# Patient Record
Sex: MALE | Race: WHITE | NOT HISPANIC OR LATINO
[De-identification: names, ages, dates, MRNs, and addresses within clinical notes are randomized per-mention and may not be internally consistent; named-entity substitution may affect disease eponyms.]

---

## 2017-03-01 ENCOUNTER — APPOINTMENT (OUTPATIENT)
Age: 52
End: 2017-03-01

## 2017-03-01 ENCOUNTER — APPOINTMENT (OUTPATIENT)
Dept: ORTHOPEDIC SURGERY | Facility: HOSPITAL | Age: 52
End: 2017-03-01

## 2017-03-01 ENCOUNTER — OUTPATIENT (OUTPATIENT)
Dept: OUTPATIENT SERVICES | Facility: HOSPITAL | Age: 52
LOS: 1 days | End: 2017-03-01
Payer: COMMERCIAL

## 2017-03-01 VITALS
BODY MASS INDEX: 31.52 KG/M2 | SYSTOLIC BLOOD PRESSURE: 122 MMHG | DIASTOLIC BLOOD PRESSURE: 78 MMHG | HEART RATE: 109 BPM | WEIGHT: 252.2 LBS

## 2017-03-01 PROCEDURE — 72170 X-RAY EXAM OF PELVIS: CPT | Mod: 26

## 2017-03-01 PROCEDURE — 73562 X-RAY EXAM OF KNEE 3: CPT | Mod: 26,76,RT

## 2017-03-02 DIAGNOSIS — M16.0 BILATERAL PRIMARY OSTEOARTHRITIS OF HIP: ICD-10-CM

## 2017-03-02 DIAGNOSIS — M17.0 BILATERAL PRIMARY OSTEOARTHRITIS OF KNEE: ICD-10-CM

## 2017-05-03 ENCOUNTER — OUTPATIENT (OUTPATIENT)
Dept: OUTPATIENT SERVICES | Facility: HOSPITAL | Age: 52
LOS: 1 days | End: 2017-05-03

## 2017-05-03 ENCOUNTER — APPOINTMENT (OUTPATIENT)
Dept: ORTHOPEDIC SURGERY | Facility: HOSPITAL | Age: 52
End: 2017-05-03

## 2017-05-03 VITALS
WEIGHT: 247.57 LBS | DIASTOLIC BLOOD PRESSURE: 82 MMHG | BODY MASS INDEX: 30.94 KG/M2 | HEART RATE: 103 BPM | SYSTOLIC BLOOD PRESSURE: 133 MMHG

## 2017-05-03 DIAGNOSIS — M17.0 BILATERAL PRIMARY OSTEOARTHRITIS OF KNEE: ICD-10-CM

## 2017-05-04 DIAGNOSIS — M17.0 BILATERAL PRIMARY OSTEOARTHRITIS OF KNEE: ICD-10-CM

## 2017-05-12 ENCOUNTER — EMERGENCY (EMERGENCY)
Facility: HOSPITAL | Age: 52
LOS: 1 days | End: 2017-05-12
Attending: EMERGENCY MEDICINE
Payer: MEDICARE

## 2017-05-12 VITALS
SYSTOLIC BLOOD PRESSURE: 148 MMHG | DIASTOLIC BLOOD PRESSURE: 98 MMHG | OXYGEN SATURATION: 96 % | TEMPERATURE: 99 F | RESPIRATION RATE: 16 BRPM | HEART RATE: 106 BPM

## 2017-05-12 PROCEDURE — 99283 EMERGENCY DEPT VISIT LOW MDM: CPT | Mod: 25

## 2017-05-12 PROCEDURE — 10060 I&D ABSCESS SIMPLE/SINGLE: CPT

## 2017-05-12 RX ADMIN — Medication 1 APPLICATION(S): at 13:25

## 2017-05-12 NOTE — ED ADULT TRIAGE NOTE - CHIEF COMPLAINT QUOTE
Pt sent from [facility] with small abscess and pain to R buttocks  x 2 days.  Pt on augmentin

## 2017-05-12 NOTE — ED PROVIDER NOTE - ATTENDING CONTRIBUTION TO CARE
Eriberto ELLINGTON- 50 y/o M with h/o bph, htn, obesity, bipolar and schizoaffective disorder from [facility] sent for evaluation of left buttock abscess. Pt states that he wears pull ups and pads and noticed a pimple which he scratched 5 days foley and got worse, never had abscess before and no fhx of dm or abscess    pt is alert, well appearing female, s1s2 normal reg, b/l clear breathe sounds, abd soft, nt,  nd, neuro exam aox3, cn 2-12 intact, skin warm, dry, good turgor, left buttock abscess adjacent to mid crease measuring 2 cm x 1.5 cm in size with diaper rash noted in that region    will perform I& d and discharge with clotrimazole cream and powder, advised to k eep it dry and clean, apply warm compressed

## 2017-05-12 NOTE — ED PROVIDER NOTE - OBJECTIVE STATEMENT
52 y/o male w/ hypthyroid, bph, bipolar d/o sent in from [facility] for right buttock abscess. patient reports it started 2 days ago as a small pumple now double in seize. started on antibiotics 2 days ago with no relief. No nausea vomiting or fever. No vomiting or diarrhea. 52 y/o male w/ hypothyroid, bph, bipolar d/o sent in from [facility] for right buttock abscess. Patient reports it started 2 days ago as a small pimple now double in size today.  Given Augmentin 2 days ago with no relief. No nausea vomiting or fever. No vomiting or diarrhea. ON ROS, patient has no other complaints.

## 2017-06-15 ENCOUNTER — EMERGENCY (EMERGENCY)
Facility: HOSPITAL | Age: 52
LOS: 1 days | Discharge: HOME | End: 2017-06-15
Attending: EMERGENCY MEDICINE | Admitting: EMERGENCY MEDICINE
Payer: COMMERCIAL

## 2017-06-15 VITALS
SYSTOLIC BLOOD PRESSURE: 150 MMHG | DIASTOLIC BLOOD PRESSURE: 83 MMHG | OXYGEN SATURATION: 100 % | TEMPERATURE: 99 F | HEART RATE: 97 BPM | RESPIRATION RATE: 14 BRPM

## 2017-06-15 PROCEDURE — 10061 I&D ABSCESS COMP/MULTIPLE: CPT

## 2017-06-15 PROCEDURE — 99282 EMERGENCY DEPT VISIT SF MDM: CPT | Mod: 25

## 2017-06-15 NOTE — ED PROVIDER NOTE - GASTROINTESTINAL, MLM
Abdomen soft, non-tender, no guarding.  2 cm lesion on right buttock, tender, no exudate, erythematous and firm.

## 2017-06-15 NOTE — ED PROVIDER NOTE - PROGRESS NOTE DETAILS
Abscess incised and drained.  Per Ran[facility] has its own pharmacy, and we just need to write on the instructions the medications we want.  Patient instructed to take Clindamycin 300mg 4 days a day for 1 week.

## 2017-06-15 NOTE — ED PROVIDER NOTE - GASTROINTESTINAL NEGATIVE STATEMENT, MLM
no abdominal pain, no bloating, no constipation, no diarrhea, no nausea and no vomiting.  Gluteal lesion per HPI

## 2017-06-15 NOTE — ED PROVIDER NOTE - ATTENDING CONTRIBUTION TO CARE
Dr. Love:  I have personally performed a face to face bedside history and physical examination of this patient. I have discussed the history, examination, review of systems, assessment and plan of management with the resident. I have reviewed the electronic medical record and amended it to reflect my history, review of systems, physical exam, assessment and plan.    51M h/o schizoaffective disorder sent from [facility] for right buttock pain and swelling and possibly recurrent abscess.  Patient had needle aspiration of abscess in that location approximately one month prior.  Denies constitutional symptoms.    Exam:  - nad  - rrr  - ctab  - abd soft, ntnd  - +0.5cm area of fluctuance with surrounding erythema and induration to medial side of right buttock, no rectal/anal involvement on exam    A/P  - abscess and cellulitis  - I&D, clindamycin  - f/u PCP

## 2017-06-15 NOTE — ED ADULT TRIAGE NOTE - CHIEF COMPLAINT QUOTE
Patient was sent from [facility] patient complains of pain to the right buttocks area states he has an abscess.

## 2017-06-15 NOTE — ED PROVIDER NOTE - OBJECTIVE STATEMENT
51 y.o. Male PMH schizoaffective d/o, enuresis, recent Needle drainage of gluteal abscess presents from [place] with return of painful guteal lesion.  No fevers, no painful defecation as lesion is away from anus more on cheek.  Patient wears a diaper at night for enuresis.

## 2017-06-23 ENCOUNTER — EMERGENCY (EMERGENCY)
Facility: HOSPITAL | Age: 52
LOS: 1 days | Discharge: HOME | End: 2017-06-23
Attending: EMERGENCY MEDICINE | Admitting: EMERGENCY MEDICINE
Payer: COMMERCIAL

## 2017-06-23 VITALS
TEMPERATURE: 98 F | DIASTOLIC BLOOD PRESSURE: 95 MMHG | OXYGEN SATURATION: 98 % | SYSTOLIC BLOOD PRESSURE: 130 MMHG | HEART RATE: 85 BPM | RESPIRATION RATE: 22 BRPM

## 2017-06-23 VITALS
OXYGEN SATURATION: 97 % | DIASTOLIC BLOOD PRESSURE: 86 MMHG | TEMPERATURE: 98 F | HEART RATE: 103 BPM | SYSTOLIC BLOOD PRESSURE: 130 MMHG | RESPIRATION RATE: 20 BRPM

## 2017-06-23 LAB
ALBUMIN SERPL ELPH-MCNC: 4.2 G/DL — SIGNIFICANT CHANGE UP (ref 3.3–5)
ALP SERPL-CCNC: 103 U/L — SIGNIFICANT CHANGE UP (ref 40–120)
ALT FLD-CCNC: 55 U/L — HIGH (ref 4–41)
AST SERPL-CCNC: 31 U/L — SIGNIFICANT CHANGE UP (ref 4–40)
BASE EXCESS BLDV CALC-SCNC: 4.3 MMOL/L — SIGNIFICANT CHANGE UP
BASOPHILS # BLD AUTO: 0.07 K/UL — SIGNIFICANT CHANGE UP (ref 0–0.2)
BASOPHILS NFR BLD AUTO: 1 % — SIGNIFICANT CHANGE UP (ref 0–2)
BILIRUB SERPL-MCNC: 0.3 MG/DL — SIGNIFICANT CHANGE UP (ref 0.2–1.2)
BLOOD GAS VENOUS - CREATININE: 0.67 MG/DL — SIGNIFICANT CHANGE UP (ref 0.5–1.3)
BUN SERPL-MCNC: 15 MG/DL — SIGNIFICANT CHANGE UP (ref 7–23)
CALCIUM SERPL-MCNC: 10.7 MG/DL — HIGH (ref 8.4–10.5)
CHLORIDE BLDV-SCNC: 106 MMOL/L — SIGNIFICANT CHANGE UP (ref 96–108)
CHLORIDE SERPL-SCNC: 106 MMOL/L — SIGNIFICANT CHANGE UP (ref 98–107)
CK MB BLD-MCNC: 5.93 NG/ML — SIGNIFICANT CHANGE UP (ref 1–6.6)
CK SERPL-CCNC: 218 U/L — HIGH (ref 30–200)
CO2 SERPL-SCNC: 27 MMOL/L — SIGNIFICANT CHANGE UP (ref 22–31)
CREAT SERPL-MCNC: 0.91 MG/DL — SIGNIFICANT CHANGE UP (ref 0.5–1.3)
EOSINOPHIL # BLD AUTO: 0.06 K/UL — SIGNIFICANT CHANGE UP (ref 0–0.5)
EOSINOPHIL NFR BLD AUTO: 0.9 % — SIGNIFICANT CHANGE UP (ref 0–6)
GAS PNL BLDV: 138 MMOL/L — SIGNIFICANT CHANGE UP (ref 136–146)
GLUCOSE BLDV-MCNC: 102 — HIGH (ref 70–99)
GLUCOSE SERPL-MCNC: 99 MG/DL — SIGNIFICANT CHANGE UP (ref 70–99)
HCO3 BLDV-SCNC: 28 MMOL/L — HIGH (ref 20–27)
HCT VFR BLD CALC: 46.4 % — SIGNIFICANT CHANGE UP (ref 39–50)
HCT VFR BLDV CALC: 46.3 % — SIGNIFICANT CHANGE UP (ref 39–51)
HGB BLD-MCNC: 14.7 G/DL — SIGNIFICANT CHANGE UP (ref 13–17)
HGB BLDV-MCNC: 15.1 G/DL — SIGNIFICANT CHANGE UP (ref 13–17)
IMM GRANULOCYTES NFR BLD AUTO: 0.1 % — SIGNIFICANT CHANGE UP (ref 0–1.5)
LACTATE BLDV-MCNC: 1 MMOL/L — SIGNIFICANT CHANGE UP (ref 0.5–2)
LYMPHOCYTES # BLD AUTO: 1.49 K/UL — SIGNIFICANT CHANGE UP (ref 1–3.3)
LYMPHOCYTES # BLD AUTO: 21.7 % — SIGNIFICANT CHANGE UP (ref 13–44)
MCHC RBC-ENTMCNC: 28.4 PG — SIGNIFICANT CHANGE UP (ref 27–34)
MCHC RBC-ENTMCNC: 31.7 % — LOW (ref 32–36)
MCV RBC AUTO: 89.6 FL — SIGNIFICANT CHANGE UP (ref 80–100)
MONOCYTES # BLD AUTO: 0.34 K/UL — SIGNIFICANT CHANGE UP (ref 0–0.9)
MONOCYTES NFR BLD AUTO: 4.9 % — SIGNIFICANT CHANGE UP (ref 2–14)
NEUTROPHILS # BLD AUTO: 4.91 K/UL — SIGNIFICANT CHANGE UP (ref 1.8–7.4)
NEUTROPHILS NFR BLD AUTO: 71.4 % — SIGNIFICANT CHANGE UP (ref 43–77)
PCO2 BLDV: 41 MMHG — SIGNIFICANT CHANGE UP (ref 41–51)
PH BLDV: 7.45 PH — HIGH (ref 7.32–7.43)
PLATELET # BLD AUTO: 342 K/UL — SIGNIFICANT CHANGE UP (ref 150–400)
PMV BLD: 9.5 FL — SIGNIFICANT CHANGE UP (ref 7–13)
PO2 BLDV: 120 MMHG — HIGH (ref 35–40)
POTASSIUM BLDV-SCNC: 4.1 MMOL/L — SIGNIFICANT CHANGE UP (ref 3.4–4.5)
POTASSIUM SERPL-MCNC: 4.4 MMOL/L — SIGNIFICANT CHANGE UP (ref 3.5–5.3)
POTASSIUM SERPL-SCNC: 4.4 MMOL/L — SIGNIFICANT CHANGE UP (ref 3.5–5.3)
PROT SERPL-MCNC: 6.8 G/DL — SIGNIFICANT CHANGE UP (ref 6–8.3)
RBC # BLD: 5.18 M/UL — SIGNIFICANT CHANGE UP (ref 4.2–5.8)
RBC # FLD: 13.9 % — SIGNIFICANT CHANGE UP (ref 10.3–14.5)
SAO2 % BLDV: 99.1 % — HIGH (ref 60–85)
SODIUM SERPL-SCNC: 143 MMOL/L — SIGNIFICANT CHANGE UP (ref 135–145)
TROPONIN T SERPL-MCNC: < 0.06 NG/ML — SIGNIFICANT CHANGE UP (ref 0–0.06)
WBC # BLD: 6.88 K/UL — SIGNIFICANT CHANGE UP (ref 3.8–10.5)
WBC # FLD AUTO: 6.88 K/UL — SIGNIFICANT CHANGE UP (ref 3.8–10.5)

## 2017-06-23 PROCEDURE — 99284 EMERGENCY DEPT VISIT MOD MDM: CPT

## 2017-06-23 PROCEDURE — 71020: CPT | Mod: 26

## 2017-06-23 NOTE — ED ADULT NURSE NOTE - OBJECTIVE STATEMENT
Pt received to Rm 09, with c/o of pressure-like pain to the midsternal chest area (6 out of 10) starting since last night. Pt denies SOB/chest pain/N/V/D/dizzines. EKG was performed. ED provider was at bedside evaluating. Pt is AxOx4 and  in NAD at this time, NSR on monitor. Lab sent and aide from [facility] is at bedside. 20G PIV is placed to right hand. will continue to monitor

## 2017-06-23 NOTE — ED PROVIDER NOTE - PLAN OF CARE
Follow up with your PMD. Rest, drink plenty of fluids.  Advance activity as tolerated.  Continue all previously prescribed medications as directed. You can use motrin 600mg every 6-8 hours for pain or fever, and/or Tylenol 650 mg every 4 hours for pain/fever. Follow up with your primary care physician in 48-72 hours- bring copies of your results.  Return to the emergency department for chest pain, shortness of breath, dizziness, or worsening, concerning, or persistent symptoms.

## 2017-06-23 NOTE — ED PROVIDER NOTE - CARE PLAN
Principal Discharge DX:	Chest pressure Principal Discharge DX:	Chest pressure  Instructions for follow-up, activity and diet:	Follow up with your PMD. Rest, drink plenty of fluids.  Advance activity as tolerated.  Continue all previously prescribed medications as directed. You can use motrin 600mg every 6-8 hours for pain or fever, and/or Tylenol 650 mg every 4 hours for pain/fever. Follow up with your primary care physician in 48-72 hours- bring copies of your results.  Return to the emergency department for chest pain, shortness of breath, dizziness, or worsening, concerning, or persistent symptoms.

## 2017-06-23 NOTE — ED PROVIDER NOTE - MEDICAL DECISION MAKING DETAILS
50 yo M here for chest pressure x 2 days, constant. Reproducible with palpation to anterior thorax. No other symptoms. EKG normal. PLAN: labs. cxr.

## 2017-06-23 NOTE — ED ADULT TRIAGE NOTE - CHIEF COMPLAINT QUOTE
pt from [facility] with staff member. pt c/o chest pressure substernal non radiating, began yesterday. Denies SOB or LUZ MARINA, pt appears comfortable

## 2017-06-23 NOTE — ED ADULT NURSE NOTE - CHPI ED SYMPTOMS NEG
no fever/no nausea/no dizziness/no vomiting/no tingling/no weakness/no chills/no numbness/no decreased eating/drinking

## 2017-06-23 NOTE — ED PROVIDER NOTE - OBJECTIVE STATEMENT
50 yo M pmhx of schizoaffective d/o here for chest pressure x 2 days. Otherwise without complaints. Denies injury or trauma. Denies better or worse with position or exercise/rest, denies fhx of cardiac disease. denies personal hx of cardiac issues. Pt states he used to use cocaine 3 yrs ago, denies drug use/smoking since. Denies travel/surgery. Denies fever, chills, vomiting, diarrhea, HA, SOB, dizziness, weakness, abdominal pain, numbness tingling. 52 yo M pmhx of schizoaffective d/o here for chest pressure x 2 days. Otherwise without complaints. Denies injury or trauma. Denies better or worse with position or exercise/rest, denies fhx of cardiac disease. denies personal hx of cardiac issues. Pt states he used to use cocaine 3 yrs ago, denies drug use/smoking since. Denies travel/surgery. Denies fever, chills, vomiting, diarrhea, HA, SOB, dizziness, weakness, abdominal pain, numbness tingling.    Attendinyo male with schizoaffective disorder presents with 2 days of chest pain.  Pain constant, but much less intense today.  no shortness of breath.

## 2017-06-23 NOTE — ED PROVIDER NOTE - CHPI ED SYMPTOMS NEG
no fever/no vomiting/no nausea/no decreased eating/drinking/no tingling/no chills/no weakness/no dizziness/no numbness

## 2017-06-23 NOTE — ED PROVIDER NOTE - CARDIAC, MLM
Normal rate, regular rhythm.  Heart sounds S1, S2.  No murmurs, rubs or gallops. +reproducible anterior thorax tenderness

## 2017-07-12 ENCOUNTER — APPOINTMENT (OUTPATIENT)
Dept: SURGERY | Facility: CLINIC | Age: 52
End: 2017-07-12

## 2017-07-12 VITALS
TEMPERATURE: 98.4 F | HEIGHT: 75 IN | BODY MASS INDEX: 31.08 KG/M2 | HEART RATE: 103 BPM | DIASTOLIC BLOOD PRESSURE: 92 MMHG | WEIGHT: 250 LBS | SYSTOLIC BLOOD PRESSURE: 140 MMHG

## 2017-07-12 DIAGNOSIS — L72.9 FOLLICULAR CYST OF THE SKIN AND SUBCUTANEOUS TISSUE, UNSPECIFIED: ICD-10-CM

## 2017-07-20 ENCOUNTER — APPOINTMENT (OUTPATIENT)
Dept: SURGERY | Facility: HOSPITAL | Age: 52
End: 2017-07-20

## 2017-07-20 ENCOUNTER — OUTPATIENT (OUTPATIENT)
Dept: OUTPATIENT SERVICES | Facility: HOSPITAL | Age: 52
LOS: 1 days | Discharge: HOME | End: 2017-07-20
Payer: COMMERCIAL

## 2017-07-20 VITALS
OXYGEN SATURATION: 97 % | HEIGHT: 75 IN | HEART RATE: 96 BPM | TEMPERATURE: 97 F | SYSTOLIC BLOOD PRESSURE: 129 MMHG | DIASTOLIC BLOOD PRESSURE: 85 MMHG | WEIGHT: 250 LBS | RESPIRATION RATE: 16 BRPM

## 2017-07-20 VITALS
OXYGEN SATURATION: 96 % | DIASTOLIC BLOOD PRESSURE: 75 MMHG | HEART RATE: 91 BPM | SYSTOLIC BLOOD PRESSURE: 121 MMHG | RESPIRATION RATE: 16 BRPM

## 2017-07-20 DIAGNOSIS — L72.9 FOLLICULAR CYST OF THE SKIN AND SUBCUTANEOUS TISSUE, UNSPECIFIED: ICD-10-CM

## 2017-07-20 PROCEDURE — 46275 REMOVE ANAL FIST INTER: CPT

## 2017-07-20 RX ORDER — SODIUM CHLORIDE 9 MG/ML
1000 INJECTION, SOLUTION INTRAVENOUS
Qty: 0 | Refills: 0 | Status: DISCONTINUED | OUTPATIENT
Start: 2017-07-20 | End: 2017-08-04

## 2017-07-20 RX ORDER — ACETAMINOPHEN 500 MG
1000 TABLET ORAL ONCE
Qty: 0 | Refills: 0 | Status: DISCONTINUED | OUTPATIENT
Start: 2017-07-20 | End: 2017-07-20

## 2017-07-20 RX ORDER — FENTANYL CITRATE 50 UG/ML
25 INJECTION INTRAVENOUS
Qty: 0 | Refills: 0 | Status: DISCONTINUED | OUTPATIENT
Start: 2017-07-20 | End: 2017-07-20

## 2017-07-20 RX ORDER — SODIUM CHLORIDE 9 MG/ML
1000 INJECTION, SOLUTION INTRAVENOUS
Qty: 0 | Refills: 0 | Status: DISCONTINUED | OUTPATIENT
Start: 2017-07-20 | End: 2017-07-20

## 2017-07-20 RX ADMIN — SODIUM CHLORIDE 75 MILLILITER(S): 9 INJECTION, SOLUTION INTRAVENOUS at 11:50

## 2017-07-20 NOTE — ASU DISCHARGE PLAN (ADULT/PEDIATRIC). - NOTIFY
Unable to Urinate/Swelling that continues/Bleeding that does not stop/Persistent Nausea and Vomiting/Fever greater than 101

## 2017-07-20 NOTE — H&P PST ADULT - ASSESSMENT
52M resident of [facility] with PMH hypothyroid, suspected hyperparathryoid, BPH, enuresis presents for scheduled excision of right buttock cyst.

## 2017-07-20 NOTE — H&P PST ADULT - HISTORY OF PRESENT ILLNESS
52M resident of [facility] with PMH schizophrenia, hypothyroid, suspected hyperparathryoid, BPH, enuresis presents for scheduled excision of right buttock cyst.

## 2017-07-20 NOTE — ASU DISCHARGE PLAN (ADULT/PEDIATRIC). - MEDICATION SUMMARY - MEDICATIONS TO TAKE
I will START or STAY ON the medications listed below when I get home from the hospital:    naproxen 500 mg oral tablet  -- 1 tab(s) by mouth 2 times a day, As needed, severe pain  -- Indication: For .    acetaminophen 325 mg oral tablet  -- 2 tab(s) by mouth every 6 hours, As needed, Moderate Pain  -- Indication: For .    Percocet 5/325 325 mg-5 mg oral tablet  -- 1-2 tab(s) by mouth every 4 to 6 hours, As Needed MDD:6 tabs - for severe pain if aleve does not help  -- Caution federal law prohibits the transfer of this drug to any person other  than the person for whom it was prescribed.  May cause drowsiness.  Alcohol may intensify this effect.  Use care when operating dangerous machinery.  This prescription cannot be refilled.  This product contains acetaminophen.  Do not use  with any other product containing acetaminophen to prevent possible liver damage.  Using more of this medication than prescribed may cause serious breathing problems.    -- Indication: For severe pain    calcium carbonate 1250 mg (500 mg elemental calcium) oral tablet  -- 1 tab(s) by mouth once a day  -- Indication: For .    Depakote  mg oral tablet, extended release  -- 5 tab(s) by mouth once a day (at bedtime)  -- Indication: For .    clonazePAM 1 mg oral tablet  -- 1 tab(s) by mouth 2 times a day  -- Indication: For .    desmopressin 0.01% nasal solution  -- 3 spray(s) into nose   -- Indication: For .    cloZAPine 200 mg oral tablet  -- 4 tab(s) by mouth   -- Indication: For .    famotidine 20 mg oral tablet  -- 1 tab(s) by mouth 2 times a day, As needed, dyspepsia  -- Indication: For .    bisacodyl 5 mg oral delayed release tablet  -- 1 tab(s) by mouth every 12 hours, As needed, Constipation  -- Indication: For .    docusate sodium 100 mg oral capsule  -- 1 cap(s) by mouth once a day  -- Indication: For .    polyethylene glycol 3350 oral powder for reconstitution  -- 17 gram(s) by mouth once a day  -- Indication: For .    levothyroxine 175 mcg (0.175 mg) oral tablet  -- 1 tab(s) by mouth once a day  -- Indication: For .    cholecalciferol oral tablet  -- 1000 unit(s) by mouth once a day  -- Indication: For .    folic acid 1 mg oral tablet  -- 1 tab(s) by mouth once a day  -- Indication: For ..

## 2017-07-21 ENCOUNTER — TRANSCRIPTION ENCOUNTER (OUTPATIENT)
Age: 52
End: 2017-07-21

## 2017-07-25 DIAGNOSIS — R32 UNSPECIFIED URINARY INCONTINENCE: ICD-10-CM

## 2017-07-25 DIAGNOSIS — N40.0 BENIGN PROSTATIC HYPERPLASIA WITHOUT LOWER URINARY TRACT SYMPTOMS: ICD-10-CM

## 2017-07-25 DIAGNOSIS — E03.9 HYPOTHYROIDISM, UNSPECIFIED: ICD-10-CM

## 2017-07-25 DIAGNOSIS — F25.9 SCHIZOAFFECTIVE DISORDER, UNSPECIFIED: ICD-10-CM

## 2017-07-25 DIAGNOSIS — K61.0 ANAL ABSCESS: ICD-10-CM

## 2017-07-25 DIAGNOSIS — I10 ESSENTIAL (PRIMARY) HYPERTENSION: ICD-10-CM

## 2017-07-25 DIAGNOSIS — F19.19 OTHER PSYCHOACTIVE SUBSTANCE ABUSE WITH UNSPECIFIED PSYCHOACTIVE SUBSTANCE-INDUCED DISORDER: ICD-10-CM

## 2017-07-25 DIAGNOSIS — Z79.1 LONG TERM (CURRENT) USE OF NON-STEROIDAL ANTI-INFLAMMATORIES (NSAID): ICD-10-CM

## 2017-07-31 ENCOUNTER — APPOINTMENT (OUTPATIENT)
Dept: SURGERY | Facility: CLINIC | Age: 52
End: 2017-07-31
Payer: MEDICARE

## 2017-07-31 VITALS
SYSTOLIC BLOOD PRESSURE: 138 MMHG | HEIGHT: 75 IN | HEART RATE: 102 BPM | BODY MASS INDEX: 31.08 KG/M2 | WEIGHT: 250 LBS | TEMPERATURE: 98.5 F | DIASTOLIC BLOOD PRESSURE: 88 MMHG

## 2017-07-31 DIAGNOSIS — Z09 ENCOUNTER FOR FOLLOW-UP EXAMINATION AFTER COMPLETED TREATMENT FOR CONDITIONS OTHER THAN MALIGNANT NEOPLASM: ICD-10-CM

## 2017-07-31 PROCEDURE — 99024 POSTOP FOLLOW-UP VISIT: CPT

## 2017-08-14 ENCOUNTER — APPOINTMENT (OUTPATIENT)
Dept: SURGERY | Facility: CLINIC | Age: 52
End: 2017-08-14
Payer: MEDICARE

## 2017-08-14 VITALS
BODY MASS INDEX: 31.08 KG/M2 | WEIGHT: 250 LBS | HEIGHT: 75 IN | TEMPERATURE: 98.1 F | HEART RATE: 88 BPM | SYSTOLIC BLOOD PRESSURE: 122 MMHG | DIASTOLIC BLOOD PRESSURE: 75 MMHG

## 2017-08-14 PROCEDURE — 99024 POSTOP FOLLOW-UP VISIT: CPT

## 2017-08-29 NOTE — ED PROVIDER NOTE - GASTROINTESTINAL NEGATIVE STATEMENT, MLM
Accession #: [id number] no abdominal pain, no bloating, no constipation, no diarrhea, no nausea and no vomiting.

## 2017-09-06 ENCOUNTER — APPOINTMENT (OUTPATIENT)
Dept: SURGERY | Facility: CLINIC | Age: 52
End: 2017-09-06
Payer: MEDICARE

## 2017-09-06 VITALS
HEIGHT: 75 IN | SYSTOLIC BLOOD PRESSURE: 105 MMHG | BODY MASS INDEX: 31.08 KG/M2 | HEART RATE: 99 BPM | TEMPERATURE: 98.3 F | DIASTOLIC BLOOD PRESSURE: 73 MMHG | WEIGHT: 250 LBS

## 2017-09-06 PROCEDURE — 99024 POSTOP FOLLOW-UP VISIT: CPT

## 2017-10-11 ENCOUNTER — APPOINTMENT (OUTPATIENT)
Dept: SURGERY | Facility: CLINIC | Age: 52
End: 2017-10-11
Payer: MEDICARE

## 2017-10-11 VITALS
WEIGHT: 250 LBS | BODY MASS INDEX: 31.08 KG/M2 | HEIGHT: 75 IN | HEART RATE: 112 BPM | DIASTOLIC BLOOD PRESSURE: 76 MMHG | SYSTOLIC BLOOD PRESSURE: 124 MMHG | TEMPERATURE: 97.5 F

## 2017-10-11 PROCEDURE — 99024 POSTOP FOLLOW-UP VISIT: CPT

## 2017-11-09 ENCOUNTER — EMERGENCY (EMERGENCY)
Facility: HOSPITAL | Age: 52
LOS: 1 days | Discharge: HOME | End: 2017-11-09
Attending: EMERGENCY MEDICINE | Admitting: EMERGENCY MEDICINE
Payer: MEDICAID

## 2017-11-09 VITALS
DIASTOLIC BLOOD PRESSURE: 89 MMHG | RESPIRATION RATE: 16 BRPM | SYSTOLIC BLOOD PRESSURE: 137 MMHG | TEMPERATURE: 99 F | OXYGEN SATURATION: 100 % | HEART RATE: 89 BPM

## 2017-11-09 PROCEDURE — 99284 EMERGENCY DEPT VISIT MOD MDM: CPT

## 2017-11-09 NOTE — ED ADULT TRIAGE NOTE - CHIEF COMPLAINT QUOTE
pt amb to ambarturoy c/o B/L foot pain 2/2 corns x multiple months, also c/o B/L hand tingling in 5th digit, no neuro deficits noted, pt states walked to [facility] from [facility], MD Schmidt called to triage for eval

## 2017-11-09 NOTE — ED ADULT NURSE NOTE - CHIEF COMPLAINT QUOTE
pt amb to ambarturoy c/o B/L foot pain 2/2 corns x multiple months, also c/o B/L hand tingling in 5th digit, no neuro deficits noted, pt states walked to [facility] from [facility], MD Schmidt called to triage for eval

## 2017-11-09 NOTE — ED PROVIDER NOTE - MEDICAL DECISION MAKING DETAILS
pt from [facility], presenting because he doesn't want to live at [facility]. Also doesn't want to go to shelter. Advised that he needs to return to [facility]. chronic paresthesias with normal exam, unlikely emergent pathology, below test threashold. f/u pmd.  The patient was given verbal and written discharge instructions. Specifically, instructions when to return to the ED and when to seek follow-up from their pcp was discussed. Any specialty follow-up was discussed, including how to make an appointment.  Instructions were discussed in simple, plain language and was understood by the patient. The patient understands that should their symptoms worsen or any new symptoms arise, they should return to the ED immediately for further evaluation.

## 2017-11-09 NOTE — ED ADULT NURSE REASSESSMENT NOTE - NS ED NURSE REASSESS COMMENT FT1
pt returned to [facility] stating "I still don't feel fell," MD Schmidt aware, pt brought to intake chairs for further eval.

## 2017-11-09 NOTE — ED PROVIDER NOTE - OBJECTIVE STATEMENT
52m, pmhx schizoaffective, from [facility] outpatient. [facility] and [facility] not want to live there anymore. on his way to the ED, noted bleeding from right d3 toe that stopped spontaneously. also noted chronic b/l hand and feet tingling. no weakness, numbness, h/a, n/v, f/c. no other complaints.

## 2017-11-10 ENCOUNTER — APPOINTMENT (OUTPATIENT)
Dept: PODIATRY | Facility: HOSPITAL | Age: 52
End: 2017-11-10

## 2017-11-10 ENCOUNTER — EMERGENCY (EMERGENCY)
Facility: HOSPITAL | Age: 52
LOS: 1 days | Discharge: HOME | End: 2017-11-10
Attending: EMERGENCY MEDICINE | Admitting: EMERGENCY MEDICINE
Payer: MEDICAID

## 2017-11-10 ENCOUNTER — OUTPATIENT (OUTPATIENT)
Dept: OUTPATIENT SERVICES | Facility: HOSPITAL | Age: 52
LOS: 1 days | End: 2017-11-10
Payer: MEDICAID

## 2017-11-10 VITALS
SYSTOLIC BLOOD PRESSURE: 158 MMHG | BODY MASS INDEX: 30.34 KG/M2 | HEART RATE: 88 BPM | RESPIRATION RATE: 14 BRPM | HEIGHT: 75 IN | DIASTOLIC BLOOD PRESSURE: 83 MMHG | WEIGHT: 244 LBS

## 2017-11-10 VITALS
SYSTOLIC BLOOD PRESSURE: 154 MMHG | HEART RATE: 96 BPM | RESPIRATION RATE: 16 BRPM | TEMPERATURE: 98 F | DIASTOLIC BLOOD PRESSURE: 89 MMHG | OXYGEN SATURATION: 98 %

## 2017-11-10 DIAGNOSIS — B35.1 TINEA UNGUIUM: ICD-10-CM

## 2017-11-10 DIAGNOSIS — L84 CORNS AND CALLOSITIES: ICD-10-CM

## 2017-11-10 DIAGNOSIS — S90.222A CONTUSION OF LEFT LESSER TOE(S) WITH DAMAGE TO NAIL, INITIAL ENCOUNTER: ICD-10-CM

## 2017-11-10 DIAGNOSIS — M79.609 PAIN IN UNSPECIFIED LIMB: ICD-10-CM

## 2017-11-10 PROCEDURE — 99282 EMERGENCY DEPT VISIT SF MDM: CPT | Mod: GC

## 2017-11-10 PROCEDURE — G0463: CPT

## 2017-11-10 NOTE — PROVIDER CONTACT NOTE (OTHER) - ACTION/TREATMENT ORDERED:
ED attending was informed, paper work regarding insurance info provided, Pt will be transferred from ED to clinic by ambulate transportation.

## 2017-11-10 NOTE — PROVIDER CONTACT NOTE (OTHER) - BACKGROUND
Dr. eLv MD informed [facility]'s is from [facility] and need ride to the [address] [place] [address]. writer contacted [facility] and  -  Ms. Adams.

## 2017-11-10 NOTE — PROVIDER CONTACT NOTE (OTHER) - ASSESSMENT
Pt was referred to ED CM to make an appointment for Podiatry clinic, Pt Spoke to Queenie coordinator in Podiatry clinic at [phone], Pt was referred to ED CM to make an appointment for Podiatry clinic, Pt is from [facility] at [phone], facillity applied for Medicaid and [employer] coverage and is in process. Spoke to Queenie coordinator in Podiatry clinic at [phone], Clinic accepted the pt's coverage and they will bill back Medicaid. Appointment made for 12:30 PM at [address] [place] [address] , [place] [address], wound clinic, [facility] 3rd floor.

## 2017-11-10 NOTE — ED PROVIDER NOTE - OBJECTIVE STATEMENT
Pt is a 52M with a PMH of schizoaffective dx, bipolar dx, on meds, presenting with a cc of b/l foot pain 2/2 callus and ingrown nail R 3rd digit, pt was seen in [facility] ED yesterday was d/c thereafter reports did not return to [place] home, spent night on bench in [facility], reports did not want to go back to [place] w/ c/f "they're stealing money" and "bad things happen there." Reports would be happy to go to [facility]. Otherwise well, no complaints. No numbness or tingling, loss of sensation or function. Denies n/v/f/c/cp/sob. Denies headache, syncope, lightheadedness, dizziness. Denies chest palpitations, abdominal pain. Denies dysuria, hematuria, hematochezia, BRBPR, tarry stools, diarrhea, constipation. Pt is a 52M with a PMH of schizoaffective dx, bipolar dx, on meds, presenting with a cc of b/l foot pain 2/2 callus and ingrown nail R 3rd digit, pt was seen in [facility] ED yesterday was d/c thereafter reports did not return to [place] home, spent night [place] bench in [facility], reports did not want to go back to [place] w/ c/f "they're stealing money" and "bad things happen there." Reports would be happy to go to [facility]. Otherwise well, no complaints. No numbness or tingling, loss of sensation or function. Denies n/v/f/c/cp/sob. Denies headache, syncope, lightheadedness, dizziness. Denies chest palpitations, abdominal pain. Denies dysuria, hematuria, hematochezia, BRBPR, tarry stools, diarrhea, constipation.  Attending - Agree with above.  I evaluated patient myself.  Previous [facility] records reviewed.  51 y/o M hx schizoaffective disorder, patient does not know meds.  States was walking around all night because does not want to return to [facility].  To ED c/o b/l feet pains from long toenails and callouses.  Denies SI/HI/AH/VH.  No fever or other complaint.

## 2017-11-10 NOTE — ED PROVIDER NOTE - CARE PLAN
Principal Discharge DX:	Callus of foot  Instructions for follow-up, activity and diet:	Thank you for visiting our Emergency Department, it has been a pleasure taking part in your healthcare.    Your discharge diagnosis is: callus  Please follow up with your PMD within x48 hours.  Please follow up with podiatry, a list of providers has been given to you.     Please take all discharge medications as indicated below:  Take Motrin/Tylenol for pain as needed, please follow instructions on manufacturers label. If you have any questions please consult a pharmacist or your PMD.  Return precautions to the Emergency Department include: unrelenting nausea, vomiting, fever, chills, chest pain, shortness of breath, dizziness, abdominal pain, syncope, blood in urine or stool, headache that doesn't resolve, numbness or tingling, loss of sensation, loss of motor function, or any other concerning symptoms. Principal Discharge DX:	Callus of foot  Instructions for follow-up, activity and diet:	Thank you for visiting our Emergency Department, it has been a pleasure taking part in your healthcare.    Your discharge diagnosis is: callus  Please follow up with your PMD within x48 hours.  Please follow up with podiatry, a list of providers has been given to you.     Please follow up with Podiatry clinic today:   Please eval for b/l plantar/lateral callus, ingrown toe nails     Please take all discharge medications as indicated below:  Take Motrin/Tylenol for pain as needed, please follow instructions on manufacturers label. If you have any questions please consult a pharmacist or your PMD.  Return precautions to the Emergency Department include: unrelenting nausea, vomiting, fever, chills, chest pain, shortness of breath, dizziness, abdominal pain, syncope, blood in urine or stool, headache that doesn't resolve, numbness or tingling, loss of sensation, loss of motor function, or any other concerning symptoms. Principal Discharge DX:	Callus of foot  Instructions for follow-up, activity and diet:	Thank you for visiting our Emergency Department, it has been a pleasure taking part in your healthcare.    Your discharge diagnosis is: callus  Please follow up with your PMD within x48 hours.  Please follow up with podiatry, a list of providers has been given to you.     Please follow up with Podiatry clinic today:   Please eval for b/l plantar/lateral callus, ingrown toe nails     Pt from [facility]  [phone]    Please take all discharge medications as indicated below:  Take Motrin/Tylenol for pain as needed, please follow instructions on manufacturers label. If you have any questions please consult a pharmacist or your PMD.  Return precautions to the Emergency Department include: unrelenting nausea, vomiting, fever, chills, chest pain, shortness of breath, dizziness, abdominal pain, syncope, blood in urine or stool, headache that doesn't resolve, numbness or tingling, loss of sensation, loss of motor function, or any other concerning symptoms.

## 2017-11-10 NOTE — ED ADULT TRIAGE NOTE - CHIEF COMPLAINT QUOTE
[facility] [address] for b/l foot pain 2/2 overgrown nails and bleeding callus on R foot, seen and d/c'd yesterday for same, states walked to [address] to watch cars, pt appears in NAD @ this time

## 2017-11-10 NOTE — PROVIDER CONTACT NOTE (OTHER) - ASSESSMENT
[facility] and spoke with  who informed pt just started with us 2 weeks ago and his insurance has not yet kicked in. Dr. Lev MD informed pt has to be in the [facility] by 11:00 am for wound care. Writer called back and left message  - [facility] and  -  Ms. Adams requesting about the ambulette bill back. Writer discussed this case with Asst Dir of  Ms. Franco ([id number]) and agreed to pay for this trip. Writer contacted senior ride [phone] and set up wheel chair  ambulette and pt will be picked up by 11:00 am and trip # [phone].  Medical team and pt was made aware of  time.

## 2017-11-10 NOTE — ED PROVIDER NOTE - PLAN OF CARE
Thank you for visiting our Emergency Department, it has been a pleasure taking part in your healthcare.    Your discharge diagnosis is: callus  Please follow up with your PMD within x48 hours.  Please follow up with podiatry, a list of providers has been given to you.     Please take all discharge medications as indicated below:  Take Motrin/Tylenol for pain as needed, please follow instructions on manufacturers label. If you have any questions please consult a pharmacist or your PMD.  Return precautions to the Emergency Department include: unrelenting nausea, vomiting, fever, chills, chest pain, shortness of breath, dizziness, abdominal pain, syncope, blood in urine or stool, headache that doesn't resolve, numbness or tingling, loss of sensation, loss of motor function, or any other concerning symptoms. Thank you for visiting our Emergency Department, it has been a pleasure taking part in your healthcare.    Your discharge diagnosis is: callus  Please follow up with your PMD within x48 hours.  Please follow up with podiatry, a list of providers has been given to you.     Please follow up with Podiatry clinic today:   Please eval for b/l plantar/lateral callus, ingrown toe nails     Please take all discharge medications as indicated below:  Take Motrin/Tylenol for pain as needed, please follow instructions on manufacturers label. If you have any questions please consult a pharmacist or your PMD.  Return precautions to the Emergency Department include: unrelenting nausea, vomiting, fever, chills, chest pain, shortness of breath, dizziness, abdominal pain, syncope, blood in urine or stool, headache that doesn't resolve, numbness or tingling, loss of sensation, loss of motor function, or any other concerning symptoms. Thank you for visiting our Emergency Department, it has been a pleasure taking part in your healthcare.    Your discharge diagnosis is: callus  Please follow up with your PMD within x48 hours.  Please follow up with podiatry, a list of providers has been given to you.     Please follow up with Podiatry clinic today:   Please eval for b/l plantar/lateral callus, ingrown toe nails     [facility]  [phone]    Please take all discharge medications as indicated below:  Take Motrin/Tylenol for pain as needed, please follow instructions on manufacturers label. If you have any questions please consult a pharmacist or your PMD.  Return precautions to the Emergency Department include: unrelenting nausea, vomiting, fever, chills, chest pain, shortness of breath, dizziness, abdominal pain, syncope, blood in urine or stool, headache that doesn't resolve, numbness or tingling, loss of sensation, loss of motor function, or any other concerning symptoms.

## 2017-11-10 NOTE — ED PROVIDER NOTE - PHYSICAL EXAMINATION
ATTENDING PHYSICAL EXAM DR. Ohara ***GEN - NAD; ambulating around ED, appears mildly disheveled; A+O x3 ***HEAD - NC/AT ***EYES/NOSE - PERRL, EOMI, mucous membranes moist, no discharge ***THROAT: Oral cavity and pharynx normal. No inflammation, swelling, exudate, or lesions.  ***NECK: Neck supple, non-tender without lymphadenopathy, no masses, no JVD.   ***PULMONARY - CTA b/l, symmetric breath sounds. ***CARDIAC -s1s2, RRR, no M,R,G  ***ABDOMEN - +BS, ND, NT, soft, no guarding, no rebound, no masses   ***BACK - no CVA tenderness, Normal  spine ***EXTREMITIES  Noted callouses on b/l feet.  Overgrown toenails b/l and thickening c/w onychomycosis.  Noted scant dried blood on right toes from toenails cutting into other toes.  No laceration appreciated.  No ulceration.  No signs of infection.   ***NEUROLOGIC - alert, CN 2-12 intact
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
## 2017-11-14 ENCOUNTER — EMERGENCY (EMERGENCY)
Facility: HOSPITAL | Age: 52
LOS: 1 days | Discharge: HOME | End: 2017-11-14
Admitting: EMERGENCY MEDICINE
Payer: MEDICAID

## 2017-11-14 ENCOUNTER — EMERGENCY (EMERGENCY)
Facility: HOSPITAL | Age: 52
LOS: 1 days | Discharge: HOME | End: 2017-11-14
Attending: EMERGENCY MEDICINE | Admitting: EMERGENCY MEDICINE
Payer: MEDICAID

## 2017-11-14 VITALS
HEART RATE: 93 BPM | DIASTOLIC BLOOD PRESSURE: 94 MMHG | OXYGEN SATURATION: 100 % | TEMPERATURE: 98 F | RESPIRATION RATE: 16 BRPM | SYSTOLIC BLOOD PRESSURE: 146 MMHG

## 2017-11-14 VITALS
SYSTOLIC BLOOD PRESSURE: 153 MMHG | TEMPERATURE: 98 F | DIASTOLIC BLOOD PRESSURE: 73 MMHG | OXYGEN SATURATION: 100 % | RESPIRATION RATE: 16 BRPM | HEART RATE: 98 BPM

## 2017-11-14 LAB
ALBUMIN SERPL ELPH-MCNC: 4.3 G/DL — SIGNIFICANT CHANGE UP (ref 3.3–5)
ALP SERPL-CCNC: 122 U/L — HIGH (ref 40–120)
ALT FLD-CCNC: 47 U/L — HIGH (ref 4–41)
AMPHET UR-MCNC: NEGATIVE — SIGNIFICANT CHANGE UP
APAP SERPL-MCNC: < 15 UG/ML — LOW (ref 15–25)
APPEARANCE UR: CLEAR — SIGNIFICANT CHANGE UP
AST SERPL-CCNC: 59 U/L — HIGH (ref 4–40)
BARBITURATES MEASUREMENT: NEGATIVE — SIGNIFICANT CHANGE UP
BARBITURATES UR SCN-MCNC: NEGATIVE — SIGNIFICANT CHANGE UP
BASOPHILS # BLD AUTO: 0.05 K/UL — SIGNIFICANT CHANGE UP (ref 0–0.2)
BASOPHILS NFR BLD AUTO: 0.4 % — SIGNIFICANT CHANGE UP (ref 0–2)
BENZODIAZ SERPL-MCNC: NEGATIVE — SIGNIFICANT CHANGE UP
BENZODIAZ UR-MCNC: NEGATIVE — SIGNIFICANT CHANGE UP
BILIRUB SERPL-MCNC: 0.6 MG/DL — SIGNIFICANT CHANGE UP (ref 0.2–1.2)
BILIRUB UR-MCNC: NEGATIVE — SIGNIFICANT CHANGE UP
BLOOD UR QL VISUAL: NEGATIVE — SIGNIFICANT CHANGE UP
BUN SERPL-MCNC: 17 MG/DL — SIGNIFICANT CHANGE UP (ref 7–23)
CALCIUM SERPL-MCNC: 10 MG/DL — SIGNIFICANT CHANGE UP (ref 8.4–10.5)
CANNABINOIDS UR-MCNC: POSITIVE — SIGNIFICANT CHANGE UP
CHLORIDE SERPL-SCNC: 102 MMOL/L — SIGNIFICANT CHANGE UP (ref 98–107)
CO2 SERPL-SCNC: 22 MMOL/L — SIGNIFICANT CHANGE UP (ref 22–31)
COCAINE METAB.OTHER UR-MCNC: NEGATIVE — SIGNIFICANT CHANGE UP
COLOR SPEC: YELLOW — SIGNIFICANT CHANGE UP
CREAT SERPL-MCNC: 0.74 MG/DL — SIGNIFICANT CHANGE UP (ref 0.5–1.3)
EOSINOPHIL # BLD AUTO: 0.03 K/UL — SIGNIFICANT CHANGE UP (ref 0–0.5)
EOSINOPHIL NFR BLD AUTO: 0.3 % — SIGNIFICANT CHANGE UP (ref 0–6)
ETHANOL BLD-MCNC: < 10 MG/DL — SIGNIFICANT CHANGE UP
GLUCOSE SERPL-MCNC: 89 MG/DL — SIGNIFICANT CHANGE UP (ref 70–99)
GLUCOSE UR-MCNC: NEGATIVE — SIGNIFICANT CHANGE UP
HCT VFR BLD CALC: 42.2 % — SIGNIFICANT CHANGE UP (ref 39–50)
HGB BLD-MCNC: 13.8 G/DL — SIGNIFICANT CHANGE UP (ref 13–17)
HYALINE CASTS # UR AUTO: SIGNIFICANT CHANGE UP (ref 0–?)
IMM GRANULOCYTES # BLD AUTO: 0.05 # — SIGNIFICANT CHANGE UP
IMM GRANULOCYTES NFR BLD AUTO: 0.4 % — SIGNIFICANT CHANGE UP (ref 0–1.5)
KETONES UR-MCNC: SIGNIFICANT CHANGE UP
LEUKOCYTE ESTERASE UR-ACNC: NEGATIVE — SIGNIFICANT CHANGE UP
LYMPHOCYTES # BLD AUTO: 1.29 K/UL — SIGNIFICANT CHANGE UP (ref 1–3.3)
LYMPHOCYTES # BLD AUTO: 11.3 % — LOW (ref 13–44)
MCHC RBC-ENTMCNC: 28.6 PG — SIGNIFICANT CHANGE UP (ref 27–34)
MCHC RBC-ENTMCNC: 32.7 % — SIGNIFICANT CHANGE UP (ref 32–36)
MCV RBC AUTO: 87.4 FL — SIGNIFICANT CHANGE UP (ref 80–100)
METHADONE UR-MCNC: NEGATIVE — SIGNIFICANT CHANGE UP
MONOCYTES # BLD AUTO: 0.68 K/UL — SIGNIFICANT CHANGE UP (ref 0–0.9)
MONOCYTES NFR BLD AUTO: 6 % — SIGNIFICANT CHANGE UP (ref 2–14)
MUCOUS THREADS # UR AUTO: SIGNIFICANT CHANGE UP
NEUTROPHILS # BLD AUTO: 9.3 K/UL — HIGH (ref 1.8–7.4)
NEUTROPHILS NFR BLD AUTO: 81.6 % — HIGH (ref 43–77)
NITRITE UR-MCNC: NEGATIVE — SIGNIFICANT CHANGE UP
NRBC # FLD: 0 — SIGNIFICANT CHANGE UP
OPIATES UR-MCNC: NEGATIVE — SIGNIFICANT CHANGE UP
OXYCODONE UR-MCNC: NEGATIVE — SIGNIFICANT CHANGE UP
PCP UR-MCNC: NEGATIVE — SIGNIFICANT CHANGE UP
PH UR: 6 — SIGNIFICANT CHANGE UP (ref 4.6–8)
PLATELET # BLD AUTO: 362 K/UL — SIGNIFICANT CHANGE UP (ref 150–400)
PMV BLD: 9.8 FL — SIGNIFICANT CHANGE UP (ref 7–13)
POTASSIUM SERPL-MCNC: 4.3 MMOL/L — SIGNIFICANT CHANGE UP (ref 3.5–5.3)
POTASSIUM SERPL-SCNC: 4.3 MMOL/L — SIGNIFICANT CHANGE UP (ref 3.5–5.3)
PROT SERPL-MCNC: 6.9 G/DL — SIGNIFICANT CHANGE UP (ref 6–8.3)
PROT UR-MCNC: 100 — SIGNIFICANT CHANGE UP
RBC # BLD: 4.83 M/UL — SIGNIFICANT CHANGE UP (ref 4.2–5.8)
RBC # FLD: 13.3 % — SIGNIFICANT CHANGE UP (ref 10.3–14.5)
RBC CASTS # UR COMP ASSIST: SIGNIFICANT CHANGE UP (ref 0–?)
SALICYLATES SERPL-MCNC: < 5 MG/DL — LOW (ref 15–30)
SODIUM SERPL-SCNC: 140 MMOL/L — SIGNIFICANT CHANGE UP (ref 135–145)
SP GR SPEC: 1.03 — HIGH (ref 1–1.03)
SQUAMOUS # UR AUTO: SIGNIFICANT CHANGE UP
TSH SERPL-MCNC: 3.1 UIU/ML — SIGNIFICANT CHANGE UP (ref 0.27–4.2)
UROBILINOGEN FLD QL: NORMAL E.U. — SIGNIFICANT CHANGE UP (ref 0.1–0.2)
WBC # BLD: 11.4 K/UL — HIGH (ref 3.8–10.5)
WBC # FLD AUTO: 11.4 K/UL — HIGH (ref 3.8–10.5)
WBC UR QL: SIGNIFICANT CHANGE UP (ref 0–?)

## 2017-11-14 PROCEDURE — 99284 EMERGENCY DEPT VISIT MOD MDM: CPT | Mod: GC

## 2017-11-14 PROCEDURE — 99053 MED SERV 10PM-8AM 24 HR FAC: CPT

## 2017-11-14 PROCEDURE — 71020: CPT | Mod: 26

## 2017-11-14 PROCEDURE — 99284 EMERGENCY DEPT VISIT MOD MDM: CPT | Mod: 25

## 2017-11-14 NOTE — ED PROVIDER NOTE - MEDICAL DECISION MAKING DETAILS
51y/o Male hx of bipolar evaluated in ED earlier for abd pain and was cleared for d/c presents back to ed for stating that he needs help finding his old self in context of being happy again.  Pt is medical other medical complaints at present including abd pain- Labs done today unrevealing. Pt w/o HI,SI, alert and oriented w/ articulate speech, ambulating w/ steady gait.  [facility]- Pt agrees w/ plan.

## 2017-11-14 NOTE — ED PROVIDER NOTE - CHPI ED SYMPTOMS NEG
no homicidal/no suicidal/no hallucinations/no change in level of consciousness/no weakness/no weight loss/no disorientation/no agitation/no paranoia/no confusion
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
## 2017-11-14 NOTE — ED PROVIDER NOTE - OBJECTIVE STATEMENT
The patient is a 52y Male hx of bipolar evaluated in ED earlier for abd pain and was cleared for d/c.  Pt presents back to ed for stating that he needs help finding his old self in context of being happy again.  Pt denies HI,SI, no AVH.  Pt denies recent injuries, trauma or falls, no headache, back or neck pain, no abd/flank pain at present, no uti/urinary symptoms, nausea or vomiting, no fever or chills, no cp or sob, no palpitations or diaphoresis.  Denies etoh or illicit drugs.

## 2017-11-14 NOTE — ED PROVIDER NOTE - MEDICAL DECISION MAKING DETAILS
51yo M PMHx bipolar disorder schizophrenia htn p/w CC abdominal pain and concerned for possible strychnine poisoning. Will send for cbc cmp urine/serum tox screen cxr ekg tsh and consult toxicology
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
## 2017-11-14 NOTE — ED ADULT TRIAGE NOTE - CHIEF COMPLAINT QUOTE
pt states "I came here from [facility] but I cant explain why. I need help with my thoughts." denies si/hi. denies drug/alcohol use. pmh bipolar drug abuse schizophrenia hypothyroid htn
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
## 2017-11-15 ENCOUNTER — APPOINTMENT (OUTPATIENT)
Dept: SURGERY | Facility: CLINIC | Age: 52
End: 2017-11-15

## 2018-02-02 ENCOUNTER — APPOINTMENT (OUTPATIENT)
Dept: PODIATRY | Facility: HOSPITAL | Age: 53
End: 2018-02-02

## 2018-08-01 ENCOUNTER — OUTPATIENT (OUTPATIENT)
Dept: OUTPATIENT SERVICES | Facility: HOSPITAL | Age: 53
LOS: 1 days | End: 2018-08-01
Payer: MEDICARE

## 2018-08-01 PROCEDURE — G9001: CPT

## 2018-08-15 ENCOUNTER — EMERGENCY (EMERGENCY)
Facility: HOSPITAL | Age: 53
LOS: 1 days | Discharge: HOME | End: 2018-08-15
Admitting: EMERGENCY MEDICINE
Payer: MEDICAID

## 2018-08-15 VITALS
SYSTOLIC BLOOD PRESSURE: 119 MMHG | RESPIRATION RATE: 18 BRPM | TEMPERATURE: 98 F | DIASTOLIC BLOOD PRESSURE: 79 MMHG | OXYGEN SATURATION: 100 % | HEART RATE: 83 BPM

## 2018-08-15 VITALS
DIASTOLIC BLOOD PRESSURE: 83 MMHG | SYSTOLIC BLOOD PRESSURE: 131 MMHG | HEART RATE: 72 BPM | OXYGEN SATURATION: 97 % | RESPIRATION RATE: 16 BRPM | TEMPERATURE: 98 F

## 2018-08-15 PROBLEM — F31.9 BIPOLAR DISORDER, UNSPECIFIED: Chronic | Status: ACTIVE | Noted: 2017-11-10

## 2018-08-15 LAB
ALBUMIN SERPL ELPH-MCNC: 4 G/DL — SIGNIFICANT CHANGE UP (ref 3.3–5)
ALP SERPL-CCNC: 111 U/L — SIGNIFICANT CHANGE UP (ref 40–120)
ALT FLD-CCNC: 36 U/L — SIGNIFICANT CHANGE UP (ref 4–41)
AST SERPL-CCNC: 22 U/L — SIGNIFICANT CHANGE UP (ref 4–40)
BASE EXCESS BLDV CALC-SCNC: 5 MMOL/L — SIGNIFICANT CHANGE UP
BASOPHILS # BLD AUTO: 0.08 K/UL — SIGNIFICANT CHANGE UP (ref 0–0.2)
BASOPHILS NFR BLD AUTO: 1.1 % — SIGNIFICANT CHANGE UP (ref 0–2)
BILIRUB SERPL-MCNC: 0.3 MG/DL — SIGNIFICANT CHANGE UP (ref 0.2–1.2)
BLOOD GAS VENOUS - CREATININE: 0.66 MG/DL — SIGNIFICANT CHANGE UP (ref 0.5–1.3)
BUN SERPL-MCNC: 18 MG/DL — SIGNIFICANT CHANGE UP (ref 7–23)
CALCIUM SERPL-MCNC: 9.8 MG/DL — SIGNIFICANT CHANGE UP (ref 8.4–10.5)
CHLORIDE BLDV-SCNC: 109 MMOL/L — HIGH (ref 96–108)
CHLORIDE SERPL-SCNC: 107 MMOL/L — SIGNIFICANT CHANGE UP (ref 98–107)
CO2 SERPL-SCNC: 28 MMOL/L — SIGNIFICANT CHANGE UP (ref 22–31)
CREAT SERPL-MCNC: 0.83 MG/DL — SIGNIFICANT CHANGE UP (ref 0.5–1.3)
EOSINOPHIL # BLD AUTO: 0.32 K/UL — SIGNIFICANT CHANGE UP (ref 0–0.5)
EOSINOPHIL NFR BLD AUTO: 4.4 % — SIGNIFICANT CHANGE UP (ref 0–6)
GAS PNL BLDV: 137 MMOL/L — SIGNIFICANT CHANGE UP (ref 136–146)
GLUCOSE BLDV-MCNC: 92 — SIGNIFICANT CHANGE UP (ref 70–99)
GLUCOSE SERPL-MCNC: 94 MG/DL — SIGNIFICANT CHANGE UP (ref 70–99)
HCO3 BLDV-SCNC: 27 MMOL/L — SIGNIFICANT CHANGE UP (ref 20–27)
HCT VFR BLD CALC: 42.8 % — SIGNIFICANT CHANGE UP (ref 39–50)
HCT VFR BLDV CALC: 43.2 % — SIGNIFICANT CHANGE UP (ref 39–51)
HGB BLD-MCNC: 13.7 G/DL — SIGNIFICANT CHANGE UP (ref 13–17)
HGB BLDV-MCNC: 14.2 G/DL — SIGNIFICANT CHANGE UP (ref 13–17)
IMM GRANULOCYTES # BLD AUTO: 0.02 # — SIGNIFICANT CHANGE UP
IMM GRANULOCYTES NFR BLD AUTO: 0.3 % — SIGNIFICANT CHANGE UP (ref 0–1.5)
LACTATE BLDV-MCNC: 0.7 MMOL/L — SIGNIFICANT CHANGE UP (ref 0.5–2)
LYMPHOCYTES # BLD AUTO: 1.46 K/UL — SIGNIFICANT CHANGE UP (ref 1–3.3)
LYMPHOCYTES # BLD AUTO: 20.1 % — SIGNIFICANT CHANGE UP (ref 13–44)
MCHC RBC-ENTMCNC: 29 PG — SIGNIFICANT CHANGE UP (ref 27–34)
MCHC RBC-ENTMCNC: 32 % — SIGNIFICANT CHANGE UP (ref 32–36)
MCV RBC AUTO: 90.5 FL — SIGNIFICANT CHANGE UP (ref 80–100)
MONOCYTES # BLD AUTO: 0.5 K/UL — SIGNIFICANT CHANGE UP (ref 0–0.9)
MONOCYTES NFR BLD AUTO: 6.9 % — SIGNIFICANT CHANGE UP (ref 2–14)
NEUTROPHILS # BLD AUTO: 4.89 K/UL — SIGNIFICANT CHANGE UP (ref 1.8–7.4)
NEUTROPHILS NFR BLD AUTO: 67.2 % — SIGNIFICANT CHANGE UP (ref 43–77)
NRBC # FLD: 0 — SIGNIFICANT CHANGE UP
PCO2 BLDV: 49 MMHG — SIGNIFICANT CHANGE UP (ref 41–51)
PH BLDV: 7.4 PH — SIGNIFICANT CHANGE UP (ref 7.32–7.43)
PLATELET # BLD AUTO: 333 K/UL — SIGNIFICANT CHANGE UP (ref 150–400)
PMV BLD: 9.4 FL — SIGNIFICANT CHANGE UP (ref 7–13)
PO2 BLDV: 31 MMHG — LOW (ref 35–40)
POTASSIUM BLDV-SCNC: 4.1 MMOL/L — SIGNIFICANT CHANGE UP (ref 3.4–4.5)
POTASSIUM SERPL-MCNC: 4.7 MMOL/L — SIGNIFICANT CHANGE UP (ref 3.5–5.3)
POTASSIUM SERPL-SCNC: 4.7 MMOL/L — SIGNIFICANT CHANGE UP (ref 3.5–5.3)
PROT SERPL-MCNC: 6.6 G/DL — SIGNIFICANT CHANGE UP (ref 6–8.3)
RBC # BLD: 4.73 M/UL — SIGNIFICANT CHANGE UP (ref 4.2–5.8)
RBC # FLD: 13.5 % — SIGNIFICANT CHANGE UP (ref 10.3–14.5)
SAO2 % BLDV: 58.9 % — LOW (ref 60–85)
SODIUM SERPL-SCNC: 143 MMOL/L — SIGNIFICANT CHANGE UP (ref 135–145)
WBC # BLD: 7.27 K/UL — SIGNIFICANT CHANGE UP (ref 3.8–10.5)
WBC # FLD AUTO: 7.27 K/UL — SIGNIFICANT CHANGE UP (ref 3.8–10.5)

## 2018-08-15 PROCEDURE — 99283 EMERGENCY DEPT VISIT LOW MDM: CPT

## 2018-08-15 RX ORDER — POLYETHYLENE GLYCOL 3350 17 G/17G
17 POWDER, FOR SOLUTION ORAL ONCE
Qty: 0 | Refills: 0 | Status: DISCONTINUED | OUTPATIENT
Start: 2018-08-15 | End: 2018-08-19

## 2018-08-15 RX ORDER — MULTIVIT WITH MIN/MFOLATE/K2 340-15/3 G
300 POWDER (GRAM) ORAL ONCE
Qty: 0 | Refills: 0 | Status: COMPLETED | OUTPATIENT
Start: 2018-08-15 | End: 2018-08-15

## 2018-08-15 RX ORDER — SOD SULF/SODIUM/NAHCO3/KCL/PEG
4000 SOLUTION, RECONSTITUTED, ORAL ORAL ONCE
Qty: 0 | Refills: 0 | Status: DISCONTINUED | OUTPATIENT
Start: 2018-08-15 | End: 2018-08-15

## 2018-08-15 RX ORDER — MULTIVIT WITH MIN/MFOLATE/K2 340-15/3 G
30 POWDER (GRAM) ORAL ONCE
Qty: 0 | Refills: 0 | Status: DISCONTINUED | OUTPATIENT
Start: 2018-08-15 | End: 2018-08-15

## 2018-08-15 RX ADMIN — Medication 300 MILLILITER(S): at 19:34

## 2018-08-15 NOTE — ED PROVIDER NOTE - PLAN OF CARE
Follow up with your primary doctor. Additional testing such as thyroid function could be performed to evaluate why you are constipated. Take Miralax and Dulcolax as needed, drink plenty of fluids.  Advance activity as tolerated.  Continue all previously prescribed medications as directed.  Follow up with your primary care physician in 48-72 hours- bring copies of your results.  Return to the ER for worsening or persistent symptoms, and/or ANY NEW OR CONCERNING SYMPTOMS. If you have issues obtaining follow up, please call: [phone] to obtain a doctor or specialist who takes your insurance in your area.  You may call [phone] to make an appointment with the internal medicine clinic.
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
## 2018-08-15 NOTE — ED PROVIDER NOTE - CARE PLAN
Principal Discharge DX:	Constipation  Assessment and plan of treatment:	Follow up with your primary doctor. Additional testing such as thyroid function could be performed to evaluate why you are constipated. Take Miralax and Dulcolax as needed, drink plenty of fluids.  Advance activity as tolerated.  Continue all previously prescribed medications as directed.  Follow up with your primary care physician in 48-72 hours- bring copies of your results.  Return to the ER for worsening or persistent symptoms, and/or ANY NEW OR CONCERNING SYMPTOMS. If you have issues obtaining follow up, please call: [phone] to obtain a doctor or specialist who takes your insurance in your area.  You may call [phone] to make an appointment with the internal medicine clinic.

## 2018-08-15 NOTE — ED PROVIDER NOTE - MEDICAL DECISION MAKING DETAILS
Pt C/O 2 days of constipation, no other changes, no vomiting, tolerating po. Will check labs, give laxitive and reassess, +BS X 4 quadrants.

## 2018-08-15 NOTE — ED PROVIDER NOTE - OBJECTIVE STATEMENT
52 Y/O M PMH HTN Schizoaffective Bipolar Type HLD BPH Hypothyroid HTN S/P fistula excision C/O 3 days of constipation. Pt states 54 Y/O M PMH HTN Schizoaffective Bipolar Type HLD BPH Hypothyroid HTN S/P fistula excision C/O 3 days of constipation. Pt states that he has not pooped in about 2 days. Denies preceding symptoms such as dark tarry or bloody stool, denies fever, chills, nightsweats n/v/d/dizz or any other acute symptoms and he has an appetite, eating normally. Admits to generalized intermittent abdominal ache, no pain at present.
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
## 2018-08-16 ENCOUNTER — EMERGENCY (EMERGENCY)
Facility: HOSPITAL | Age: 53
LOS: 1 days | Discharge: HOME | End: 2018-08-16
Attending: EMERGENCY MEDICINE | Admitting: EMERGENCY MEDICINE
Payer: MEDICAID

## 2018-08-16 VITALS
RESPIRATION RATE: 15 BRPM | SYSTOLIC BLOOD PRESSURE: 143 MMHG | OXYGEN SATURATION: 98 % | TEMPERATURE: 98 F | HEART RATE: 84 BPM | DIASTOLIC BLOOD PRESSURE: 94 MMHG

## 2018-08-16 VITALS
RESPIRATION RATE: 16 BRPM | HEART RATE: 87 BPM | TEMPERATURE: 99 F | SYSTOLIC BLOOD PRESSURE: 139 MMHG | DIASTOLIC BLOOD PRESSURE: 80 MMHG | OXYGEN SATURATION: 98 %

## 2018-08-16 LAB
ALBUMIN SERPL ELPH-MCNC: 4.7 G/DL — SIGNIFICANT CHANGE UP (ref 3.3–5)
ALP SERPL-CCNC: 136 U/L — HIGH (ref 40–120)
ALT FLD-CCNC: 109 U/L — HIGH (ref 4–41)
APAP SERPL-MCNC: < 15 UG/ML — LOW (ref 15–25)
AST SERPL-CCNC: 66 U/L — HIGH (ref 4–40)
BASOPHILS # BLD AUTO: 0.09 K/UL — SIGNIFICANT CHANGE UP (ref 0–0.2)
BASOPHILS NFR BLD AUTO: 1 % — SIGNIFICANT CHANGE UP (ref 0–2)
BILIRUB SERPL-MCNC: 0.8 MG/DL — SIGNIFICANT CHANGE UP (ref 0.2–1.2)
BUN SERPL-MCNC: 25 MG/DL — HIGH (ref 7–23)
CALCIUM SERPL-MCNC: 10 MG/DL — SIGNIFICANT CHANGE UP (ref 8.4–10.5)
CHLORIDE SERPL-SCNC: 102 MMOL/L — SIGNIFICANT CHANGE UP (ref 98–107)
CO2 SERPL-SCNC: 27 MMOL/L — SIGNIFICANT CHANGE UP (ref 22–31)
CREAT SERPL-MCNC: 0.83 MG/DL — SIGNIFICANT CHANGE UP (ref 0.5–1.3)
EOSINOPHIL # BLD AUTO: 0.07 K/UL — SIGNIFICANT CHANGE UP (ref 0–0.5)
EOSINOPHIL NFR BLD AUTO: 0.7 % — SIGNIFICANT CHANGE UP (ref 0–6)
ETHANOL BLD-MCNC: < 10 MG/DL — SIGNIFICANT CHANGE UP
GLUCOSE SERPL-MCNC: 98 MG/DL — SIGNIFICANT CHANGE UP (ref 70–99)
HCT VFR BLD CALC: 44.2 % — SIGNIFICANT CHANGE UP (ref 39–50)
HGB BLD-MCNC: 14.3 G/DL — SIGNIFICANT CHANGE UP (ref 13–17)
HIV COMBO RESULT: SIGNIFICANT CHANGE UP
HIV1+2 AB SPEC QL: SIGNIFICANT CHANGE UP
IMM GRANULOCYTES # BLD AUTO: 0.03 # — SIGNIFICANT CHANGE UP
IMM GRANULOCYTES NFR BLD AUTO: 0.3 % — SIGNIFICANT CHANGE UP (ref 0–1.5)
LYMPHOCYTES # BLD AUTO: 1.29 K/UL — SIGNIFICANT CHANGE UP (ref 1–3.3)
LYMPHOCYTES # BLD AUTO: 13.6 % — SIGNIFICANT CHANGE UP (ref 13–44)
MCHC RBC-ENTMCNC: 29.2 PG — SIGNIFICANT CHANGE UP (ref 27–34)
MCHC RBC-ENTMCNC: 32.4 % — SIGNIFICANT CHANGE UP (ref 32–36)
MCV RBC AUTO: 90.4 FL — SIGNIFICANT CHANGE UP (ref 80–100)
MONOCYTES # BLD AUTO: 0.6 K/UL — SIGNIFICANT CHANGE UP (ref 0–0.9)
MONOCYTES NFR BLD AUTO: 6.3 % — SIGNIFICANT CHANGE UP (ref 2–14)
NEUTROPHILS # BLD AUTO: 7.39 K/UL — SIGNIFICANT CHANGE UP (ref 1.8–7.4)
NEUTROPHILS NFR BLD AUTO: 78.1 % — HIGH (ref 43–77)
NRBC # FLD: 0 — SIGNIFICANT CHANGE UP
PLATELET # BLD AUTO: 375 K/UL — SIGNIFICANT CHANGE UP (ref 150–400)
PMV BLD: 9.5 FL — SIGNIFICANT CHANGE UP (ref 7–13)
POTASSIUM SERPL-MCNC: 4.4 MMOL/L — SIGNIFICANT CHANGE UP (ref 3.5–5.3)
POTASSIUM SERPL-SCNC: 4.4 MMOL/L — SIGNIFICANT CHANGE UP (ref 3.5–5.3)
PROT SERPL-MCNC: 7.7 G/DL — SIGNIFICANT CHANGE UP (ref 6–8.3)
RBC # BLD: 4.89 M/UL — SIGNIFICANT CHANGE UP (ref 4.2–5.8)
RBC # FLD: 13.6 % — SIGNIFICANT CHANGE UP (ref 10.3–14.5)
SALICYLATES SERPL-MCNC: < 5 MG/DL — LOW (ref 15–30)
SODIUM SERPL-SCNC: 143 MMOL/L — SIGNIFICANT CHANGE UP (ref 135–145)
TSH SERPL-MCNC: 3.76 UIU/ML — SIGNIFICANT CHANGE UP (ref 0.27–4.2)
VALPROATE SERPL-MCNC: < 3.2 UG/ML — LOW (ref 50–100)
WBC # BLD: 9.47 K/UL — SIGNIFICANT CHANGE UP (ref 3.8–10.5)
WBC # FLD AUTO: 9.47 K/UL — SIGNIFICANT CHANGE UP (ref 3.8–10.5)

## 2018-08-16 PROCEDURE — 99283 EMERGENCY DEPT VISIT LOW MDM: CPT

## 2018-08-16 NOTE — ED ADULT NURSE REASSESSMENT NOTE - NS ED NURSE REASSESS COMMENT FT1
pt calm & cooperative back to baseline d/c to [facility] awaiting transport  pt denies Si/Hi/AVh presently eval on going.
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
## 2018-08-16 NOTE — ED ADULT TRIAGE NOTE - CHIEF COMPLAINT QUOTE
Pt requesting HIV test.  Pt denies SI/HI, denies hallucinations.  States that he forgot to ask for test when he was seen in ED yesterday for constipation.  Pt calm and cooperative.  PMH: schizophrenia, bipolar, HTN, incontinence
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
## 2018-08-16 NOTE — ED ADULT NURSE NOTE - OBJECTIVE STATEMENT
Received pt in  from [facility] pt requesting HIV testing pt calm & cooperative in nad denies Si/Hi/AVh at present labs drawn & send  pt oriented to unit comfort measures provided eval on going.
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
## 2018-08-16 NOTE — ED BEHAVIORAL HEALTH NOTE - BEHAVIORAL HEALTH NOTE
Writer called [facility], and spoke with Radha, [phone], who provided invoice # [id number] for ambulette service, to be provided by Lisandro newell, [phone] and [phone], with an ETA of 6:30PM, to transport him to his address, verified to be: [facility], [address], [facility], [address] [place] [address]. Writer called [facility], and spoke with Radha, [phone], who provided invoice # [id number] for ambulette service, to be provided by [facility], [phone] and [phone], with an ETA of 6:30PM, to transport him to his address, verified to be: [facility], [address], [facility], [address] [place] [address]. Writer called back later, and could not reach [facility] staff on the phone. Terrencer called [facility] back and spoke with Perfecto, who stated he could not reach them on the phone either, and that the request had mistakenly been made for a taxi. He called [facility], [phone], and arranged for an ambulette with Claribel, providing an invoice # [id number], with an ETA of 10:15PM. Writer called [facility], and spoke with Radha, [phone], who provided invoice # [id number] for ambulette service, to be provided by [facility], [phone] and [phone], with an ETA of 6:30PM, to transport him to his address, verified to be: [facility], [address], [facility], [address] [place] [address]. Writer called back later, and could not reach [facility] staff on the phone. Terrencer called [facility] back and spoke with Perfecto, who stated he could not reach them on the phone either, and that the request had mistakenly been made for a taxi. He called [facility], [phone], and arranged for an ambulette with Claribel, providing an invoice # [id number], with an ETA of 10:15PM. Terrencer later called [facility] and spoke with Anna, who provided a new ETA of 11PM. Writer called [facility], and spoke with Radha, [phone], who provided invoice # [id number] for ambulette service, to be provided by [facility], [phone] and [phone], with an ETA of 6:30PM, to transport him to his address, verified to be: [facility], [address], [facility], [address] [place] [address]. Writer called back later, and could not reach [facility] staff on the phone. Terrencer called [facility] back and spoke with Perfecto, who stated he could not reach them on the phone either, and that the request had mistakenly been made for a taxi. He called [facility], [phone], and arranged for an ambulette with Kelsey, providing an invoice # [id number], with an ETA of 10:15PM. Terrencer later called [facility] and spoke with Anna, who provided a new ETA of 11PM.

## 2018-08-16 NOTE — ED ADULT NURSE NOTE - NSIMPLEMENTINTERV_GEN_ALL_ED
Implemented All Universal Safety Interventions:  [place] to call system. Call bell, personal items and telephone within reach. Instruct patient to call for assistance. Room bathroom lighting operational. Non-slip footwear when patient is off stretcher. Physically safe environment: no spills, clutter or unnecessary equipment. Stretcher in lowest position, wheels locked, appropriate side rails in place.

## 2018-08-16 NOTE — ED PROVIDER NOTE - PLAN OF CARE
Rest, drink plenty of fluids.  Advance activity as tolerated.  Continue all previously prescribed medications as directed.  Follow up with your primary care physician in 48-72 hours- bring copies of your results.  Return to the ER for worsening or persistent symptoms, and/or ANY NEW OR CONCERNING SYMPTOMS. If you have issues obtaining follow up, please call: [phone] to obtain a doctor or specialist who takes your insurance in your area.

## 2018-08-17 DIAGNOSIS — Z71.89 OTHER SPECIFIED COUNSELING: ICD-10-CM

## 2018-08-23 ENCOUNTER — INPATIENT (INPATIENT)
Facility: HOSPITAL | Age: 53
LOS: 0 days | Discharge: PSYCHIATRIC OR SUBSTANCE USE FACILITY | End: 2018-08-24
Attending: HOSPITALIST | Admitting: HOSPITALIST
Payer: MEDICAID

## 2018-08-23 VITALS
DIASTOLIC BLOOD PRESSURE: 97 MMHG | TEMPERATURE: 99 F | OXYGEN SATURATION: 100 % | HEART RATE: 110 BPM | RESPIRATION RATE: 16 BRPM | SYSTOLIC BLOOD PRESSURE: 147 MMHG

## 2018-08-23 DIAGNOSIS — F31.30 BIPOLAR DISORDER, CURRENT EPISODE DEPRESSED, MILD OR MODERATE SEVERITY, UNSPECIFIED: ICD-10-CM

## 2018-08-23 DIAGNOSIS — M86.172 OTHER ACUTE OSTEOMYELITIS, LEFT ANKLE AND FOOT: ICD-10-CM

## 2018-08-23 DIAGNOSIS — I10 ESSENTIAL (PRIMARY) HYPERTENSION: ICD-10-CM

## 2018-08-23 DIAGNOSIS — F25.0 SCHIZOAFFECTIVE DISORDER, BIPOLAR TYPE: ICD-10-CM

## 2018-08-23 DIAGNOSIS — T74.11XA ADULT PHYSICAL ABUSE, CONFIRMED, INITIAL ENCOUNTER: ICD-10-CM

## 2018-08-23 DIAGNOSIS — M86.9 OSTEOMYELITIS, UNSPECIFIED: ICD-10-CM

## 2018-08-23 DIAGNOSIS — Z29.9 ENCOUNTER FOR PROPHYLACTIC MEASURES, UNSPECIFIED: ICD-10-CM

## 2018-08-23 DIAGNOSIS — R69 ILLNESS, UNSPECIFIED: ICD-10-CM

## 2018-08-23 DIAGNOSIS — F25.9 SCHIZOAFFECTIVE DISORDER, UNSPECIFIED: ICD-10-CM

## 2018-08-23 DIAGNOSIS — E03.9 HYPOTHYROIDISM, UNSPECIFIED: ICD-10-CM

## 2018-08-23 DIAGNOSIS — N39.44 NOCTURNAL ENURESIS: ICD-10-CM

## 2018-08-23 LAB
ALBUMIN SERPL ELPH-MCNC: 4.5 G/DL — SIGNIFICANT CHANGE UP (ref 3.3–5)
ALP SERPL-CCNC: 140 U/L — HIGH (ref 40–120)
ALT FLD-CCNC: 32 U/L — SIGNIFICANT CHANGE UP (ref 4–41)
AMPHET UR-MCNC: NEGATIVE — SIGNIFICANT CHANGE UP
APAP SERPL-MCNC: < 15 UG/ML — LOW (ref 15–25)
APPEARANCE UR: CLEAR — SIGNIFICANT CHANGE UP
AST SERPL-CCNC: 25 U/L — SIGNIFICANT CHANGE UP (ref 4–40)
BARBITURATES UR SCN-MCNC: NEGATIVE — SIGNIFICANT CHANGE UP
BASOPHILS # BLD AUTO: 0.08 K/UL — SIGNIFICANT CHANGE UP (ref 0–0.2)
BASOPHILS NFR BLD AUTO: 0.6 % — SIGNIFICANT CHANGE UP (ref 0–2)
BENZODIAZ UR-MCNC: NEGATIVE — SIGNIFICANT CHANGE UP
BILIRUB SERPL-MCNC: 0.8 MG/DL — SIGNIFICANT CHANGE UP (ref 0.2–1.2)
BILIRUB UR-MCNC: NEGATIVE — SIGNIFICANT CHANGE UP
BLOOD UR QL VISUAL: NEGATIVE — SIGNIFICANT CHANGE UP
BUN SERPL-MCNC: 22 MG/DL — SIGNIFICANT CHANGE UP (ref 7–23)
CALCIUM SERPL-MCNC: 10.3 MG/DL — SIGNIFICANT CHANGE UP (ref 8.4–10.5)
CANNABINOIDS UR-MCNC: NEGATIVE — SIGNIFICANT CHANGE UP
CHLORIDE SERPL-SCNC: 101 MMOL/L — SIGNIFICANT CHANGE UP (ref 98–107)
CO2 SERPL-SCNC: 24 MMOL/L — SIGNIFICANT CHANGE UP (ref 22–31)
COCAINE METAB.OTHER UR-MCNC: NEGATIVE — SIGNIFICANT CHANGE UP
COLOR SPEC: SIGNIFICANT CHANGE UP
CREAT SERPL-MCNC: 0.93 MG/DL — SIGNIFICANT CHANGE UP (ref 0.5–1.3)
CRP SERPL-MCNC: 5.5 MG/L — HIGH
EOSINOPHIL # BLD AUTO: 0.03 K/UL — SIGNIFICANT CHANGE UP (ref 0–0.5)
EOSINOPHIL NFR BLD AUTO: 0.2 % — SIGNIFICANT CHANGE UP (ref 0–6)
ERYTHROCYTE [SEDIMENTATION RATE] IN BLOOD: 5 MM/HR — SIGNIFICANT CHANGE UP (ref 1–15)
ETHANOL BLD-MCNC: < 10 MG/DL — SIGNIFICANT CHANGE UP
GLUCOSE SERPL-MCNC: 107 MG/DL — HIGH (ref 70–99)
GLUCOSE UR-MCNC: NEGATIVE — SIGNIFICANT CHANGE UP
GRAM STN SPEC: SIGNIFICANT CHANGE UP
HCT VFR BLD CALC: 42.2 % — SIGNIFICANT CHANGE UP (ref 39–50)
HGB BLD-MCNC: 13.7 G/DL — SIGNIFICANT CHANGE UP (ref 13–17)
IMM GRANULOCYTES # BLD AUTO: 0.04 # — SIGNIFICANT CHANGE UP
IMM GRANULOCYTES NFR BLD AUTO: 0.3 % — SIGNIFICANT CHANGE UP (ref 0–1.5)
KETONES UR-MCNC: SIGNIFICANT CHANGE UP
LEUKOCYTE ESTERASE UR-ACNC: NEGATIVE — SIGNIFICANT CHANGE UP
LITHIUM SERPL-MCNC: 0.43 MMOL/L — LOW (ref 0.6–1.2)
LYMPHOCYTES # BLD AUTO: 0.68 K/UL — LOW (ref 1–3.3)
LYMPHOCYTES # BLD AUTO: 4.8 % — LOW (ref 13–44)
MCHC RBC-ENTMCNC: 28.8 PG — SIGNIFICANT CHANGE UP (ref 27–34)
MCHC RBC-ENTMCNC: 32.5 % — SIGNIFICANT CHANGE UP (ref 32–36)
MCV RBC AUTO: 88.8 FL — SIGNIFICANT CHANGE UP (ref 80–100)
METHADONE UR-MCNC: NEGATIVE — SIGNIFICANT CHANGE UP
MONOCYTES # BLD AUTO: 0.76 K/UL — SIGNIFICANT CHANGE UP (ref 0–0.9)
MONOCYTES NFR BLD AUTO: 5.4 % — SIGNIFICANT CHANGE UP (ref 2–14)
NEUTROPHILS # BLD AUTO: 12.54 K/UL — HIGH (ref 1.8–7.4)
NEUTROPHILS NFR BLD AUTO: 88.7 % — HIGH (ref 43–77)
NITRITE UR-MCNC: NEGATIVE — SIGNIFICANT CHANGE UP
NRBC # FLD: 0 — SIGNIFICANT CHANGE UP
OPIATES UR-MCNC: NEGATIVE — SIGNIFICANT CHANGE UP
OXYCODONE UR-MCNC: NEGATIVE — SIGNIFICANT CHANGE UP
PCP UR-MCNC: NEGATIVE — SIGNIFICANT CHANGE UP
PH UR: 7 — SIGNIFICANT CHANGE UP (ref 5–8)
PLATELET # BLD AUTO: 350 K/UL — SIGNIFICANT CHANGE UP (ref 150–400)
PMV BLD: 9.5 FL — SIGNIFICANT CHANGE UP (ref 7–13)
POTASSIUM SERPL-MCNC: 4.5 MMOL/L — SIGNIFICANT CHANGE UP (ref 3.5–5.3)
POTASSIUM SERPL-SCNC: 4.5 MMOL/L — SIGNIFICANT CHANGE UP (ref 3.5–5.3)
PROT SERPL-MCNC: 7.4 G/DL — SIGNIFICANT CHANGE UP (ref 6–8.3)
PROT UR-MCNC: NEGATIVE — SIGNIFICANT CHANGE UP
RBC # BLD: 4.75 M/UL — SIGNIFICANT CHANGE UP (ref 4.2–5.8)
RBC # FLD: 12.9 % — SIGNIFICANT CHANGE UP (ref 10.3–14.5)
SALICYLATES SERPL-MCNC: < 5 MG/DL — LOW (ref 15–30)
SODIUM SERPL-SCNC: 139 MMOL/L — SIGNIFICANT CHANGE UP (ref 135–145)
SP GR SPEC: 1.02 — SIGNIFICANT CHANGE UP (ref 1–1.04)
SPECIMEN SOURCE: SIGNIFICANT CHANGE UP
TSH SERPL-MCNC: 5.12 UIU/ML — HIGH (ref 0.27–4.2)
UROBILINOGEN FLD QL: NORMAL — SIGNIFICANT CHANGE UP
VALPROATE SERPL-MCNC: < 3.2 UG/ML — LOW (ref 50–100)
WBC # BLD: 14.13 K/UL — HIGH (ref 3.8–10.5)
WBC # FLD AUTO: 14.13 K/UL — HIGH (ref 3.8–10.5)

## 2018-08-23 PROCEDURE — 73630 X-RAY EXAM OF FOOT: CPT | Mod: 26,LT

## 2018-08-23 PROCEDURE — 99285 EMERGENCY DEPT VISIT HI MDM: CPT

## 2018-08-23 PROCEDURE — 99223 1ST HOSP IP/OBS HIGH 75: CPT

## 2018-08-23 RX ORDER — CEPHALEXIN 500 MG
500 CAPSULE ORAL ONCE
Qty: 0 | Refills: 0 | Status: COMPLETED | OUTPATIENT
Start: 2018-08-23 | End: 2018-08-23

## 2018-08-23 RX ORDER — VANCOMYCIN HCL 1 G
1000 VIAL (EA) INTRAVENOUS ONCE
Qty: 0 | Refills: 0 | Status: COMPLETED | OUTPATIENT
Start: 2018-08-23 | End: 2018-08-23

## 2018-08-23 RX ORDER — PANTOPRAZOLE SODIUM 20 MG/1
40 TABLET, DELAYED RELEASE ORAL
Qty: 0 | Refills: 0 | Status: DISCONTINUED | OUTPATIENT
Start: 2018-08-23 | End: 2018-08-24

## 2018-08-23 RX ORDER — MAGNESIUM OXIDE 400 MG ORAL TABLET 241.3 MG
400 TABLET ORAL DAILY
Qty: 0 | Refills: 0 | Status: DISCONTINUED | OUTPATIENT
Start: 2018-08-23 | End: 2018-08-24

## 2018-08-23 RX ORDER — CEFEPIME 1 G/1
2000 INJECTION, POWDER, FOR SOLUTION INTRAMUSCULAR; INTRAVENOUS ONCE
Qty: 0 | Refills: 0 | Status: COMPLETED | OUTPATIENT
Start: 2018-08-23 | End: 2018-08-23

## 2018-08-23 RX ORDER — LITHIUM CARBONATE 300 MG/1
600 TABLET, EXTENDED RELEASE ORAL
Qty: 0 | Refills: 0 | COMMUNITY

## 2018-08-23 RX ORDER — LEVOTHYROXINE SODIUM 125 MCG
175 TABLET ORAL DAILY
Qty: 0 | Refills: 0 | Status: DISCONTINUED | OUTPATIENT
Start: 2018-08-23 | End: 2018-08-24

## 2018-08-23 RX ORDER — DESMOPRESSIN ACETATE 0.1 MG/1
0.2 TABLET ORAL
Qty: 0 | Refills: 0 | COMMUNITY

## 2018-08-23 RX ORDER — CLOZAPINE 150 MG/1
250 TABLET, ORALLY DISINTEGRATING ORAL
Qty: 0 | Refills: 0 | COMMUNITY

## 2018-08-23 RX ORDER — OMEPRAZOLE 10 MG/1
20 CAPSULE, DELAYED RELEASE ORAL
Qty: 0 | Refills: 0 | COMMUNITY

## 2018-08-23 RX ORDER — VANCOMYCIN HCL 1 G
VIAL (EA) INTRAVENOUS
Qty: 0 | Refills: 0 | Status: DISCONTINUED | OUTPATIENT
Start: 2018-08-23 | End: 2018-08-24

## 2018-08-23 RX ORDER — LEVOTHYROXINE SODIUM 125 MCG
1 TABLET ORAL
Qty: 0 | Refills: 0 | COMMUNITY

## 2018-08-23 RX ORDER — GABAPENTIN 400 MG/1
300 CAPSULE ORAL
Qty: 0 | Refills: 0 | Status: DISCONTINUED | OUTPATIENT
Start: 2018-08-23 | End: 2018-08-24

## 2018-08-23 RX ORDER — LITHIUM CARBONATE 300 MG/1
300 TABLET, EXTENDED RELEASE ORAL
Qty: 0 | Refills: 0 | COMMUNITY

## 2018-08-23 RX ORDER — CLOZAPINE 150 MG/1
250 TABLET, ORALLY DISINTEGRATING ORAL AT BEDTIME
Qty: 0 | Refills: 0 | Status: DISCONTINUED | OUTPATIENT
Start: 2018-08-23 | End: 2018-08-24

## 2018-08-23 RX ORDER — LITHIUM CARBONATE 300 MG/1
300 TABLET, EXTENDED RELEASE ORAL DAILY
Qty: 0 | Refills: 0 | Status: DISCONTINUED | OUTPATIENT
Start: 2018-08-23 | End: 2018-08-24

## 2018-08-23 RX ORDER — LACTULOSE 10 G/15ML
20 SOLUTION ORAL
Qty: 0 | Refills: 0 | Status: DISCONTINUED | OUTPATIENT
Start: 2018-08-23 | End: 2018-08-24

## 2018-08-23 RX ORDER — METOPROLOL TARTRATE 50 MG
1 TABLET ORAL
Qty: 0 | Refills: 0 | COMMUNITY

## 2018-08-23 RX ORDER — LITHIUM CARBONATE 300 MG/1
600 TABLET, EXTENDED RELEASE ORAL AT BEDTIME
Qty: 0 | Refills: 0 | Status: DISCONTINUED | OUTPATIENT
Start: 2018-08-23 | End: 2018-08-24

## 2018-08-23 RX ORDER — GABAPENTIN 400 MG/1
300 CAPSULE ORAL
Qty: 0 | Refills: 0 | COMMUNITY

## 2018-08-23 RX ORDER — VANCOMYCIN HCL 1 G
1000 VIAL (EA) INTRAVENOUS EVERY 12 HOURS
Qty: 0 | Refills: 0 | Status: DISCONTINUED | OUTPATIENT
Start: 2018-08-24 | End: 2018-08-24

## 2018-08-23 RX ORDER — CLOZAPINE 150 MG/1
1 TABLET, ORALLY DISINTEGRATING ORAL
Qty: 0 | Refills: 0 | COMMUNITY

## 2018-08-23 RX ORDER — NICOTINE POLACRILEX 2 MG
2 GUM BUCCAL EVERY 8 HOURS
Qty: 0 | Refills: 0 | Status: DISCONTINUED | OUTPATIENT
Start: 2018-08-23 | End: 2018-08-24

## 2018-08-23 RX ORDER — CLOZAPINE 150 MG/1
100 TABLET, ORALLY DISINTEGRATING ORAL DAILY
Qty: 0 | Refills: 0 | Status: DISCONTINUED | OUTPATIENT
Start: 2018-08-23 | End: 2018-08-24

## 2018-08-23 RX ORDER — DESMOPRESSIN ACETATE 0.1 MG/1
0.2 TABLET ORAL AT BEDTIME
Qty: 0 | Refills: 0 | Status: DISCONTINUED | OUTPATIENT
Start: 2018-08-23 | End: 2018-08-24

## 2018-08-23 RX ORDER — SODIUM CHLORIDE 9 MG/ML
1000 INJECTION INTRAMUSCULAR; INTRAVENOUS; SUBCUTANEOUS ONCE
Qty: 0 | Refills: 0 | Status: COMPLETED | OUTPATIENT
Start: 2018-08-23 | End: 2018-08-23

## 2018-08-23 RX ORDER — METOPROLOL TARTRATE 50 MG
50 TABLET ORAL DAILY
Qty: 0 | Refills: 0 | Status: DISCONTINUED | OUTPATIENT
Start: 2018-08-23 | End: 2018-08-24

## 2018-08-23 RX ADMIN — Medication 250 MILLIGRAM(S): at 23:39

## 2018-08-23 RX ADMIN — CEFEPIME 100 MILLIGRAM(S): 1 INJECTION, POWDER, FOR SOLUTION INTRAMUSCULAR; INTRAVENOUS at 19:58

## 2018-08-23 RX ADMIN — MAGNESIUM OXIDE 400 MG ORAL TABLET 400 MILLIGRAM(S): 241.3 TABLET ORAL at 23:42

## 2018-08-23 RX ADMIN — SODIUM CHLORIDE 1000 MILLILITER(S): 9 INJECTION INTRAMUSCULAR; INTRAVENOUS; SUBCUTANEOUS at 14:53

## 2018-08-23 RX ADMIN — LITHIUM CARBONATE 600 MILLIGRAM(S): 300 TABLET, EXTENDED RELEASE ORAL at 23:48

## 2018-08-23 RX ADMIN — CLOZAPINE 250 MILLIGRAM(S): 150 TABLET, ORALLY DISINTEGRATING ORAL at 23:37

## 2018-08-23 RX ADMIN — GABAPENTIN 300 MILLIGRAM(S): 400 CAPSULE ORAL at 23:38

## 2018-08-23 RX ADMIN — Medication 500 MILLIGRAM(S): at 15:49
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
## 2018-08-23 NOTE — ED PROVIDER NOTE - MEDICAL DECISION MAKING DETAILS
52 yo M hx bipolar and schizophrenia, hypothyroidism presenting with generalized weakness, denies recreational drug use, pt on lithium- lithium level, Creatinine, pt tachycardic- check EKG & TSH, serum & urine tox r/o drugs of abuse, pt well appearing likely dc if negative work up

## 2018-08-23 NOTE — ED ADULT NURSE NOTE - CHIEF COMPLAINT QUOTE
Pt BIBEMS c/o generalized malaise, "not feeling well", pt was around residents from [facility] that smoked k-2, denies any drug use, pt tachycardic, afebrile, denies any present pain, breathing even and unlabored. Pt has hx of depression, states he does not take any medication.

## 2018-08-23 NOTE — ED PROVIDER NOTE - ATTENDING CONTRIBUTION TO CARE
Pt was seen and evaluated by me. Pt is a 54 y/o male with PMhx of Bipolar Disorder, Schizophrenia, drug abuse, incontinence, hypothyroid, and HTN presenting for "not feeling well." Pt is resides at [facility] and notes today he went outside where others were smoking K-2 and felt sick after inhaling the smoke. Pt denies any new medications or recent changes. Denies taking an illicit drugs. Pt denies any headache, neck pain, back pain, fever, chills, SOB, chest pain, or abd pain. Pt denies any SI, HI, or hallucinations. Lungs CTA b/l. RRR. Abd soft, non-tender. Pt was seen and evaluated by me. Pt is a 52 y/o male with PMhx of Bipolar Disorder, Schizophrenia, drug abuse, incontinence, hypothyroid, and HTN presenting for "not feeling well." Pt is resides at [facility] and notes today he went outside where others were smoking K-2 and felt sick after inhaling the smoke. Pt denies any new medications or recent changes. Denies taking an illicit drugs. Pt denies any headache, neck pain, back pain, fever, chills, SOB, chest pain, or abd pain. Pt denies any SI, HI, or hallucinations. Lungs CTA b/l. RRR. Abd soft, non-tender. Left foot warm with erythema. + distal pulses.

## 2018-08-23 NOTE — ED BEHAVIORAL HEALTH ASSESSMENT NOTE - MEDICAL ISSUES AND PLAN (INCLUDE STANDING AND PRN MEDICATION)
Cellulitis to Foot: Keflex 500mg BID x 10 days, Metoprolol Suc 50mg XL QAM, Omeprazole 20mg BID, Lactulose 30 mL BId, Bisacodyl 5mg 2 tabs BID, Levothyroxine 0.1mg tablet QAM, Gabapentin 300mg BID, Magnesium Oxide 500mg 1 tab Q48 hours, Desmopressin 0.2mg QHS

## 2018-08-23 NOTE — H&P ADULT - NSHPPHYSICALEXAM_GEN_ALL_CORE
Vital Signs Last 24 Hrs  T(C): 36.7 (23 Aug 2018 22:26), Max: 37.1 (23 Aug 2018 13:49)  T(F): 98.1 (23 Aug 2018 22:26), Max: 98.7 (23 Aug 2018 13:49)  HR: 89 (23 Aug 2018 22:26) (80 - 110)  BP: 104/50 (23 Aug 2018 22:26) (104/50 - 147/97)  BP(mean): --  RR: 18 (23 Aug 2018 22:26) (16 - 18)  SpO2: 97% (23 Aug 2018 22:26) (97% - 100%)    GENERAL: No acute distress, well-developed  HEAD:  Atraumatic, Normocephalic  ENT: EOMI, PERRLA, conjunctiva and sclera clear, Neck supple, No JVD, moist mucosa  CHEST/LUNG: Clear to auscultation bilaterally; No wheeze, equal breath sounds bilaterally   BACK: No spinal tenderness  HEART: Regular rate and rhythm; No murmurs, rubs, or gallops  ABDOMEN: Soft, Nontender, Nondistended; Bowel sounds present  EXTREMITIES:  No clubbing, cyanosis, or edema  PSYCH: flat affect, depressed, no agitation or anxiety at present  NEUROLOGY: AAOx3, non-focal  SKIN: Normal color, +ulcer on L foot, +TTP, some serosanguinous discharge, no foul smell noted

## 2018-08-23 NOTE — H&P ADULT - PROBLEM SELECTOR PLAN 1
- Patient with ulc - Patient with ulcer of L foot with xray findings concerning for OM of the 5th metatarsal head  - Recevied keflex and cefepime in ED, will place on vancomycin for now and monitor  - Wounds cultures sent by podiatry, will f/u further podiatry recs, MRI ordered

## 2018-08-23 NOTE — ED BEHAVIORAL HEALTH ASSESSMENT NOTE - SUMMARY
Patient is a 53 year old single disabled non-caregiver  male currently residing in [facility]. PPH Schizoaffective Disorder & Cannabis Use Disorder. He has a history of multiple inpatient admissions discharged most recently from [facility] inpatient 11/17/17-8/8/18. He has a history of 1 past suicide attempt at age 26 via OD. He has a history of aggression/violence.  Past history of cannabis/crack/alcohol use; denies recent drug use. No known history of detox/rehab. No current legal issues.  PMH hypothyroidism, HLD, HTN, enuresis, s/p perianal fistulectomy 7/17 & BPH. BIBA reporting he does not feel well.    Patient presents to the ER in the context of not feeling well. Patient is acutely psychotic and per collateral appears to be decompensating. Patient is delusional, depressed and paranoid. He is impulsive and unpredictable and having hallucinations that parents are going to come get him from [place] and he is too paranoid and fearful to return to residence. He is unable to care for self due to severity of symptoms and requires inpatient admission for safety and stabilization. Ovi Monzon reports plan to transfer back to inpatient [facility]. Patient is a 53 year old single disabled non-caregiver  male currently residing in [facility]. PPH Schizoaffective Disorder & Cannabis Use Disorder. He has a history of multiple inpatient admissions discharged most recently from [facility] inpatient 11/17/17-8/8/18. He has a history of 1 past suicide attempt at age 26 via OD. He has a history of aggression/violence.  Past history of cannabis/crack/alcohol use; denies recent drug use. No known history of detox/rehab. No current legal issues.  PMH hypothyroidism, HLD, HTN, enuresis, s/p perianal fistulectomy 7/17 & BPH. BIBA reporting he does not feel well.    Patient presents to the ER in the context of not feeling well. Patient is acutely psychotic and per collateral appears to be decompensating. Patient is delusional, depressed and paranoid. He is impulsive and unpredictable and having hallucinations that parents are going to come get him from [place] and he is too paranoid and fearful to return to residence. He is unable to care for self due to severity of symptoms and requires inpatient admission for safety and stabilization. Ovi Monzon reports plan to transfer back to inpatient [facility].    Patient to be admitted medically, recommend 1:1.

## 2018-08-23 NOTE — ED BEHAVIORAL HEALTH NOTE - BEHAVIORAL HEALTH NOTE
Worker called patient's mother ([phone]) Rose and informed her of patient's admission to [facility]. Worker provided unit information to L3 and provided support for patient's mother around patient's admission. Worker called patient's mother ([phone]) Rose and informed her of patient's admission to [facility]. Worker provided unit information to L3 and provided support for patient's mother around patient's admission.      Worker called back patient's mother and informed her that patient will be instead medically admitted to [facility].

## 2018-08-23 NOTE — H&P ADULT - PROBLEM SELECTOR PROBLEM 3
Bipolar affective disorder, current episode depressed, current episode severity unspecified Schizoaffective disorder, bipolar type

## 2018-08-23 NOTE — ED BEHAVIORAL HEALTH ASSESSMENT NOTE - RISK ASSESSMENT
Patient presents an imminent risk to self as evidence by worsening delusions/paranoia, depression, [facility], history of aggression/violence, unpredictable/impulsive behavior, unable to care for self due to severity of symptoms, history of drug use and decompensated from baseline.

## 2018-08-23 NOTE — H&P ADULT - PROBLEM SELECTOR PLAN 2
- Concern for acute decompensation of patient's underlying psychiatric d/o, psych eval appreciated  - Will c/w clozapine and lithium at home doses for now, f/u further psych recs  - Will c/w 1:1 for now - Patient meeting criteria for sepsis given his leukocytosis and tachycardia on initial presentation  - Will c/w management as above with vancomycin, his gram stain is showing GPCs in pairs  - if not improving or vitals worsening then would consider broadening coverage to include gram negatives

## 2018-08-23 NOTE — CONSULT NOTE ADULT - SUBJECTIVE AND OBJECTIVE BOX
Patient is a 53y old  Male who presents with a chief complaint of generalized weakness    HPI:  52 yo M hx Bipolar Disorder, drug abuse, incontinence, schizophrenia, hypothyroid, HTN, presenting for "not feeling well." Pt resides at [facility] and states he was outside when other residents were smoking K-2 and felt sick  after inhaling smoke. Denies smoking K2 or any other recreational drug use. No nausea/vomiting. Denies chest pain or shortness of breath, no leg pain/swelling, no recent travel/surgeries. No focal weakness or numbness. No fevers/chills.  Denies SI/HI/depression/anxiety. Denies visual or auditory hallucinations    PAST MEDICAL & SURGICAL HISTORY:  Bipolar disorder  Drug abuse  Incontinence  Schizophrenia  Hypothyroid  Hypertension  No significant past surgical history      MEDICATIONS  (STANDING):    MEDICATIONS  (PRN):      Allergies    No Known Allergies    Intolerances    Haldol (Unknown)  Thorazine (Unknown)      VITALS:    Vital Signs Last 24 Hrs  T(C): 36.9 (23 Aug 2018 14:38), Max: 37.1 (23 Aug 2018 13:49)  T(F): 98.5 (23 Aug 2018 14:38), Max: 98.7 (23 Aug 2018 13:49)  HR: 100 (23 Aug 2018 14:38) (100 - 110)  BP: 127/84 (23 Aug 2018 14:38) (127/84 - 147/97)  BP(mean): --  RR: 16 (23 Aug 2018 14:38) (16 - 16)  SpO2: 100% (23 Aug 2018 14:38) (100% - 100%)    LABS:                          13.7   14.13 )-----------( 350      ( 23 Aug 2018 14:30 )             42.2       08-23    139  |  101  |  22  ----------------------------<  107<H>  4.5   |  24  |  0.93    Ca    10.3      23 Aug 2018 14:30    TPro  7.4  /  Alb  4.5  /  TBili  0.8  /  DBili  x   /  AST  25  /  ALT  32  /  AlkPhos  140<H>  08-23      CAPILLARY BLOOD GLUCOSE              LOWER EXTREMITY PHYSICAL EXAM:    Vasular: DP/PT 2/4, B/L, CFT <3 seconds B/L, Temperature gradient warm to warm Left foot + wnl on right foot, B/L.   Neuro: Epicritic sensation diminished to the level of toes, B/L.  Musculoskeletal/Ortho:  Skin: diffuse erythema to left ankle, right foot hyperkeratotic lesion on 5th met head and prominent styloid process   Wound #1: Left foot 5th met head, fibrous base, + probe to bone  Depth: to fascia  Wound bed: fibrous   Drainage: no purulence   Odor: no malodor   Periwound: hyperkeratotic   Etiology: pressure     RADIOLOGY & ADDITIONAL STUDIES: Patient is a 53y old  Male who presents with a chief complaint of generalized weakness    HPI:  54 yo M hx Bipolar Disorder, drug abuse, incontinence, schizophrenia, hypothyroid, HTN, presenting for "not feeling well." Pt resides at [facility] and states he was outside when other residents were smoking K-2 and felt sick  after inhaling smoke. Denies smoking K2 or any other recreational drug use. No nausea/vomiting. Denies chest pain or shortness of breath, no leg pain/swelling, no recent travel/surgeries. No focal weakness or numbness. No fevers/chills.  Denies SI/HI/depression/anxiety. Denies visual or auditory hallucinations    PAST MEDICAL & SURGICAL HISTORY:  Bipolar disorder  Drug abuse  Incontinence  Schizophrenia  Hypothyroid  Hypertension  No significant past surgical history      MEDICATIONS  (STANDING):    MEDICATIONS  (PRN):      Allergies    No Known Allergies    Intolerances    Haldol (Unknown)  Thorazine (Unknown)      VITALS:    Vital Signs Last 24 Hrs  T(C): 36.9 (23 Aug 2018 14:38), Max: 37.1 (23 Aug 2018 13:49)  T(F): 98.5 (23 Aug 2018 14:38), Max: 98.7 (23 Aug 2018 13:49)  HR: 100 (23 Aug 2018 14:38) (100 - 110)  BP: 127/84 (23 Aug 2018 14:38) (127/84 - 147/97)  BP(mean): --  RR: 16 (23 Aug 2018 14:38) (16 - 16)  SpO2: 100% (23 Aug 2018 14:38) (100% - 100%)    LABS:                          13.7   14.13 )-----------( 350      ( 23 Aug 2018 14:30 )             42.2       08-23    139  |  101  |  22  ----------------------------<  107<H>  4.5   |  24  |  0.93    Ca    10.3      23 Aug 2018 14:30    TPro  7.4  /  Alb  4.5  /  TBili  0.8  /  DBili  x   /  AST  25  /  ALT  32  /  AlkPhos  140<H>  08-23      CAPILLARY BLOOD GLUCOSE              LOWER EXTREMITY PHYSICAL EXAM:    Vasular: DP/PT 2/4, B/L, CFT <3 seconds B/L, Temperature gradient warm to warm Left foot + wnl on right foot, B/L.   Neuro: Epicritic sensation diminished to the level of toes, B/L.  Musculoskeletal/Ortho:  Skin: diffuse erythema to left ankle, right foot hyperkeratotic lesion on 5th met head and prominent styloid process   Wound #1: Left foot 5th met head, fibrous base, no probe to bone  Depth: to fascia  Wound bed: fibrous   Drainage: no purulence   Odor: no malodor   Periwound: hyperkeratotic   Etiology: pressure     RADIOLOGY & ADDITIONAL STUDIES:

## 2018-08-23 NOTE — H&P ADULT - NSHPRISKHIVSCREEN_GEN_ALL_CORE
Will increase her dose to 100 mcg.  She should discontinue the 75 mcg levothyroxine tablet.  Repeat TSH in 2 months.    Unable to offer due to clinical condition
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
## 2018-08-23 NOTE — H&P ADULT - NSHPLABSRESULTS_GEN_ALL_CORE
LABS and ADDITIONAL STUDIES:                        13.7   14.13 )-----------( 350      ( 23 Aug 2018 14:30 )             42.2         139  |  101  |  22  ----------------------------<  107<H>  4.5   |  24  |  0.93    Ca    10.3      23 Aug 2018 14:30    TPro  7.4  /  Alb  4.5  /  TBili  0.8  /  DBili  x   /  AST  25  /  ALT  32  /  AlkPhos  140<H>      LIVER FUNCTIONS - ( 23 Aug 2018 14:30 )  Alb: 4.5 g/dL / Pro: 7.4 g/dL / ALK PHOS: 140 u/L / ALT: 32 u/L / AST: 25 u/L / GGT: x           Urinalysis Basic - ( 23 Aug 2018 17:03 )  Color: LIGHT YELLOW / Appearance: CLEAR / S.017 / pH: 7.0  Gluc: NEGATIVE / Ketone: SMALL  / Bili: NEGATIVE / Urobili: NORMAL   Blood: NEGATIVE / Protein: NEGATIVE / Nitrite: NEGATIVE   Leuk Esterase: NEGATIVE / RBC: x / WBC x   Sq Epi: x / Non Sq Epi: x / Bacteria: x    < from: Xray Foot AP + Lateral + Oblique, Left (18 @ 18:12) >  ******PRELIMINARY REPORT******        INTERPRETATION:  There tyron cortical defect on the inferior aspect of the 5th metatarsal head, concerning for ostemyelitis. Per primary team, the patient's ulcer is in this region.  < end of copied text >    EKG - NSR at 100, QTc 461

## 2018-08-23 NOTE — ED ADULT NURSE NOTE - NSIMPLEMENTINTERV_GEN_ALL_ED
Implemented All Universal Safety Interventions:  Alfred to call system. Call bell, personal items and telephone within reach. Instruct patient to call for assistance. Room bathroom lighting operational. Non-slip footwear when patient is off stretcher. Physically safe environment: no spills, clutter or unnecessary equipment. Stretcher in lowest position, wheels locked, appropriate side rails in place.

## 2018-08-23 NOTE — ED BEHAVIORAL HEALTH ASSESSMENT NOTE - PSYCHIATRIC ISSUES AND PLAN (INCLUDE STANDING AND PRN MEDICATION)
Clozapine 100mg QAM; 250mg QHS, Lithium 300mg QAM; 600mg QHS,, Prolixin 5mg PO/IM PRN, Ativan 2mg PO/IM PRN, Benadryl 50mg PO/IM PRN

## 2018-08-23 NOTE — ED BEHAVIORAL HEALTH ASSESSMENT NOTE - HPI (INCLUDE ILLNESS QUALITY, SEVERITY, DURATION, TIMING, CONTEXT, MODIFYING FACTORS, ASSOCIATED SIGNS AND SYMPTOMS)
Patient is a 53 year old single disabled non-caregiver  male currently residing in [facility]. PPH Schizoaffective Disorder & Cannabis Use Disorder. He has a history of multiple inpatient admissions discharged most recently from [facility] inpatient 11/17/17-8/8/18. He has a history of 1 past suicide attempt at age 26 via OD. He has a history of aggression/violence.  Past history of cannabis/crack/alcohol use; denies recent drug use. No known history of detox/rehab. No current legal issues.  PMH hypothyroidism, HLD, HTN, enuresis, s/p perianal fistulectomy 7/17 & BPH. BIBA reporting he does not feel well.    Patient reports he came to the ER because he does not feel well. He stated his heart is weak due to not feeling well. When asked what doesn't feel well patient had evident poverty of thought/self expression. He stated his mind doesn't feel well but cannot indicate why. He stated he is not doing well and needs help.    Patient endorsed feeling paranoid and depressed stating he believes staff is trying to kill him. He stated they are poisoning his water and using placebos instead of his pills. Patient was visually fearful and stated he cannot go back to [facility]. He reports his parents said they would pick him up and bring him to [place]. When asked how his parents would get him he said in the car. Patient reported his parents told him this but he did not talk to them on the phone, they told him in his mind.     Patient does not report nor exhibit any signs of adrienne, including irritable or elevated mood, grandiosity, pressured speech, risk-taking behaviors, increase in productivity or agitation. Patient adamantly denies SI, intent or plan; denies any HI, violent thoughts.     See  note for collateral information.

## 2018-08-23 NOTE — ED BEHAVIORAL HEALTH ASSESSMENT NOTE - CURRENT MEDICATION
Metoprolol Suc 50mg XL QAM, Omeprazole 20mg BID, Lactulose 30 mL BId, Bisacodyl 5mg 2 tabs BID, Levothyroxine 0.1mg tablet QAM, Gabapentin 300mg BID, Magnesium Oxide 500mg 1 tab Q48 hours, Desmopressin 0.2mg QHS    Nicotine Gum 2mg Q8 hours    Clozapine 100mg QAM; 250mg QHS, Lithium 300mg QAM; 600mg QHS, Metoprolol Suc 50mg XL QAM, Omeprazole 20mg BID, Lactulose 30 mL BId, Bisacodyl 5mg 2 tabs BID, Levothyroxine 0.1mg tablet QAM, Gabapentin 300mg BID, Magnesium Oxide 500mg 1 tab Q48 hours, Desmopressin 0.2mg QHS Nicotine Gum 2mg Q8 hours, Clozapine 100mg QAM; 250mg QHS, Lithium 300mg QAM; 600mg QHS, Metoprolol Suc 50mg XL QAM, Omeprazole 20mg BID, Lactulose 30 mL BId, Bisacodyl 5mg 2 tabs BID, Levothyroxine 0.1mg/0.75mg tablet QAM, Gabapentin 300mg BID, Magnesium Oxide 500mg 1 tab Q48 hours, Desmopressin 0.2mg QHS Nicotine Gum 2mg Q8 hours, Clozapine 100mg QAM; 250mg QHS, Lithium 300mg QAM; 600mg QHS,

## 2018-08-23 NOTE — ED ADULT NURSE REASSESSMENT NOTE - NS ED NURSE REASSESS COMMENT FT1
Pt noted with redness, swelling on L foot, +sore on the bottom part of the foot with mild bleeding, pt denies any pain. Area cleaned and covered with a dressing. MD gasca made aware.
Patient calm and cooperative, PCA at bedside, provided with meal directly observed at all times.

## 2018-08-23 NOTE — H&P ADULT - ASSESSMENT
This is a 53M with history as above who presents with complaints of not feeling well found to have OM of L foot and likely acute psychosis.

## 2018-08-23 NOTE — ED BEHAVIORAL HEALTH ASSESSMENT NOTE - CASE SUMMARY
Patient is a 53 year old single disabled non-caregiver  male currently residing in [facility]. PPH Schizoaffective Disorder & Cannabis Use Disorder. He has a history of multiple inpatient admissions discharged most recently from [facility] 11/17/17-8/8/18. He has a history of 1 past suicide attempt at age 26 via OD. He has a history of aggression/violence.  Past history of cannabis/crack/alcohol use; denies recent drug use. No known history of detox/rehab. No current legal issues.  PMH hypothyroidism, HLD, HTN, enuresis, s/p perianal fistulectomy 7/17 & BPH. BIBA reporting he does not feel well. On exam, pt is guarded, internally preoccupied, he endorses persecutory delusions believing that staff and patients in psych residence are trying to kill him. Collateral hx is concerning for worsening psychosis, poor judgment, and depressed mood. Patient poses an imminent danger to self as he is not caring for self, and requires inpatient psychiatric hospitalization and will be admitted 9.39 Patient is a 53 year old single disabled non-caregiver  male currently residing in [facility]. PPH Schizoaffective Disorder & Cannabis Use Disorder. He has a history of multiple inpatient admissions discharged most recently from [facility] 11/17/17-8/8/18. He has a history of 1 past suicide attempt at age 26 via OD. He has a history of aggression/violence.  Past history of cannabis/crack/alcohol use; denies recent drug use. No known history of detox/rehab. No current legal issues.  PMH hypothyroidism, HLD, HTN, enuresis, s/p perianal fistulectomy 7/17 & BPH. BIBA reporting he does not feel well. On exam, pt is guarded, internally preoccupied, he endorses persecutory delusions believing that staff and patients in psych residence are trying to kill him. Collateral hx is concerning for worsening psychosis, poor judgment, and depressed mood. Patient poses an imminent danger to self as he is not caring for self, and requires inpatient psychiatric hospitalization when medically cleared

## 2018-08-23 NOTE — ED PROVIDER NOTE - PROGRESS NOTE DETAILS
Spoke with pt's Outpt physician Dr. Monzon who can be reached at [phone] to discussed results with. juan m: pt signed out to me pending podiatry c/s. here with cellulitis of LE.  podiatry c/s would like ESR and imaging read prior to seeing pt. WIll call back pt's psychiatrist once medically cleared. Appreciate Psych C/S: as per psych, patient's psychiatrist (Dr. Monzon) states that patient is acutely psychotic with delusions and paranoia, was recently discharged from inpatient at [facility] and is not stable or safe living in the residence given his psychosis, needs inpatient psych, but patient is not medically cleared as he has an osteomyelitis, admit to medicine team

## 2018-08-23 NOTE — ED BEHAVIORAL HEALTH ASSESSMENT NOTE - OTHER PAST PSYCHIATRIC HISTORY (INCLUDE DETAILS REGARDING ONSET, COURSE OF ILLNESS, INPATIENT/OUTPATIENT TREATMENT)
PPH Schizoaffective Disorder & Cannabis Use Disorder. He has a history of multiple inpatient admissions discharged most recently from [facility] 11/17/17-8/8/18. . Patient was inpatient [place] after [facility] stay in 2015 until 10/25/17 but then was discharged and readmitted after medication non compliance until 8/8/18. He is currently on AOT.
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
## 2018-08-23 NOTE — H&P ADULT - NSHPSOCIALHISTORY_GEN_ALL_CORE
Currently residing at [facility] on [facility]  Denies any current tobacco or EtOH use, denies any IVDU

## 2018-08-23 NOTE — H&P ADULT - PROBLEM SELECTOR PLAN 3
- plan as above - Concern for acute decompensation of patient's underlying psychiatric d/o, psych eval appreciated  - Will c/w clozapine and lithium at home doses for now, f/u further psych recs  - Will c/w 1:1 for now
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
## 2018-08-23 NOTE — ED PROVIDER NOTE - PHYSICAL EXAMINATION
GEN: unkempt, NAD, A & O x 3  HEAD/EYES: NCAT, PERRL, EOMI, anicteric sclerae, no conjunctival pallor  ENT: mucus membranes moist, oropharynx WNL, trachea midline, no JVD  RESP: lungs CTA with equal breath sounds bilaterally, chest wall nontender and atraumatic  CV: heart with reg rhythm S1, S2, no murmur; distal pulses intact and symmetric bilaterally  ABDOMEN: normoactive bowel sounds, soft, nondistended, nontender, no palpable masses  : no CVAT  MSK: extremities atraumatic and nontender, no edema, no asymmetry. the back is without midline or lateral tenderness, there is no spinal deformity or stepoff and the back is ranged painlessly.  SKIN: warm, dry, no rash, no bruising, no cyanosis. color appropriate for ethnicity  NEURO: alert, mentating appropriately, no facial asymmetry. gross sensation, motor, coordination are intact  PSYCH: Affect appropriate GEN: unkempt, NAD, A & O x 3  HEAD/EYES: NCAT, PERRL, EOMI, anicteric sclerae, no conjunctival pallor  ENT: mucus membranes moist, oropharynx WNL, trachea midline, no JVD  RESP: lungs CTA with equal breath sounds bilaterally, chest wall nontender and atraumatic  CV: heart with reg rhythm S1, S2, no murmur; distal pulses intact and symmetric bilaterally  ABDOMEN: normoactive bowel sounds, soft, nondistended, nontender, no palpable masses  : no CVAT  MSK: extremities atraumatic and nontender, no edema, no asymmetry. the back is without midline or lateral tenderness, there is no spinal deformity or stepoff and the back is ranged painlessly.  SKIN: warm, dry,no bruising, no cyanosis. color appropriate for ethnicity, erythema warmth and ttp L foot with ulcer under base of 5th metatarsal  NEURO: alert, mentating appropriately, no facial asymmetry. gross sensation, motor, coordination are intact  PSYCH: Affect appropriate

## 2018-08-23 NOTE — ED PROVIDER NOTE - SKIN, MLM
Skin normal color for race, warm, dry and intact. No evidence of rash. Left foot, warm with erythema. + distal pulses.

## 2018-08-23 NOTE — ED BEHAVIORAL HEALTH ASSESSMENT NOTE - DESCRIPTION
hypothyroidism, HLD, HTN, enuresis, s/p perianal fistulectomy 7/17 & BPH see hpi During course of ED visit patient was calm and cooperative. Patient was not aggressive or violent and did not require PRN medications.     Vital Signs Last 24 Hrs  T(C): 36.9 (23 Aug 2018 14:38), Max: 37.1 (23 Aug 2018 13:49)  T(F): 98.5 (23 Aug 2018 14:38), Max: 98.7 (23 Aug 2018 13:49)  HR: 100 (23 Aug 2018 14:38) (100 - 110)  BP: 127/84 (23 Aug 2018 14:38) (127/84 - 147/97)  BP(mean): --  RR: 16 (23 Aug 2018 14:38) (16 - 16)  SpO2: 100% (23 Aug 2018 14:38) (100% - 100%)

## 2018-08-23 NOTE — ED ADULT TRIAGE NOTE - CHIEF COMPLAINT QUOTE
Pt BIBEMS c/o generalized malaise, "not feeling well", pt was around residents from [facility] that smoked k-2, denies any drug use, pt tachycardic, afebrile, denies any present pain, breathing even and unlabored. Pt has hx of depression, states he does not take any medication.
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
## 2018-08-23 NOTE — H&P ADULT - NSHPREVIEWOFSYSTEMS_GEN_ALL_CORE
REVIEW OF SYSTEMS:    CONSTITUTIONAL: No weakness, fevers or chills  EYES: No visual changes, no eye discharge  ENT: No rhinorrhea or sore throat  NECK: No pain or stiffness  RESPIRATORY: No cough, wheezing, hemoptysis; No shortness of breath  CARDIOVASCULAR: No chest pain or palpitations; No lower extremity edema  GASTROINTESTINAL: No abdominal or epigastric pain. No nausea, vomiting, or hematemesis; No diarrhea or constipation. No melena or hematochezia.  BACK: No back pain  GENITOURINARY: No dysuria, frequency or hematuria  NEUROLOGICAL: No numbness or weakness  PSYCH: +depression, +passive SI, +anhedonia, no active SI/HI  SKIN: +ulcer on L foot, painful on palpation   All other review of systems is negative unless indicated above.

## 2018-08-24 ENCOUNTER — INPATIENT (INPATIENT)
Facility: HOSPITAL | Age: 53
LOS: 47 days | Discharge: ANOTHER HOSPITAL | End: 2018-10-11
Attending: PSYCHIATRY & NEUROLOGY | Admitting: PSYCHIATRY & NEUROLOGY
Payer: MEDICAID

## 2018-08-24 ENCOUNTER — TRANSCRIPTION ENCOUNTER (OUTPATIENT)
Age: 53
End: 2018-08-24

## 2018-08-24 VITALS — WEIGHT: 233.91 LBS | HEIGHT: 75 IN | TEMPERATURE: 99 F

## 2018-08-24 VITALS
SYSTOLIC BLOOD PRESSURE: 100 MMHG | DIASTOLIC BLOOD PRESSURE: 64 MMHG | HEART RATE: 71 BPM | RESPIRATION RATE: 16 BRPM | OXYGEN SATURATION: 100 % | TEMPERATURE: 97 F

## 2018-08-24 DIAGNOSIS — F12.10 CANNABIS ABUSE, UNCOMPLICATED: ICD-10-CM

## 2018-08-24 DIAGNOSIS — F20.9 SCHIZOPHRENIA, UNSPECIFIED: ICD-10-CM

## 2018-08-24 DIAGNOSIS — A41.9 SEPSIS, UNSPECIFIED ORGANISM: ICD-10-CM

## 2018-08-24 LAB
BUN SERPL-MCNC: 17 MG/DL — SIGNIFICANT CHANGE UP (ref 7–23)
CALCIUM SERPL-MCNC: 9.9 MG/DL — SIGNIFICANT CHANGE UP (ref 8.4–10.5)
CHLORIDE SERPL-SCNC: 106 MMOL/L — SIGNIFICANT CHANGE UP (ref 98–107)
CO2 SERPL-SCNC: 26 MMOL/L — SIGNIFICANT CHANGE UP (ref 22–31)
CREAT SERPL-MCNC: 0.74 MG/DL — SIGNIFICANT CHANGE UP (ref 0.5–1.3)
GLUCOSE SERPL-MCNC: 88 MG/DL — SIGNIFICANT CHANGE UP (ref 70–99)
HCT VFR BLD CALC: 41.6 % — SIGNIFICANT CHANGE UP (ref 39–50)
HGB BLD-MCNC: 13.4 G/DL — SIGNIFICANT CHANGE UP (ref 13–17)
MAGNESIUM SERPL-MCNC: 2.3 MG/DL — SIGNIFICANT CHANGE UP (ref 1.6–2.6)
MCHC RBC-ENTMCNC: 29.5 PG — SIGNIFICANT CHANGE UP (ref 27–34)
MCHC RBC-ENTMCNC: 32.2 % — SIGNIFICANT CHANGE UP (ref 32–36)
MCV RBC AUTO: 91.4 FL — SIGNIFICANT CHANGE UP (ref 80–100)
NRBC # FLD: 0 — SIGNIFICANT CHANGE UP
PHOSPHATE SERPL-MCNC: 2.7 MG/DL — SIGNIFICANT CHANGE UP (ref 2.5–4.5)
PLATELET # BLD AUTO: 281 K/UL — SIGNIFICANT CHANGE UP (ref 150–400)
PMV BLD: 10.2 FL — SIGNIFICANT CHANGE UP (ref 7–13)
POTASSIUM SERPL-MCNC: 4.1 MMOL/L — SIGNIFICANT CHANGE UP (ref 3.5–5.3)
POTASSIUM SERPL-SCNC: 4.1 MMOL/L — SIGNIFICANT CHANGE UP (ref 3.5–5.3)
RBC # BLD: 4.55 M/UL — SIGNIFICANT CHANGE UP (ref 4.2–5.8)
RBC # FLD: 13 % — SIGNIFICANT CHANGE UP (ref 10.3–14.5)
SODIUM SERPL-SCNC: 143 MMOL/L — SIGNIFICANT CHANGE UP (ref 135–145)
T3 SERPL-MCNC: 94.6 NG/DL — SIGNIFICANT CHANGE UP (ref 80–200)
T4 AB SER-ACNC: 8.37 UG/DL — SIGNIFICANT CHANGE UP (ref 5.1–13)
WBC # BLD: 6.35 K/UL — SIGNIFICANT CHANGE UP (ref 3.8–10.5)
WBC # FLD AUTO: 6.35 K/UL — SIGNIFICANT CHANGE UP (ref 3.8–10.5)

## 2018-08-24 PROCEDURE — 99233 SBSQ HOSP IP/OBS HIGH 50: CPT

## 2018-08-24 PROCEDURE — 99239 HOSP IP/OBS DSCHRG MGMT >30: CPT

## 2018-08-24 PROCEDURE — 99222 1ST HOSP IP/OBS MODERATE 55: CPT

## 2018-08-24 RX ORDER — GABAPENTIN 400 MG/1
300 CAPSULE ORAL
Qty: 0 | Refills: 0 | Status: DISCONTINUED | OUTPATIENT
Start: 2018-08-24 | End: 2018-10-11

## 2018-08-24 RX ORDER — NICOTINE POLACRILEX 2 MG
2 GUM BUCCAL EVERY 8 HOURS
Qty: 0 | Refills: 0 | Status: DISCONTINUED | OUTPATIENT
Start: 2018-08-24 | End: 2018-10-11

## 2018-08-24 RX ORDER — CEPHALEXIN 500 MG
500 CAPSULE ORAL
Qty: 0 | Refills: 0 | Status: DISCONTINUED | OUTPATIENT
Start: 2018-08-24 | End: 2018-08-24

## 2018-08-24 RX ORDER — LITHIUM CARBONATE 300 MG/1
300 TABLET, EXTENDED RELEASE ORAL DAILY
Qty: 0 | Refills: 0 | Status: DISCONTINUED | OUTPATIENT
Start: 2018-08-24 | End: 2018-10-11

## 2018-08-24 RX ORDER — DESMOPRESSIN ACETATE 0.1 MG/1
0.2 TABLET ORAL AT BEDTIME
Qty: 0 | Refills: 0 | Status: DISCONTINUED | OUTPATIENT
Start: 2018-08-24 | End: 2018-10-11

## 2018-08-24 RX ORDER — PANTOPRAZOLE SODIUM 20 MG/1
40 TABLET, DELAYED RELEASE ORAL
Qty: 0 | Refills: 0 | Status: DISCONTINUED | OUTPATIENT
Start: 2018-08-24 | End: 2018-10-11

## 2018-08-24 RX ORDER — FLUPHENAZINE HYDROCHLORIDE 1 MG/1
5 TABLET, FILM COATED ORAL EVERY 6 HOURS
Qty: 0 | Refills: 0 | Status: DISCONTINUED | OUTPATIENT
Start: 2018-08-24 | End: 2018-10-11

## 2018-08-24 RX ORDER — MUPIROCIN 20 MG/G
1 OINTMENT TOPICAL ONCE
Qty: 0 | Refills: 0 | Status: DISCONTINUED | OUTPATIENT
Start: 2018-08-24 | End: 2018-08-24

## 2018-08-24 RX ORDER — CLOZAPINE 150 MG/1
350 TABLET, ORALLY DISINTEGRATING ORAL AT BEDTIME
Qty: 0 | Refills: 0 | Status: DISCONTINUED | OUTPATIENT
Start: 2018-08-24 | End: 2018-08-28

## 2018-08-24 RX ORDER — CEPHALEXIN 500 MG
1 CAPSULE ORAL
Qty: 0 | Refills: 0 | COMMUNITY
Start: 2018-08-24

## 2018-08-24 RX ORDER — MUPIROCIN 20 MG/G
1 OINTMENT TOPICAL EVERY 12 HOURS
Qty: 0 | Refills: 0 | Status: DISCONTINUED | OUTPATIENT
Start: 2018-08-24 | End: 2018-08-24

## 2018-08-24 RX ORDER — FLUPHENAZINE HYDROCHLORIDE 1 MG/1
5 TABLET, FILM COATED ORAL ONCE
Qty: 0 | Refills: 0 | Status: DISCONTINUED | OUTPATIENT
Start: 2018-08-24 | End: 2018-10-11

## 2018-08-24 RX ORDER — MAGNESIUM OXIDE 400 MG ORAL TABLET 241.3 MG
400 TABLET ORAL
Qty: 0 | Refills: 0 | Status: DISCONTINUED | OUTPATIENT
Start: 2018-08-24 | End: 2018-10-11

## 2018-08-24 RX ORDER — METOPROLOL TARTRATE 50 MG
50 TABLET ORAL DAILY
Qty: 0 | Refills: 0 | Status: DISCONTINUED | OUTPATIENT
Start: 2018-08-24 | End: 2018-10-11

## 2018-08-24 RX ORDER — ACETAMINOPHEN 500 MG
2 TABLET ORAL
Qty: 0 | Refills: 0 | COMMUNITY

## 2018-08-24 RX ORDER — MUPIROCIN 20 MG/G
1 OINTMENT TOPICAL
Qty: 0 | Refills: 0 | Status: DISCONTINUED | OUTPATIENT
Start: 2018-08-24 | End: 2018-10-11

## 2018-08-24 RX ORDER — LITHIUM CARBONATE 300 MG/1
600 TABLET, EXTENDED RELEASE ORAL AT BEDTIME
Qty: 0 | Refills: 0 | Status: DISCONTINUED | OUTPATIENT
Start: 2018-08-24 | End: 2018-10-11

## 2018-08-24 RX ORDER — LACTULOSE 10 G/15ML
20 SOLUTION ORAL
Qty: 0 | Refills: 0 | Status: DISCONTINUED | OUTPATIENT
Start: 2018-08-24 | End: 2018-10-11

## 2018-08-24 RX ADMIN — CLOZAPINE 350 MILLIGRAM(S): 150 TABLET, ORALLY DISINTEGRATING ORAL at 22:55

## 2018-08-24 RX ADMIN — Medication 1 TABLET(S): at 17:26

## 2018-08-24 RX ADMIN — DESMOPRESSIN ACETATE 0.2 MILLIGRAM(S): 0.1 TABLET ORAL at 00:52

## 2018-08-24 RX ADMIN — MAGNESIUM OXIDE 400 MG ORAL TABLET 400 MILLIGRAM(S): 241.3 TABLET ORAL at 12:19

## 2018-08-24 RX ADMIN — DESMOPRESSIN ACETATE 0.2 MILLIGRAM(S): 0.1 TABLET ORAL at 22:48

## 2018-08-24 RX ADMIN — PANTOPRAZOLE SODIUM 40 MILLIGRAM(S): 20 TABLET, DELAYED RELEASE ORAL at 06:35

## 2018-08-24 RX ADMIN — Medication 250 MILLIGRAM(S): at 06:35

## 2018-08-24 RX ADMIN — CLOZAPINE 100 MILLIGRAM(S): 150 TABLET, ORALLY DISINTEGRATING ORAL at 12:19

## 2018-08-24 RX ADMIN — LITHIUM CARBONATE 300 MILLIGRAM(S): 300 TABLET, EXTENDED RELEASE ORAL at 12:19

## 2018-08-24 RX ADMIN — Medication 50 MILLIGRAM(S): at 06:35

## 2018-08-24 RX ADMIN — GABAPENTIN 300 MILLIGRAM(S): 400 CAPSULE ORAL at 06:35

## 2018-08-24 RX ADMIN — Medication 1 TABLET(S): at 22:55

## 2018-08-24 RX ADMIN — LITHIUM CARBONATE 600 MILLIGRAM(S): 300 TABLET, EXTENDED RELEASE ORAL at 22:55

## 2018-08-24 RX ADMIN — LACTULOSE 20 GRAM(S): 10 SOLUTION ORAL at 22:55

## 2018-08-24 RX ADMIN — GABAPENTIN 300 MILLIGRAM(S): 400 CAPSULE ORAL at 17:26

## 2018-08-24 RX ADMIN — Medication 175 MICROGRAM(S): at 06:35

## 2018-08-24 RX ADMIN — GABAPENTIN 300 MILLIGRAM(S): 400 CAPSULE ORAL at 22:55

## 2018-08-24 NOTE — DISCHARGE NOTE ADULT - MEDICATION SUMMARY - MEDICATIONS TO STOP TAKING
I will STOP taking the medications listed below when I get home from the hospital:    desmopressin 0.01% nasal solution  -- 3 spray(s) into nose    Percocet 5/325 325 mg-5 mg oral tablet  -- 1-2 tab(s) by mouth every 4 to 6 hours, As Needed MDD:6 tabs - for severe pain if aleve does not help  -- Caution federal law prohibits the transfer of this drug to any person other  than the person for whom it was prescribed.  May cause drowsiness.  Alcohol may intensify this effect.  Use care when operating dangerous machinery.  This prescription cannot be refilled.  This product contains acetaminophen.  Do not use  with any other product containing acetaminophen to prevent possible liver damage.  Using more of this medication than prescribed may cause serious breathing problems.

## 2018-08-24 NOTE — DISCHARGE NOTE ADULT - MEDICATION SUMMARY - MEDICATIONS TO TAKE
I will START or STAY ON the medications listed below when I get home from the hospital:    gabapentin 300 mg oral capsule  -- 1 cap(s) by mouth 2 times a day  -- Indication: For Pain    desmopressin 0.2 mg oral tablet  -- 1 tab(s) by mouth once a day (at bedtime)  -- Indication: For Enuresis    cloZAPine 100 mg oral tablet  -- 1 tab(s) by mouth once a day (in the morning)  -- Indication: For Schizophrenia    cloZAPine 50 mg oral tablet  -- 5 tab(s) (250mg) by mouth once a day (at bedtime)  -- Indication: For Schizophrenia    lithium 300 mg oral capsule  -- 1 cap(s) (300mg) by mouth in the morning and then 2 cap(s) (600mg) by mouth at bedtime  -- Indication: For Bipolar disorder    Metoprolol Succinate ER 50 mg oral tablet, extended release  -- 1 tab(s) by mouth once a day (in the morning)  -- Indication: For Hypertension    lactulose 10 g/15 mL oral syrup  -- 30 milliliter(s) by mouth 2 times a day  -- Indication: For Constipation    bisacodyl 5 mg oral delayed release tablet  -- 2 tab(s) by mouth 2 times a day  -- Indication: For Constipation    magnesium oxide 400 mg (240 mg elemental magnesium) oral tablet  -- 1 tab(s) by mouth every other day (at bedtime)  -- Indication: For Multivitamin    Augmentin 875 mg-125 mg oral tablet  -- 1 tab(s) by mouth every 12 hours for two weeks  -- Indication: For Cellulitis    omeprazole 20 mg oral delayed release capsule  -- 1 cap(s) by mouth 2 times a day  -- Indication: For GERD    nicotine 2 mg oral transmucosal gum  -- Chew 2 pieces of gum oral transmucosal every 8 hours  -- Indication: For Smoking    levothyroxine 175 mcg (0.175 mg) oral tablet  -- 1 tab(s) by mouth once a day (in the morning)  -- Indication: For Hypothyroidism, unspecified type

## 2018-08-24 NOTE — PROGRESS NOTE ADULT - ASSESSMENT
This is a 53M with history as above who presents with complaints of not feeling well found to have OM of L foot and likely acute psychosis. This is a 53M with history as above who presents with complaints of not feeling well, found L food ulcer and acute psychosis. This is a 53M with history as above who presents with complaints of not feeling well, found L foot ulcer and acute psychosis. Radiograph negative for osteomyelitis. Plan to stop vancomycin and transfer to [facility] on cephalexin pending podiatry recs.

## 2018-08-24 NOTE — DISCHARGE NOTE ADULT - PATIENT PORTAL LINK FT
You can access the [facility] Patient Portal, offered by [facility], by registering with the following website: [email or web address]

## 2018-08-24 NOTE — PROGRESS NOTE ADULT - SUBJECTIVE AND OBJECTIVE BOX
Medicine Team MARIE CareModel B  Progress Note - PGY1    =========================================  CONTACT INFO  Marylou Arroyo M.D., PGY-1  Pager: [phone]    Mon-Fri: pager covered by day team 7am-7pm;   ***Academic conferences M-F 8am-9am & 12pm-1pm- page ONLY if URGENT or if Consultant  Sa/Sun: see chart, primary physician assigned available 7am-12pm  Sat/Sun Cross Coverage 12pm-7pm: NS- page [phone] for Team1-4, [facility]- pager forwarded to covering Resident  For Night coverage 7pm-7am: NS- page [phone] Team1-3, page [phone] Team4 & Care Model; [facility]- page [phone] Team1-3,[id number] Team4-CareA/B  =========================================      CHIEF COMPLAINT:     INTERVAL EVENTS / SUBJECTIVE:    ***    OBJECTIVE:  Vital Signs Last 24 Hrs  T(C): 35.6 (24 Aug 2018 06:13), Max: 37.1 (23 Aug 2018 13:49)  T(F): 96.1 (24 Aug 2018 06:13), Max: 98.7 (23 Aug 2018 13:49)  HR: 90 (24 Aug 2018 06:13) (80 - 110)  BP: 101/61 (24 Aug 2018 06:13) (101/61 - 147/97)  BP(mean): --  RR: 18 (24 Aug 2018 06:13) (16 - 18)  SpO2: 100% (24 Aug 2018 06:13) (97% - 100%)    CAPILLARY BLOOD GLUCOSE          PHYSICAL EXAM:    ***      HOSPITAL MEDICATIONS:  bisacodyl 10 milliGRAM(s) Oral every 12 hours PRN Constipation  cloZAPine 100 milliGRAM(s) Oral daily  cloZAPine 250 milliGRAM(s) Oral at bedtime  desmopressin 0.2 milliGRAM(s) Oral at bedtime  gabapentin 300 milliGRAM(s) Oral two times a day  lactulose Syrup 20 Gram(s) Oral two times a day PRN Constipation  levothyroxine 175 MICROGram(s) Oral daily  lithium 300 milliGRAM(s) Oral daily  lithium 600 milliGRAM(s) Oral at bedtime  magnesium oxide 400 milliGRAM(s) Oral daily  metoprolol succinate ER 50 milliGRAM(s) Oral daily  nicotine  Polacrilex Gum 2 milliGRAM(s) Oral every 8 hours PRN Nicotine cravings  pantoprazole    Tablet 40 milliGRAM(s) Oral before breakfast  vancomycin  IVPB      vancomycin  IVPB 1000 milliGRAM(s) IV Intermittent every 12 hours      LABS:                        13.7   14.13 )-----------( 350      ( 23 Aug 2018 14:30 )             42.2     Hgb Trend: 13.7<--      139  |  101  |  22  ----------------------------<  107<H>  4.5   |  24  |  0.93    Ca    10.3      23 Aug 2018 14:30    TPro  7.4  /  Alb  4.5  /  TBili  0.8  /  DBili  x   /  AST  25  /  ALT  32  /  AlkPhos  140<H>      Creatinine Trend: 0.93<--, 0.83<--, 0.83<--    Urinalysis Basic - ( 23 Aug 2018 17:03 )    Color: LIGHT YELLOW / Appearance: CLEAR / S.017 / pH: 7.0  Gluc: NEGATIVE / Ketone: SMALL  / Bili: NEGATIVE / Urobili: NORMAL   Blood: NEGATIVE / Protein: NEGATIVE / Nitrite: NEGATIVE   Leuk Esterase: NEGATIVE / RBC: x / WBC x   Sq Epi: x / Non Sq Epi: x / Bacteria: x            MICROBIOLOGY:    Culture - Abscess with Gram Stain (collected 18 @ 18:18)  Source: OTHER        RADIOLOGY:    EKG: Medicine [facility] Team 4 Progress Note - PGY1    =========================================  CONTACT INFO  Marylou Arroyo M.D., PGY-1  Pager: [phone]    Mon-Fri: pager covered by day team 7am-7pm;   ***Academic conferences M-F 8am-9am & 12pm-1pm- page ONLY if URGENT or if Consultant  Sa/Sun: see chart, primary physician assigned available 7am-12pm  Sat/Sun Cross Coverage 12pm-7pm: NS- page [phone] for Team1-4, [facility]- pager forwarded to covering Resident  For Night coverage 7pm-7am: NS- page [phone] Team1-3, page [phone] Team4 & Care Model; [facility]- page [phone] Team1-3,[id number] Team4-CareA/B  =========================================        INTERVAL EVENTS / SUBJECTIVE:    Radiograph negative for osteomyelitis. No acute events overnight. Pt seen and examined at bedside this am. Denies pain in foot. Denies fever, chills, diaphoresis.    OBJECTIVE:  Vital Signs Last 24 Hrs  T(C): 35.6 (24 Aug 2018 06:13), Max: 37.1 (23 Aug 2018 13:49)  T(F): 96.1 (24 Aug 2018 06:13), Max: 98.7 (23 Aug 2018 13:49)  HR: 90 (24 Aug 2018 06:13) (80 - 110)  BP: 101/61 (24 Aug 2018 06:13) (101/61 - 147/97)  BP(mean): --  RR: 18 (24 Aug 2018 06:13) (16 - 18)  SpO2: 100% (24 Aug 2018 06:13) (97% - 100%)      PHYSICAL EXAM:      GENERAL: No acute distress, well-developed  	HEAD:  Atraumatic, Normocephalic  	ENT: EOMI, PERRL, conjunctiva and sclera clear, Neck supple, No JVD, moist mucosa  	CHEST/LUNG: Clear to auscultation bilaterally; No wheeze, equal breath sounds bilaterally   	BACK: No spinal tenderness  	HEART: Regular rate and rhythm; No murmurs, rubs, or gallops  	ABDOMEN: Soft, Nontender, Nondistended; Bowel sounds present  	EXTREMITIES:  No clubbing, cyanosis, or edema  	PSYCH: flat affect, depressed, no agitation or anxiety at present  	NEUROLOGY: AAOx3, non-focal    SKIN: Normal color, +ulcer on L foot, +TTP, some serosanguinous discharge, no foul smell noted      HOSPITAL MEDICATIONS:  bisacodyl 10 milliGRAM(s) Oral every 12 hours PRN Constipation  cloZAPine 100 milliGRAM(s) Oral daily  cloZAPine 250 milliGRAM(s) Oral at bedtime  desmopressin 0.2 milliGRAM(s) Oral at bedtime  gabapentin 300 milliGRAM(s) Oral two times a day  lactulose Syrup 20 Gram(s) Oral two times a day PRN Constipation  levothyroxine 175 MICROGram(s) Oral daily  lithium 300 milliGRAM(s) Oral daily  lithium 600 milliGRAM(s) Oral at bedtime  magnesium oxide 400 milliGRAM(s) Oral daily  metoprolol succinate ER 50 milliGRAM(s) Oral daily  nicotine  Polacrilex Gum 2 milliGRAM(s) Oral every 8 hours PRN Nicotine cravings  pantoprazole    Tablet 40 milliGRAM(s) Oral before breakfast  vancomycin  IVPB      vancomycin  IVPB 1000 milliGRAM(s) IV Intermittent every 12 hours      LABS:                        13.7   14.13 )-----------( 350      ( 23 Aug 2018 14:30 )             42.2     Hgb Trend: 13.7<--      139  |  101  |  22  ----------------------------<  107<H>  4.5   |  24  |  0.93    Ca    10.3      23 Aug 2018 14:30    TPro  7.4  /  Alb  4.5  /  TBili  0.8  /  DBili  x   /  AST  25  /  ALT  32  /  AlkPhos  140<H>  08-23    Creatinine Trend: 0.93<--, 0.83<--, 0.83<--    Urinalysis Basic - ( 23 Aug 2018 17:03 )    Color: LIGHT YELLOW / Appearance: CLEAR / S.017 / pH: 7.0  Gluc: NEGATIVE / Ketone: SMALL  / Bili: NEGATIVE / Urobili: NORMAL   Blood: NEGATIVE / Protein: NEGATIVE / Nitrite: NEGATIVE   Leuk Esterase: NEGATIVE / RBC: x / WBC x   Sq Epi: x / Non Sq Epi: x / Bacteria: x      MICROBIOLOGY:    Culture - Abscess with Gram Stain (collected 18 @ 18:18)  Source: OTHER      RADIOLOGY:    L foot radiographs :   No acute fracture. There is no bone destruction or subcutaneous air to suggest osteomyelitis. Joint spaces are maintained.  No joint effusion. No radiographic signs to suggest osteomyelitis.
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
## 2018-08-24 NOTE — PROGRESS NOTE BEHAVIORAL HEALTH - NSBHCHARTREVIEWINVESTIGATE_PSY_A_CORE FT
< from: 12 Lead ECG (08.23.18 @ 14:19) >    Ventricular Rate 100 BPM    Atrial Rate 100 BPM    P-R Interval 168 ms    QRS Duration 96 ms    Q-T Interval 358 ms    QTC Calculation(Bezet) 461 ms    P Axis 33 degrees    R Axis 9 degrees    T Axis 14 degrees    Diagnosis Line Normal sinus rhythm  Normal ECG

## 2018-08-24 NOTE — PATIENT PROFILE ADULT. - PRO MENTAL HEALTH SX RECENT
A/P: 49 year old make with MR and schizophrenia presents with urinary retention and fever of 104F. Patient has a normal white count in this admission and U/A is negative for leukocyte esterase and nitrates, culture is pending.     Sepsis:  on admission no fevers, nl wbc and no evidence of pyelo but in retention. After long placed pt spiked to104. Probably transient sepsis secondary to trauma. Follow up cultures.  Cefepime 2gm q12h. none

## 2018-08-24 NOTE — PROGRESS NOTE ADULT - PROBLEM SELECTOR PLAN 2
- Patient meeting criteria for sepsis given his leukocytosis and tachycardia on initial presentation  - Will c/w management as above with vancomycin, his gram stain is showing GPCs in pairs  - if not improving or vitals worsening then would consider broadening coverage to include gram negatives - Patient meeting criteria for sepsis given his leukocytosis and tachycardia on initial presentation  - improved with vancomycin, his gram stain is showing GPCs in pairs  - sepsis resolved, Abx will be changed to keflex. - Patient meeting criteria for sepsis given his leukocytosis and tachycardia on initial presentation  - improved with vancomycin, his gram stain is showing GPCs in pairs  - sepsis resolved, abx will be changed to keflex.

## 2018-08-24 NOTE — PROGRESS NOTE BEHAVIORAL HEALTH - SUMMARY
Patient is a 53 year old single disabled non-caregiver  male currently residing in [facility]. PPH Schizoaffective Disorder & Cannabis Use Disorder. He has a history of multiple inpatient admissions discharged most recently from [facility] inpatient 11/17/17-8/8/18. He has a history of 1 past suicide attempt at age 26 via OD. He has a history of aggression/violence.  Past history of cannabis/crack/alcohol use; denies recent drug use. No known history of detox/rehab. No current legal issues.  PMH hypothyroidism, HLD, HTN, enuresis, s/p perianal fistulectomy 7/17 & BPH. BIBA reporting he does not feel well.    Patient presents to the ER in the context of not feeling well. Patient is acutely psychotic and per collateral appears to be decompensating. Patient is delusional, depressed and paranoid. He is impulsive and unpredictable and having hallucinations that parents are going to come get him from [place] and he is too paranoid and fearful to return to residence. He is unable to care for self due to severity of symptoms and requires inpatient admission for safety and stabilization. Ovi Monzon reports ultimate plan to transfer back to inpatient [facility].    Plan:  1) Patient is currently medically stable, plan to transfer to [facility] today on 2PC status, bed available on Low 6.   2) Continue current psychotropic meds for now:           Clozapine 100mg daily / 250mg qhs           Gabapentin 300mg bid           Lithium 300mg daily / 600mg qhs  3)

## 2018-08-24 NOTE — DISCHARGE NOTE ADULT - PLAN OF CARE
To keep your thoughts more calm You were started on clozapine and other medications while in this hospital stay. You were also seen by psychiatrists here and recommended that you should go to [facility] for further management. Treatment and evaluation You were found to have a foot ulcer during your admission here. It was concerning because there was a worry that the infection spread to your bone, but you were seen by the foot doctors here who looked at your wound on x-ray and stated that it is unlikely that the infection reached your bone and that you should continue to take your antibiotics for the next 7 days. To keep your thyroid function normal Please continue to take your synthroid daily as you did prior to your admission. You were found to have a foot ulcer during your admission here. It was concerning because there was a worry that the infection spread to your bone, but you were seen by the foot doctors here who looked at your wound on x-ray and stated that it is unlikely that the infection reached your bone and that you should continue to take your antibiotics for the next 2 weeks. Please follow the instructions below.

## 2018-08-24 NOTE — DISCHARGE NOTE ADULT - CARE PLAN
Principal Discharge DX:	Osteomyelitis  Goal:	Treatment and evaluation  Assessment and plan of treatment:	You were found to have a foot ulcer during your admission here. It was concerning because there was a worry that the infection spread to your bone, but you were seen by the foot doctors here who looked at your wound on x-ray and stated that it is unlikely that the infection reached your bone and that you should continue to take your antibiotics for the next 7 days.  Secondary Diagnosis:	Schizoaffective disorder  Goal:	To keep your thoughts more calm  Assessment and plan of treatment:	You were started on clozapine and other medications while in this hospital stay. You were also seen by psychiatrists here and recommended that you should go to [facility] for further management.  Secondary Diagnosis:	Hypothyroidism, unspecified type Principal Discharge DX:	Osteomyelitis  Goal:	Treatment and evaluation  Assessment and plan of treatment:	You were found to have a foot ulcer during your admission here. It was concerning because there was a worry that the infection spread to your bone, but you were seen by the foot doctors here who looked at your wound on x-ray and stated that it is unlikely that the infection reached your bone and that you should continue to take your antibiotics for the next 7 days.  Secondary Diagnosis:	Schizoaffective disorder  Goal:	To keep your thoughts more calm  Assessment and plan of treatment:	You were started on clozapine and other medications while in this hospital stay. You were also seen by psychiatrists here and recommended that you should go to [facility] for further management.  Secondary Diagnosis:	Hypothyroidism, unspecified type  Goal:	To keep your thyroid function normal  Assessment and plan of treatment:	Please continue to take your synthroid daily as you did prior to your admission. Principal Discharge DX:	Osteomyelitis  Goal:	Treatment and evaluation  Assessment and plan of treatment:	You were found to have a foot ulcer during your admission here. It was concerning because there was a worry that the infection spread to your bone, but you were seen by the foot doctors here who looked at your wound on x-ray and stated that it is unlikely that the infection reached your bone and that you should continue to take your antibiotics for the next 2 weeks. Please follow the instructions below.  Secondary Diagnosis:	Schizoaffective disorder  Goal:	To keep your thoughts more calm  Assessment and plan of treatment:	You were started on clozapine and other medications while in this hospital stay. You were also seen by psychiatrists here and recommended that you should go to [facility] for further management.  Secondary Diagnosis:	Hypothyroidism, unspecified type  Goal:	To keep your thyroid function normal  Assessment and plan of treatment:	Please continue to take your synthroid daily as you did prior to your admission.

## 2018-08-24 NOTE — DISCHARGE NOTE ADULT - ADDITIONAL INSTRUCTIONS
WOUND CARE: apply bactroban to wound and dress with 4x4 gauze and neville. Change dressing daily.   WEIGHT BEAR: weight bear as tolerated in surgical shoe   ANTIBIOTICS: 2 weeks of Augmentin   FOLLOW UP: within 1 week with Dr. Piper at [facility]. Call  to schedule appointment. Located on [address] [facility]. WOUND CARE: apply bactroban to wound and dress with 4x4 gauze and neville. Change dressing daily.   WEIGHT BEAR: weight bear as tolerated in surgical shoe   ANTIBIOTICS: 2 weeks of Augmentin   FOLLOW UP: within 1 week with Dr. Piper at [facility]. Call  to schedule appointment. Located on [address] [facility], where pt resides: Ovi Nicolás is the point person [phone]  Dr. Elvira Dewitt [phone]. Please call her with any questions, this is her cell phone.
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
## 2018-08-24 NOTE — PROGRESS NOTE ADULT - PROBLEM SELECTOR PLAN 3
- Concern for acute decompensation of patient's underlying psychiatric d/o, psych eval appreciated  - Will c/w clozapine and lithium at home doses for now, f/u further psych recs  - Will c/w 1:1 for now

## 2018-08-24 NOTE — PROGRESS NOTE ADULT - ATTENDING COMMENTS
Pt was seen and examed at bedside with resident.  54 yo M PMH who presents with complaints of not feeling well, found L foot ulcer and acute psychosis. Xray prelim concerned about OM, however official report no evidence of OM. Podiatry consult appreciated, wound Cx GPC in pairs. Pt on admission with leukocytosis, and tachycardia, improved with vanco. After discussed with podiatry, no need for MRI, will change Abx to Keflex.   - schizoaffective D/o, psych f/u, likely will need [facility] inpatient psych management. currently on Clozapine, Lithium, on 1:1 observation.   D/c planning, pending final psych recommendation. D/c SW to coordinate [facility] transfer. Discharge time 40 min

## 2018-08-24 NOTE — PROGRESS NOTE BEHAVIORAL HEALTH - NSBHCHARTREVIEWLAB_PSY_A_CORE FT
CBC Full  -  ( 24 Aug 2018 06:40 )  WBC Count : 6.35 K/uL  Hemoglobin : 13.4 g/dL  Hematocrit : 41.6 %  Platelet Count - Automated : 281 K/uL  Mean Cell Volume : 91.4 fL  Mean Cell Hemoglobin : 29.5 pg  Mean Cell Hemoglobin Concentration : 32.2 %  Auto Neutrophil # : x  Auto Lymphocyte # : x  Auto Monocyte # : x  Auto Eosinophil # : x  Auto Basophil # : x  Auto Neutrophil % : x  Auto Lymphocyte % : x  Auto Monocyte % : x  Auto Eosinophil % : x  Auto Basophil % : x        143  |  106  |  17  ----------------------------<  88  4.1   |  26  |  0.74    Ca    9.9      24 Aug 2018 06:40  Phos  2.7       Mg     2.3         TPro  7.4  /  Alb  4.5  /  TBili  0.8  /  DBili  x   /  AST  25  /  ALT  32  /  AlkPhos  140<H>      LIVER FUNCTIONS - ( 23 Aug 2018 14:30 )  Alb: 4.5 g/dL / Pro: 7.4 g/dL / ALK PHOS: 140 u/L / ALT: 32 u/L / AST: 25 u/L / GGT: x           Urinalysis Basic - ( 23 Aug 2018 17:03 )    Color: LIGHT YELLOW / Appearance: CLEAR / S.017 / pH: 7.0  Gluc: NEGATIVE / Ketone: SMALL  / Bili: NEGATIVE / Urobili: NORMAL   Blood: NEGATIVE / Protein: NEGATIVE / Nitrite: NEGATIVE   Leuk Esterase: NEGATIVE / RBC: x / WBC x   Sq Epi: x / Non Sq Epi: x / Bacteria: x
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
## 2018-08-24 NOTE — PROGRESS NOTE BEHAVIORAL HEALTH - NSBHCHARTREVIEWVS_PSY_A_CORE FT
Vital Signs Last 24 Hrs  T(C): 36.3 (24 Aug 2018 11:45), Max: 36.7 (23 Aug 2018 22:26)  T(F): 97.4 (24 Aug 2018 11:45), Max: 98.1 (23 Aug 2018 22:26)  HR: 71 (24 Aug 2018 11:45) (71 - 90)  BP: 100/64 (24 Aug 2018 11:45) (100/64 - 128/79)  BP(mean): --  RR: 16 (24 Aug 2018 11:45) (16 - 18)  SpO2: 100% (24 Aug 2018 11:45) (97% - 100%)

## 2018-08-24 NOTE — DISCHARGE NOTE ADULT - MEDICATION SUMMARY - MEDICATIONS TO CHANGE
I will SWITCH the dose or number of times a day I take the medications listed below when I get home from the hospital:    cloZAPine 200 mg oral tablet  -- 4 tab(s) by mouth    clonazePAM 1 mg oral tablet  -- 1 tab(s) by mouth 2 times a day

## 2018-08-24 NOTE — PROGRESS NOTE ADULT - ASSESSMENT
52 y/o Male presents with Left submet 5 ulcer  ·	Pt seen and evaluated   ·	stable ulcer submet 5 with probe to fascia, cellulitis resolved, hyperkeratotic lesions debrided with sterile #10 bladed   ·	Dressed with betadine and dry sterile dressing   ·	bactroban ordered   ·	MRI ordered to assess for OM   ·	cultures pending   ·	stable for d/c pending MRI   ·	REC ID consult   ·	seen w/ attending 52 y/o Male presents with Left submet 5 ulcer  ·	Pt seen and evaluated   ·	stable ulcer submet 5 with probe to fascia, cellulitis resolved, hyperkeratotic lesions debrided with sterile #10 bladed   ·	Dressed with betadine and dry sterile dressing   ·	bactroban ordered   ·	cultures pending   ·	Rec d/c Augmentin for 2 weeks  ·	stable for d/c  ·	seen w/ attending 54 y/o Male presents with Left submet 5 ulcer  ·	Pt seen and evaluated   ·	stable ulcer submet 5 with probe to fascia, cellulitis resolving signficantly, hyperkeratotic lesions debrided with sterile #10 bladed   ·	Dressed with betadine and dry sterile dressing   ·	bactroban ordered   ·	cultures pending   ·	Rec d/c on PO Augmentin for 2 weeks  ·	stable for d/c, will follow as outpatient  ·	possible LF MRI as outpatient is wound fails to show signs of progression  ·	seen w/ attending

## 2018-08-24 NOTE — PROGRESS NOTE ADULT - SUBJECTIVE AND OBJECTIVE BOX
Patient is a 53y old  Male who presents with a chief complaint of Worsening depression (23 Aug 2018 22:01)       INTERVAL HPI/OVERNIGHT EVENTS:  Patient seen and evaluated at bedside.  Pt is resting comfortable in NAD. Denies N/V/F/C.  Pain rated at X/10    Allergies    No Known Allergies    Intolerances    Haldol (Unknown)  Thorazine (Unknown)      Vital Signs Last 24 Hrs  T(C): 36.3 (24 Aug 2018 11:45), Max: 36.9 (23 Aug 2018 14:38)  T(F): 97.4 (24 Aug 2018 11:45), Max: 98.5 (23 Aug 2018 14:38)  HR: 71 (24 Aug 2018 11:45) (71 - 100)  BP: 100/64 (24 Aug 2018 11:45) (100/64 - 128/79)  BP(mean): --  RR: 16 (24 Aug 2018 11:45) (16 - 18)  SpO2: 100% (24 Aug 2018 11:45) (97% - 100%)    LABS:                        13.4   6.35  )-----------( 281      ( 24 Aug 2018 06:40 )             41.6     08-24    143  |  106  |  17  ----------------------------<  88  4.1   |  26  |  0.74    Ca    9.9      24 Aug 2018 06:40  Phos  2.7     08-24  Mg     2.3     08-24    TPro  7.4  /  Alb  4.5  /  TBili  0.8  /  DBili  x   /  AST  25  /  ALT  32  /  AlkPhos  140<H>  08-23      Urinalysis Basic - ( 23 Aug 2018 17:03 )    Color: LIGHT YELLOW / Appearance: CLEAR / S.017 / pH: 7.0  Gluc: NEGATIVE / Ketone: SMALL  / Bili: NEGATIVE / Urobili: NORMAL   Blood: NEGATIVE / Protein: NEGATIVE / Nitrite: NEGATIVE   Leuk Esterase: NEGATIVE / RBC: x / WBC x   Sq Epi: x / Non Sq Epi: x / Bacteria: x      CAPILLARY BLOOD GLUCOSE          Lower Extremity Physical Exam:  Vasular: DP/PT 2/4, B/L, CFT <3 seconds B/L, Temperature gradient warm to warm Left foot + wnl on right foot, B/L.   Neuro: Epicritic sensation diminished to the level of toes, B/L.  Musculoskeletal/Ortho:  Skin: erythema resolved right foot hyperkeratotic lesion on 5th met head and prominent styloid process   Wound #1: Left foot 5th met head, fibrous base, no probe to bone  Depth: to fascia  Wound bed: fibrous   Drainage: no purulence   Odor: no malodor   Periwound: hyperkeratotic   Etiology: pressure     RADIOLOGY & ADDITIONAL TESTS: Patient is a 53y old  Male who presents with a chief complaint of Worsening depression (23 Aug 2018 22:01)       INTERVAL HPI/OVERNIGHT EVENTS:  Patient seen and evaluated at bedside.  Pt is resting comfortable in NAD. Denies N/V/F/C.  Pain rated at X/10    Allergies    No Known Allergies    Intolerances    Haldol (Unknown)  Thorazine (Unknown)      Vital Signs Last 24 Hrs  T(C): 36.3 (24 Aug 2018 11:45), Max: 36.9 (23 Aug 2018 14:38)  T(F): 97.4 (24 Aug 2018 11:45), Max: 98.5 (23 Aug 2018 14:38)  HR: 71 (24 Aug 2018 11:45) (71 - 100)  BP: 100/64 (24 Aug 2018 11:45) (100/64 - 128/79)  BP(mean): --  RR: 16 (24 Aug 2018 11:45) (16 - 18)  SpO2: 100% (24 Aug 2018 11:45) (97% - 100%)    LABS:                        13.4   6.35  )-----------( 281      ( 24 Aug 2018 06:40 )             41.6     08-24    143  |  106  |  17  ----------------------------<  88  4.1   |  26  |  0.74    Ca    9.9      24 Aug 2018 06:40  Phos  2.7     08-24  Mg     2.3     08-24    TPro  7.4  /  Alb  4.5  /  TBili  0.8  /  DBili  x   /  AST  25  /  ALT  32  /  AlkPhos  140<H>  08-23      Urinalysis Basic - ( 23 Aug 2018 17:03 )    Color: LIGHT YELLOW / Appearance: CLEAR / S.017 / pH: 7.0  Gluc: NEGATIVE / Ketone: SMALL  / Bili: NEGATIVE / Urobili: NORMAL   Blood: NEGATIVE / Protein: NEGATIVE / Nitrite: NEGATIVE   Leuk Esterase: NEGATIVE / RBC: x / WBC x   Sq Epi: x / Non Sq Epi: x / Bacteria: x      CAPILLARY BLOOD GLUCOSE          Lower Extremity Physical Exam:  Vasular: DP/PT 2/4, B/L, CFT <3 seconds B/L, Temperature gradient warm to warm Left foot + wnl on right foot, B/L.   Neuro: Epicritic sensation diminished to the level of toes, B/L.  Musculoskeletal/Ortho:  Skin: erythema resolved right foot hyperkeratotic lesion on 5th met head and prominent styloid process   Wound #1: Left foot 5th met head, fibrous base, no probe to bone  Depth: to fascia  Wound bed: fibrous   Drainage: no purulence   Odor: no malodor   Periwound: hyperkeratotic   Etiology: pressure     RADIOLOGY & ADDITIONAL TESTS:  < from: Xray Foot AP + Lateral + Oblique, Left (18 @ 18:12) >    EXAM:  RAD FOOT MIN 3 VIEWS LT        PROCEDURE DATE:  Aug 23 2018         INTERPRETATION:  CLINICAL INFORMATION: Cellulitis, ulcer.    EXAM: 3 views of the left foot views     COMPARISON: None    FINDINGS:  Osseous Structures: No acute fracture. There is no bone destruction or   subcutaneous air to suggest osteomyelitis.   Joint spaces are maintained.    No joint effusion.        IMPRESSION:   No radiographic signs to suggest osteomyelitis.                MARRY SMITH M.D., RADIOLOGY RESIDENT  This document has been electronically signed.  BUSHRA GARNER M.D., ATTENDING RADIOLOGIST  This document has been electronically signed. Aug 24 2018  9:06AM        < end of copied text >

## 2018-08-24 NOTE — PROGRESS NOTE ADULT - PROBLEM SELECTOR PLAN 1
- Patient with ulcer of L foot with xray findings concerning for OM of the 5th metatarsal head  - Recevied keflex and cefepime in ED, will place on vancomycin for now and monitor  - Wounds cultures sent by podiatry, will f/u further podiatry recs, MRI ordered - Patient with ulcer of L foot with xray prelim findings concerning for OM of the 5th metatarsal head, however, official report no OM  - Received keflex and cefepime in ED, on vancomycin, as discussed with podiatry can change to keflex.   - Wounds cultures GPC in pairs, cancelled MRI as confirmed with podiatry. - Patient with ulcer of L foot with xray prelim findings concerning for OM of the 5th metatarsal head, however, official report no OM  - Received keflex and cefepime in ED, on vancomycin, as discussed with podiatry can change to keflex.  - Wounds cultures GPC in pairs, cancelled MRI as confirmed with podiatry.

## 2018-08-25 DIAGNOSIS — E03.9 HYPOTHYROIDISM, UNSPECIFIED: ICD-10-CM

## 2018-08-25 DIAGNOSIS — L97.509 NON-PRESSURE CHRONIC ULCER OF OTHER PART OF UNSPECIFIED FOOT WITH UNSPECIFIED SEVERITY: ICD-10-CM

## 2018-08-25 DIAGNOSIS — F25.9 SCHIZOAFFECTIVE DISORDER, UNSPECIFIED: ICD-10-CM

## 2018-08-25 DIAGNOSIS — I10 ESSENTIAL (PRIMARY) HYPERTENSION: ICD-10-CM

## 2018-08-25 PROCEDURE — 99232 SBSQ HOSP IP/OBS MODERATE 35: CPT

## 2018-08-25 PROCEDURE — 99223 1ST HOSP IP/OBS HIGH 75: CPT

## 2018-08-25 RX ADMIN — LACTULOSE 20 GRAM(S): 10 SOLUTION ORAL at 09:12

## 2018-08-25 RX ADMIN — Medication 1 TABLET(S): at 21:01

## 2018-08-25 RX ADMIN — GABAPENTIN 300 MILLIGRAM(S): 400 CAPSULE ORAL at 09:12

## 2018-08-25 RX ADMIN — Medication 5 MILLIGRAM(S): at 09:12

## 2018-08-25 RX ADMIN — CLOZAPINE 350 MILLIGRAM(S): 150 TABLET, ORALLY DISINTEGRATING ORAL at 21:01

## 2018-08-25 RX ADMIN — LITHIUM CARBONATE 300 MILLIGRAM(S): 300 TABLET, EXTENDED RELEASE ORAL at 09:12

## 2018-08-25 RX ADMIN — Medication 50 MILLIGRAM(S): at 09:12

## 2018-08-25 RX ADMIN — LACTULOSE 20 GRAM(S): 10 SOLUTION ORAL at 21:01

## 2018-08-25 RX ADMIN — MUPIROCIN 1 APPLICATION(S): 20 OINTMENT TOPICAL at 09:09

## 2018-08-25 RX ADMIN — GABAPENTIN 300 MILLIGRAM(S): 400 CAPSULE ORAL at 21:01

## 2018-08-25 RX ADMIN — Medication 5 MILLIGRAM(S): at 21:01

## 2018-08-25 RX ADMIN — PANTOPRAZOLE SODIUM 40 MILLIGRAM(S): 20 TABLET, DELAYED RELEASE ORAL at 09:13

## 2018-08-25 RX ADMIN — DESMOPRESSIN ACETATE 0.2 MILLIGRAM(S): 0.1 TABLET ORAL at 21:01

## 2018-08-25 RX ADMIN — MAGNESIUM OXIDE 400 MG ORAL TABLET 400 MILLIGRAM(S): 241.3 TABLET ORAL at 09:12

## 2018-08-25 RX ADMIN — Medication 1 TABLET(S): at 09:12

## 2018-08-25 RX ADMIN — LITHIUM CARBONATE 600 MILLIGRAM(S): 300 TABLET, EXTENDED RELEASE ORAL at 21:01

## 2018-08-25 NOTE — CONSULT NOTE ADULT - ASSESSMENT
53yMale [facility] schizoaffective disorder admitted to [facility] for paranoia, inability to care for self. Patient found to have left submetatarsal ulcer with cellulitis. Evaluated by podiatry as inpatient. On course of Augmentin.

## 2018-08-25 NOTE — CONSULT NOTE ADULT - PROBLEM SELECTOR RECOMMENDATION 2
Admitted to [facility]. Unable to care for self. Management per psychiatry team.

## 2018-08-25 NOTE — CONSULT NOTE ADULT - PROBLEM SELECTOR RECOMMENDATION 9
Patient seen by podiatry inpatient. Dressing c/d/i. Xray without e/o osteomyelitis; per podiatry notes, ulcer probed to fascia, not bone. Will complete 2-week course of Augmentin. Needs 1-week f/u with podiatry (may be done at [facility] or if patient is discharged, with Dr. Piper ).

## 2018-08-25 NOTE — CONSULT NOTE ADULT - PROBLEM SELECTOR RECOMMENDATION 3
TSH normal 9 days ago, but slightly elevated 2 days ago. Not symptomatic. Not on Synthroid. Recommend outpatient TFTs.

## 2018-08-25 NOTE — CONSULT NOTE ADULT - SUBJECTIVE AND OBJECTIVE BOX
HPI:   53yMale [facility] schizoaffective disorder admitted to [facility] for paranoia, inability to care for self. Patient found to have left submetatarsal ulcer with cellulitis. Evaluated by podiatry as inpatient. On course of Augmentin. States he has no significant pain around his ulcer. No fevers.    PAST MEDICAL & SURGICAL HISTORY:  Bipolar disorder  Drug abuse  Incontinence  Schizophrenia  Hypothyroid  Hypertension  No significant past surgical history      Review of Systems:   CONSTITUTIONAL: No fever, weight loss, or fatigue  EYES: No eye pain, visual disturbances, or discharge  ENMT:  No difficulty hearing, tinnitus, vertigo; No sinus or throat pain  NECK: No pain or stiffness  RESPIRATORY: No cough, wheezing, chills or hemoptysis; No shortness of breath  CARDIOVASCULAR: No chest pain, palpitations, dizziness, or leg swelling  GASTROINTESTINAL: No abdominal or epigastric pain. No nausea, vomiting, or hematemesis; No diarrhea or constipation. No melena or hematochezia.  GENITOURINARY: No dysuria, frequency, hematuria, or incontinence  NEUROLOGICAL: No headaches, memory loss, loss of strength, numbness, or tremors  SKIN: No itching, burning, rashes, or lesions   LYMPH NODES: No enlarged glands  ENDOCRINE: No heat or cold intolerance; No hair loss  MUSCULOSKELETAL: No joint pain or swelling; No muscle, back, or extremity pain  HEME/LYMPH: No easy bruising, or bleeding gums  ALLERGY AND IMMUNOLOGIC: No hives or eczema    Allergies    No Known Allergies    Intolerances    Haldol (Unknown)  Thorazine (Unknown)      Social History:   Smokes. Does not drink or use drugs per his report.    FAMILY HISTORY:  Family history of Crohn's disease (Mother): in Mother      MEDICATIONS  (STANDING):  amoxicillin  875 milliGRAM(s)/clavulanate 1 Tablet(s) Oral every 12 hours  bisacodyl 5 milliGRAM(s) Oral two times a day  cloZAPine 350 milliGRAM(s) Oral at bedtime  desmopressin 0.2 milliGRAM(s) Oral at bedtime  gabapentin 300 milliGRAM(s) Oral two times a day  lactulose Syrup 20 Gram(s) Oral two times a day  lithium 300 milliGRAM(s) Oral daily  lithium 600 milliGRAM(s) Oral at bedtime  magnesium oxide 400 milliGRAM(s) Oral <User Schedule>  metoprolol succinate ER 50 milliGRAM(s) Oral daily  pantoprazole    Tablet 40 milliGRAM(s) Oral before breakfast    MEDICATIONS  (PRN):  fluPHENAZine 5 milliGRAM(s) Oral every 6 hours PRN agitation  fluPHENAZine  Injectable 5 milliGRAM(s) IntraMuscular once PRN agitation  LORazepam     Tablet 1 milliGRAM(s) Oral every 6 hours PRN Anxiety  LORazepam   Injectable 2 milliGRAM(s) IntraMuscular once PRN Agitation  mupirocin 2% Ointment 1 Application(s) Topical two times a day PRN Foot ulcer  nicotine  Polacrilex Gum 2 milliGRAM(s) Oral every 8 hours PRN nicotine craving      Vital Signs Last 24 Hrs  T(C): 36.6 (24 Aug 2018 20:21), Max: 37.1 (24 Aug 2018 18:36)  T(F): 97.9 (24 Aug 2018 20:21), Max: 98.7 (24 Aug 2018 18:36)  HR: 71 (24 Aug 2018 11:45) (71 - 71)  BP: 100/64 (24 Aug 2018 11:45) (100/64 - 100/64)  BP(mean): --  RR: 18 (24 Aug 2018 20:21) (16 - 18)  SpO2: 100% (24 Aug 2018 11:45) (100% - 100%)  CAPILLARY BLOOD GLUCOSE            PHYSICAL EXAM:  GENERAL: NAD, well-developed  HEAD:  Atraumatic, Normocephalic  EYES: EOMI, PERRLA, conjunctiva and sclera clear  NECK: Supple, No JVD  CHEST/LUNG: Clear to auscultation bilaterally; No wheeze  HEART: Regular rate and rhythm; No murmurs, rubs, or gallops  ABDOMEN: Soft, Nontender, Nondistended; Bowel sounds present  EXTREMITIES:  2+ Peripheral Pulses, No clubbing, cyanosis, or edema; left foot bandage c/d/i  PSYCH: AAOx3  NEUROLOGY: non-focal  SKIN: No rashes or lesions    LABS:                        13.4   6.35  )-----------( 281      ( 24 Aug 2018 06:40 )             41.6     -    143  |  106  |  17  ----------------------------<  88  4.1   |  26  |  0.74    Ca    9.9      24 Aug 2018 06:40  Phos  2.7     -  Mg     2.3     -    TPro  7.4  /  Alb  4.5  /  TBili  0.8  /  DBili  x   /  AST  25  /  ALT  32  /  AlkPhos  140<H>            Urinalysis Basic - ( 23 Aug 2018 17:03 )    Color: LIGHT YELLOW / Appearance: CLEAR / S.017 / pH: 7.0  Gluc: NEGATIVE / Ketone: SMALL  / Bili: NEGATIVE / Urobili: NORMAL   Blood: NEGATIVE / Protein: NEGATIVE / Nitrite: NEGATIVE   Leuk Esterase: NEGATIVE / RBC: x / WBC x   Sq Epi: x / Non Sq Epi: x / Bacteria: x        EKG(personally reviewed):  - NSR,     RADIOLOGY & ADDITIONAL TESTS:    Imaging Personally Reviewed:    Consultant(s) Notes Reviewed:      Care Discussed with Consultants/Other Providers:

## 2018-08-26 PROCEDURE — 99232 SBSQ HOSP IP/OBS MODERATE 35: CPT

## 2018-08-26 RX ORDER — ACETAMINOPHEN 500 MG
650 TABLET ORAL EVERY 6 HOURS
Qty: 0 | Refills: 0 | Status: DISCONTINUED | OUTPATIENT
Start: 2018-08-26 | End: 2018-10-11

## 2018-08-26 RX ORDER — IBUPROFEN 200 MG
600 TABLET ORAL EVERY 6 HOURS
Qty: 0 | Refills: 0 | Status: DISCONTINUED | OUTPATIENT
Start: 2018-08-26 | End: 2018-08-26

## 2018-08-26 RX ADMIN — Medication 50 MILLIGRAM(S): at 11:04

## 2018-08-26 RX ADMIN — Medication 650 MILLIGRAM(S): at 14:19

## 2018-08-26 RX ADMIN — PANTOPRAZOLE SODIUM 40 MILLIGRAM(S): 20 TABLET, DELAYED RELEASE ORAL at 11:03

## 2018-08-26 RX ADMIN — Medication 1 TABLET(S): at 21:33

## 2018-08-26 RX ADMIN — Medication 5 MILLIGRAM(S): at 11:04

## 2018-08-26 RX ADMIN — Medication 1 TABLET(S): at 11:03

## 2018-08-26 RX ADMIN — LACTULOSE 20 GRAM(S): 10 SOLUTION ORAL at 11:04

## 2018-08-26 RX ADMIN — LITHIUM CARBONATE 300 MILLIGRAM(S): 300 TABLET, EXTENDED RELEASE ORAL at 11:04

## 2018-08-26 RX ADMIN — GABAPENTIN 300 MILLIGRAM(S): 400 CAPSULE ORAL at 11:04

## 2018-08-27 LAB — CULTURE RESULTS: SIGNIFICANT CHANGE UP

## 2018-08-27 PROCEDURE — 99232 SBSQ HOSP IP/OBS MODERATE 35: CPT

## 2018-08-27 RX ADMIN — Medication 50 MILLIGRAM(S): at 09:46

## 2018-08-27 RX ADMIN — MUPIROCIN 1 APPLICATION(S): 20 OINTMENT TOPICAL at 09:51

## 2018-08-27 RX ADMIN — GABAPENTIN 300 MILLIGRAM(S): 400 CAPSULE ORAL at 09:46

## 2018-08-27 RX ADMIN — MAGNESIUM OXIDE 400 MG ORAL TABLET 400 MILLIGRAM(S): 241.3 TABLET ORAL at 09:46

## 2018-08-27 RX ADMIN — Medication 1 TABLET(S): at 09:46

## 2018-08-27 RX ADMIN — PANTOPRAZOLE SODIUM 40 MILLIGRAM(S): 20 TABLET, DELAYED RELEASE ORAL at 09:47

## 2018-08-27 RX ADMIN — LITHIUM CARBONATE 300 MILLIGRAM(S): 300 TABLET, EXTENDED RELEASE ORAL at 09:46

## 2018-08-27 RX ADMIN — Medication 5 MILLIGRAM(S): at 09:46

## 2018-08-27 RX ADMIN — LACTULOSE 20 GRAM(S): 10 SOLUTION ORAL at 09:46

## 2018-08-28 LAB — CULTURE RESULTS: SIGNIFICANT CHANGE UP

## 2018-08-28 PROCEDURE — 99232 SBSQ HOSP IP/OBS MODERATE 35: CPT

## 2018-08-28 RX ORDER — CLOZAPINE 150 MG/1
350 TABLET, ORALLY DISINTEGRATING ORAL ONCE
Qty: 0 | Refills: 0 | Status: COMPLETED | OUTPATIENT
Start: 2018-08-28 | End: 2018-08-28

## 2018-08-28 RX ORDER — CLOZAPINE 150 MG/1
350 TABLET, ORALLY DISINTEGRATING ORAL DAILY
Qty: 0 | Refills: 0 | Status: DISCONTINUED | OUTPATIENT
Start: 2018-08-29 | End: 2018-09-06

## 2018-08-28 RX ADMIN — LITHIUM CARBONATE 300 MILLIGRAM(S): 300 TABLET, EXTENDED RELEASE ORAL at 09:04

## 2018-08-28 RX ADMIN — GABAPENTIN 300 MILLIGRAM(S): 400 CAPSULE ORAL at 21:33

## 2018-08-28 RX ADMIN — DESMOPRESSIN ACETATE 0.2 MILLIGRAM(S): 0.1 TABLET ORAL at 21:33

## 2018-08-28 RX ADMIN — Medication 50 MILLIGRAM(S): at 09:04

## 2018-08-28 RX ADMIN — Medication 5 MILLIGRAM(S): at 09:04

## 2018-08-28 RX ADMIN — Medication 5 MILLIGRAM(S): at 21:33

## 2018-08-28 RX ADMIN — LACTULOSE 20 GRAM(S): 10 SOLUTION ORAL at 09:04

## 2018-08-28 RX ADMIN — Medication 1 TABLET(S): at 21:33

## 2018-08-28 RX ADMIN — CLOZAPINE 350 MILLIGRAM(S): 150 TABLET, ORALLY DISINTEGRATING ORAL at 10:36

## 2018-08-28 RX ADMIN — Medication 1 TABLET(S): at 09:04

## 2018-08-28 RX ADMIN — GABAPENTIN 300 MILLIGRAM(S): 400 CAPSULE ORAL at 09:04

## 2018-08-29 RX ADMIN — DESMOPRESSIN ACETATE 0.2 MILLIGRAM(S): 0.1 TABLET ORAL at 22:16

## 2018-08-29 RX ADMIN — LACTULOSE 20 GRAM(S): 10 SOLUTION ORAL at 22:16

## 2018-08-29 RX ADMIN — Medication 1 TABLET(S): at 22:16

## 2018-08-29 RX ADMIN — Medication 1 TABLET(S): at 10:29

## 2018-08-29 RX ADMIN — Medication 5 MILLIGRAM(S): at 22:16

## 2018-08-29 RX ADMIN — Medication 50 MILLIGRAM(S): at 10:29

## 2018-08-29 RX ADMIN — CLOZAPINE 350 MILLIGRAM(S): 150 TABLET, ORALLY DISINTEGRATING ORAL at 10:29

## 2018-08-29 RX ADMIN — GABAPENTIN 300 MILLIGRAM(S): 400 CAPSULE ORAL at 22:16

## 2018-08-29 RX ADMIN — MAGNESIUM OXIDE 400 MG ORAL TABLET 400 MILLIGRAM(S): 241.3 TABLET ORAL at 10:29

## 2018-08-29 RX ADMIN — PANTOPRAZOLE SODIUM 40 MILLIGRAM(S): 20 TABLET, DELAYED RELEASE ORAL at 10:29

## 2018-08-29 RX ADMIN — LITHIUM CARBONATE 300 MILLIGRAM(S): 300 TABLET, EXTENDED RELEASE ORAL at 10:29

## 2018-08-29 RX ADMIN — LITHIUM CARBONATE 600 MILLIGRAM(S): 300 TABLET, EXTENDED RELEASE ORAL at 22:16

## 2018-08-29 RX ADMIN — Medication 5 MILLIGRAM(S): at 10:29

## 2018-08-29 RX ADMIN — LACTULOSE 20 GRAM(S): 10 SOLUTION ORAL at 10:29

## 2018-08-29 RX ADMIN — GABAPENTIN 300 MILLIGRAM(S): 400 CAPSULE ORAL at 10:29

## 2018-08-30 PROCEDURE — 99232 SBSQ HOSP IP/OBS MODERATE 35: CPT

## 2018-08-30 RX ADMIN — LITHIUM CARBONATE 600 MILLIGRAM(S): 300 TABLET, EXTENDED RELEASE ORAL at 20:46

## 2018-08-30 RX ADMIN — Medication 1 TABLET(S): at 20:45

## 2018-08-30 RX ADMIN — GABAPENTIN 300 MILLIGRAM(S): 400 CAPSULE ORAL at 20:46

## 2018-08-30 RX ADMIN — Medication 5 MILLIGRAM(S): at 09:14

## 2018-08-30 RX ADMIN — CLOZAPINE 350 MILLIGRAM(S): 150 TABLET, ORALLY DISINTEGRATING ORAL at 09:14

## 2018-08-30 RX ADMIN — LACTULOSE 20 GRAM(S): 10 SOLUTION ORAL at 09:14

## 2018-08-30 RX ADMIN — Medication 50 MILLIGRAM(S): at 09:15

## 2018-08-30 RX ADMIN — LITHIUM CARBONATE 300 MILLIGRAM(S): 300 TABLET, EXTENDED RELEASE ORAL at 09:15

## 2018-08-30 RX ADMIN — Medication 1 TABLET(S): at 09:14

## 2018-08-30 RX ADMIN — DESMOPRESSIN ACETATE 0.2 MILLIGRAM(S): 0.1 TABLET ORAL at 20:46

## 2018-08-30 RX ADMIN — GABAPENTIN 300 MILLIGRAM(S): 400 CAPSULE ORAL at 09:14

## 2018-08-30 RX ADMIN — Medication 5 MILLIGRAM(S): at 20:46

## 2018-08-30 RX ADMIN — PANTOPRAZOLE SODIUM 40 MILLIGRAM(S): 20 TABLET, DELAYED RELEASE ORAL at 09:15

## 2018-08-31 PROCEDURE — 99232 SBSQ HOSP IP/OBS MODERATE 35: CPT

## 2018-08-31 RX ORDER — HALOPERIDOL DECANOATE 100 MG/ML
200 INJECTION INTRAMUSCULAR
Qty: 0 | Refills: 0 | Status: DISCONTINUED | OUTPATIENT
Start: 2018-08-31 | End: 2018-10-11

## 2018-08-31 RX ADMIN — MAGNESIUM OXIDE 400 MG ORAL TABLET 400 MILLIGRAM(S): 241.3 TABLET ORAL at 08:57

## 2018-08-31 RX ADMIN — PANTOPRAZOLE SODIUM 40 MILLIGRAM(S): 20 TABLET, DELAYED RELEASE ORAL at 08:57

## 2018-08-31 RX ADMIN — Medication 5 MILLIGRAM(S): at 21:03

## 2018-08-31 RX ADMIN — LACTULOSE 20 GRAM(S): 10 SOLUTION ORAL at 21:03

## 2018-08-31 RX ADMIN — GABAPENTIN 300 MILLIGRAM(S): 400 CAPSULE ORAL at 08:57

## 2018-08-31 RX ADMIN — HALOPERIDOL DECANOATE 200 MILLIGRAM(S): 100 INJECTION INTRAMUSCULAR at 16:34

## 2018-08-31 RX ADMIN — CLOZAPINE 350 MILLIGRAM(S): 150 TABLET, ORALLY DISINTEGRATING ORAL at 08:57

## 2018-08-31 RX ADMIN — LITHIUM CARBONATE 300 MILLIGRAM(S): 300 TABLET, EXTENDED RELEASE ORAL at 08:57

## 2018-08-31 RX ADMIN — Medication 50 MILLIGRAM(S): at 08:57

## 2018-08-31 RX ADMIN — GABAPENTIN 300 MILLIGRAM(S): 400 CAPSULE ORAL at 21:03

## 2018-08-31 RX ADMIN — Medication 5 MILLIGRAM(S): at 08:57

## 2018-08-31 RX ADMIN — DESMOPRESSIN ACETATE 0.2 MILLIGRAM(S): 0.1 TABLET ORAL at 21:03

## 2018-08-31 RX ADMIN — LACTULOSE 20 GRAM(S): 10 SOLUTION ORAL at 08:57

## 2018-08-31 RX ADMIN — Medication 1 TABLET(S): at 08:57

## 2018-08-31 RX ADMIN — Medication 1 TABLET(S): at 21:03

## 2018-08-31 RX ADMIN — LITHIUM CARBONATE 600 MILLIGRAM(S): 300 TABLET, EXTENDED RELEASE ORAL at 21:03

## 2018-09-01 RX ADMIN — DESMOPRESSIN ACETATE 0.2 MILLIGRAM(S): 0.1 TABLET ORAL at 20:21

## 2018-09-01 RX ADMIN — GABAPENTIN 300 MILLIGRAM(S): 400 CAPSULE ORAL at 20:21

## 2018-09-01 RX ADMIN — Medication 50 MILLIGRAM(S): at 09:04

## 2018-09-01 RX ADMIN — LITHIUM CARBONATE 600 MILLIGRAM(S): 300 TABLET, EXTENDED RELEASE ORAL at 20:21

## 2018-09-01 RX ADMIN — PANTOPRAZOLE SODIUM 40 MILLIGRAM(S): 20 TABLET, DELAYED RELEASE ORAL at 09:04

## 2018-09-01 RX ADMIN — Medication 5 MILLIGRAM(S): at 20:21

## 2018-09-01 RX ADMIN — LACTULOSE 20 GRAM(S): 10 SOLUTION ORAL at 09:04

## 2018-09-01 RX ADMIN — Medication 1 TABLET(S): at 09:04

## 2018-09-01 RX ADMIN — LITHIUM CARBONATE 300 MILLIGRAM(S): 300 TABLET, EXTENDED RELEASE ORAL at 09:04

## 2018-09-01 RX ADMIN — CLOZAPINE 350 MILLIGRAM(S): 150 TABLET, ORALLY DISINTEGRATING ORAL at 09:04

## 2018-09-01 RX ADMIN — GABAPENTIN 300 MILLIGRAM(S): 400 CAPSULE ORAL at 09:04

## 2018-09-01 RX ADMIN — LACTULOSE 20 GRAM(S): 10 SOLUTION ORAL at 20:21

## 2018-09-01 RX ADMIN — Medication 1 TABLET(S): at 20:21

## 2018-09-01 RX ADMIN — Medication 5 MILLIGRAM(S): at 09:04

## 2018-09-02 RX ADMIN — GABAPENTIN 300 MILLIGRAM(S): 400 CAPSULE ORAL at 08:52

## 2018-09-02 RX ADMIN — LITHIUM CARBONATE 300 MILLIGRAM(S): 300 TABLET, EXTENDED RELEASE ORAL at 08:52

## 2018-09-02 RX ADMIN — Medication 5 MILLIGRAM(S): at 21:50

## 2018-09-02 RX ADMIN — LITHIUM CARBONATE 600 MILLIGRAM(S): 300 TABLET, EXTENDED RELEASE ORAL at 21:50

## 2018-09-02 RX ADMIN — Medication 5 MILLIGRAM(S): at 08:52

## 2018-09-02 RX ADMIN — Medication 1 TABLET(S): at 08:52

## 2018-09-02 RX ADMIN — MAGNESIUM OXIDE 400 MG ORAL TABLET 400 MILLIGRAM(S): 241.3 TABLET ORAL at 08:52

## 2018-09-02 RX ADMIN — LACTULOSE 20 GRAM(S): 10 SOLUTION ORAL at 21:50

## 2018-09-02 RX ADMIN — PANTOPRAZOLE SODIUM 40 MILLIGRAM(S): 20 TABLET, DELAYED RELEASE ORAL at 08:52

## 2018-09-02 RX ADMIN — CLOZAPINE 350 MILLIGRAM(S): 150 TABLET, ORALLY DISINTEGRATING ORAL at 08:52

## 2018-09-02 RX ADMIN — DESMOPRESSIN ACETATE 0.2 MILLIGRAM(S): 0.1 TABLET ORAL at 21:50

## 2018-09-02 RX ADMIN — GABAPENTIN 300 MILLIGRAM(S): 400 CAPSULE ORAL at 21:50

## 2018-09-02 RX ADMIN — Medication 1 TABLET(S): at 21:50

## 2018-09-03 RX ADMIN — GABAPENTIN 300 MILLIGRAM(S): 400 CAPSULE ORAL at 09:03

## 2018-09-03 RX ADMIN — Medication 5 MILLIGRAM(S): at 09:03

## 2018-09-03 RX ADMIN — Medication 1 TABLET(S): at 20:46

## 2018-09-03 RX ADMIN — CLOZAPINE 350 MILLIGRAM(S): 150 TABLET, ORALLY DISINTEGRATING ORAL at 09:03

## 2018-09-03 RX ADMIN — GABAPENTIN 300 MILLIGRAM(S): 400 CAPSULE ORAL at 20:46

## 2018-09-03 RX ADMIN — LACTULOSE 20 GRAM(S): 10 SOLUTION ORAL at 20:46

## 2018-09-03 RX ADMIN — PANTOPRAZOLE SODIUM 40 MILLIGRAM(S): 20 TABLET, DELAYED RELEASE ORAL at 09:03

## 2018-09-03 RX ADMIN — Medication 5 MILLIGRAM(S): at 20:46

## 2018-09-03 RX ADMIN — LITHIUM CARBONATE 300 MILLIGRAM(S): 300 TABLET, EXTENDED RELEASE ORAL at 09:03

## 2018-09-03 RX ADMIN — Medication 1 TABLET(S): at 09:03

## 2018-09-03 RX ADMIN — LACTULOSE 20 GRAM(S): 10 SOLUTION ORAL at 09:03

## 2018-09-03 RX ADMIN — DESMOPRESSIN ACETATE 0.2 MILLIGRAM(S): 0.1 TABLET ORAL at 20:46

## 2018-09-03 RX ADMIN — LITHIUM CARBONATE 600 MILLIGRAM(S): 300 TABLET, EXTENDED RELEASE ORAL at 20:46

## 2018-09-04 PROCEDURE — 99232 SBSQ HOSP IP/OBS MODERATE 35: CPT

## 2018-09-04 RX ADMIN — CLOZAPINE 350 MILLIGRAM(S): 150 TABLET, ORALLY DISINTEGRATING ORAL at 10:02

## 2018-09-04 RX ADMIN — GABAPENTIN 300 MILLIGRAM(S): 400 CAPSULE ORAL at 10:03

## 2018-09-04 RX ADMIN — Medication 1 TABLET(S): at 10:02

## 2018-09-04 RX ADMIN — PANTOPRAZOLE SODIUM 40 MILLIGRAM(S): 20 TABLET, DELAYED RELEASE ORAL at 10:03

## 2018-09-04 RX ADMIN — Medication 5 MILLIGRAM(S): at 10:02

## 2018-09-04 RX ADMIN — MAGNESIUM OXIDE 400 MG ORAL TABLET 400 MILLIGRAM(S): 241.3 TABLET ORAL at 10:03

## 2018-09-04 RX ADMIN — LACTULOSE 20 GRAM(S): 10 SOLUTION ORAL at 10:03

## 2018-09-04 RX ADMIN — LITHIUM CARBONATE 600 MILLIGRAM(S): 300 TABLET, EXTENDED RELEASE ORAL at 20:38

## 2018-09-04 RX ADMIN — GABAPENTIN 300 MILLIGRAM(S): 400 CAPSULE ORAL at 20:38

## 2018-09-04 RX ADMIN — Medication 5 MILLIGRAM(S): at 20:37

## 2018-09-04 RX ADMIN — DESMOPRESSIN ACETATE 0.2 MILLIGRAM(S): 0.1 TABLET ORAL at 20:38

## 2018-09-04 RX ADMIN — LITHIUM CARBONATE 300 MILLIGRAM(S): 300 TABLET, EXTENDED RELEASE ORAL at 10:03

## 2018-09-04 RX ADMIN — Medication 1 TABLET(S): at 20:37

## 2018-09-05 PROCEDURE — 99232 SBSQ HOSP IP/OBS MODERATE 35: CPT

## 2018-09-05 RX ADMIN — LACTULOSE 20 GRAM(S): 10 SOLUTION ORAL at 10:14

## 2018-09-05 RX ADMIN — LITHIUM CARBONATE 300 MILLIGRAM(S): 300 TABLET, EXTENDED RELEASE ORAL at 10:14

## 2018-09-05 RX ADMIN — Medication 1 TABLET(S): at 21:36

## 2018-09-05 RX ADMIN — PANTOPRAZOLE SODIUM 40 MILLIGRAM(S): 20 TABLET, DELAYED RELEASE ORAL at 10:14

## 2018-09-05 RX ADMIN — Medication 1 TABLET(S): at 10:14

## 2018-09-05 RX ADMIN — CLOZAPINE 350 MILLIGRAM(S): 150 TABLET, ORALLY DISINTEGRATING ORAL at 10:14

## 2018-09-05 RX ADMIN — GABAPENTIN 300 MILLIGRAM(S): 400 CAPSULE ORAL at 21:36

## 2018-09-05 RX ADMIN — Medication 50 MILLIGRAM(S): at 10:14

## 2018-09-05 RX ADMIN — GABAPENTIN 300 MILLIGRAM(S): 400 CAPSULE ORAL at 10:14

## 2018-09-05 RX ADMIN — DESMOPRESSIN ACETATE 0.2 MILLIGRAM(S): 0.1 TABLET ORAL at 21:36

## 2018-09-05 RX ADMIN — Medication 5 MILLIGRAM(S): at 10:14

## 2018-09-05 RX ADMIN — Medication 5 MILLIGRAM(S): at 21:36

## 2018-09-05 RX ADMIN — LITHIUM CARBONATE 600 MILLIGRAM(S): 300 TABLET, EXTENDED RELEASE ORAL at 21:36

## 2018-09-06 PROCEDURE — 99232 SBSQ HOSP IP/OBS MODERATE 35: CPT

## 2018-09-06 RX ORDER — CLOZAPINE 150 MG/1
50 TABLET, ORALLY DISINTEGRATING ORAL DAILY
Qty: 0 | Refills: 0 | Status: DISCONTINUED | OUTPATIENT
Start: 2018-09-07 | End: 2018-09-13

## 2018-09-06 RX ORDER — CLOZAPINE 150 MG/1
300 TABLET, ORALLY DISINTEGRATING ORAL AT BEDTIME
Qty: 0 | Refills: 0 | Status: DISCONTINUED | OUTPATIENT
Start: 2018-09-07 | End: 2018-09-13

## 2018-09-06 RX ADMIN — Medication 1 TABLET(S): at 21:38

## 2018-09-06 RX ADMIN — Medication 5 MILLIGRAM(S): at 10:30

## 2018-09-06 RX ADMIN — Medication 1 TABLET(S): at 10:30

## 2018-09-06 RX ADMIN — GABAPENTIN 300 MILLIGRAM(S): 400 CAPSULE ORAL at 10:30

## 2018-09-06 RX ADMIN — GABAPENTIN 300 MILLIGRAM(S): 400 CAPSULE ORAL at 21:38

## 2018-09-06 RX ADMIN — MAGNESIUM OXIDE 400 MG ORAL TABLET 400 MILLIGRAM(S): 241.3 TABLET ORAL at 10:30

## 2018-09-06 RX ADMIN — DESMOPRESSIN ACETATE 0.2 MILLIGRAM(S): 0.1 TABLET ORAL at 21:38

## 2018-09-06 RX ADMIN — CLOZAPINE 350 MILLIGRAM(S): 150 TABLET, ORALLY DISINTEGRATING ORAL at 10:30

## 2018-09-06 RX ADMIN — Medication 5 MILLIGRAM(S): at 21:38

## 2018-09-06 RX ADMIN — PANTOPRAZOLE SODIUM 40 MILLIGRAM(S): 20 TABLET, DELAYED RELEASE ORAL at 10:30

## 2018-09-06 RX ADMIN — LITHIUM CARBONATE 300 MILLIGRAM(S): 300 TABLET, EXTENDED RELEASE ORAL at 10:30

## 2018-09-06 RX ADMIN — LITHIUM CARBONATE 600 MILLIGRAM(S): 300 TABLET, EXTENDED RELEASE ORAL at 21:38

## 2018-09-07 PROCEDURE — 99232 SBSQ HOSP IP/OBS MODERATE 35: CPT

## 2018-09-07 RX ADMIN — LACTULOSE 20 GRAM(S): 10 SOLUTION ORAL at 21:03

## 2018-09-07 RX ADMIN — Medication 1 TABLET(S): at 21:03

## 2018-09-07 RX ADMIN — GABAPENTIN 300 MILLIGRAM(S): 400 CAPSULE ORAL at 21:03

## 2018-09-07 RX ADMIN — Medication 5 MILLIGRAM(S): at 21:03

## 2018-09-07 RX ADMIN — PANTOPRAZOLE SODIUM 40 MILLIGRAM(S): 20 TABLET, DELAYED RELEASE ORAL at 09:34

## 2018-09-07 RX ADMIN — Medication 50 MILLIGRAM(S): at 09:34

## 2018-09-07 RX ADMIN — GABAPENTIN 300 MILLIGRAM(S): 400 CAPSULE ORAL at 09:33

## 2018-09-07 RX ADMIN — LACTULOSE 20 GRAM(S): 10 SOLUTION ORAL at 09:34

## 2018-09-07 RX ADMIN — Medication 1 TABLET(S): at 09:33

## 2018-09-07 RX ADMIN — CLOZAPINE 50 MILLIGRAM(S): 150 TABLET, ORALLY DISINTEGRATING ORAL at 09:33

## 2018-09-07 RX ADMIN — LITHIUM CARBONATE 600 MILLIGRAM(S): 300 TABLET, EXTENDED RELEASE ORAL at 21:03

## 2018-09-07 RX ADMIN — CLOZAPINE 300 MILLIGRAM(S): 150 TABLET, ORALLY DISINTEGRATING ORAL at 21:03

## 2018-09-07 RX ADMIN — DESMOPRESSIN ACETATE 0.2 MILLIGRAM(S): 0.1 TABLET ORAL at 21:03

## 2018-09-07 RX ADMIN — LITHIUM CARBONATE 300 MILLIGRAM(S): 300 TABLET, EXTENDED RELEASE ORAL at 09:34

## 2018-09-07 RX ADMIN — Medication 5 MILLIGRAM(S): at 09:33

## 2018-09-08 RX ADMIN — GABAPENTIN 300 MILLIGRAM(S): 400 CAPSULE ORAL at 09:04

## 2018-09-08 RX ADMIN — LITHIUM CARBONATE 300 MILLIGRAM(S): 300 TABLET, EXTENDED RELEASE ORAL at 09:04

## 2018-09-08 RX ADMIN — LITHIUM CARBONATE 600 MILLIGRAM(S): 300 TABLET, EXTENDED RELEASE ORAL at 21:41

## 2018-09-08 RX ADMIN — PANTOPRAZOLE SODIUM 40 MILLIGRAM(S): 20 TABLET, DELAYED RELEASE ORAL at 09:04

## 2018-09-08 RX ADMIN — Medication 5 MILLIGRAM(S): at 21:41

## 2018-09-08 RX ADMIN — GABAPENTIN 300 MILLIGRAM(S): 400 CAPSULE ORAL at 21:41

## 2018-09-08 RX ADMIN — Medication 5 MILLIGRAM(S): at 09:04

## 2018-09-08 RX ADMIN — CLOZAPINE 50 MILLIGRAM(S): 150 TABLET, ORALLY DISINTEGRATING ORAL at 09:04

## 2018-09-08 RX ADMIN — CLOZAPINE 300 MILLIGRAM(S): 150 TABLET, ORALLY DISINTEGRATING ORAL at 21:41

## 2018-09-08 RX ADMIN — DESMOPRESSIN ACETATE 0.2 MILLIGRAM(S): 0.1 TABLET ORAL at 21:41

## 2018-09-08 RX ADMIN — MAGNESIUM OXIDE 400 MG ORAL TABLET 400 MILLIGRAM(S): 241.3 TABLET ORAL at 09:04

## 2018-09-08 RX ADMIN — Medication 50 MILLIGRAM(S): at 09:04

## 2018-09-08 RX ADMIN — LACTULOSE 20 GRAM(S): 10 SOLUTION ORAL at 09:04

## 2018-09-09 RX ADMIN — Medication 50 MILLIGRAM(S): at 09:37

## 2018-09-09 RX ADMIN — LITHIUM CARBONATE 300 MILLIGRAM(S): 300 TABLET, EXTENDED RELEASE ORAL at 09:37

## 2018-09-09 RX ADMIN — PANTOPRAZOLE SODIUM 40 MILLIGRAM(S): 20 TABLET, DELAYED RELEASE ORAL at 09:37

## 2018-09-09 RX ADMIN — CLOZAPINE 50 MILLIGRAM(S): 150 TABLET, ORALLY DISINTEGRATING ORAL at 09:37

## 2018-09-09 RX ADMIN — LACTULOSE 20 GRAM(S): 10 SOLUTION ORAL at 20:30

## 2018-09-09 RX ADMIN — CLOZAPINE 300 MILLIGRAM(S): 150 TABLET, ORALLY DISINTEGRATING ORAL at 20:30

## 2018-09-09 RX ADMIN — LITHIUM CARBONATE 600 MILLIGRAM(S): 300 TABLET, EXTENDED RELEASE ORAL at 20:30

## 2018-09-09 RX ADMIN — GABAPENTIN 300 MILLIGRAM(S): 400 CAPSULE ORAL at 20:30

## 2018-09-09 RX ADMIN — LACTULOSE 20 GRAM(S): 10 SOLUTION ORAL at 09:37

## 2018-09-09 RX ADMIN — DESMOPRESSIN ACETATE 0.2 MILLIGRAM(S): 0.1 TABLET ORAL at 20:30

## 2018-09-09 RX ADMIN — Medication 5 MILLIGRAM(S): at 09:38

## 2018-09-09 RX ADMIN — Medication 5 MILLIGRAM(S): at 20:30

## 2018-09-09 RX ADMIN — GABAPENTIN 300 MILLIGRAM(S): 400 CAPSULE ORAL at 09:37

## 2018-09-10 PROCEDURE — 99232 SBSQ HOSP IP/OBS MODERATE 35: CPT

## 2018-09-10 RX ADMIN — LITHIUM CARBONATE 300 MILLIGRAM(S): 300 TABLET, EXTENDED RELEASE ORAL at 09:21

## 2018-09-10 RX ADMIN — Medication 5 MILLIGRAM(S): at 21:27

## 2018-09-10 RX ADMIN — MAGNESIUM OXIDE 400 MG ORAL TABLET 400 MILLIGRAM(S): 241.3 TABLET ORAL at 09:21

## 2018-09-10 RX ADMIN — GABAPENTIN 300 MILLIGRAM(S): 400 CAPSULE ORAL at 09:21

## 2018-09-10 RX ADMIN — PANTOPRAZOLE SODIUM 40 MILLIGRAM(S): 20 TABLET, DELAYED RELEASE ORAL at 09:22

## 2018-09-10 RX ADMIN — LACTULOSE 20 GRAM(S): 10 SOLUTION ORAL at 09:21

## 2018-09-10 RX ADMIN — LITHIUM CARBONATE 600 MILLIGRAM(S): 300 TABLET, EXTENDED RELEASE ORAL at 20:28

## 2018-09-10 RX ADMIN — Medication 5 MILLIGRAM(S): at 09:20

## 2018-09-10 RX ADMIN — LACTULOSE 20 GRAM(S): 10 SOLUTION ORAL at 20:28

## 2018-09-10 RX ADMIN — GABAPENTIN 300 MILLIGRAM(S): 400 CAPSULE ORAL at 20:28

## 2018-09-10 RX ADMIN — CLOZAPINE 300 MILLIGRAM(S): 150 TABLET, ORALLY DISINTEGRATING ORAL at 20:28

## 2018-09-10 RX ADMIN — Medication 650 MILLIGRAM(S): at 11:22

## 2018-09-10 RX ADMIN — Medication 50 MILLIGRAM(S): at 09:21

## 2018-09-10 RX ADMIN — CLOZAPINE 50 MILLIGRAM(S): 150 TABLET, ORALLY DISINTEGRATING ORAL at 09:20

## 2018-09-10 RX ADMIN — DESMOPRESSIN ACETATE 0.2 MILLIGRAM(S): 0.1 TABLET ORAL at 20:28

## 2018-09-11 LAB
BASOPHILS # BLD AUTO: 0.08 K/UL — SIGNIFICANT CHANGE UP (ref 0–0.2)
BASOPHILS NFR BLD AUTO: 1.1 % — SIGNIFICANT CHANGE UP (ref 0–2)
EOSINOPHIL # BLD AUTO: 0.5 K/UL — SIGNIFICANT CHANGE UP (ref 0–0.5)
EOSINOPHIL NFR BLD AUTO: 7.1 % — HIGH (ref 0–6)
HCT VFR BLD CALC: 42.7 % — SIGNIFICANT CHANGE UP (ref 39–50)
HGB BLD-MCNC: 13.3 G/DL — SIGNIFICANT CHANGE UP (ref 13–17)
IMM GRANULOCYTES # BLD AUTO: 0.02 # — SIGNIFICANT CHANGE UP
IMM GRANULOCYTES NFR BLD AUTO: 0.3 % — SIGNIFICANT CHANGE UP (ref 0–1.5)
LYMPHOCYTES # BLD AUTO: 1.14 K/UL — SIGNIFICANT CHANGE UP (ref 1–3.3)
LYMPHOCYTES # BLD AUTO: 16.3 % — SIGNIFICANT CHANGE UP (ref 13–44)
MCHC RBC-ENTMCNC: 28.5 PG — SIGNIFICANT CHANGE UP (ref 27–34)
MCHC RBC-ENTMCNC: 31.1 % — LOW (ref 32–36)
MCV RBC AUTO: 91.6 FL — SIGNIFICANT CHANGE UP (ref 80–100)
MONOCYTES # BLD AUTO: 0.54 K/UL — SIGNIFICANT CHANGE UP (ref 0–0.9)
MONOCYTES NFR BLD AUTO: 7.7 % — SIGNIFICANT CHANGE UP (ref 2–14)
NEUTROPHILS # BLD AUTO: 4.72 K/UL — SIGNIFICANT CHANGE UP (ref 1.8–7.4)
NEUTROPHILS NFR BLD AUTO: 67.5 % — SIGNIFICANT CHANGE UP (ref 43–77)
NRBC # FLD: 0 — SIGNIFICANT CHANGE UP
PLATELET # BLD AUTO: 326 K/UL — SIGNIFICANT CHANGE UP (ref 150–400)
PMV BLD: 10.4 FL — SIGNIFICANT CHANGE UP (ref 7–13)
RBC # BLD: 4.66 M/UL — SIGNIFICANT CHANGE UP (ref 4.2–5.8)
RBC # FLD: 13 % — SIGNIFICANT CHANGE UP (ref 10.3–14.5)
WBC # BLD: 7 K/UL — SIGNIFICANT CHANGE UP (ref 3.8–10.5)
WBC # FLD AUTO: 7 K/UL — SIGNIFICANT CHANGE UP (ref 3.8–10.5)

## 2018-09-11 PROCEDURE — 99232 SBSQ HOSP IP/OBS MODERATE 35: CPT

## 2018-09-11 RX ORDER — ACETAMINOPHEN 500 MG
650 TABLET ORAL ONCE
Qty: 0 | Refills: 0 | Status: COMPLETED | OUTPATIENT
Start: 2018-09-11 | End: 2018-09-11

## 2018-09-11 RX ADMIN — Medication 5 MILLIGRAM(S): at 11:13

## 2018-09-11 RX ADMIN — GABAPENTIN 300 MILLIGRAM(S): 400 CAPSULE ORAL at 20:35

## 2018-09-11 RX ADMIN — CLOZAPINE 300 MILLIGRAM(S): 150 TABLET, ORALLY DISINTEGRATING ORAL at 20:35

## 2018-09-11 RX ADMIN — LACTULOSE 20 GRAM(S): 10 SOLUTION ORAL at 11:13

## 2018-09-11 RX ADMIN — PANTOPRAZOLE SODIUM 40 MILLIGRAM(S): 20 TABLET, DELAYED RELEASE ORAL at 11:13

## 2018-09-11 RX ADMIN — DESMOPRESSIN ACETATE 0.2 MILLIGRAM(S): 0.1 TABLET ORAL at 20:35

## 2018-09-11 RX ADMIN — Medication 650 MILLIGRAM(S): at 06:10

## 2018-09-11 RX ADMIN — Medication 5 MILLIGRAM(S): at 20:35

## 2018-09-11 RX ADMIN — GABAPENTIN 300 MILLIGRAM(S): 400 CAPSULE ORAL at 11:13

## 2018-09-11 RX ADMIN — CLOZAPINE 50 MILLIGRAM(S): 150 TABLET, ORALLY DISINTEGRATING ORAL at 11:13

## 2018-09-11 RX ADMIN — LITHIUM CARBONATE 600 MILLIGRAM(S): 300 TABLET, EXTENDED RELEASE ORAL at 20:36

## 2018-09-11 RX ADMIN — Medication 650 MILLIGRAM(S): at 04:59

## 2018-09-11 RX ADMIN — Medication 50 MILLIGRAM(S): at 11:13

## 2018-09-11 RX ADMIN — LITHIUM CARBONATE 300 MILLIGRAM(S): 300 TABLET, EXTENDED RELEASE ORAL at 11:13

## 2018-09-12 PROCEDURE — 99232 SBSQ HOSP IP/OBS MODERATE 35: CPT

## 2018-09-12 RX ADMIN — LITHIUM CARBONATE 600 MILLIGRAM(S): 300 TABLET, EXTENDED RELEASE ORAL at 21:14

## 2018-09-12 RX ADMIN — CLOZAPINE 300 MILLIGRAM(S): 150 TABLET, ORALLY DISINTEGRATING ORAL at 21:14

## 2018-09-12 RX ADMIN — GABAPENTIN 300 MILLIGRAM(S): 400 CAPSULE ORAL at 08:28

## 2018-09-12 RX ADMIN — Medication 5 MILLIGRAM(S): at 08:28

## 2018-09-12 RX ADMIN — MAGNESIUM OXIDE 400 MG ORAL TABLET 400 MILLIGRAM(S): 241.3 TABLET ORAL at 08:28

## 2018-09-12 RX ADMIN — PANTOPRAZOLE SODIUM 40 MILLIGRAM(S): 20 TABLET, DELAYED RELEASE ORAL at 08:29

## 2018-09-12 RX ADMIN — Medication 5 MILLIGRAM(S): at 21:14

## 2018-09-12 RX ADMIN — Medication 50 MILLIGRAM(S): at 08:29

## 2018-09-12 RX ADMIN — GABAPENTIN 300 MILLIGRAM(S): 400 CAPSULE ORAL at 21:14

## 2018-09-12 RX ADMIN — DESMOPRESSIN ACETATE 0.2 MILLIGRAM(S): 0.1 TABLET ORAL at 21:14

## 2018-09-12 RX ADMIN — LITHIUM CARBONATE 300 MILLIGRAM(S): 300 TABLET, EXTENDED RELEASE ORAL at 08:28

## 2018-09-12 RX ADMIN — CLOZAPINE 50 MILLIGRAM(S): 150 TABLET, ORALLY DISINTEGRATING ORAL at 08:28

## 2018-09-13 PROCEDURE — 99232 SBSQ HOSP IP/OBS MODERATE 35: CPT

## 2018-09-13 RX ORDER — CLOZAPINE 150 MG/1
300 TABLET, ORALLY DISINTEGRATING ORAL
Qty: 0 | Refills: 0 | Status: COMPLETED | OUTPATIENT
Start: 2018-09-13 | End: 2018-09-13

## 2018-09-13 RX ORDER — CLOZAPINE 150 MG/1
350 TABLET, ORALLY DISINTEGRATING ORAL
Qty: 0 | Refills: 0 | Status: DISCONTINUED | OUTPATIENT
Start: 2018-09-14 | End: 2018-10-11

## 2018-09-13 RX ADMIN — DESMOPRESSIN ACETATE 0.2 MILLIGRAM(S): 0.1 TABLET ORAL at 20:45

## 2018-09-13 RX ADMIN — GABAPENTIN 300 MILLIGRAM(S): 400 CAPSULE ORAL at 20:45

## 2018-09-13 RX ADMIN — CLOZAPINE 50 MILLIGRAM(S): 150 TABLET, ORALLY DISINTEGRATING ORAL at 10:06

## 2018-09-13 RX ADMIN — LACTULOSE 20 GRAM(S): 10 SOLUTION ORAL at 10:06

## 2018-09-13 RX ADMIN — Medication 5 MILLIGRAM(S): at 10:06

## 2018-09-13 RX ADMIN — LITHIUM CARBONATE 300 MILLIGRAM(S): 300 TABLET, EXTENDED RELEASE ORAL at 10:06

## 2018-09-13 RX ADMIN — CLOZAPINE 300 MILLIGRAM(S): 150 TABLET, ORALLY DISINTEGRATING ORAL at 19:00

## 2018-09-13 RX ADMIN — PANTOPRAZOLE SODIUM 40 MILLIGRAM(S): 20 TABLET, DELAYED RELEASE ORAL at 10:06

## 2018-09-13 RX ADMIN — Medication 5 MILLIGRAM(S): at 20:45

## 2018-09-13 RX ADMIN — LITHIUM CARBONATE 600 MILLIGRAM(S): 300 TABLET, EXTENDED RELEASE ORAL at 20:45

## 2018-09-13 RX ADMIN — Medication 50 MILLIGRAM(S): at 10:06

## 2018-09-13 RX ADMIN — GABAPENTIN 300 MILLIGRAM(S): 400 CAPSULE ORAL at 10:06

## 2018-09-14 LAB
APPEARANCE UR: CLEAR — SIGNIFICANT CHANGE UP
BILIRUB UR-MCNC: NEGATIVE — SIGNIFICANT CHANGE UP
BLOOD UR QL VISUAL: NEGATIVE — SIGNIFICANT CHANGE UP
COLOR SPEC: SIGNIFICANT CHANGE UP
GLUCOSE UR-MCNC: NEGATIVE — SIGNIFICANT CHANGE UP
KETONES UR-MCNC: NEGATIVE — SIGNIFICANT CHANGE UP
LEUKOCYTE ESTERASE UR-ACNC: NEGATIVE — SIGNIFICANT CHANGE UP
NITRITE UR-MCNC: NEGATIVE — SIGNIFICANT CHANGE UP
PH UR: 6 — SIGNIFICANT CHANGE UP (ref 5–8)
PROT UR-MCNC: 10 — SIGNIFICANT CHANGE UP
SP GR SPEC: 1.02 — SIGNIFICANT CHANGE UP (ref 1–1.04)
UROBILINOGEN FLD QL: NORMAL — SIGNIFICANT CHANGE UP

## 2018-09-14 PROCEDURE — 99232 SBSQ HOSP IP/OBS MODERATE 35: CPT

## 2018-09-14 RX ADMIN — DESMOPRESSIN ACETATE 0.2 MILLIGRAM(S): 0.1 TABLET ORAL at 21:54

## 2018-09-14 RX ADMIN — PANTOPRAZOLE SODIUM 40 MILLIGRAM(S): 20 TABLET, DELAYED RELEASE ORAL at 09:31

## 2018-09-14 RX ADMIN — LACTULOSE 20 GRAM(S): 10 SOLUTION ORAL at 09:31

## 2018-09-14 RX ADMIN — LACTULOSE 20 GRAM(S): 10 SOLUTION ORAL at 21:54

## 2018-09-14 RX ADMIN — MAGNESIUM OXIDE 400 MG ORAL TABLET 400 MILLIGRAM(S): 241.3 TABLET ORAL at 09:31

## 2018-09-14 RX ADMIN — GABAPENTIN 300 MILLIGRAM(S): 400 CAPSULE ORAL at 21:54

## 2018-09-14 RX ADMIN — Medication 50 MILLIGRAM(S): at 09:31

## 2018-09-14 RX ADMIN — Medication 5 MILLIGRAM(S): at 21:54

## 2018-09-14 RX ADMIN — GABAPENTIN 300 MILLIGRAM(S): 400 CAPSULE ORAL at 09:31

## 2018-09-14 RX ADMIN — LITHIUM CARBONATE 300 MILLIGRAM(S): 300 TABLET, EXTENDED RELEASE ORAL at 09:31

## 2018-09-14 RX ADMIN — Medication 5 MILLIGRAM(S): at 09:31

## 2018-09-14 RX ADMIN — LITHIUM CARBONATE 600 MILLIGRAM(S): 300 TABLET, EXTENDED RELEASE ORAL at 21:55

## 2018-09-15 LAB — SPECIMEN SOURCE: SIGNIFICANT CHANGE UP

## 2018-09-15 RX ADMIN — CLOZAPINE 350 MILLIGRAM(S): 150 TABLET, ORALLY DISINTEGRATING ORAL at 18:26

## 2018-09-15 RX ADMIN — LACTULOSE 20 GRAM(S): 10 SOLUTION ORAL at 08:27

## 2018-09-15 RX ADMIN — PANTOPRAZOLE SODIUM 40 MILLIGRAM(S): 20 TABLET, DELAYED RELEASE ORAL at 08:27

## 2018-09-15 RX ADMIN — DESMOPRESSIN ACETATE 0.2 MILLIGRAM(S): 0.1 TABLET ORAL at 21:08

## 2018-09-15 RX ADMIN — LACTULOSE 20 GRAM(S): 10 SOLUTION ORAL at 21:08

## 2018-09-15 RX ADMIN — GABAPENTIN 300 MILLIGRAM(S): 400 CAPSULE ORAL at 08:27

## 2018-09-15 RX ADMIN — Medication 50 MILLIGRAM(S): at 08:27

## 2018-09-15 RX ADMIN — Medication 5 MILLIGRAM(S): at 08:27

## 2018-09-15 RX ADMIN — LITHIUM CARBONATE 300 MILLIGRAM(S): 300 TABLET, EXTENDED RELEASE ORAL at 08:27

## 2018-09-15 RX ADMIN — LITHIUM CARBONATE 600 MILLIGRAM(S): 300 TABLET, EXTENDED RELEASE ORAL at 21:08

## 2018-09-15 RX ADMIN — GABAPENTIN 300 MILLIGRAM(S): 400 CAPSULE ORAL at 21:08

## 2018-09-15 RX ADMIN — Medication 5 MILLIGRAM(S): at 21:08

## 2018-09-16 LAB — BACTERIA UR CULT: SIGNIFICANT CHANGE UP

## 2018-09-16 RX ADMIN — PANTOPRAZOLE SODIUM 40 MILLIGRAM(S): 20 TABLET, DELAYED RELEASE ORAL at 09:30

## 2018-09-16 RX ADMIN — GABAPENTIN 300 MILLIGRAM(S): 400 CAPSULE ORAL at 21:44

## 2018-09-16 RX ADMIN — Medication 5 MILLIGRAM(S): at 21:44

## 2018-09-16 RX ADMIN — Medication 50 MILLIGRAM(S): at 09:30

## 2018-09-16 RX ADMIN — LITHIUM CARBONATE 600 MILLIGRAM(S): 300 TABLET, EXTENDED RELEASE ORAL at 21:44

## 2018-09-16 RX ADMIN — CLOZAPINE 350 MILLIGRAM(S): 150 TABLET, ORALLY DISINTEGRATING ORAL at 18:41

## 2018-09-16 RX ADMIN — GABAPENTIN 300 MILLIGRAM(S): 400 CAPSULE ORAL at 09:30

## 2018-09-16 RX ADMIN — Medication 5 MILLIGRAM(S): at 09:30

## 2018-09-16 RX ADMIN — LACTULOSE 20 GRAM(S): 10 SOLUTION ORAL at 09:30

## 2018-09-16 RX ADMIN — DESMOPRESSIN ACETATE 0.2 MILLIGRAM(S): 0.1 TABLET ORAL at 21:44

## 2018-09-16 RX ADMIN — LITHIUM CARBONATE 300 MILLIGRAM(S): 300 TABLET, EXTENDED RELEASE ORAL at 09:30

## 2018-09-16 RX ADMIN — MAGNESIUM OXIDE 400 MG ORAL TABLET 400 MILLIGRAM(S): 241.3 TABLET ORAL at 09:30

## 2018-09-17 LAB — CLOZAPINE SERPL-MCNC: SIGNIFICANT CHANGE UP

## 2018-09-17 PROCEDURE — 99232 SBSQ HOSP IP/OBS MODERATE 35: CPT

## 2018-09-17 RX ORDER — LANOLIN ALCOHOL/MO/W.PET/CERES
3 CREAM (GRAM) TOPICAL ONCE
Qty: 0 | Refills: 0 | Status: COMPLETED | OUTPATIENT
Start: 2018-09-17 | End: 2018-09-17

## 2018-09-17 RX ADMIN — Medication 3 MILLIGRAM(S): at 23:59

## 2018-09-17 RX ADMIN — LITHIUM CARBONATE 600 MILLIGRAM(S): 300 TABLET, EXTENDED RELEASE ORAL at 20:53

## 2018-09-17 RX ADMIN — LACTULOSE 20 GRAM(S): 10 SOLUTION ORAL at 10:16

## 2018-09-17 RX ADMIN — Medication 50 MILLIGRAM(S): at 10:16

## 2018-09-17 RX ADMIN — PANTOPRAZOLE SODIUM 40 MILLIGRAM(S): 20 TABLET, DELAYED RELEASE ORAL at 10:16

## 2018-09-17 RX ADMIN — Medication 5 MILLIGRAM(S): at 20:53

## 2018-09-17 RX ADMIN — LITHIUM CARBONATE 300 MILLIGRAM(S): 300 TABLET, EXTENDED RELEASE ORAL at 10:16

## 2018-09-17 RX ADMIN — Medication 5 MILLIGRAM(S): at 10:16

## 2018-09-17 RX ADMIN — GABAPENTIN 300 MILLIGRAM(S): 400 CAPSULE ORAL at 20:53

## 2018-09-17 RX ADMIN — GABAPENTIN 300 MILLIGRAM(S): 400 CAPSULE ORAL at 10:16

## 2018-09-17 RX ADMIN — CLOZAPINE 350 MILLIGRAM(S): 150 TABLET, ORALLY DISINTEGRATING ORAL at 17:55

## 2018-09-17 RX ADMIN — DESMOPRESSIN ACETATE 0.2 MILLIGRAM(S): 0.1 TABLET ORAL at 20:53

## 2018-09-17 RX ADMIN — Medication 650 MILLIGRAM(S): at 22:03

## 2018-09-18 PROCEDURE — 99232 SBSQ HOSP IP/OBS MODERATE 35: CPT

## 2018-09-18 RX ADMIN — LITHIUM CARBONATE 600 MILLIGRAM(S): 300 TABLET, EXTENDED RELEASE ORAL at 21:25

## 2018-09-18 RX ADMIN — Medication 650 MILLIGRAM(S): at 13:30

## 2018-09-18 RX ADMIN — Medication 5 MILLIGRAM(S): at 21:24

## 2018-09-18 RX ADMIN — CLOZAPINE 350 MILLIGRAM(S): 150 TABLET, ORALLY DISINTEGRATING ORAL at 17:43

## 2018-09-18 RX ADMIN — Medication 50 MILLIGRAM(S): at 10:20

## 2018-09-18 RX ADMIN — GABAPENTIN 300 MILLIGRAM(S): 400 CAPSULE ORAL at 21:24

## 2018-09-18 RX ADMIN — LITHIUM CARBONATE 300 MILLIGRAM(S): 300 TABLET, EXTENDED RELEASE ORAL at 10:20

## 2018-09-18 RX ADMIN — DESMOPRESSIN ACETATE 0.2 MILLIGRAM(S): 0.1 TABLET ORAL at 21:24

## 2018-09-18 RX ADMIN — MAGNESIUM OXIDE 400 MG ORAL TABLET 400 MILLIGRAM(S): 241.3 TABLET ORAL at 10:20

## 2018-09-18 RX ADMIN — Medication 5 MILLIGRAM(S): at 10:20

## 2018-09-18 RX ADMIN — PANTOPRAZOLE SODIUM 40 MILLIGRAM(S): 20 TABLET, DELAYED RELEASE ORAL at 10:20

## 2018-09-18 RX ADMIN — LACTULOSE 20 GRAM(S): 10 SOLUTION ORAL at 21:25

## 2018-09-18 RX ADMIN — GABAPENTIN 300 MILLIGRAM(S): 400 CAPSULE ORAL at 10:20

## 2018-09-19 PROCEDURE — 99232 SBSQ HOSP IP/OBS MODERATE 35: CPT | Mod: 25

## 2018-09-19 RX ADMIN — Medication 5 MILLIGRAM(S): at 08:53

## 2018-09-19 RX ADMIN — LACTULOSE 20 GRAM(S): 10 SOLUTION ORAL at 20:56

## 2018-09-19 RX ADMIN — LITHIUM CARBONATE 300 MILLIGRAM(S): 300 TABLET, EXTENDED RELEASE ORAL at 08:53

## 2018-09-19 RX ADMIN — Medication 50 MILLIGRAM(S): at 08:53

## 2018-09-19 RX ADMIN — Medication 5 MILLIGRAM(S): at 20:56

## 2018-09-19 RX ADMIN — PANTOPRAZOLE SODIUM 40 MILLIGRAM(S): 20 TABLET, DELAYED RELEASE ORAL at 08:53

## 2018-09-19 RX ADMIN — LACTULOSE 20 GRAM(S): 10 SOLUTION ORAL at 08:53

## 2018-09-19 RX ADMIN — GABAPENTIN 300 MILLIGRAM(S): 400 CAPSULE ORAL at 20:56

## 2018-09-19 RX ADMIN — DESMOPRESSIN ACETATE 0.2 MILLIGRAM(S): 0.1 TABLET ORAL at 20:56

## 2018-09-19 RX ADMIN — GABAPENTIN 300 MILLIGRAM(S): 400 CAPSULE ORAL at 08:53

## 2018-09-19 RX ADMIN — LITHIUM CARBONATE 600 MILLIGRAM(S): 300 TABLET, EXTENDED RELEASE ORAL at 20:56

## 2018-09-20 PROCEDURE — 99232 SBSQ HOSP IP/OBS MODERATE 35: CPT

## 2018-09-20 RX ADMIN — LACTULOSE 20 GRAM(S): 10 SOLUTION ORAL at 21:37

## 2018-09-20 RX ADMIN — GABAPENTIN 300 MILLIGRAM(S): 400 CAPSULE ORAL at 08:25

## 2018-09-20 RX ADMIN — Medication 5 MILLIGRAM(S): at 08:25

## 2018-09-20 RX ADMIN — LITHIUM CARBONATE 600 MILLIGRAM(S): 300 TABLET, EXTENDED RELEASE ORAL at 21:37

## 2018-09-20 RX ADMIN — MAGNESIUM OXIDE 400 MG ORAL TABLET 400 MILLIGRAM(S): 241.3 TABLET ORAL at 08:26

## 2018-09-20 RX ADMIN — Medication 50 MILLIGRAM(S): at 08:26

## 2018-09-20 RX ADMIN — PANTOPRAZOLE SODIUM 40 MILLIGRAM(S): 20 TABLET, DELAYED RELEASE ORAL at 08:26

## 2018-09-20 RX ADMIN — DESMOPRESSIN ACETATE 0.2 MILLIGRAM(S): 0.1 TABLET ORAL at 21:37

## 2018-09-20 RX ADMIN — Medication 5 MILLIGRAM(S): at 21:37

## 2018-09-20 RX ADMIN — GABAPENTIN 300 MILLIGRAM(S): 400 CAPSULE ORAL at 21:37

## 2018-09-20 RX ADMIN — CLOZAPINE 350 MILLIGRAM(S): 150 TABLET, ORALLY DISINTEGRATING ORAL at 21:37

## 2018-09-20 RX ADMIN — LITHIUM CARBONATE 300 MILLIGRAM(S): 300 TABLET, EXTENDED RELEASE ORAL at 08:26

## 2018-09-21 PROCEDURE — 99232 SBSQ HOSP IP/OBS MODERATE 35: CPT

## 2018-09-21 RX ADMIN — Medication 5 MILLIGRAM(S): at 21:28

## 2018-09-21 RX ADMIN — PANTOPRAZOLE SODIUM 40 MILLIGRAM(S): 20 TABLET, DELAYED RELEASE ORAL at 10:14

## 2018-09-21 RX ADMIN — GABAPENTIN 300 MILLIGRAM(S): 400 CAPSULE ORAL at 10:13

## 2018-09-21 RX ADMIN — CLOZAPINE 350 MILLIGRAM(S): 150 TABLET, ORALLY DISINTEGRATING ORAL at 17:44

## 2018-09-21 RX ADMIN — DESMOPRESSIN ACETATE 0.2 MILLIGRAM(S): 0.1 TABLET ORAL at 21:28

## 2018-09-21 RX ADMIN — LACTULOSE 20 GRAM(S): 10 SOLUTION ORAL at 21:28

## 2018-09-21 RX ADMIN — GABAPENTIN 300 MILLIGRAM(S): 400 CAPSULE ORAL at 21:28

## 2018-09-21 RX ADMIN — Medication 50 MILLIGRAM(S): at 10:13

## 2018-09-21 RX ADMIN — Medication 5 MILLIGRAM(S): at 10:13

## 2018-09-21 RX ADMIN — LITHIUM CARBONATE 600 MILLIGRAM(S): 300 TABLET, EXTENDED RELEASE ORAL at 21:28

## 2018-09-21 RX ADMIN — LITHIUM CARBONATE 300 MILLIGRAM(S): 300 TABLET, EXTENDED RELEASE ORAL at 10:13

## 2018-09-22 RX ADMIN — LITHIUM CARBONATE 600 MILLIGRAM(S): 300 TABLET, EXTENDED RELEASE ORAL at 22:28

## 2018-09-22 RX ADMIN — PANTOPRAZOLE SODIUM 40 MILLIGRAM(S): 20 TABLET, DELAYED RELEASE ORAL at 09:12

## 2018-09-22 RX ADMIN — Medication 5 MILLIGRAM(S): at 22:28

## 2018-09-22 RX ADMIN — LACTULOSE 20 GRAM(S): 10 SOLUTION ORAL at 22:28

## 2018-09-22 RX ADMIN — GABAPENTIN 300 MILLIGRAM(S): 400 CAPSULE ORAL at 09:12

## 2018-09-22 RX ADMIN — GABAPENTIN 300 MILLIGRAM(S): 400 CAPSULE ORAL at 22:28

## 2018-09-22 RX ADMIN — Medication 50 MILLIGRAM(S): at 09:12

## 2018-09-22 RX ADMIN — LITHIUM CARBONATE 300 MILLIGRAM(S): 300 TABLET, EXTENDED RELEASE ORAL at 09:12

## 2018-09-22 RX ADMIN — CLOZAPINE 350 MILLIGRAM(S): 150 TABLET, ORALLY DISINTEGRATING ORAL at 17:31

## 2018-09-22 RX ADMIN — DESMOPRESSIN ACETATE 0.2 MILLIGRAM(S): 0.1 TABLET ORAL at 22:28

## 2018-09-22 RX ADMIN — MAGNESIUM OXIDE 400 MG ORAL TABLET 400 MILLIGRAM(S): 241.3 TABLET ORAL at 09:12

## 2018-09-22 RX ADMIN — Medication 5 MILLIGRAM(S): at 09:12

## 2018-09-23 RX ADMIN — LITHIUM CARBONATE 600 MILLIGRAM(S): 300 TABLET, EXTENDED RELEASE ORAL at 21:19

## 2018-09-23 RX ADMIN — GABAPENTIN 300 MILLIGRAM(S): 400 CAPSULE ORAL at 09:11

## 2018-09-23 RX ADMIN — GABAPENTIN 300 MILLIGRAM(S): 400 CAPSULE ORAL at 21:19

## 2018-09-23 RX ADMIN — Medication 50 MILLIGRAM(S): at 09:11

## 2018-09-23 RX ADMIN — Medication 5 MILLIGRAM(S): at 09:11

## 2018-09-23 RX ADMIN — LITHIUM CARBONATE 300 MILLIGRAM(S): 300 TABLET, EXTENDED RELEASE ORAL at 09:11

## 2018-09-23 RX ADMIN — LACTULOSE 20 GRAM(S): 10 SOLUTION ORAL at 09:11

## 2018-09-23 RX ADMIN — DESMOPRESSIN ACETATE 0.2 MILLIGRAM(S): 0.1 TABLET ORAL at 21:19

## 2018-09-23 RX ADMIN — Medication 5 MILLIGRAM(S): at 21:19

## 2018-09-23 RX ADMIN — PANTOPRAZOLE SODIUM 40 MILLIGRAM(S): 20 TABLET, DELAYED RELEASE ORAL at 09:11

## 2018-09-23 RX ADMIN — CLOZAPINE 350 MILLIGRAM(S): 150 TABLET, ORALLY DISINTEGRATING ORAL at 18:48

## 2018-09-24 PROCEDURE — 99232 SBSQ HOSP IP/OBS MODERATE 35: CPT

## 2018-09-24 RX ADMIN — Medication 5 MILLIGRAM(S): at 20:45

## 2018-09-24 RX ADMIN — CLOZAPINE 350 MILLIGRAM(S): 150 TABLET, ORALLY DISINTEGRATING ORAL at 17:11

## 2018-09-24 RX ADMIN — LITHIUM CARBONATE 600 MILLIGRAM(S): 300 TABLET, EXTENDED RELEASE ORAL at 20:45

## 2018-09-24 RX ADMIN — GABAPENTIN 300 MILLIGRAM(S): 400 CAPSULE ORAL at 20:45

## 2018-09-24 RX ADMIN — DESMOPRESSIN ACETATE 0.2 MILLIGRAM(S): 0.1 TABLET ORAL at 20:45

## 2018-09-24 RX ADMIN — LACTULOSE 20 GRAM(S): 10 SOLUTION ORAL at 20:45

## 2018-09-24 RX ADMIN — GABAPENTIN 300 MILLIGRAM(S): 400 CAPSULE ORAL at 08:57

## 2018-09-24 RX ADMIN — MAGNESIUM OXIDE 400 MG ORAL TABLET 400 MILLIGRAM(S): 241.3 TABLET ORAL at 08:58

## 2018-09-24 RX ADMIN — LITHIUM CARBONATE 300 MILLIGRAM(S): 300 TABLET, EXTENDED RELEASE ORAL at 08:57

## 2018-09-24 RX ADMIN — Medication 5 MILLIGRAM(S): at 08:57

## 2018-09-24 RX ADMIN — Medication 50 MILLIGRAM(S): at 08:58

## 2018-09-24 RX ADMIN — LACTULOSE 20 GRAM(S): 10 SOLUTION ORAL at 08:57

## 2018-09-24 RX ADMIN — PANTOPRAZOLE SODIUM 40 MILLIGRAM(S): 20 TABLET, DELAYED RELEASE ORAL at 08:45

## 2018-09-25 PROCEDURE — 99232 SBSQ HOSP IP/OBS MODERATE 35: CPT

## 2018-09-25 RX ADMIN — LITHIUM CARBONATE 300 MILLIGRAM(S): 300 TABLET, EXTENDED RELEASE ORAL at 09:14

## 2018-09-25 RX ADMIN — GABAPENTIN 300 MILLIGRAM(S): 400 CAPSULE ORAL at 09:14

## 2018-09-25 RX ADMIN — PANTOPRAZOLE SODIUM 40 MILLIGRAM(S): 20 TABLET, DELAYED RELEASE ORAL at 09:14

## 2018-09-25 RX ADMIN — Medication 5 MILLIGRAM(S): at 09:13

## 2018-09-25 RX ADMIN — Medication 50 MILLIGRAM(S): at 09:14

## 2018-09-25 RX ADMIN — LACTULOSE 20 GRAM(S): 10 SOLUTION ORAL at 21:38

## 2018-09-25 RX ADMIN — DESMOPRESSIN ACETATE 0.2 MILLIGRAM(S): 0.1 TABLET ORAL at 21:38

## 2018-09-25 RX ADMIN — LACTULOSE 20 GRAM(S): 10 SOLUTION ORAL at 09:14

## 2018-09-25 RX ADMIN — GABAPENTIN 300 MILLIGRAM(S): 400 CAPSULE ORAL at 21:38

## 2018-09-25 RX ADMIN — CLOZAPINE 350 MILLIGRAM(S): 150 TABLET, ORALLY DISINTEGRATING ORAL at 18:12

## 2018-09-25 RX ADMIN — Medication 5 MILLIGRAM(S): at 21:38

## 2018-09-25 RX ADMIN — LITHIUM CARBONATE 600 MILLIGRAM(S): 300 TABLET, EXTENDED RELEASE ORAL at 21:38

## 2018-09-26 PROCEDURE — 99232 SBSQ HOSP IP/OBS MODERATE 35: CPT

## 2018-09-26 RX ADMIN — LITHIUM CARBONATE 600 MILLIGRAM(S): 300 TABLET, EXTENDED RELEASE ORAL at 19:48

## 2018-09-26 RX ADMIN — DESMOPRESSIN ACETATE 0.2 MILLIGRAM(S): 0.1 TABLET ORAL at 19:48

## 2018-09-26 RX ADMIN — GABAPENTIN 300 MILLIGRAM(S): 400 CAPSULE ORAL at 19:48

## 2018-09-26 RX ADMIN — Medication 50 MILLIGRAM(S): at 09:20

## 2018-09-26 RX ADMIN — GABAPENTIN 300 MILLIGRAM(S): 400 CAPSULE ORAL at 09:19

## 2018-09-26 RX ADMIN — MAGNESIUM OXIDE 400 MG ORAL TABLET 400 MILLIGRAM(S): 241.3 TABLET ORAL at 09:20

## 2018-09-26 RX ADMIN — CLOZAPINE 350 MILLIGRAM(S): 150 TABLET, ORALLY DISINTEGRATING ORAL at 19:48

## 2018-09-26 RX ADMIN — Medication 5 MILLIGRAM(S): at 19:48

## 2018-09-26 RX ADMIN — Medication 5 MILLIGRAM(S): at 09:19

## 2018-09-26 RX ADMIN — PANTOPRAZOLE SODIUM 40 MILLIGRAM(S): 20 TABLET, DELAYED RELEASE ORAL at 09:20

## 2018-09-26 RX ADMIN — LITHIUM CARBONATE 300 MILLIGRAM(S): 300 TABLET, EXTENDED RELEASE ORAL at 09:20

## 2018-09-27 PROCEDURE — 99231 SBSQ HOSP IP/OBS SF/LOW 25: CPT

## 2018-09-27 RX ADMIN — Medication 50 MILLIGRAM(S): at 10:25

## 2018-09-27 RX ADMIN — PANTOPRAZOLE SODIUM 40 MILLIGRAM(S): 20 TABLET, DELAYED RELEASE ORAL at 10:25

## 2018-09-27 RX ADMIN — Medication 5 MILLIGRAM(S): at 10:25

## 2018-09-27 RX ADMIN — Medication 5 MILLIGRAM(S): at 19:47

## 2018-09-27 RX ADMIN — LITHIUM CARBONATE 600 MILLIGRAM(S): 300 TABLET, EXTENDED RELEASE ORAL at 19:48

## 2018-09-27 RX ADMIN — LITHIUM CARBONATE 300 MILLIGRAM(S): 300 TABLET, EXTENDED RELEASE ORAL at 10:25

## 2018-09-27 RX ADMIN — CLOZAPINE 350 MILLIGRAM(S): 150 TABLET, ORALLY DISINTEGRATING ORAL at 18:52

## 2018-09-27 RX ADMIN — DESMOPRESSIN ACETATE 0.2 MILLIGRAM(S): 0.1 TABLET ORAL at 19:47

## 2018-09-27 RX ADMIN — GABAPENTIN 300 MILLIGRAM(S): 400 CAPSULE ORAL at 10:25

## 2018-09-27 RX ADMIN — GABAPENTIN 300 MILLIGRAM(S): 400 CAPSULE ORAL at 19:47

## 2018-09-28 PROCEDURE — 99231 SBSQ HOSP IP/OBS SF/LOW 25: CPT

## 2018-09-28 RX ADMIN — GABAPENTIN 300 MILLIGRAM(S): 400 CAPSULE ORAL at 21:51

## 2018-09-28 RX ADMIN — Medication 5 MILLIGRAM(S): at 21:51

## 2018-09-28 RX ADMIN — Medication 5 MILLIGRAM(S): at 11:32

## 2018-09-28 RX ADMIN — MAGNESIUM OXIDE 400 MG ORAL TABLET 400 MILLIGRAM(S): 241.3 TABLET ORAL at 11:33

## 2018-09-28 RX ADMIN — CLOZAPINE 350 MILLIGRAM(S): 150 TABLET, ORALLY DISINTEGRATING ORAL at 19:05

## 2018-09-28 RX ADMIN — LACTULOSE 20 GRAM(S): 10 SOLUTION ORAL at 11:31

## 2018-09-28 RX ADMIN — LITHIUM CARBONATE 300 MILLIGRAM(S): 300 TABLET, EXTENDED RELEASE ORAL at 11:33

## 2018-09-28 RX ADMIN — LITHIUM CARBONATE 600 MILLIGRAM(S): 300 TABLET, EXTENDED RELEASE ORAL at 21:51

## 2018-09-28 RX ADMIN — LACTULOSE 20 GRAM(S): 10 SOLUTION ORAL at 21:51

## 2018-09-28 RX ADMIN — DESMOPRESSIN ACETATE 0.2 MILLIGRAM(S): 0.1 TABLET ORAL at 21:51

## 2018-09-28 RX ADMIN — GABAPENTIN 300 MILLIGRAM(S): 400 CAPSULE ORAL at 11:32

## 2018-09-28 RX ADMIN — PANTOPRAZOLE SODIUM 40 MILLIGRAM(S): 20 TABLET, DELAYED RELEASE ORAL at 11:33

## 2018-09-29 RX ORDER — LANOLIN ALCOHOL/MO/W.PET/CERES
3 CREAM (GRAM) TOPICAL AT BEDTIME
Qty: 0 | Refills: 0 | Status: DISCONTINUED | OUTPATIENT
Start: 2018-09-29 | End: 2018-10-11

## 2018-09-29 RX ADMIN — LITHIUM CARBONATE 300 MILLIGRAM(S): 300 TABLET, EXTENDED RELEASE ORAL at 10:19

## 2018-09-29 RX ADMIN — Medication 50 MILLIGRAM(S): at 10:19

## 2018-09-29 RX ADMIN — DESMOPRESSIN ACETATE 0.2 MILLIGRAM(S): 0.1 TABLET ORAL at 20:56

## 2018-09-29 RX ADMIN — Medication 5 MILLIGRAM(S): at 10:19

## 2018-09-29 RX ADMIN — LITHIUM CARBONATE 600 MILLIGRAM(S): 300 TABLET, EXTENDED RELEASE ORAL at 20:56

## 2018-09-29 RX ADMIN — LACTULOSE 20 GRAM(S): 10 SOLUTION ORAL at 10:19

## 2018-09-29 RX ADMIN — CLOZAPINE 350 MILLIGRAM(S): 150 TABLET, ORALLY DISINTEGRATING ORAL at 17:51

## 2018-09-29 RX ADMIN — Medication 5 MILLIGRAM(S): at 20:56

## 2018-09-29 RX ADMIN — GABAPENTIN 300 MILLIGRAM(S): 400 CAPSULE ORAL at 10:19

## 2018-09-29 RX ADMIN — GABAPENTIN 300 MILLIGRAM(S): 400 CAPSULE ORAL at 20:56

## 2018-09-29 RX ADMIN — LACTULOSE 20 GRAM(S): 10 SOLUTION ORAL at 20:56

## 2018-09-29 RX ADMIN — PANTOPRAZOLE SODIUM 40 MILLIGRAM(S): 20 TABLET, DELAYED RELEASE ORAL at 10:19

## 2018-09-29 RX ADMIN — HALOPERIDOL DECANOATE 200 MILLIGRAM(S): 100 INJECTION INTRAMUSCULAR at 10:19

## 2018-09-30 RX ADMIN — DESMOPRESSIN ACETATE 0.2 MILLIGRAM(S): 0.1 TABLET ORAL at 20:48

## 2018-09-30 RX ADMIN — LITHIUM CARBONATE 600 MILLIGRAM(S): 300 TABLET, EXTENDED RELEASE ORAL at 20:48

## 2018-09-30 RX ADMIN — GABAPENTIN 300 MILLIGRAM(S): 400 CAPSULE ORAL at 08:48

## 2018-09-30 RX ADMIN — Medication 5 MILLIGRAM(S): at 20:48

## 2018-09-30 RX ADMIN — CLOZAPINE 350 MILLIGRAM(S): 150 TABLET, ORALLY DISINTEGRATING ORAL at 18:18

## 2018-09-30 RX ADMIN — Medication 5 MILLIGRAM(S): at 08:47

## 2018-09-30 RX ADMIN — LITHIUM CARBONATE 300 MILLIGRAM(S): 300 TABLET, EXTENDED RELEASE ORAL at 08:48

## 2018-09-30 RX ADMIN — PANTOPRAZOLE SODIUM 40 MILLIGRAM(S): 20 TABLET, DELAYED RELEASE ORAL at 08:48

## 2018-09-30 RX ADMIN — Medication 50 MILLIGRAM(S): at 08:48

## 2018-09-30 RX ADMIN — GABAPENTIN 300 MILLIGRAM(S): 400 CAPSULE ORAL at 20:48

## 2018-09-30 RX ADMIN — Medication 3 MILLIGRAM(S): at 20:48

## 2018-09-30 RX ADMIN — MAGNESIUM OXIDE 400 MG ORAL TABLET 400 MILLIGRAM(S): 241.3 TABLET ORAL at 08:48

## 2018-10-01 PROCEDURE — 99231 SBSQ HOSP IP/OBS SF/LOW 25: CPT

## 2018-10-01 RX ADMIN — Medication 2 MILLIGRAM(S): at 20:53

## 2018-10-01 RX ADMIN — Medication 5 MILLIGRAM(S): at 20:44

## 2018-10-01 RX ADMIN — DESMOPRESSIN ACETATE 0.2 MILLIGRAM(S): 0.1 TABLET ORAL at 20:44

## 2018-10-01 RX ADMIN — Medication 3 MILLIGRAM(S): at 20:53

## 2018-10-01 RX ADMIN — GABAPENTIN 300 MILLIGRAM(S): 400 CAPSULE ORAL at 08:58

## 2018-10-01 RX ADMIN — Medication 650 MILLIGRAM(S): at 18:50

## 2018-10-01 RX ADMIN — GABAPENTIN 300 MILLIGRAM(S): 400 CAPSULE ORAL at 20:44

## 2018-10-01 RX ADMIN — PANTOPRAZOLE SODIUM 40 MILLIGRAM(S): 20 TABLET, DELAYED RELEASE ORAL at 07:26

## 2018-10-01 RX ADMIN — LACTULOSE 20 GRAM(S): 10 SOLUTION ORAL at 08:58

## 2018-10-01 RX ADMIN — Medication 50 MILLIGRAM(S): at 08:58

## 2018-10-01 RX ADMIN — LITHIUM CARBONATE 600 MILLIGRAM(S): 300 TABLET, EXTENDED RELEASE ORAL at 20:53

## 2018-10-01 RX ADMIN — Medication 5 MILLIGRAM(S): at 08:58

## 2018-10-01 RX ADMIN — LITHIUM CARBONATE 300 MILLIGRAM(S): 300 TABLET, EXTENDED RELEASE ORAL at 08:58

## 2018-10-02 PROCEDURE — 99231 SBSQ HOSP IP/OBS SF/LOW 25: CPT

## 2018-10-02 RX ORDER — ACETAMINOPHEN 500 MG
650 TABLET ORAL ONCE
Qty: 0 | Refills: 0 | Status: COMPLETED | OUTPATIENT
Start: 2018-10-02 | End: 2018-10-02

## 2018-10-02 RX ADMIN — PANTOPRAZOLE SODIUM 40 MILLIGRAM(S): 20 TABLET, DELAYED RELEASE ORAL at 08:20

## 2018-10-02 RX ADMIN — DESMOPRESSIN ACETATE 0.2 MILLIGRAM(S): 0.1 TABLET ORAL at 20:29

## 2018-10-02 RX ADMIN — Medication 650 MILLIGRAM(S): at 21:11

## 2018-10-02 RX ADMIN — CLOZAPINE 350 MILLIGRAM(S): 150 TABLET, ORALLY DISINTEGRATING ORAL at 17:53

## 2018-10-02 RX ADMIN — Medication 5 MILLIGRAM(S): at 08:19

## 2018-10-02 RX ADMIN — Medication 3 MILLIGRAM(S): at 20:29

## 2018-10-02 RX ADMIN — GABAPENTIN 300 MILLIGRAM(S): 400 CAPSULE ORAL at 08:19

## 2018-10-02 RX ADMIN — GABAPENTIN 300 MILLIGRAM(S): 400 CAPSULE ORAL at 20:29

## 2018-10-02 RX ADMIN — MAGNESIUM OXIDE 400 MG ORAL TABLET 400 MILLIGRAM(S): 241.3 TABLET ORAL at 08:20

## 2018-10-02 RX ADMIN — Medication 50 MILLIGRAM(S): at 08:20

## 2018-10-02 RX ADMIN — LACTULOSE 20 GRAM(S): 10 SOLUTION ORAL at 20:29

## 2018-10-02 RX ADMIN — Medication 650 MILLIGRAM(S): at 23:45

## 2018-10-02 RX ADMIN — Medication 5 MILLIGRAM(S): at 20:29

## 2018-10-02 RX ADMIN — LITHIUM CARBONATE 600 MILLIGRAM(S): 300 TABLET, EXTENDED RELEASE ORAL at 20:29

## 2018-10-02 RX ADMIN — LITHIUM CARBONATE 300 MILLIGRAM(S): 300 TABLET, EXTENDED RELEASE ORAL at 08:19

## 2018-10-03 PROCEDURE — 99232 SBSQ HOSP IP/OBS MODERATE 35: CPT

## 2018-10-03 RX ADMIN — LITHIUM CARBONATE 600 MILLIGRAM(S): 300 TABLET, EXTENDED RELEASE ORAL at 20:46

## 2018-10-03 RX ADMIN — GABAPENTIN 300 MILLIGRAM(S): 400 CAPSULE ORAL at 09:29

## 2018-10-03 RX ADMIN — Medication 50 MILLIGRAM(S): at 09:29

## 2018-10-03 RX ADMIN — Medication 5 MILLIGRAM(S): at 20:45

## 2018-10-03 RX ADMIN — Medication 5 MILLIGRAM(S): at 09:29

## 2018-10-03 RX ADMIN — CLOZAPINE 350 MILLIGRAM(S): 150 TABLET, ORALLY DISINTEGRATING ORAL at 17:36

## 2018-10-03 RX ADMIN — LACTULOSE 20 GRAM(S): 10 SOLUTION ORAL at 20:46

## 2018-10-03 RX ADMIN — LITHIUM CARBONATE 300 MILLIGRAM(S): 300 TABLET, EXTENDED RELEASE ORAL at 09:29

## 2018-10-03 RX ADMIN — GABAPENTIN 300 MILLIGRAM(S): 400 CAPSULE ORAL at 20:46

## 2018-10-03 RX ADMIN — Medication 3 MILLIGRAM(S): at 20:46

## 2018-10-03 RX ADMIN — DESMOPRESSIN ACETATE 0.2 MILLIGRAM(S): 0.1 TABLET ORAL at 20:46

## 2018-10-04 PROCEDURE — 99231 SBSQ HOSP IP/OBS SF/LOW 25: CPT

## 2018-10-04 RX ADMIN — MAGNESIUM OXIDE 400 MG ORAL TABLET 400 MILLIGRAM(S): 241.3 TABLET ORAL at 08:32

## 2018-10-04 RX ADMIN — Medication 3 MILLIGRAM(S): at 21:31

## 2018-10-04 RX ADMIN — GABAPENTIN 300 MILLIGRAM(S): 400 CAPSULE ORAL at 08:32

## 2018-10-04 RX ADMIN — GABAPENTIN 300 MILLIGRAM(S): 400 CAPSULE ORAL at 21:31

## 2018-10-04 RX ADMIN — Medication 50 MILLIGRAM(S): at 08:32

## 2018-10-04 RX ADMIN — Medication 5 MILLIGRAM(S): at 08:31

## 2018-10-04 RX ADMIN — LACTULOSE 20 GRAM(S): 10 SOLUTION ORAL at 21:31

## 2018-10-04 RX ADMIN — DESMOPRESSIN ACETATE 0.2 MILLIGRAM(S): 0.1 TABLET ORAL at 21:31

## 2018-10-04 RX ADMIN — Medication 5 MILLIGRAM(S): at 21:31

## 2018-10-04 RX ADMIN — Medication 650 MILLIGRAM(S): at 10:37

## 2018-10-04 RX ADMIN — LITHIUM CARBONATE 600 MILLIGRAM(S): 300 TABLET, EXTENDED RELEASE ORAL at 21:31

## 2018-10-04 RX ADMIN — Medication 2 MILLIGRAM(S): at 09:15

## 2018-10-04 RX ADMIN — LACTULOSE 20 GRAM(S): 10 SOLUTION ORAL at 08:32

## 2018-10-04 RX ADMIN — LITHIUM CARBONATE 300 MILLIGRAM(S): 300 TABLET, EXTENDED RELEASE ORAL at 08:32

## 2018-10-04 RX ADMIN — CLOZAPINE 350 MILLIGRAM(S): 150 TABLET, ORALLY DISINTEGRATING ORAL at 17:35

## 2018-10-04 RX ADMIN — PANTOPRAZOLE SODIUM 40 MILLIGRAM(S): 20 TABLET, DELAYED RELEASE ORAL at 08:34

## 2018-10-05 PROCEDURE — 99232 SBSQ HOSP IP/OBS MODERATE 35: CPT

## 2018-10-05 RX ADMIN — LACTULOSE 20 GRAM(S): 10 SOLUTION ORAL at 08:58

## 2018-10-05 RX ADMIN — GABAPENTIN 300 MILLIGRAM(S): 400 CAPSULE ORAL at 08:58

## 2018-10-05 RX ADMIN — DESMOPRESSIN ACETATE 0.2 MILLIGRAM(S): 0.1 TABLET ORAL at 20:59

## 2018-10-05 RX ADMIN — Medication 50 MILLIGRAM(S): at 08:58

## 2018-10-05 RX ADMIN — PANTOPRAZOLE SODIUM 40 MILLIGRAM(S): 20 TABLET, DELAYED RELEASE ORAL at 08:58

## 2018-10-05 RX ADMIN — GABAPENTIN 300 MILLIGRAM(S): 400 CAPSULE ORAL at 20:59

## 2018-10-05 RX ADMIN — LITHIUM CARBONATE 600 MILLIGRAM(S): 300 TABLET, EXTENDED RELEASE ORAL at 20:59

## 2018-10-05 RX ADMIN — Medication 3 MILLIGRAM(S): at 20:59

## 2018-10-05 RX ADMIN — Medication 650 MILLIGRAM(S): at 17:34

## 2018-10-05 RX ADMIN — CLOZAPINE 350 MILLIGRAM(S): 150 TABLET, ORALLY DISINTEGRATING ORAL at 17:35

## 2018-10-05 RX ADMIN — Medication 650 MILLIGRAM(S): at 12:47

## 2018-10-05 RX ADMIN — LITHIUM CARBONATE 300 MILLIGRAM(S): 300 TABLET, EXTENDED RELEASE ORAL at 08:58

## 2018-10-05 RX ADMIN — LACTULOSE 20 GRAM(S): 10 SOLUTION ORAL at 20:59

## 2018-10-05 RX ADMIN — Medication 5 MILLIGRAM(S): at 20:59

## 2018-10-05 RX ADMIN — Medication 5 MILLIGRAM(S): at 08:58

## 2018-10-06 RX ADMIN — DESMOPRESSIN ACETATE 0.2 MILLIGRAM(S): 0.1 TABLET ORAL at 21:46

## 2018-10-06 RX ADMIN — FLUPHENAZINE HYDROCHLORIDE 5 MILLIGRAM(S): 1 TABLET, FILM COATED ORAL at 10:36

## 2018-10-06 RX ADMIN — GABAPENTIN 300 MILLIGRAM(S): 400 CAPSULE ORAL at 09:32

## 2018-10-06 RX ADMIN — Medication 650 MILLIGRAM(S): at 18:56

## 2018-10-06 RX ADMIN — LACTULOSE 20 GRAM(S): 10 SOLUTION ORAL at 21:46

## 2018-10-06 RX ADMIN — Medication 5 MILLIGRAM(S): at 21:46

## 2018-10-06 RX ADMIN — LITHIUM CARBONATE 300 MILLIGRAM(S): 300 TABLET, EXTENDED RELEASE ORAL at 09:32

## 2018-10-06 RX ADMIN — PANTOPRAZOLE SODIUM 40 MILLIGRAM(S): 20 TABLET, DELAYED RELEASE ORAL at 09:32

## 2018-10-06 RX ADMIN — Medication 5 MILLIGRAM(S): at 09:32

## 2018-10-06 RX ADMIN — MAGNESIUM OXIDE 400 MG ORAL TABLET 400 MILLIGRAM(S): 241.3 TABLET ORAL at 09:32

## 2018-10-06 RX ADMIN — LITHIUM CARBONATE 600 MILLIGRAM(S): 300 TABLET, EXTENDED RELEASE ORAL at 21:46

## 2018-10-06 RX ADMIN — CLOZAPINE 350 MILLIGRAM(S): 150 TABLET, ORALLY DISINTEGRATING ORAL at 18:56

## 2018-10-06 RX ADMIN — Medication 50 MILLIGRAM(S): at 09:32

## 2018-10-06 RX ADMIN — FLUPHENAZINE HYDROCHLORIDE 5 MILLIGRAM(S): 1 TABLET, FILM COATED ORAL at 02:00

## 2018-10-06 RX ADMIN — Medication 3 MILLIGRAM(S): at 21:46

## 2018-10-06 RX ADMIN — GABAPENTIN 300 MILLIGRAM(S): 400 CAPSULE ORAL at 21:46

## 2018-10-07 RX ADMIN — CLOZAPINE 350 MILLIGRAM(S): 150 TABLET, ORALLY DISINTEGRATING ORAL at 18:39

## 2018-10-07 RX ADMIN — GABAPENTIN 300 MILLIGRAM(S): 400 CAPSULE ORAL at 10:58

## 2018-10-07 RX ADMIN — Medication 50 MILLIGRAM(S): at 10:58

## 2018-10-07 RX ADMIN — LACTULOSE 20 GRAM(S): 10 SOLUTION ORAL at 20:38

## 2018-10-07 RX ADMIN — GABAPENTIN 300 MILLIGRAM(S): 400 CAPSULE ORAL at 20:38

## 2018-10-07 RX ADMIN — Medication 650 MILLIGRAM(S): at 18:39

## 2018-10-07 RX ADMIN — DESMOPRESSIN ACETATE 0.2 MILLIGRAM(S): 0.1 TABLET ORAL at 20:38

## 2018-10-07 RX ADMIN — PANTOPRAZOLE SODIUM 40 MILLIGRAM(S): 20 TABLET, DELAYED RELEASE ORAL at 10:58

## 2018-10-07 RX ADMIN — LITHIUM CARBONATE 300 MILLIGRAM(S): 300 TABLET, EXTENDED RELEASE ORAL at 10:58

## 2018-10-07 RX ADMIN — Medication 5 MILLIGRAM(S): at 20:38

## 2018-10-07 RX ADMIN — Medication 3 MILLIGRAM(S): at 20:38

## 2018-10-07 RX ADMIN — Medication 5 MILLIGRAM(S): at 10:58

## 2018-10-07 RX ADMIN — LACTULOSE 20 GRAM(S): 10 SOLUTION ORAL at 10:58

## 2018-10-07 RX ADMIN — LITHIUM CARBONATE 600 MILLIGRAM(S): 300 TABLET, EXTENDED RELEASE ORAL at 20:38

## 2018-10-08 PROCEDURE — 99232 SBSQ HOSP IP/OBS MODERATE 35: CPT

## 2018-10-08 RX ADMIN — LACTULOSE 20 GRAM(S): 10 SOLUTION ORAL at 21:26

## 2018-10-08 RX ADMIN — LITHIUM CARBONATE 600 MILLIGRAM(S): 300 TABLET, EXTENDED RELEASE ORAL at 21:26

## 2018-10-08 RX ADMIN — CLOZAPINE 350 MILLIGRAM(S): 150 TABLET, ORALLY DISINTEGRATING ORAL at 18:04

## 2018-10-08 RX ADMIN — Medication 650 MILLIGRAM(S): at 18:49

## 2018-10-08 RX ADMIN — Medication 3 MILLIGRAM(S): at 21:26

## 2018-10-08 RX ADMIN — LITHIUM CARBONATE 300 MILLIGRAM(S): 300 TABLET, EXTENDED RELEASE ORAL at 08:42

## 2018-10-08 RX ADMIN — Medication 5 MILLIGRAM(S): at 21:26

## 2018-10-08 RX ADMIN — DESMOPRESSIN ACETATE 0.2 MILLIGRAM(S): 0.1 TABLET ORAL at 21:26

## 2018-10-08 RX ADMIN — MAGNESIUM OXIDE 400 MG ORAL TABLET 400 MILLIGRAM(S): 241.3 TABLET ORAL at 08:42

## 2018-10-08 RX ADMIN — GABAPENTIN 300 MILLIGRAM(S): 400 CAPSULE ORAL at 08:42

## 2018-10-08 RX ADMIN — GABAPENTIN 300 MILLIGRAM(S): 400 CAPSULE ORAL at 21:26

## 2018-10-08 RX ADMIN — Medication 5 MILLIGRAM(S): at 08:42

## 2018-10-08 RX ADMIN — Medication 50 MILLIGRAM(S): at 08:42

## 2018-10-08 RX ADMIN — PANTOPRAZOLE SODIUM 40 MILLIGRAM(S): 20 TABLET, DELAYED RELEASE ORAL at 08:42

## 2018-10-09 PROCEDURE — 99231 SBSQ HOSP IP/OBS SF/LOW 25: CPT

## 2018-10-09 RX ADMIN — Medication 3 MILLIGRAM(S): at 21:31

## 2018-10-09 RX ADMIN — GABAPENTIN 300 MILLIGRAM(S): 400 CAPSULE ORAL at 21:31

## 2018-10-09 RX ADMIN — CLOZAPINE 350 MILLIGRAM(S): 150 TABLET, ORALLY DISINTEGRATING ORAL at 17:28

## 2018-10-09 RX ADMIN — GABAPENTIN 300 MILLIGRAM(S): 400 CAPSULE ORAL at 08:58

## 2018-10-09 RX ADMIN — Medication 5 MILLIGRAM(S): at 08:58

## 2018-10-09 RX ADMIN — LITHIUM CARBONATE 300 MILLIGRAM(S): 300 TABLET, EXTENDED RELEASE ORAL at 08:58

## 2018-10-09 RX ADMIN — Medication 650 MILLIGRAM(S): at 17:55

## 2018-10-09 RX ADMIN — LACTULOSE 20 GRAM(S): 10 SOLUTION ORAL at 21:31

## 2018-10-09 RX ADMIN — LITHIUM CARBONATE 600 MILLIGRAM(S): 300 TABLET, EXTENDED RELEASE ORAL at 21:31

## 2018-10-09 RX ADMIN — Medication 50 MILLIGRAM(S): at 08:59

## 2018-10-09 RX ADMIN — DESMOPRESSIN ACETATE 0.2 MILLIGRAM(S): 0.1 TABLET ORAL at 21:31

## 2018-10-09 RX ADMIN — PANTOPRAZOLE SODIUM 40 MILLIGRAM(S): 20 TABLET, DELAYED RELEASE ORAL at 08:59

## 2018-10-09 RX ADMIN — Medication 5 MILLIGRAM(S): at 21:31

## 2018-10-10 PROCEDURE — 99231 SBSQ HOSP IP/OBS SF/LOW 25: CPT

## 2018-10-10 RX ADMIN — GABAPENTIN 300 MILLIGRAM(S): 400 CAPSULE ORAL at 08:46

## 2018-10-10 RX ADMIN — LACTULOSE 20 GRAM(S): 10 SOLUTION ORAL at 21:12

## 2018-10-10 RX ADMIN — DESMOPRESSIN ACETATE 0.2 MILLIGRAM(S): 0.1 TABLET ORAL at 21:12

## 2018-10-10 RX ADMIN — Medication 3 MILLIGRAM(S): at 21:12

## 2018-10-10 RX ADMIN — GABAPENTIN 300 MILLIGRAM(S): 400 CAPSULE ORAL at 21:12

## 2018-10-10 RX ADMIN — LITHIUM CARBONATE 300 MILLIGRAM(S): 300 TABLET, EXTENDED RELEASE ORAL at 08:46

## 2018-10-10 RX ADMIN — Medication 5 MILLIGRAM(S): at 08:46

## 2018-10-10 RX ADMIN — PANTOPRAZOLE SODIUM 40 MILLIGRAM(S): 20 TABLET, DELAYED RELEASE ORAL at 08:46

## 2018-10-10 RX ADMIN — CLOZAPINE 350 MILLIGRAM(S): 150 TABLET, ORALLY DISINTEGRATING ORAL at 18:06

## 2018-10-10 RX ADMIN — Medication 5 MILLIGRAM(S): at 21:12

## 2018-10-10 RX ADMIN — LITHIUM CARBONATE 600 MILLIGRAM(S): 300 TABLET, EXTENDED RELEASE ORAL at 21:12

## 2018-10-10 RX ADMIN — Medication 50 MILLIGRAM(S): at 08:46

## 2018-10-10 RX ADMIN — MAGNESIUM OXIDE 400 MG ORAL TABLET 400 MILLIGRAM(S): 241.3 TABLET ORAL at 08:46

## 2018-10-10 RX ADMIN — LACTULOSE 20 GRAM(S): 10 SOLUTION ORAL at 08:46

## 2018-10-11 VITALS — TEMPERATURE: 98 F

## 2018-10-11 LAB
BASOPHILS # BLD AUTO: 0.08 K/UL — SIGNIFICANT CHANGE UP (ref 0–0.2)
BASOPHILS NFR BLD AUTO: 1.2 % — SIGNIFICANT CHANGE UP (ref 0–2)
BUN SERPL-MCNC: 18 MG/DL — SIGNIFICANT CHANGE UP (ref 7–23)
CALCIUM SERPL-MCNC: 9 MG/DL — SIGNIFICANT CHANGE UP (ref 8.4–10.5)
CHLORIDE SERPL-SCNC: 103 MMOL/L — SIGNIFICANT CHANGE UP (ref 98–107)
CHOLEST SERPL-MCNC: 132 MG/DL — SIGNIFICANT CHANGE UP (ref 120–199)
CO2 SERPL-SCNC: 26 MMOL/L — SIGNIFICANT CHANGE UP (ref 22–31)
CREAT SERPL-MCNC: 0.75 MG/DL — SIGNIFICANT CHANGE UP (ref 0.5–1.3)
EOSINOPHIL # BLD AUTO: 0.43 K/UL — SIGNIFICANT CHANGE UP (ref 0–0.5)
EOSINOPHIL NFR BLD AUTO: 6.7 % — HIGH (ref 0–6)
GLUCOSE SERPL-MCNC: 96 MG/DL — SIGNIFICANT CHANGE UP (ref 70–99)
HBA1C BLD-MCNC: 5 % — SIGNIFICANT CHANGE UP (ref 4–5.6)
HCT VFR BLD CALC: 45.4 % — SIGNIFICANT CHANGE UP (ref 39–50)
HDLC SERPL-MCNC: 36 MG/DL — SIGNIFICANT CHANGE UP (ref 35–55)
HGB BLD-MCNC: 14.5 G/DL — SIGNIFICANT CHANGE UP (ref 13–17)
IMM GRANULOCYTES # BLD AUTO: 0.04 # — SIGNIFICANT CHANGE UP
IMM GRANULOCYTES NFR BLD AUTO: 0.6 % — SIGNIFICANT CHANGE UP (ref 0–1.5)
LIPID PNL WITH DIRECT LDL SERPL: 92 MG/DL — SIGNIFICANT CHANGE UP
LYMPHOCYTES # BLD AUTO: 1.27 K/UL — SIGNIFICANT CHANGE UP (ref 1–3.3)
LYMPHOCYTES # BLD AUTO: 19.7 % — SIGNIFICANT CHANGE UP (ref 13–44)
MCHC RBC-ENTMCNC: 28.5 PG — SIGNIFICANT CHANGE UP (ref 27–34)
MCHC RBC-ENTMCNC: 31.9 % — LOW (ref 32–36)
MCV RBC AUTO: 89.2 FL — SIGNIFICANT CHANGE UP (ref 80–100)
MONOCYTES # BLD AUTO: 0.42 K/UL — SIGNIFICANT CHANGE UP (ref 0–0.9)
MONOCYTES NFR BLD AUTO: 6.5 % — SIGNIFICANT CHANGE UP (ref 2–14)
NEUTROPHILS # BLD AUTO: 4.21 K/UL — SIGNIFICANT CHANGE UP (ref 1.8–7.4)
NEUTROPHILS NFR BLD AUTO: 65.3 % — SIGNIFICANT CHANGE UP (ref 43–77)
NRBC # FLD: 0 — SIGNIFICANT CHANGE UP
PLATELET # BLD AUTO: 327 K/UL — SIGNIFICANT CHANGE UP (ref 150–400)
PMV BLD: 9.1 FL — SIGNIFICANT CHANGE UP (ref 7–13)
POTASSIUM SERPL-MCNC: 4.4 MMOL/L — SIGNIFICANT CHANGE UP (ref 3.5–5.3)
POTASSIUM SERPL-SCNC: 4.4 MMOL/L — SIGNIFICANT CHANGE UP (ref 3.5–5.3)
RBC # BLD: 5.09 M/UL — SIGNIFICANT CHANGE UP (ref 4.2–5.8)
RBC # FLD: 14.1 % — SIGNIFICANT CHANGE UP (ref 10.3–14.5)
SODIUM SERPL-SCNC: 140 MMOL/L — SIGNIFICANT CHANGE UP (ref 135–145)
TRIGL SERPL-MCNC: 95 MG/DL — SIGNIFICANT CHANGE UP (ref 10–149)
WBC # BLD: 6.45 K/UL — SIGNIFICANT CHANGE UP (ref 3.8–10.5)
WBC # FLD AUTO: 6.45 K/UL — SIGNIFICANT CHANGE UP (ref 3.8–10.5)

## 2018-10-11 PROCEDURE — 99238 HOSP IP/OBS DSCHRG MGMT 30/<: CPT

## 2018-10-11 RX ORDER — HALOPERIDOL DECANOATE 100 MG/ML
2 INJECTION INTRAMUSCULAR
Qty: 0 | Refills: 0 | COMMUNITY
Start: 2018-10-11

## 2018-10-11 RX ORDER — GABAPENTIN 400 MG/1
1 CAPSULE ORAL
Qty: 60 | Refills: 0 | OUTPATIENT
Start: 2018-10-11 | End: 2018-11-09

## 2018-10-11 RX ORDER — CLOZAPINE 150 MG/1
1 TABLET, ORALLY DISINTEGRATING ORAL
Qty: 0 | Refills: 0 | COMMUNITY

## 2018-10-11 RX ORDER — CLOZAPINE 150 MG/1
5 TABLET, ORALLY DISINTEGRATING ORAL
Qty: 0 | Refills: 0 | COMMUNITY

## 2018-10-11 RX ORDER — GABAPENTIN 400 MG/1
1 CAPSULE ORAL
Qty: 0 | Refills: 0 | COMMUNITY

## 2018-10-11 RX ORDER — DESMOPRESSIN ACETATE 0.1 MG/1
1 TABLET ORAL
Qty: 0 | Refills: 0 | COMMUNITY

## 2018-10-11 RX ORDER — DESMOPRESSIN ACETATE 0.1 MG/1
1 TABLET ORAL
Qty: 30 | Refills: 0
Start: 2018-10-11 | End: 2018-11-09

## 2018-10-11 RX ORDER — MAGNESIUM OXIDE 400 MG ORAL TABLET 241.3 MG
1 TABLET ORAL
Qty: 30 | Refills: 0 | OUTPATIENT
Start: 2018-10-11 | End: 2018-11-09

## 2018-10-11 RX ORDER — METOPROLOL TARTRATE 50 MG
1 TABLET ORAL
Qty: 30 | Refills: 0
Start: 2018-10-11 | End: 2018-11-09

## 2018-10-11 RX ORDER — NICOTINE POLACRILEX 2 MG
2 GUM BUCCAL
Qty: 0 | Refills: 0 | COMMUNITY

## 2018-10-11 RX ORDER — CLOZAPINE 150 MG/1
7 TABLET, ORALLY DISINTEGRATING ORAL
Qty: 105 | Refills: 0 | OUTPATIENT
Start: 2018-10-11 | End: 2018-10-25

## 2018-10-11 RX ORDER — LITHIUM CARBONATE 300 MG/1
1 TABLET, EXTENDED RELEASE ORAL
Qty: 0 | Refills: 0 | COMMUNITY

## 2018-10-11 RX ORDER — METOPROLOL TARTRATE 50 MG
1 TABLET ORAL
Qty: 0 | Refills: 0 | COMMUNITY

## 2018-10-11 RX ORDER — MAGNESIUM OXIDE 400 MG ORAL TABLET 241.3 MG
1 TABLET ORAL
Qty: 0 | Refills: 0 | COMMUNITY

## 2018-10-11 RX ORDER — LITHIUM CARBONATE 300 MG/1
1 TABLET, EXTENDED RELEASE ORAL
Qty: 30 | Refills: 0 | OUTPATIENT
Start: 2018-10-11 | End: 2018-11-09

## 2018-10-11 RX ORDER — LACTULOSE 10 G/15ML
30 SOLUTION ORAL
Qty: 0 | Refills: 0 | COMMUNITY

## 2018-10-11 RX ADMIN — GABAPENTIN 300 MILLIGRAM(S): 400 CAPSULE ORAL at 09:01

## 2018-10-11 RX ADMIN — LITHIUM CARBONATE 300 MILLIGRAM(S): 300 TABLET, EXTENDED RELEASE ORAL at 09:01

## 2018-10-11 RX ADMIN — PANTOPRAZOLE SODIUM 40 MILLIGRAM(S): 20 TABLET, DELAYED RELEASE ORAL at 09:02

## 2018-10-11 RX ADMIN — Medication 50 MILLIGRAM(S): at 09:02

## 2018-10-11 RX ADMIN — Medication 5 MILLIGRAM(S): at 09:01

## 2018-10-15 LAB — CLOZAPINE SERPL-MCNC: SIGNIFICANT CHANGE UP

## 2018-10-24 ENCOUNTER — EMERGENCY (EMERGENCY)
Facility: HOSPITAL | Age: 53
LOS: 1 days | Discharge: HOME | End: 2018-10-24
Admitting: EMERGENCY MEDICINE
Payer: MEDICAID

## 2018-10-24 VITALS
OXYGEN SATURATION: 99 % | HEART RATE: 87 BPM | RESPIRATION RATE: 18 BRPM | TEMPERATURE: 98 F | DIASTOLIC BLOOD PRESSURE: 81 MMHG | SYSTOLIC BLOOD PRESSURE: 140 MMHG

## 2018-10-24 DIAGNOSIS — F25.9 SCHIZOAFFECTIVE DISORDER, UNSPECIFIED: ICD-10-CM

## 2018-10-24 DIAGNOSIS — R69 ILLNESS, UNSPECIFIED: ICD-10-CM

## 2018-10-24 PROCEDURE — 90792 PSYCH DIAG EVAL W/MED SRVCS: CPT

## 2018-10-24 PROCEDURE — 99284 EMERGENCY DEPT VISIT MOD MDM: CPT

## 2018-10-24 RX ORDER — KETOROLAC TROMETHAMINE 30 MG/ML
30 SYRINGE (ML) INJECTION ONCE
Qty: 0 | Refills: 0 | Status: DISCONTINUED | OUTPATIENT
Start: 2018-10-24 | End: 2018-10-24

## 2018-10-24 RX ADMIN — Medication 2 MILLIGRAM(S): at 17:12

## 2018-10-24 RX ADMIN — Medication 30 MILLIGRAM(S): at 17:01

## 2018-10-24 NOTE — ED BEHAVIORAL HEALTH ASSESSMENT NOTE - SUMMARY
Patient is a 53 year old single disabled non-caregiver  male currently residing in [facility]. PPH Schizoaffective Disorder & Cannabis Use Disorder. He has a history of multiple inpatient admissions discharged most recently from [facility] 8/24/18-10/11/18, prior to this was [place] inpatient 11/17/17-8/8/18. He has a history of 1 past suicide attempt at age 26 via OD. He has a history of aggression/violence.  Past history of cannabis/crack/alcohol use; denies recent drug use. No known history of detox/rehab. No current legal issues.  PMH hypothyroidism, HLD, HTN, enuresis, s/p perianal fistulectomy 7/17 & BPH. BIB EMS called by his outpatient psychiatrist with CAH telling him to hurt himself. Patient is known to writer from previous ED presentation. On interview, pt is A+O x 3, he is calm and cooperative, makes good eye contact, mood subjectively "good", objectively is euthymic, affect restricted. Thought processes are concrete but grossly linear. Pt denies paranoid delusions. Endorses intermittent CAH telling him to "end it all" but denies specific plan or intent. Denies VH. Reality testing is fairly intact. Patient's mental state is much improved since last assessment in ED on 8/23/18. He is institutionalized given long standing recent inpatient hospitalization and is struggling to make transition to community living, he has a long hx of chronic CAH which increases during times of duress. Patient is a 53 year old single disabled non-caregiver  male currently residing in [facility]. PPH Schizoaffective Disorder & Cannabis Use Disorder. He has a history of multiple inpatient admissions discharged most recently from [facility] 8/24/18-10/11/18, prior to this was [place] inpatient 11/17/17-8/8/18. He has a history of 1 past suicide attempt at age 26 via OD. He has a history of aggression/violence.  Past history of cannabis/crack/alcohol use; denies recent drug use. No known history of detox/rehab. No current legal issues.  PMH hypothyroidism, HLD, HTN, enuresis, s/p perianal fistulectomy 7/17 & BPH. BIB EMS called by his outpatient psychiatrist with CAH telling him to hurt himself. Patient is known to writer from previous ED presentation. On interview, pt is A+O x 3, he is calm and cooperative, makes good eye contact, mood subjectively "good", objectively is euthymic, affect restricted. Thought processes are concrete but grossly linear. Pt denies paranoid delusions. Endorses intermittent CAH telling him to "end it all" but denies specific plan or intent. Denies VH. Reality testing is fairly intact. Patient's mental state is much improved since last assessment in ED on 8/23/18. He is institutionalized given long standing recent inpatient hospitalization and is struggling to make transition to community living, he has a long hx of chronic CAH which increases during times of duress. dx schizoaffective disorder

## 2018-10-24 NOTE — ED BEHAVIORAL HEALTH ASSESSMENT NOTE - OTHER PAST PSYCHIATRIC HISTORY (INCLUDE DETAILS REGARDING ONSET, COURSE OF ILLNESS, INPATIENT/OUTPATIENT TREATMENT)
PPH Schizoaffective Disorder & Cannabis Use Disorder. He has a history of multiple inpatient admissions discharged most recently from [facility] 11/17/17-8/8/18. . Patient was inpatient [place] after [facility] stay in 2015 until 10/25/17 but then was discharged and readmitted after medication non compliance until 8/8/18. He is currently on AOT. PPH Schizoaffective Disorder & Cannabis Use Disorder. He has a history of multiple inpatient admissions discharged most recently from [facility] 8/24/18-10/11/18 and [place] inpatient 11/17/17-8/8/18. . Patient was inpatient [place] after [facility] stay in 2015 until 10/25/17 but then was discharged and readmitted after medication non compliance until 8/8/18. He is currently on AOT.

## 2018-10-24 NOTE — ED ADULT NURSE NOTE - NSIMPLEMENTINTERV_GEN_ALL_ED
Implemented All Universal Safety Interventions:  [place] to call system. Call bell, personal items and telephone within reach. Instruct patient to call for assistance. Room bathroom lighting operational. Non-slip footwear when patient is off stretcher. Physically safe environment: no spills, clutter or unnecessary equipment. Stretcher in lowest position, wheels locked, appropriate side rails in place.

## 2018-10-24 NOTE — ED BEHAVIORAL HEALTH NOTE - BEHAVIORAL HEALTH NOTE
Writer called [facility], [phone], and spoke with Zachary, informing him the patient is being discharged. Writer called [facility], [phone], and left a message for Dr. Betancur, informing him also of the discharge.
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
## 2018-10-24 NOTE — ED PROVIDER NOTE - CHPI ED SYMPTOMS NEG
no disorientation/no change in level of consciousness/no paranoia/no homicidal/no suicidal/no agitation

## 2018-10-24 NOTE — ED ADULT TRIAGE NOTE - BP NONINVASIVE DIASTOLIC (MM HG)
CT abd: 1. Foci of endometrial air of uncertain etiology, possibly from recent   procedure or less likely infection.  2. Cholelithiasis. CT abd: 1. Foci of endometrial air of uncertain etiology, possibly from recent procedure or less likely infection. Cholelithiasis.  MRI foot: Disorganization and disruption of the tarsometatarsal and navicular cuneiform articulations with prominent osseous erosive change and subchondral edema with surrounding periosseous enhancement. Findings are most likely related to Charcot related changes. Superimposed osteomyelitis cannot be entirely excluded as it would have a similar imaging appearance 81

## 2018-10-24 NOTE — ED PROVIDER NOTE - MEDICAL DECISION MAKING DETAILS
52 y/o male BIBEMS for psychiatric evaluation.  Medical evaluation performed.  No clinical evidence of acute intoxication or any acute medical issues/problems requiring immediate interventions.  Plan: 52 y/o male BIBEMS for psychiatric evaluation.  Plan: Psychiatric consult, Ativan/Toradol
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
## 2018-10-24 NOTE — ED BEHAVIORAL HEALTH ASSESSMENT NOTE - CURRENT MEDICATION
Metoprolol Suc 50mg XL QAM, Omeprazole 20mg BID, Lactulose 30 mL BId, Bisacodyl 5mg 2 tabs BID, Levothyroxine 0.1mg/0.75mg tablet QAM, Gabapentin 300mg BID, Magnesium Oxide 500mg 1 tab Q48 hours, Desmopressin 0.2mg QHS Nicotine Gum 2mg Q8 hours, Clozapine 100mg QAM; 250mg QHS, Lithium 300mg QAM; 600mg QHS,

## 2018-10-24 NOTE — ED PROVIDER NOTE - PLAN OF CARE
PT will return to [facility] and continue to f/u with plan of care at facility.

## 2018-10-24 NOTE — ED PROVIDER NOTE - OBJECTIVE STATEMENT
52 y/o male BIBEMS for psychiatric evaluation.  Pt was at his psychiatrist's office and mentioned that he is having auditory hallucinations and the voices are telling him to harm himself.  PT states:  "I do not want to hurt myself, that's just what the voices are telling me and they are annoying".  "The voices are so annoying".  PT denies SI/HI/VH.  PT stated that he was "so scared, it was like 8 - 10  that all came for me".  Pt also reports "always having AH" telling him to hurt himself, but today, they were just annoying and he told his psychiatrist this because he was there for the visit, but does not want to harm himself.  There is no evidence of or obvious physical injuries.  PT denies hitting, punching or kicking any objects.  PT reports pain to his left knee 7/10.  Pt denies SOB, chills, CP, ETOH or drug use.

## 2018-10-24 NOTE — ED BEHAVIORAL HEALTH ASSESSMENT NOTE - HPI (INCLUDE ILLNESS QUALITY, SEVERITY, DURATION, TIMING, CONTEXT, MODIFYING FACTORS, ASSOCIATED SIGNS AND SYMPTOMS)
Patient is a 53 year old single disabled non-caregiver  male currently residing in [facility]. PPH Schizoaffective Disorder & Cannabis Use Disorder. He has a history of multiple inpatient admissions discharged most recently from [facility] 8/24/18-10/11/18, prior to this was [place] inpatient 11/17/17-8/8/18. He has a history of 1 past suicide attempt at age 26 via OD. He has a history of aggression/violence.  Past history of cannabis/crack/alcohol use; denies recent drug use. No known history of detox/rehab. No current legal issues.  PMH hypothyroidism, HLD, HTN, enuresis, s/p perianal fistulectomy 7/17 & BPH. BIB EMS called by his outpatient psychiatrist with CAH telling him to hurt himself. Patient is known to writer from previous ED presentation.     On interview, pt is A+O x 3, he is calm and cooperative, makes good eye contact, mood subjectively "good", objectively is euthymic, affect restricted. Thought processes Patient is a 53 year old single disabled non-caregiver  male currently residing in [facility]. PPH Schizoaffective Disorder & Cannabis Use Disorder. He has a history of multiple inpatient admissions discharged most recently from [facility] 8/24/18-10/11/18, prior to this was [place] inpatient 11/17/17-8/8/18. He has a history of 1 past suicide attempt at age 26 via OD. He has a history of aggression/violence.  Past history of cannabis/crack/alcohol use; denies recent drug use. No known history of detox/rehab. No current legal issues.  PMH hypothyroidism, HLD, HTN, enuresis, s/p perianal fistulectomy 7/17 & BPH. BIB EMS called by his outpatient psychiatrist with CAH telling him to hurt himself. Patient is known to writer from previous ED presentation.     On interview, pt is A+O x 3, he is calm and cooperative, makes good eye contact, mood subjectively "good", objectively is euthymic, affect restricted. Thought processes are concrete but grossly linear. He states that he hears voices telling him to "just end it" intermittently for many years, but he adamantly denies active plan or intent. He denies that the voices tell him to kill himself by a particular method. He states that he has been able to resist the voices. He expresses increased stress with making the transition to community life from many years of inpatient hospitalization. He states that his mood is "pretty good", and he "feels better" but that he feels that he "was discharged too soon".  He denies overt paranoid delusions regarding staff at present. He denies HI/I/P, denies current SI/I/P. He reports that sleep. appetite and energy are normal. He is compliant with meds. He denies access to guns, he is able to safety plan. He is future oriented and expressed a wish to "buy a radio" and "go to the program in [facility]". Identifies parents and family as reasons to live.

## 2018-10-24 NOTE — ED ADULT NURSE NOTE - OBJECTIVE STATEMENT
Received pt in  pt calm & cooperative c/o  pt denies Si/Hi/AVh  presently safety & comfort measures maintained eval on going.

## 2018-10-24 NOTE — ED PROVIDER NOTE - CARE PLAN
Principal Discharge DX:	Schizophrenia  Assessment and plan of treatment:	PT will return to [facility] and continue to f/u with plan of care at facility.

## 2018-11-02 ENCOUNTER — EMERGENCY (EMERGENCY)
Facility: HOSPITAL | Age: 53
LOS: 1 days | Discharge: HOME | End: 2018-11-02
Attending: EMERGENCY MEDICINE | Admitting: EMERGENCY MEDICINE
Payer: MEDICAID

## 2018-11-02 VITALS
RESPIRATION RATE: 16 BRPM | OXYGEN SATURATION: 100 % | HEART RATE: 95 BPM | DIASTOLIC BLOOD PRESSURE: 85 MMHG | SYSTOLIC BLOOD PRESSURE: 151 MMHG | TEMPERATURE: 98 F

## 2018-11-02 PROCEDURE — 99282 EMERGENCY DEPT VISIT SF MDM: CPT

## 2018-11-02 RX ORDER — IBUPROFEN 200 MG
600 TABLET ORAL ONCE
Qty: 0 | Refills: 0 | Status: COMPLETED | OUTPATIENT
Start: 2018-11-02 | End: 2018-11-02

## 2018-11-02 RX ADMIN — Medication 600 MILLIGRAM(S): at 18:53

## 2018-11-02 NOTE — ED PROVIDER NOTE - CARE PLAN
Assessment and plan of treatment:	Rest, ice and elevate your knee. Advance activity as tolerated.  Continue all previously prescribed medications as directed.  Follow up with your primary care physician in 48-72 hours- bring copies of your results. Follow up with Orthopedist as needed- referral list provided. Return to the Emergency Department for worsening or persistent symptoms OR ANY NEW OR CONCERNING SYMPTOMS. Principal Discharge DX:	Left knee pain  Assessment and plan of treatment:	Rest, ice and elevate your knee. Advance activity as tolerated.  Continue all previously prescribed medications as directed.  Follow up with your primary care physician in 48-72 hours- bring copies of your results. Follow up with Orthopedist as needed- referral list provided. Return to the Emergency Department for worsening or persistent symptoms OR ANY NEW OR CONCERNING SYMPTOMS.

## 2018-11-02 NOTE — ED ADULT TRIAGE NOTE - CHIEF COMPLAINT QUOTE
pt with chronic left knee pain, c/o increased left knee pain while walking, pt ambulatory in triage in [facility]- denies any other medical complaints- denies any recent fall/injury

## 2018-11-02 NOTE — ED PROVIDER NOTE - MEDICAL DECISION MAKING DETAILS
L knee pain, intermittent x1y, normal physical exam. Pt states pain improves with Tylenol. Will give Motrin and reassess. No indication for x-ray. L knee pain, intermittent x1y, normal physical exam. Pt states pain improves with Tylenol. Will give Motrin and reassess. No indication for x-ray. Denies hearing voices, suicidal/ homicidal ideations. Has no psych complaints.

## 2018-11-02 NOTE — ED PROVIDER NOTE - OBJECTIVE STATEMENT
54y/o Male with PMHx of bipolar disorder, schizophrenia and HTN, smokes a quarter pack of cigarettes per day complaining of L knee pain after tripping and falling to the ground on it 1 year ago. Pt never followed up with an orthopedic since the fall. Pt reports no fevers in the past 2 days.

## 2018-11-02 NOTE — ED PROVIDER NOTE - PLAN OF CARE
Rest, ice and elevate your knee. Advance activity as tolerated.  Continue all previously prescribed medications as directed.  Follow up with your primary care physician in 48-72 hours- bring copies of your results. Follow up with Orthopedist as needed- referral list provided. Return to the Emergency Department for worsening or persistent symptoms OR ANY NEW OR CONCERNING SYMPTOMS.

## 2018-11-02 NOTE — PROVIDER CONTACT NOTE (OTHER) - ASSESSMENT
medical team  referred this case as pt is requesting a cab to go home through their insurance.  Writer contacted [facility] – [phone] spoke with   and trip invoice number # [id number].  Writer spoke with Ms. Rivas at [facility] informed that pt will be picked up in 30 min and medical team. cab came and picked up the pt to [facility] [address].  no further SW intervention needed at this time for this visit.

## 2018-11-02 NOTE — ED PROVIDER NOTE - NS_ ATTENDINGSCRIBEDETAILS _ED_A_ED_FT
I,Edith Milton, performed the initial face to face bedside interview with this patient regarding history of present illness, review of symptoms and relevant past medical, social and family history.  I completed an independent physical examination.  I was the initial provider who evaluated this patient. The history, relevant review of systems, past medical and surgical history, medical decision making, and physical examination was documented by the scribe in my presence and I attest to the accuracy of the documentation.

## 2018-11-06 ENCOUNTER — EMERGENCY (EMERGENCY)
Facility: HOSPITAL | Age: 53
LOS: 1 days | Discharge: HOME | End: 2018-11-06
Attending: EMERGENCY MEDICINE | Admitting: EMERGENCY MEDICINE
Payer: MEDICAID

## 2018-11-06 VITALS
TEMPERATURE: 98 F | SYSTOLIC BLOOD PRESSURE: 140 MMHG | DIASTOLIC BLOOD PRESSURE: 93 MMHG | RESPIRATION RATE: 18 BRPM | HEART RATE: 106 BPM | OXYGEN SATURATION: 97 %

## 2018-11-06 PROCEDURE — 99283 EMERGENCY DEPT VISIT LOW MDM: CPT

## 2018-11-06 RX ORDER — MULTIVIT WITH MIN/MFOLATE/K2 340-15/3 G
300 POWDER (GRAM) ORAL ONCE
Qty: 0 | Refills: 0 | Status: COMPLETED | OUTPATIENT
Start: 2018-11-06 | End: 2018-11-06

## 2018-11-06 RX ADMIN — Medication 300 MILLILITER(S): at 17:00

## 2018-11-06 NOTE — ED PROVIDER NOTE - PROGRESS NOTE DETAILS
Dr. Perez: left message with 's psychiatrist Dr. Young ([phone]), awaiting call back Dr. Perez: left message with covering psychiatry Dr. Julian ([phone]), awaiting call back Dr. Perez: called pt's care coordinator Ms. Rola Scales ([phone]) and left message, awaiting call back Dr. Perez: called main phone number listed on [place] transfer paperwork (Beronica Chen, [phone]) Dr. Perez: called pt's parents (listed and noted to have been given consent to discuss healthcare; [phone]), spoke to pt's father Gabino Townsend, aware that pt is in ED and is aware that pt is being discharged back to [place] Dr. Perez: pt tolerated PO without difficulty--no vomiting or pain; social work to assist with transportation back to [place]

## 2018-11-06 NOTE — ED PROVIDER NOTE - OBJECTIVE STATEMENT
52 yo male with schizoaffective disorder, living in [place] [facility] [facility], known history of constipation, in ED for evaluation of possible constipation.  Pt notes that he has not had a bowel movement in 1.5 days and so was sent to ED for evaluation.  He denies N/V/D or abdominal pain, states that he continues to eat normally.  He admits that he feels somewhat constipated, feels like he needs "something to get it going".  No abdominal surgical history.  Pt denies SI/HI.

## 2018-11-06 NOTE — ED PROVIDER NOTE - MEDICAL DECISION MAKING DETAILS
52 yo male with psychiatric disorder, living on [place] grounds, in ED with complaint of constipation; transfer paperwork notes concern for possible bowel obstruction, but my evaluation shows extremely low suspicion for bowel obstruction--no abdominal surgical history, no abdominal TTP, normal BS, no abdominal distention, soft brown stool in vault and no N/V, pt continues to eat normally; pt currently on senna and lactulose and bisacodyl already so do not want to add any new medications; will give mag citrate in ED and encourage oral hydration with water and outpatient follow up;  noted, no signs of dehydration on physical exam

## 2018-11-06 NOTE — ED PROVIDER NOTE - PHYSICAL EXAMINATION
rectal exam chaperoned by NP student Amy Santos  no tenderness, no hemorrhoids, soft brown stool in vault

## 2018-11-06 NOTE — ED PROVIDER NOTE - CARE PLAN
Principal Discharge DX:	Constipation  Assessment and plan of treatment:	1. TAKE ALL MEDICATIONS AS DIRECTED.    2. FOR PAIN OR FEVER YOU CAN TAKE IBUPROFEN (MOTRIN, ADVIL) OR ACETAMINOPHEN (TYLENOL) AS NEEDED, AS DIRECTED ON PACKAGING.  3. FOLLOW UP WITH YOUR PRIMARY DOCTOR WITHIN 5 DAYS AS DIRECTED.  4. IF YOU HAD LABS OR IMAGING DONE, YOU WERE GIVEN COPIES OF ALL LABS AND/OR IMAGING RESULTS FROM YOUR ER VISIT--PLEASE TAKE THEM WITH YOU TO YOUR FOLLOW UP APPOINTMENTS.  5. IF NEEDED, CALL PATIENT ACCESS SERVICES AT [phone] TO FIND A PRIMARY CARE PHYSICIAN.  OR CALL [phone] TO MAKE AN APPOINTMENT WITH THE CLINIC.  6. RETURN TO THE ER FOR ANY WORSENING SYMPTOMS OR CONCERNS.

## 2018-11-06 NOTE — ED PROVIDER NOTE - PLAN OF CARE
1. TAKE ALL MEDICATIONS AS DIRECTED.    2. FOR PAIN OR FEVER YOU CAN TAKE IBUPROFEN (MOTRIN, ADVIL) OR ACETAMINOPHEN (TYLENOL) AS NEEDED, AS DIRECTED ON PACKAGING.  3. FOLLOW UP WITH YOUR PRIMARY DOCTOR WITHIN 5 DAYS AS DIRECTED.  4. IF YOU HAD LABS OR IMAGING DONE, YOU WERE GIVEN COPIES OF ALL LABS AND/OR IMAGING RESULTS FROM YOUR ER VISIT--PLEASE TAKE THEM WITH YOU TO YOUR FOLLOW UP APPOINTMENTS.  5. IF NEEDED, CALL PATIENT ACCESS SERVICES AT [phone] TO FIND A PRIMARY CARE PHYSICIAN.  OR CALL [phone] TO MAKE AN APPOINTMENT WITH THE CLINIC.  6. RETURN TO THE ER FOR ANY WORSENING SYMPTOMS OR CONCERNS.

## 2018-11-23 ENCOUNTER — EMERGENCY (EMERGENCY)
Facility: HOSPITAL | Age: 53
LOS: 1 days | Discharge: HOME | End: 2018-11-23
Admitting: EMERGENCY MEDICINE
Payer: MEDICAID

## 2018-11-23 VITALS
OXYGEN SATURATION: 99 % | RESPIRATION RATE: 18 BRPM | SYSTOLIC BLOOD PRESSURE: 117 MMHG | HEART RATE: 70 BPM | TEMPERATURE: 98 F | DIASTOLIC BLOOD PRESSURE: 75 MMHG

## 2018-11-23 PROCEDURE — 90792 PSYCH DIAG EVAL W/MED SRVCS: CPT | Mod: GC

## 2018-11-23 PROCEDURE — 99284 EMERGENCY DEPT VISIT MOD MDM: CPT

## 2018-11-23 NOTE — ED ADULT TRIAGE NOTE - CHIEF COMPLAINT QUOTE
BIBEMS from [facility] for suicidal thoughts today. Pt has had previous attempts of OD. Denies any plan or attempt today. Hearing voices to telling him to hurt himself. Denies HI/ETOH/Substance abuse. Takes meds as prescribed.

## 2018-11-23 NOTE — ED BEHAVIORAL HEALTH ASSESSMENT NOTE - CASE SUMMARY
53 year-old single disabled non-caregiver  male currently residing in [facility] with a PPH of schizoaffective disorder and cannabis use disorder with a history of multiple inpatient admissions discharged most recently from [facility] 8/24/18-10/11/18, before this he was at [facility] 11/17/17-8/8/18. He has a history of 1 past suicide attempt at age 26 via OD. Past history of cannabis/crack/alcohol use; denies recent drug use. No known history of detox/rehab. No current legal issues.  PMH hypothyroidism, HLD, HTN, enuresis, s/p perianal fistulectomy 7/17 & BPH. BIB EMS called by the on-call psychiatrist after the patient told the psychiatrist that he was hearing CAH telling him to hurt himself.     Patient currently denies SI/HI/I/P at this time as well as current AH. Denies manic symptoms. Reports baseline, chronic, unchanged AH telling him to "end it" with no plan. Denies intent and is able to cite multiple protective factors. Reports he does not want to be at [facility], states he would like his own apartment instead, and cites no issues with peers or staff. Patient is able to contract for safety and appears to be at his baseline. He is bright and social with writer and staff. He does not meet criteria for inpatient admission and will be discharged home to follow up with outpatient providers.

## 2018-11-23 NOTE — ED PROVIDER NOTE - MEDICAL DECISION MAKING DETAILS
54 y/o male presents to [facility] EMS from [place] for SI and AH "telling me to harm myself".  Physical examination completed.  No acute medical issues/problems requiring immediate interventions.  Psychiatric consult requested and discharge with continued outpatient treatment recommended to be continued at [place].

## 2018-11-23 NOTE — ED BEHAVIORAL HEALTH NOTE - BEHAVIORAL HEALTH NOTE
Worker called [facility] ([phone]) and spoke to Ayala who  arranged for transportation through [employer] ([phone])  time 7:15pm to return back to [facility] trip #[phone].

## 2018-11-23 NOTE — ED ADULT NURSE NOTE - CHIEF COMPLAINT QUOTE
BIBEMS from [facility] for suicidal thoughts today. Pt has had previous attempts of OD. Denies any plan or attempt today. Hearing voices to telling him to hurt himself. Denies HI/ETOH/Substance abuse. Takes meds as prescribed.

## 2018-11-23 NOTE — ED ADULT NURSE NOTE - OBJECTIVE STATEMENT
Pt received in  intake area from [facility] via EMS with c/o auditory hallucinations. Pt denies desire of SI or any plans for SI. States that he has chronic ah. No deviations from baseline ah. Pt also states that he does not like his current residence. No HI, VH or etoh/substance use.

## 2018-11-23 NOTE — ED PROVIDER NOTE - OBJECTIVE STATEMENT
52 y/o male presents to  BIB EMS from [place] for SI and AH "telling me to harm myself".  PT is well known to  as he often comes for the same complaint.  Pt currently states:  "I don't think I would really harm myself", pt denies having a plan".  PT states "the voices are telling me to hurt myself".  When asked how the voices tell him to do that or if they tell him how to harm himself, pt states: No, they don't tell me how, they just tell me to hurt myself". PT denies HI/VH.  Pt with no active suicidal thoughts/plan at this time.  PT denies any other c/o pain/discomfort and is only requesting food.

## 2018-11-23 NOTE — ED BEHAVIORAL HEALTH ASSESSMENT NOTE - HPI (INCLUDE ILLNESS QUALITY, SEVERITY, DURATION, TIMING, CONTEXT, MODIFYING FACTORS, ASSOCIATED SIGNS AND SYMPTOMS)
53 year-old single disabled non-caregiver  male currently residing in [facility] with a PPH of schizoaffective disorder and cannabis use disorder with a history of multiple inpatient admissions discharged most recently from [facility] 8/24/18-10/11/18, before this he was at [facility] 11/17/17-8/8/18. He has a history of 1 past suicide attempt at age 26 via OD. Past history of cannabis/crack/alcohol use; denies recent drug use. No known history of detox/rehab. No current legal issues.  PMH hypothyroidism, HLD, HTN, enuresis, s/p perianal fistulectomy 7/17 & BPH. BIB EMS called by the on-call psychiatrist after the patient told the psychiatrist that he was hearing CAH telling him to hurt himself.     On interview, pt is A+O x 3, he is calm and cooperative, makes good eye contact, mood subjectively "good", affect is euthymic. Thought processes are concrete but grossly linear. He states that he hears voices telling him to end his life intermittently for many years, but he adamantly denies active plan or intent. He denies that the voices tell him to kill himself by a particular method. He states that he has been able to resist the voices. He states he's tired of his current living arrangement at [place] and says he would like to transition to an apartment program. He states he is not being bullied at his residence and generally gets along well with peers and staff there. Asked what makes life worth living, he rattles off a long list of family members include neices and nephews he says he loves very much. He reports good mood and denies anhedonia. He reports compliance with his medications, and says he's sleeping and eating well. Reports good energy. He denies HI/I/P, denies current intent or plan to end his life. He denies access to guns, he is able to safety plan. He says he's looking forward to transitioning into an apartment program. 53 year-old single disabled non-caregiver  male currently residing in [facility] with a PPH of schizoaffective disorder and cannabis use disorder with a history of multiple inpatient admissions discharged most recently from [facility] 8/24/18-10/11/18, before this he was at [facility] 11/17/17-8/8/18. He has a history of 1 past suicide attempt at age 26 via OD. Past history of cannabis/crack/alcohol use; denies recent drug use. No known history of detox/rehab. No current legal issues.  PMH hypothyroidism, HLD, HTN, enuresis, s/p perianal fistulectomy 7/17 & BPH. BIB EMS called by the on-call psychiatrist after the patient told the psychiatrist that he was hearing CAH telling him to hurt himself.     On interview, pt is A+O x 3, he is calm and cooperative, makes good eye contact, mood subjectively "good", affect is euthymic. Thought processes are concrete but grossly linear. He states that he hears voices telling him to end his life intermittently for many years, but he adamantly denies active plan or intent. He denies that the voices tell him to kill himself by a particular method. He states that he has been able to resist the voices. He states he's tired of his current living arrangement at [place] and says he would like to transition to an apartment program. He states he is not being bullied at his residence and generally gets along well with peers and staff there. Asked what makes life worth living, he rattles off a long list of family members include neices and nephews he says he loves very much. He reports good mood and denies anhedonia. He reports compliance with his medications, and says he's sleeping and eating well. Reports good energy. He denies HI/I/P, denies current intent or plan to end his life. He denies access to guns, he is able to safety plan. He says he's looking forward to transitioning into an apartment program.    An attempt at obtaining collateral was made by contacting Dr. Young at [phone]. A voicemail was left. 53 year-old single disabled non-caregiver  male currently residing in [facility] with a PPH of schizoaffective disorder and cannabis use disorder with a history of multiple inpatient admissions discharged most recently from [facility] 8/24/18-10/11/18, before this he was at [facility] 11/17/17-8/8/18. He has a history of 1 past suicide attempt at age 26 via OD. Past history of cannabis/crack/alcohol use; denies recent drug use. No known history of detox/rehab. No current legal issues.  PMH hypothyroidism, HLD, HTN, enuresis, s/p perianal fistulectomy 7/17 & BPH. BIB EMS called by the on-call psychiatrist after the patient told the psychiatrist that he was hearing CAH telling him to hurt himself.     On interview, pt is A+O x 3, he is calm and cooperative, makes good eye contact, mood subjectively "good", affect is euthymic. Thought processes are concrete but grossly linear. He states that he hears voices telling him to end his life intermittently for many years, but he adamantly denies active plan or intent. He denies that the voices tell him to kill himself by a particular method. He states that he has been able to resist the voices. He states he's tired of his current living arrangement at [place] and says he would like to transition to an apartment program. He states he is not being bullied at his residence and generally gets along well with peers and staff there. Asked what makes life worth living, he rattles off a long list of family members include nieces and nephews he says he loves very much. He reports good mood and denies anhedonia. He reports compliance with his medications, and says he's sleeping and eating well. Reports good energy. He denies HI/I/P, denies current intent or plan to end his life. He denies access to guns, he is able to safety plan. He says he's looking forward to transitioning into an apartment program.    An attempt at obtaining collateral was made by contacting Dr. Young at [phone]. A voicemail was left.

## 2018-11-23 NOTE — ED BEHAVIORAL HEALTH ASSESSMENT NOTE - SAFETY PLAN DETAILS
return to ED if condition deteriorates patient advised to return to ED or call 911 for any worsening symptoms and patient agreed. Call [phone] if you need to speak with someone 24/7

## 2018-11-23 NOTE — ED ADULT NURSE REASSESSMENT NOTE - NS ED NURSE REASSESS COMMENT FT1
pt calm & cooperative in nad tolerated dinner pt d/c by NP pt verbalizing understanding of d/c instructions pt denies Si/Hi/AVh presently pt placed in a cab by ROBERT.

## 2018-11-23 NOTE — ED BEHAVIORAL HEALTH ASSESSMENT NOTE - OTHER PAST PSYCHIATRIC HISTORY (INCLUDE DETAILS REGARDING ONSET, COURSE OF ILLNESS, INPATIENT/OUTPATIENT TREATMENT)
PPH Schizoaffective Disorder & Cannabis Use Disorder. He has a history of multiple inpatient admissions discharged most recently from [facility] 8/24/18-10/11/18 and [facility] inpatient 11/17/17-8/8/18. . Patient was inpatient [facility] after [facility] stay in 2015 until 10/25/17 but then was discharged and readmitted after medication non compliance until 8/8/18. He is currently on AOT.

## 2018-11-23 NOTE — ED BEHAVIORAL HEALTH ASSESSMENT NOTE - RISK ASSESSMENT
Acute risks include CAH to end his life. Chronic risks include past hospitalizations, past SA, chronic psychotic sx including CAH at his baseline, unemployment. Protective factors includes denies intent or plan to end his life, reporting ability to resist voices he hears telling him to end his life, being future orientated, complaint w tx, lives in supportive housing, no access to means/weapons. Pt appears at baseline, and risk is not elevated to justify inpatient hospitalization. Furthermore, inpatient hospitalization would further institutionalization and would inhibit successful transition to community life. Pt does not pose an imminent danger to self or others and does not meet criteria for involuntary psychiatric admission. Chronic risks include past hospitalizations, past SA, chronic psychotic sx including CAH at his baseline, unemployment. Protective factors includes denies intent or plan to end his life, reporting ability to resist voices he hears telling him to end his life, being future orientated, complaint w tx, lives in supportive housing, no access to means/weapons. Pt appears at baseline, and risk is not elevated to justify inpatient hospitalization. Furthermore, inpatient hospitalization would further institutionalize and would inhibit successful transition to community life. Pt does not pose an imminent danger to self or others and does not meet criteria for involuntary psychiatric admission.

## 2018-11-23 NOTE — ED BEHAVIORAL HEALTH ASSESSMENT NOTE - SUMMARY
Patient is a 53 year old single disabled non-caregiver  male currently residing in [facility]. PPH Schizoaffective Disorder & Cannabis Use Disorder. He has a history of multiple inpatient admissions discharged most recently from [facility] 8/24/18-10/11/18, prior to this was [facility] inpatient 11/17/17-8/8/18. He has a history of 1 past suicide attempt at age 26 via OD. Past history of cannabis/crack/alcohol use; denies recent drug use. No known history of detox/rehab. No current legal issues.  PMH hypothyroidism, HLD, HTN, enuresis, s/p perianal fistulectomy 7/17 & BPH. BIB EMS called by the psychiatrist on call because the patient made a complaint about CAH telling him to hurt himself. On interview, pt is A+O x 3, he is calm and cooperative, makes good eye contact, mood subjectively "good", and his affect is objectively euthymic. Thought processes are concrete but grossly linear. Pt denies paranoid delusions. Endorses intermittent CAH telling him to end his life but denies specific plan or intent. Reality testing is fairly intact. The patient has CAH at his baseline.

## 2018-11-23 NOTE — ED BEHAVIORAL HEALTH ASSESSMENT NOTE - DETAILS
Crohn's disease mother CAH to end his life -- patient reports he does not want to end his life and is able to ignore the CAH pt has remote hx of SA 20 yrs ago, no recent attempts or self injury Voicemail left for Dr. Young vague CAH to end his life -- patient reports he does not want to end his life and is able to ignore the CAH

## 2018-12-05 NOTE — ED PROVIDER NOTE - NS ED ROS FT
CONST: no fevers, no chills, no trauma  EYES: no pain, no visual disturbances  ENT: no sore throat, no epistaxis, no rhinorrhea, no hearing changes  CV: no chest pain, no palpitations, no orthopnea, no extremity pain or swelling  RESP: no shortness of breath, no cough, no sputum, no pleurisy, no wheezing  ABD: no abdominal pain, no nausea, no vomiting, no diarrhea, no black or bloody stool  : no dysuria, no hematuria, no frequency, no urgency  MSK: no back pain, no neck pain, no extremity pain  NEURO: no headache, no sensory disturbances, no focal weakness, no dizziness, + generalized weakness  HEME: no easy bleeding or bruising  SKIN: no diaphoresis, no rash
supervision

## 2018-12-07 ENCOUNTER — EMERGENCY (EMERGENCY)
Facility: HOSPITAL | Age: 53
LOS: 1 days | Discharge: HOME | End: 2018-12-07
Admitting: EMERGENCY MEDICINE
Payer: MEDICAID

## 2018-12-07 VITALS
TEMPERATURE: 98 F | OXYGEN SATURATION: 100 % | RESPIRATION RATE: 14 BRPM | SYSTOLIC BLOOD PRESSURE: 138 MMHG | DIASTOLIC BLOOD PRESSURE: 97 MMHG | HEART RATE: 88 BPM

## 2018-12-07 PROCEDURE — 99283 EMERGENCY DEPT VISIT LOW MDM: CPT | Mod: 25

## 2018-12-07 RX ADMIN — Medication 2 MILLIGRAM(S): at 22:24

## 2018-12-07 NOTE — ED ADULT NURSE NOTE - NSIMPLEMENTINTERV_GEN_ALL_ED
Implemented All Universal Safety Interventions:  [place] to call system. Call bell, personal items and telephone within reach. Instruct patient to call for assistance. Room bathroom lighting operational. Non-slip footwear when patient is off stretcher. Physically safe environment: no spills, clutter or unnecessary equipment. Stretcher in lowest position, wheels locked, appropriate side rails in place.

## 2018-12-07 NOTE — ED ADULT NURSE NOTE - CHIEF COMPLAINT QUOTE
"I taste sperm in my mouth. My whole body is uncomfortable, I can't release myself for months". Pt hx of schizoaffective disorder. EMS was called to scene, pt is from [employer] [address], left and was banging on peoples doors. Pt denies suicidal or homicidal ideation. Denies drugs or alcohol use. Pt observed to have nasal drip, states recent cold. VSS. Endorsed to RICHMOND Ramesh, to be seen in

## 2018-12-07 NOTE — ED PROVIDER NOTE - OBJECTIVE STATEMENT
56 y/o F  hx Schizophrenia, Polysubstance Abuse, HTN, Hypothyroid  BIBA  w c/o agitation requesting " I need to release , im backed up".  States while laughing , that he hadn't had sex in months and now he needs to release his sperm.  Admits to medication compliance .  No evidence of physical injuries, broken skin or deformities.  Denies falling, punching or kicking any objects. Denies SI/HI/AH/VH.  Denies  pain, SOB,  fever, chills, chest/abdominal  discomfort.  Denies recent use of  alcohol or illicit drugs.

## 2018-12-07 NOTE — ED BEHAVIORAL HEALTH NOTE - BEHAVIORAL HEALTH NOTE
Writer called [facility], [phone], and spoke with Mickey, who provided invoice # [id number], for livery service, to be provided by [employer], [phone] with an ETA of 12AM, to transport him to his residence, verified to be [address] [place] [address]. Writer called [facility], [phone], and spoke with Mickey, who provided invoice # [id number], for livery service, to be provided by [employer], [phone] with an ETA of 12AM, to transport him to his residence, verified to be [address] [place] [address]. Writer called [employer] and verified the arrangements, making sure they had both writer's spectra link number to call prior to midnight, and the nursing station number to call after that.

## 2018-12-07 NOTE — ED PROVIDER NOTE - MEDICAL DECISION MAKING DETAILS
56 y/o F  hx Schizophrenia, Polysubstance Abuse, HTN, Hypothyroid  Medical evaluation performed. There is no clinical evidence of intoxication or any acute medical problem requiring immediate intervention.  Discuss plan with [facility] staff. D/C via cab.

## 2018-12-07 NOTE — ED ADULT TRIAGE NOTE - CHIEF COMPLAINT QUOTE
"I taste sperm in my mouth. My whole body is uncomfortable, I can't release myself for months". Pt hx of schizoaffective disorder. EMS was called to scene, pt is from [employer] [address], left and was banging on peoples doors. Pt denies suicidal or homicidal ideation. Denies drugs or alcohol use. Pt observed to have nasal drip, states recent cold. VSS. "I taste sperm in my mouth. My whole body is uncomfortable, I can't release myself for months". Pt hx of schizoaffective disorder. EMS was called to scene, pt is from [employer] [address], left and was banging on peoples doors. Pt denies suicidal or homicidal ideation. Denies drugs or alcohol use. Pt observed to have nasal drip, states recent cold. VSS. Endorsed to RICHMON[facility] Ramesh, to be seen in

## 2018-12-07 NOTE — ED ADULT NURSE NOTE - OBJECTIVE STATEMENT
Pt received to behavioral health low acuity. Presents alert w/ c/o being "backed up" for last few months that it is making him uncomfortable. Pt defines feeling backed up as being unable to ejaculate. Pt medicated as ordered on arrival. Calm and cooperative presently. Awaiting dispo. Will continue to monitor.

## 2018-12-08 ENCOUNTER — EMERGENCY (EMERGENCY)
Facility: HOSPITAL | Age: 53
LOS: 1 days | Discharge: HOME | End: 2018-12-08
Attending: EMERGENCY MEDICINE | Admitting: EMERGENCY MEDICINE
Payer: MEDICAID

## 2018-12-08 VITALS
OXYGEN SATURATION: 99 % | RESPIRATION RATE: 14 BRPM | HEART RATE: 94 BPM | DIASTOLIC BLOOD PRESSURE: 63 MMHG | SYSTOLIC BLOOD PRESSURE: 147 MMHG | TEMPERATURE: 97 F

## 2018-12-08 PROCEDURE — 99282 EMERGENCY DEPT VISIT SF MDM: CPT | Mod: 25

## 2018-12-08 NOTE — PROVIDER CONTACT NOTE (OTHER) - ASSESSMENT
SW contacted by medical team, Pt requires transport back to [facility]. Pt is from [facility] [address] [phone]. SW contacted Pt's residence and spoke with staff Millicent JIMENEZ, Pt is known to staff is from Pt's unit, staff confirmed Pt is a resident.  SW contacted [facility] [phone] spoke with staff Yamileth who provided confirmation #[id number] for taxi trip with [employer]. Pt and medical staff are aware and in agreement with plan. All questions or concerns were addressed with Pt to satisfaction, No further SW intervention required at this time. .

## 2018-12-08 NOTE — ED PROVIDER NOTE - MEDICAL DECISION MAKING DETAILS
Calm and cooperative. Does not appear to be intoxicated or overtly psychotic. No complaints beyond need to ejaculate.

## 2018-12-08 NOTE — ED PROVIDER NOTE - OBJECTIVE STATEMENT
53M PMH of schizoaffective d/o and resident of [facility] after call made by concerned bystanders who saw patient walking along road. Pt reports he was going to see his girlfriend. Pt states "I feel backed up and I need to blow my wad." Pt denies SI/HI/AVH. Denies tobacco, alcohol and drug abuse. No HA, CP, SOB, abd pain.

## 2018-12-08 NOTE — ED ADULT TRIAGE NOTE - CHIEF COMPLAINT QUOTE
Pt called in ED called in by [employer] who saw him wandering by department of [employer]. Pt states went inside [facility] [address] [place], decided to leave to walk to his house in [facility] [place] [facility] to see his fiance but first wanted to see if his brother-in-law who works at the department of [employer]. Pt was here earlier, stating "I am still backed up, I taste sperm". VSS. MD Spicer evaluated patient, to be seen in . Hx of Bipolar and schizophrenia

## 2018-12-10 ENCOUNTER — EMERGENCY (EMERGENCY)
Facility: HOSPITAL | Age: 53
LOS: 1 days | Discharge: HOME | End: 2018-12-10
Attending: EMERGENCY MEDICINE | Admitting: EMERGENCY MEDICINE
Payer: MEDICAID

## 2018-12-10 VITALS
SYSTOLIC BLOOD PRESSURE: 133 MMHG | OXYGEN SATURATION: 97 % | RESPIRATION RATE: 18 BRPM | TEMPERATURE: 98 F | HEART RATE: 106 BPM | DIASTOLIC BLOOD PRESSURE: 71 MMHG

## 2018-12-10 VITALS
HEART RATE: 100 BPM | SYSTOLIC BLOOD PRESSURE: 137 MMHG | OXYGEN SATURATION: 100 % | TEMPERATURE: 98 F | DIASTOLIC BLOOD PRESSURE: 76 MMHG | RESPIRATION RATE: 16 BRPM

## 2018-12-10 LAB
ALBUMIN SERPL ELPH-MCNC: 3.8 G/DL — SIGNIFICANT CHANGE UP (ref 3.3–5)
ALP SERPL-CCNC: 120 U/L — SIGNIFICANT CHANGE UP (ref 40–120)
ALT FLD-CCNC: 28 U/L — SIGNIFICANT CHANGE UP (ref 4–41)
AST SERPL-CCNC: 29 U/L — SIGNIFICANT CHANGE UP (ref 4–40)
BASOPHILS # BLD AUTO: 0.07 K/UL — SIGNIFICANT CHANGE UP (ref 0–0.2)
BASOPHILS NFR BLD AUTO: 0.9 % — SIGNIFICANT CHANGE UP (ref 0–2)
BILIRUB SERPL-MCNC: 0.3 MG/DL — SIGNIFICANT CHANGE UP (ref 0.2–1.2)
BUN SERPL-MCNC: 13 MG/DL — SIGNIFICANT CHANGE UP (ref 7–23)
CALCIUM SERPL-MCNC: 9.5 MG/DL — SIGNIFICANT CHANGE UP (ref 8.4–10.5)
CHLORIDE SERPL-SCNC: 109 MMOL/L — HIGH (ref 98–107)
CO2 SERPL-SCNC: 24 MMOL/L — SIGNIFICANT CHANGE UP (ref 22–31)
CREAT SERPL-MCNC: 0.7 MG/DL — SIGNIFICANT CHANGE UP (ref 0.5–1.3)
EOSINOPHIL # BLD AUTO: 0.26 K/UL — SIGNIFICANT CHANGE UP (ref 0–0.5)
EOSINOPHIL NFR BLD AUTO: 3.3 % — SIGNIFICANT CHANGE UP (ref 0–6)
GLUCOSE SERPL-MCNC: 103 MG/DL — HIGH (ref 70–99)
HCT VFR BLD CALC: 41.3 % — SIGNIFICANT CHANGE UP (ref 39–50)
HGB BLD-MCNC: 13.4 G/DL — SIGNIFICANT CHANGE UP (ref 13–17)
IMM GRANULOCYTES # BLD AUTO: 0.02 # — SIGNIFICANT CHANGE UP
IMM GRANULOCYTES NFR BLD AUTO: 0.3 % — SIGNIFICANT CHANGE UP (ref 0–1.5)
LIDOCAIN IGE QN: 106.2 U/L — HIGH (ref 7–60)
LITHIUM SERPL-MCNC: 0.45 MMOL/L — LOW (ref 0.6–1.2)
LYMPHOCYTES # BLD AUTO: 1.26 K/UL — SIGNIFICANT CHANGE UP (ref 1–3.3)
LYMPHOCYTES # BLD AUTO: 15.9 % — SIGNIFICANT CHANGE UP (ref 13–44)
MCHC RBC-ENTMCNC: 29.1 PG — SIGNIFICANT CHANGE UP (ref 27–34)
MCHC RBC-ENTMCNC: 32.4 % — SIGNIFICANT CHANGE UP (ref 32–36)
MCV RBC AUTO: 89.6 FL — SIGNIFICANT CHANGE UP (ref 80–100)
MONOCYTES # BLD AUTO: 0.41 K/UL — SIGNIFICANT CHANGE UP (ref 0–0.9)
MONOCYTES NFR BLD AUTO: 5.2 % — SIGNIFICANT CHANGE UP (ref 2–14)
NEUTROPHILS # BLD AUTO: 5.88 K/UL — SIGNIFICANT CHANGE UP (ref 1.8–7.4)
NEUTROPHILS NFR BLD AUTO: 74.4 % — SIGNIFICANT CHANGE UP (ref 43–77)
NRBC # FLD: 0 — SIGNIFICANT CHANGE UP
PLATELET # BLD AUTO: 319 K/UL — SIGNIFICANT CHANGE UP (ref 150–400)
PMV BLD: 9.2 FL — SIGNIFICANT CHANGE UP (ref 7–13)
POTASSIUM SERPL-MCNC: 3.9 MMOL/L — SIGNIFICANT CHANGE UP (ref 3.5–5.3)
POTASSIUM SERPL-SCNC: 3.9 MMOL/L — SIGNIFICANT CHANGE UP (ref 3.5–5.3)
PROT SERPL-MCNC: 6.3 G/DL — SIGNIFICANT CHANGE UP (ref 6–8.3)
RBC # BLD: 4.61 M/UL — SIGNIFICANT CHANGE UP (ref 4.2–5.8)
RBC # FLD: 13.3 % — SIGNIFICANT CHANGE UP (ref 10.3–14.5)
SODIUM SERPL-SCNC: 144 MMOL/L — SIGNIFICANT CHANGE UP (ref 135–145)
T3 SERPL-MCNC: 138.3 NG/DL — SIGNIFICANT CHANGE UP (ref 80–200)
T4 FREE SERPL-MCNC: 1.05 NG/DL — SIGNIFICANT CHANGE UP (ref 0.9–1.8)
TROPONIN T, HIGH SENSITIVITY: 17 NG/L — SIGNIFICANT CHANGE UP (ref ?–14)
TROPONIN T, HIGH SENSITIVITY: 18 NG/L — SIGNIFICANT CHANGE UP (ref ?–14)
TSH SERPL-MCNC: 18.63 UIU/ML — HIGH (ref 0.27–4.2)
VALPROATE SERPL-MCNC: < 3.2 UG/ML — LOW (ref 50–100)
WBC # BLD: 7.9 K/UL — SIGNIFICANT CHANGE UP (ref 3.8–10.5)
WBC # FLD AUTO: 7.9 K/UL — SIGNIFICANT CHANGE UP (ref 3.8–10.5)

## 2018-12-10 PROCEDURE — 71046 X-RAY EXAM CHEST 2 VIEWS: CPT | Mod: 26

## 2018-12-10 PROCEDURE — 99284 EMERGENCY DEPT VISIT MOD MDM: CPT

## 2018-12-10 RX ORDER — SODIUM CHLORIDE 9 MG/ML
1000 INJECTION INTRAMUSCULAR; INTRAVENOUS; SUBCUTANEOUS ONCE
Qty: 0 | Refills: 0 | Status: DISCONTINUED | OUTPATIENT
Start: 2018-12-10 | End: 2018-12-10

## 2018-12-10 NOTE — ED PROVIDER NOTE - ATTENDING CONTRIBUTION TO CARE
53M from outpt [facility], PMH HTN, hypothyroid, bipolar/schizoaffective p/w vague complaints of feeling "sick," flushed. Also vague diffuse abd pain, over several hrs, nausea, no other systemic symptoms. Initial tachycardia self resolved, other vitals wnl, exam as above. EKG grossly unchanged from prior.  ddx: Unclear etiology. Benign abd. Not allergic rexn.   Given psych hx, will check basic labs, medication levels, fluid, reassess.

## 2018-12-10 NOTE — ED ADULT NURSE NOTE - NSIMPLEMENTINTERV_GEN_ALL_ED
Implemented All Universal Safety Interventions:  [place] to call system. Call bell, personal items and telephone within reach. Instruct patient to call for assistance. Room bathroom lighting operational. Non-slip footwear when patient is off stretcher. Physically safe environment: no spills, clutter or unnecessary equipment. Stretcher in lowest position, wheels locked, appropriate side rails in place.

## 2018-12-10 NOTE — ED PROVIDER NOTE - PROGRESS NOTE DETAILS
[place]: tolerating po. Pt eloped prior to completion of eval. Thyroid studies, repeat trop and clozapine level all still pending. Attempted to call contact numbers in chart w/o response. Called overhead in ED w/o response. Pt not observed in ED for over 1hr, eloped.  Pt had clinical capacity during our entire encounter and clinically had no acute psych complaints. Pt returned to the ED, stated he is feeling much better, states he went outside to smoke. Repeat trop negative. Pt stable to be discharged from ED. [place]: PT states he went out to smoke. Currently asymptomatic. delta trop <3. TFTs wnl. Comfortable for dc, outpt pmd f/u. Able to take bus back.

## 2018-12-10 NOTE — ED PROVIDER NOTE - NSFOLLOWUPINSTRUCTIONS_ED_ALL_ED_FT
Advance activity as tolerated.  Continue all previously prescribed medications as directed unless otherwise instructed.  Follow up with your primary care physician in 48-72 hours- bring copies of your results.  Return to the ER for worsening or persistent symptoms, and/or ANY NEW OR CONCERNING SYMPTOMS. If you have issues obtaining follow up, please call: [phone] to obtain a doctor or specialist who takes your insurance in your area.  You may call [phone] to make an appointment with the internal medicine clinic.

## 2018-12-10 NOTE — ED PROVIDER NOTE - OBJECTIVE STATEMENT
54yo M w/ pmhx of HTN, hypothyroidism, bipolar d/o, schizoaffective d/o presents to the ED BIBEMS from [facility] c/o feeling hot, and uncomfortable, reports abd pain and nausea x a few days. 54yo M w/ pmhx of HTN, hypothyroidism, bipolar d/o, schizoaffective d/o presents to the ED BIBEMS from [facility] c/o feeling hot, and uncomfortable, reports abd pain and nausea x a few days.  [place] 53[facility] HTN, hypothyroid, bipolar/schizoaffective p/w vague complaints of feeling "sick," flushed. Also vague diffuse abd pain, over several hrs, nausea. Now feeling much better but still feels "sick." Denies vomit, cp, f/c, cough/rhinorrhea, black/bloody stool, rashes, urinary complaints. States symptoms began after getting his evening meds, pt unsure which ones he got. No pruritis. No SI/HI.   Pt w/ multiple ED visits for various complaints.

## 2018-12-10 NOTE — ED ADULT NURSE NOTE - OBJECTIVE STATEMENT
break coverage RN- pt brought in from [facility] c/o abdominal pain for "a few days"- currently denies any abdominal pain, n/v/d, dysuria, hematuria. pt awake, a/ox3, ambulatory, vitals as noted in NAD- pt butterflied for blood work. MD at bedside, awaiting further orders from MD, will continue to monitor, pt safety maintained.

## 2018-12-15 ENCOUNTER — EMERGENCY (EMERGENCY)
Facility: HOSPITAL | Age: 53
LOS: 1 days | Discharge: HOME | End: 2018-12-15
Admitting: EMERGENCY MEDICINE
Payer: MEDICAID

## 2018-12-15 VITALS
HEART RATE: 94 BPM | OXYGEN SATURATION: 98 % | SYSTOLIC BLOOD PRESSURE: 126 MMHG | TEMPERATURE: 98 F | RESPIRATION RATE: 18 BRPM | DIASTOLIC BLOOD PRESSURE: 74 MMHG

## 2018-12-15 PROCEDURE — 99283 EMERGENCY DEPT VISIT LOW MDM: CPT

## 2018-12-15 RX ORDER — IBUPROFEN 200 MG
600 TABLET ORAL ONCE
Qty: 0 | Refills: 0 | Status: COMPLETED | OUTPATIENT
Start: 2018-12-15 | End: 2018-12-15

## 2018-12-15 RX ADMIN — Medication 600 MILLIGRAM(S): at 22:05

## 2018-12-15 NOTE — ED PROVIDER NOTE - CARE PLAN
Principal Discharge DX:	Adjustment disorder Principal Discharge DX:	Adjustment disorder  Secondary Diagnosis:	Malingering

## 2018-12-15 NOTE — ED ADULT NURSE REASSESSMENT NOTE - GENERAL PATIENT STATE
remains awake, a&ox3, calm. Pt received STAT Motrin 600mg PO for c/i right knee pain 7/10./cooperative/comfortable appearance

## 2018-12-15 NOTE — ED ADULT TRIAGE NOTE - CHIEF COMPLAINT QUOTE
Pt arrives via EMS from [facility] [address] at [place]. Pt c/o left sided leg/knee pain x 1 hour. Pt also requesting a pair of "psych pants" and a shower while in the ER.

## 2018-12-15 NOTE — ED PROVIDER NOTE - OBJECTIVE STATEMENT
54 y/o F  hx Schizophrenia, Polysubstance Abuse, HTN, Hypothyroid  BIBA  requesting a change of clothes and  left knee pain- 2/10 x 6 months. Admits that patient is intermittent. Denies any recent trauma.   Admits to medication compliance .  No evidence of physical injuries, broken skin or deformities.  Denies falling, punching or kicking any objects. Denies SI/HI/AH/VH.  Denies  SOB,  fever, chills, chest/abdominal  discomfort.  Denies recent use of  alcohol or illicit drugs.

## 2018-12-15 NOTE — ED ADULT NURSE NOTE - CHIEF COMPLAINT QUOTE
Pt arrives via EMS from [facility] [address] at [place]. Pt c/o left sided leg/knee pain x 1 hour. Pt also requesting a pair of "psych pants" and a shower while in the ER.

## 2018-12-15 NOTE — ED PROVIDER NOTE - MEDICAL DECISION MAKING DETAILS
52 y/o F  hx Schizophrenia, Polysubstance Abuse, HTN, Hypothyroid  Good ROM to knee, gait steady, able to weight bear well. No evidence of physical injuries, broken skin or deformities. Motrin offered. No indication for xray.   Medical evaluation performed. There is no clinical evidence of intoxication or any acute medical problem requiring immediate intervention.  D/C to [facility] via cab. CW aware

## 2018-12-16 ENCOUNTER — EMERGENCY (EMERGENCY)
Facility: HOSPITAL | Age: 53
LOS: 1 days | Discharge: HOME | End: 2018-12-16
Attending: EMERGENCY MEDICINE | Admitting: EMERGENCY MEDICINE
Payer: MEDICARE

## 2018-12-16 VITALS
SYSTOLIC BLOOD PRESSURE: 125 MMHG | DIASTOLIC BLOOD PRESSURE: 71 MMHG | OXYGEN SATURATION: 100 % | RESPIRATION RATE: 16 BRPM | HEART RATE: 71 BPM | TEMPERATURE: 98 F

## 2018-12-16 LAB
ALBUMIN SERPL ELPH-MCNC: 3.7 G/DL — SIGNIFICANT CHANGE UP (ref 3.3–5)
ALP SERPL-CCNC: 117 U/L — SIGNIFICANT CHANGE UP (ref 40–120)
ALT FLD-CCNC: 28 U/L — SIGNIFICANT CHANGE UP (ref 4–41)
APPEARANCE UR: CLEAR — SIGNIFICANT CHANGE UP
AST SERPL-CCNC: 29 U/L — SIGNIFICANT CHANGE UP (ref 4–40)
BASOPHILS # BLD AUTO: 0.07 K/UL — SIGNIFICANT CHANGE UP (ref 0–0.2)
BASOPHILS NFR BLD AUTO: 1.2 % — SIGNIFICANT CHANGE UP (ref 0–2)
BILIRUB SERPL-MCNC: 0.2 MG/DL — SIGNIFICANT CHANGE UP (ref 0.2–1.2)
BILIRUB UR-MCNC: NEGATIVE — SIGNIFICANT CHANGE UP
BLOOD UR QL VISUAL: NEGATIVE — SIGNIFICANT CHANGE UP
BUN SERPL-MCNC: 12 MG/DL — SIGNIFICANT CHANGE UP (ref 7–23)
CALCIUM SERPL-MCNC: 9.5 MG/DL — SIGNIFICANT CHANGE UP (ref 8.4–10.5)
CHLORIDE SERPL-SCNC: 106 MMOL/L — SIGNIFICANT CHANGE UP (ref 98–107)
CO2 SERPL-SCNC: 25 MMOL/L — SIGNIFICANT CHANGE UP (ref 22–31)
COLOR SPEC: COLORLESS — SIGNIFICANT CHANGE UP
CREAT SERPL-MCNC: 0.71 MG/DL — SIGNIFICANT CHANGE UP (ref 0.5–1.3)
EOSINOPHIL # BLD AUTO: 0.37 K/UL — SIGNIFICANT CHANGE UP (ref 0–0.5)
EOSINOPHIL NFR BLD AUTO: 6.2 % — HIGH (ref 0–6)
GLUCOSE SERPL-MCNC: 83 MG/DL — SIGNIFICANT CHANGE UP (ref 70–99)
GLUCOSE UR-MCNC: NEGATIVE — SIGNIFICANT CHANGE UP
HCT VFR BLD CALC: 42 % — SIGNIFICANT CHANGE UP (ref 39–50)
HGB BLD-MCNC: 13.2 G/DL — SIGNIFICANT CHANGE UP (ref 13–17)
IMM GRANULOCYTES # BLD AUTO: 0.01 # — SIGNIFICANT CHANGE UP
IMM GRANULOCYTES NFR BLD AUTO: 0.2 % — SIGNIFICANT CHANGE UP (ref 0–1.5)
KETONES UR-MCNC: NEGATIVE — SIGNIFICANT CHANGE UP
LEUKOCYTE ESTERASE UR-ACNC: NEGATIVE — SIGNIFICANT CHANGE UP
LIDOCAIN IGE QN: 80.1 U/L — HIGH (ref 7–60)
LYMPHOCYTES # BLD AUTO: 1.06 K/UL — SIGNIFICANT CHANGE UP (ref 1–3.3)
LYMPHOCYTES # BLD AUTO: 17.8 % — SIGNIFICANT CHANGE UP (ref 13–44)
MCHC RBC-ENTMCNC: 28.4 PG — SIGNIFICANT CHANGE UP (ref 27–34)
MCHC RBC-ENTMCNC: 31.4 % — LOW (ref 32–36)
MCV RBC AUTO: 90.5 FL — SIGNIFICANT CHANGE UP (ref 80–100)
MONOCYTES # BLD AUTO: 0.72 K/UL — SIGNIFICANT CHANGE UP (ref 0–0.9)
MONOCYTES NFR BLD AUTO: 12.1 % — SIGNIFICANT CHANGE UP (ref 2–14)
NEUTROPHILS # BLD AUTO: 3.71 K/UL — SIGNIFICANT CHANGE UP (ref 1.8–7.4)
NEUTROPHILS NFR BLD AUTO: 62.5 % — SIGNIFICANT CHANGE UP (ref 43–77)
NITRITE UR-MCNC: NEGATIVE — SIGNIFICANT CHANGE UP
NRBC # FLD: 0 — SIGNIFICANT CHANGE UP
PH UR: 7 — SIGNIFICANT CHANGE UP (ref 5–8)
PLATELET # BLD AUTO: 385 K/UL — SIGNIFICANT CHANGE UP (ref 150–400)
PMV BLD: 9.4 FL — SIGNIFICANT CHANGE UP (ref 7–13)
POTASSIUM SERPL-MCNC: 4.7 MMOL/L — SIGNIFICANT CHANGE UP (ref 3.5–5.3)
POTASSIUM SERPL-SCNC: 4.7 MMOL/L — SIGNIFICANT CHANGE UP (ref 3.5–5.3)
PROT SERPL-MCNC: 6.6 G/DL — SIGNIFICANT CHANGE UP (ref 6–8.3)
PROT UR-MCNC: NEGATIVE — SIGNIFICANT CHANGE UP
RBC # BLD: 4.64 M/UL — SIGNIFICANT CHANGE UP (ref 4.2–5.8)
RBC # FLD: 13.5 % — SIGNIFICANT CHANGE UP (ref 10.3–14.5)
SODIUM SERPL-SCNC: 140 MMOL/L — SIGNIFICANT CHANGE UP (ref 135–145)
SP GR SPEC: 1.01 — SIGNIFICANT CHANGE UP (ref 1–1.04)
UROBILINOGEN FLD QL: NORMAL — SIGNIFICANT CHANGE UP
WBC # BLD: 5.94 K/UL — SIGNIFICANT CHANGE UP (ref 3.8–10.5)
WBC # FLD AUTO: 5.94 K/UL — SIGNIFICANT CHANGE UP (ref 3.8–10.5)

## 2018-12-16 PROCEDURE — 71045 X-RAY EXAM CHEST 1 VIEW: CPT | Mod: 26

## 2018-12-16 PROCEDURE — 99283 EMERGENCY DEPT VISIT LOW MDM: CPT

## 2018-12-16 NOTE — ED ADULT TRIAGE NOTE - CHIEF COMPLAINT QUOTE
pt comes to ED for abdominal pain and vomiting since this morning VSS NAD pt was seen at [facility] yesterday.

## 2018-12-16 NOTE — ED PROVIDER NOTE - OBJECTIVE STATEMENT
52 yo M from outpt [place], PMH HTN, hypothyroid, bipolar/schizoaffective p/w nausea, vomiting and abdominal cramping after having some fried food, all of which have resolved prior to coming to the ED. No f/c, sob, cp, dysuria, constipation. Pt said he had one episode of diarrhea yesterday.

## 2018-12-16 NOTE — ED PROVIDER NOTE - MEDICAL DECISION MAKING DETAILS
will check basic labs and lfts; pt does not want nausea medication ot pain meds as all symptoms have resolved. Will d/c if labs are wnl as the pt's exam is benign and he is has eaten three sandwiches while in the ED.

## 2018-12-17 LAB
CLOZAPINE SERPL-MCNC: SIGNIFICANT CHANGE UP
SPECIMEN SOURCE: SIGNIFICANT CHANGE UP

## 2018-12-18 LAB — BACTERIA UR CULT: SIGNIFICANT CHANGE UP

## 2019-01-05 ENCOUNTER — EMERGENCY (EMERGENCY)
Facility: HOSPITAL | Age: 54
LOS: 1 days | Discharge: HOME | End: 2019-01-05
Admitting: EMERGENCY MEDICINE
Payer: MEDICAID

## 2019-01-05 VITALS
DIASTOLIC BLOOD PRESSURE: 79 MMHG | TEMPERATURE: 98 F | OXYGEN SATURATION: 99 % | RESPIRATION RATE: 18 BRPM | SYSTOLIC BLOOD PRESSURE: 153 MMHG | HEART RATE: 98 BPM

## 2019-01-05 PROCEDURE — 93010 ELECTROCARDIOGRAM REPORT: CPT

## 2019-01-05 PROCEDURE — 99283 EMERGENCY DEPT VISIT LOW MDM: CPT | Mod: 25

## 2019-01-05 RX ORDER — FAMOTIDINE 10 MG/ML
20 INJECTION INTRAVENOUS ONCE
Qty: 0 | Refills: 0 | Status: COMPLETED | OUTPATIENT
Start: 2019-01-05 | End: 2019-01-05

## 2019-01-05 RX ADMIN — FAMOTIDINE 20 MILLIGRAM(S): 10 INJECTION INTRAVENOUS at 21:58

## 2019-01-05 RX ADMIN — Medication 30 MILLILITER(S): at 21:58

## 2019-01-05 NOTE — ED ADULT NURSE REASSESSMENT NOTE - GENERAL PATIENT STATE
cooperative/improvement verbalized/comfortable appearance/pt remains awake, a&ox3, calm. Denies chest pain or sob after receiving PO Maalox/Pepcid

## 2019-01-05 NOTE — ED PROVIDER NOTE - OBJECTIVE STATEMENT
52 y/o F  hx Schizophrenia, Polysubstance Abuse, HTN, Hypothyroid  BIBA w c/o epigastric pain shorty after eating chinese food . States ' The pain is just in the middle of my chest". State that pain  is 2/10.  No evidence of physical injuries, broken skin or deformities.  Denies falling, punching or kicking any objects. Denies SI/HI/AH/VH.  Denies  SOB,  fever, chills, chest/abdominal  discomfort.  Denies recent use of  alcohol or illicit drugs.

## 2019-01-05 NOTE — ED ADULT TRIAGE NOTE - CHIEF COMPLAINT QUOTE
Pt arrives from  - [address] [place]. Pt c/o SOB beginning at 11am this morning. Pt also c/o burning sensations in chest which worsen after eating. Pt appears comfortable on room air, respirations even and non labored.

## 2019-01-05 NOTE — ED PROVIDER NOTE - MEDICAL DECISION MAKING DETAILS
Tolerated Maalox and Pepcid. EKG normal   Medical evaluation performed. There is no clinical evidence of intoxication or any acute medical problem requiring immediate intervention. D/c via EMS 54 y/o F  hx Schizophrenia, Polysubstance Abuse, HTN, Hypothyroid    Tolerated Maalox and Pepcid- verbalize relief of epigastric pain.  EKG normal   Medical evaluation performed. There is no clinical evidence of intoxication or any acute medical problem requiring immediate intervention. D/C to [facility] via EMS

## 2019-01-05 NOTE — ED ADULT NURSE NOTE - OBJECTIVE STATEMENT
BIB ems from [facility] for c/o indigestion and sob after "eating chinese food". Pt received from ambulance triage, awake, a&ox3, calm, denies s/i h/i or a/v/h, NAD. Denies drug/etoh. EKG in progress.

## 2019-01-05 NOTE — ED ADULT NURSE NOTE - CHIEF COMPLAINT QUOTE
Pt arrives from [facility] - 4th floor [address]. Pt c/o SOB beginning at 11am this morning. Pt also c/o burning sensations in chest which worsen after eating. Pt appears comfortable on room air, respirations even and non labored.

## 2019-01-11 ENCOUNTER — APPOINTMENT (OUTPATIENT)
Dept: VASCULAR SURGERY | Facility: CLINIC | Age: 54
End: 2019-01-11

## 2019-01-14 ENCOUNTER — EMERGENCY (EMERGENCY)
Facility: HOSPITAL | Age: 54
LOS: 1 days | Discharge: HOME | End: 2019-01-14
Admitting: EMERGENCY MEDICINE
Payer: MEDICAID

## 2019-01-14 VITALS
SYSTOLIC BLOOD PRESSURE: 149 MMHG | DIASTOLIC BLOOD PRESSURE: 74 MMHG | RESPIRATION RATE: 16 BRPM | HEART RATE: 80 BPM | OXYGEN SATURATION: 100 % | TEMPERATURE: 98 F

## 2019-01-14 PROCEDURE — 99283 EMERGENCY DEPT VISIT LOW MDM: CPT | Mod: 25

## 2019-01-14 NOTE — ED PROVIDER NOTE - MEDICAL DECISION MAKING DETAILS
This is a 53 year old Male PMHX Bipolar disorder  Drug abuse  Hypertension  Hypothyroid  Incontinence  Schizophrenia  came in today for evaluation r/t paranoid thoughts. patient reports he is paranoid and believes someone s after him. Medical evaluation performed. There is no clinical evidence of intoxication or any acute medical problem requiring immediate intervention.

## 2019-01-14 NOTE — ED PROVIDER NOTE - CHPI ED SYMPTOMS NEG
no disorientation/no weakness/no homicidal/no weight loss/no suicidal/no hallucinations/no change in level of consciousness

## 2019-01-14 NOTE — ED ADULT TRIAGE NOTE - CHIEF COMPLAINT QUOTE
Pt c/o feeling paranoid and that people "are after him". Endorses auditory hallucinations. H/o schizoaffective disorder.

## 2019-01-14 NOTE — ED PROVIDER NOTE - OBJECTIVE STATEMENT
This is a 53 year old Male PMHX Bipolar disorder  Drug abuse  Hypertension  Hypothyroid  Incontinence  Schizophrenia  came in today for evaluation r/t paranoid thoughts. patient reports he is paranoid and believes someone s after him. Reports he wants to stay in the ER. Once that patient was offered a sandwich he reported he want to eat a sandwich and then will go home. Denies SI/HI Denies AH/VH Denies ETOH/Illicit drugs

## 2019-01-14 NOTE — ED BEHAVIORAL HEALTH NOTE - BEHAVIORAL HEALTH NOTE
Writer called [facility], [phone], to arrange livery service, as requested, to his residential facility, verified to be [facility], [phone], and spoke with Sally, who provided Invoice # [id number], for service to be provided by [employer], [phone]. Writer called [employer], and was given an ETA of 12AM. Writer called [facility], [phone], to arrange livery service, as requested, to his residential facility, verified to be [facility], [address], [facility], [address] [place] [address], [phone], and spoke with Sally, who provided Invoice # [id number], for service to be provided by [employer], [phone]. Writer called [employer], and was given an ETA of 12AM.

## 2019-01-14 NOTE — ED PROVIDER NOTE - PROGRESS NOTE DETAILS
RICHMOND Taylor at [facility] who reports patel is ompliant with medications. Reports he is at his baseline and has not acted out. reports patient requires a lot of reassurance and does not have any safety concerns for patient.

## 2019-02-06 ENCOUNTER — EMERGENCY (EMERGENCY)
Facility: HOSPITAL | Age: 54
LOS: 1 days | Discharge: HOME | End: 2019-02-06
Attending: EMERGENCY MEDICINE | Admitting: EMERGENCY MEDICINE
Payer: MEDICARE

## 2019-02-06 VITALS
HEART RATE: 75 BPM | SYSTOLIC BLOOD PRESSURE: 125 MMHG | OXYGEN SATURATION: 100 % | TEMPERATURE: 98 F | DIASTOLIC BLOOD PRESSURE: 74 MMHG | RESPIRATION RATE: 18 BRPM

## 2019-02-06 PROCEDURE — 99283 EMERGENCY DEPT VISIT LOW MDM: CPT

## 2019-02-06 RX ORDER — ACETAMINOPHEN 500 MG
650 TABLET ORAL ONCE
Qty: 0 | Refills: 0 | Status: COMPLETED | OUTPATIENT
Start: 2019-02-06 | End: 2019-02-06

## 2019-02-06 RX ADMIN — Medication 650 MILLIGRAM(S): at 20:07

## 2019-02-06 RX ADMIN — Medication 650 MILLIGRAM(S): at 19:25
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
## 2019-02-06 NOTE — ED PROVIDER NOTE - MEDICAL DECISION MAKING DETAILS
52 y/o M with a PMHx of Bipolar disorder presenting with cold-like symptoms with very well-appearing and stable vital signs. Plan - Will DCU back to [facility]. 52 y/o M with a PMHx of Bipolar disorder presenting with cold-like symptoms, very well-appearing and stable vital signs. Plan - will rec tylenol, Will DC back to [facility], instructed on return precautions and f/u pcp.

## 2019-02-06 NOTE — ED PROVIDER NOTE - NSFOLLOWUPINSTRUCTIONS_ED_ALL_ED_FT
Please take tylenol 650mg every 4 hours as needed for pain.  Please follow up with your regular doctor in the next few days.  Please return for any new/worse problems.

## 2019-02-06 NOTE — ED BEHAVIORAL HEALTH NOTE - BEHAVIORAL HEALTH NOTE
Worker called patient’s residence [facility] ([phone]) and spoke to Mental health therapy aide Eveliaphillip Lowe.     Ms. Lowe states that staff did not even know that the patient was in the emergency room.  Ms. Lowe states that the patient is usually in and out of the residence. Ms. Lowe denies that the patient was presenting with SI/HI/AH/VH today. She states that the day shift last saw the patient at the residence and he did not appear to be presenting with SI/HI. She denies that the patient has been violent or aggressive. She states that the patient has history of refusing medication at times and yesterday night the patient did not take the medication. She denies that staff or residents go into the patient’s room or takes his belongings. Worker informed Ms. Lowe that patient was cleared for discharge by MD and will be returning. Writer called [facility], [phone], to arrange livery service, as requested, to his residential facility, verified to be [facility], [address], [facility], [address] [place] [address], [phone], and spoke with Millie, who provided Invoice # [id number], for service to be provided by [employer], [phone]. Writer called [employer], and was given an ETA of 8:30pm. Worker provided nursing contact number for cab once arrived.

## 2019-02-15 ENCOUNTER — EMERGENCY (EMERGENCY)
Facility: HOSPITAL | Age: 54
LOS: 1 days | Discharge: HOME | End: 2019-02-15
Admitting: PERSONAL EMERGENCY RESPONSE ATTENDANT
Payer: MEDICAID

## 2019-02-15 VITALS
OXYGEN SATURATION: 100 % | DIASTOLIC BLOOD PRESSURE: 84 MMHG | RESPIRATION RATE: 16 BRPM | SYSTOLIC BLOOD PRESSURE: 118 MMHG | TEMPERATURE: 98 F | HEART RATE: 105 BPM

## 2019-02-15 VITALS
RESPIRATION RATE: 17 BRPM | HEART RATE: 77 BPM | DIASTOLIC BLOOD PRESSURE: 76 MMHG | TEMPERATURE: 98 F | OXYGEN SATURATION: 99 % | SYSTOLIC BLOOD PRESSURE: 121 MMHG

## 2019-02-15 PROCEDURE — 73562 X-RAY EXAM OF KNEE 3: CPT | Mod: 26,LT

## 2019-02-15 PROCEDURE — 73564 X-RAY EXAM KNEE 4 OR MORE: CPT | Mod: 26,LT

## 2019-02-15 PROCEDURE — 73502 X-RAY EXAM HIP UNI 2-3 VIEWS: CPT | Mod: 26,LT

## 2019-02-15 PROCEDURE — 99283 EMERGENCY DEPT VISIT LOW MDM: CPT

## 2019-02-15 RX ORDER — ACETAMINOPHEN 500 MG
650 TABLET ORAL ONCE
Qty: 0 | Refills: 0 | Status: COMPLETED | OUTPATIENT
Start: 2019-02-15 | End: 2019-02-15

## 2019-02-15 RX ADMIN — Medication 650 MILLIGRAM(S): at 22:02

## 2019-02-15 NOTE — ED ADULT NURSE NOTE - NSIMPLEMENTINTERV_GEN_ALL_ED
Implemented All Fall Risk Interventions:  [place] to call system. Call bell, personal items and telephone within reach. Instruct patient to call for assistance. Room bathroom lighting operational. Non-slip footwear when patient is off stretcher. Physically safe environment: no spills, clutter or unnecessary equipment. Stretcher in lowest position, wheels locked, appropriate side rails in place. Provide visual cue, wrist band, yellow gown, etc. Monitor gait and stability. Monitor for mental status changes and reorient to person, place, and time. Review medications for side effects contributing to fall risk. Reinforce activity limits and safety measures with patient and family.

## 2019-02-15 NOTE — ED PROVIDER NOTE - PHYSICAL EXAMINATION
MSK: left knee non-tender to palpation, limited ROM 2/2 pain, sensation intact and equal b/l, distal pulses intact MSK: left knee non-tender to palpation, limited ROM 2/2 pain, no warmth or swelling to left knee, sensation intact and equal b/l, distal pulses intact MSK: left knee non-tender to palpation, limited active ROM 2/2 pain, near full passive ROM, no warmth or swelling to left knee, sensation intact and equal b/l, distal pulses intact

## 2019-02-15 NOTE — ED PROVIDER NOTE - OBJECTIVE STATEMENT
53yM w/pmhx presenting with left knee pain s/p fall. 53yM w/pmhx presenting with left knee pain s/p fall. Pt states he fell today while "playing ball" onto his left knee, denies head injury or LOC. Pt states he hasn't been able to walk since he fell. Pt denies numbness/tingling, weakness, fever/chills, recent illness, chest pain, shortness of breath, palpitations or any other concerns. 53yM w/pmhx bipolar, HTN, hypothyroid,  presenting with left knee pain s/p fall. Pt states he fell today while "playing ball" onto his left knee, denies head injury or LOC. Pt states he hasn't been able to walk since he fell. Pt reports that he has had pain to the left knee intermittently over the past few months. Pt denies numbness/tingling, weakness, fever/chills, recent illness, chest pain, shortness of breath, palpitations, headache, dizziness, lightheadedness or any other concerns.

## 2019-02-15 NOTE — ED PROVIDER NOTE - PROGRESS NOTE DETAILS
GORGE Verduzco: Spoke with overnight aide at [facility], she states pt fell onto leg knee after getting out of bed, she states lately he has been asking her to help him get around. She states pt did not hist his head, no LOC. GORGE Verduzco: Spoke with social work to organize transportation home GORGE Verduzco: Pt was at first reluctant to return to [facility], he was able to ambulate with knee immobilizer and cane with assistance. GORGE Verduzco: Spoke with overnight aide at [facility], she states pt fell onto leg knee after getting out of bed, she states lately he has been asking her to help him get around. She states pt did not hit his head, no LOC. GORGE Verduzco: Spoke with social work to organize transportation home. Social work offered pt housing at a shelter and pt refused. GORGE Verduzco: Pt was at first reluctant to return to [facility] although willing when transport arrived, he was able to ambulate with knee immobilizer and cane with assistance.

## 2019-02-15 NOTE — ED ADULT NURSE NOTE - OBJECTIVE STATEMENT
pt arrives a*ox3 to rw from [facility], c/o left knee pain after "falling down while playing basketball," +ROM, no deformity noted, ambulatory without assist. appears comfortable and in no distress, denies cp sob n/v/fevers, appears comfortable, calm and cooperative, pending xray at this time will continue to monitor closely.

## 2019-02-15 NOTE — ED PROVIDER NOTE - CLINICAL SUMMARY MEDICAL DECISION MAKING FREE TEXT BOX
53yM w/pmhx bipolar, HTN, hypothyroid, presenting with left knee pain s/p fall. No swelling, warmth, redness to suggest a septic joint. Limited active ROM 2/2 pain. Will xray to r/o fracture and provide analgesia. Pt will likely be d/c home with ortho follow up. 53yM w/pmhx bipolar, HTN, hypothyroid, presenting with left knee pain s/p fall. No swelling, warmth, redness to suggest a septic joint. Limited active ROM 2/2 pain. Will xray to r/o fracture and provide analgesia. Pt will likely be d/c home with ortho follow up. Will discuss with social work, many ER visits, no SI/HI at this time

## 2019-02-15 NOTE — ED ADULT NURSE REASSESSMENT NOTE - NS ED NURSE REASSESS COMMENT FT1
pt prepared for discharge, yaneth rm made [facility] aware that patient will be returning tonight.  provided with cane and knee immobilizer, educated regarding use. awaiting pickup at this time, vital signs stable, will continue to monitor for remainder of stay in ed.

## 2019-02-15 NOTE — ED PROVIDER NOTE - NSFOLLOWUPINSTRUCTIONS_ED_ALL_ED_FT
Follow up with your primary care provider within 48 hours  Follow up with an orthopedic doctor within 48 hours  Take Tylenol 650mg every 6 hours for pain  Return to the ER with any worsening or concerning symptoms, increased pain or swelling, fever/chills or any other concerns

## 2019-02-15 NOTE — ED ADULT TRIAGE NOTE - CHIEF COMPLAINT QUOTE
A&Ox3 c/o left knee pain pt states she slipped on water, denies loc or hitting head, no bruising or ecchymosis noted

## 2019-02-16 ENCOUNTER — EMERGENCY (EMERGENCY)
Facility: HOSPITAL | Age: 54
LOS: 1 days | Discharge: HOME | End: 2019-02-16
Attending: EMERGENCY MEDICINE | Admitting: EMERGENCY MEDICINE
Payer: MEDICARE

## 2019-02-16 VITALS
OXYGEN SATURATION: 98 % | HEART RATE: 78 BPM | TEMPERATURE: 98 F | DIASTOLIC BLOOD PRESSURE: 79 MMHG | SYSTOLIC BLOOD PRESSURE: 146 MMHG | RESPIRATION RATE: 18 BRPM

## 2019-02-16 VITALS
SYSTOLIC BLOOD PRESSURE: 120 MMHG | DIASTOLIC BLOOD PRESSURE: 75 MMHG | RESPIRATION RATE: 17 BRPM | OXYGEN SATURATION: 100 % | HEART RATE: 89 BPM | TEMPERATURE: 98 F

## 2019-02-16 PROCEDURE — 99283 EMERGENCY DEPT VISIT LOW MDM: CPT | Mod: 25

## 2019-02-16 RX ORDER — ACETAMINOPHEN 500 MG
650 TABLET ORAL ONCE
Qty: 0 | Refills: 0 | Status: COMPLETED | OUTPATIENT
Start: 2019-02-16 | End: 2019-02-16

## 2019-02-16 RX ADMIN — Medication 650 MILLIGRAM(S): at 04:44

## 2019-02-16 NOTE — ED PROVIDER NOTE - PRIOR HOSPITAL/ED VISITS SUMMARY FREE TEXT FOR MDM OBTAINED AND REVIEWED OLD RECORDS QUESTION
Pt seen in ER earlier this evening for similar complaints, XR negative at the time.  Pt is now requesting SW as he does not want to return to [facility] residence

## 2019-02-16 NOTE — ED ADULT NURSE NOTE - CHIEF COMPLAINT QUOTE
Pt FAB [facility], reports Pain on left Knee s/p Tripped and Fall yesterday and landed on affected area. Evaluated here earlier, D/C and transported to his Facility, But the staff refused to accept him , claimed He still complaining of Pain and wanted to be rechecked. Denies SI HI Hearing/Visual Hallucination.

## 2019-02-16 NOTE — ED PROVIDER NOTE - PROGRESS NOTE DETAILS
AJM: pt calm and cooperative, walking around ED, stable for dc to [facility], spoke with nurse elida who accepts pt. SW assisting with transport. spoke with mother with pts permission who agrees with plan as well.

## 2019-02-16 NOTE — ED ADULT NURSE NOTE - OBJECTIVE STATEMENT
Pt received to ER#3. Pt received to ER#3. Pt c/o left knee pain s/p fall 2 years ago. No swelling noted; pt was seen ambulating without assistance or difficulty in triage. Pt admits during nursing assessment that he hates [facility], that its just horrible. Pt requesting to see social work for shelter placement.

## 2019-02-16 NOTE — ED PROVIDER NOTE - MUSCULOSKELETAL, MLM
Spine appears normal, range of motion is not limited, no muscle or joint tenderness; left knee intact, from, no swelling or redness, no warmth or laxity.  Pt is weightbearing and ambulatory in ER.

## 2019-02-16 NOTE — ED PROVIDER NOTE - CLINICAL SUMMARY MEDICAL DECISION MAKING FREE TEXT BOX
54 y/o M with previous earlier ER visit for left knee pain s/p mechanical fall.  Pt now refusing to return to [facility].  No new complaints, no change to knee pain or new trauma.  Will give tylenol for pain, SATYA blevins in the AM for shelter placement.

## 2019-02-16 NOTE — ED ADULT NURSE REASSESSMENT NOTE - NS ED NURSE REASSESS COMMENT FT1
pt discharged, instructions provided by pa. patient able to ambulate with cane and immobilizer with assistance from ed staff.

## 2019-02-16 NOTE — ED ADULT TRIAGE NOTE - CHIEF COMPLAINT QUOTE
Pt FAB [facility], reports Pain on left Knee s/p Tripped and Fall yesterday and landed on affected area. Evaluated here earlier and transported to his FAcility, Butb the staff refused to accept him , claimed He still complaining of Pain and wanted to rechecked. Denies SI HI Hearing/Visual Hallucination. Pt FAB [facility], reports Pain on left Knee s/p Tripped and Fall yesterday and landed on affected area. Evaluated here earlier, D/C and transported to his Facility, But the staff refused to accept him , claimed He still complaining of Pain and wanted to be rechecked. Denies SI HI Hearing/Visual Hallucination.

## 2019-02-16 NOTE — ED PROVIDER NOTE - OBJECTIVE STATEMENT
54 y/o M with h/o HTN, hypothyroidism, bipolar disorder, resident of [facility] ([facility]) here with knee pain.  Pt reports that he has had pain to his left knee for 2 years.  Pt was seen in ER earlier this evening for pain to left knee, which he reported to be after a trip and fall getting out of bed.  Pt had negative XR at the time, dc home with knee immobilizer and cane.  Pt was also seen by SW at the time and offered shelter information, which he refused stating he would return to [facility].  Upon arrival at [facility], pt refused to leave ambulance and return.    Pt denies any new injuries.  He has since removed the knee immobilizer and is seen ambulating in the waiting room and results waiting while waiting for room.  Pt is now stating that his residence is "horrible" and is requesting to go to shelter as he does not want to return to [facility].

## 2019-02-16 NOTE — PROVIDER CONTACT NOTE (OTHER) - ASSESSMENT
SATYA contacted by medical team, Pt requires transport back to [facility]. SW informed by Medical Team Pt refused to leave ambulance. Pt is from [facility] [phone]. SATYA contacted Pt's residence and spoke with staff GARY Moses  who confirmed Pt is a resident, this is an outpatient program and the Pt travels independently. RN informs last night Pt complained of pain and [facility] refused to accept him at that time. SATYA contacted by medical team, Pt requires transport back to [facility]. SATYA informed by Medical Team Pt refused to leave ambulance last night. Pt is from [facility] [phone]. SATYA contacted Pt's residence and spoke with staff GARY Moses  who confirmed Pt is a resident, this is an outpatient program and the Pt travels independently. RN informs last night Pt complained of pain and [facility] refused to accept him back at that time. SATYA met with MD who confirmed Pt is stable, has been walking without issue.  SATYA provided MD with residence information to speak with RN.  SATYA contacted [facility] spoke with Sarah who provided auth # [id number] for [facility] by 11:30am.  SATYA contacted Pt Support Mgr. Adrienne Watkins [id number] acquired clothing for Pt to travel. Pt and medical staff are aware and in agreement with plan. All questions or concerns were addressed with to satisfaction, No further SW intervention required at this time.

## 2019-02-19 ENCOUNTER — EMERGENCY (EMERGENCY)
Facility: HOSPITAL | Age: 54
LOS: 1 days | Discharge: HOME | End: 2019-02-19
Attending: EMERGENCY MEDICINE | Admitting: EMERGENCY MEDICINE
Payer: MEDICARE

## 2019-02-19 VITALS
SYSTOLIC BLOOD PRESSURE: 179 MMHG | DIASTOLIC BLOOD PRESSURE: 88 MMHG | TEMPERATURE: 98 F | OXYGEN SATURATION: 100 % | HEART RATE: 100 BPM | RESPIRATION RATE: 18 BRPM

## 2019-02-19 PROCEDURE — 99283 EMERGENCY DEPT VISIT LOW MDM: CPT

## 2019-02-19 PROCEDURE — 90792 PSYCH DIAG EVAL W/MED SRVCS: CPT

## 2019-02-19 NOTE — ED ADULT NURSE REASSESSMENT NOTE - NS ED NURSE REASSESS COMMENT FT1
pt calm & cooperative back to baseline pt calm & cooperative back to baseline pt d/c by MD pt currently denies si/hi/avh, pt placed in a cab by PES for transportation back to [facility].

## 2019-02-19 NOTE — ED PROVIDER NOTE - OBJECTIVE STATEMENT
pt P/W auditory hallucinations, with no complaints SI, HI. On interview, pt is A+O x 3, he is calm and cooperative, makes good eye contact, mood subjectively "good", affect is euthymic. Thought processes are concrete but grossly linear. He states that he hears voices telling him to end his life intermittently for many years, but he adamantly denies active plan or intent. He denies that the voices tell him to kill himself by a particular method. He states that he has been able to resist the voices. He states he's tired of his current living arrangement at [place] and says he would like to transition to an apartment program. He states he is not being bullied at his residence and generally gets along well with peers and staff there. Asked what makes life worth living, he rattles off a long list of family members include nieces and nephews he says he loves very much. He reports good mood and denies anhedonia. He reports compliance with his medications, and says he's sleeping and eating well. Reports good energy. He denies HI/I/P, denies current intent or plan to end his life. He denies access to guns, he is able to safety plan. He says he's looking forward to transitioning into an apartment program.

## 2019-02-19 NOTE — ED ADULT TRIAGE NOTE - CHIEF COMPLAINT QUOTE
Pt from [facility] sent for hearing voices, being sexual, and non compliant with meds.  PMH bipolar, schizophrenia, depression

## 2019-02-19 NOTE — ED BEHAVIORAL HEALTH ASSESSMENT NOTE - RISK ASSESSMENT
Chronic risks include past hospitalizations, past SA, chronic psychotic sx including CAH at his baseline, unemployment. Protective factors includes denies intent or plan to end his life, reporting ability to resist voices he hears telling him to end his life, being future orientated, complaint w tx, lives in supportive housing, no access to means/weapons. Pt appears at baseline, and risk is not elevated to justify inpatient hospitalization. Furthermore, inpatient hospitalization would further institutionalize and would inhibit successful transition to community life. Pt does not pose an imminent danger to self or others and does not meet criteria for involuntary psychiatric admission. Chronic risks include past hospitalizations, past SA, chronic psychotic sx including a hx of CAH at his baseline, unemployment. Protective factors includes denies intent or plan to end his life, reporting ability to resist voices he hears telling him to end his life, being future orientated, complaint w tx, lives in supportive housing, no access to means/weapons. Pt appears at baseline, and risk is not elevated to justify inpatient hospitalization. Furthermore, inpatient hospitalization would further institutionalize and would inhibit successful transition to community life. Pt does not pose an imminent danger to self or others and does not meet criteria for involuntary psychiatric admission.

## 2019-02-19 NOTE — ED BEHAVIORAL HEALTH ASSESSMENT NOTE - SUMMARY
Patient is a 53 year old single disabled non-caregiver  male currently residing in [facility]. PPH Schizoaffective Disorder & Cannabis Use Disorder. He has a history of multiple inpatient admissions discharged most recently from [facility] 8/24/18-10/11/18, prior to this was [facility] inpatient 11/17/17-8/8/18. He has a history of 1 past suicide attempt at age 26 via OD. Past history of cannabis/crack/alcohol use; denies recent drug use. No known history of detox/rehab. No current legal issues.  PMH hypothyroidism, HLD, HTN, enuresis, s/p perianal fistulectomy 7/17 & BPH. BIB EMS called by the psychiatrist on call because the patient made a complaint about CAH telling him to hurt himself. On interview, pt is A+O x 3, he is calm and cooperative, makes good eye contact, mood subjectively "good", and his affect is objectively euthymic. Thought processes are concrete but grossly linear. Pt denies paranoid delusions. Endorses intermittent CAH telling him to end his life but denies specific plan or intent. Reality testing is fairly intact. The patient has CAH at his baseline. Patient is a 53 year old single disabled non-caregiver  male currently residing in [facility]. PPH Schizoaffective Disorder & Cannabis Use Disorder. He has a history of multiple inpatient admissions discharged most recently from [facility] 8/24/18-10/11/18, prior to this was [place] inpatient 11/17/17-8/8/18. He has a history of 1 past suicide attempt at age 26 via OD. Past history of cannabis/crack/alcohol use; denies recent drug use. No known history of detox/rehab. No current legal issues.  PMH hypothyroidism, HLD, HTN, enuresis, s/p perianal fistulectomy 7/17 & BPH. BIB EMS called by staff as the patient made a complaint about AH, no reports of command type AH. On interview, pt is A+O x 3, he is calm and cooperative, makes good eye contact, mood subjectively "good", and his affect is objectively euthymic. Thought processes are concrete but grossly linear. Pt denies paranoid delusions. Endorses a history of chronic AH, with a past report of CAH not acutely psychotic at this time with no SI or HI. Reality testing is fairly intact. The patient is offered but declines voluntary admission and does not meet criteria for involuntary hospitalization.

## 2019-02-19 NOTE — ED BEHAVIORAL HEALTH NOTE - BEHAVIORAL HEALTH NOTE
As per psychiatric clearance by MD, Worker was instructed to call for transport with [facility] services. Worker called [phone] and spoke with Kerry who arranged for patient to return back to [address] [place] [address], with 8/11 transit pick 7:30pm with trip #[phone]. As per psychiatric clearance by MD, Worker was instructed to call for transport with livery services. Worker called [phone] and spoke with Kerry who arranged for patient to return back to [address] [place] [address], with 8/11 transit pick 7:30pm with trip #[phone].   Worker received a call back from8/11 transit stating that patient refused to take cab back to residence and walked back into facility. Patient was escorted to bus stop by security because he declined to take livery services. Worker discussed case with psychiatric team. As per psychiatric clearance by MD, Worker was instructed to call for transport with livery services. Worker called [phone] and spoke with Kerry who arranged for patient to return back to [address] [place] [address], with 8/11 transit pick 7:30pm with trip #[phone].   Worker received a call back from8/11 transit stating that patient refused to take cab back to residence and walked back into facility. Patient was escorted to bus stop by security because he declined to take livery services. Worker discussed case with psychiatric team. Patient has a known history of traveling independently. [facility] was informed that patient will be returning back to residence as per evaluating psychiatrist.

## 2019-02-19 NOTE — ED BEHAVIORAL HEALTH ASSESSMENT NOTE - DESCRIPTION
During course of ED visit patient was calm and cooperative. Patient was not aggressive or violent and did not require PRN medications.     ICU Vital Signs Last 24 Hrs  T(C): 36.8 (23 Nov 2018 17:58), Max: 36.8 (23 Nov 2018 17:58)  T(F): 98.3 (23 Nov 2018 17:58), Max: 98.3 (23 Nov 2018 17:58)  HR: 70 (23 Nov 2018 17:58) (70 - 70)  BP: 117/75 (23 Nov 2018 17:58) (117/75 - 117/75)  BP(mean): --  ABP: --  ABP(mean): --  RR: 18 (23 Nov 2018 17:58) (18 - 18)  SpO2: 99% (23 Nov 2018 17:58) (99% - 99%) hypothyroidism, HLD, HTN, enuresis, s/p perianal fistulectomy 7/17 & BPH Single. No kids. Completed 11th grade of high school During course of ED visit patient was calm and cooperative. Patient was not aggressive or violent and did not require PRN medications.   Vital Signs Last 24 Hrs  T(C): 36.4 (19 Feb 2019 15:57), Max: 36.4 (19 Feb 2019 15:57)  T(F): 97.6 (19 Feb 2019 15:57), Max: 97.6 (19 Feb 2019 15:57)  HR: 100 (19 Feb 2019 15:57) (100 - 100)  BP: 179/88 (19 Feb 2019 15:57) (179/88 - 179/88)  BP(mean): --  RR: 18 (19 Feb 2019 15:57) (18 - 18)  SpO2: 100% (19 Feb 2019 15:57) (100% - 100%) During course of ED visit patient was calm and cooperative. Patient was not aggressive or violent and did not require PRN medications.     Vital Signs Last 24 Hrs  T(C): 36.4 (19 Feb 2019 15:57), Max: 36.4 (19 Feb 2019 15:57)  T(F): 97.6 (19 Feb 2019 15:57), Max: 97.6 (19 Feb 2019 15:57)  HR: 100 (19 Feb 2019 15:57) (100 - 100)  BP: 179/88 (19 Feb 2019 15:57) (179/88 - 179/88)  BP(mean): --  RR: 18 (19 Feb 2019 15:57) (18 - 18)  SpO2: 100% (19 Feb 2019 15:57) (100% - 100%)

## 2019-02-19 NOTE — ED BEHAVIORAL HEALTH ASSESSMENT NOTE - HPI (INCLUDE ILLNESS QUALITY, SEVERITY, DURATION, TIMING, CONTEXT, MODIFYING FACTORS, ASSOCIATED SIGNS AND SYMPTOMS)
53 year-old single disabled non-caregiver  male currently residing in [facility] with a PPH of schizoaffective disorder and cannabis use disorder with a history of multiple inpatient admissions discharged most recently from [facility] 8/24/18-10/11/18, before this he was at [facility] 11/17/17-8/8/18. He has a history of 1 past suicide attempt at age 26 via OD. Past history of cannabis/crack/alcohol use; denies recent drug use. No known history of detox/rehab. No current legal issues.  PMH hypothyroidism, HLD, HTN, enuresis, s/p perianal fistulectomy 7/17 & BPH. BIB EMS called by the on-call psychiatrist after the patient told the psychiatrist that he was hearing CAH telling him to hurt himself.     On interview, pt is A+O x 3, he is calm and cooperative, makes good eye contact, mood subjectively "good", affect is euthymic. Thought processes are concrete but grossly linear. He states that he hears voices telling him to end his life intermittently for many years, but he adamantly denies active plan or intent. He denies that the voices tell him to kill himself by a particular method. He states that he has been able to resist the voices. He states he's tired of his current living arrangement at [place] and says he would like to transition to an apartment program. He states he is not being bullied at his residence and generally gets along well with peers and staff there. Asked what makes life worth living, he rattles off a long list of family members include nieces and nephews he says he loves very much. He reports good mood and denies anhedonia. He reports compliance with his medications, and says he's sleeping and eating well. Reports good energy. He denies HI/I/P, denies current intent or plan to end his life. He denies access to guns, he is able to safety plan. He says he's looking forward to transitioning into an apartment program.    An attempt at obtaining collateral was made by contacting Dr. Young at [phone]. A voicemail was left. 53 year-old single disabled non-caregiver  male currently residing in [facility] with a PPH of schizoaffective disorder and cannabis use disorder with a history of multiple inpatient admissions discharged most recently from [facility] 8/24/18-10/11/18, before this he was at [facility] inpatient 11/17/17-8/8/18. He has a history of 1 past suicide attempt at age 26 via OD. Past history of cannabis/crack/alcohol use; denies recent drug use. No known history of detox/rehab. No current legal issues.  PMH hypothyroidism, HLD, HTN, enuresis, s/p perianal fistulectomy 7/17 & BPH. BIB EMS called by the staff at [facility] after the patient reported auditory hallucinations.     On interview, pt is A+O x 3, he is calm and cooperative, makes good eye contact, mood subjectively "good", affect is euthymic. Thought processes are concrete but grossly linear. He states that he hears voices telling him to end his life intermittently for many years, but he adamantly denies active plan or intent. He denies that the voices tell him to kill himself by a particular method. He states that he has been able to resist the voices. He states he's tired of his current living arrangement at [place] and says he would like to transition to an apartment program. He states he is not being bullied at his residence and generally gets along well with peers and staff there. Asked what makes life worth living, he rattles off a long list of family members include nieces and nephews he says he loves very much. He reports good mood and denies anhedonia. He reports compliance with his medications, and says he's sleeping and eating well. Reports good energy. He denies HI/I/P, denies current intent or plan to end his life. He denies access to guns, he is able to safety plan. He says he's looking forward to transitioning into an apartment program.    An attempt at obtaining collateral was made by contacting Dr. Young at [phone] but not reached as after hours. 53 year-old single disabled non-caregiver  male currently residing in [facility] with a PPH of schizoaffective disorder and cannabis use disorder with a history of multiple inpatient admissions discharged most recently from [facility] 8/24/18-10/11/18, before this he was at [facility] inpatient 11/17/17-8/8/18. He has a history of 1 past suicide attempt at age 26 via OD. Past history of cannabis/crack/alcohol use; denies recent drug use. No known history of detox/rehab. No current legal issues.  PMH hypothyroidism, HLD, HTN, enuresis, s/p perianal fistulectomy 7/17 & BPH. BIB EMS called by the staff at [facility] after the patient reported auditory hallucinations.     On interview, pt is A+O x 3, he is calm and cooperative, makes good eye contact, mood subjectively "good", affect is euthymic. Thought processes are concrete but grossly linear. He states that he hears voices telling him to end his life intermittently for many years, but he adamantly denies acute AH at this time and denies active plan or intent. He denies that the voices tell him to kill himself by a particular method. He states that he has been able to resist the voices. He states he's tired of his current living arrangement at [place] and says he would like to transition to an apartment program.  Patient states he "just needed to get a break" from residence and denies any acute safety concerns.  He states he is not being bullied at his residence and generally gets along well with peers and staff there but states not liking to live there.  Asked what makes life worth living, he rattles off a long list of family members and expresses hope to eventually change residence.  He reports good mood and denies anhedonia.  He reports compliance with his medications, and says he's sleeping and eating well.  Reports good energy.  He denies HI/I/P, denies current intent or plan to end his life.  He denies access to guns, he is able to safety plan.  He says he's looking forward to transitioning into an apartment program.    An attempt at obtaining collateral was made by contacting Dr. Young at [phone] but not reached as after hours.    Care is coordinated with RN at [facility] who reports that patient has been taking meds and self reported hearing voices and as a result 911 was activated.  No reports of SI or HI or acute safety concerns are expressed. 53 year-old single disabled non-caregiver  male currently residing in [facility], with multiple ED visits noted with a PPH of schizoaffective disorder and cannabis use disorder with a history of multiple inpatient admissions discharged most recently from [place] [address] 8/24/18-10/11/18, before this he was at [place] inpatient 11/17/17-8/8/18. He has a history of 1 past suicide attempt at age 26 via OD. Past history of cannabis/crack/alcohol use; denies recent drug use. No known history of detox/rehab. No current legal issues.  PMH hypothyroidism, HLD, HTN, enuresis, s/p perianal fistulectomy 7/17 & BPH. BIB EMS called by the staff at [facility] after the patient reported auditory hallucinations.     On interview, pt is A+O x 3, he is calm and cooperative, makes good eye contact, mood subjectively "good", affect is euthymic. Thought processes are concrete but grossly linear. He states that he hears voices telling him to end his life intermittently for many years, but he adamantly denies acute AH at this time and denies active plan or intent. He denies that the voices tell him to kill himself by a particular method. He states that he has been able to resist the voices. He states he's tired of his current living arrangement at [place] and says he would like to transition to an apartment program.  Patient states he "just needed to get a break" from residence and denies any acute safety concerns.  He states he is not being bullied at his residence and generally gets along well with peers and staff there but states not liking to live there.  Asked what makes life worth living, he rattles off a long list of family members and expresses hope to eventually change residence.  He reports good mood and denies anhedonia.  He reports compliance with his medications, and says he's sleeping and eating well.  Reports good energy.  He denies HI/I/P, denies current intent or plan to end his life.  He denies access to guns, he is able to safety plan.  He says he's looking forward to transitioning into an apartment program.    An attempt at obtaining collateral was made by contacting Dr. Young at [phone] but not reached as after hours.    Care is coordinated with RN at [facility] who reports that patient has been taking meds and self reported hearing voices and as a result 911 was activated.  No reports of SI or HI or acute safety concerns are expressed.

## 2019-02-19 NOTE — ED PROVIDER NOTE - CLINICAL SUMMARY MEDICAL DECISION MAKING FREE TEXT BOX
pt p/w auditory hallucinations with no chronic basis. nothing acute. pt stable with no acute changes. no si hi/ no acute psychosis. stable for d/c to [facility]. cleared by psych.

## 2019-02-19 NOTE — ED BEHAVIORAL HEALTH ASSESSMENT NOTE - SUICIDE PROTECTIVE FACTORS
Responsibility to family and others/Identifies reasons for living/Supportive social network or family/Future oriented Future oriented/Supportive social network or family/Responsibility to family and others/Identifies reasons for living/Other

## 2019-02-19 NOTE — ED ADULT NURSE NOTE - OBJECTIVE STATEMENT
Received pt in  pt c/o  pt denies Si/Hi/Vh presently emotional reassurance provided safety & comfort measures maintained eval on going.

## 2019-02-19 NOTE — ED BEHAVIORAL HEALTH ASSESSMENT NOTE - REFERRAL / APPOINTMENT DETAILS
continue follow up with outpatient team continue follow up with outpatient team at [facility] at [address] on [place] grounds continue follow up with outpatient team at [facility] at [address] on [place] grounds within 7 days.

## 2019-02-19 NOTE — ED BEHAVIORAL HEALTH ASSESSMENT NOTE - OTHER
peers hx of chronic mental illness recent transition back to community life from inpatient concrete staff at [facility] records from [facility] and medication records denies acute AH at this time, does not appear to be internally preoccupied recent transition back to community life from inpatient, dissatisfied with housing medication compliance, on a LUBIN Provided support, reassurance and psychoeducation.

## 2019-02-19 NOTE — ED BEHAVIORAL HEALTH ASSESSMENT NOTE - DETAILS
pt has remote hx of SA 20 yrs ago, no recent attempts or self injury Crohn's disease mother vague CAH to end his life -- patient reports he does not want to end his life and is able to ignore the CAH Voicemail left for Dr. Young care coordinated with RN, Ms. Ta, at [facility] and d/w EM provider, Dr. Ybarra

## 2019-02-19 NOTE — ED ADULT NURSE NOTE - CHIEF COMPLAINT QUOTE
Pt from [facility] sent for hearing voices, being sexual, and non compliant with meds.  PMH bipolar, schizophrenia, depression

## 2019-02-19 NOTE — ED BEHAVIORAL HEALTH ASSESSMENT NOTE - SAFETY PLAN DETAILS
patient advised to return to ED or call 911 for any worsening symptoms and patient agreed. Call [phone] if you need to speak with someone 24/7

## 2019-02-19 NOTE — ED BEHAVIORAL HEALTH ASSESSMENT NOTE - CURRENT MEDICATION
Metoprolol Suc 50mg XL QAM, Omeprazole 20mg BID, Lactulose 30 mL BId, Bisacodyl 5mg 2 tabs BID, Levothyroxine 0.1mg/0.75mg tablet QAM, Gabapentin 300mg BID, Magnesium Oxide 500mg 1 tab Q48 hours, Desmopressin 0.2mg QHS Nicotine Gum 2mg Q8 hours, Clozapine 100mg QAM; 250mg QHS, Lithium 300mg QAM; 600mg QHS, Metoprolol Suc 50mg XL QAM, Omeprazole 20mg BID, Senna 8.6mg 2 tabs daily, Levothyroxine 175mcg tablet QAM, Gabapentin 300mg BID, Magnesium Oxide 250mg daily, Finasteride 5mg daily, Desmopressin 0.2mg QHS Nicotine Gum 2mg Q8 hours, Clozapine 100mg QHS, Lithium 300mg QPM, Seroquel 600mg QPM, Depakote ER 1250mg HS, Cogentin 1mg QHS, Haldol Dec q4wks given on 2/6/19 (next due 3/6/19), Ventolin prn wheezing

## 2019-02-24 ENCOUNTER — EMERGENCY (EMERGENCY)
Facility: HOSPITAL | Age: 54
LOS: 1 days | Discharge: HOME | End: 2019-02-24
Attending: EMERGENCY MEDICINE | Admitting: EMERGENCY MEDICINE
Payer: MEDICARE

## 2019-02-24 VITALS
HEART RATE: 92 BPM | DIASTOLIC BLOOD PRESSURE: 99 MMHG | OXYGEN SATURATION: 97 % | SYSTOLIC BLOOD PRESSURE: 128 MMHG | RESPIRATION RATE: 16 BRPM | TEMPERATURE: 97 F

## 2019-02-24 PROCEDURE — 99282 EMERGENCY DEPT VISIT SF MDM: CPT | Mod: 25

## 2019-02-24 NOTE — ED ADULT TRIAGE NOTE - CHIEF COMPLAINT QUOTE
Pt arrives to ED via EMS from [facility].  Pt states he feels he "needs his ears cleaned and an iv for dehydration."  Pt arrives smelling of urine.  Pt denies ETOH or drug use.  Pt denies SI/HI.  Pt to be changed in  and returned to triage.  Pt denies pain.  Pt does not appear in distress.  Pt calm and cooperative.

## 2019-02-24 NOTE — ED ADULT NURSE NOTE - CHIEF COMPLAINT QUOTE
Pt arrives to ED via EMS from [facility].  Pt states he feels he "needs his ears cleaned and an iv for dehydration."  Pt arrives smelling of urine.  Pt denies ETOH or drug use.  Pt denies SI/HI.  Pt to be changed in  and returned to triage.  Pt denies pain.  Pt does not appear in distress.  Pt calm and cooperative.

## 2019-02-24 NOTE — ED ADULT NURSE NOTE - OBJECTIVE STATEMENT
Pt received to room 12 a/o x 3 c/o fluid sensation in his ear for "a while" and need for IV fluids for dehydration. Pt states he need fluids and his ears cleaned. Pt denies SI/HI.  Pt denies visual or auditory hallucinations or other complaints, drug or ETOH use. Respirations even and unlabored. Lung sounds clear with equal chest rise bilaterally. ABD is soft, non tender, non distended with normal active bowel sounds. No complaints of chest pain, headache, nausea, dizziness, vomiting  SOB, fever, chills verbalized. Pt resting comfortably in bed. NAD

## 2019-02-24 NOTE — ED ADULT NURSE NOTE - NSIMPLEMENTINTERV_GEN_ALL_ED
Implemented All Universal Safety Interventions:  [place] to call system. Call bell, personal items and telephone within reach. Instruct patient to call for assistance. Room bathroom lighting operational. Non-slip footwear when patient is off stretcher. Physically safe environment: no spills, clutter or unnecessary equipment. Stretcher in lowest position, wheels locked, appropriate side rails in place.

## 2019-02-24 NOTE — ED PROVIDER NOTE - NS ED ROS FT
ROS:  GENERAL: No fever, no chills  EYES: no change in vision  HEENT: no trouble swallowing, no trouble speaking, no ear pain or drainage  CARDIAC: no chest pain  PULMONARY: no cough, no shortness of breath  GI: no abdominal pain, no nausea, no vomiting, no diarrhea, no constipation  : No dysuria, no frequency, no change in appearance, or odor of urine  SKIN: no rashes  NEURO: no headache, no weakness  MSK: No joint pain

## 2019-02-24 NOTE — ED PROVIDER NOTE - NSFOLLOWUPINSTRUCTIONS_ED_ALL_ED_FT
Please follow up with your regular doctor.   Please return for pain, fevers, vomiting, or any new/worse symptom.

## 2019-02-24 NOTE — ED ADULT NURSE REASSESSMENT NOTE - NS ED NURSE REASSESS COMMENT FT1
Pt a/o x 3. Pt discharged by Dr. schmitz . Pt given written and verbal instructions by MD. Pt given metrocard and belongings returned.  Pt ambulated to exit
Pt clothing changed by staff in .
Pt respirations even and unlabored.  Pt repositioned as aspiration precaution.

## 2019-02-24 NOTE — ED PROVIDER NOTE - OBJECTIVE STATEMENT
52 y/o m presents requesting his ears to be drained and iv fluids. Patient states he feels well, does not have any ear pain or drainage, has been eating and drinking normally, has not had any fevers, chills, ha, cp, sob, abd pain, vomiting, diarrhea, dysuria. when asked why he felt he needs his ears drained and fluids, he responds he doesn't know. denies any drug or alcohol use.

## 2019-02-24 NOTE — ED PROVIDER NOTE - CLINICAL SUMMARY MEDICAL DECISION MAKING FREE TEXT BOX
Patient presented requesting ears to be drained and iv fluids. No abnormalities on exam, well appearing, no c/o ear pain or drainage, tolerating po without difficulty, no systemic symptoms, ok for dc with pcp f/u. Patient presented requesting ears to be drained and iv fluids. No abnormalities on exam, tms nl, eacs nl, minimal wax. well appearing, no c/o ear pain or drainage, tolerating po without difficulty, no systemic symptoms, discussed with patient no abnl findings on exam and no need for fluids or ear drainage. Patient ok with plan to dc home. Instructed to f/u with his pcp in next few days. ok for dc with pcp f/u.

## 2019-03-11 ENCOUNTER — EMERGENCY (EMERGENCY)
Facility: HOSPITAL | Age: 54
LOS: 1 days | Discharge: HOME | End: 2019-03-11
Admitting: EMERGENCY MEDICINE
Payer: MEDICAID

## 2019-03-11 VITALS
OXYGEN SATURATION: 99 % | TEMPERATURE: 97 F | HEART RATE: 79 BPM | RESPIRATION RATE: 18 BRPM | SYSTOLIC BLOOD PRESSURE: 107 MMHG | DIASTOLIC BLOOD PRESSURE: 71 MMHG

## 2019-03-11 VITALS
OXYGEN SATURATION: 98 % | HEART RATE: 74 BPM | RESPIRATION RATE: 18 BRPM | DIASTOLIC BLOOD PRESSURE: 72 MMHG | SYSTOLIC BLOOD PRESSURE: 105 MMHG

## 2019-03-11 LAB
ALBUMIN SERPL ELPH-MCNC: 3.7 G/DL — SIGNIFICANT CHANGE UP (ref 3.3–5)
ALP SERPL-CCNC: 118 U/L — SIGNIFICANT CHANGE UP (ref 40–120)
ALT FLD-CCNC: 21 U/L — SIGNIFICANT CHANGE UP (ref 4–41)
ANION GAP SERPL CALC-SCNC: 11 MMO/L — SIGNIFICANT CHANGE UP (ref 7–14)
APAP SERPL-MCNC: < 15 UG/ML — LOW (ref 15–25)
AST SERPL-CCNC: 15 U/L — SIGNIFICANT CHANGE UP (ref 4–40)
BASOPHILS # BLD AUTO: 0.08 K/UL — SIGNIFICANT CHANGE UP (ref 0–0.2)
BASOPHILS NFR BLD AUTO: 1.7 % — SIGNIFICANT CHANGE UP (ref 0–2)
BILIRUB SERPL-MCNC: 0.4 MG/DL — SIGNIFICANT CHANGE UP (ref 0.2–1.2)
BUN SERPL-MCNC: 27 MG/DL — HIGH (ref 7–23)
CALCIUM SERPL-MCNC: 9.9 MG/DL — SIGNIFICANT CHANGE UP (ref 8.4–10.5)
CHLORIDE SERPL-SCNC: 105 MMOL/L — SIGNIFICANT CHANGE UP (ref 98–107)
CO2 SERPL-SCNC: 29 MMOL/L — SIGNIFICANT CHANGE UP (ref 22–31)
CREAT SERPL-MCNC: 0.98 MG/DL — SIGNIFICANT CHANGE UP (ref 0.5–1.3)
EOSINOPHIL # BLD AUTO: 0.16 K/UL — SIGNIFICANT CHANGE UP (ref 0–0.5)
EOSINOPHIL NFR BLD AUTO: 3.5 % — SIGNIFICANT CHANGE UP (ref 0–6)
ETHANOL BLD-MCNC: < 10 MG/DL — SIGNIFICANT CHANGE UP
GLUCOSE SERPL-MCNC: 114 MG/DL — HIGH (ref 70–99)
HCT VFR BLD CALC: 46 % — SIGNIFICANT CHANGE UP (ref 39–50)
HGB BLD-MCNC: 14.2 G/DL — SIGNIFICANT CHANGE UP (ref 13–17)
IMM GRANULOCYTES NFR BLD AUTO: 0.2 % — SIGNIFICANT CHANGE UP (ref 0–1.5)
LYMPHOCYTES # BLD AUTO: 1.16 K/UL — SIGNIFICANT CHANGE UP (ref 1–3.3)
LYMPHOCYTES # BLD AUTO: 25.2 % — SIGNIFICANT CHANGE UP (ref 13–44)
MCHC RBC-ENTMCNC: 27.7 PG — SIGNIFICANT CHANGE UP (ref 27–34)
MCHC RBC-ENTMCNC: 30.9 % — LOW (ref 32–36)
MCV RBC AUTO: 89.7 FL — SIGNIFICANT CHANGE UP (ref 80–100)
MONOCYTES # BLD AUTO: 0.48 K/UL — SIGNIFICANT CHANGE UP (ref 0–0.9)
MONOCYTES NFR BLD AUTO: 10.4 % — SIGNIFICANT CHANGE UP (ref 2–14)
NEUTROPHILS # BLD AUTO: 2.71 K/UL — SIGNIFICANT CHANGE UP (ref 1.8–7.4)
NEUTROPHILS NFR BLD AUTO: 59 % — SIGNIFICANT CHANGE UP (ref 43–77)
NRBC # FLD: 0 K/UL — LOW (ref 25–125)
PLATELET # BLD AUTO: 266 K/UL — SIGNIFICANT CHANGE UP (ref 150–400)
PMV BLD: 9.7 FL — SIGNIFICANT CHANGE UP (ref 7–13)
POTASSIUM SERPL-MCNC: 4.2 MMOL/L — SIGNIFICANT CHANGE UP (ref 3.5–5.3)
POTASSIUM SERPL-SCNC: 4.2 MMOL/L — SIGNIFICANT CHANGE UP (ref 3.5–5.3)
PROT SERPL-MCNC: 6.3 G/DL — SIGNIFICANT CHANGE UP (ref 6–8.3)
RBC # BLD: 5.13 M/UL — SIGNIFICANT CHANGE UP (ref 4.2–5.8)
RBC # FLD: 14.2 % — SIGNIFICANT CHANGE UP (ref 10.3–14.5)
SALICYLATES SERPL-MCNC: < 5 MG/DL — LOW (ref 15–30)
SODIUM SERPL-SCNC: 145 MMOL/L — SIGNIFICANT CHANGE UP (ref 135–145)
TSH SERPL-MCNC: 3.09 UIU/ML — SIGNIFICANT CHANGE UP (ref 0.27–4.2)
VALPROATE SERPL-MCNC: 46.3 UG/ML — LOW (ref 50–100)
WBC # BLD: 4.6 K/UL — SIGNIFICANT CHANGE UP (ref 3.8–10.5)
WBC # FLD AUTO: 4.6 K/UL — SIGNIFICANT CHANGE UP (ref 3.8–10.5)

## 2019-03-11 PROCEDURE — 99283 EMERGENCY DEPT VISIT LOW MDM: CPT

## 2019-03-11 NOTE — ED ADULT TRIAGE NOTE - CHIEF COMPLAINT QUOTE
pt BIBA from [facility], as per EMS, 911 was called because pt has been refusing meds and is refusing to speak to anyone.

## 2019-03-11 NOTE — ED PROVIDER NOTE - CLINICAL SUMMARY MEDICAL DECISION MAKING FREE TEXT BOX
54 y/o male pt presents to [facility] from [place] for psych eval. 54 y/o male pt presents to [facility] from [place] for psych eval.  Collateral obtained by .  Physical examination completed and unremarkable for any acute issues/problems requiring immediate interventions.  PT remained calm and cooperative in the  area at all times.  Pt cleared for discharge awaiting EMS for transport back to [place].

## 2019-03-11 NOTE — ED ADULT NURSE NOTE - CHIEF COMPLAINT QUOTE
pt BIBA from [facility], as per EMS, 911 was called because pt has been refusing meds and is refusing to speak to anyone.

## 2019-03-11 NOTE — ED BEHAVIORAL HEALTH NOTE - BEHAVIORAL HEALTH NOTE
Pt is a 53 year old male. Pt BIB EMS from [facility] ([phone]) for refusing to speak with staff. Writer contacted [facility] and spoke with Mingo High ([phone]) who provided the following information:     Pt is current resident of [facility] and pending transfer to [facility] (ext. [phone]). Pt has hx of mental illness with multiple past psychiatric admissions. Pt last hospitalized at [facility] from 3/5-3/9. Pt was reported to have not been admitted but remained in ED for medical evaluation. Pt then seen by psychiatry after refusing to leave. Pt reported to be diagnosed with Osteoarthritis of left knee. Pt reported to be stating that he "can't walk" with leg pain. Pt also seen in [facility] ED on 3/4 after being sent by [facility] for inappropriate touching of female peers. Pt was reported to be evaluated and sent home same day. Last potential psychiatric hospitalization was reported to be in January or February 2019. Actual date unable to be provided. Medication list sent with pt. Pt reported to be taking medications on his own schedule but refusing if encouraged to by staff. [facility] staff stated that pt was unresponsive, not focusing, and not speaking when approached by staff in his room today. Pt reported to be not reacting despite verbal engagement. Staff states pt also presented as "delusional". Writer asked staff member to further clarify delusions, in which it was reported that upon arrival of ambulance pt reported that he could not walk. No AH, VH, SI or HI reported. No recent suicidal behaviors or attempts reported. Pt has no reported access to firearms. No known active substance use. Pt was also reported to not be exhibiting inappropriate behaviors to peers on this day. No additional safety concerns or questions reported. Pt is currently responding to staff on unit.     Pt to be discharged upon medical clearance. [facility] staff member, Mingo High, contacted and notified via .

## 2019-03-11 NOTE — ED ADULT NURSE NOTE - OBJECTIVE STATEMENT
Pt received in  BIBA from [facility], c/o psych eval, pt denies SI,HI&AH. Pt denies ETOH or substance use. Pt endorse noncompliance to medications, stating "I don't wanna be in [place] anymore". Pt presented calm and cooperative, difficulty with ambulated noted. safety and comfort measures maintained.

## 2019-03-11 NOTE — ED ADULT TRIAGE NOTE - NS_BH TRG QUESTION3_ED_ALL_ED
Immediate Brief Procedure Note  Patient Name:  Deirdre Floyd  YOB: 1928  DATE OF PROCEDURE : 5/25/2017  PROCEDURALIST: Anatoly Benitez MD  ASSISTANT(S):  None  ANESTHESIA TYPE:  Moderate sedation.  ANESTHESIOLOGIST:  None    PROCEDURE PERFORMED:  Fluid collection drain placement.    Pre-procedure Dx:   Patient Active Problem List   Diagnosis   • Insomnia   • GERD (gastroesophageal reflux disease)   • Constipation   • COPD (chronic obstructive pulmonary disease) (CMS/HCC)   • Hypertension   • Hyperlipidemia   • Glaucoma   • Iron deficiency   • CAD (coronary artery disease)   • Incontinence in female   • Contusion of left knee   • Hyponatremia   • Acute UTI   • SOB (shortness of breath)   • Tinea corporis   • S/P TAVR (transcatheter aortic valve replacement)   • Syncope   • Hypertensive urgency   • Chronic kidney disease, stage III (moderate)   • Renal artery stenosis (CMS/HCC)   • Orthostatic hypotension   • S/P TAVR (transcatheter aortic valve replacement)   • 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     Estimated Blood Loss: less than 5 ml    Complications:  None    Specimens Removed: Yes.    
No

## 2019-03-11 NOTE — ED PROVIDER NOTE - OBJECTIVE STATEMENT
52 y/o male pt presents to [facility] from [place] for psych eval.  PT presents irritable, stating he wants some juice to drink.  PT provided with apple juice and then stated: "I hate it at [place], I don't want to be there".  When asked why he wasn't talking to staff there, pt replied: "because I don't want to".  PT also stated that he is taking his medications.  PT reports having difficulty walking, but no pain.  As per collateral obtained by   from [place], this was addressed at [facility] last week when pt was admitted and diagnosed with arthritis.  Pt is alert and oriented x 3, denies SI/HI/AH/VH.  Pt is cooperative in  area and denies any other c/o pain or discomfort.

## 2019-03-21 LAB — CLOZAPINE SERPL-MCNC: SIGNIFICANT CHANGE UP

## 2019-03-23 ENCOUNTER — EMERGENCY (EMERGENCY)
Facility: HOSPITAL | Age: 54
LOS: 1 days | Discharge: HOME | End: 2019-03-23
Attending: EMERGENCY MEDICINE | Admitting: EMERGENCY MEDICINE
Payer: MEDICAID

## 2019-03-23 VITALS
HEART RATE: 69 BPM | TEMPERATURE: 98 F | DIASTOLIC BLOOD PRESSURE: 65 MMHG | RESPIRATION RATE: 17 BRPM | OXYGEN SATURATION: 97 % | SYSTOLIC BLOOD PRESSURE: 120 MMHG

## 2019-03-23 PROCEDURE — 99283 EMERGENCY DEPT VISIT LOW MDM: CPT | Mod: 25

## 2019-03-23 NOTE — ED ADULT TRIAGE NOTE - CHIEF COMPLAINT QUOTE
Pt arrives to ED s/p trip and fal on carpet.  Pt right lower leg wrapped by EMS due to small laceration and abrasion.  Pt denies head rtrauma or LOC.  Pt is [place] pt.

## 2019-03-24 PROCEDURE — 73562 X-RAY EXAM OF KNEE 3: CPT | Mod: 26,50

## 2019-03-24 RX ORDER — TETANUS TOXOID, REDUCED DIPHTHERIA TOXOID AND ACELLULAR PERTUSSIS VACCINE, ADSORBED 5; 2.5; 8; 8; 2.5 [IU]/.5ML; [IU]/.5ML; UG/.5ML; UG/.5ML; UG/.5ML
0.5 SUSPENSION INTRAMUSCULAR ONCE
Qty: 0 | Refills: 0 | Status: COMPLETED | OUTPATIENT
Start: 2019-03-24 | End: 2019-03-24

## 2019-03-24 RX ORDER — IBUPROFEN 200 MG
600 TABLET ORAL ONCE
Qty: 0 | Refills: 0 | Status: COMPLETED | OUTPATIENT
Start: 2019-03-24 | End: 2019-03-24

## 2019-03-24 RX ADMIN — Medication 600 MILLIGRAM(S): at 00:55

## 2019-03-24 NOTE — ED ADULT NURSE REASSESSMENT NOTE - NS ED NURSE REASSESS COMMENT FT1
pt A&Ox4, c/o right knee pain. pt has abrasion and contusion to right knee, pt states he tripped and fell. pt says last tetanus shot 5 years ago. wound not actively bleeding. pt appears in NAD, respirations even and non labored. medicated per MD order. pending xray results.

## 2019-03-24 NOTE — ED PROVIDER NOTE - LOWER EXTREMITY EXAM, LEFT
no deformities to lt knee, TTP to the lt patella, good pedal pulses, distally NV intact, ranging knee well/TENDERNESS

## 2019-03-24 NOTE — ED PROVIDER NOTE - LOWER EXTREMITY EXAM, RIGHT
SWELLING/TENDERNESS/swelling to the rt anterior knee/proximal tibia, TTP to the rt patella, good pedal pulses, no visualized FB, distally NV intact, ranging knee well

## 2019-03-24 NOTE — ED PROVIDER NOTE - OBJECTIVE STATEMENT
54 y/o M from [place] w/ PMHx of Schizophrenia, Bipolar Disorder, HTN presents to ED c/o BL knee pain w/ abrasion to the rt knee s/p mechanical fall onto concrete in the past 24hrs. States he struck the concrete w/ his rt knee. Tetanus status is unknown. Denies LOC, and other complaints/injuries.

## 2019-03-24 NOTE — ED PROVIDER NOTE - NSFOLLOWUPINSTRUCTIONS_ED_ALL_ED_FT
No acute fractures/dislocations on your XR.      Please follow up with your primary care physician.    Recommend Tylenol or Motrin/Advil for pain as needed.

## 2019-03-24 NOTE — PROVIDER CONTACT NOTE (OTHER) - RECOMMENDATIONS
SATYA also contacted [facility] to schedule ambulette transport for pt- scheduled for 3:30 a.m. with [facility].  Invoice # [phone].  [facility] trip # [phone].

## 2019-03-24 NOTE — PROVIDER CONTACT NOTE (OTHER) - SITUATION
Pt states he wants to go to a nursing home; states he doesn't like his residence.  Support provided re: living situation.  Encouraged him to speak with his therapist on Thursday re: options.

## 2019-04-16 NOTE — ED PROVIDER NOTE - CONSTITUTIONAL NOURISHMENT, MLM
Santosh Vasquez (MD)  Surgery; Thoracic Surgery  [address] [place] [address], [place] [address]  Phone: [phone]  Fax: [phone]  Follow Up Time:     Jonas Carmen ()  [facility]  [address] [place] [address]  Phone: [phone]  Fax: [phone]  Follow Up Time: OVERWEIGHT

## 2019-04-25 ENCOUNTER — INPATIENT (INPATIENT)
Facility: HOSPITAL | Age: 54
LOS: 20 days | Discharge: NURSING, REHAB OR LONG-TERM CARE | End: 2019-05-16
Attending: STUDENT IN AN ORGANIZED HEALTH CARE EDUCATION/TRAINING PROGRAM | Admitting: STUDENT IN AN ORGANIZED HEALTH CARE EDUCATION/TRAINING PROGRAM
Payer: MEDICAID

## 2019-04-25 VITALS
HEART RATE: 98 BPM | SYSTOLIC BLOOD PRESSURE: 122 MMHG | RESPIRATION RATE: 16 BRPM | OXYGEN SATURATION: 98 % | TEMPERATURE: 98 F | DIASTOLIC BLOOD PRESSURE: 68 MMHG

## 2019-04-25 DIAGNOSIS — R53.1 WEAKNESS: ICD-10-CM

## 2019-04-25 LAB
ALBUMIN SERPL ELPH-MCNC: 4.3 G/DL — SIGNIFICANT CHANGE UP (ref 3.3–5)
ALP SERPL-CCNC: 104 U/L — SIGNIFICANT CHANGE UP (ref 40–120)
ALT FLD-CCNC: 18 U/L — SIGNIFICANT CHANGE UP (ref 4–41)
ANION GAP SERPL CALC-SCNC: 14 MMO/L — SIGNIFICANT CHANGE UP (ref 7–14)
AST SERPL-CCNC: 18 U/L — SIGNIFICANT CHANGE UP (ref 4–40)
BASOPHILS # BLD AUTO: 0.07 K/UL — SIGNIFICANT CHANGE UP (ref 0–0.2)
BASOPHILS NFR BLD AUTO: 1 % — SIGNIFICANT CHANGE UP (ref 0–2)
BILIRUB SERPL-MCNC: 0.5 MG/DL — SIGNIFICANT CHANGE UP (ref 0.2–1.2)
BUN SERPL-MCNC: 15 MG/DL — SIGNIFICANT CHANGE UP (ref 7–23)
CALCIUM SERPL-MCNC: 10.5 MG/DL — SIGNIFICANT CHANGE UP (ref 8.4–10.5)
CHLORIDE SERPL-SCNC: 105 MMOL/L — SIGNIFICANT CHANGE UP (ref 98–107)
CO2 SERPL-SCNC: 25 MMOL/L — SIGNIFICANT CHANGE UP (ref 22–31)
CREAT SERPL-MCNC: 0.72 MG/DL — SIGNIFICANT CHANGE UP (ref 0.5–1.3)
EOSINOPHIL # BLD AUTO: 0.09 K/UL — SIGNIFICANT CHANGE UP (ref 0–0.5)
EOSINOPHIL NFR BLD AUTO: 1.2 % — SIGNIFICANT CHANGE UP (ref 0–6)
GLUCOSE SERPL-MCNC: 94 MG/DL — SIGNIFICANT CHANGE UP (ref 70–99)
HCT VFR BLD CALC: 47.4 % — SIGNIFICANT CHANGE UP (ref 39–50)
HGB BLD-MCNC: 15.7 G/DL — SIGNIFICANT CHANGE UP (ref 13–17)
IMM GRANULOCYTES NFR BLD AUTO: 0.3 % — SIGNIFICANT CHANGE UP (ref 0–1.5)
LITHIUM SERPL-MCNC: < 0.04 MMOL/L — LOW (ref 0.6–1.2)
LYMPHOCYTES # BLD AUTO: 1.23 K/UL — SIGNIFICANT CHANGE UP (ref 1–3.3)
LYMPHOCYTES # BLD AUTO: 17 % — SIGNIFICANT CHANGE UP (ref 13–44)
MCHC RBC-ENTMCNC: 28.4 PG — SIGNIFICANT CHANGE UP (ref 27–34)
MCHC RBC-ENTMCNC: 33.1 % — SIGNIFICANT CHANGE UP (ref 32–36)
MCV RBC AUTO: 85.7 FL — SIGNIFICANT CHANGE UP (ref 80–100)
MONOCYTES # BLD AUTO: 0.65 K/UL — SIGNIFICANT CHANGE UP (ref 0–0.9)
MONOCYTES NFR BLD AUTO: 9 % — SIGNIFICANT CHANGE UP (ref 2–14)
NEUTROPHILS # BLD AUTO: 5.17 K/UL — SIGNIFICANT CHANGE UP (ref 1.8–7.4)
NEUTROPHILS NFR BLD AUTO: 71.5 % — SIGNIFICANT CHANGE UP (ref 43–77)
NRBC # FLD: 0 K/UL — SIGNIFICANT CHANGE UP (ref 0–0)
PLATELET # BLD AUTO: 346 K/UL — SIGNIFICANT CHANGE UP (ref 150–400)
PMV BLD: 9.6 FL — SIGNIFICANT CHANGE UP (ref 7–13)
POTASSIUM SERPL-MCNC: 4 MMOL/L — SIGNIFICANT CHANGE UP (ref 3.5–5.3)
POTASSIUM SERPL-SCNC: 4 MMOL/L — SIGNIFICANT CHANGE UP (ref 3.5–5.3)
PROT SERPL-MCNC: 7.4 G/DL — SIGNIFICANT CHANGE UP (ref 6–8.3)
RBC # BLD: 5.53 M/UL — SIGNIFICANT CHANGE UP (ref 4.2–5.8)
RBC # FLD: 14.4 % — SIGNIFICANT CHANGE UP (ref 10.3–14.5)
SODIUM SERPL-SCNC: 144 MMOL/L — SIGNIFICANT CHANGE UP (ref 135–145)
TSH SERPL-MCNC: 8.29 UIU/ML — HIGH (ref 0.27–4.2)
WBC # BLD: 7.23 K/UL — SIGNIFICANT CHANGE UP (ref 3.8–10.5)
WBC # FLD AUTO: 7.23 K/UL — SIGNIFICANT CHANGE UP (ref 3.8–10.5)

## 2019-04-25 PROCEDURE — 70450 CT HEAD/BRAIN W/O DYE: CPT | Mod: 26

## 2019-04-25 NOTE — ED PROVIDER NOTE - PHYSICAL EXAMINATION
LUE with erythema lateral left arm  nontender  interosseous muscles appear wasted in hands    neuro  decreased strength in hands  decreased LE strength  difficulty raising legs against gravity  no LE sensory level  toe upgoing RLE  b/l ankle clonus   ([place] and as below)

## 2019-04-25 NOTE — ED PROVIDER NOTE - ATTENDING CONTRIBUTION TO CARE
[facility]  pt with reported 2 weeks of inability to walk  after fall 2 weeks ago  Also with fall seen here in March  Given underlying psych d/o , strength exam may not be fully reliable but objective Muscle loss ludmila in hands  upgoing toes rt [facility]  pt with reported 2 weeks of inability to walk  after fall 2 weeks ago  Also with fall seen here in March  Given underlying psych d/o , strength exam may not be fully reliable but objective Muscle loss ludmila in hands  upgoing toes rt    predominantly UMN signs    will need further evaluation in hospital  MRI of spine/brain

## 2019-04-25 NOTE — ED ADULT NURSE NOTE - CHIEF COMPLAINT QUOTE
bibems from [place] [address] for back pain, knee pain, hand/foot tingling. as per paperwork,  pt states he cannot move his legs, has been bedbound since falling 2 weeks ago. pt states he was being poisoned while he was admitted in [place].  denies any si/hi, a/v hallucinations.

## 2019-04-25 NOTE — ED ADULT TRIAGE NOTE - CHIEF COMPLAINT QUOTE
bibems from [place] [address] for back pain, knee pain, hand/foot tingling. as per paperwork,  pt states he cannot move his legs, has been bedbound since falling 2 weeks ago. pt states he was being poisoned while he was admitted in [place].  denies any si/hi, a/v hallucinations.

## 2019-04-25 NOTE — ED ADULT NURSE NOTE - OBJECTIVE STATEMENT
Pt received to spot #9. AAOx4, being sent in from [facility] #40 for back pain, knee pain, hand/foot tingling. As per paperwork , pt had a fall 2 weeks ago and since then has not been able to walk. Pt states he has been bed bound c/o back pain and leg pain. Pt states, "I have a guardian addie that holds my legs down while I'm in bed and I need him to let go." Pt calm and cooperative. MD blevins performed. #20g IVSL placed to right ac, labs drawn and sent. no acute distress. Awaiting further plan of care.

## 2019-04-25 NOTE — ED PROVIDER NOTE - OBJECTIVE STATEMENT
pt with h/o bipolar d/o  HTN  [facility] with h/o bipolar d/o  HTN  sent from [facility] for decreased ability to ambulate for the last 2 weeks  since a fall   Pt notes some low back pain  notes paresthesias in hands  but is able to feel feet  He has no GI/ complaint  no SOB  notes some cough  no abd pain  He was seen here in March for a fall as well 54 y/o M with bipolar disorder from [place] presents with inability to walk secondary to lower extremity weakness. Has been worsening for the last two weeks. Also complaining of some lower back pain. Has been evaluated recently for fall. Believes he is being possessed and held down by somebody that passed away. Denies SI/HI. Denies fever, chills, chest pain, SOB, abdominal pain, nausea, vomiting, diarrhea, dysuria, increased frequency.      [place]: pt with h/o bipolar d/o  HTN  sent from [facility] for decreased ability to ambulate for the last 2 weeks  since a fall   Pt notes some low back pain  notes paresthesias in hands  but is able to feel feet  He has no GI/ complaint  no SOB  notes some cough  no abd pain  He was seen here in March for a fall as well

## 2019-04-25 NOTE — ED PROVIDER NOTE - CHIEF COMPLAINT
The patient is a 53y Male complaining of The patient is a 53y Male complaining of inability to ambulate.

## 2019-04-25 NOTE — CONSULT NOTE ADULT - ATTENDING COMMENTS
Patient with schizophrenia who is a poor historian. He was admitted after a fall and worsening gait imbalance. His baseline is not clear and reports pain in his mid back.    On exam: He appears to have 4+ weakness in his hip flexors with brisk reflexes and ankle clonus + Babinskis and hoffmans. Mild thenar atrophy in his hands. patient did not want to try walking. there was no spinal tenderness    A/P: r/o myelopathy    - obtain MRI c/t spine

## 2019-04-25 NOTE — ED PROVIDER NOTE - CLINICAL SUMMARY MEDICAL DECISION MAKING FREE TEXT BOX
52 y/o M with bipolar disorder from [place] presents with inability to walk secondary to lower extremity weakness. physical exam concerning for UMN lesion given sustained clonus in bilateral lower extremities, some spinal tenderness and back pain as well. Will have neuro eval patient, CT head, admit

## 2019-04-25 NOTE — CONSULT NOTE ADULT - SUBJECTIVE AND OBJECTIVE BOX
Neurology Consult    53y Male PMH bipolar, HTN, Hypothyroid, schizophrenia, IVDA here w/ inability to ambulate.     REVIEW OF SYSTEMS:  As above    MEDICATIONS    PMH: Bipolar disorder  Drug abuse  Incontinence  Schizophrenia  Hypothyroid  Hypertension    PSH: No significant past surgical history    FAMILY HISTORY:  Family history of Crohn's disease (Mother): in Mother  No history of dementia, strokes, or seizures     SOCIAL HISTORY:  No history of tobacco or alcohol use     Allergies  No Known Allergies    Intolerances    Haldol (Unknown)  Thorazine (Unknown)    Vital Signs Last 24 Hrs  T(C): 36.7 (25 Apr 2019 17:42), Max: 36.7 (25 Apr 2019 17:42)  T(F): 98 (25 Apr 2019 17:42), Max: 98 (25 Apr 2019 17:42)  HR: 98 (25 Apr 2019 17:42) (98 - 98)  BP: 122/68 (25 Apr 2019 17:42) (122/68 - 122/68)  BP(mean): --  RR: 16 (25 Apr 2019 17:42) (16 - 16)  SpO2: 98% (25 Apr 2019 17:42) (98% - 98%)    Neurological Examination:    Mental Status: Patient is alert, awake, oriented X3. Patient is fluent, no dysarthria, no aphasia. Follows commands well and able to answer questions appropriately. Mood and affect normal.    Cranial Nerves: PERRL, EOMI, visual field intact, V1-V3 intact, no gross facial asymmetry, tongue/uvula midline    Motor Exam: No drift  Right upper extremity: 5/5  Left upper extremity: 5/5  Right lower extremity: 5/5  Left lower extremity: 5/5    Normal bulk/tone    Sensory: Intact to light touch and pinprick bilaterally. No extinction    Coordination: Finger to nose intact bilaterally     Gait: Normal      Reflexes: Bilateral 2+ Biceps, Brachial, Patellar, Ankle  Plantar: Down bilateral    GENERAL: No acute distress  HEENT:  Normocephalic, atraumatic  EXTREMITIES: No edema, clubbing, cyanosis  MUSCULOSKELETAL: Normal range of motion  SKIN: No rashes    LABS:              Hemoglobin A1C:           RADIOLOGY:  CT head:  MRI: Neurology Consult    53y Male PMH bipolar, HTN, Hypothyroid, schizophrenia, IVDA here w/ inability to ambulate. Patient a poor historian, poor insight into condition(believes hes here because he was poisoned). Patient endorses back pain starting a couple of weeks ago(reports falling, injuring his L knee, states thats the reason he can't walk). Does not recall trauma otherwise. Not answering questions w/ regards to urination, defecation, altered sensorium, fever, chills.     REVIEW OF SYSTEMS:  As above    MEDICATIONS    PMH: Bipolar disorder  Drug abuse  Incontinence  Schizophrenia  Hypothyroid  Hypertension    PSH: No significant past surgical history    FAMILY HISTORY:  Family history of Crohn's disease (Mother): in Mother  No history of dementia, strokes, or seizures     SOCIAL HISTORY:  No history of tobacco or alcohol use     Allergies  No Known Allergies    Intolerances    Haldol (Unknown)  Thorazine (Unknown)    Vital Signs Last 24 Hrs  T(C): 36.7 (25 Apr 2019 17:42), Max: 36.7 (25 Apr 2019 17:42)  T(F): 98 (25 Apr 2019 17:42), Max: 98 (25 Apr 2019 17:42)  HR: 98 (25 Apr 2019 17:42) (98 - 98)  BP: 122/68 (25 Apr 2019 17:42) (122/68 - 122/68)  BP(mean): --  RR: 16 (25 Apr 2019 17:42) (16 - 16)  SpO2: 98% (25 Apr 2019 17:42) (98% - 98%)    Neurological Examination:    Mental Status: Patient is alert, awake, oriented X3. Patient is fluent, no dysarthria, no aphasia. Follows commands well and able to answer questions appropriately. Mood and affect normal.    Cranial Nerves: PERRL, EOMI, visual field intact, V1-V3 intact, no gross facial asymmetry, tongue/uvula midline    Motor Exam: No drift  Right upper extremity: 5/5  Left upper extremity: 5/5  Right lower extremity: 5/5  Left lower extremity: 5/5    Normal bulk/tone    Sensory: Pain with active hip flexion RLE, LLE individually, no pain on straight leg raise, pain on palpation of T/L spine. LT intact, proprioception impaired BLE, temp intact    Coordination: Finger to nose intact bilaterally    Reflexes: Bilateral 2+ Biceps, Brachial, Patellar, Ankle. LLE with sustained clonus on passive dorsiflexion  Plantar: upgoing bilateral    GENERAL: No acute distress  HEENT:  Normocephalic, atraumatic  EXTREMITIES: No edema, clubbing, cyanosis  MUSCULOSKELETAL: Normal range of motion  SKIN: No rashes    LABS:              Hemoglobin A1C:           RADIOLOGY:  CT head:  MRI:

## 2019-04-26 DIAGNOSIS — E03.9 HYPOTHYROIDISM, UNSPECIFIED: ICD-10-CM

## 2019-04-26 DIAGNOSIS — F20.9 SCHIZOPHRENIA, UNSPECIFIED: ICD-10-CM

## 2019-04-26 DIAGNOSIS — R20.2 PARESTHESIA OF SKIN: ICD-10-CM

## 2019-04-26 DIAGNOSIS — F31.9 BIPOLAR DISORDER, UNSPECIFIED: ICD-10-CM

## 2019-04-26 DIAGNOSIS — E86.0 DEHYDRATION: ICD-10-CM

## 2019-04-26 DIAGNOSIS — R10.819 ABDOMINAL TENDERNESS, UNSPECIFIED SITE: ICD-10-CM

## 2019-04-26 DIAGNOSIS — Z29.9 ENCOUNTER FOR PROPHYLACTIC MEASURES, UNSPECIFIED: ICD-10-CM

## 2019-04-26 DIAGNOSIS — R29.898 OTHER SYMPTOMS AND SIGNS INVOLVING THE MUSCULOSKELETAL SYSTEM: ICD-10-CM

## 2019-04-26 DIAGNOSIS — F22 DELUSIONAL DISORDERS: ICD-10-CM

## 2019-04-26 LAB
APPEARANCE UR: CLEAR — SIGNIFICANT CHANGE UP
BILIRUB UR-MCNC: NEGATIVE — SIGNIFICANT CHANGE UP
BLOOD UR QL VISUAL: NEGATIVE — SIGNIFICANT CHANGE UP
COLOR SPEC: YELLOW — SIGNIFICANT CHANGE UP
CRP SERPL-MCNC: < 4 MG/L — SIGNIFICANT CHANGE UP
ERYTHROCYTE [SEDIMENTATION RATE] IN BLOOD: 5 MM/HR — SIGNIFICANT CHANGE UP (ref 1–15)
GLUCOSE UR-MCNC: NEGATIVE — SIGNIFICANT CHANGE UP
KETONES UR-MCNC: NEGATIVE — SIGNIFICANT CHANGE UP
LEUKOCYTE ESTERASE UR-ACNC: NEGATIVE — SIGNIFICANT CHANGE UP
NITRITE UR-MCNC: NEGATIVE — SIGNIFICANT CHANGE UP
PH UR: 6 — SIGNIFICANT CHANGE UP (ref 5–8)
PROT UR-MCNC: 10 — SIGNIFICANT CHANGE UP
SP GR SPEC: 1.02 — SIGNIFICANT CHANGE UP (ref 1–1.04)
TSH SERPL-MCNC: 9.08 UIU/ML — HIGH (ref 0.27–4.2)
UROBILINOGEN FLD QL: NORMAL — SIGNIFICANT CHANGE UP
VALPROATE SERPL-MCNC: < 3.2 UG/ML — LOW (ref 50–100)

## 2019-04-26 PROCEDURE — 12345: CPT | Mod: NC

## 2019-04-26 PROCEDURE — 72157 MRI CHEST SPINE W/O & W/DYE: CPT | Mod: 26

## 2019-04-26 PROCEDURE — 72156 MRI NECK SPINE W/O & W/DYE: CPT | Mod: 26

## 2019-04-26 PROCEDURE — 99223 1ST HOSP IP/OBS HIGH 75: CPT

## 2019-04-26 PROCEDURE — 93010 ELECTROCARDIOGRAM REPORT: CPT

## 2019-04-26 RX ORDER — ENOXAPARIN SODIUM 100 MG/ML
40 INJECTION SUBCUTANEOUS EVERY 24 HOURS
Qty: 0 | Refills: 0 | Status: DISCONTINUED | OUTPATIENT
Start: 2019-04-26 | End: 2019-04-30

## 2019-04-26 RX ORDER — INFLUENZA VIRUS VACCINE 15; 15; 15; 15 UG/.5ML; UG/.5ML; UG/.5ML; UG/.5ML
0.5 SUSPENSION INTRAMUSCULAR ONCE
Qty: 0 | Refills: 0 | Status: COMPLETED | OUTPATIENT
Start: 2019-04-26 | End: 2019-04-26

## 2019-04-26 RX ORDER — METOPROLOL TARTRATE 50 MG
50 TABLET ORAL DAILY
Qty: 0 | Refills: 0 | Status: DISCONTINUED | OUTPATIENT
Start: 2019-04-26 | End: 2019-05-16

## 2019-04-26 RX ORDER — VALPROIC ACID (AS SODIUM SALT) 250 MG/5ML
500 SOLUTION, ORAL ORAL
Qty: 0 | Refills: 0 | Status: DISCONTINUED | OUTPATIENT
Start: 2019-04-26 | End: 2019-04-26

## 2019-04-26 RX ORDER — GABAPENTIN 400 MG/1
100 CAPSULE ORAL
Qty: 0 | Refills: 0 | Status: DISCONTINUED | OUTPATIENT
Start: 2019-04-26 | End: 2019-05-16

## 2019-04-26 RX ORDER — BACLOFEN 100 %
1 POWDER (GRAM) MISCELLANEOUS
Qty: 0 | Refills: 0 | COMMUNITY

## 2019-04-26 RX ORDER — VALPROIC ACID (AS SODIUM SALT) 250 MG/5ML
750 SOLUTION, ORAL ORAL AT BEDTIME
Qty: 0 | Refills: 0 | Status: DISCONTINUED | OUTPATIENT
Start: 2019-04-26 | End: 2019-05-16

## 2019-04-26 RX ORDER — SODIUM CHLORIDE 9 MG/ML
1000 INJECTION INTRAMUSCULAR; INTRAVENOUS; SUBCUTANEOUS
Qty: 0 | Refills: 0 | Status: DISCONTINUED | OUTPATIENT
Start: 2019-04-26 | End: 2019-04-30

## 2019-04-26 RX ORDER — BACLOFEN 100 %
10 POWDER (GRAM) MISCELLANEOUS THREE TIMES A DAY
Qty: 0 | Refills: 0 | Status: DISCONTINUED | OUTPATIENT
Start: 2019-04-26 | End: 2019-05-16

## 2019-04-26 RX ORDER — DESMOPRESSIN ACETATE 0.1 MG/1
0.2 TABLET ORAL AT BEDTIME
Qty: 0 | Refills: 0 | Status: DISCONTINUED | OUTPATIENT
Start: 2019-04-26 | End: 2019-05-16

## 2019-04-26 RX ORDER — FLUPHENAZINE HYDROCHLORIDE 1 MG/1
5 TABLET, FILM COATED ORAL
Qty: 0 | Refills: 0 | Status: DISCONTINUED | OUTPATIENT
Start: 2019-04-26 | End: 2019-04-26

## 2019-04-26 RX ORDER — GABAPENTIN 400 MG/1
100 CAPSULE ORAL
Qty: 0 | Refills: 0 | Status: DISCONTINUED | OUTPATIENT
Start: 2019-04-26 | End: 2019-04-26

## 2019-04-26 RX ORDER — BENZTROPINE MESYLATE 1 MG
0.5 TABLET ORAL
Qty: 0 | Refills: 0 | Status: DISCONTINUED | OUTPATIENT
Start: 2019-04-26 | End: 2019-05-16

## 2019-04-26 RX ORDER — FLUPHENAZINE HYDROCHLORIDE 1 MG/1
1 TABLET, FILM COATED ORAL
Qty: 0 | Refills: 0 | COMMUNITY

## 2019-04-26 RX ORDER — PANTOPRAZOLE SODIUM 20 MG/1
40 TABLET, DELAYED RELEASE ORAL
Qty: 0 | Refills: 0 | Status: DISCONTINUED | OUTPATIENT
Start: 2019-04-26 | End: 2019-05-16

## 2019-04-26 RX ORDER — VALPROIC ACID (AS SODIUM SALT) 250 MG/5ML
500 SOLUTION, ORAL ORAL
Qty: 0 | Refills: 0 | Status: DISCONTINUED | OUTPATIENT
Start: 2019-04-26 | End: 2019-05-16

## 2019-04-26 RX ORDER — DIVALPROEX SODIUM 500 MG/1
500 TABLET, DELAYED RELEASE ORAL
Qty: 0 | Refills: 0 | Status: DISCONTINUED | OUTPATIENT
Start: 2019-04-26 | End: 2019-04-26

## 2019-04-26 RX ORDER — OMEPRAZOLE 10 MG/1
1 CAPSULE, DELAYED RELEASE ORAL
Qty: 0 | Refills: 0 | COMMUNITY

## 2019-04-26 RX ORDER — BENZTROPINE MESYLATE 1 MG
0.5 TABLET ORAL AT BEDTIME
Qty: 0 | Refills: 0 | Status: DISCONTINUED | OUTPATIENT
Start: 2019-04-26 | End: 2019-04-26

## 2019-04-26 RX ORDER — FLUPHENAZINE HYDROCHLORIDE 1 MG/1
5 TABLET, FILM COATED ORAL
Refills: 0 | Status: DISCONTINUED | OUTPATIENT
Start: 2019-04-26 | End: 2019-05-16

## 2019-04-26 RX ORDER — VALPROIC ACID (AS SODIUM SALT) 250 MG/5ML
750 SOLUTION, ORAL ORAL AT BEDTIME
Qty: 0 | Refills: 0 | Status: DISCONTINUED | OUTPATIENT
Start: 2019-04-26 | End: 2019-04-26

## 2019-04-26 RX ORDER — LEVOTHYROXINE SODIUM 125 MCG
175 TABLET ORAL DAILY
Qty: 0 | Refills: 0 | Status: DISCONTINUED | OUTPATIENT
Start: 2019-04-26 | End: 2019-05-16

## 2019-04-26 RX ORDER — FINASTERIDE 5 MG/1
1 TABLET, FILM COATED ORAL
Qty: 0 | Refills: 0 | COMMUNITY

## 2019-04-26 RX ADMIN — Medication 0.5 MILLIGRAM(S): at 18:12

## 2019-04-26 RX ADMIN — GABAPENTIN 100 MILLIGRAM(S): 400 CAPSULE ORAL at 17:15

## 2019-04-26 RX ADMIN — Medication 5 MILLIGRAM(S): at 05:26

## 2019-04-26 RX ADMIN — Medication 175 MICROGRAM(S): at 05:26

## 2019-04-26 RX ADMIN — DESMOPRESSIN ACETATE 0.2 MILLIGRAM(S): 0.1 TABLET ORAL at 21:03

## 2019-04-26 RX ADMIN — Medication 5 MILLIGRAM(S): at 17:20

## 2019-04-26 RX ADMIN — GABAPENTIN 100 MILLIGRAM(S): 400 CAPSULE ORAL at 05:26

## 2019-04-26 RX ADMIN — FLUPHENAZINE HYDROCHLORIDE 5 MILLIGRAM(S): 1 TABLET, FILM COATED ORAL at 05:26

## 2019-04-26 RX ADMIN — Medication 50 MILLIGRAM(S): at 05:26

## 2019-04-26 RX ADMIN — PANTOPRAZOLE SODIUM 40 MILLIGRAM(S): 20 TABLET, DELAYED RELEASE ORAL at 05:26

## 2019-04-26 RX ADMIN — Medication 750 MILLIGRAM(S): at 21:03

## 2019-04-26 RX ADMIN — DIVALPROEX SODIUM 500 MILLIGRAM(S): 500 TABLET, DELAYED RELEASE ORAL at 17:15

## 2019-04-26 RX ADMIN — SODIUM CHLORIDE 125 MILLILITER(S): 9 INJECTION INTRAMUSCULAR; INTRAVENOUS; SUBCUTANEOUS at 05:26

## 2019-04-26 RX ADMIN — FLUPHENAZINE HYDROCHLORIDE 5 MILLIGRAM(S): 1 TABLET, FILM COATED ORAL at 18:11

## 2019-04-26 RX ADMIN — ENOXAPARIN SODIUM 40 MILLIGRAM(S): 100 INJECTION SUBCUTANEOUS at 05:26

## 2019-04-26 RX ADMIN — DIVALPROEX SODIUM 500 MILLIGRAM(S): 500 TABLET, DELAYED RELEASE ORAL at 05:26

## 2019-04-26 NOTE — H&P ADULT - NSHPLABSRESULTS_GEN_ALL_CORE
CT BRAIN   PROCEDURE DATE: Apr 25 2019   FINDINGS:   The study is mildly degraded by motion. There is no gross acute intra-axial   or extra-axial hemorrhage. There is no mass effect or shift of the midline.   The basal cisterns are not effaced. There is cerebral volume loss with   prominence of the ventricles and sulci. Mild patchy lucency in the   frontoparietal periventricular and subcortical white matter without   associated mild mass effect is nonspecific.   There is no significant scalp soft tissue swelling or scalp hematoma. The   skull base and bony calvarium are intact. The visualized paranasal sinuses   and tympanic/mastoid cavities are clear apart from minimal ethmoid mucosal   thickening and trace bilateral mastoid effusions. There is evidence of   bilateral lens surgery.   IMPRESSION:   No acute intracranial hemorrhage, mass effect, or acute osseous fracture.   Cerebral volume loss and mild patchy lucency in the frontoparietal   periventricular and subcortical white matter without associated mass effect   which could be due to chronic small vessel ischemic changes,   postinfectious/postinflammatory, or demyelination.

## 2019-04-26 NOTE — BEHAVIORAL HEALTH ASSESSMENT NOTE - NSBHCHARTREVIEWIMAGING_PSY_A_CORE FT
< from: CT Head No Cont (04.25.19 @ 21:27) >    EXAM:  CT BRAIN      PROCEDURE DATE:  Apr 25 2019     INTERPRETATION:  HISTORY: Lower extremity weakness, status post fall 2   weeks ago.    COMPARISON: None    TECHNIQUE: Axial noncontrast CT images from the skull base to the vertex   were obtained and submitted for interpretation. Coronal and sagittal   reformatted images were performed. Bone and soft tissue windows were   evaluated.    FINDINGS:   The study is mildly degraded by motion. There is no gross acute   intra-axial or extra-axial hemorrhage. There is no mass effect or shift   of the midline. The basal cisterns are not effaced. There is cerebral   volume loss with prominence of the ventricles and sulci. Mild patchy   lucency in the frontoparietal periventricular and subcortical white   matter without associated mild mass effect is nonspecific.    There is no significant scalp soft tissue swelling or scalp hematoma. The   skull base and bony calvarium are intact. The visualized paranasal   sinuses and tympanic/mastoid cavities are clear apart from minimal   ethmoid mucosal thickening and trace bilateral mastoid effusions. There   is evidence of bilateral lens surgery.    IMPRESSION:   No acute intracranial hemorrhage, mass effect, or acute osseous fracture.    Cerebral volume loss and mild patchy lucency in the frontoparietal   periventricular and subcortical white matter without associated mass   effect which could be due to chronic small vessel ischemic changes,   postinfectious/postinflammatory, or demyelination.

## 2019-04-26 NOTE — OCCUPATIONAL THERAPY INITIAL EVALUATION ADULT - PERTINENT HX OF CURRENT PROBLEM, REHAB EVAL
54 y/o Male, resident of [place], with h/o bipolar d/o, HTN, nocturnal enuresis, deformity of lower leg, hypothyroid, schizophrenia, BPH, IVDA a/w bilateral LE weakness, inability to walk and paresthesias of bilateral UE. Found to be dehydrated and to have suprapubic TTP; Also found to be paranoid. CT Head: No acute intracranial hemorrhage, mass effect, or acute osseous fracture.

## 2019-04-26 NOTE — PROGRESS NOTE ADULT - SUBJECTIVE AND OBJECTIVE BOX
Patient is a 53y old  Male who presents with a chief complaint of Cannot move legs (26 Apr 2019 03:25)        SUBJECTIVE / OVERNIGHT EVENTS:      MEDICATIONS  (STANDING):  benztropine 0.5 milliGRAM(s) Oral at bedtime  bisacodyl 5 milliGRAM(s) Oral every 12 hours  desmopressin 0.2 milliGRAM(s) Oral at bedtime  diVALproex  milliGRAM(s) Oral two times a day  enoxaparin Injectable 40 milliGRAM(s) SubCutaneous every 24 hours  fluPHENAZine 5 milliGRAM(s) Oral two times a day  gabapentin 100 milliGRAM(s) Oral two times a day  influenza   Vaccine 0.5 milliLiter(s) IntraMuscular once  levothyroxine 175 MICROGram(s) Oral daily  metoprolol succinate ER 50 milliGRAM(s) Oral daily  pantoprazole    Tablet 40 milliGRAM(s) Oral before breakfast  sodium chloride 0.9%. 1000 milliLiter(s) (125 mL/Hr) IV Continuous <Continuous>    MEDICATIONS  (PRN):  baclofen 10 milliGRAM(s) Oral three times a day PRN Muscle Spasm      Vital Signs Last 24 Hrs  T(C): 36.8 (26 Apr 2019 05:19), Max: 37 (26 Apr 2019 02:29)  T(F): 98.2 (26 Apr 2019 05:19), Max: 98.6 (26 Apr 2019 02:29)  HR: 80 (26 Apr 2019 05:19) (80 - 98)  BP: 132/68 (26 Apr 2019 05:19) (122/68 - 163/87)  BP(mean): --  RR: 18 (26 Apr 2019 05:19) (16 - 20)  SpO2: 96% (26 Apr 2019 05:19) (96% - 100%)  CAPILLARY BLOOD GLUCOSE        I&O's Summary        PHYSICAL EXAM  GENERAL: NAD, well-developed  HEAD:  Atraumatic, Normocephalic  EYES: EOMI, PERRLA, conjunctiva and sclera clear  NECK: Supple, No JVD  CHEST/LUNG: Clear to auscultation bilaterally; No wheeze  HEART: Regular rate and rhythm; No murmurs, rubs, or gallops  ABDOMEN: Soft, Nontender, Nondistended; Bowel sounds present  EXTREMITIES:  2+ Peripheral Pulses, No clubbing, cyanosis, or edema  PSYCH: AAOx3  SKIN: No rashes or lesions    LABS:                        15.7   7.23  )-----------( 346      ( 25 Apr 2019 20:00 )             47.4     04-25    144  |  105  |  15  ----------------------------<  94  4.0   |  25  |  0.72    Ca    10.5      25 Apr 2019 20:00    TPro  7.4  /  Alb  4.3  /  TBili  0.5  /  DBili  x   /  AST  18  /  ALT  18  /  AlkPhos  104  04-25              RADIOLOGY & ADDITIONAL TESTS:    Imaging Personally Reviewed:  Consultant(s) Notes Reviewed:    Care Discussed with Consultants/Other Providers: Patient is a 53y old  Male who presents with a chief complaint of Cannot move legs (26 Apr 2019 03:25)    SUBJECTIVE / OVERNIGHT EVENTS:  Patient seen in the afternoon. Pt eating lunch. Pt endorse bilateral leg weakness and difficulty moving his fingers and opening hands. No fever, chills, SOB, chest pain, n/v and abdominal pain.    MEDICATIONS  (STANDING):  benztropine 0.5 milliGRAM(s) Oral at bedtime  bisacodyl 5 milliGRAM(s) Oral every 12 hours  desmopressin 0.2 milliGRAM(s) Oral at bedtime  diVALproex  milliGRAM(s) Oral two times a day  enoxaparin Injectable 40 milliGRAM(s) SubCutaneous every 24 hours  fluPHENAZine 5 milliGRAM(s) Oral two times a day  gabapentin 100 milliGRAM(s) Oral two times a day  influenza   Vaccine 0.5 milliLiter(s) IntraMuscular once  levothyroxine 175 MICROGram(s) Oral daily  metoprolol succinate ER 50 milliGRAM(s) Oral daily  pantoprazole    Tablet 40 milliGRAM(s) Oral before breakfast  sodium chloride 0.9%. 1000 milliLiter(s) (125 mL/Hr) IV Continuous <Continuous>    MEDICATIONS  (PRN):  baclofen 10 milliGRAM(s) Oral three times a day PRN Muscle Spasm      Vital Signs Last 24 Hrs  T(C): 36.8 (26 Apr 2019 05:19), Max: 37 (26 Apr 2019 02:29)  T(F): 98.2 (26 Apr 2019 05:19), Max: 98.6 (26 Apr 2019 02:29)  HR: 80 (26 Apr 2019 05:19) (80 - 98)  BP: 132/68 (26 Apr 2019 05:19) (122/68 - 163/87)  BP(mean): --  RR: 18 (26 Apr 2019 05:19) (16 - 20)  SpO2: 96% (26 Apr 2019 05:19) (96% - 100%)        PHYSICAL EXAM  GENERAL: NAD, well-developed  EYES: EOMI, PERRLA, conjunctiva and sclera clear  CHEST/LUNG: Clear to auscultation bilaterally; No wheeze  HEART: Regular rate and rhythm; No murmurs, rubs, or gallops  ABDOMEN: Soft, Nontender, Nondistended; Bowel sounds present  EXTREMITIES:  2+ Peripheral Pulses, No clubbing, cyanosis, or edema  PSYCH/NEURO: AAOx3. Sensation to light touch intact. Patient with atrophy of hand muscles b/l R>L. 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 Pt unable to lift legs against gravity b/l.  SKIN: resolving ecchymosis of left dorsal arm distally.     LABS:                        15.7   7.23  )-----------( 346      ( 25 Apr 2019 20:00 )             47.4     04-25    144  |  105  |  15  ----------------------------<  94  4.0   |  25  |  0.72    Ca    10.5      25 Apr 2019 20:00    TPro  7.4  /  Alb  4.3  /  TBili  0.5  /  DBili  x   /  AST  18  /  ALT  18  /  AlkPhos  104  04-25          Consultant(s) Notes Reviewed:  yes  Care Discussed with Consultants/Other Providers:

## 2019-04-26 NOTE — BEHAVIORAL HEALTH ASSESSMENT NOTE - NSBHCONSULTRECOMMENDOTHER_PSY_A_CORE FT
- Monitor EKG for qtc; if qtc >500ms, would need to discontinue antipsychotics - Monitor EKG for qtc; if qtc >500ms, would need to discontinue antipsychotics    - Will attempt to get collateral info from pt's outpatient psychiatrist and therapist

## 2019-04-26 NOTE — H&P ADULT - NSICDXPASTMEDICALHX_GEN_ALL_CORE_FT
PAST MEDICAL HISTORY:  Bipolar disorder     Drug abuse     Hypertension     Hypothyroid     Incontinence     Schizophrenia

## 2019-04-26 NOTE — OCCUPATIONAL THERAPY INITIAL EVALUATION ADULT - DIAGNOSIS, OT EVAL
Bilateral UE weakness/numbness, decreased trunk control, decreased functional mobility, decreased ADL independence

## 2019-04-26 NOTE — H&P ADULT - LYMPHATIC
posterior cervical L/supraclavicular L/posterior cervical R/anterior cervical L/supraclavicular R/anterior cervical R

## 2019-04-26 NOTE — OCCUPATIONAL THERAPY INITIAL EVALUATION ADULT - GENERAL OBSERVATIONS, REHAB EVAL
Pt received in semisupine position. + left forearm ecchymosis. + paranoid thoughts. Pt pleasant and agreeable to participate with therapy.

## 2019-04-26 NOTE — BEHAVIORAL HEALTH ASSESSMENT NOTE - NSBHCONSULTFOLLOWAFTERCARE_PSY_A_CORE FT
Return to [facility] ([phone]) when medically stable.   Pt will f/u with his outpatient psychiatrist Dr. Faviola Julian ([phone]) and therapist Lilia Gutierrez ([phone])

## 2019-04-26 NOTE — BEHAVIORAL HEALTH ASSESSMENT NOTE - NSBHCONSULTMEDS_PSY_A_CORE FT
Restart patient on his outpatient psychotropic medication regimen:    VPA 500mg qam + 750mg qhs  Neurontin 100mg po bid  Prolixin 5mg po bid  Cogentin 0.5mg po bid    ** Pt also receives Prolixin 50mg IM decanoate monthly, last received 4/23/19- due on 5/21/19    (Pt was also taking Clozapine, Lithium and Seroquel - however all 3 of these agents were discontinued at [facility] on 4/23/19)

## 2019-04-26 NOTE — H&P ADULT - PROBLEM SELECTOR PLAN 1
Fall precautions  Evaluated by Neurology in ED: r/o  spinal cord myelopathy  -MRI T- and L-spine  -ESR, CRP

## 2019-04-26 NOTE — H&P ADULT - ASSESSMENT
54 y/o Male, resident of [facility], with h/o bipolar d/o, HTN, nocturnal enuresis, deformity of lower leg, hypothyroid, schizophrenia, BPH, IVDA a/w b/l weakness, inability to walk and paresthesias of b/l UE. Found to be dehydrated and to have suprapubic TTP; 54 y/o Male, resident of [facility], with h/o bipolar d/o, HTN, nocturnal enuresis, deformity of lower leg, hypothyroid, schizophrenia, BPH, IVDA a/w b/l weakness, inability to walk and paresthesias of b/l UE. Found to be dehydrated and to have suprapubic TTP; Also found to be paranoid; r/o UTI

## 2019-04-26 NOTE — CHART NOTE - NSCHARTNOTEFT_GEN_A_CORE
attempted to reach aib coordinator to obtain wifes information to sign consents as pt cannot sign consent 2/2 does not have insight into his illness, unable to reach [place] coordinator. Also attempted to call Dr Rola Mullins  (medical doctor) unable to reach. Dr Dao is aware. S/w   Rola Scales [facility]  who states that patient is able to sign consents at [facility] he was previously at [facility]  [place]  unsure why previous admission - will attempt to reach medical doctor    -Can call Housing  on Saturday for more information and to reach medical doctor : Laura  [phone]  -Dr Dhillon  is patient psychiatrist at [facility] S/w   Rola Scales [facility]  who states that patient is able to sign consents at [facility] he was previously at [facility]  [place]  unsure why previous admission - will attempt to reach medical doctor  -accordign to  patient has no wife or gf   -Can call Housing  on Saturday for more information and to reach medical doctor : Laura  [phone]  -Dr Dhillon  is patient psychiatrist at [facility]

## 2019-04-26 NOTE — BEHAVIORAL HEALTH ASSESSMENT NOTE - NSBHCHARTREVIEWLAB_PSY_A_CORE FT
VPA level 4/26 AM: <3.2  Li level: <0.04  Blood EtOH level: <10    CBC Full  -  ( 25 Apr 2019 20:00 )  WBC Count : 7.23 K/uL  RBC Count : 5.53 M/uL  Hemoglobin : 15.7 g/dL  Hematocrit : 47.4 %  Platelet Count - Automated : 346 K/uL  Mean Cell Volume : 85.7 fL  Mean Cell Hemoglobin : 28.4 pg  Mean Cell Hemoglobin Concentration : 33.1 %  Auto Neutrophil # : 5.17 K/uL  Auto Lymphocyte # : 1.23 K/uL  Auto Monocyte # : 0.65 K/uL  Auto Eosinophil # : 0.09 K/uL  Auto Basophil # : 0.07 K/uL  Auto Neutrophil % : 71.5 %  Auto Lymphocyte % : 17.0 %  Auto Monocyte % : 9.0 %  Auto Eosinophil % : 1.2 %  Auto Basophil % : 1.0 %    04-25    144  |  105  |  15  ----------------------------<  94  4.0   |  25  |  0.72    Ca    10.5      25 Apr 2019 20:00    TPro  7.4  /  Alb  4.3  /  TBili  0.5  /  DBili  x   /  AST  18  /  ALT  18  /  AlkPhos  104  04-25    LIVER FUNCTIONS - ( 25 Apr 2019 20:00 )  Alb: 4.3 g/dL / Pro: 7.4 g/dL / ALK PHOS: 104 u/L / ALT: 18 u/L / AST: 18 u/L / GGT: x

## 2019-04-26 NOTE — PHYSICAL THERAPY INITIAL EVALUATION ADULT - ACTIVE RANGE OF MOTION EXAMINATION, REHAB EVAL
bilateral UE active assist WFL; bilateral LE AROM knee extension -25 degrees from neutral; left ankle df/pf 0-3 degrees; right ankle df/pf no active ROM; bilateral hip flexion 0-55 degrees

## 2019-04-26 NOTE — H&P ADULT - HISTORY OF PRESENT ILLNESS
54 y/o Male from [place] with h/o bipolar d/o, HTN,  hypothyroid, schizophrenia, IVDA c/o inability to walk due to lower extremity weakness which has been worsening for the last two weeks. Also complaining of lower back pain since sustaining a fall 2 weeks ago. In additions the pt reports paresthesias in both hands, however says that able to feel his feet. Sates that believes that he is being "possessed" and "held down" by somebody who passed away. Otherwise reports no SI/HI, fever, chills, chest pain, SOB, abdominal pain, nausea, vomiting, diarrhea, dysuria, increased frequency. 54 y/o Male, resident of [facility], with h/o bipolar d/o, HTN, nocturnal enuresis, deformity of lower leg, hypothyroid, schizophrenia, BPH, IVDA c/o inability to walk due to lower extremity weakness which has been worsening for the last two weeks. Also complaining of lower back pain since sustaining a fall 2 weeks ago. In additions the pt reports paresthesias in both hands, however says that able to feel his feet. Sates that believes that he is being "possessed" and "held down" by somebody who passed away. Otherwise reports no SI/HI, fever, chills, chest pain, SOB, abdominal pain, nausea, vomiting, diarrhea, dysuria, increased frequency. 52 y/o Male, resident of [facility], with h/o bipolar d/o, HTN, nocturnal enuresis, deformity of lower leg, hypothyroid, schizophrenia, BPH, IVDA c/o inability to walk due to lower extremity weakness which has been worsening for the last two weeks. Also complaining of lower back pain since sustaining a fall 2 weeks ago. In additions the pt reports paresthesias in both hands, however says that able to feel his feet. Sates that believes that he is being "possessed" and "held down" by somebody who passed away. Otherwise reports no SI/HI, fever, chills, chest pain, SOB, abdominal pain, nausea, vomiting, diarrhea, dysuria, increased frequency. Of note, the pt states that someone is trying to "poison" and "finish him off" at [place] and that he is being followed. 52 y/o Male, resident of [facility], with h/o bipolar d/o, HTN, nocturnal enuresis, deformity of lower leg, hypothyroid, schizophrenia, BPH, IVDA c/o inability to walk due to lower extremity weakness which has been worsening for the last two weeks. Also complaining of lower back pain since sustaining a fall 2 weeks ago. In additions the pt reports paresthesias in both hands, however says that able to feel his feet. Sates that believes that he is being "possessed" and "held down" by somebody who passed away. Otherwise reports no SI/HI, fever, chills, chest pain, SOB, abdominal pain, nausea, vomiting, diarrhea, dysuria, increased frequency. Of note, the pt states that someone is trying to "poison" and "finish him off" at [place] and that he is being followed. Last BM 3 days ago;

## 2019-04-26 NOTE — BEHAVIORAL HEALTH ASSESSMENT NOTE - NSBHCHARTREVIEWVS_PSY_A_CORE FT
Vital Signs Last 24 Hrs  T(C): 36.8 (26 Apr 2019 05:19), Max: 37 (26 Apr 2019 02:29)  T(F): 98.2 (26 Apr 2019 05:19), Max: 98.6 (26 Apr 2019 02:29)  HR: 80 (26 Apr 2019 05:19) (80 - 98)  BP: 132/68 (26 Apr 2019 05:19) (122/68 - 163/87)  BP(mean): --  RR: 18 (26 Apr 2019 05:19) (16 - 20)  SpO2: 96% (26 Apr 2019 05:19) (96% - 100%)

## 2019-04-26 NOTE — BEHAVIORAL HEALTH ASSESSMENT NOTE - SUMMARY
52 y/o Male, resident of [facility] with h/o schizophrenia, history of 1 past suicide attempt at age 26 via OD, h/o aggression/violence, h/o cannabis/crack/alcohol use and IVDA (reports sober x 4 years) and PMH of  HTN, nocturnal enuresis, deformity of lower leg, hypothyroid, BPH, c/o inability to walk due to lower extremity weakness which has been worsening for the last two weeks. In additions the pt reports paresthesias in both hands, however says that able to feel his feet. He has a history of multiple inpatient admissions discharged most recently from [facility] 11/17/17-8/8/18, currently residing at [facility]. Patient currently with longstanding delusions of being targeted/harmed by [facility] staff and them causing him not to be able to walk. Aside from these delusions of persecution, pt denies any other psychotic sx - no AH/VH, and no SI/HI. Patient appears to be at his psychiatric baseline at this time, although need collateral from pt's treating psychiatrist and/or therapist.

## 2019-04-27 LAB
ANION GAP SERPL CALC-SCNC: 13 MMO/L — SIGNIFICANT CHANGE UP (ref 7–14)
BUN SERPL-MCNC: 15 MG/DL — SIGNIFICANT CHANGE UP (ref 7–23)
CALCIUM SERPL-MCNC: 10 MG/DL — SIGNIFICANT CHANGE UP (ref 8.4–10.5)
CHLORIDE SERPL-SCNC: 100 MMOL/L — SIGNIFICANT CHANGE UP (ref 98–107)
CO2 SERPL-SCNC: 25 MMOL/L — SIGNIFICANT CHANGE UP (ref 22–31)
CREAT SERPL-MCNC: 0.65 MG/DL — SIGNIFICANT CHANGE UP (ref 0.5–1.3)
GLUCOSE SERPL-MCNC: 98 MG/DL — SIGNIFICANT CHANGE UP (ref 70–99)
HCT VFR BLD CALC: 44.6 % — SIGNIFICANT CHANGE UP (ref 39–50)
HGB BLD-MCNC: 14.4 G/DL — SIGNIFICANT CHANGE UP (ref 13–17)
MCHC RBC-ENTMCNC: 28.1 PG — SIGNIFICANT CHANGE UP (ref 27–34)
MCHC RBC-ENTMCNC: 32.3 % — SIGNIFICANT CHANGE UP (ref 32–36)
MCV RBC AUTO: 87.1 FL — SIGNIFICANT CHANGE UP (ref 80–100)
NRBC # FLD: 0 K/UL — SIGNIFICANT CHANGE UP (ref 0–0)
PLATELET # BLD AUTO: 291 K/UL — SIGNIFICANT CHANGE UP (ref 150–400)
PMV BLD: 9.4 FL — SIGNIFICANT CHANGE UP (ref 7–13)
POTASSIUM SERPL-MCNC: 4 MMOL/L — SIGNIFICANT CHANGE UP (ref 3.5–5.3)
POTASSIUM SERPL-SCNC: 4 MMOL/L — SIGNIFICANT CHANGE UP (ref 3.5–5.3)
RBC # BLD: 5.12 M/UL — SIGNIFICANT CHANGE UP (ref 4.2–5.8)
RBC # FLD: 14 % — SIGNIFICANT CHANGE UP (ref 10.3–14.5)
SODIUM SERPL-SCNC: 138 MMOL/L — SIGNIFICANT CHANGE UP (ref 135–145)
WBC # BLD: 8.23 K/UL — SIGNIFICANT CHANGE UP (ref 3.8–10.5)
WBC # FLD AUTO: 8.23 K/UL — SIGNIFICANT CHANGE UP (ref 3.8–10.5)

## 2019-04-27 PROCEDURE — 72125 CT NECK SPINE W/O DYE: CPT | Mod: 26

## 2019-04-27 PROCEDURE — 93010 ELECTROCARDIOGRAM REPORT: CPT

## 2019-04-27 PROCEDURE — 99233 SBSQ HOSP IP/OBS HIGH 50: CPT

## 2019-04-27 PROCEDURE — 99223 1ST HOSP IP/OBS HIGH 75: CPT

## 2019-04-27 RX ORDER — DEXAMETHASONE 0.5 MG/5ML
4 ELIXIR ORAL EVERY 6 HOURS
Qty: 0 | Refills: 0 | Status: DISCONTINUED | OUTPATIENT
Start: 2019-04-27 | End: 2019-05-02

## 2019-04-27 RX ADMIN — Medication 750 MILLIGRAM(S): at 21:03

## 2019-04-27 RX ADMIN — DESMOPRESSIN ACETATE 0.2 MILLIGRAM(S): 0.1 TABLET ORAL at 21:03

## 2019-04-27 RX ADMIN — GABAPENTIN 100 MILLIGRAM(S): 400 CAPSULE ORAL at 05:15

## 2019-04-27 RX ADMIN — Medication 5 MILLIGRAM(S): at 05:15

## 2019-04-27 RX ADMIN — Medication 5 MILLIGRAM(S): at 17:43

## 2019-04-27 RX ADMIN — Medication 0.5 MILLIGRAM(S): at 05:15

## 2019-04-27 RX ADMIN — FLUPHENAZINE HYDROCHLORIDE 5 MILLIGRAM(S): 1 TABLET, FILM COATED ORAL at 05:15

## 2019-04-27 RX ADMIN — Medication 4 MILLIGRAM(S): at 23:22

## 2019-04-27 RX ADMIN — Medication 10 MILLIGRAM(S): at 21:03

## 2019-04-27 RX ADMIN — FLUPHENAZINE HYDROCHLORIDE 5 MILLIGRAM(S): 1 TABLET, FILM COATED ORAL at 17:43

## 2019-04-27 RX ADMIN — ENOXAPARIN SODIUM 40 MILLIGRAM(S): 100 INJECTION SUBCUTANEOUS at 05:15

## 2019-04-27 RX ADMIN — Medication 4 MILLIGRAM(S): at 17:43

## 2019-04-27 RX ADMIN — PANTOPRAZOLE SODIUM 40 MILLIGRAM(S): 20 TABLET, DELAYED RELEASE ORAL at 05:15

## 2019-04-27 RX ADMIN — Medication 500 MILLIGRAM(S): at 12:49

## 2019-04-27 RX ADMIN — Medication 50 MILLIGRAM(S): at 05:15

## 2019-04-27 RX ADMIN — Medication 0.5 MILLIGRAM(S): at 17:43

## 2019-04-27 RX ADMIN — GABAPENTIN 100 MILLIGRAM(S): 400 CAPSULE ORAL at 17:43

## 2019-04-27 RX ADMIN — Medication 175 MICROGRAM(S): at 05:15

## 2019-04-27 NOTE — CONSULT NOTE ADULT - SUBJECTIVE AND OBJECTIVE BOX
HPI:  54 y/o Male, resident of [facility], with h/o bipolar d/o, HTN, nocturnal enuresis, deformity of lower leg, hypothyroid, schizophrenia, BPH, IVDA c/o inability to walk due to lower extremity weakness which has been worsening for the last two weeks. Also complaining of lower back pain since sustaining a fall 2 weeks ago. In additions the pt reports paresthesias in both hands, however says that able to feel his feet. Sates that believes that he is being "possessed" and "held down" by somebody who passed away. Otherwise reports no SI/HI, fever, chills, chest pain, SOB, abdominal pain, nausea, vomiting, diarrhea, dysuria, increased frequency. Of note, the pt states that someone is trying to "poison" and "finish him off" at [place] and that he is being followed. Last BM 3 days ago; (2019 03:25)  No history of cancers, no bowel or bladder incontinence     Neurosurgery called for severe cervical stenosis on MRI    PAST MEDICAL & SURGICAL HISTORY:  Bipolar disorder  Drug abuse  Incontinence  Schizophrenia  Hypothyroid  Hypertension  No significant past surgical history    Allergies    No Known Allergies    Intolerances    Haldol (Unknown)  Thorazine (Unknown)    baclofen 10 milliGRAM(s) Oral three times a day PRN  benztropine 0.5 milliGRAM(s) Oral two times a day  bisacodyl 5 milliGRAM(s) Oral every 12 hours  desmopressin 0.2 milliGRAM(s) Oral at bedtime  enoxaparin Injectable 40 milliGRAM(s) SubCutaneous every 24 hours  fluPHENAZine 5 milliGRAM(s) Oral two times a day  gabapentin 100 milliGRAM(s) Oral two times a day  influenza   Vaccine 0.5 milliLiter(s) IntraMuscular once  levothyroxine 175 MICROGram(s) Oral daily  metoprolol succinate ER 50 milliGRAM(s) Oral daily  pantoprazole    Tablet 40 milliGRAM(s) Oral before breakfast  sodium chloride 0.9%. 1000 milliLiter(s) IV Continuous <Continuous>  valproic  acid Syrup 500 milliGRAM(s) Oral <User Schedule>  valproic  acid Syrup 750 milliGRAM(s) Oral at bedtime    SOCIAL HISTORY:  FAMILY HISTORY:  Family history of Crohn's disease: in Mother    Vital Signs Last 24 Hrs  T(C): 36.6 (2019 05:14), Max: 36.7 (2019 15:02)  T(F): 97.8 (2019 05:14), Max: 98 (2019 15:02)  HR: 70 (2019 05:14) (66 - 70)  BP: 144/70 (2019 05:14) (130/80 - 149/81)  BP(mean): --  RR: 17 (2019 05:14) (17 - 18)  SpO2: 97% (2019 05:14) (96% - 99%)    PHYSICAL EXAM:  Awake Alert Attentive Affect appropriate Ox3, But was speaking to as if his "aunt" was next to him when no one present  Tone: normal.                  Strength:     [X] Upper extremity                      Delt       Bicep    Tricep                                                  R         5/5        4/5        5/5       5/5                                               L          5/5        5/5        5/5       5/5  [X] Lower extremity                       HF          KE          KF        DF         PF    EHL                                               R        4/5        5/5        5/5       4/5       5/5      5/5                                               L         4/5        5/5       5/5       4/5        5/5       4/5  + GRIFFITH'S  + BABINKSI  + CLONUS  Diminished sensation to LE leg to light touch however difficult to exam and patient is easily distracted  LABS:                          14.4   8.23  )-----------( 291      ( 2019 06:15 )             44.6     04-27    138  |  100  |  15  ----------------------------<  98  4.0   |  25  |  0.65    Ca    10.0      2019 06:15    TPro  7.4  /  Alb  4.3  /  TBili  0.5  /  DBili  x   /  AST  18  /  ALT  18  /  AlkPhos  104  04-25      Urinalysis Basic - ( 2019 16:25 )    Color: YELLOW / Appearance: CLEAR / S.019 / pH: 6.0  Gluc: NEGATIVE / Ketone: NEGATIVE  / Bili: NEGATIVE / Urobili: NORMAL   Blood: NEGATIVE / Protein: 10 / Nitrite: NEGATIVE   Leuk Esterase: NEGATIVE / RBC: x / WBC x   Sq Epi: x / Non Sq Epi: x / Bacteria: x        RADIOLOGY & ADDITIONAL STUDIES:  < from: MR Cervical Spine w/wo IV Cont (19 @ 19:54) >  EXAM:  MR SPINE CERVICAL WAW IC      EXAM:  MR SPINE THORACIC WAW IC        PROCEDURE DATE:  2019         INTERPRETATION:  History: Paresthesias. Bilateral upper and lower   extremity weakness. Inability to walk.    MRI of the thoracic and lumbar spine was performed using sagittal T1-T2   and STIR sequence. Axial T1 and T2-weighted sequences were performed. The   patient was injected with approximately 7.5 cc Gadavist IV with no IV   contrast discarded. Sagittal T1-weighted sequence was performed with fat   suppression. Axial T1-weighted sequence was performed.    Cervical spine:    Loss of normal cervical lordosis is seen which could be due to   positioning or muscle spasm.    The vertebral body height alignment appear maintained.    Disc desiccation is seen throughout cervical spine region though most   prominent at the C4-5 C5-6 and C6-7 levels. These findings are secondary   to degenerative changes    Abnormal increased signal seen involving the C2 C3 C4 C5-C6 and C7   vertebral bodies. These findings are suspicious for radiation changes.   Please correlate clinically.     C2-3: Hypertrophic facet joint changes are seen bilaterally (right   greater than left). Mild to moderate narrowing of the right neural   foramen is seen.    C3-4: Disc bulge and bilateral hypertrophic facet joint changes seen.   Moderate to severe narrowing of the left spinal canal is seen. Severe   narrowing of both neural foramen is seen. Spinal cord T2 prolongation is   suspected at the C4 level. This is difficult to ascertain given the   motion. Better quality MRI the cervical spine is recommended when patient   tolerated or sedation be given.    C4-5: Disc bulge is identified. Bilateral hypertrophic facet joint   changes seen. Severe narrowing of the spinal canal is seen with severe   spinal cord compression. Abnormal T2 pronation spinal cord is suspected.   Better quality study is recommended to better evaluate this finding.   Severe narrowing of both neural foramen is seen.    C5-6: Disc osteophyte complex is seen. Bilateral hypertrophic facet joint   changes seen. Mild to moderate narrowing of the spinal canal is seen.   Moderate to severe narrowing of the right neural foramen and severe   narrowing left neural foramen.    C6-7:Disc bulge and bilateral hypertrophic facet joint changes are seen.   Mild narrowing of the spinal canal is seen. Severe narrowing of both   neural foramen.    C7-T1: Disc bulge and bilateral hypertrophic facet joint changes seen. No   significant compromise of the left spinal canal or either neural foramen.    Thoracic spine:    This exam is somewhat limited by motion.     Hemangiomas are suspected at the T4 T10 and T11 levels. CT scan can be   done to evaluate this finding if clinically indicated.    T3-4: Right parasagittal disc herniation is seen which causes effacement   ventral thecal sac and abuts the spinal cord. No significant, as of the   spinal canal or either neural foramen.    T4-5: Disc bulge is seen which is slightly more prominent on the right   side. No significant, minus of the spinal canal or either neural foramen.    T5-6: Mild disc bulge is seen without significant, spinal canal or either   neural foramen    T6-7: Left parasagittal disc protrusion is seen without significant, the   spinal canal or either neural foramina foramen.    T7-8: Mild disc bulge is seen with mild narrowing of the spinal canal.    T8-9: Tiny central disc herniation is seen without significant, as of the   spinal canal or either neural foramen    T9-10: Left-sided disc bulge is seen which causes minimal effacement of   the ventral thecal sac. No Kniffen, the spinal canal or either neural   foramen    T10-11: Mild disc bulge and bilateral hypertrophic facet joint changes   are seen. Mild narrowing of the spinal canal and both neural foramen    No abnormal areas of enhancement are seen.    The spinal cord demonstrates normal signal and caliber.     Evaluation of the paraspinal soft tissues appear unremarkable    Impression: Abnormal signal is seen involving the C2-C7 vertebral bodies   which is suspicious for radiation changes.    Degenerative change seen involving the cervical and thoracic spine region   though this most prominent at the C3-4 C4-5 levels without significant   narrowing spinal canal and abnormal T2 prolongation suspected in the   spinal cord which is likely compatible with underlying contusion. Better   quality MRI of the cervical spine is recommended when patient tolerated   sedation be given.    < end of copied text >      < from: CT Head No Cont (19 @ 21:27) >      The study is mildly degraded by motion. There is no gross acute   intra-axial or extra-axial hemorrhage. There is no mass effect or shift   of the midline. The basal cisterns are not effaced. There is cerebral   volume loss with prominence of the ventricles and sulci. Mild patchy   lucency in the frontoparietal periventricular and subcortical white   matter without associated mild mass effect is nonspecific.    There is no significant scalp soft tissue swelling or scalp hematoma. The   skull base and bony calvarium are intact. The visualized paranasal   sinuses and tympanic/mastoid cavities are clear apart from minimal   ethmoid mucosal thickening and trace bilateral mastoid effusions. There   is evidence of bilateral lens surgery.    IMPRESSION:     No acute intracranial hemorrhage, mass effect, or acute osseous fracture.    Cerebral volume loss and mild patchy lucency in the frontoparietal   periventricular and subcortical white matter without associated mass   effect which could be due to chronic small vessel ischemic changes,   postinfectious/postinflammatory, or demyelination.      < end of copied text >      53

## 2019-04-27 NOTE — PROGRESS NOTE ADULT - SUBJECTIVE AND OBJECTIVE BOX
Spoke to parents of patient, Rose and Mariela Mims, regarding MRI Cervical spine results and need for cervical decompression and fusion. Risks and benefits d/w parents who agreed that it is best to proceed with surgery to prevent Mariela from getting weaker and to prevent further injury to his spinal cord and prevent paralysis. D/w Family that mariela had paranoid behavior this morning  and told me that his mother is in halfway for murdering his father. Both parents agreed this is not like him to say and are concerned his medications will need adjustments and requested that this is taken care of before surgery.       D/w Dr. Villasenor. Can move surgery to Wednesday 5/1/19 for cervical decompression and fusion. In the interim, MRI Cervical spine and Lumbar spine +/- to be arrange with sedation. CT Cervical spine w/o contrast to be completed and psychiatry to see given the above concerns.  Please medically optimize patient in preparation for surgery .     All d/w ADS NP

## 2019-04-27 NOTE — CHART NOTE - NSCHARTNOTEFT_GEN_A_CORE
Received call from Dr. Veras radiology; reporting MRI results of C3-C5 severe spinal stenosis, cord edema and compression. Dr. Dao made aware, neurosx paged. awaiting call back

## 2019-04-27 NOTE — PROGRESS NOTE ADULT - SUBJECTIVE AND OBJECTIVE BOX
Patient is a 53y old  Male who presents with a chief complaint of Cannot move legs (2019 12:41)      SUBJECTIVE / OVERNIGHT EVENTS:      MEDICATIONS  (STANDING):  benztropine 0.5 milliGRAM(s) Oral two times a day  bisacodyl 5 milliGRAM(s) Oral every 12 hours  desmopressin 0.2 milliGRAM(s) Oral at bedtime  dexamethasone  Injectable 4 milliGRAM(s) IV Push every 6 hours  enoxaparin Injectable 40 milliGRAM(s) SubCutaneous every 24 hours  fluPHENAZine 5 milliGRAM(s) Oral two times a day  gabapentin 100 milliGRAM(s) Oral two times a day  influenza   Vaccine 0.5 milliLiter(s) IntraMuscular once  levothyroxine 175 MICROGram(s) Oral daily  LORazepam   Injectable 2 milliGRAM(s) IV Push once  metoprolol succinate ER 50 milliGRAM(s) Oral daily  pantoprazole    Tablet 40 milliGRAM(s) Oral before breakfast  sodium chloride 0.9%. 1000 milliLiter(s) (125 mL/Hr) IV Continuous <Continuous>  valproic  acid Syrup 500 milliGRAM(s) Oral <User Schedule>  valproic  acid Syrup 750 milliGRAM(s) Oral at bedtime    MEDICATIONS  (PRN):  baclofen 10 milliGRAM(s) Oral three times a day PRN Muscle Spasm      Vital Signs Last 24 Hrs  T(C): 36.7 (2019 15:09), Max: 36.7 (2019 15:09)  T(F): 98.1 (2019 15:09), Max: 98.1 (2019 15:09)  HR: 64 (2019 15:09) (64 - 70)  BP: 137/73 (2019 15:09) (137/73 - 149/81)  BP(mean): --  RR: 18 (2019 15:09) (17 - 18)  SpO2: 98% (2019 15:09) (97% - 99%)  CAPILLARY BLOOD GLUCOSE        I&O's Summary        PHYSICAL EXAM  GENERAL: NAD, well-developed  HEAD:  Atraumatic, Normocephalic  EYES: EOMI, PERRLA, conjunctiva and sclera clear  NECK: Supple, No JVD  CHEST/LUNG: Clear to auscultation bilaterally; No wheeze  HEART: Regular rate and rhythm; No murmurs, rubs, or gallops  ABDOMEN: Soft, Nontender, Nondistended; Bowel sounds present  EXTREMITIES:  2+ Peripheral Pulses, No clubbing, cyanosis, or edema  PSYCH: AAOx3  SKIN: No rashes or lesions    LABS:                        14.4   8.23  )-----------( 291      ( 2019 06:15 )             44.6     04-    138  |  100  |  15  ----------------------------<  98  4.0   |  25  |  0.65    Ca    10.0      2019 06:15    TPro  7.4  /  Alb  4.3  /  TBili  0.5  /  DBili  x   /  AST  18  /  ALT  18  /  AlkPhos  104  04-25          Urinalysis Basic - ( 2019 16:25 )    Color: YELLOW / Appearance: CLEAR / S.019 / pH: 6.0  Gluc: NEGATIVE / Ketone: NEGATIVE  / Bili: NEGATIVE / Urobili: NORMAL   Blood: NEGATIVE / Protein: 10 / Nitrite: NEGATIVE   Leuk Esterase: NEGATIVE / RBC: x / WBC x   Sq Epi: x / Non Sq Epi: x / Bacteria: x        RADIOLOGY & ADDITIONAL TESTS:    Imaging Personally Reviewed:  Consultant(s) Notes Reviewed:    Care Discussed with Consultants/Other Providers: Patient is a 53y old  Male who presents with a chief complaint of Cannot move legs (27 Apr 2019 12:41)    SUBJECTIVE / OVERNIGHT EVENTS:  Patient observed working with PT this afternoon. Pt otherwise without worsening weakness. Patient states he is determined to walk again. Pt has no SOB, chest pain, n/v or abdominal pain.    MEDICATIONS  (STANDING):  benztropine 0.5 milliGRAM(s) Oral two times a day  bisacodyl 5 milliGRAM(s) Oral every 12 hours  desmopressin 0.2 milliGRAM(s) Oral at bedtime  dexamethasone  Injectable 4 milliGRAM(s) IV Push every 6 hours  enoxaparin Injectable 40 milliGRAM(s) SubCutaneous every 24 hours  fluPHENAZine 5 milliGRAM(s) Oral two times a day  gabapentin 100 milliGRAM(s) Oral two times a day  influenza   Vaccine 0.5 milliLiter(s) IntraMuscular once  levothyroxine 175 MICROGram(s) Oral daily  LORazepam   Injectable 2 milliGRAM(s) IV Push once  metoprolol succinate ER 50 milliGRAM(s) Oral daily  pantoprazole    Tablet 40 milliGRAM(s) Oral before breakfast  sodium chloride 0.9%. 1000 milliLiter(s) (125 mL/Hr) IV Continuous <Continuous>  valproic  acid Syrup 500 milliGRAM(s) Oral <User Schedule>  valproic  acid Syrup 750 milliGRAM(s) Oral at bedtime    MEDICATIONS  (PRN):  baclofen 10 milliGRAM(s) Oral three times a day PRN Muscle Spasm      Vital Signs Last 24 Hrs  T(C): 36.7 (27 Apr 2019 15:09), Max: 36.7 (27 Apr 2019 15:09)  T(F): 98.1 (27 Apr 2019 15:09), Max: 98.1 (27 Apr 2019 15:09)  HR: 64 (27 Apr 2019 15:09) (64 - 70)  BP: 137/73 (27 Apr 2019 15:09) (137/73 - 149/81)  BP(mean): --  RR: 18 (27 Apr 2019 15:09) (17 - 18)  SpO2: 98% (27 Apr 2019 15:09) (97% - 99%)          PHYSICAL EXAM  GENERAL: NAD  CHEST/LUNG: Clear to auscultation bilaterally; No wheeze  HEART: Regular rate and rhythm; No murmurs, rubs, or gallops  ABDOMEN: Soft, Nontender, Nondistended; Bowel sounds present  EXTREMITIES:  2+ Peripheral Pulses, No clubbing, cyanosis, or edema  PSYCH/NEURO: AAOx3. Sensation to light touch intact. Patient with atrophy of hand muscles b/l R>L. 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 Pt using proximal hip muscles in attempt to lift legs against gravity b/l - weak quadriceps b/l  SKIN: resolving ecchymosis of left dorsal arm distally.       LABS:                        14.4   8.23  )-----------( 291      ( 27 Apr 2019 06:15 )             44.6     04-27    138  |  100  |  15  ----------------------------<  98  4.0   |  25  |  0.65    Ca    10.0      27 Apr 2019 06:15        RADIOLOGY & ADDITIONAL TESTS:      EXAM:  MR SPINE CERVICAL WAW IC    EXAM:  MR SPINE THORACIC WAW IC    PROCEDURE DATE:  Apr 26 2019       Cervical spine:  Loss of normal cervical lordosis is seen which could be due to   positioning or muscle spasm.  The vertebral body height alignment appear maintained.  Disc desiccation is seen throughout cervical spine region though most   prominent at the C4-5 C5-6 and C6-7 levels. These findings are secondary   to degenerative changes  Abnormal increased signal seen involving the C2 C3 C4 C5-C6 and C7   vertebral bodies. These findings are suspicious for radiation changes.   Please correlate clinically.     C2-3: Hypertrophic facet joint changes are seen bilaterally (right   greater than left). Mild to moderate narrowing of the right neural   foramen is seen.    C3-4: Disc bulge and bilateral hypertrophic facet joint changes seen.   Moderate to severe narrowing of the left spinal canal is seen. Severe   narrowing of both neural foramen is seen. Spinal cord T2 prolongation is   suspected at the C4 level. This is difficult to ascertain given the   motion. Better quality MRI the cervical spine is recommended when patient   tolerated or sedation be given.    C4-5: Disc bulge is identified. Bilateral hypertrophic facet joint   changes seen. Severe narrowing of the spinal canal is seen with severe   spinal cord compression. Abnormal T2 pronation spinal cord is suspected.   Better quality study is recommended to better evaluate this finding.   Severe narrowing of both neural foramen is seen.    C5-6: Disc osteophyte complex is seen. Bilateral hypertrophic facet joint   changes seen. Mild to moderate narrowing of the spinal canal is seen.   Moderate to severe narrowing of the right neural foramen and severe   narrowing left neural foramen.    C6-7: Disc bulge and bilateral hypertrophic facet joint changes are seen.   Mild narrowing of the spinal canal is seen. Severe narrowing of both   neural foramen.    C7-T1: Disc bulge and bilateral hypertrophic facet joint changes seen. No   significant compromise of the left spinal canal or either neural foramen.    Thoracic spine:  This exam is somewhat limited by motion.   Hemangiomas are suspected at the T4 T10 and T11 levels. CT scan can be   done to evaluate this finding if clinically indicated.    T3-4: Right parasagittal disc herniation is seen which causes effacement   ventral thecal sac and abuts the spinal cord. No significant, as of the   spinal canal or either neural foramen.    T4-5: Disc bulge is seen which is slightly more prominent on the right   side. No significant, minus of the spinal canal or either neural foramen.    T5-6: Mild disc bulge is seen without significant, spinal canal or either   neural foramen    T6-7: Left parasagittal disc protrusion is seen without significant, the   spinal canal or either neural foramina foramen.    T7-8: Mild disc bulge is seen with mild narrowing of the spinal canal.    T8-9: Tiny central disc herniation is seen without significant, as of the   spinal canal or either neural foramen    T9-10: Left-sided disc bulge is seen which causes minimal effacement of   the ventral thecal sac. No Kniffen, the spinal canal or either neural   foramen    T10-11: Mild disc bulge and bilateral hypertrophic facet joint changes   are seen. Mild narrowing of the spinal canal and both neural foramen    No abnormal areas of enhancement are seen.  The spinal cord demonstrates normal signal and caliber.   Evaluation of the paraspinal soft tissues appear unremarkable    IMPRESSION:   Abnormal signal is seen involving the C2-C7 vertebral bodies   which is suspicious for radiation changes.    Degenerative change seen involving the cervical and thoracic spine region   though this most prominent at the C3-4 C4-5 levels without significant   narrowing spinal canal and abnormal T2 prolongation suspected in the   spinal cord which is likely compatible with underlying contusion. Better   quality MRI of the cervical spine is recommended when patient tolerated   sedation be given.  Imaging Personally Reviewed:  Consultant(s) Notes Reviewed:    Care Discussed with Consultants/Other Providers:

## 2019-04-27 NOTE — PROGRESS NOTE ADULT - SUBJECTIVE AND OBJECTIVE BOX
Neurology Progress    RAUDEL [id number]    HPI:  54 y/o Male, resident of [facility], with h/o bipolar d/o, HTN, nocturnal enuresis, deformity of lower leg, hypothyroid, schizophrenia, BPH, IVDA c/o inability to walk due to lower extremity weakness which has been worsening for the last two weeks. Also complaining of lower back pain since sustaining a fall 2 weeks ago. In additions the pt reports paresthesias in both hands, however says that able to feel his feet. Sates that believes that he is being "possessed" and "held down" by somebody who passed away. Otherwise reports no SI/HI, fever, chills, chest pain, SOB, abdominal pain, nausea, vomiting, diarrhea, dysuria, increased frequency. Of note, the pt states that someone is trying to "poison" and "finish him off" at [place] and that he is being followed. Last BM 3 days ago; (2019 03:25)      Past Medical History  Bipolar disorder  Drug abuse  Incontinence  Schizophrenia  Hypothyroid  Hypertension      Past Surgical History  No significant past surgical history  No significant past surgical history  No significant past surgical history      MEDICATIONS    baclofen 10 milliGRAM(s) Oral three times a day PRN  benztropine 0.5 milliGRAM(s) Oral two times a day  bisacodyl 5 milliGRAM(s) Oral every 12 hours  desmopressin 0.2 milliGRAM(s) Oral at bedtime  dexamethasone  Injectable 4 milliGRAM(s) IV Push every 6 hours  enoxaparin Injectable 40 milliGRAM(s) SubCutaneous every 24 hours  fluPHENAZine 5 milliGRAM(s) Oral two times a day  gabapentin 100 milliGRAM(s) Oral two times a day  influenza   Vaccine 0.5 milliLiter(s) IntraMuscular once  levothyroxine 175 MICROGram(s) Oral daily  LORazepam   Injectable 2 milliGRAM(s) IV Push once  metoprolol succinate ER 50 milliGRAM(s) Oral daily  pantoprazole    Tablet 40 milliGRAM(s) Oral before breakfast  sodium chloride 0.9%. 1000 milliLiter(s) IV Continuous <Continuous>  valproic  acid Syrup 500 milliGRAM(s) Oral <User Schedule>  valproic  acid Syrup 750 milliGRAM(s) Oral at bedtime         Family history: No history of dementia, strokes, or seizures   FAMILY HISTORY:  Family history of Crohn's disease: in Mother    SOCIAL HISTORY -- No history of tobacco or alcohol use     Allergies    No Known Allergies    Intolerances    Haldol (Unknown)  Thorazine (Unknown)          Vital Signs Last 24 Hrs  T(C): 36.6 (2019 05:14), Max: 36.7 (2019 15:02)  T(F): 97.8 (2019 05:14), Max: 98 (2019 15:02)  HR: 70 (2019 05:14) (66 - 70)  BP: 144/70 (2019 05:14) (130/80 - 149/81)  BP(mean): --  RR: 17 (2019 05:14) (17 - 18)  SpO2: 97% (2019 05:14) (96% - 99%)        On Neurological Examination:    Mental Status - Patient is alert, awake, oriented X3. .   Follows commands well and able to answer questions appropriately. Mood and affect  normal  Follow simple commands able to repeat  able to name.  Speech - Fluent no Dysarthria  no  Aphasia                              Cranial Nerves - Extraocular muscle intact  JOHN Facial symmetry Tongue midline, CnV1to V3 intact gross hearing intact      Motor Exam -   Right upper  4/5 throughout  Left upper 4/5 throughtout  Right lower-  4/5 throughout  Left lower45/5 throughout  Coordination -finger to nose intact  Muscle tone - is normal all over. No asymmetry is seen.      Sensory    Bilateral intact to light touch      ENERAL Exam:     Nontoxic , No Acute Distress   	  HEENT:  normocephalic, atraumatic  		  LUNGS:	Clear bilaterally  No Wheeze      VASCULAR: no carotid brui  	  HEART:	 Normal S1S2   No murmur RRR        	  MUSCULOSKELETAL: Normal Range of Motion  	   SKIN:      Normal   No Ecchymosis               LABS:  CBC Full  -  ( 2019 06:15 )  WBC Count : 8.23 K/uL  RBC Count : 5.12 M/uL  Hemoglobin : 14.4 g/dL  Hematocrit : 44.6 %  Platelet Count - Automated : 291 K/uL  Mean Cell Volume : 87.1 fL  Mean Cell Hemoglobin : 28.1 pg  Mean Cell Hemoglobin Concentration : 32.3 %  Auto Neutrophil # : x  Auto Lymphocyte # : x  Auto Monocyte # : x  Auto Eosinophil # : x  Auto Basophil # : x  Auto Neutrophil % : x  Auto Lymphocyte % : x  Auto Monocyte % : x  Auto Eosinophil % : x  Auto Basophil % : x    Urinalysis Basic - ( 2019 16:25 )    Color: YELLOW / Appearance: CLEAR / S.019 / pH: 6.0  Gluc: NEGATIVE / Ketone: NEGATIVE  / Bili: NEGATIVE / Urobili: NORMAL   Blood: NEGATIVE / Protein: 10 / Nitrite: NEGATIVE   Leuk Esterase: NEGATIVE / RBC: x / WBC x   Sq Epi: x / Non Sq Epi: x / Bacteria: x          138  |  100  |  15  ----------------------------<  98  4.0   |  25  |  0.65    Ca    10.0      2019 06:15    TPro  7.4  /  Alb  4.3  /  TBili  0.5  /  DBili  x   /  AST  18  /  ALT  18  /  AlkPhos  104  04-25    Hemoglobin A1C:     LIVER FUNCTIONS - ( 2019 20:00 )  Alb: 4.3 g/dL / Pro: 7.4 g/dL / ALK PHOS: 104 u/L / ALT: 18 u/L / AST: 18 u/L / GGT: x           Vitamin B12         RADIOLOGY    EKG                schoenberg

## 2019-04-27 NOTE — CONSULT NOTE ADULT - ASSESSMENT
52 y/o Male, resident of [facility], with h/o bipolar d/o, HTN, nocturnal enuresis, deformity of lower leg, hypothyroid, schizophrenia, BPH, IVDA c/o inability to walk due to lower extremity weakness which has been worsening for the last two weeks. Also complaining of lower back pain since sustaining a fall 2 weeks ago. In additions the pt reports paresthesias in both hands, however says that able to feel his feet. Sates that believes that he is being "possessed" and "held down" by somebody who passed away. Otherwise reports no SI/HI, fever, chills, chest pain, SOB, abdominal pain, nausea, vomiting, diarrhea, dysuria, increased frequency. Of note, the pt states that someone is trying to "poison" and "finish him off" at [place] and that he is being followed. Last BM 3 days ago; (26 Apr 2019 03:25)  Motion degraded MRI Cervical and thoracic spine obtained after patient had +duarte's clonus and babinski on exam that showed multilevel cervical stenosis and possible cord signal change          1. Patient would need cervical decompression and fusion but will need repeated MRI cervical spine WITH and without contrast as patient moved too much during study to evaluate exact levels of compression. Please also add MRI Lumbar spine +/- contrast also given back pain and EHL weaknss.   2. Send HIV/Hepatitis panel  3. CT cervical spine w/o contrast to assess bone quality  4. Medically optimize patient as this surgery can be done as early as Monday pending completion of above imaging and discussion with HCP if they are agreeable with HCP  5. Spoke to covering ADS NP regarding our plan. Will follow up once it is determined who pateint's HCP is to discuss surgery.   6. Hold Aspirin, NSAIDS, Any blood thinners (DVT ppx is ok and can be held the night before surgery)  7. Consider starting Decadron 4q6

## 2019-04-28 LAB
ALBUMIN SERPL ELPH-MCNC: 4 G/DL — SIGNIFICANT CHANGE UP (ref 3.3–5)
ALP SERPL-CCNC: 92 U/L — SIGNIFICANT CHANGE UP (ref 40–120)
ALT FLD-CCNC: 16 U/L — SIGNIFICANT CHANGE UP (ref 4–41)
AST SERPL-CCNC: 13 U/L — SIGNIFICANT CHANGE UP (ref 4–40)
BACTERIA UR CULT: SIGNIFICANT CHANGE UP
BILIRUB DIRECT SERPL-MCNC: < 0.2 MG/DL — SIGNIFICANT CHANGE UP (ref 0.1–0.2)
BILIRUB SERPL-MCNC: 0.6 MG/DL — SIGNIFICANT CHANGE UP (ref 0.2–1.2)
HIV 1+2 AB+HIV1 P24 AG SERPL QL IA: SIGNIFICANT CHANGE UP
PROT SERPL-MCNC: 6.9 G/DL — SIGNIFICANT CHANGE UP (ref 6–8.3)
SPECIMEN SOURCE: SIGNIFICANT CHANGE UP

## 2019-04-28 PROCEDURE — 99233 SBSQ HOSP IP/OBS HIGH 50: CPT

## 2019-04-28 RX ADMIN — Medication 50 MILLIGRAM(S): at 05:17

## 2019-04-28 RX ADMIN — Medication 175 MICROGRAM(S): at 05:17

## 2019-04-28 RX ADMIN — FLUPHENAZINE HYDROCHLORIDE 5 MILLIGRAM(S): 1 TABLET, FILM COATED ORAL at 05:18

## 2019-04-28 RX ADMIN — Medication 0.5 MILLIGRAM(S): at 17:54

## 2019-04-28 RX ADMIN — GABAPENTIN 100 MILLIGRAM(S): 400 CAPSULE ORAL at 05:18

## 2019-04-28 RX ADMIN — Medication 500 MILLIGRAM(S): at 08:57

## 2019-04-28 RX ADMIN — GABAPENTIN 100 MILLIGRAM(S): 400 CAPSULE ORAL at 17:54

## 2019-04-28 RX ADMIN — Medication 0.5 MILLIGRAM(S): at 05:17

## 2019-04-28 RX ADMIN — Medication 4 MILLIGRAM(S): at 12:37

## 2019-04-28 RX ADMIN — Medication 5 MILLIGRAM(S): at 05:17

## 2019-04-28 RX ADMIN — PANTOPRAZOLE SODIUM 40 MILLIGRAM(S): 20 TABLET, DELAYED RELEASE ORAL at 05:18

## 2019-04-28 RX ADMIN — Medication 10 MILLIGRAM(S): at 19:03

## 2019-04-28 RX ADMIN — DESMOPRESSIN ACETATE 0.2 MILLIGRAM(S): 0.1 TABLET ORAL at 21:21

## 2019-04-28 RX ADMIN — Medication 750 MILLIGRAM(S): at 21:21

## 2019-04-28 RX ADMIN — Medication 4 MILLIGRAM(S): at 05:18

## 2019-04-28 RX ADMIN — Medication 5 MILLIGRAM(S): at 17:54

## 2019-04-28 RX ADMIN — FLUPHENAZINE HYDROCHLORIDE 5 MILLIGRAM(S): 1 TABLET, FILM COATED ORAL at 18:26

## 2019-04-28 RX ADMIN — Medication 4 MILLIGRAM(S): at 17:54

## 2019-04-28 RX ADMIN — ENOXAPARIN SODIUM 40 MILLIGRAM(S): 100 INJECTION SUBCUTANEOUS at 05:18

## 2019-04-28 NOTE — PROGRESS NOTE ADULT - SUBJECTIVE AND OBJECTIVE BOX
Patient is a 53y old  Male who presents with a chief complaint of Cannot move legs (28 Apr 2019 09:41)        SUBJECTIVE / OVERNIGHT EVENTS:      MEDICATIONS  (STANDING):  benztropine 0.5 milliGRAM(s) Oral two times a day  bisacodyl 5 milliGRAM(s) Oral every 12 hours  desmopressin 0.2 milliGRAM(s) Oral at bedtime  dexamethasone  Injectable 4 milliGRAM(s) IV Push every 6 hours  enoxaparin Injectable 40 milliGRAM(s) SubCutaneous every 24 hours  fluPHENAZine 5 milliGRAM(s) Oral two times a day  gabapentin 100 milliGRAM(s) Oral two times a day  levothyroxine 175 MICROGram(s) Oral daily  LORazepam   Injectable 2 milliGRAM(s) IV Push once  metoprolol succinate ER 50 milliGRAM(s) Oral daily  pantoprazole    Tablet 40 milliGRAM(s) Oral before breakfast  sodium chloride 0.9%. 1000 milliLiter(s) (125 mL/Hr) IV Continuous <Continuous>  valproic  acid Syrup 500 milliGRAM(s) Oral <User Schedule>  valproic  acid Syrup 750 milliGRAM(s) Oral at bedtime    MEDICATIONS  (PRN):  baclofen 10 milliGRAM(s) Oral three times a day PRN Muscle Spasm      Vital Signs Last 24 Hrs  T(C): 36.6 (28 Apr 2019 12:35), Max: 36.6 (28 Apr 2019 05:17)  T(F): 97.8 (28 Apr 2019 12:35), Max: 97.8 (28 Apr 2019 05:17)  HR: 67 (28 Apr 2019 12:35) (67 - 88)  BP: 127/90 (28 Apr 2019 12:35) (127/90 - 156/86)  BP(mean): --  RR: 18 (28 Apr 2019 12:35) (18 - 18)  SpO2: 99% (28 Apr 2019 12:35) (96% - 99%)  CAPILLARY BLOOD GLUCOSE        I&O's Summary        PHYSICAL EXAM  GENERAL: NAD, well-developed  HEAD:  Atraumatic, Normocephalic  EYES: EOMI, PERRLA, conjunctiva and sclera clear  NECK: Supple, No JVD  CHEST/LUNG: Clear to auscultation bilaterally; No wheeze  HEART: Regular rate and rhythm; No murmurs, rubs, or gallops  ABDOMEN: Soft, Nontender, Nondistended; Bowel sounds present  EXTREMITIES:  2+ Peripheral Pulses, No clubbing, cyanosis, or edema  PSYCH: AAOx3  SKIN: No rashes or lesions    LABS:                        14.4   8.23  )-----------( 291      ( 27 Apr 2019 06:15 )             44.6     04-27    138  |  100  |  15  ----------------------------<  98  4.0   |  25  |  0.65    Ca    10.0      27 Apr 2019 06:15    TPro  6.9  /  Alb  4.0  /  TBili  0.6  /  DBili  < 0.2  /  AST  13  /  ALT  16  /  AlkPhos  92  04-28              RADIOLOGY & ADDITIONAL TESTS:    Imaging Personally Reviewed:  Consultant(s) Notes Reviewed:    Care Discussed with Consultants/Other Providers: Patient is a 53y old  Male who presents with a chief complaint of Cannot move legs (28 Apr 2019 09:41)    SUBJECTIVE / OVERNIGHT EVENTS:  No events overnight. Patient has no acute complaints. Pt eats with assistance, no n/v or abdominal pain. Pt reports no change in urinary or bowel habits.     MEDICATIONS  (STANDING):  benztropine 0.5 milliGRAM(s) Oral two times a day  bisacodyl 5 milliGRAM(s) Oral every 12 hours  desmopressin 0.2 milliGRAM(s) Oral at bedtime  dexamethasone  Injectable 4 milliGRAM(s) IV Push every 6 hours  enoxaparin Injectable 40 milliGRAM(s) SubCutaneous every 24 hours  fluPHENAZine 5 milliGRAM(s) Oral two times a day  gabapentin 100 milliGRAM(s) Oral two times a day  levothyroxine 175 MICROGram(s) Oral daily  LORazepam   Injectable 2 milliGRAM(s) IV Push once  metoprolol succinate ER 50 milliGRAM(s) Oral daily  pantoprazole    Tablet 40 milliGRAM(s) Oral before breakfast  sodium chloride 0.9%. 1000 milliLiter(s) (125 mL/Hr) IV Continuous <Continuous>  valproic  acid Syrup 500 milliGRAM(s) Oral <User Schedule>  valproic  acid Syrup 750 milliGRAM(s) Oral at bedtime    MEDICATIONS  (PRN):  baclofen 10 milliGRAM(s) Oral three times a day PRN Muscle Spasm      Vital Signs Last 24 Hrs  T(C): 36.6 (28 Apr 2019 12:35), Max: 36.6 (28 Apr 2019 05:17)  T(F): 97.8 (28 Apr 2019 12:35), Max: 97.8 (28 Apr 2019 05:17)  HR: 67 (28 Apr 2019 12:35) (67 - 88)  BP: 127/90 (28 Apr 2019 12:35) (127/90 - 156/86)  BP(mean): --  RR: 18 (28 Apr 2019 12:35) (18 - 18)  SpO2: 99% (28 Apr 2019 12:35) (96% - 99%)        PHYSICAL EXAM  GENERAL: NAD, pleasant   CHEST/LUNG: Clear to auscultation bilaterally; No wheeze  HEART: Regular rate and rhythm; No murmurs, rubs, or gallops  ABDOMEN: Soft, Nontender, Nondistended; Bowel sounds present  EXTREMITIES:  2+ Peripheral Pulses, No clubbing, cyanosis, or edema  PSYCH/NEURO: AAOx3. Sensation to light touch intact. Patient with atrophy of hand muscles b/l R>L. 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 Pt using proximal hip muscles but able to slightly lift legs against gravity b/l, R>L weak quadriceps b/l  SKIN: resolving ecchymosis of left dorsal arm distally.     LABS:                        14.4   8.23  )-----------( 291      ( 27 Apr 2019 06:15 )             44.6     04-27    138  |  100  |  15  ----------------------------<  98  4.0   |  25  |  0.65    Ca    10.0      27 Apr 2019 06:15    TPro  6.9  /  Alb  4.0  /  TBili  0.6  /  DBili  < 0.2  /  AST  13  /  ALT  16  /  AlkPhos  92  04-28      EXAM:  CT CERVICAL SPINE    PROCEDURE DATE:  Apr 27 2019     INTERPRETATION  Loss of normal cervical lordosis is seen. This could be due to   positioning or muscle spasm    Disc space narrowing endplate right change and osteophytes seen involving   the C4-5 C5-6 and C6-7 levels. These findings are secondary degenerative   change    There are no acute fractures or dislocations seen.    C2-3: Bilateral hypertrophic facet joint changes are seen. Mild to   moderate narrowing of the right neural foramen is seen.    C3-4: Disc osteophyte complex is seen with bilateral hypertrophic facet   joint changes. Moderate to severe narrowing of the spinal canal is seen.   Moderate to severe narrowing of the right neural foramen is seen. Mild to   moderate narrowing of the left neural foramen is seen.    C4-5: Disc osteophyte complex is seen. Bilateral hypertrophic facet joint   changes are seen. Severe narrowing of the spinal canal is seen. Moderate   narrowing of the left neural foramen and moderate to severe narrowing of   the right neural foramen    C5-6: Disc osteophyte complex is seen. Bilateral hypertrophic facet joint   changes seen. Mild to moderate narrowing spinal canal is seen. Mild to   moderate narrowing of the right neural foramen and moderate to severe   narrowing of the left neural foramen    C6-7: Disc bulge and bilateral hypertrophic facet joint changes are seen.   Moderate narrowing of the spinal canal and both neural foramen.    C7-T1: Disc bulge is seen. No significant, spinal canal either neural   foramen.    Previously noted abnormal signal involving the spinal cord on the prior   MRI is beyond the resolution of this study and should be followed with   MRI.    Evaluation of the paraspinal soft tissues is limited lack of IV contrast   though grossly unremarkable    The visualized portion of both lung apices appear clear.    Impression: Extensive degenerative changes as described above.    Consultant(s) Notes Reviewed:  Yes  Care Discussed with Consultants/Other Providers:

## 2019-04-28 NOTE — PROGRESS NOTE ADULT - SUBJECTIVE AND OBJECTIVE BOX
HPI:  54 y/o Male, resident of [facility], with h/o bipolar d/o, HTN, nocturnal enuresis, deformity of lower leg, hypothyroid, schizophrenia, BPH, IVDA c/o inability to walk due to lower extremity weakness which has been worsening for the last two weeks. Also complaining of lower back pain since sustaining a fall 2 weeks ago. In additions the pt reports paresthesias in both hands, however says that able to feel his feet. Sates that believes that he is being "possessed" and "held down" by somebody who passed away. Otherwise reports no SI/HI, fever, chills, chest pain, SOB, abdominal pain, nausea, vomiting, diarrhea, dysuria, increased frequency. Of note, the pt states that someone is trying to "poison" and "finish him off" at [place] and that he is being followed. Last BM 3 days ago; (2019 03:25)      Past Medical History  Bipolar disorder  Drug abuse  Incontinence  Schizophrenia  Hypothyroid  Hypertension      Past Surgical History  No significant past surgical history  No significant past surgical history  No significant past surgical history      MEDICATIONS    baclofen 10 milliGRAM(s) Oral three times a day PRN  benztropine 0.5 milliGRAM(s) Oral two times a day  bisacodyl 5 milliGRAM(s) Oral every 12 hours  desmopressin 0.2 milliGRAM(s) Oral at bedtime  dexamethasone  Injectable 4 milliGRAM(s) IV Push every 6 hours  enoxaparin Injectable 40 milliGRAM(s) SubCutaneous every 24 hours  fluPHENAZine 5 milliGRAM(s) Oral two times a day  gabapentin 100 milliGRAM(s) Oral two times a day  influenza   Vaccine 0.5 milliLiter(s) IntraMuscular once  levothyroxine 175 MICROGram(s) Oral daily  LORazepam   Injectable 2 milliGRAM(s) IV Push once  metoprolol succinate ER 50 milliGRAM(s) Oral daily  pantoprazole    Tablet 40 milliGRAM(s) Oral before breakfast  sodium chloride 0.9%. 1000 milliLiter(s) IV Continuous <Continuous>  valproic  acid Syrup 500 milliGRAM(s) Oral <User Schedule>  valproic  acid Syrup 750 milliGRAM(s) Oral at bedtime         Family history: No history of dementia, strokes, or seizures   FAMILY HISTORY:  Family history of Crohn's disease: in Mother    SOCIAL HISTORY -- No history of tobacco or alcohol use     Allergies    No Known Allergies    Intolerances    Haldol (Unknown)  Thorazine (Unknown)          Vital Signs Last 24 Hrs  T(C): 36.8  HR: 76  BP: 140/70   RR: 17  On Neurological Examination:    Mental Status - Patient is alert, awake, oriented X3. .   Follows commands well and able to answer questions appropriately. Mood and affect  normal  Follow simple commands able to repeat  able to name.  Speech - Fluent no Dysarthria  no  Aphasia                              Cranial Nerves - Extraocular muscle intact  JOHN Facial symmetry Tongue midline, CnV1to V3 intact gross hearing intact      Motor Exam -   Right upper  4/5 throughout  Left upper 4/5 throughtout  Right lower-  4/5 throughout  Left lower45/5 throughout  Coordination -finger to nose intact  Muscle tone - is normal all over. No asymmetry is seen.      Sensory    Bilateral intact to light touch      ENERAL Exam:     Nontoxic , No Acute Distress   	  HEENT:  normocephalic, atraumatic  		  LUNGS:	Clear bilaterally  No Wheeze      VASCULAR: no carotid brui  	  HEART:	 Normal S1S2   No murmur RRR        	  MUSCULOSKELETAL: Normal Range of Motion  	   SKIN:      Normal   No Ecchymosis               LABS:  CBC Full  -  ( 2019 06:15 )  WBC Count : 8.23 K/uL  RBC Count : 5.12 M/uL  Hemoglobin : 14.4 g/dL  Hematocrit : 44.6 %  Platelet Count - Automated : 291 K/uL  Mean Cell Volume : 87.1 fL  Mean Cell Hemoglobin : 28.1 pg  Mean Cell Hemoglobin Concentration : 32.3 %  Auto Neutrophil # : x  Auto Lymphocyte # : x  Auto Monocyte # : x  Auto Eosinophil # : x  Auto Basophil # : x  Auto Neutrophil % : x  Auto Lymphocyte % : x  Auto Monocyte % : x  Auto Eosinophil % : x  Auto Basophil % : x    Urinalysis Basic - ( 2019 16:25 )    Color: YELLOW / Appearance: CLEAR / S.019 / pH: 6.0  Gluc: NEGATIVE / Ketone: NEGATIVE  / Bili: NEGATIVE / Urobili: NORMAL   Blood: NEGATIVE / Protein: 10 / Nitrite: NEGATIVE   Leuk Esterase: NEGATIVE / RBC: x / WBC x   Sq Epi: x / Non Sq Epi: x / Bacteria: x          138  |  100  |  15  ----------------------------<  98  4.0   |  25  |  0.65    Ca    10.0      2019 06:15    TPro  7.4  /  Alb  4.3  /  TBili  0.5  /  DBili  x   /  AST  18  /  ALT  18  /  AlkPhos  104  04-25    Hemoglobin A1C:     LIVER FUNCTIONS - ( 2019 20:00 )  Alb: 4.3 g/dL / Pro: 7.4 g/dL / ALK PHOS: 104 u/L / ALT: 18 u/L / AST: 18 u/L / GGT: x           Vitamin B12         RADIOLOGY    EKG                schoenberg     Assessment and Plan:   · Assessment		  54 y/o Male, resident of [facility], with h/o bipolar d/o, HTN, nocturnal enuresis, deformity of lower leg, hypothyroid, schizophrenia, BPH, IVDA c/o inability to walk due to lower extremity weakness which has been worsening for the last two weeks. Also complaining of lower back pain since sustaining a fall 2 weeks ago. In additions the pt reports paresthesias in both hands, however says that able to feel his feet. Sates that believes that he is being "possessed" and "held down" by somebody who passed away   MRI shows severe steneois in the cervical spione     [facility]     neurosurgery input appreciated      Electronic Signatures:  Schoenberg, Laura Gray (MD)  (

## 2019-04-29 LAB
ANION GAP SERPL CALC-SCNC: 12 MMO/L — SIGNIFICANT CHANGE UP (ref 7–14)
BASOPHILS # BLD AUTO: 0.02 K/UL — SIGNIFICANT CHANGE UP (ref 0–0.2)
BASOPHILS NFR BLD AUTO: 0.2 % — SIGNIFICANT CHANGE UP (ref 0–2)
BUN SERPL-MCNC: 15 MG/DL — SIGNIFICANT CHANGE UP (ref 7–23)
CALCIUM SERPL-MCNC: 10.2 MG/DL — SIGNIFICANT CHANGE UP (ref 8.4–10.5)
CHLORIDE SERPL-SCNC: 102 MMOL/L — SIGNIFICANT CHANGE UP (ref 98–107)
CO2 SERPL-SCNC: 26 MMOL/L — SIGNIFICANT CHANGE UP (ref 22–31)
CREAT SERPL-MCNC: 0.62 MG/DL — SIGNIFICANT CHANGE UP (ref 0.5–1.3)
EOSINOPHIL # BLD AUTO: 0 K/UL — SIGNIFICANT CHANGE UP (ref 0–0.5)
EOSINOPHIL NFR BLD AUTO: 0 % — SIGNIFICANT CHANGE UP (ref 0–6)
GLUCOSE SERPL-MCNC: 102 MG/DL — HIGH (ref 70–99)
HCT VFR BLD CALC: 45 % — SIGNIFICANT CHANGE UP (ref 39–50)
HGB BLD-MCNC: 14.7 G/DL — SIGNIFICANT CHANGE UP (ref 13–17)
IMM GRANULOCYTES NFR BLD AUTO: 0.4 % — SIGNIFICANT CHANGE UP (ref 0–1.5)
LYMPHOCYTES # BLD AUTO: 0.72 K/UL — LOW (ref 1–3.3)
LYMPHOCYTES # BLD AUTO: 6.4 % — LOW (ref 13–44)
MAGNESIUM SERPL-MCNC: 2 MG/DL — SIGNIFICANT CHANGE UP (ref 1.6–2.6)
MCHC RBC-ENTMCNC: 28.5 PG — SIGNIFICANT CHANGE UP (ref 27–34)
MCHC RBC-ENTMCNC: 32.7 % — SIGNIFICANT CHANGE UP (ref 32–36)
MCV RBC AUTO: 87.2 FL — SIGNIFICANT CHANGE UP (ref 80–100)
MONOCYTES # BLD AUTO: 0.58 K/UL — SIGNIFICANT CHANGE UP (ref 0–0.9)
MONOCYTES NFR BLD AUTO: 5.2 % — SIGNIFICANT CHANGE UP (ref 2–14)
NEUTROPHILS # BLD AUTO: 9.89 K/UL — HIGH (ref 1.8–7.4)
NEUTROPHILS NFR BLD AUTO: 87.8 % — HIGH (ref 43–77)
NRBC # FLD: 0 K/UL — SIGNIFICANT CHANGE UP (ref 0–0)
PHOSPHATE SERPL-MCNC: 2.6 MG/DL — SIGNIFICANT CHANGE UP (ref 2.5–4.5)
PLATELET # BLD AUTO: 305 K/UL — SIGNIFICANT CHANGE UP (ref 150–400)
PMV BLD: 10.3 FL — SIGNIFICANT CHANGE UP (ref 7–13)
POTASSIUM SERPL-MCNC: 3.9 MMOL/L — SIGNIFICANT CHANGE UP (ref 3.5–5.3)
POTASSIUM SERPL-SCNC: 3.9 MMOL/L — SIGNIFICANT CHANGE UP (ref 3.5–5.3)
RBC # BLD: 5.16 M/UL — SIGNIFICANT CHANGE UP (ref 4.2–5.8)
RBC # FLD: 13.8 % — SIGNIFICANT CHANGE UP (ref 10.3–14.5)
SODIUM SERPL-SCNC: 140 MMOL/L — SIGNIFICANT CHANGE UP (ref 135–145)
WBC # BLD: 11.25 K/UL — HIGH (ref 3.8–10.5)
WBC # FLD AUTO: 11.25 K/UL — HIGH (ref 3.8–10.5)

## 2019-04-29 PROCEDURE — 99233 SBSQ HOSP IP/OBS HIGH 50: CPT

## 2019-04-29 PROCEDURE — 93010 ELECTROCARDIOGRAM REPORT: CPT

## 2019-04-29 RX ORDER — OLANZAPINE 15 MG/1
5 TABLET, FILM COATED ORAL ONCE
Qty: 0 | Refills: 0 | Status: DISCONTINUED | OUTPATIENT
Start: 2019-04-29 | End: 2019-04-29

## 2019-04-29 RX ADMIN — Medication 0.5 MILLIGRAM(S): at 18:32

## 2019-04-29 RX ADMIN — Medication 175 MICROGRAM(S): at 05:18

## 2019-04-29 RX ADMIN — FLUPHENAZINE HYDROCHLORIDE 5 MILLIGRAM(S): 1 TABLET, FILM COATED ORAL at 06:45

## 2019-04-29 RX ADMIN — Medication 4 MILLIGRAM(S): at 11:35

## 2019-04-29 RX ADMIN — DESMOPRESSIN ACETATE 0.2 MILLIGRAM(S): 0.1 TABLET ORAL at 22:40

## 2019-04-29 RX ADMIN — Medication 4 MILLIGRAM(S): at 18:32

## 2019-04-29 RX ADMIN — GABAPENTIN 100 MILLIGRAM(S): 400 CAPSULE ORAL at 05:18

## 2019-04-29 RX ADMIN — Medication 5 MILLIGRAM(S): at 18:31

## 2019-04-29 RX ADMIN — Medication 4 MILLIGRAM(S): at 05:18

## 2019-04-29 RX ADMIN — GABAPENTIN 100 MILLIGRAM(S): 400 CAPSULE ORAL at 18:32

## 2019-04-29 RX ADMIN — Medication 500 MILLIGRAM(S): at 08:20

## 2019-04-29 RX ADMIN — Medication 0.5 MILLIGRAM(S): at 05:18

## 2019-04-29 RX ADMIN — Medication 50 MILLIGRAM(S): at 05:18

## 2019-04-29 RX ADMIN — Medication 5 MILLIGRAM(S): at 05:18

## 2019-04-29 RX ADMIN — FLUPHENAZINE HYDROCHLORIDE 5 MILLIGRAM(S): 1 TABLET, FILM COATED ORAL at 18:32

## 2019-04-29 RX ADMIN — Medication 4 MILLIGRAM(S): at 00:00

## 2019-04-29 RX ADMIN — Medication 750 MILLIGRAM(S): at 22:40

## 2019-04-29 RX ADMIN — Medication 10 MILLIGRAM(S): at 03:19

## 2019-04-29 RX ADMIN — PANTOPRAZOLE SODIUM 40 MILLIGRAM(S): 20 TABLET, DELAYED RELEASE ORAL at 05:18

## 2019-04-29 RX ADMIN — ENOXAPARIN SODIUM 40 MILLIGRAM(S): 100 INJECTION SUBCUTANEOUS at 05:18

## 2019-04-29 NOTE — PROGRESS NOTE BEHAVIORAL HEALTH - NSBHCHARTREVIEWVS_PSY_A_CORE FT
Vital Signs Last 24 Hrs  T(C): 36.3 (29 Apr 2019 05:17), Max: 37.1 (28 Apr 2019 21:19)  T(F): 97.3 (29 Apr 2019 05:17), Max: 98.7 (28 Apr 2019 21:19)  HR: 72 (29 Apr 2019 05:17) (72 - 72)  BP: 133/80 (29 Apr 2019 05:17) (133/80 - 140/82)  BP(mean): --  RR: 18 (29 Apr 2019 05:17) (18 - 18)  SpO2: 99% (29 Apr 2019 05:17) (99% - 99%)

## 2019-04-29 NOTE — PROGRESS NOTE BEHAVIORAL HEALTH - NSBHFUPINTERVALHXFT_PSY_A_CORE
Patient seen in follow-up today, chart reviewed. No prns or events over the weekend. Pt scheduled for surgery 5/1 for spinal stenosis. Pt without complaints today, in good spirits. Reports good sleep and appetite. Pt reports he has been communicating internally in his mind with rapper "Maria C" which he feels has been helpful for him. No SI/HI. He is A&Ox3. Patient seen in follow-up today, chart reviewed. No prns or events over the weekend. Pt scheduled for surgery 5/1 for spinal stenosis. Pt without complaints today, in good spirits. Reports good sleep and appetite. Pt reports he has been communicating internally in his mind with rapper "Maria C" which he feels has been helpful for him. No SI/HI. He is A&Ox3.    Spoke with pt's outpatient psychiatrist Dr. Young at [place] (Psych resident) at [phone] who reports he's been treating the patient x 1 year. Reports that Clozapine and Lithium were discontinued many months ago, and that over the last month while pt was psychiatrically admitted to [facility] he was taken off of Seroquel and started on Prolixin PO and Prolixin decanoate. Reports pt was just out of [place] a few days prior to current admission, at that time looked much improved psychiatrically with pleasant mood.

## 2019-04-29 NOTE — PROGRESS NOTE BEHAVIORAL HEALTH - NSBHCONSULTFOLLOWAFTERCARE_PSY_A_CORE FT
Return to [facility] ([phone]) when medically stable.   Pt will f/u with his outpatient psychiatrist Dr. Faviola Julian ([phone]) and therapist Lilia Gutierrez ([phone]) Return to [facility] ([phone]) when medically stable.   Pt will f/u with his outpatient psychiatrist resident Dr. Young ([phone]) with supervisor Dr. Faviola Julian ([phone]) and therapist Lilia Gutierrez ([phone])

## 2019-04-29 NOTE — PROGRESS NOTE BEHAVIORAL HEALTH - NSBHCHARTREVIEWLAB_PSY_A_CORE FT
CBC Full  -  ( 29 Apr 2019 05:55 )  WBC Count : 11.25 K/uL  RBC Count : 5.16 M/uL  Hemoglobin : 14.7 g/dL  Hematocrit : 45.0 %  Platelet Count - Automated : 305 K/uL  Mean Cell Volume : 87.2 fL  Mean Cell Hemoglobin : 28.5 pg  Mean Cell Hemoglobin Concentration : 32.7 %  Auto Neutrophil # : 9.89 K/uL  Auto Lymphocyte # : 0.72 K/uL  Auto Monocyte # : 0.58 K/uL  Auto Eosinophil # : 0.00 K/uL  Auto Basophil # : 0.02 K/uL  Auto Neutrophil % : 87.8 %  Auto Lymphocyte % : 6.4 %  Auto Monocyte % : 5.2 %  Auto Eosinophil % : 0.0 %  Auto Basophil % : 0.2 %    04-29    140  |  102  |  15  ----------------------------<  102<H>  3.9   |  26  |  0.62    Ca    10.2      29 Apr 2019 05:55  Phos  2.6     04-29  Mg     2.0     04-29    TPro  6.9  /  Alb  4.0  /  TBili  0.6  /  DBili  < 0.2  /  AST  13  /  ALT  16  /  AlkPhos  92  04-28    LIVER FUNCTIONS - ( 28 Apr 2019 06:18 )  Alb: 4.0 g/dL / Pro: 6.9 g/dL / ALK PHOS: 92 u/L / ALT: 16 u/L / AST: 13 u/L / GGT: x

## 2019-04-29 NOTE — PROGRESS NOTE BEHAVIORAL HEALTH - NSBHCONSULTMEDS_PSY_A_CORE FT
C/w patient's outpatient psychotropic medication regimen:    VPA 500mg qam + 750mg qhs  Neurontin 100mg po bid  Prolixin 5mg po bid  Cogentin 0.5mg po bid    ** Pt also receives Prolixin 50mg IM decanoate monthly, last received 4/23/19- due on 5/21/19    (Pt was also taking Clozapine, Lithium and Seroquel - however all 3 of these agents were discontinued at [place] on 4/23/19)

## 2019-04-29 NOTE — PROGRESS NOTE BEHAVIORAL HEALTH - SUMMARY
52 y/o Male, resident of [facility] with h/o schizophrenia, history of 1 past suicide attempt at age 26 via OD, h/o aggression/violence, h/o cannabis/crack/alcohol use and IVDA (reports sober x 4 years) and PMH of  HTN, nocturnal enuresis, deformity of lower leg, hypothyroid, BPH, c/o inability to walk due to lower extremity weakness which has been worsening for the last two weeks. In additions the pt reports paresthesias in both hands, however says that able to feel his feet. He has a history of multiple inpatient admissions discharged most recently from [facility] 11/17/17-8/8/18, currently residing at [facility]. Patient currently with longstanding delusions of being targeted/harmed by [facility] staff and them causing him not to be able to walk. Aside from these delusions of persecution, pt denies any other psychotic sx - no AH/VH, and no SI/HI. Patient appears to be at his psychiatric baseline at this time, although need collateral from pt's treating psychiatrist and/or therapist. 54 y/o Male, resident of [facility] with h/o schizophrenia, history of 1 past suicide attempt at age 26 via OD, h/o aggression/violence, h/o cannabis/crack/alcohol use and IVDA (reports sober x 4 years) and PMH of  HTN, nocturnal enuresis, deformity of lower leg, hypothyroid, BPH, c/o inability to walk due to lower extremity weakness which has been worsening for the last two weeks. In additions the pt reports paresthesias in both hands, however says that able to feel his feet. He has a history of multiple inpatient admissions discharged most recently from [facility] 11/17/17-8/8/18, currently residing at [facility]. Patient currently with longstanding delusions of being targeted/harmed by [facility] staff and them causing him not to be able to walk. Aside from these delusions of persecution, pt denies any other psychotic sx - no AH/VH, and no SI/HI. Patient appears to be at his psychiatric baseline at this time.

## 2019-04-29 NOTE — PROGRESS NOTE BEHAVIORAL HEALTH - NSBHCONSULTRECOMMENDOTHER_PSY_A_CORE FT
- Monitor EKG for qtc; if qtc >500ms, would need to discontinue antipsychotics    - Will attempt to get collateral info from pt's outpatient psychiatrist and therapist - Monitor EKG for qtc; if qtc >500ms, would need to discontinue antipsychotics

## 2019-04-30 ENCOUNTER — TRANSCRIPTION ENCOUNTER (OUTPATIENT)
Age: 54
End: 2019-04-30

## 2019-04-30 LAB
ANION GAP SERPL CALC-SCNC: 12 MMO/L — SIGNIFICANT CHANGE UP (ref 7–14)
APTT BLD: 32.7 SEC — SIGNIFICANT CHANGE UP (ref 27.5–36.3)
BASOPHILS # BLD AUTO: 0.01 K/UL — SIGNIFICANT CHANGE UP (ref 0–0.2)
BASOPHILS NFR BLD AUTO: 0.1 % — SIGNIFICANT CHANGE UP (ref 0–2)
BLD GP AB SCN SERPL QL: NEGATIVE — SIGNIFICANT CHANGE UP
BUN SERPL-MCNC: 18 MG/DL — SIGNIFICANT CHANGE UP (ref 7–23)
CALCIUM SERPL-MCNC: 9.9 MG/DL — SIGNIFICANT CHANGE UP (ref 8.4–10.5)
CHLORIDE SERPL-SCNC: 102 MMOL/L — SIGNIFICANT CHANGE UP (ref 98–107)
CO2 SERPL-SCNC: 27 MMOL/L — SIGNIFICANT CHANGE UP (ref 22–31)
CREAT SERPL-MCNC: 0.6 MG/DL — SIGNIFICANT CHANGE UP (ref 0.5–1.3)
EOSINOPHIL # BLD AUTO: 0 K/UL — SIGNIFICANT CHANGE UP (ref 0–0.5)
EOSINOPHIL NFR BLD AUTO: 0 % — SIGNIFICANT CHANGE UP (ref 0–6)
GLUCOSE SERPL-MCNC: 114 MG/DL — HIGH (ref 70–99)
HAV IGM SER-ACNC: NONREACTIVE — SIGNIFICANT CHANGE UP
HBV CORE IGM SER-ACNC: NONREACTIVE — SIGNIFICANT CHANGE UP
HBV SURFACE AG SER-ACNC: NONREACTIVE — SIGNIFICANT CHANGE UP
HCT VFR BLD CALC: 43.6 % — SIGNIFICANT CHANGE UP (ref 39–50)
HCV AB S/CO SERPL IA: 0.11 S/CO — SIGNIFICANT CHANGE UP (ref 0–0.99)
HCV AB SERPL-IMP: SIGNIFICANT CHANGE UP
HGB BLD-MCNC: 14.2 G/DL — SIGNIFICANT CHANGE UP (ref 13–17)
IMM GRANULOCYTES NFR BLD AUTO: 0.4 % — SIGNIFICANT CHANGE UP (ref 0–1.5)
INR BLD: 1.2 — HIGH (ref 0.88–1.17)
LYMPHOCYTES # BLD AUTO: 0.55 K/UL — LOW (ref 1–3.3)
LYMPHOCYTES # BLD AUTO: 4.7 % — LOW (ref 13–44)
MAGNESIUM SERPL-MCNC: 1.9 MG/DL — SIGNIFICANT CHANGE UP (ref 1.6–2.6)
MCHC RBC-ENTMCNC: 28.6 PG — SIGNIFICANT CHANGE UP (ref 27–34)
MCHC RBC-ENTMCNC: 32.6 % — SIGNIFICANT CHANGE UP (ref 32–36)
MCV RBC AUTO: 87.9 FL — SIGNIFICANT CHANGE UP (ref 80–100)
MONOCYTES # BLD AUTO: 0.58 K/UL — SIGNIFICANT CHANGE UP (ref 0–0.9)
MONOCYTES NFR BLD AUTO: 5 % — SIGNIFICANT CHANGE UP (ref 2–14)
NEUTROPHILS # BLD AUTO: 10.4 K/UL — HIGH (ref 1.8–7.4)
NEUTROPHILS NFR BLD AUTO: 89.8 % — HIGH (ref 43–77)
NRBC # FLD: 0 K/UL — SIGNIFICANT CHANGE UP (ref 0–0)
PHOSPHATE SERPL-MCNC: 3.2 MG/DL — SIGNIFICANT CHANGE UP (ref 2.5–4.5)
PLATELET # BLD AUTO: 267 K/UL — SIGNIFICANT CHANGE UP (ref 150–400)
PMV BLD: 10.4 FL — SIGNIFICANT CHANGE UP (ref 7–13)
POTASSIUM SERPL-MCNC: 4 MMOL/L — SIGNIFICANT CHANGE UP (ref 3.5–5.3)
POTASSIUM SERPL-SCNC: 4 MMOL/L — SIGNIFICANT CHANGE UP (ref 3.5–5.3)
PROTHROM AB SERPL-ACNC: 13.8 SEC — HIGH (ref 9.8–13.1)
RBC # BLD: 4.96 M/UL — SIGNIFICANT CHANGE UP (ref 4.2–5.8)
RBC # FLD: 13.9 % — SIGNIFICANT CHANGE UP (ref 10.3–14.5)
RH IG SCN BLD-IMP: POSITIVE — SIGNIFICANT CHANGE UP
RH IG SCN BLD-IMP: POSITIVE — SIGNIFICANT CHANGE UP
SODIUM SERPL-SCNC: 141 MMOL/L — SIGNIFICANT CHANGE UP (ref 135–145)
WBC # BLD: 11.59 K/UL — HIGH (ref 3.8–10.5)
WBC # FLD AUTO: 11.59 K/UL — HIGH (ref 3.8–10.5)

## 2019-04-30 PROCEDURE — 71045 X-RAY EXAM CHEST 1 VIEW: CPT | Mod: 26

## 2019-04-30 PROCEDURE — 99233 SBSQ HOSP IP/OBS HIGH 50: CPT

## 2019-04-30 PROCEDURE — 93306 TTE W/DOPPLER COMPLETE: CPT | Mod: 26

## 2019-04-30 RX ORDER — SODIUM CHLORIDE 9 MG/ML
1000 INJECTION INTRAMUSCULAR; INTRAVENOUS; SUBCUTANEOUS
Qty: 0 | Refills: 0 | Status: DISCONTINUED | OUTPATIENT
Start: 2019-04-30 | End: 2019-05-01

## 2019-04-30 RX ADMIN — Medication 175 MICROGRAM(S): at 05:41

## 2019-04-30 RX ADMIN — Medication 10 MILLIGRAM(S): at 00:48

## 2019-04-30 RX ADMIN — Medication 500 MILLIGRAM(S): at 07:39

## 2019-04-30 RX ADMIN — Medication 4 MILLIGRAM(S): at 00:48

## 2019-04-30 RX ADMIN — Medication 0.5 MILLIGRAM(S): at 05:40

## 2019-04-30 RX ADMIN — Medication 5 MILLIGRAM(S): at 05:40

## 2019-04-30 RX ADMIN — Medication 0.5 MILLIGRAM(S): at 19:24

## 2019-04-30 RX ADMIN — Medication 4 MILLIGRAM(S): at 12:35

## 2019-04-30 RX ADMIN — Medication 5 MILLIGRAM(S): at 19:24

## 2019-04-30 RX ADMIN — FLUPHENAZINE HYDROCHLORIDE 5 MILLIGRAM(S): 1 TABLET, FILM COATED ORAL at 19:25

## 2019-04-30 RX ADMIN — DESMOPRESSIN ACETATE 0.2 MILLIGRAM(S): 0.1 TABLET ORAL at 22:53

## 2019-04-30 RX ADMIN — Medication 4 MILLIGRAM(S): at 19:25

## 2019-04-30 RX ADMIN — GABAPENTIN 100 MILLIGRAM(S): 400 CAPSULE ORAL at 19:24

## 2019-04-30 RX ADMIN — GABAPENTIN 100 MILLIGRAM(S): 400 CAPSULE ORAL at 05:41

## 2019-04-30 RX ADMIN — Medication 750 MILLIGRAM(S): at 22:53

## 2019-04-30 RX ADMIN — FLUPHENAZINE HYDROCHLORIDE 5 MILLIGRAM(S): 1 TABLET, FILM COATED ORAL at 05:40

## 2019-04-30 RX ADMIN — Medication 4 MILLIGRAM(S): at 05:40

## 2019-04-30 RX ADMIN — PANTOPRAZOLE SODIUM 40 MILLIGRAM(S): 20 TABLET, DELAYED RELEASE ORAL at 05:41

## 2019-04-30 RX ADMIN — ENOXAPARIN SODIUM 40 MILLIGRAM(S): 100 INJECTION SUBCUTANEOUS at 05:39

## 2019-04-30 NOTE — CONSULT NOTE ADULT - ASSESSMENT
EKG: NSR no ischemic changes     Echo: < from: TTE with Doppler (w/Cont) (04.30.19 @ 13:23) >  1. Normal mitral valve. Minimal mitral regurgitation.  2. Normal left ventricular internal dimensions and wall  thicknesses.  3. Endocardium not well visualized; grossly normal left  ventricular systolicfunction.  Endocardial visualization  enhanced with intravenous injection of echo contrast  (Definity).  4. The right ventricle is not well visualized; grossly  normal right ventricular systolic function.    < end of copied text >    Assessment and Plan     1) Perioperative risk assessment: Denies CP, SOB, Palpitation, syncope , Ekg no ischemic changes, Echo with normal LV function, Optimized from a cardiac standpoint for Cervical spine decompression and fusion under anesthesia. Moderate risk for MACE      2) Cervical spine compression: in steroids, neuro surg on board      3) HTN: c/w metoprolol

## 2019-04-30 NOTE — CONSULT NOTE ADULT - SUBJECTIVE AND OBJECTIVE BOX
Pipe Winkler MD  Interventional Cardiology   [place] Office : [phone] [place] [address] [place] [address]  Tel:   [place] Office : [address] [facility] [place] [address]  Tel: [phone]  Cell : [phone]    HISTORY OF PRESENT ILLNESS:    52 y/o Male, resident of [facility], with h/o bipolar d/o, HTN, nocturnal enuresis, deformity of lower leg, hypothyroid, schizophrenia, BPH, IVDA c/o inability to walk due to lower extremity weakness which has been worsening for the last two weeks. Also complaining of lower back pain since sustaining a fall 2 weeks ago. In additions the pt reports paresthesias in both hands, however says that able to feel his feet. Sates that believes that he is being "possessed" and "held down" by somebody who passed away. Otherwise reports no SI/HI, fever, chills, chest pain, SOB, abdominal pain, nausea, vomiting, diarrhea, dysuria, increased frequency. Of note, the pt states that someone is trying to "poison" and "finish him off" at [place] and that he is being followed. Last BM 3 days ago;  Cervical MRI found to have cervical spinal compression scheduled for cervical decompression and fusion,. Denies CP, SOB, palpitation, Syncope   PAST MEDICAL & SURGICAL HISTORY:  Bipolar disorder  Drug abuse  Incontinence  Schizophrenia  Hypothyroid  Hypertension  No significant past surgical history    	    MEDICATIONS:  metoprolol succinate ER 50 milliGRAM(s) Oral daily        baclofen 10 milliGRAM(s) Oral three times a day PRN  benztropine 0.5 milliGRAM(s) Oral two times a day  fluPHENAZine 5 milliGRAM(s) Oral two times a day  gabapentin 100 milliGRAM(s) Oral two times a day  LORazepam   Injectable 2 milliGRAM(s) IV Push once  valproic  acid Syrup 500 milliGRAM(s) Oral <User Schedule>  valproic  acid Syrup 750 milliGRAM(s) Oral at bedtime    bisacodyl 5 milliGRAM(s) Oral every 12 hours  pantoprazole    Tablet 40 milliGRAM(s) Oral before breakfast    desmopressin 0.2 milliGRAM(s) Oral at bedtime  dexamethasone  Injectable 4 milliGRAM(s) IV Push every 6 hours  levothyroxine 175 MICROGram(s) Oral daily    sodium chloride 0.9%. 1000 milliLiter(s) IV Continuous <Continuous>      FAMILY HISTORY:  Family history of Crohn's disease: in Mother        Allergies    No Known Allergies    Intolerances    Haldol (Unknown)  Thorazine (Unknown)  	      PHYSICAL EXAM:  T(C): 36.7 (04-30-19 @ 15:38), Max: 36.8 (04-29-19 @ 22:39)  HR: 66 (04-30-19 @ 15:38) (53 - 67)  BP: 136/83 (04-30-19 @ 15:38) (114/68 - 136/83)  RR: 17 (04-30-19 @ 15:38) (16 - 17)  SpO2: 100% (04-30-19 @ 15:38) (97% - 100%)  Wt(kg): --  I&O's Summary      Appearance: Disheveled 	  HEENT:   Normal oral mucosa	  Cardiovascular: Normal S1 S2, No JVD, No murmurs  Respiratory: Lungs clear to auscultation	  Gastrointestinal:  Soft, Non-tender, + BS	  Extremities:  No clubbing, cyanosis or edema    LABS:	 	    CARDIAC MARKERS:                                  14.2   11.59 )-----------( 267      ( 30 Apr 2019 06:15 )             43.6     04-30    141  |  102  |  18  ----------------------------<  114<H>  4.0   |  27  |  0.60    Ca    9.9      30 Apr 2019 06:15  Phos  3.2     04-30  Mg     1.9     04-30      proBNP:   Lipid Profile:   HgA1c:   TSH:     ASSESSMENT/PLAN:

## 2019-04-30 NOTE — PROGRESS NOTE ADULT - SUBJECTIVE AND OBJECTIVE BOX
Patient is a 53y old  Male who presents with a chief complaint of Cannot move legs (29 Apr 2019 17:14)      SUBJECTIVE / OVERNIGHT EVENTS: patient seen and examined by bedside, no acute distress noted        MEDICATIONS  (STANDING):  benztropine 0.5 milliGRAM(s) Oral two times a day  bisacodyl 5 milliGRAM(s) Oral every 12 hours  desmopressin 0.2 milliGRAM(s) Oral at bedtime  dexamethasone  Injectable 4 milliGRAM(s) IV Push every 6 hours  enoxaparin Injectable 40 milliGRAM(s) SubCutaneous every 24 hours  fluPHENAZine 5 milliGRAM(s) Oral two times a day  gabapentin 100 milliGRAM(s) Oral two times a day  levothyroxine 175 MICROGram(s) Oral daily  LORazepam   Injectable 2 milliGRAM(s) IV Push once  metoprolol succinate ER 50 milliGRAM(s) Oral daily  pantoprazole    Tablet 40 milliGRAM(s) Oral before breakfast  sodium chloride 0.9%. 1000 milliLiter(s) (125 mL/Hr) IV Continuous <Continuous>  valproic  acid Syrup 500 milliGRAM(s) Oral <User Schedule>  valproic  acid Syrup 750 milliGRAM(s) Oral at bedtime    MEDICATIONS  (PRN):  baclofen 10 milliGRAM(s) Oral three times a day PRN Muscle Spasm      Vital Signs Last 24 Hrs  T(C): 36.8 (30 Apr 2019 05:35), Max: 37.2 (29 Apr 2019 15:20)  T(F): 98.3 (30 Apr 2019 05:35), Max: 98.9 (29 Apr 2019 15:20)  HR: 53 (30 Apr 2019 05:35) (53 - 74)  BP: 117/78 (30 Apr 2019 05:35) (114/68 - 136/87)  BP(mean): --  RR: 16 (30 Apr 2019 05:35) (16 - 18)  SpO2: 97% (30 Apr 2019 05:35) (97% - 100%)    PHYSICAL EXAM  GENERAL: NAD, pleasant   CHEST/LUNG: Clear to auscultation bilaterally; No wheeze  HEART: Regular rate and rhythm;   ABDOMEN: Soft, Nontender, Nondistended; Bowel sounds present  EXTREMITIES:  2+ Peripheral Pulses, No clubbing, cyanosis, or edema  PSYCH/NEURO: AAOx3. Sensation to light touch intact. Patient with atrophy of hand muscles b/l R>L. 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 Pt using proximal hip muscles but able to slightly lift legs against gravity b/l, R>L weak quadriceps b/l  SKIN: resolving ecchymosis of left dorsal arm distally.         LABS:                        14.2   11.59 )-----------( 267      ( 30 Apr 2019 06:15 )             43.6     04-30    141  |  102  |  18  ----------------------------<  114<H>  4.0   |  27  |  0.60    Ca    9.9      30 Apr 2019 06:15  Phos  3.2     04-30  Mg     1.9     04-30      PT/INR - ( 30 Apr 2019 11:30 )   PT: 13.8 SEC;   INR: 1.20          PTT - ( 30 Apr 2019 11:30 )  PTT:32.7 SEC          RADIOLOGY & ADDITIONAL TESTS:  < from: TTE with Doppler (w/Cont) (04.30.19 @ 13:23) >  CONCLUSIONS:  1. Normal mitral valve. Minimal mitral regurgitation.  2. Normal left ventricular internal dimensions and wall  thicknesses.  3. Endocardium not well visualized; grossly normal left  ventricular systolicfunction.  Endocardial visualization  enhanced with intravenous injection of echo contrast  (Definity).  4. The right ventricle is not well visualized; grossly  normal right ventricular systolic function.    < end of copied text >      Consultant(s) Notes Reviewed:      Care Discussed with Consultants/Other Providers:

## 2019-05-01 ENCOUNTER — APPOINTMENT (OUTPATIENT)
Dept: NEUROSURGERY | Facility: HOSPITAL | Age: 54
End: 2019-05-01
Payer: MEDICAID

## 2019-05-01 DIAGNOSIS — M48.02 SPINAL STENOSIS, CERVICAL REGION: ICD-10-CM

## 2019-05-01 LAB
ANION GAP SERPL CALC-SCNC: 12 MMO/L — SIGNIFICANT CHANGE UP (ref 7–14)
ANION GAP SERPL CALC-SCNC: 15 MMO/L — HIGH (ref 7–14)
BASOPHILS # BLD AUTO: 0.01 K/UL — SIGNIFICANT CHANGE UP (ref 0–0.2)
BASOPHILS # BLD AUTO: 0.01 K/UL — SIGNIFICANT CHANGE UP (ref 0–0.2)
BASOPHILS NFR BLD AUTO: 0.1 % — SIGNIFICANT CHANGE UP (ref 0–2)
BASOPHILS NFR BLD AUTO: 0.1 % — SIGNIFICANT CHANGE UP (ref 0–2)
BUN SERPL-MCNC: 12 MG/DL — SIGNIFICANT CHANGE UP (ref 7–23)
BUN SERPL-MCNC: 16 MG/DL — SIGNIFICANT CHANGE UP (ref 7–23)
CALCIUM SERPL-MCNC: 9.6 MG/DL — SIGNIFICANT CHANGE UP (ref 8.4–10.5)
CALCIUM SERPL-MCNC: 9.8 MG/DL — SIGNIFICANT CHANGE UP (ref 8.4–10.5)
CHLORIDE SERPL-SCNC: 103 MMOL/L — SIGNIFICANT CHANGE UP (ref 98–107)
CHLORIDE SERPL-SCNC: 104 MMOL/L — SIGNIFICANT CHANGE UP (ref 98–107)
CO2 SERPL-SCNC: 24 MMOL/L — SIGNIFICANT CHANGE UP (ref 22–31)
CO2 SERPL-SCNC: 26 MMOL/L — SIGNIFICANT CHANGE UP (ref 22–31)
CREAT SERPL-MCNC: 0.49 MG/DL — LOW (ref 0.5–1.3)
CREAT SERPL-MCNC: 0.49 MG/DL — LOW (ref 0.5–1.3)
EOSINOPHIL # BLD AUTO: 0 K/UL — SIGNIFICANT CHANGE UP (ref 0–0.5)
EOSINOPHIL # BLD AUTO: 0 K/UL — SIGNIFICANT CHANGE UP (ref 0–0.5)
EOSINOPHIL NFR BLD AUTO: 0 % — SIGNIFICANT CHANGE UP (ref 0–6)
EOSINOPHIL NFR BLD AUTO: 0 % — SIGNIFICANT CHANGE UP (ref 0–6)
GLUCOSE SERPL-MCNC: 111 MG/DL — HIGH (ref 70–99)
GLUCOSE SERPL-MCNC: 169 MG/DL — HIGH (ref 70–99)
HCT VFR BLD CALC: 44.2 % — SIGNIFICANT CHANGE UP (ref 39–50)
HCT VFR BLD CALC: 44.4 % — SIGNIFICANT CHANGE UP (ref 39–50)
HGB BLD-MCNC: 14.6 G/DL — SIGNIFICANT CHANGE UP (ref 13–17)
HGB BLD-MCNC: 14.8 G/DL — SIGNIFICANT CHANGE UP (ref 13–17)
IMM GRANULOCYTES NFR BLD AUTO: 0.4 % — SIGNIFICANT CHANGE UP (ref 0–1.5)
IMM GRANULOCYTES NFR BLD AUTO: 0.5 % — SIGNIFICANT CHANGE UP (ref 0–1.5)
INR BLD: 1.18 — HIGH (ref 0.88–1.17)
LYMPHOCYTES # BLD AUTO: 0.4 K/UL — LOW (ref 1–3.3)
LYMPHOCYTES # BLD AUTO: 0.56 K/UL — LOW (ref 1–3.3)
LYMPHOCYTES # BLD AUTO: 2.5 % — LOW (ref 13–44)
LYMPHOCYTES # BLD AUTO: 5.7 % — LOW (ref 13–44)
MAGNESIUM SERPL-MCNC: 2 MG/DL — SIGNIFICANT CHANGE UP (ref 1.6–2.6)
MCHC RBC-ENTMCNC: 28.5 PG — SIGNIFICANT CHANGE UP (ref 27–34)
MCHC RBC-ENTMCNC: 28.5 PG — SIGNIFICANT CHANGE UP (ref 27–34)
MCHC RBC-ENTMCNC: 32.9 % — SIGNIFICANT CHANGE UP (ref 32–36)
MCHC RBC-ENTMCNC: 33.5 % — SIGNIFICANT CHANGE UP (ref 32–36)
MCV RBC AUTO: 85 FL — SIGNIFICANT CHANGE UP (ref 80–100)
MCV RBC AUTO: 86.5 FL — SIGNIFICANT CHANGE UP (ref 80–100)
MONOCYTES # BLD AUTO: 0.68 K/UL — SIGNIFICANT CHANGE UP (ref 0–0.9)
MONOCYTES # BLD AUTO: 0.7 K/UL — SIGNIFICANT CHANGE UP (ref 0–0.9)
MONOCYTES NFR BLD AUTO: 4.3 % — SIGNIFICANT CHANGE UP (ref 2–14)
MONOCYTES NFR BLD AUTO: 6.9 % — SIGNIFICANT CHANGE UP (ref 2–14)
NEUTROPHILS # BLD AUTO: 14.96 K/UL — HIGH (ref 1.8–7.4)
NEUTROPHILS # BLD AUTO: 8.54 K/UL — HIGH (ref 1.8–7.4)
NEUTROPHILS NFR BLD AUTO: 86.8 % — HIGH (ref 43–77)
NEUTROPHILS NFR BLD AUTO: 92.7 % — HIGH (ref 43–77)
NRBC # FLD: 0 K/UL — SIGNIFICANT CHANGE UP (ref 0–0)
NRBC # FLD: 0 K/UL — SIGNIFICANT CHANGE UP (ref 0–0)
PHOSPHATE SERPL-MCNC: 2.6 MG/DL — SIGNIFICANT CHANGE UP (ref 2.5–4.5)
PLATELET # BLD AUTO: 271 K/UL — SIGNIFICANT CHANGE UP (ref 150–400)
PLATELET # BLD AUTO: 275 K/UL — SIGNIFICANT CHANGE UP (ref 150–400)
PMV BLD: 10.2 FL — SIGNIFICANT CHANGE UP (ref 7–13)
PMV BLD: 10.5 FL — SIGNIFICANT CHANGE UP (ref 7–13)
POTASSIUM SERPL-MCNC: 3.9 MMOL/L — SIGNIFICANT CHANGE UP (ref 3.5–5.3)
POTASSIUM SERPL-MCNC: 3.9 MMOL/L — SIGNIFICANT CHANGE UP (ref 3.5–5.3)
POTASSIUM SERPL-SCNC: 3.9 MMOL/L — SIGNIFICANT CHANGE UP (ref 3.5–5.3)
POTASSIUM SERPL-SCNC: 3.9 MMOL/L — SIGNIFICANT CHANGE UP (ref 3.5–5.3)
PROTHROM AB SERPL-ACNC: 13.5 SEC — HIGH (ref 9.8–13.1)
RBC # BLD: 5.13 M/UL — SIGNIFICANT CHANGE UP (ref 4.2–5.8)
RBC # BLD: 5.2 M/UL — SIGNIFICANT CHANGE UP (ref 4.2–5.8)
RBC # FLD: 13.7 % — SIGNIFICANT CHANGE UP (ref 10.3–14.5)
RBC # FLD: 13.9 % — SIGNIFICANT CHANGE UP (ref 10.3–14.5)
SODIUM SERPL-SCNC: 142 MMOL/L — SIGNIFICANT CHANGE UP (ref 135–145)
SODIUM SERPL-SCNC: 142 MMOL/L — SIGNIFICANT CHANGE UP (ref 135–145)
WBC # BLD: 16.14 K/UL — HIGH (ref 3.8–10.5)
WBC # BLD: 9.84 K/UL — SIGNIFICANT CHANGE UP (ref 3.8–10.5)
WBC # FLD AUTO: 16.14 K/UL — HIGH (ref 3.8–10.5)
WBC # FLD AUTO: 9.84 K/UL — SIGNIFICANT CHANGE UP (ref 3.8–10.5)

## 2019-05-01 PROCEDURE — 63045 LAM FACETEC & FORAMOT CRV: CPT

## 2019-05-01 PROCEDURE — 22842 INSERT SPINE FIXATION DEVICE: CPT

## 2019-05-01 PROCEDURE — 22600 ARTHRD PST TQ 1NTRSPC CRV: CPT

## 2019-05-01 PROCEDURE — 63048 LAM FACETEC &FORAMOT EA ADDL: CPT

## 2019-05-01 PROCEDURE — 22614 ARTHRD PST TQ 1NTRSPC EA ADD: CPT

## 2019-05-01 PROCEDURE — 99233 SBSQ HOSP IP/OBS HIGH 50: CPT

## 2019-05-01 RX ORDER — HYDROMORPHONE HYDROCHLORIDE 2 MG/ML
0.5 INJECTION INTRAMUSCULAR; INTRAVENOUS; SUBCUTANEOUS
Qty: 0 | Refills: 0 | Status: DISCONTINUED | OUTPATIENT
Start: 2019-05-01 | End: 2019-05-01

## 2019-05-01 RX ORDER — DEXTROSE MONOHYDRATE, SODIUM CHLORIDE, AND POTASSIUM CHLORIDE 50; .745; 4.5 G/1000ML; G/1000ML; G/1000ML
1000 INJECTION, SOLUTION INTRAVENOUS
Qty: 0 | Refills: 0 | Status: DISCONTINUED | OUTPATIENT
Start: 2019-05-01 | End: 2019-05-02

## 2019-05-01 RX ORDER — ONDANSETRON 8 MG/1
4 TABLET, FILM COATED ORAL EVERY 6 HOURS
Qty: 0 | Refills: 0 | Status: DISCONTINUED | OUTPATIENT
Start: 2019-05-01 | End: 2019-05-16

## 2019-05-01 RX ORDER — DIAZEPAM 5 MG
5 TABLET ORAL EVERY 6 HOURS
Qty: 0 | Refills: 0 | Status: DISCONTINUED | OUTPATIENT
Start: 2019-05-01 | End: 2019-05-02

## 2019-05-01 RX ORDER — SENNA PLUS 8.6 MG/1
2 TABLET ORAL AT BEDTIME
Qty: 0 | Refills: 0 | Status: DISCONTINUED | OUTPATIENT
Start: 2019-05-01 | End: 2019-05-16

## 2019-05-01 RX ORDER — OXYCODONE HYDROCHLORIDE 5 MG/1
5 TABLET ORAL EVERY 4 HOURS
Qty: 0 | Refills: 0 | Status: DISCONTINUED | OUTPATIENT
Start: 2019-05-01 | End: 2019-05-02

## 2019-05-01 RX ORDER — OXYCODONE HYDROCHLORIDE 5 MG/1
10 TABLET ORAL EVERY 4 HOURS
Qty: 0 | Refills: 0 | Status: DISCONTINUED | OUTPATIENT
Start: 2019-05-01 | End: 2019-05-02

## 2019-05-01 RX ORDER — ONDANSETRON 8 MG/1
4 TABLET, FILM COATED ORAL ONCE
Qty: 0 | Refills: 0 | Status: DISCONTINUED | OUTPATIENT
Start: 2019-05-01 | End: 2019-05-01

## 2019-05-01 RX ORDER — DOCUSATE SODIUM 100 MG
100 CAPSULE ORAL THREE TIMES A DAY
Qty: 0 | Refills: 0 | Status: DISCONTINUED | OUTPATIENT
Start: 2019-05-01 | End: 2019-05-16

## 2019-05-01 RX ORDER — ENOXAPARIN SODIUM 100 MG/ML
40 INJECTION SUBCUTANEOUS AT BEDTIME
Qty: 0 | Refills: 0 | Status: DISCONTINUED | OUTPATIENT
Start: 2019-05-02 | End: 2019-05-16

## 2019-05-01 RX ORDER — HYDROMORPHONE HYDROCHLORIDE 2 MG/ML
0.25 INJECTION INTRAMUSCULAR; INTRAVENOUS; SUBCUTANEOUS
Qty: 0 | Refills: 0 | Status: DISCONTINUED | OUTPATIENT
Start: 2019-05-01 | End: 2019-05-01

## 2019-05-01 RX ORDER — CEFAZOLIN SODIUM 1 G
2000 VIAL (EA) INJECTION EVERY 8 HOURS
Qty: 0 | Refills: 0 | Status: COMPLETED | OUTPATIENT
Start: 2019-05-01 | End: 2019-05-02

## 2019-05-01 RX ADMIN — GABAPENTIN 100 MILLIGRAM(S): 400 CAPSULE ORAL at 18:40

## 2019-05-01 RX ADMIN — SODIUM CHLORIDE 75 MILLILITER(S): 9 INJECTION INTRAMUSCULAR; INTRAVENOUS; SUBCUTANEOUS at 15:12

## 2019-05-01 RX ADMIN — PANTOPRAZOLE SODIUM 40 MILLIGRAM(S): 20 TABLET, DELAYED RELEASE ORAL at 05:31

## 2019-05-01 RX ADMIN — HYDROMORPHONE HYDROCHLORIDE 0.5 MILLIGRAM(S): 2 INJECTION INTRAMUSCULAR; INTRAVENOUS; SUBCUTANEOUS at 15:16

## 2019-05-01 RX ADMIN — OXYCODONE HYDROCHLORIDE 10 MILLIGRAM(S): 5 TABLET ORAL at 19:01

## 2019-05-01 RX ADMIN — Medication 100 MILLIGRAM(S): at 21:32

## 2019-05-01 RX ADMIN — SENNA PLUS 2 TABLET(S): 8.6 TABLET ORAL at 21:32

## 2019-05-01 RX ADMIN — Medication 0.5 MILLIGRAM(S): at 18:40

## 2019-05-01 RX ADMIN — FLUPHENAZINE HYDROCHLORIDE 5 MILLIGRAM(S): 1 TABLET, FILM COATED ORAL at 18:42

## 2019-05-01 RX ADMIN — GABAPENTIN 100 MILLIGRAM(S): 400 CAPSULE ORAL at 05:31

## 2019-05-01 RX ADMIN — OXYCODONE HYDROCHLORIDE 10 MILLIGRAM(S): 5 TABLET ORAL at 20:00

## 2019-05-01 RX ADMIN — Medication 750 MILLIGRAM(S): at 21:33

## 2019-05-01 RX ADMIN — Medication 4 MILLIGRAM(S): at 18:48

## 2019-05-01 RX ADMIN — SODIUM CHLORIDE 75 MILLILITER(S): 9 INJECTION INTRAMUSCULAR; INTRAVENOUS; SUBCUTANEOUS at 00:36

## 2019-05-01 RX ADMIN — Medication 5 MILLIGRAM(S): at 18:44

## 2019-05-01 RX ADMIN — HYDROMORPHONE HYDROCHLORIDE 0.5 MILLIGRAM(S): 2 INJECTION INTRAMUSCULAR; INTRAVENOUS; SUBCUTANEOUS at 15:30

## 2019-05-01 RX ADMIN — Medication 4 MILLIGRAM(S): at 05:31

## 2019-05-01 RX ADMIN — Medication 100 MILLIGRAM(S): at 17:30

## 2019-05-01 RX ADMIN — FLUPHENAZINE HYDROCHLORIDE 5 MILLIGRAM(S): 1 TABLET, FILM COATED ORAL at 05:31

## 2019-05-01 RX ADMIN — Medication 0.5 MILLIGRAM(S): at 05:31

## 2019-05-01 RX ADMIN — DESMOPRESSIN ACETATE 0.2 MILLIGRAM(S): 0.1 TABLET ORAL at 21:33

## 2019-05-01 RX ADMIN — DEXTROSE MONOHYDRATE, SODIUM CHLORIDE, AND POTASSIUM CHLORIDE 75 MILLILITER(S): 50; .745; 4.5 INJECTION, SOLUTION INTRAVENOUS at 20:20

## 2019-05-01 RX ADMIN — Medication 175 MICROGRAM(S): at 05:31

## 2019-05-01 RX ADMIN — Medication 5 MILLIGRAM(S): at 05:31

## 2019-05-01 RX ADMIN — Medication 4 MILLIGRAM(S): at 00:36

## 2019-05-01 NOTE — PROVIDER CONTACT NOTE (OTHER) - SITUATION
pt is aa0x1, pt is alert it his name and birthday. pt had no complaints of sob but pain to his neck pt is aa0x2, pt is alert it his name and birthday. pt is aware of the type of surgery he is having but not time and space

## 2019-05-01 NOTE — PROGRESS NOTE ADULT - SUBJECTIVE AND OBJECTIVE BOX
Patient is a 53y old  Male who presents with a chief complaint of C3-c6 laminectomy (01 May 2019 16:44)      SUBJECTIVE / OVERNIGHT EVENTS: patient seen and examined by bedside in PACY, s/p   C3-c6 laminectomy, c/o pain in the surgical site, alert and awake     MEDICATIONS  (STANDING):  benztropine 0.5 milliGRAM(s) Oral two times a day  bisacodyl 5 milliGRAM(s) Oral every 12 hours  ceFAZolin   IVPB 2000 milliGRAM(s) IV Intermittent every 8 hours  desmopressin 0.2 milliGRAM(s) Oral at bedtime  dexamethasone  Injectable 4 milliGRAM(s) IV Push every 6 hours  docusate sodium 100 milliGRAM(s) Oral three times a day  fluPHENAZine 5 milliGRAM(s) Oral two times a day  gabapentin 100 milliGRAM(s) Oral two times a day  levothyroxine 175 MICROGram(s) Oral daily  LORazepam   Injectable 2 milliGRAM(s) IV Push once  metoprolol succinate ER 50 milliGRAM(s) Oral daily  pantoprazole    Tablet 40 milliGRAM(s) Oral before breakfast  senna 2 Tablet(s) Oral at bedtime  sodium chloride 0.9% with potassium chloride 20 mEq/L 1000 milliLiter(s) (75 mL/Hr) IV Continuous <Continuous>  sodium chloride 0.9%. 1000 milliLiter(s) (75 mL/Hr) IV Continuous <Continuous>  valproic  acid Syrup 500 milliGRAM(s) Oral <User Schedule>  valproic  acid Syrup 750 milliGRAM(s) Oral at bedtime    MEDICATIONS  (PRN):  baclofen 10 milliGRAM(s) Oral three times a day PRN Muscle Spasm  diazepam    Tablet 5 milliGRAM(s) Oral every 6 hours PRN Spasm  HYDROmorphone  Injectable 0.25 milliGRAM(s) IV Push every 10 minutes PRN Moderate Pain (4 - 6)  HYDROmorphone  Injectable 0.5 milliGRAM(s) IV Push every 10 minutes PRN Severe Pain (7 - 10)  ondansetron Injectable 4 milliGRAM(s) IV Push once PRN Nausea and/or Vomiting  ondansetron Injectable 4 milliGRAM(s) IV Push every 6 hours PRN Nausea  oxyCODONE    IR 5 milliGRAM(s) Oral every 4 hours PRN Moderate Pain (4 - 6)  oxyCODONE    IR 10 milliGRAM(s) Oral every 4 hours PRN Severe Pain (7 - 10)      Vital Signs Last 24 Hrs  T(C): 36 (01 May 2019 15:00), Max: 36.5 (01 May 2019 06:59)  T(F): 96.8 (01 May 2019 15:00), Max: 97.7 (01 May 2019 06:59)  HR: 85 (01 May 2019 18:00) (45 - 97)  BP: 150/83 (01 May 2019 18:00) (118/75 - 157/70)  BP(mean): 97 (01 May 2019 18:00) (78 - 97)  RR: 18 (01 May 2019 18:00) (13 - 20)  SpO2: 96% (01 May 2019 18:00) (94% - 99%)  CAPILLARY BLOOD GLUCOSE        I&O's Summary    01 May 2019 07:01  -  01 May 2019 18:04  --------------------------------------------------------  IN: 37 mL / OUT: 1000 mL / NET: -963 mL        PHYSICAL EXAM:  GENERAL: awake and alert  EYES: EOMI, PERRLA, conjunctiva and sclera clear  NECK: drains present in the surgical site   CHEST/LUNG: Clear to auscultation bilaterally; No wheeze  HEART: Regular rate and rhythm;   ABDOMEN: Soft, Nontender, Nondistended; Bowel sounds present  EXTREMITIES:  2+ Peripheral Pulses, No clubbing, cyanosis, or edema  SKIN: No rashes or lesions    LABS:                        14.6   16.14 )-----------( 275      ( 01 May 2019 16:45 )             44.4     05-01    142  |  103  |  12  ----------------------------<  169<H>  3.9   |  24  |  0.49<L>    Ca    9.6      01 May 2019 16:45  Phos  2.6     05-01  Mg     2.0     05-01      PT/INR - ( 01 May 2019 06:23 )   PT: 13.5 SEC;   INR: 1.18          PTT - ( 30 Apr 2019 11:30 )  PTT:32.7 SEC            Consultant(s) Notes Reviewed:  neuro sx     Care Discussed with Consultants/Other Providers:

## 2019-05-01 NOTE — PROGRESS NOTE ADULT - SUBJECTIVE AND OBJECTIVE BOX
PO check:     SUBJECTIVE EVENTS: Patient reports feeling well, denies any neck pain, numbness or tingling.     Vital Signs Last 24 Hrs  T(C): 36 (01 May 2019 15:00), Max: 36.5 (01 May 2019 06:59)  T(F): 96.8 (01 May 2019 15:00), Max: 97.7 (01 May 2019 06:59)  HR: 76 (01 May 2019 16:30) (45 - 76)  BP: 137/73 (01 May 2019 16:30) (118/75 - 149/73)  BP(mean): 88 (01 May 2019 16:30) (78 - 97)  RR: 18 (01 May 2019 16:30) (13 - 20)  SpO2: 95% (01 May 2019 16:30) (94% - 99%)      PHYSICAL EXAM:  Awake Alert, speech clear and coherent, fc, nad.  PERRL, EOMI, No facial droop, Tongue midline  CHING antigravity spontaneously.  HMV x 2 to suction.     INCISION: posterior neck dressing clean, dry no dc noted.     DIET: advance as tolerating      MEDICATIONS  (STANDING):  benztropine 0.5 milliGRAM(s) Oral two times a day  bisacodyl 5 milliGRAM(s) Oral every 12 hours  ceFAZolin   IVPB 2000 milliGRAM(s) IV Intermittent every 8 hours  desmopressin 0.2 milliGRAM(s) Oral at bedtime  dexamethasone  Injectable 4 milliGRAM(s) IV Push every 6 hours  docusate sodium 100 milliGRAM(s) Oral three times a day  fluPHENAZine 5 milliGRAM(s) Oral two times a day  gabapentin 100 milliGRAM(s) Oral two times a day  levothyroxine 175 MICROGram(s) Oral daily  LORazepam   Injectable 2 milliGRAM(s) IV Push once  metoprolol succinate ER 50 milliGRAM(s) Oral daily  pantoprazole    Tablet 40 milliGRAM(s) Oral before breakfast  senna 2 Tablet(s) Oral at bedtime  sodium chloride 0.9% with potassium chloride 20 mEq/L 1000 milliLiter(s) (75 mL/Hr) IV Continuous <Continuous>  sodium chloride 0.9%. 1000 milliLiter(s) (75 mL/Hr) IV Continuous <Continuous>  valproic  acid Syrup 500 milliGRAM(s) Oral <User Schedule>  valproic  acid Syrup 750 milliGRAM(s) Oral at bedtime    MEDICATIONS  (PRN):  baclofen 10 milliGRAM(s) Oral three times a day PRN Muscle Spasm  diazepam    Tablet 5 milliGRAM(s) Oral every 6 hours PRN Spasm  HYDROmorphone  Injectable 0.25 milliGRAM(s) IV Push every 10 minutes PRN Moderate Pain (4 - 6)  HYDROmorphone  Injectable 0.5 milliGRAM(s) IV Push every 10 minutes PRN Severe Pain (7 - 10)  ondansetron Injectable 4 milliGRAM(s) IV Push once PRN Nausea and/or Vomiting  ondansetron Injectable 4 milliGRAM(s) IV Push every 6 hours PRN Nausea  oxyCODONE    IR 5 milliGRAM(s) Oral every 4 hours PRN Moderate Pain (4 - 6)  oxyCODONE    IR 10 milliGRAM(s) Oral every 4 hours PRN Severe Pain (7 - 10)                            14.8   9.84  )-----------( 271      ( 01 May 2019 06:23 )             44.2   05-01    142  |  104  |  16  ----------------------------<  111<H>  3.9   |  26  |  0.49<L>    Ca    9.8      01 May 2019 06:23  Phos  2.6     05-01  Mg     2.0     05-01    PT/INR - ( 01 May 2019 06:23 )   PT: 13.5 SEC;   INR: 1.18          PTT - ( 30 Apr 2019 11:30 )  PTT:32.7 SEC
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
## 2019-05-02 DIAGNOSIS — Z98.1 ARTHRODESIS STATUS: ICD-10-CM

## 2019-05-02 LAB
ANION GAP SERPL CALC-SCNC: 12 MMO/L — SIGNIFICANT CHANGE UP (ref 7–14)
BASOPHILS # BLD AUTO: 0.02 K/UL — SIGNIFICANT CHANGE UP (ref 0–0.2)
BASOPHILS NFR BLD AUTO: 0.1 % — SIGNIFICANT CHANGE UP (ref 0–2)
BUN SERPL-MCNC: 11 MG/DL — SIGNIFICANT CHANGE UP (ref 7–23)
CALCIUM SERPL-MCNC: 9.7 MG/DL — SIGNIFICANT CHANGE UP (ref 8.4–10.5)
CHLORIDE SERPL-SCNC: 99 MMOL/L — SIGNIFICANT CHANGE UP (ref 98–107)
CO2 SERPL-SCNC: 27 MMOL/L — SIGNIFICANT CHANGE UP (ref 22–31)
CREAT SERPL-MCNC: 0.5 MG/DL — SIGNIFICANT CHANGE UP (ref 0.5–1.3)
EOSINOPHIL # BLD AUTO: 0 K/UL — SIGNIFICANT CHANGE UP (ref 0–0.5)
EOSINOPHIL NFR BLD AUTO: 0 % — SIGNIFICANT CHANGE UP (ref 0–6)
GLUCOSE SERPL-MCNC: 102 MG/DL — HIGH (ref 70–99)
HCT VFR BLD CALC: 46.1 % — SIGNIFICANT CHANGE UP (ref 39–50)
HGB BLD-MCNC: 15.2 G/DL — SIGNIFICANT CHANGE UP (ref 13–17)
IMM GRANULOCYTES NFR BLD AUTO: 0.5 % — SIGNIFICANT CHANGE UP (ref 0–1.5)
LYMPHOCYTES # BLD AUTO: 0.54 K/UL — LOW (ref 1–3.3)
LYMPHOCYTES # BLD AUTO: 3.7 % — LOW (ref 13–44)
MCHC RBC-ENTMCNC: 28.8 PG — SIGNIFICANT CHANGE UP (ref 27–34)
MCHC RBC-ENTMCNC: 33 % — SIGNIFICANT CHANGE UP (ref 32–36)
MCV RBC AUTO: 87.3 FL — SIGNIFICANT CHANGE UP (ref 80–100)
MONOCYTES # BLD AUTO: 0.97 K/UL — HIGH (ref 0–0.9)
MONOCYTES NFR BLD AUTO: 6.6 % — SIGNIFICANT CHANGE UP (ref 2–14)
NEUTROPHILS # BLD AUTO: 13.03 K/UL — HIGH (ref 1.8–7.4)
NEUTROPHILS NFR BLD AUTO: 89.1 % — HIGH (ref 43–77)
NRBC # FLD: 0 K/UL — SIGNIFICANT CHANGE UP (ref 0–0)
PLATELET # BLD AUTO: 277 K/UL — SIGNIFICANT CHANGE UP (ref 150–400)
PMV BLD: 10.1 FL — SIGNIFICANT CHANGE UP (ref 7–13)
POTASSIUM SERPL-MCNC: 4.3 MMOL/L — SIGNIFICANT CHANGE UP (ref 3.5–5.3)
POTASSIUM SERPL-SCNC: 4.3 MMOL/L — SIGNIFICANT CHANGE UP (ref 3.5–5.3)
RBC # BLD: 5.28 M/UL — SIGNIFICANT CHANGE UP (ref 4.2–5.8)
RBC # FLD: 13.6 % — SIGNIFICANT CHANGE UP (ref 10.3–14.5)
SODIUM SERPL-SCNC: 138 MMOL/L — SIGNIFICANT CHANGE UP (ref 135–145)
WBC # BLD: 14.64 K/UL — HIGH (ref 3.8–10.5)
WBC # FLD AUTO: 14.64 K/UL — HIGH (ref 3.8–10.5)

## 2019-05-02 PROCEDURE — 72125 CT NECK SPINE W/O DYE: CPT | Mod: 26

## 2019-05-02 PROCEDURE — 99233 SBSQ HOSP IP/OBS HIGH 50: CPT

## 2019-05-02 RX ORDER — DEXAMETHASONE 0.5 MG/5ML
4 ELIXIR ORAL EVERY 6 HOURS
Qty: 0 | Refills: 0 | Status: DISCONTINUED | OUTPATIENT
Start: 2019-05-02 | End: 2019-05-04

## 2019-05-02 RX ORDER — OXYCODONE HYDROCHLORIDE 5 MG/1
5 TABLET ORAL EVERY 4 HOURS
Qty: 0 | Refills: 0 | Status: DISCONTINUED | OUTPATIENT
Start: 2019-05-02 | End: 2019-05-09

## 2019-05-02 RX ORDER — OXYCODONE HYDROCHLORIDE 5 MG/1
10 TABLET ORAL EVERY 4 HOURS
Qty: 0 | Refills: 0 | Status: DISCONTINUED | OUTPATIENT
Start: 2019-05-02 | End: 2019-05-09

## 2019-05-02 RX ORDER — DEXTROSE MONOHYDRATE, SODIUM CHLORIDE, AND POTASSIUM CHLORIDE 50; .745; 4.5 G/1000ML; G/1000ML; G/1000ML
1000 INJECTION, SOLUTION INTRAVENOUS
Qty: 0 | Refills: 0 | Status: DISCONTINUED | OUTPATIENT
Start: 2019-05-02 | End: 2019-05-03

## 2019-05-02 RX ORDER — DIAZEPAM 5 MG
5 TABLET ORAL EVERY 6 HOURS
Qty: 0 | Refills: 0 | Status: DISCONTINUED | OUTPATIENT
Start: 2019-05-02 | End: 2019-05-09

## 2019-05-02 RX ORDER — ACETAMINOPHEN 500 MG
650 TABLET ORAL EVERY 8 HOURS
Qty: 0 | Refills: 0 | Status: DISCONTINUED | OUTPATIENT
Start: 2019-05-02 | End: 2019-05-16

## 2019-05-02 RX ADMIN — SENNA PLUS 2 TABLET(S): 8.6 TABLET ORAL at 21:20

## 2019-05-02 RX ADMIN — ENOXAPARIN SODIUM 40 MILLIGRAM(S): 100 INJECTION SUBCUTANEOUS at 21:19

## 2019-05-02 RX ADMIN — Medication 100 MILLIGRAM(S): at 14:24

## 2019-05-02 RX ADMIN — Medication 650 MILLIGRAM(S): at 14:45

## 2019-05-02 RX ADMIN — DESMOPRESSIN ACETATE 0.2 MILLIGRAM(S): 0.1 TABLET ORAL at 21:19

## 2019-05-02 RX ADMIN — Medication 175 MICROGRAM(S): at 05:37

## 2019-05-02 RX ADMIN — Medication 0.5 MILLIGRAM(S): at 05:37

## 2019-05-02 RX ADMIN — FLUPHENAZINE HYDROCHLORIDE 5 MILLIGRAM(S): 1 TABLET, FILM COATED ORAL at 18:21

## 2019-05-02 RX ADMIN — PANTOPRAZOLE SODIUM 40 MILLIGRAM(S): 20 TABLET, DELAYED RELEASE ORAL at 05:37

## 2019-05-02 RX ADMIN — Medication 50 MILLIGRAM(S): at 05:36

## 2019-05-02 RX ADMIN — Medication 4 MILLIGRAM(S): at 05:37

## 2019-05-02 RX ADMIN — FLUPHENAZINE HYDROCHLORIDE 5 MILLIGRAM(S): 1 TABLET, FILM COATED ORAL at 05:37

## 2019-05-02 RX ADMIN — Medication 5 MILLIGRAM(S): at 17:28

## 2019-05-02 RX ADMIN — Medication 750 MILLIGRAM(S): at 21:20

## 2019-05-02 RX ADMIN — Medication 100 MILLIGRAM(S): at 05:37

## 2019-05-02 RX ADMIN — Medication 0.5 MILLIGRAM(S): at 18:21

## 2019-05-02 RX ADMIN — Medication 4 MILLIGRAM(S): at 00:06

## 2019-05-02 RX ADMIN — GABAPENTIN 100 MILLIGRAM(S): 400 CAPSULE ORAL at 05:37

## 2019-05-02 RX ADMIN — Medication 4 MILLIGRAM(S): at 17:27

## 2019-05-02 RX ADMIN — OXYCODONE HYDROCHLORIDE 10 MILLIGRAM(S): 5 TABLET ORAL at 17:27

## 2019-05-02 RX ADMIN — OXYCODONE HYDROCHLORIDE 10 MILLIGRAM(S): 5 TABLET ORAL at 01:10

## 2019-05-02 RX ADMIN — Medication 5 MILLIGRAM(S): at 05:36

## 2019-05-02 RX ADMIN — DEXTROSE MONOHYDRATE, SODIUM CHLORIDE, AND POTASSIUM CHLORIDE 75 MILLILITER(S): 50; .745; 4.5 INJECTION, SOLUTION INTRAVENOUS at 00:06

## 2019-05-02 RX ADMIN — Medication 500 MILLIGRAM(S): at 09:32

## 2019-05-02 RX ADMIN — OXYCODONE HYDROCHLORIDE 5 MILLIGRAM(S): 5 TABLET ORAL at 10:50

## 2019-05-02 RX ADMIN — OXYCODONE HYDROCHLORIDE 5 MILLIGRAM(S): 5 TABLET ORAL at 11:10

## 2019-05-02 RX ADMIN — Medication 4 MILLIGRAM(S): at 10:51

## 2019-05-02 RX ADMIN — OXYCODONE HYDROCHLORIDE 10 MILLIGRAM(S): 5 TABLET ORAL at 18:21

## 2019-05-02 RX ADMIN — OXYCODONE HYDROCHLORIDE 10 MILLIGRAM(S): 5 TABLET ORAL at 00:27

## 2019-05-02 RX ADMIN — Medication 100 MILLIGRAM(S): at 01:06

## 2019-05-02 RX ADMIN — DEXTROSE MONOHYDRATE, SODIUM CHLORIDE, AND POTASSIUM CHLORIDE 75 MILLILITER(S): 50; .745; 4.5 INJECTION, SOLUTION INTRAVENOUS at 21:20

## 2019-05-02 RX ADMIN — Medication 100 MILLIGRAM(S): at 21:19

## 2019-05-02 RX ADMIN — Medication 4 MILLIGRAM(S): at 23:46

## 2019-05-02 RX ADMIN — Medication 650 MILLIGRAM(S): at 14:23

## 2019-05-02 RX ADMIN — GABAPENTIN 100 MILLIGRAM(S): 400 CAPSULE ORAL at 17:28

## 2019-05-02 RX ADMIN — OXYCODONE HYDROCHLORIDE 10 MILLIGRAM(S): 5 TABLET ORAL at 06:30

## 2019-05-02 RX ADMIN — OXYCODONE HYDROCHLORIDE 10 MILLIGRAM(S): 5 TABLET ORAL at 05:37

## 2019-05-02 NOTE — DIETITIAN INITIAL EVALUATION ADULT. - PERTINENT MEDS FT
acetaminophen   Tablet .. PRN  baclofen PRN  benztropine  bisacodyl  desmopressin  dexamethasone     Tablet  diazepam    Tablet PRN  docusate sodium  enoxaparin Injectable  fluPHENAZine  gabapentin  levothyroxine  LORazepam   Injectable  metoprolol succinate ER  ondansetron Injectable PRN  oxyCODONE    IR PRN  oxyCODONE    IR PRN  pantoprazole    Tablet  senna  sodium chloride 0.9% with potassium chloride 20 mEq/L  valproic  acid Syrup  valproic  acid Syrup

## 2019-05-02 NOTE — PHYSICAL THERAPY INITIAL EVALUATION ADULT - DIAGNOSIS, PT EVAL
Pt s/p  C3-C6 laminectomy/fusion on 05/02/2019; pt presents with decreased strength, decreased balance, and difficulty with ambulation.
bilateral LE weakness

## 2019-05-02 NOTE — PHYSICAL THERAPY INITIAL EVALUATION ADULT - CRITERIA FOR SKILLED THERAPEUTIC INTERVENTIONS
functional limitations in following categories/bed mobility, transfers and ambulation
rehab potential/functional limitations in following categories/impairments found/risk reduction/prevention/therapy frequency

## 2019-05-02 NOTE — PHYSICAL THERAPY INITIAL EVALUATION ADULT - DISCHARGE DISPOSITION, PT EVAL
[facility] to improve functional mobility and strength and to return to baseline functional status.
rehabilitation facility/to improve strength and balance for return to prior level of function.

## 2019-05-02 NOTE — OCCUPATIONAL THERAPY INITIAL EVALUATION ADULT - RANGE OF MOTION EXAMINATION, UPPER EXTREMITY
bilateral UE Active Assistive ROM was WFL  (within functional limits)
bilateral UE Active ROM was WFL  (within functional limits)

## 2019-05-02 NOTE — PROGRESS NOTE ADULT - SUBJECTIVE AND OBJECTIVE BOX
[facility] Medicine  Pager: [phone]    Patient is a 53y old  Male who presents with a chief complaint of Cannot move legs (02 May 2019 05:33)      SUBJECTIVE / OVERNIGHT EVENTS: No acute events overnight. Patient reports pain is 8/10 but that the medication is working sufficiently at this time to control his symptoms. He had a BM this AM, no CP, SOB, palpitations. Reports he thinks his PT session went well.    MEDICATIONS  (STANDING):  benztropine 0.5 milliGRAM(s) Oral two times a day  bisacodyl 5 milliGRAM(s) Oral every 12 hours  desmopressin 0.2 milliGRAM(s) Oral at bedtime  dexamethasone     Tablet 4 milliGRAM(s) Oral every 6 hours  docusate sodium 100 milliGRAM(s) Oral three times a day  enoxaparin Injectable 40 milliGRAM(s) SubCutaneous at bedtime  fluPHENAZine 5 milliGRAM(s) Oral two times a day  gabapentin 100 milliGRAM(s) Oral two times a day  levothyroxine 175 MICROGram(s) Oral daily  LORazepam   Injectable 2 milliGRAM(s) IV Push once  metoprolol succinate ER 50 milliGRAM(s) Oral daily  pantoprazole    Tablet 40 milliGRAM(s) Oral before breakfast  senna 2 Tablet(s) Oral at bedtime  sodium chloride 0.9% with potassium chloride 20 mEq/L 1000 milliLiter(s) (75 mL/Hr) IV Continuous <Continuous>  valproic  acid Syrup 500 milliGRAM(s) Oral <User Schedule>  valproic  acid Syrup 750 milliGRAM(s) Oral at bedtime    MEDICATIONS  (PRN):  acetaminophen   Tablet .. 650 milliGRAM(s) Oral every 8 hours PRN Temp greater or equal to 38C (100.4F), Mild Pain (1 - 3)  baclofen 10 milliGRAM(s) Oral three times a day PRN Muscle Spasm  diazepam    Tablet 5 milliGRAM(s) Oral every 6 hours PRN Spasm  ondansetron Injectable 4 milliGRAM(s) IV Push every 6 hours PRN Nausea  oxyCODONE    IR 5 milliGRAM(s) Oral every 4 hours PRN Moderate Pain (4 - 6)  oxyCODONE    IR 10 milliGRAM(s) Oral every 4 hours PRN Severe Pain (7 - 10)      Vital Signs Last 24 Hrs  T(C): 36.5 (05-02-19 @ 17:24)  T(F): 97.7 (05-02-19 @ 17:24), Max: 98.2 (05-01-19 @ 22:55)  HR: 68 (05-02-19 @ 17:24) (60 - 80)  BP: 128/69 (05-02-19 @ 17:24)  BP(mean): 94 (05-01-19 @ 22:00) (93 - 105)  RR: 18 (05-02-19 @ 17:24) (15 - 19)  SpO2: 98% (05-02-19 @ 17:24) (97% - 99%)  Wt(kg): --    05-01 @ 07:01  -  05-02 @ 07:00  --------------------------------------------------------  IN: 2662 mL / OUT: 4286 mL / NET: -1624 mL    05-02 @ 07:01  -  05-02 @ 18:12  --------------------------------------------------------  IN: 675 mL / OUT: 1400 mL / NET: -725 mL      CAPILLARY BLOOD GLUCOSE        I&O's Summary    01 May 2019 07:01  -  02 May 2019 07:00  --------------------------------------------------------  IN: 2662 mL / OUT: 4286 mL / NET: -1624 mL    02 May 2019 07:01  -  02 May 2019 18:12  --------------------------------------------------------  IN: 675 mL / OUT: 1400 mL / NET: -725 mL        PHYSICAL EXAM:  GENERAL: NAD, well-developed  HEAD:  Atraumatic, Normocephalic  EYES: EOMI, PERRLA, conjunctiva and sclera clear  NECK: Supple, No JVD  CHEST/LUNG: Clear to auscultation bilaterally; No wheeze  HEART: Regular rate and rhythm; No murmurs, rubs, or gallops  ABDOMEN: Soft, Nontender, Nondistended; Bowel sounds present  EXTREMITIES:  2+ Peripheral Pulses, No clubbing, cyanosis, or edema  PSYCH: AAOx3  NEUROLOGY: non-focal  SKIN: No rashes or lesions    LABS:                        15.2   14.64 )-----------( 277      ( 02 May 2019 05:40 )             46.1     05-02    138  |  99  |  11  ----------------------------<  102<H>  4.3   |  27  |  0.50    Ca    9.7      02 May 2019 05:40  Phos  2.6     05-01  Mg     2.0     05-01      PT/INR - ( 01 May 2019 06:23 )   PT: 13.5 SEC;   INR: 1.18                    RADIOLOGY & ADDITIONAL TESTS:    Imaging Personally Reviewed:    Consultant(s) Notes Reviewed:      Care Discussed with Consultants/Other Providers:    Assessment and Plan: [facility] Medicine  Pager: [phone]    Patient is a 53y old  Male who presents with a chief complaint of Cannot move legs (02 May 2019 05:33)      SUBJECTIVE / OVERNIGHT EVENTS: No acute events overnight. Patient reports pain is 8/10 but that the medication is working sufficiently at this time to control his symptoms. He had a BM this AM, no CP, SOB, palpitations. Reports he thinks his PT session went well.    MEDICATIONS  (STANDING):  benztropine 0.5 milliGRAM(s) Oral two times a day  bisacodyl 5 milliGRAM(s) Oral every 12 hours  desmopressin 0.2 milliGRAM(s) Oral at bedtime  dexamethasone     Tablet 4 milliGRAM(s) Oral every 6 hours  docusate sodium 100 milliGRAM(s) Oral three times a day  enoxaparin Injectable 40 milliGRAM(s) SubCutaneous at bedtime  fluPHENAZine 5 milliGRAM(s) Oral two times a day  gabapentin 100 milliGRAM(s) Oral two times a day  levothyroxine 175 MICROGram(s) Oral daily  LORazepam   Injectable 2 milliGRAM(s) IV Push once  metoprolol succinate ER 50 milliGRAM(s) Oral daily  pantoprazole    Tablet 40 milliGRAM(s) Oral before breakfast  senna 2 Tablet(s) Oral at bedtime  sodium chloride 0.9% with potassium chloride 20 mEq/L 1000 milliLiter(s) (75 mL/Hr) IV Continuous <Continuous>  valproic  acid Syrup 500 milliGRAM(s) Oral <User Schedule>  valproic  acid Syrup 750 milliGRAM(s) Oral at bedtime    MEDICATIONS  (PRN):  acetaminophen   Tablet .. 650 milliGRAM(s) Oral every 8 hours PRN Temp greater or equal to 38C (100.4F), Mild Pain (1 - 3)  baclofen 10 milliGRAM(s) Oral three times a day PRN Muscle Spasm  diazepam    Tablet 5 milliGRAM(s) Oral every 6 hours PRN Spasm  ondansetron Injectable 4 milliGRAM(s) IV Push every 6 hours PRN Nausea  oxyCODONE    IR 5 milliGRAM(s) Oral every 4 hours PRN Moderate Pain (4 - 6)  oxyCODONE    IR 10 milliGRAM(s) Oral every 4 hours PRN Severe Pain (7 - 10)      Vital Signs Last 24 Hrs  T(C): 36.5 (05-02-19 @ 17:24)  T(F): 97.7 (05-02-19 @ 17:24), Max: 98.2 (05-01-19 @ 22:55)  HR: 68 (05-02-19 @ 17:24) (60 - 80)  BP: 128/69 (05-02-19 @ 17:24)  BP(mean): 94 (05-01-19 @ 22:00) (93 - 105)  RR: 18 (05-02-19 @ 17:24) (15 - 19)  SpO2: 98% (05-02-19 @ 17:24) (97% - 99%)  Wt(kg): --    05-01 @ 07:01  -  05-02 @ 07:00  --------------------------------------------------------  IN: 2662 mL / OUT: 4286 mL / NET: -1624 mL    05-02 @ 07:01  -  05-02 @ 18:12  --------------------------------------------------------  IN: 675 mL / OUT: 1400 mL / NET: -725 mL      CAPILLARY BLOOD GLUCOSE        I&O's Summary    01 May 2019 07:01  -  02 May 2019 07:00  --------------------------------------------------------  IN: 2662 mL / OUT: 4286 mL / NET: -1624 mL    02 May 2019 07:01  -  02 May 2019 18:12  --------------------------------------------------------  IN: 675 mL / OUT: 1400 mL / NET: -725 mL      PHYSICAL EXAM  GENERAL: NAD, pleasant, poor hygiene  HEENT: normocephalic, atraumatic, CN 2-12 grossly intact   CHEST/LUNG: Clear to auscultation bilaterally; No wheeze, rhonchi, rales  HEART: Regular rate and rhythm; No murmurs, rubs, or gallops  ABDOMEN: Soft, Nontender, Nondistended; Bowel sounds present  EXTREMITIES:  2+ Peripheral Pulses, No clubbing, cyanosis, or edema  PSYCH/NEURO: AAOx3. Sensation to light touch intact. Patient with atrophy of hand muscles b/l R>L. Able to wiggle his toes  SKIN: resolving ecchymosis of left dorsal arm distally.     LABS:                        15.2   14.64 )-----------( 277      ( 02 May 2019 05:40 )             46.1     05-02    138  |  99  |  11  ----------------------------<  102<H>  4.3   |  27  |  0.50    Ca    9.7      02 May 2019 05:40  Phos  2.6     05-01  Mg     2.0     05-01      PT/INR - ( 01 May 2019 06:23 )   PT: 13.5 SEC;   INR: 1.18              RADIOLOGY & ADDITIONAL TESTS:    Imaging Personally Reviewed:   < from: CT Cervical Spine No Cont (05.02.19 @ 11:32) >  IMPRESSION:  Interval C3-C5 laminectomy with placement of fixation hardware as   described above.    < end of copied text >      Consultant(s) Notes Reviewed:  Neurosurgery    Care Discussed with Consultants/Other Providers: Neurosurgery GORGE Minor re: plan of care    Assessment and Plan:

## 2019-05-02 NOTE — PROGRESS NOTE ADULT - SUBJECTIVE AND OBJECTIVE BOX
No issues overnight  Vital Signs Last 24 Hrs  T(C): 36.4 (02 May 2019 05:30), Max: 36.8 (01 May 2019 22:55)  T(F): 97.5 (02 May 2019 05:30), Max: 98.2 (01 May 2019 22:55)  HR: 30 (02 May 2019 05:30) (30 - 97)  BP: 132/74 (02 May 2019 05:30) (118/75 - 157/88)  BP(mean): 94 (01 May 2019 22:00) (78 - 105)  RR: 17 (02 May 2019 05:30) (13 - 20)  SpO2: 97% (02 May 2019 05:30) (94% - 99%)    Awake, alert  HAJA HIGUERA  05-01    142  |  103  |  12  ----------------------------<  169<H>  3.9   |  24  |  0.49<L>    Ca    9.6      01 May 2019 16:45  Phos  2.6     05-01  Mg     2.0     05-01    CHING strength 5/5  SILT    HMV #1: 163cc  HMV #2: 28cc    MEDICATIONS  (STANDING):  benztropine 0.5 milliGRAM(s) Oral two times a day  bisacodyl 5 milliGRAM(s) Oral every 12 hours  desmopressin 0.2 milliGRAM(s) Oral at bedtime  dexamethasone  Injectable 4 milliGRAM(s) IV Push every 6 hours  docusate sodium 100 milliGRAM(s) Oral three times a day  enoxaparin Injectable 40 milliGRAM(s) SubCutaneous at bedtime  fluPHENAZine 5 milliGRAM(s) Oral two times a day  gabapentin 100 milliGRAM(s) Oral two times a day  levothyroxine 175 MICROGram(s) Oral daily  LORazepam   Injectable 2 milliGRAM(s) IV Push once  metoprolol succinate ER 50 milliGRAM(s) Oral daily  pantoprazole    Tablet 40 milliGRAM(s) Oral before breakfast  senna 2 Tablet(s) Oral at bedtime  sodium chloride 0.9% with potassium chloride 20 mEq/L 1000 milliLiter(s) (75 mL/Hr) IV Continuous <Continuous>  valproic  acid Syrup 500 milliGRAM(s) Oral <User Schedule>  valproic  acid Syrup 750 milliGRAM(s) Oral at bedtime    MEDICATIONS  (PRN):  baclofen 10 milliGRAM(s) Oral three times a day PRN Muscle Spasm  diazepam    Tablet 5 milliGRAM(s) Oral every 6 hours PRN Spasm  ondansetron Injectable 4 milliGRAM(s) IV Push every 6 hours PRN Nausea  oxyCODONE    IR 5 milliGRAM(s) Oral every 4 hours PRN Moderate Pain (4 - 6)  oxyCODONE    IR 10 milliGRAM(s) Oral every 4 hours PRN Severe Pain (7 - 10)                          14.6   16.14 )-----------( 275      ( 01 May 2019 16:45 )             44.4

## 2019-05-02 NOTE — PROGRESS NOTE ADULT - SUBJECTIVE AND OBJECTIVE BOX
Pipe Winkler MD  Interventional Cardiology  [place] Office : [address] [place] [address]. [address]  Tel:   [place] Office : [address] [facility] [place] [address]  Tel: [phone]  Cell : [phone]    Subjective : Pt lying in bed comfortable, not in distress, denies any chest pain or SOB  	  MEDICATIONS:  enoxaparin Injectable 40 milliGRAM(s) SubCutaneous at bedtime  metoprolol succinate ER 50 milliGRAM(s) Oral daily        acetaminophen   Tablet .. 650 milliGRAM(s) Oral every 8 hours PRN  baclofen 10 milliGRAM(s) Oral three times a day PRN  benztropine 0.5 milliGRAM(s) Oral two times a day  diazepam    Tablet 5 milliGRAM(s) Oral every 6 hours PRN  fluPHENAZine 5 milliGRAM(s) Oral two times a day  gabapentin 100 milliGRAM(s) Oral two times a day  LORazepam   Injectable 2 milliGRAM(s) IV Push once  ondansetron Injectable 4 milliGRAM(s) IV Push every 6 hours PRN  oxyCODONE    IR 5 milliGRAM(s) Oral every 4 hours PRN  oxyCODONE    IR 10 milliGRAM(s) Oral every 4 hours PRN  valproic  acid Syrup 500 milliGRAM(s) Oral <User Schedule>  valproic  acid Syrup 750 milliGRAM(s) Oral at bedtime    bisacodyl 5 milliGRAM(s) Oral every 12 hours  docusate sodium 100 milliGRAM(s) Oral three times a day  pantoprazole    Tablet 40 milliGRAM(s) Oral before breakfast  senna 2 Tablet(s) Oral at bedtime    desmopressin 0.2 milliGRAM(s) Oral at bedtime  dexamethasone     Tablet 4 milliGRAM(s) Oral every 6 hours  levothyroxine 175 MICROGram(s) Oral daily    sodium chloride 0.9% with potassium chloride 20 mEq/L 1000 milliLiter(s) IV Continuous <Continuous>      PHYSICAL EXAM:  T(C): 36.4 (05-02-19 @ 23:46), Max: 36.9 (05-02-19 @ 22:00)  HR: 70 (05-02-19 @ 23:46) (60 - 77)  BP: 123/64 (05-02-19 @ 23:46) (123/64 - 141/72)  RR: 17 (05-02-19 @ 23:46) (17 - 18)  SpO2: 97% (05-02-19 @ 23:46) (95% - 99%)  Wt(kg): --  I&O's Summary    01 May 2019 07:01  -  02 May 2019 07:00  --------------------------------------------------------  IN: 2662 mL / OUT: 4286 mL / NET: -1624 mL    02 May 2019 07:01  -  03 May 2019 02:12  --------------------------------------------------------  IN: 1350 mL / OUT: 2171 mL / NET: -821 mL        Appearance: Disheveled 	  HEENT:   Normal oral mucosa	  Cardiovascular: Normal S1 S2, No JVD, No murmurs  Respiratory: Lungs clear to auscultation	  Gastrointestinal:  Soft, Non-tender, + BS	  Extremities:  No clubbing, cyanosis or edema                                  15.2   14.64 )-----------( 277      ( 02 May 2019 05:40 )             46.1     05-02    138  |  99  |  11  ----------------------------<  102<H>  4.3   |  27  |  0.50    Ca    9.7      02 May 2019 05:40  Phos  2.6     05-01  Mg     2.0     05-01      proBNP:   Lipid Profile:   HgA1c:   TSH:

## 2019-05-02 NOTE — DIETITIAN INITIAL EVALUATION ADULT. - ORAL INTAKE PTA
fair/Patient with schizophrenia and reports belief that his food was being poisoned at [facility] facility - patient states he was still eating meals but was getting "sick."

## 2019-05-02 NOTE — OCCUPATIONAL THERAPY INITIAL EVALUATION ADULT - IMPAIRMENTS CONTRIBUTING IMPAIRED BED MOBILITY, REHAB EVAL
impaired balance/decreased sensation/impaired postural control/decreased strength
decreased strength/impaired balance/impaired postural control

## 2019-05-02 NOTE — OCCUPATIONAL THERAPY INITIAL EVALUATION ADULT - PERTINENT HX OF CURRENT PROBLEM, REHAB EVAL
Pt is a 53 year old male with PMH notable for h/o bipolar d/o, HTN, nocturnal enuresis, hypothyroid, schizophrenia, and other comorbidities with pre-op diagnosis of cervical spinal stenosis. Pt now s/p C3-6 laminectomy, fusion.

## 2019-05-02 NOTE — PHYSICAL THERAPY INITIAL EVALUATION ADULT - PLANNED THERAPY INTERVENTIONS, PT EVAL
Patient left supine in bed in NAD, call bell in reach, all lines intact. +bed check./balance training/gait training/bed mobility training/strengthening/transfer training
postural re-education/strengthening/balance training/bed mobility training/gait training/transfer training

## 2019-05-02 NOTE — PHYSICAL THERAPY INITIAL EVALUATION ADULT - PATIENT PROFILE REVIEW, REHAB EVAL
yes
ACTIVITY: OOB to Chair; spoke with RN Senait Coronel prior to PT evaluation--> Pt OK for PT consult/yes

## 2019-05-02 NOTE — OCCUPATIONAL THERAPY INITIAL EVALUATION ADULT - PHYSICAL ASSIST/NONPHYSICAL ASSIST:DRESS UPPER BODY, OT EVAL
verbal cues/nonverbal cues (demo/gestures)/1 person + 1 person to manage equipment
nonverbal cues (demo/gestures)/1 person assist

## 2019-05-02 NOTE — DIETITIAN INITIAL EVALUATION ADULT. - OTHER INFO
Initial Dietitian Evaluation 2/2 to extended length of stay. Per chart review patient with medical history of " bipolar disorder, HTN, nocturnal enuresis, deformity of lower leg, hypothyroid, schizophrenia, BPH, IVDA a/w b/l weakness, inability to walk and paresthesias of b/l UE." Now s/p Fusion of cervical spine on 5/01. Patient reports appetite and PO intake are great in-house. Patient consuming 100% meals. RN reports patient often requesting more food due to being hungry. NKFA. No GI distress (nausea/vomiting/diarrhea/constipation) noted at this time. Patient reports UBW is ~240#, current weight noted to be 227#. Patient did not report weight loss, states he thought he has been gaining weight. Question bed-scale accuracy, or possibly patient with inaccurate reported weight.

## 2019-05-02 NOTE — PHYSICAL THERAPY INITIAL EVALUATION ADULT - GENERAL OBSERVATIONS, REHAB EVAL
Patient found supine in bed in NAD.
Pt encountered in semisupine position, no distress, AxOx3, with +IV, and C/S dressing dry/intact.

## 2019-05-02 NOTE — PHYSICAL THERAPY INITIAL EVALUATION ADULT - IMPAIRMENTS FOUND, PT EVAL
gait, locomotion, and balance/muscle strength/aerobic capacity/endurance
posture/gait, locomotion, and balance/muscle strength

## 2019-05-02 NOTE — DIETITIAN INITIAL EVALUATION ADULT. - PERTINENT LABORATORY DATA
05-02 Na 138 mmol/L Glu 102 mg/dL<H> K+ 4.3 mmol/L Cr 0.50 mg/dL BUN 11 mg/dL Phos n/a   Alb n/a   PAB n/a   Hgb 15.2 g/dL Hct 46.1 % HgA1C n/a    Glucose, Serum: 102 mg/dL <H>

## 2019-05-02 NOTE — PHYSICAL THERAPY INITIAL EVALUATION ADULT - MANUAL MUSCLE TESTING RESULTS, REHAB EVAL
mental status as bilateral UE 3-/5; bilateral hip flexion 2-/5; bilateral knee extension 2/5; left ankle df/pf 1+/5; right ankle df/pf 0/5/grossly assessed due to
grossly assessed due to/spinal precautions; bilateral Shoulders at least 3-/5, bilateral elbows/hands/wrist at least 3-/5, bilateral hamstrings at least 3/5, bilateral quads 3-/5, bilateral anterior tib 2/5

## 2019-05-02 NOTE — DIETITIAN INITIAL EVALUATION ADULT. - ENERGY NEEDS
Height (cm): 182.88 (01 May 2019 06:01)  Weight (kg): 102.4 (01 May 2019 06:01)  BMI (kg/m2): 30.6 (01 May 2019 06:01)  IBW: 80.9kg +/-10%

## 2019-05-02 NOTE — OCCUPATIONAL THERAPY INITIAL EVALUATION ADULT - NS ASR FOLLOW COMMAND OT EVAL
able to follow single-step instructions/75% of the time
able to follow single-step instructions/100% of the time

## 2019-05-02 NOTE — PHYSICAL THERAPY INITIAL EVALUATION ADULT - ACTIVE RANGE OF MOTION EXAMINATION, REHAB EVAL
bilateral  lower extremity Active ROM was WFL (within functional limits)/Bilateral shoulders to 85 degrees, bilateral elbows/hands/wrist WFL

## 2019-05-02 NOTE — OCCUPATIONAL THERAPY INITIAL EVALUATION ADULT - LEVEL OF INDEPENDENCE: DRESS UPPER BODY, OT EVAL
moderate assist (50% patients effort)
minimum assist (75% patients effort)/moderate assist (50% patients effort)

## 2019-05-02 NOTE — OCCUPATIONAL THERAPY INITIAL EVALUATION ADULT - PLANNED THERAPY INTERVENTIONS, OT EVAL
neuromuscular re-education/bed mobility training/strengthening/ADL retraining/transfer training/balance training/ROM
bed mobility training/strengthening/ADL retraining/fine motor coordination training/neuromuscular re-education/transfer training/ROM/balance training

## 2019-05-02 NOTE — PHYSICAL THERAPY INITIAL EVALUATION ADULT - PERTINENT HX OF CURRENT PROBLEM, REHAB EVAL
53 year old male, resident of [facility], with h/o bipolar d/o, HTN, nocturnal enuresis, deformity of lower leg, hypothyroid, schizophrenia, BPH, IVDA c/o inability to walk due to lower extremity weakness which has been worsening for the last two weeks.  Pt also reports lower back pain and paresthesias in both hands. Pt has paranoid thoughts, he feels he is being "possessed" and "held down" by somebody who passed away. CT head negative for acute findings.
53 year old male, resident of [facility], with h/o bipolar d/o, HTN, nocturnal enuresis, deformity of lower leg, hypothyroid, schizophrenia, BPH, IVDA c/o inability to walk due to lower extremity weakness which has been worsening for the last two weeks.  Pt also reports lower back pain and paresthesias in both hands. Pt has paranoid thoughts, he feels he is being "possessed" and "held down" by somebody who passed away. CT head negative for acute findings.

## 2019-05-02 NOTE — OCCUPATIONAL THERAPY INITIAL EVALUATION ADULT - BED MOBILITY LIMITATIONS, REHAB EVAL
impaired ability to control trunk for mobility/decreased ability to use arms for pushing/pulling/decreased ability to use legs for bridging/pushing
decreased ability to use legs for bridging/pushing/impaired ability to control trunk for mobility/decreased ability to use arms for pushing/pulling

## 2019-05-03 DIAGNOSIS — K59.01 SLOW TRANSIT CONSTIPATION: ICD-10-CM

## 2019-05-03 PROCEDURE — 99232 SBSQ HOSP IP/OBS MODERATE 35: CPT

## 2019-05-03 RX ORDER — POLYETHYLENE GLYCOL 3350 17 G/17G
17 POWDER, FOR SOLUTION ORAL DAILY
Qty: 0 | Refills: 0 | Status: DISCONTINUED | OUTPATIENT
Start: 2019-05-03 | End: 2019-05-16

## 2019-05-03 RX ADMIN — GABAPENTIN 100 MILLIGRAM(S): 400 CAPSULE ORAL at 17:04

## 2019-05-03 RX ADMIN — FLUPHENAZINE HYDROCHLORIDE 5 MILLIGRAM(S): 1 TABLET, FILM COATED ORAL at 17:03

## 2019-05-03 RX ADMIN — OXYCODONE HYDROCHLORIDE 10 MILLIGRAM(S): 5 TABLET ORAL at 08:11

## 2019-05-03 RX ADMIN — Medication 5 MILLIGRAM(S): at 17:04

## 2019-05-03 RX ADMIN — OXYCODONE HYDROCHLORIDE 10 MILLIGRAM(S): 5 TABLET ORAL at 10:15

## 2019-05-03 RX ADMIN — Medication 500 MILLIGRAM(S): at 07:57

## 2019-05-03 RX ADMIN — ENOXAPARIN SODIUM 40 MILLIGRAM(S): 100 INJECTION SUBCUTANEOUS at 21:28

## 2019-05-03 RX ADMIN — PANTOPRAZOLE SODIUM 40 MILLIGRAM(S): 20 TABLET, DELAYED RELEASE ORAL at 05:55

## 2019-05-03 RX ADMIN — Medication 100 MILLIGRAM(S): at 12:47

## 2019-05-03 RX ADMIN — GABAPENTIN 100 MILLIGRAM(S): 400 CAPSULE ORAL at 05:55

## 2019-05-03 RX ADMIN — Medication 4 MILLIGRAM(S): at 17:04

## 2019-05-03 RX ADMIN — Medication 650 MILLIGRAM(S): at 12:47

## 2019-05-03 RX ADMIN — SENNA PLUS 2 TABLET(S): 8.6 TABLET ORAL at 21:28

## 2019-05-03 RX ADMIN — OXYCODONE HYDROCHLORIDE 10 MILLIGRAM(S): 5 TABLET ORAL at 18:09

## 2019-05-03 RX ADMIN — OXYCODONE HYDROCHLORIDE 10 MILLIGRAM(S): 5 TABLET ORAL at 17:04

## 2019-05-03 RX ADMIN — Medication 100 MILLIGRAM(S): at 21:28

## 2019-05-03 RX ADMIN — Medication 175 MICROGRAM(S): at 05:55

## 2019-05-03 RX ADMIN — FLUPHENAZINE HYDROCHLORIDE 5 MILLIGRAM(S): 1 TABLET, FILM COATED ORAL at 05:55

## 2019-05-03 RX ADMIN — Medication 4 MILLIGRAM(S): at 05:55

## 2019-05-03 RX ADMIN — POLYETHYLENE GLYCOL 3350 17 GRAM(S): 17 POWDER, FOR SOLUTION ORAL at 17:04

## 2019-05-03 RX ADMIN — Medication 100 MILLIGRAM(S): at 05:55

## 2019-05-03 RX ADMIN — Medication 4 MILLIGRAM(S): at 11:44

## 2019-05-03 RX ADMIN — Medication 0.5 MILLIGRAM(S): at 17:03

## 2019-05-03 RX ADMIN — Medication 750 MILLIGRAM(S): at 21:28

## 2019-05-03 RX ADMIN — Medication 4 MILLIGRAM(S): at 23:13

## 2019-05-03 RX ADMIN — Medication 0.5 MILLIGRAM(S): at 05:55

## 2019-05-03 RX ADMIN — Medication 50 MILLIGRAM(S): at 05:55

## 2019-05-03 RX ADMIN — Medication 5 MILLIGRAM(S): at 05:55

## 2019-05-03 RX ADMIN — DESMOPRESSIN ACETATE 0.2 MILLIGRAM(S): 0.1 TABLET ORAL at 21:28

## 2019-05-03 RX ADMIN — Medication 650 MILLIGRAM(S): at 13:49

## 2019-05-03 NOTE — PROGRESS NOTE ADULT - NSHPATTENDINGPLANDISCUSS_GEN_ALL_CORE
patient and ADS NP
patient and ADS NP
patient and PACU RN
Patient, Neurosurgery
Patient, Neurosurgery

## 2019-05-03 NOTE — PROGRESS NOTE ADULT - SUBJECTIVE AND OBJECTIVE BOX
No issues overnight  Vital Signs Last 24 Hrs  T(C): 36.4 (02 May 2019 23:46), Max: 36.9 (02 May 2019 22:00)  T(F): 97.5 (02 May 2019 23:46), Max: 98.4 (02 May 2019 22:00)  HR: 70 (02 May 2019 23:46) (60 - 77)  BP: 123/64 (02 May 2019 23:46) (123/64 - 145/86)  BP(mean): --  RR: 17 (02 May 2019 23:46) (17 - 18)  SpO2: 97% (02 May 2019 23:46) (95% - 99%)    Awake, alert  PERRLA, EOMI  CHING strength 5/5  SILT    HMV #1: 74cc  HMV #2: 47cc    MEDICATIONS  (STANDING):  benztropine 0.5 milliGRAM(s) Oral two times a day  bisacodyl 5 milliGRAM(s) Oral every 12 hours  desmopressin 0.2 milliGRAM(s) Oral at bedtime  dexamethasone     Tablet 4 milliGRAM(s) Oral every 6 hours  docusate sodium 100 milliGRAM(s) Oral three times a day  enoxaparin Injectable 40 milliGRAM(s) SubCutaneous at bedtime  fluPHENAZine 5 milliGRAM(s) Oral two times a day  gabapentin 100 milliGRAM(s) Oral two times a day  levothyroxine 175 MICROGram(s) Oral daily  LORazepam   Injectable 2 milliGRAM(s) IV Push once  metoprolol succinate ER 50 milliGRAM(s) Oral daily  pantoprazole    Tablet 40 milliGRAM(s) Oral before breakfast  senna 2 Tablet(s) Oral at bedtime  sodium chloride 0.9% with potassium chloride 20 mEq/L 1000 milliLiter(s) (75 mL/Hr) IV Continuous <Continuous>  valproic  acid Syrup 500 milliGRAM(s) Oral <User Schedule>  valproic  acid Syrup 750 milliGRAM(s) Oral at bedtime    MEDICATIONS  (PRN):  acetaminophen   Tablet .. 650 milliGRAM(s) Oral every 8 hours PRN Temp greater or equal to 38C (100.4F), Mild Pain (1 - 3)  baclofen 10 milliGRAM(s) Oral three times a day PRN Muscle Spasm  diazepam    Tablet 5 milliGRAM(s) Oral every 6 hours PRN Spasm  ondansetron Injectable 4 milliGRAM(s) IV Push every 6 hours PRN Nausea  oxyCODONE    IR 5 milliGRAM(s) Oral every 4 hours PRN Moderate Pain (4 - 6)  oxyCODONE    IR 10 milliGRAM(s) Oral every 4 hours PRN Severe Pain (7 - 10)                          15.2   14.64 )-----------( 277      ( 02 May 2019 05:40 )             46.1     05-02    138  |  99  |  11  ----------------------------<  102<H>  4.3   |  27  |  0.50    Ca    9.7      02 May 2019 05:40  Phos  2.6     05-01  Mg     2.0     05-01    < from: CT Cervical Spine No Cont (05.02.19 @ 11:32) >  FINDINGS:  There has been C3 through C5 laminectomy with posterior fusion hardware   in place. Transpedicular screws spanning the C3 through the C6 articular   pillars are associated with paraspinal interlocking rods. There is or   hardware fracture.    Screws do not enter the spinal canal or neural foramina. Two surgical   drains are in place.  There is no evidence for a fluid collection within   the visualized soft tissues.    Vertebrae otherwise appear intact. There is no evidence for fracture.  Prevertebral and retropharyngeal soft tissues otherwise appear intact.        IMPRESSION:  Interval C3-C5 laminectomy with placement of fixation hardware as   described above.    < end of copied text >

## 2019-05-03 NOTE — PROGRESS NOTE ADULT - SUBJECTIVE AND OBJECTIVE BOX
[facility] Medicine  Pager: [phone]    Patient is a 53y old  Male who presents with a chief complaint of Cannot move legs (03 May 2019 01:29)      SUBJECTIVE / OVERNIGHT EVENTS: No acute events overnight. Reports he is feeling much better and is very grateful for the care he is receiving. Reports he is having come pain in his neck at the surgical site but feels the pain medications he is receiving are sufficient. Urinating well. Endorses feeling constipated.     MEDICATIONS  (STANDING):  benztropine 0.5 milliGRAM(s) Oral two times a day  bisacodyl 5 milliGRAM(s) Oral every 12 hours  desmopressin 0.2 milliGRAM(s) Oral at bedtime  dexamethasone     Tablet 4 milliGRAM(s) Oral every 6 hours  docusate sodium 100 milliGRAM(s) Oral three times a day  enoxaparin Injectable 40 milliGRAM(s) SubCutaneous at bedtime  fluPHENAZine 5 milliGRAM(s) Oral two times a day  gabapentin 100 milliGRAM(s) Oral two times a day  levothyroxine 175 MICROGram(s) Oral daily  LORazepam   Injectable 2 milliGRAM(s) IV Push once  metoprolol succinate ER 50 milliGRAM(s) Oral daily  pantoprazole    Tablet 40 milliGRAM(s) Oral before breakfast  senna 2 Tablet(s) Oral at bedtime  valproic  acid Syrup 500 milliGRAM(s) Oral <User Schedule>  valproic  acid Syrup 750 milliGRAM(s) Oral at bedtime    MEDICATIONS  (PRN):  acetaminophen   Tablet .. 650 milliGRAM(s) Oral every 8 hours PRN Temp greater or equal to 38C (100.4F), Mild Pain (1 - 3)  baclofen 10 milliGRAM(s) Oral three times a day PRN Muscle Spasm  diazepam    Tablet 5 milliGRAM(s) Oral every 6 hours PRN Spasm  ondansetron Injectable 4 milliGRAM(s) IV Push every 6 hours PRN Nausea  oxyCODONE    IR 5 milliGRAM(s) Oral every 4 hours PRN Moderate Pain (4 - 6)  oxyCODONE    IR 10 milliGRAM(s) Oral every 4 hours PRN Severe Pain (7 - 10)      Vital Signs Last 24 Hrs  T(C): 36.3 (05-03-19 @ 09:05)  T(F): 97.4 (05-03-19 @ 09:05), Max: 98.4 (05-02-19 @ 22:00)  HR: 75 (05-03-19 @ 09:05) (65 - 77)  BP: 121/62 (05-03-19 @ 09:05)  BP(mean): --  RR: 18 (05-03-19 @ 09:05) (17 - 18)  SpO2: 97% (05-03-19 @ 09:05) (95% - 98%)  Wt(kg): --    05-02 @ 07:01  -  05-03 @ 07:00  --------------------------------------------------------  IN: 1950 mL / OUT: 2556 mL / NET: -606 mL    05-03 @ 07:01  -  05-03 @ 12:49  --------------------------------------------------------  IN: 300 mL / OUT: 525 mL / NET: -225 mL      CAPILLARY BLOOD GLUCOSE        I&O's Summary    02 May 2019 07:01  -  03 May 2019 07:00  --------------------------------------------------------  IN: 1950 mL / OUT: 2556 mL / NET: -606 mL    03 May 2019 07:01  -  03 May 2019 12:49  --------------------------------------------------------  IN: 300 mL / OUT: 525 mL / NET: -225 mL        PHYSICAL EXAM:  GENERAL: NAD, well-developed  HEAD:  Atraumatic, Normocephalic  EYES: EOMI, PERRLA, conjunctiva and sclera clear  NECK: Supple, No JVD  CHEST/LUNG: Clear to auscultation bilaterally; No wheeze  HEART: Regular rate and rhythm; No murmurs, rubs, or gallops  ABDOMEN: Soft, Nontender, Nondistended; Bowel sounds present  EXTREMITIES:  2+ Peripheral Pulses, No clubbing, cyanosis, or edema  PSYCH: AAOx3  NEUROLOGY: non-focal  SKIN: No rashes or lesions    LABS:                        15.2   14.64 )-----------( 277      ( 02 May 2019 05:40 )             46.1     05-02    138  |  99  |  11  ----------------------------<  102<H>  4.3   |  27  |  0.50    Ca    9.7      02 May 2019 05:40                RADIOLOGY & ADDITIONAL TESTS:    Imaging Personally Reviewed:    Consultant(s) Notes Reviewed:      Care Discussed with Consultants/Other Providers:    Assessment and Plan: [facility] Medicine  Pager: [phone]    Patient is a 53y old  Male who presents with a chief complaint of Cannot move legs (03 May 2019 01:29)      SUBJECTIVE / OVERNIGHT EVENTS: No acute events overnight. Reports he is feeling much better and is very grateful for the care he is receiving. Reports he is having come pain in his neck at the surgical site but feels the pain medications he is receiving are sufficient. Urinating well. Endorses feeling constipated.     MEDICATIONS  (STANDING):  benztropine 0.5 milliGRAM(s) Oral two times a day  bisacodyl 5 milliGRAM(s) Oral every 12 hours  desmopressin 0.2 milliGRAM(s) Oral at bedtime  dexamethasone     Tablet 4 milliGRAM(s) Oral every 6 hours  docusate sodium 100 milliGRAM(s) Oral three times a day  enoxaparin Injectable 40 milliGRAM(s) SubCutaneous at bedtime  fluPHENAZine 5 milliGRAM(s) Oral two times a day  gabapentin 100 milliGRAM(s) Oral two times a day  levothyroxine 175 MICROGram(s) Oral daily  LORazepam   Injectable 2 milliGRAM(s) IV Push once  metoprolol succinate ER 50 milliGRAM(s) Oral daily  pantoprazole    Tablet 40 milliGRAM(s) Oral before breakfast  senna 2 Tablet(s) Oral at bedtime  valproic  acid Syrup 500 milliGRAM(s) Oral <User Schedule>  valproic  acid Syrup 750 milliGRAM(s) Oral at bedtime    MEDICATIONS  (PRN):  acetaminophen   Tablet .. 650 milliGRAM(s) Oral every 8 hours PRN Temp greater or equal to 38C (100.4F), Mild Pain (1 - 3)  baclofen 10 milliGRAM(s) Oral three times a day PRN Muscle Spasm  diazepam    Tablet 5 milliGRAM(s) Oral every 6 hours PRN Spasm  ondansetron Injectable 4 milliGRAM(s) IV Push every 6 hours PRN Nausea  oxyCODONE    IR 5 milliGRAM(s) Oral every 4 hours PRN Moderate Pain (4 - 6)  oxyCODONE    IR 10 milliGRAM(s) Oral every 4 hours PRN Severe Pain (7 - 10)      Vital Signs Last 24 Hrs  T(C): 36.3 (05-03-19 @ 09:05)  T(F): 97.4 (05-03-19 @ 09:05), Max: 98.4 (05-02-19 @ 22:00)  HR: 75 (05-03-19 @ 09:05) (65 - 77)  BP: 121/62 (05-03-19 @ 09:05)  BP(mean): --  RR: 18 (05-03-19 @ 09:05) (17 - 18)  SpO2: 97% (05-03-19 @ 09:05) (95% - 98%)  Wt(kg): --    05-02 @ 07:01  -  05-03 @ 07:00  --------------------------------------------------------  IN: 1950 mL / OUT: 2556 mL / NET: -606 mL    05-03 @ 07:01  -  05-03 @ 12:49  --------------------------------------------------------  IN: 300 mL / OUT: 525 mL / NET: -225 mL      CAPILLARY BLOOD GLUCOSE        I&O's Summary    02 May 2019 07:01  -  03 May 2019 07:00  --------------------------------------------------------  IN: 1950 mL / OUT: 2556 mL / NET: -606 mL    03 May 2019 07:01  -  03 May 2019 12:49  --------------------------------------------------------  IN: 300 mL / OUT: 525 mL / NET: -225 mL      PHYSICAL EXAM  GENERAL: NAD, pleasant, sitting up in chair  HEENT: normocephalic, atraumatic, CN 2-12 grossly intact   CHEST/LUNG: Clear to auscultation bilaterally; No wheeze, rhonchi, rales  HEART: Regular rate and rhythm; No murmurs, rubs, or gallops  ABDOMEN: Soft, Nontender, Nondistended; Bowel sounds present  EXTREMITIES:  2+ Peripheral Pulses, No clubbing, cyanosis, or edema  PSYCH/NEURO: AAOx3. Sensation to light touch intact. Patient with atrophy of hand muscles b/l R>L. Able to lift his legs  SKIN: resolving ecchymosis of left dorsal arm distally, drains are c/d/i    LABS:                        15.2   14.64 )-----------( 277      ( 02 May 2019 05:40 )             46.1     05-02    138  |  99  |  11  ----------------------------<  102<H>  4.3   |  27  |  0.50    Ca    9.7      02 May 2019 05:40        RADIOLOGY & ADDITIONAL TESTS:    Imaging Personally Reviewed:  < from: CT Cervical Spine No Cont (05.02.19 @ 11:32) >  IMPRESSION:  Interval C3-C5 laminectomy with placement of fixation hardware as   described above.      < end of copied text >      Consultant(s) Notes Reviewed: Neurosurgery     Care Discussed with Consultants/Other Providers: Neurosurgery PA (Mily) re: plan of care    Assessment and Plan:

## 2019-05-03 NOTE — PROGRESS NOTE ADULT - SUBJECTIVE AND OBJECTIVE BOX
Pipe Winkler MD  Interventional Cardiology  [place] Office : [address] [place] [address]  Tel:   [place] Office : [address] [facility] [place] [address]  Tel: [phone]  Cell : [phone]    Subjective : Pt lying in bed comfortable, not in distress, denies any chest pain or SOB  	  MEDICATIONS:  enoxaparin Injectable 40 milliGRAM(s) SubCutaneous at bedtime  metoprolol succinate ER 50 milliGRAM(s) Oral daily  acetaminophen   Tablet .. 650 milliGRAM(s) Oral every 8 hours PRN  baclofen 10 milliGRAM(s) Oral three times a day PRN  benztropine 0.5 milliGRAM(s) Oral two times a day  diazepam    Tablet 5 milliGRAM(s) Oral every 6 hours PRN  fluPHENAZine 5 milliGRAM(s) Oral two times a day  gabapentin 100 milliGRAM(s) Oral two times a day  LORazepam   Injectable 2 milliGRAM(s) IV Push once  ondansetron Injectable 4 milliGRAM(s) IV Push every 6 hours PRN  oxyCODONE    IR 5 milliGRAM(s) Oral every 4 hours PRN  oxyCODONE    IR 10 milliGRAM(s) Oral every 4 hours PRN  valproic  acid Syrup 500 milliGRAM(s) Oral <User Schedule>  valproic  acid Syrup 750 milliGRAM(s) Oral at bedtime  bisacodyl 5 milliGRAM(s) Oral every 12 hours  docusate sodium 100 milliGRAM(s) Oral three times a day  pantoprazole    Tablet 40 milliGRAM(s) Oral before breakfast  polyethylene glycol 3350 17 Gram(s) Oral daily PRN  senna 2 Tablet(s) Oral at bedtime    desmopressin 0.2 milliGRAM(s) Oral at bedtime  dexamethasone     Tablet 4 milliGRAM(s) Oral every 6 hours  levothyroxine 175 MICROGram(s) Oral daily        PHYSICAL EXAM:  T(C): 36.7 (05-03-19 @ 21:31), Max: 36.9 (05-03-19 @ 16:56)  HR: 65 (05-03-19 @ 21:31) (65 - 75)  BP: 103/54 (05-03-19 @ 21:31) (103/54 - 129/69)  RR: 17 (05-03-19 @ 21:31) (16 - 18)  SpO2: 95% (05-03-19 @ 21:31) (95% - 100%)  Wt(kg): --  I&O's Summary    02 May 2019 07:01  -  03 May 2019 07:00  --------------------------------------------------------  IN: 1950 mL / OUT: 2556 mL / NET: -606 mL    03 May 2019 07:01  -  03 May 2019 22:01  --------------------------------------------------------  IN: 300 mL / OUT: 1795 mL / NET: -1495 mL      Appearance: Disheveled 	  HEENT:   Normal oral mucosa	  Cardiovascular: Normal S1 S2, No JVD, No murmurs  Respiratory: Lungs clear to auscultation	  Gastrointestinal:  Soft, Non-tender, + BS	  Extremities:  No clubbing, cyanosis or edema                                    15.2   14.64 )-----------( 277      ( 02 May 2019 05:40 )             46.1     05-02    138  |  99  |  11  ----------------------------<  102<H>  4.3   |  27  |  0.50    Ca    9.7      02 May 2019 05:40      proBNP:   Lipid Profile:   HgA1c:   TSH:

## 2019-05-04 PROCEDURE — 99233 SBSQ HOSP IP/OBS HIGH 50: CPT

## 2019-05-04 RX ORDER — DEXAMETHASONE 0.5 MG/5ML
3 ELIXIR ORAL EVERY 12 HOURS
Qty: 0 | Refills: 0 | Status: COMPLETED | OUTPATIENT
Start: 2019-05-05 | End: 2019-05-06

## 2019-05-04 RX ORDER — DEXAMETHASONE 0.5 MG/5ML
2 ELIXIR ORAL EVERY 12 HOURS
Qty: 0 | Refills: 0 | Status: COMPLETED | OUTPATIENT
Start: 2019-05-06 | End: 2019-05-07

## 2019-05-04 RX ORDER — DEXAMETHASONE 0.5 MG/5ML
4 ELIXIR ORAL EVERY 8 HOURS
Qty: 0 | Refills: 0 | Status: COMPLETED | OUTPATIENT
Start: 2019-05-04 | End: 2019-05-05

## 2019-05-04 RX ORDER — DEXAMETHASONE 0.5 MG/5ML
ELIXIR ORAL
Qty: 0 | Refills: 0 | Status: COMPLETED | OUTPATIENT
Start: 2019-05-04 | End: 2019-05-08

## 2019-05-04 RX ORDER — NICOTINE POLACRILEX 2 MG
1 GUM BUCCAL DAILY
Qty: 0 | Refills: 0 | Status: DISCONTINUED | OUTPATIENT
Start: 2019-05-04 | End: 2019-05-16

## 2019-05-04 RX ORDER — DEXAMETHASONE 0.5 MG/5ML
1 ELIXIR ORAL DAILY
Qty: 0 | Refills: 0 | Status: COMPLETED | OUTPATIENT
Start: 2019-05-07 | End: 2019-05-08

## 2019-05-04 RX ORDER — NICOTINE POLACRILEX 2 MG
4 GUM BUCCAL EVERY 6 HOURS
Qty: 0 | Refills: 0 | Status: DISCONTINUED | OUTPATIENT
Start: 2019-05-04 | End: 2019-05-16

## 2019-05-04 RX ADMIN — FLUPHENAZINE HYDROCHLORIDE 5 MILLIGRAM(S): 1 TABLET, FILM COATED ORAL at 05:30

## 2019-05-04 RX ADMIN — Medication 500 MILLIGRAM(S): at 09:17

## 2019-05-04 RX ADMIN — Medication 750 MILLIGRAM(S): at 21:04

## 2019-05-04 RX ADMIN — Medication 50 MILLIGRAM(S): at 05:30

## 2019-05-04 RX ADMIN — OXYCODONE HYDROCHLORIDE 10 MILLIGRAM(S): 5 TABLET ORAL at 10:45

## 2019-05-04 RX ADMIN — Medication 4 MILLIGRAM(S): at 23:00

## 2019-05-04 RX ADMIN — FLUPHENAZINE HYDROCHLORIDE 5 MILLIGRAM(S): 1 TABLET, FILM COATED ORAL at 18:39

## 2019-05-04 RX ADMIN — Medication 100 MILLIGRAM(S): at 21:03

## 2019-05-04 RX ADMIN — Medication 0.5 MILLIGRAM(S): at 05:30

## 2019-05-04 RX ADMIN — Medication 0.5 MILLIGRAM(S): at 18:38

## 2019-05-04 RX ADMIN — Medication 4 MILLIGRAM(S): at 14:50

## 2019-05-04 RX ADMIN — ENOXAPARIN SODIUM 40 MILLIGRAM(S): 100 INJECTION SUBCUTANEOUS at 21:04

## 2019-05-04 RX ADMIN — Medication 1 PATCH: at 18:38

## 2019-05-04 RX ADMIN — OXYCODONE HYDROCHLORIDE 10 MILLIGRAM(S): 5 TABLET ORAL at 09:48

## 2019-05-04 RX ADMIN — OXYCODONE HYDROCHLORIDE 10 MILLIGRAM(S): 5 TABLET ORAL at 05:30

## 2019-05-04 RX ADMIN — Medication 175 MICROGRAM(S): at 05:30

## 2019-05-04 RX ADMIN — OXYCODONE HYDROCHLORIDE 10 MILLIGRAM(S): 5 TABLET ORAL at 18:55

## 2019-05-04 RX ADMIN — Medication 100 MILLIGRAM(S): at 05:30

## 2019-05-04 RX ADMIN — Medication 100 MILLIGRAM(S): at 14:50

## 2019-05-04 RX ADMIN — Medication 5 MILLIGRAM(S): at 18:39

## 2019-05-04 RX ADMIN — OXYCODONE HYDROCHLORIDE 10 MILLIGRAM(S): 5 TABLET ORAL at 06:00

## 2019-05-04 RX ADMIN — GABAPENTIN 100 MILLIGRAM(S): 400 CAPSULE ORAL at 18:38

## 2019-05-04 RX ADMIN — OXYCODONE HYDROCHLORIDE 10 MILLIGRAM(S): 5 TABLET ORAL at 19:41

## 2019-05-04 RX ADMIN — Medication 1 PATCH: at 12:38

## 2019-05-04 RX ADMIN — PANTOPRAZOLE SODIUM 40 MILLIGRAM(S): 20 TABLET, DELAYED RELEASE ORAL at 05:29

## 2019-05-04 RX ADMIN — Medication 5 MILLIGRAM(S): at 05:29

## 2019-05-04 RX ADMIN — GABAPENTIN 100 MILLIGRAM(S): 400 CAPSULE ORAL at 05:29

## 2019-05-04 RX ADMIN — Medication 4 MILLIGRAM(S): at 05:29

## 2019-05-04 RX ADMIN — Medication 4 MILLIGRAM(S): at 21:03

## 2019-05-04 RX ADMIN — SENNA PLUS 2 TABLET(S): 8.6 TABLET ORAL at 21:03

## 2019-05-04 RX ADMIN — DESMOPRESSIN ACETATE 0.2 MILLIGRAM(S): 0.1 TABLET ORAL at 21:04

## 2019-05-04 NOTE — PROGRESS NOTE ADULT - SUBJECTIVE AND OBJECTIVE BOX
Pipe Winkler MD  Interventional Cardiology  [place] Office : [address] [place] [address]  Tel:   [place] Office : [address] [facility] [place] [address]  Tel: [phone]  Cell : [phone]    Subjective : Pt lying in bed comfortable, not in distress, denies any chest pain or SOB  	  MEDICATIONS:  enoxaparin Injectable 40 milliGRAM(s) SubCutaneous at bedtime  metoprolol succinate ER 50 milliGRAM(s) Oral daily        acetaminophen   Tablet .. 650 milliGRAM(s) Oral every 8 hours PRN  baclofen 10 milliGRAM(s) Oral three times a day PRN  benztropine 0.5 milliGRAM(s) Oral two times a day  diazepam    Tablet 5 milliGRAM(s) Oral every 6 hours PRN  fluPHENAZine 5 milliGRAM(s) Oral two times a day  gabapentin 100 milliGRAM(s) Oral two times a day  LORazepam   Injectable 2 milliGRAM(s) IV Push once  ondansetron Injectable 4 milliGRAM(s) IV Push every 6 hours PRN  oxyCODONE    IR 5 milliGRAM(s) Oral every 4 hours PRN  oxyCODONE    IR 10 milliGRAM(s) Oral every 4 hours PRN  valproic  acid Syrup 500 milliGRAM(s) Oral <User Schedule>  valproic  acid Syrup 750 milliGRAM(s) Oral at bedtime    bisacodyl 5 milliGRAM(s) Oral every 12 hours  docusate sodium 100 milliGRAM(s) Oral three times a day  pantoprazole    Tablet 40 milliGRAM(s) Oral before breakfast  polyethylene glycol 3350 17 Gram(s) Oral daily PRN  senna 2 Tablet(s) Oral at bedtime    desmopressin 0.2 milliGRAM(s) Oral at bedtime  dexamethasone     Tablet   Oral   dexamethasone     Tablet 4 milliGRAM(s) Oral every 8 hours  levothyroxine 175 MICROGram(s) Oral daily        PHYSICAL EXAM:  T(C): 36.4 (05-04-19 @ 17:19), Max: 36.7 (05-03-19 @ 21:31)  HR: 79 (05-04-19 @ 17:19) (63 - 85)  BP: 126/60 (05-04-19 @ 17:19) (103/54 - 131/79)  RR: 18 (05-04-19 @ 17:19) (17 - 18)  SpO2: 97% (05-04-19 @ 17:19) (94% - 98%)  Wt(kg): --  I&O's Summary    03 May 2019 07:01  -  04 May 2019 07:00  --------------------------------------------------------  IN: 300 mL / OUT: 1998 mL / NET: -1698 mL    04 May 2019 07:01  -  04 May 2019 18:59  --------------------------------------------------------  IN: 0 mL / OUT: 710 mL / NET: -710 mL          Appearance: Normal	  HEENT:   Normal oral mucosa, PERRL, EOMI	  Cardiovascular: Normal S1 S2, No JVD, No murmurs, No edema  Respiratory: Lungs clear to auscultation	  Gastrointestinal:  Soft, Non-tender, + BS	  Extremities: Normal range of motion, No clubbing, cyanosis or edema                      proBNP:   Lipid Profile:   HgA1c:   TSH:

## 2019-05-04 NOTE — PROGRESS NOTE ADULT - SUBJECTIVE AND OBJECTIVE BOX
CHIEF COMPLAINT: Patient is a 53y old  male who presents with a chief complaint of Cannot move legs (04 May 2019 01:08)      SUBJECTIVE / OVERNIGHT EVENTS:    Complains of neck pain. Denies constipation. Denies other complaints. Requesting nicotine replacement therapy - normally smokes 1 PPD. Contemplating quitting smoking.    MEDICATIONS  (STANDING):  benztropine 0.5 milliGRAM(s) Oral two times a day  bisacodyl 5 milliGRAM(s) Oral every 12 hours  desmopressin 0.2 milliGRAM(s) Oral at bedtime  dexamethasone     Tablet   Oral   dexamethasone     Tablet 4 milliGRAM(s) Oral every 8 hours  docusate sodium 100 milliGRAM(s) Oral three times a day  enoxaparin Injectable 40 milliGRAM(s) SubCutaneous at bedtime  fluPHENAZine 5 milliGRAM(s) Oral two times a day  gabapentin 100 milliGRAM(s) Oral two times a day  levothyroxine 175 MICROGram(s) Oral daily  LORazepam   Injectable 2 milliGRAM(s) IV Push once  metoprolol succinate ER 50 milliGRAM(s) Oral daily  nicotine - 21 mG/24Hr(s) Patch 1 patch Transdermal daily  pantoprazole    Tablet 40 milliGRAM(s) Oral before breakfast  senna 2 Tablet(s) Oral at bedtime  valproic  acid Syrup 500 milliGRAM(s) Oral <User Schedule>  valproic  acid Syrup 750 milliGRAM(s) Oral at bedtime    MEDICATIONS  (PRN):  acetaminophen   Tablet .. 650 milliGRAM(s) Oral every 8 hours PRN Temp greater or equal to 38C (100.4F), Mild Pain (1 - 3)  baclofen 10 milliGRAM(s) Oral three times a day PRN Muscle Spasm  diazepam    Tablet 5 milliGRAM(s) Oral every 6 hours PRN Spasm  nicotine  Polacrilex Gum 4 milliGRAM(s) Oral every 6 hours PRN nicotine craving  ondansetron Injectable 4 milliGRAM(s) IV Push every 6 hours PRN Nausea  oxyCODONE    IR 5 milliGRAM(s) Oral every 4 hours PRN Moderate Pain (4 - 6)  oxyCODONE    IR 10 milliGRAM(s) Oral every 4 hours PRN Severe Pain (7 - 10)  polyethylene glycol 3350 17 Gram(s) Oral daily PRN Constipation      VITALS:  T(F): 97.5 (05-04-19 @ 10:06), Max: 98.4 (05-03-19 @ 16:56)  HR: 85 (05-04-19 @ 10:06) (63 - 85)  BP: 129/77 (05-04-19 @ 10:06) (103/54 - 131/79)  RR: 18 (05-04-19 @ 10:06) (16 - 18)  SpO2: 94% (05-04-19 @ 10:06)      CAPILLARY BLOOD GLUCOSE    Output     I&O's Summary  T(F): 97.5 (05-04-19 @ 10:06), Max: 98.4 (05-03-19 @ 16:56)  HR: 85 (05-04-19 @ 10:06) (63 - 85)  BP: 129/77 (05-04-19 @ 10:06) (103/54 - 131/79)  RR: 18 (05-04-19 @ 10:06) (16 - 18)  SpO2: 94% (05-04-19 @ 10:06)    PHYSICAL EXAM:  GENERAL: NAD, well-developed  HEAD:  Atraumatic, Normocephalic  EYES: EOMI  NECK: Supple, No JVD  CHEST/LUNG: nonlabored breathing  HEART: nl S1/S2  ABDOMEN: nondistended, soft  EXTREMITIES:  no LE edema  PSYCH: alert, answering questions appropriately  NEUROLOGY: non-focal  SKIN: No rashes noted    LABS:                        MICROBIOLOGY:        RADIOLOGY & ADDITIONAL TESTS:    Imaging Personally Reviewed:    < from: CT Cervical Spine No Cont (04.27.19 @ 17:04) >      < end of copied text >        [x] Care Discussed with Consultants/Other Providers:      Daria Reilly M.D.  Hospitalist  Pager [phone] CHIEF COMPLAINT: Patient is a 53y old  male who presents with a chief complaint of Cannot move legs (04 May 2019 01:08)      SUBJECTIVE / OVERNIGHT EVENTS:    Complains of neck pain. Denies constipation. Denies other complaints. Requesting nicotine replacement therapy - normally smokes 1 PPD. Contemplating quitting smoking.    MEDICATIONS  (STANDING):  benztropine 0.5 milliGRAM(s) Oral two times a day  bisacodyl 5 milliGRAM(s) Oral every 12 hours  desmopressin 0.2 milliGRAM(s) Oral at bedtime  dexamethasone     Tablet   Oral   dexamethasone     Tablet 4 milliGRAM(s) Oral every 8 hours  docusate sodium 100 milliGRAM(s) Oral three times a day  enoxaparin Injectable 40 milliGRAM(s) SubCutaneous at bedtime  fluPHENAZine 5 milliGRAM(s) Oral two times a day  gabapentin 100 milliGRAM(s) Oral two times a day  levothyroxine 175 MICROGram(s) Oral daily  LORazepam   Injectable 2 milliGRAM(s) IV Push once  metoprolol succinate ER 50 milliGRAM(s) Oral daily  nicotine - 21 mG/24Hr(s) Patch 1 patch Transdermal daily  pantoprazole    Tablet 40 milliGRAM(s) Oral before breakfast  senna 2 Tablet(s) Oral at bedtime  valproic  acid Syrup 500 milliGRAM(s) Oral <User Schedule>  valproic  acid Syrup 750 milliGRAM(s) Oral at bedtime    MEDICATIONS  (PRN):  acetaminophen   Tablet .. 650 milliGRAM(s) Oral every 8 hours PRN Temp greater or equal to 38C (100.4F), Mild Pain (1 - 3)  baclofen 10 milliGRAM(s) Oral three times a day PRN Muscle Spasm  diazepam    Tablet 5 milliGRAM(s) Oral every 6 hours PRN Spasm  nicotine  Polacrilex Gum 4 milliGRAM(s) Oral every 6 hours PRN nicotine craving  ondansetron Injectable 4 milliGRAM(s) IV Push every 6 hours PRN Nausea  oxyCODONE    IR 5 milliGRAM(s) Oral every 4 hours PRN Moderate Pain (4 - 6)  oxyCODONE    IR 10 milliGRAM(s) Oral every 4 hours PRN Severe Pain (7 - 10)  polyethylene glycol 3350 17 Gram(s) Oral daily PRN Constipation      VITALS:  T(F): 97.5 (05-04-19 @ 10:06), Max: 98.4 (05-03-19 @ 16:56)  HR: 85 (05-04-19 @ 10:06) (63 - 85)  BP: 129/77 (05-04-19 @ 10:06) (103/54 - 131/79)  RR: 18 (05-04-19 @ 10:06) (16 - 18)  SpO2: 94% (05-04-19 @ 10:06)      CAPILLARY BLOOD GLUCOSE    Output     I&O's Summary  T(F): 97.5 (05-04-19 @ 10:06), Max: 98.4 (05-03-19 @ 16:56)  HR: 85 (05-04-19 @ 10:06) (63 - 85)  BP: 129/77 (05-04-19 @ 10:06) (103/54 - 131/79)  RR: 18 (05-04-19 @ 10:06) (16 - 18)  SpO2: 94% (05-04-19 @ 10:06)    PHYSICAL EXAM:  GENERAL: NAD, well-developed  HEAD:  Atraumatic, Normocephalic  EYES: EOMI  NECK: Supple, No JVD  CHEST/LUNG: nonlabored breathing  HEART: nl S1/S2  ABDOMEN: nondistended, soft  EXTREMITIES:  no LE edema  PSYCH: alert, answering questions appropriately  SKIN: No rashes noted    LABS:                        MICROBIOLOGY:        RADIOLOGY & ADDITIONAL TESTS:    Imaging Personally Reviewed:    < from: CT Cervical Spine No Cont (04.27.19 @ 17:04) >      < end of copied text >        [x] Care Discussed with Consultants/Other Providers:      Daria Reilly M.D.  Hospitalist  Pager [phone]

## 2019-05-04 NOTE — PROGRESS NOTE ADULT - SUBJECTIVE AND OBJECTIVE BOX
No issues overnight  Vital Signs Last 24 Hrs  T(C): 36.7 (03 May 2019 21:31), Max: 36.9 (03 May 2019 16:56)  T(F): 98 (03 May 2019 21:31), Max: 98.4 (03 May 2019 16:56)  HR: 65 (03 May 2019 21:31) (65 - 75)  BP: 103/54 (03 May 2019 21:31) (103/54 - 129/69)  BP(mean): --  RR: 17 (03 May 2019 21:31) (16 - 18)  SpO2: 95% (03 May 2019 21:31) (95% - 100%)    Awake, alert  PERRLA, EOMI  CHING strength 5/5  SILT    HMV #1: 32cc  HMV #2: 63cc    MEDICATIONS  (STANDING):  benztropine 0.5 milliGRAM(s) Oral two times a day  bisacodyl 5 milliGRAM(s) Oral every 12 hours  desmopressin 0.2 milliGRAM(s) Oral at bedtime  dexamethasone     Tablet 4 milliGRAM(s) Oral every 6 hours  docusate sodium 100 milliGRAM(s) Oral three times a day  enoxaparin Injectable 40 milliGRAM(s) SubCutaneous at bedtime  fluPHENAZine 5 milliGRAM(s) Oral two times a day  gabapentin 100 milliGRAM(s) Oral two times a day  levothyroxine 175 MICROGram(s) Oral daily  LORazepam   Injectable 2 milliGRAM(s) IV Push once  metoprolol succinate ER 50 milliGRAM(s) Oral daily  pantoprazole    Tablet 40 milliGRAM(s) Oral before breakfast  senna 2 Tablet(s) Oral at bedtime  valproic  acid Syrup 500 milliGRAM(s) Oral <User Schedule>  valproic  acid Syrup 750 milliGRAM(s) Oral at bedtime    MEDICATIONS  (PRN):  acetaminophen   Tablet .. 650 milliGRAM(s) Oral every 8 hours PRN Temp greater or equal to 38C (100.4F), Mild Pain (1 - 3)  baclofen 10 milliGRAM(s) Oral three times a day PRN Muscle Spasm  diazepam    Tablet 5 milliGRAM(s) Oral every 6 hours PRN Spasm  ondansetron Injectable 4 milliGRAM(s) IV Push every 6 hours PRN Nausea  oxyCODONE    IR 5 milliGRAM(s) Oral every 4 hours PRN Moderate Pain (4 - 6)  oxyCODONE    IR 10 milliGRAM(s) Oral every 4 hours PRN Severe Pain (7 - 10)  polyethylene glycol 3350 17 Gram(s) Oral daily PRN Constipation                          15.2   14.64 )-----------( 277      ( 02 May 2019 05:40 )             46.1   05-02    138  |  99  |  11  ----------------------------<  102<H>  4.3   |  27  |  0.50    Ca    9.7      02 May 2019 05:40

## 2019-05-05 RX ADMIN — Medication 650 MILLIGRAM(S): at 14:10

## 2019-05-05 RX ADMIN — DESMOPRESSIN ACETATE 0.2 MILLIGRAM(S): 0.1 TABLET ORAL at 22:15

## 2019-05-05 RX ADMIN — FLUPHENAZINE HYDROCHLORIDE 5 MILLIGRAM(S): 1 TABLET, FILM COATED ORAL at 17:34

## 2019-05-05 RX ADMIN — Medication 5 MILLIGRAM(S): at 17:34

## 2019-05-05 RX ADMIN — Medication 650 MILLIGRAM(S): at 15:10

## 2019-05-05 RX ADMIN — OXYCODONE HYDROCHLORIDE 10 MILLIGRAM(S): 5 TABLET ORAL at 19:30

## 2019-05-05 RX ADMIN — Medication 1 PATCH: at 07:29

## 2019-05-05 RX ADMIN — GABAPENTIN 100 MILLIGRAM(S): 400 CAPSULE ORAL at 06:16

## 2019-05-05 RX ADMIN — Medication 750 MILLIGRAM(S): at 22:16

## 2019-05-05 RX ADMIN — Medication 0.5 MILLIGRAM(S): at 06:15

## 2019-05-05 RX ADMIN — Medication 0.5 MILLIGRAM(S): at 17:34

## 2019-05-05 RX ADMIN — OXYCODONE HYDROCHLORIDE 10 MILLIGRAM(S): 5 TABLET ORAL at 18:58

## 2019-05-05 RX ADMIN — Medication 5 MILLIGRAM(S): at 06:14

## 2019-05-05 RX ADMIN — Medication 100 MILLIGRAM(S): at 22:15

## 2019-05-05 RX ADMIN — Medication 100 MILLIGRAM(S): at 06:14

## 2019-05-05 RX ADMIN — Medication 4 MILLIGRAM(S): at 06:14

## 2019-05-05 RX ADMIN — Medication 1 PATCH: at 11:53

## 2019-05-05 RX ADMIN — Medication 100 MILLIGRAM(S): at 13:04

## 2019-05-05 RX ADMIN — Medication 175 MICROGRAM(S): at 06:16

## 2019-05-05 RX ADMIN — ENOXAPARIN SODIUM 40 MILLIGRAM(S): 100 INJECTION SUBCUTANEOUS at 22:15

## 2019-05-05 RX ADMIN — GABAPENTIN 100 MILLIGRAM(S): 400 CAPSULE ORAL at 17:34

## 2019-05-05 RX ADMIN — OXYCODONE HYDROCHLORIDE 10 MILLIGRAM(S): 5 TABLET ORAL at 13:04

## 2019-05-05 RX ADMIN — Medication 4 MILLIGRAM(S): at 11:54

## 2019-05-05 RX ADMIN — PANTOPRAZOLE SODIUM 40 MILLIGRAM(S): 20 TABLET, DELAYED RELEASE ORAL at 06:16

## 2019-05-05 RX ADMIN — SENNA PLUS 2 TABLET(S): 8.6 TABLET ORAL at 22:15

## 2019-05-05 RX ADMIN — Medication 50 MILLIGRAM(S): at 06:16

## 2019-05-05 RX ADMIN — Medication 500 MILLIGRAM(S): at 07:56

## 2019-05-05 RX ADMIN — FLUPHENAZINE HYDROCHLORIDE 5 MILLIGRAM(S): 1 TABLET, FILM COATED ORAL at 06:15

## 2019-05-05 RX ADMIN — Medication 3 MILLIGRAM(S): at 17:34

## 2019-05-05 RX ADMIN — OXYCODONE HYDROCHLORIDE 10 MILLIGRAM(S): 5 TABLET ORAL at 14:00

## 2019-05-05 NOTE — PROGRESS NOTE ADULT - SUBJECTIVE AND OBJECTIVE BOX
Pipe Winkler MD  Interventional Cardiology  [place] Office : [address] [place] [address] [place]. [address]  Tel:   [place] Office : [address] [facility] [place] [address]  Tel: [phone]  Cell : [phone]    Subjective : Pt lying in bed comfortable, not in distress, denies any chest pain or SOB  	  MEDICATIONS:  enoxaparin Injectable 40 milliGRAM(s) SubCutaneous at bedtime  metoprolol succinate ER 50 milliGRAM(s) Oral daily  acetaminophen   Tablet .. 650 milliGRAM(s) Oral every 8 hours PRN  baclofen 10 milliGRAM(s) Oral three times a day PRN  benztropine 0.5 milliGRAM(s) Oral two times a day  diazepam    Tablet 5 milliGRAM(s) Oral every 6 hours PRN  fluPHENAZine 5 milliGRAM(s) Oral two times a day  gabapentin 100 milliGRAM(s) Oral two times a day  ondansetron Injectable 4 milliGRAM(s) IV Push every 6 hours PRN  oxyCODONE    IR 5 milliGRAM(s) Oral every 4 hours PRN  oxyCODONE    IR 10 milliGRAM(s) Oral every 4 hours PRN  valproic  acid Syrup 500 milliGRAM(s) Oral <User Schedule>  valproic  acid Syrup 750 milliGRAM(s) Oral at bedtime    bisacodyl 5 milliGRAM(s) Oral every 12 hours  docusate sodium 100 milliGRAM(s) Oral three times a day  pantoprazole    Tablet 40 milliGRAM(s) Oral before breakfast  polyethylene glycol 3350 17 Gram(s) Oral daily PRN  senna 2 Tablet(s) Oral at bedtime    desmopressin 0.2 milliGRAM(s) Oral at bedtime  dexamethasone     Tablet   Oral   dexamethasone     Tablet 3 milliGRAM(s) Oral every 12 hours  levothyroxine 175 MICROGram(s) Oral daily        PHYSICAL EXAM:  T(C): 37.1 (05-05-19 @ 21:21), Max: 37.1 (05-05-19 @ 21:21)  HR: 61 (05-05-19 @ 21:21) (61 - 76)  BP: 119/61 (05-05-19 @ 21:21) (119/61 - 138/64)  RR: 18 (05-05-19 @ 21:21) (18 - 22)  SpO2: 98% (05-05-19 @ 21:21) (97% - 100%)  Wt(kg): --  I&O's Summary    04 May 2019 07:01  -  05 May 2019 07:00  --------------------------------------------------------  IN: 0 mL / OUT: 1838 mL / NET: -1838 mL    05 May 2019 07:01  -  05 May 2019 22:47  --------------------------------------------------------  IN: 0 mL / OUT: 527 mL / NET: -527 mL          Appearance: Normal	  HEENT:   Normal oral mucosa, PERRL, EOMI	  Cardiovascular: Normal S1 S2, No JVD, No murmurs, No edema  Respiratory: Lungs clear to auscultation	  Gastrointestinal:  Soft, Non-tender, + BS	  Extremities: Normal range of motion, No clubbing, cyanosis or edema                      proBNP:   Lipid Profile:   HgA1c:   TSH:

## 2019-05-05 NOTE — PROGRESS NOTE ADULT - SUBJECTIVE AND OBJECTIVE BOX
No issues overnight  ICU Vital Signs Last 24 Hrs  T(C): 36.3 (04 May 2019 21:14), Max: 36.6 (04 May 2019 14:51)  T(F): 97.4 (04 May 2019 21:14), Max: 97.9 (04 May 2019 14:51)  HR: 82 (04 May 2019 21:14) (63 - 85)  BP: 124/57 (04 May 2019 21:14) (124/57 - 131/79)  BP(mean): --  ABP: --  ABP(mean): --  RR: 18 (04 May 2019 21:14) (18 - 18)  SpO2: 99% (04 May 2019 21:14) (94% - 99%)      Awake, alert  PERRLA, EOMI  CHING strength 5/5  SILT    HMV : 125cc    MEDICATIONS  (STANDING):  benztropine 0.5 milliGRAM(s) Oral two times a day  bisacodyl 5 milliGRAM(s) Oral every 12 hours  desmopressin 0.2 milliGRAM(s) Oral at bedtime  dexamethasone     Tablet 4 milliGRAM(s) Oral every 6 hours  docusate sodium 100 milliGRAM(s) Oral three times a day  enoxaparin Injectable 40 milliGRAM(s) SubCutaneous at bedtime  fluPHENAZine 5 milliGRAM(s) Oral two times a day  gabapentin 100 milliGRAM(s) Oral two times a day  levothyroxine 175 MICROGram(s) Oral daily  LORazepam   Injectable 2 milliGRAM(s) IV Push once  metoprolol succinate ER 50 milliGRAM(s) Oral daily  pantoprazole    Tablet 40 milliGRAM(s) Oral before breakfast  senna 2 Tablet(s) Oral at bedtime  valproic  acid Syrup 500 milliGRAM(s) Oral <User Schedule>  valproic  acid Syrup 750 milliGRAM(s) Oral at bedtime    MEDICATIONS  (PRN):  acetaminophen   Tablet .. 650 milliGRAM(s) Oral every 8 hours PRN Temp greater or equal to 38C (100.4F), Mild Pain (1 - 3)  baclofen 10 milliGRAM(s) Oral three times a day PRN Muscle Spasm  diazepam    Tablet 5 milliGRAM(s) Oral every 6 hours PRN Spasm  ondansetron Injectable 4 milliGRAM(s) IV Push every 6 hours PRN Nausea  oxyCODONE    IR 5 milliGRAM(s) Oral every 4 hours PRN Moderate Pain (4 - 6)  oxyCODONE    IR 10 milliGRAM(s) Oral every 4 hours PRN Severe Pain (7 - 10)  polyethylene glycol 3350 17 Gram(s) Oral daily PRN Constipation                          15.2   14.64 )-----------( 277      ( 02 May 2019 05:40 )             46.1   05-02    138  |  99  |  11  ----------------------------<  102<H>  4.3   |  27  |  0.50    Ca    9.7      02 May 2019 05:40

## 2019-05-06 PROCEDURE — 73562 X-RAY EXAM OF KNEE 3: CPT | Mod: 26,LT

## 2019-05-06 PROCEDURE — 99222 1ST HOSP IP/OBS MODERATE 55: CPT | Mod: GC

## 2019-05-06 PROCEDURE — 99233 SBSQ HOSP IP/OBS HIGH 50: CPT

## 2019-05-06 RX ADMIN — Medication 0.5 MILLIGRAM(S): at 06:43

## 2019-05-06 RX ADMIN — Medication 3 MILLIGRAM(S): at 06:42

## 2019-05-06 RX ADMIN — SENNA PLUS 2 TABLET(S): 8.6 TABLET ORAL at 21:13

## 2019-05-06 RX ADMIN — Medication 0.5 MILLIGRAM(S): at 17:12

## 2019-05-06 RX ADMIN — Medication 5 MILLIGRAM(S): at 06:43

## 2019-05-06 RX ADMIN — OXYCODONE HYDROCHLORIDE 10 MILLIGRAM(S): 5 TABLET ORAL at 09:44

## 2019-05-06 RX ADMIN — Medication 650 MILLIGRAM(S): at 02:41

## 2019-05-06 RX ADMIN — OXYCODONE HYDROCHLORIDE 10 MILLIGRAM(S): 5 TABLET ORAL at 18:37

## 2019-05-06 RX ADMIN — OXYCODONE HYDROCHLORIDE 10 MILLIGRAM(S): 5 TABLET ORAL at 19:15

## 2019-05-06 RX ADMIN — Medication 4 MILLIGRAM(S): at 12:47

## 2019-05-06 RX ADMIN — GABAPENTIN 100 MILLIGRAM(S): 400 CAPSULE ORAL at 17:13

## 2019-05-06 RX ADMIN — Medication 175 MICROGRAM(S): at 06:43

## 2019-05-06 RX ADMIN — DESMOPRESSIN ACETATE 0.2 MILLIGRAM(S): 0.1 TABLET ORAL at 21:13

## 2019-05-06 RX ADMIN — PANTOPRAZOLE SODIUM 40 MILLIGRAM(S): 20 TABLET, DELAYED RELEASE ORAL at 06:43

## 2019-05-06 RX ADMIN — Medication 1 PATCH: at 18:40

## 2019-05-06 RX ADMIN — Medication 750 MILLIGRAM(S): at 21:13

## 2019-05-06 RX ADMIN — OXYCODONE HYDROCHLORIDE 10 MILLIGRAM(S): 5 TABLET ORAL at 10:38

## 2019-05-06 RX ADMIN — Medication 100 MILLIGRAM(S): at 21:13

## 2019-05-06 RX ADMIN — Medication 100 MILLIGRAM(S): at 13:30

## 2019-05-06 RX ADMIN — Medication 500 MILLIGRAM(S): at 08:34

## 2019-05-06 RX ADMIN — ENOXAPARIN SODIUM 40 MILLIGRAM(S): 100 INJECTION SUBCUTANEOUS at 21:13

## 2019-05-06 RX ADMIN — Medication 2 MILLIGRAM(S): at 17:13

## 2019-05-06 RX ADMIN — Medication 650 MILLIGRAM(S): at 03:40

## 2019-05-06 RX ADMIN — Medication 100 MILLIGRAM(S): at 06:43

## 2019-05-06 RX ADMIN — Medication 1 PATCH: at 12:01

## 2019-05-06 RX ADMIN — GABAPENTIN 100 MILLIGRAM(S): 400 CAPSULE ORAL at 06:43

## 2019-05-06 RX ADMIN — FLUPHENAZINE HYDROCHLORIDE 5 MILLIGRAM(S): 1 TABLET, FILM COATED ORAL at 06:43

## 2019-05-06 RX ADMIN — FLUPHENAZINE HYDROCHLORIDE 5 MILLIGRAM(S): 1 TABLET, FILM COATED ORAL at 17:12

## 2019-05-06 RX ADMIN — Medication 5 MILLIGRAM(S): at 17:12

## 2019-05-06 NOTE — PROVIDER CONTACT NOTE (OTHER) - RECOMMENDATIONS
recommended Robitussin cough syrup
Dr. Aguilar was notified of the situation with the anesthesia consent

## 2019-05-06 NOTE — PROVIDER CONTACT NOTE (OTHER) - ACTION/TREATMENT ORDERED:
Will order x-ray for patient
Continue to monitor
no action at this time, Will call hospitalist
Upon speaking with Dr. Aguilar, the pt will proceed with the surgery, the pt has no capacity, and that the parents are aware the pt will receive an anesthetic agent

## 2019-05-06 NOTE — CONSULT NOTE ADULT - ASSESSMENT
spinal stenosis, LE weakness  1. PT- bed mobility,transfers, gait and balance training  2. OT- ADL'S  3. pain control  4. Bowel Regimen  4. Patient would benefit from acute rehab, needs a multidisciplinary team including PT, OT. Can tolerate 3 hours of therapy a day.  Will follow.

## 2019-05-06 NOTE — PROVIDER CONTACT NOTE (OTHER) - BACKGROUND
S/P C3-C6 Laminectomy and fusion
S/P C3-C6 laminectomy and fusion
Pt admitted with weakness
pt has a hx of IVDA, schizophrenia, cervical stenosis, HTN, bipolar

## 2019-05-06 NOTE — CONSULT NOTE ADULT - SUBJECTIVE AND OBJECTIVE BOX
P      HPI:  54 y/o Male, resident of [facility], with h/o bipolar d/o, HTN, nocturnal enuresis, deformity of lower leg, hypothyroid, schizophrenia, BPH, IVDA c/o inability to walk due to lower extremity weakness which has been worsening for the last two weeks. Also complaining of lower back pain since sustaining a fall 2 weeks ago. In additions the pt reports paresthesias in both hands, however says that able to feel his feet. Sates that believes that he is being "possessed" and "held down" by somebody who passed away. Otherwise reports no SI/HI, fever, chills, chest pain, SOB, abdominal pain, nausea, vomiting, diarrhea, dysuria, increased frequency. Of note, the pt states that someone is trying to "poison" and "finish him off" at [place] and that he is being followed. Last BM 3 days ago; (26 Apr 2019 03:25)    noted to have severe cervical stenosis on MRI in need for cervical decompression and fusion now s/p C3-C5 decompression and fusion.   REVIEW OF SYSTEMS: No chest pain, shortness of breath, nausea, vomiting or diarhea.      PAST MEDICAL & SURGICAL HISTORY  Bipolar disorder  Drug abuse  Incontinence  Schizophrenia  Hypothyroid  Hypertension  No significant past surgical history  No significant past surgical history  No significant past surgical history      SOCIAL HISTORY  Smoking - Denied, EtOH - Denied, Drugs - Denied    FUNCTIONAL HISTORY:   Lives at [facility]   required assist PTA   CURRENT FUNCTIONAL STATUS: mod a       FAMILY HISTORY   Family history of Crohn's disease  No significant family history      RECENT LABS/IMAGING              VITALS  T(C): 36.5 (05-06-19 @ 06:40), Max: 37.1 (05-05-19 @ 21:21)  HR: 58 (05-06-19 @ 06:40) (58 - 76)  BP: 113/66 (05-06-19 @ 06:40) (113/66 - 138/64)  RR: 18 (05-06-19 @ 06:40) (16 - 22)  SpO2: 100% (05-06-19 @ 06:40) (97% - 100%)  Wt(kg): --    ALLERGIES  Haldol (Unknown)  No Known Allergies  Thorazine (Unknown)      MEDICATIONS   acetaminophen   Tablet .. 650 milliGRAM(s) Oral every 8 hours PRN  baclofen 10 milliGRAM(s) Oral three times a day PRN  benztropine 0.5 milliGRAM(s) Oral two times a day  bisacodyl 5 milliGRAM(s) Oral every 12 hours  desmopressin 0.2 milliGRAM(s) Oral at bedtime  dexamethasone     Tablet   Oral   dexamethasone     Tablet 2 milliGRAM(s) Oral every 12 hours  diazepam    Tablet 5 milliGRAM(s) Oral every 6 hours PRN  docusate sodium 100 milliGRAM(s) Oral three times a day  enoxaparin Injectable 40 milliGRAM(s) SubCutaneous at bedtime  fluPHENAZine 5 milliGRAM(s) Oral two times a day  gabapentin 100 milliGRAM(s) Oral two times a day  levothyroxine 175 MICROGram(s) Oral daily  metoprolol succinate ER 50 milliGRAM(s) Oral daily  nicotine  Polacrilex Gum 4 milliGRAM(s) Oral every 6 hours PRN  nicotine - 21 mG/24Hr(s) Patch 1 patch Transdermal daily  ondansetron Injectable 4 milliGRAM(s) IV Push every 6 hours PRN  oxyCODONE    IR 5 milliGRAM(s) Oral every 4 hours PRN  oxyCODONE    IR 10 milliGRAM(s) Oral every 4 hours PRN  pantoprazole    Tablet 40 milliGRAM(s) Oral before breakfast  polyethylene glycol 3350 17 Gram(s) Oral daily PRN  senna 2 Tablet(s) Oral at bedtime  valproic  acid Syrup 500 milliGRAM(s) Oral <User Schedule>  valproic  acid Syrup 750 milliGRAM(s) Oral at bedtime      ----------------------------------------------------------------------------------------  PHYSICAL EXAM  Constitutional - NAD, Comfortable  HEENT - NCAT, EOMI  Neck - Supple, No limited ROM  Chest - CTA bilaterally, No wheeze, No rhonchi, No crackles  Cardiovascular - RRR, S1S2, No murmurs  Abdomen - BS+, Soft, NTND  Extremities - No C/C/E, No calf tenderness   Neurologic Exam -                    Cognitive - Awake, Alert, AAO to self, place, date, year, situation     Communication - Fluent, No dysarthria, no aphasia     Cranial Nerves - CN 2-12 intact     Motor - No focal deficits                       Sensory - Intact to LT     Reflexes - DTR Intact, No primitive reflexive     Balance - WNL Static  Psychiatric - Mood stable, Affect WNL P      HPI:  54 y/o Male, resident of [facility], with h/o bipolar d/o, HTN, nocturnal enuresis, deformity of lower leg, hypothyroid, schizophrenia, BPH, IVDA c/o inability to walk due to lower extremity weakness which has been worsening for the last two weeks. Also complaining of lower back pain since sustaining a fall 2 weeks ago. In additions the pt reports paresthesias in both hands, however says that able to feel his feet. Sates that believes that he is being "possessed" and "held down" by somebody who passed away. Otherwise reports no SI/HI, fever, chills, chest pain, SOB, abdominal pain, nausea, vomiting, diarrhea, dysuria, increased frequency. Of note, the pt states that someone is trying to "poison" and "finish him off" at [place] and that he is being followed. Last BM 3 days ago; (26 Apr 2019 03:25)    noted to have severe cervical stenosis on MRI in need for cervical decompression and fusion now s/p C3-C5 decompression and fusion.   has had progressive LE weakness over last 6 months, requiring a walker, then a  WC.   Currently pain controlled.   + constipation.     REVIEW OF SYSTEMS: No chest pain, shortness of breath, nausea, vomiting or diarrhea.      PAST MEDICAL & SURGICAL HISTORY  Bipolar disorder  Drug abuse  Incontinence  Schizophrenia  Hypothyroid  Hypertension  No significant past surgical history  No significant past surgical history  No significant past surgical history      SOCIAL HISTORY  Smoking - Denied, EtOH - Denied, Drugs - Denied    FUNCTIONAL HISTORY:   Lives at [facility]   required assist PTA   CURRENT FUNCTIONAL STATUS: mod a       FAMILY HISTORY   Family history of Crohn's disease  No significant family history      RECENT LABS/IMAGING              VITALS  T(C): 36.5 (05-06-19 @ 06:40), Max: 37.1 (05-05-19 @ 21:21)  HR: 58 (05-06-19 @ 06:40) (58 - 76)  BP: 113/66 (05-06-19 @ 06:40) (113/66 - 138/64)  RR: 18 (05-06-19 @ 06:40) (16 - 22)  SpO2: 100% (05-06-19 @ 06:40) (97% - 100%)  Wt(kg): --    ALLERGIES  Haldol (Unknown)  No Known Allergies  Thorazine (Unknown)      MEDICATIONS   acetaminophen   Tablet .. 650 milliGRAM(s) Oral every 8 hours PRN  baclofen 10 milliGRAM(s) Oral three times a day PRN  benztropine 0.5 milliGRAM(s) Oral two times a day  bisacodyl 5 milliGRAM(s) Oral every 12 hours  desmopressin 0.2 milliGRAM(s) Oral at bedtime  dexamethasone     Tablet   Oral   dexamethasone     Tablet 2 milliGRAM(s) Oral every 12 hours  diazepam    Tablet 5 milliGRAM(s) Oral every 6 hours PRN  docusate sodium 100 milliGRAM(s) Oral three times a day  enoxaparin Injectable 40 milliGRAM(s) SubCutaneous at bedtime  fluPHENAZine 5 milliGRAM(s) Oral two times a day  gabapentin 100 milliGRAM(s) Oral two times a day  levothyroxine 175 MICROGram(s) Oral daily  metoprolol succinate ER 50 milliGRAM(s) Oral daily  nicotine  Polacrilex Gum 4 milliGRAM(s) Oral every 6 hours PRN  nicotine - 21 mG/24Hr(s) Patch 1 patch Transdermal daily  ondansetron Injectable 4 milliGRAM(s) IV Push every 6 hours PRN  oxyCODONE    IR 5 milliGRAM(s) Oral every 4 hours PRN  oxyCODONE    IR 10 milliGRAM(s) Oral every 4 hours PRN  pantoprazole    Tablet 40 milliGRAM(s) Oral before breakfast  polyethylene glycol 3350 17 Gram(s) Oral daily PRN  senna 2 Tablet(s) Oral at bedtime  valproic  acid Syrup 500 milliGRAM(s) Oral <User Schedule>  valproic  acid Syrup 750 milliGRAM(s) Oral at bedtime      ----------------------------------------------------------------------------------------  PHYSICAL EXAM  Constitutional - NAD, Comfortable  HEENT - NCAT, EOMI  Neck - Supple, No limited ROM  Chest - CTA bilaterally, No wheeze, No rhonchi, No crackles  Cardiovascular - RRR, S1S2, No murmurs  Abdomen - BS+, Soft, NTND  Extremities - No C/C/E, No calf tenderness   Neurologic Exam -                    Cognitive - Awake, Alert, AAO to self, place, date, year, situation     Communication - Fluent, No dysarthria, no aphasia     Cranial Nerves - CN 2-12 intact     Motor - 2/5 b/l HF/KE/DF. UE 4/5 throughout. some hand intrinsic atrophy                        Sensory - Intact to LT     Reflexes - DTR Intact, No primitive reflexive     Balance - unable to test   Psychiatric - Mood stable, Affect WNL

## 2019-05-06 NOTE — PROVIDER CONTACT NOTE (OTHER) - ASSESSMENT
C/O left knee pain, positive left knee swelling noted prior to incident
No acute distress noted, lungs clear, denies SOB
/78, HR 53, no s/s of distress noted
Dr. Singh of anesthesia made attempts to contact the pt's parents for anesthesia consent

## 2019-05-06 NOTE — PROGRESS NOTE ADULT - SUBJECTIVE AND OBJECTIVE BOX
CHIEF COMPLAINT: Patient is a 53y old  male who presents with a chief complaint of Cannot move legs (06 May 2019 09:02)      SUBJECTIVE / OVERNIGHT EVENTS:    Denies any complaints. Eating well. Denies CP. Denies SOB.    MEDICATIONS  (STANDING):  benztropine 0.5 milliGRAM(s) Oral two times a day  bisacodyl 5 milliGRAM(s) Oral every 12 hours  desmopressin 0.2 milliGRAM(s) Oral at bedtime  dexamethasone     Tablet   Oral   dexamethasone     Tablet 2 milliGRAM(s) Oral every 12 hours  docusate sodium 100 milliGRAM(s) Oral three times a day  enoxaparin Injectable 40 milliGRAM(s) SubCutaneous at bedtime  fluPHENAZine 5 milliGRAM(s) Oral two times a day  gabapentin 100 milliGRAM(s) Oral two times a day  levothyroxine 175 MICROGram(s) Oral daily  metoprolol succinate ER 50 milliGRAM(s) Oral daily  nicotine - 21 mG/24Hr(s) Patch 1 patch Transdermal daily  pantoprazole    Tablet 40 milliGRAM(s) Oral before breakfast  senna 2 Tablet(s) Oral at bedtime  valproic  acid Syrup 500 milliGRAM(s) Oral <User Schedule>  valproic  acid Syrup 750 milliGRAM(s) Oral at bedtime    MEDICATIONS  (PRN):  acetaminophen   Tablet .. 650 milliGRAM(s) Oral every 8 hours PRN Temp greater or equal to 38C (100.4F), Mild Pain (1 - 3)  baclofen 10 milliGRAM(s) Oral three times a day PRN Muscle Spasm  diazepam    Tablet 5 milliGRAM(s) Oral every 6 hours PRN Spasm  nicotine  Polacrilex Gum 4 milliGRAM(s) Oral every 6 hours PRN nicotine craving  ondansetron Injectable 4 milliGRAM(s) IV Push every 6 hours PRN Nausea  oxyCODONE    IR 5 milliGRAM(s) Oral every 4 hours PRN Moderate Pain (4 - 6)  oxyCODONE    IR 10 milliGRAM(s) Oral every 4 hours PRN Severe Pain (7 - 10)  polyethylene glycol 3350 17 Gram(s) Oral daily PRN Constipation      VITALS:  T(F): 98.3 (05-06-19 @ 13:21), Max: 98.7 (05-05-19 @ 21:21)  HR: 84 (05-06-19 @ 13:21) (58 - 84)  BP: 127/73 (05-06-19 @ 13:21) (113/66 - 138/64)  RR: 16 (05-06-19 @ 13:21) (16 - 20)  SpO2: 98% (05-06-19 @ 13:21)      CAPILLARY BLOOD GLUCOSE    Output     I&O's Summary  T(F): 98.3 (05-06-19 @ 13:21), Max: 98.7 (05-05-19 @ 21:21)  HR: 84 (05-06-19 @ 13:21) (58 - 84)  BP: 127/73 (05-06-19 @ 13:21) (113/66 - 138/64)  RR: 16 (05-06-19 @ 13:21) (16 - 20)  SpO2: 98% (05-06-19 @ 13:21)    PHYSICAL EXAM:  GENERAL: NAD, well-developed  HEAD:  Atraumatic, Normocephalic  EYES: EOMI  NECK: Supple, No JVD  CHEST/LUNG: nonlabored breathing  HEART: nl S1/S2  ABDOMEN: nondistended, soft  EXTREMITIES:  no LE edema  PSYCH: alert, answering questions appropriately  SKIN: No rashes noted    LABS:                        MICROBIOLOGY:        RADIOLOGY & ADDITIONAL TESTS:    Imaging Personally Reviewed:    < from: CT Cervical Spine No Cont (04.27.19 @ 17:04) >      < end of copied text >        [x] Care Discussed with Consultants/Other Providers:      Daria Reilly M.D.  Hospitalist  Pager [phone]

## 2019-05-06 NOTE — PROVIDER CONTACT NOTE (OTHER) - SITUATION
Patient slide off chair while sitting, PCA and RN present and safely assisted to floor then transfer to bed.

## 2019-05-06 NOTE — PROGRESS NOTE ADULT - SUBJECTIVE AND OBJECTIVE BOX
Pipe Winkler MD  Interventional Cardiology / Advance Heart Failure and Cardiac Transplant Specialist  [place] Office : [address] [place] [address]  Tel:   [place] Office : [address] [facility] [place] [address]  Tel: [phone]  Cell : [phone]    Subjective : Pt lying in bed comfortable, not in distress    	  MEDICATIONS:  enoxaparin Injectable 40 milliGRAM(s) SubCutaneous at bedtime  metoprolol succinate ER 50 milliGRAM(s) Oral daily        acetaminophen   Tablet .. 650 milliGRAM(s) Oral every 8 hours PRN  baclofen 10 milliGRAM(s) Oral three times a day PRN  benztropine 0.5 milliGRAM(s) Oral two times a day  diazepam    Tablet 5 milliGRAM(s) Oral every 6 hours PRN  fluPHENAZine 5 milliGRAM(s) Oral two times a day  gabapentin 100 milliGRAM(s) Oral two times a day  ondansetron Injectable 4 milliGRAM(s) IV Push every 6 hours PRN  oxyCODONE    IR 5 milliGRAM(s) Oral every 4 hours PRN  oxyCODONE    IR 10 milliGRAM(s) Oral every 4 hours PRN  valproic  acid Syrup 500 milliGRAM(s) Oral <User Schedule>  valproic  acid Syrup 750 milliGRAM(s) Oral at bedtime    bisacodyl 5 milliGRAM(s) Oral every 12 hours  docusate sodium 100 milliGRAM(s) Oral three times a day  pantoprazole    Tablet 40 milliGRAM(s) Oral before breakfast  polyethylene glycol 3350 17 Gram(s) Oral daily PRN  senna 2 Tablet(s) Oral at bedtime    desmopressin 0.2 milliGRAM(s) Oral at bedtime  dexamethasone     Tablet   Oral   dexamethasone     Tablet 2 milliGRAM(s) Oral every 12 hours  levothyroxine 175 MICROGram(s) Oral daily        PHYSICAL EXAM:  T(C): 36.8 (05-06-19 @ 13:21), Max: 37.1 (05-05-19 @ 21:21)  HR: 84 (05-06-19 @ 13:21) (58 - 84)  BP: 127/73 (05-06-19 @ 13:21) (113/66 - 138/64)  RR: 16 (05-06-19 @ 13:21) (16 - 20)  SpO2: 98% (05-06-19 @ 13:21) (97% - 100%)  Wt(kg): --  I&O's Summary    05 May 2019 07:01  -  06 May 2019 07:00  --------------------------------------------------------  IN: 0 mL / OUT: 1387 mL / NET: -1387 mL    06 May 2019 07:01  -  06 May 2019 15:56  --------------------------------------------------------  IN: 0 mL / OUT: 1060 mL / NET: -1060 mL            HEENT:   Normal oral mucosa, PERRL, EOMI	  Cardiovascular: Normal S1 S2, No JVD, No murmurs, No edema  Respiratory: Lungs clear to auscultation	  Gastrointestinal:  Soft, Non-tender, + BS	  Extremities: Normal range of motion, No clubbing, cyanosis or edema                      proBNP:   Lipid Profile:   HgA1c:   TSH:

## 2019-05-06 NOTE — PROGRESS NOTE ADULT - SUBJECTIVE AND OBJECTIVE BOX
No issues overnight  Vital Signs Last 24 Hrs  T(C): 37.1 (05 May 2019 21:21), Max: 37.1 (05 May 2019 21:21)  T(F): 98.7 (05 May 2019 21:21), Max: 98.7 (05 May 2019 21:21)  HR: 61 (05 May 2019 21:21) (61 - 76)  BP: 119/61 (05 May 2019 21:21) (119/61 - 138/64)  BP(mean): --  RR: 18 (05 May 2019 21:21) (18 - 22)  SpO2: 98% (05 May 2019 21:21) (97% - 100%)    Awake, alert  PERRLA, EOMI  CHING strength 5/5  SILT    HMV : 72cc    MEDICATIONS  (STANDING):  benztropine 0.5 milliGRAM(s) Oral two times a day  bisacodyl 5 milliGRAM(s) Oral every 12 hours  desmopressin 0.2 milliGRAM(s) Oral at bedtime  dexamethasone     Tablet   Oral   dexamethasone     Tablet 3 milliGRAM(s) Oral every 12 hours  dexamethasone     Tablet 2 milliGRAM(s) Oral every 12 hours  docusate sodium 100 milliGRAM(s) Oral three times a day  enoxaparin Injectable 40 milliGRAM(s) SubCutaneous at bedtime  fluPHENAZine 5 milliGRAM(s) Oral two times a day  gabapentin 100 milliGRAM(s) Oral two times a day  levothyroxine 175 MICROGram(s) Oral daily  metoprolol succinate ER 50 milliGRAM(s) Oral daily  nicotine - 21 mG/24Hr(s) Patch 1 patch Transdermal daily  pantoprazole    Tablet 40 milliGRAM(s) Oral before breakfast  senna 2 Tablet(s) Oral at bedtime  valproic  acid Syrup 500 milliGRAM(s) Oral <User Schedule>  valproic  acid Syrup 750 milliGRAM(s) Oral at bedtime    MEDICATIONS  (PRN):  acetaminophen   Tablet .. 650 milliGRAM(s) Oral every 8 hours PRN Temp greater or equal to 38C (100.4F), Mild Pain (1 - 3)  baclofen 10 milliGRAM(s) Oral three times a day PRN Muscle Spasm  diazepam    Tablet 5 milliGRAM(s) Oral every 6 hours PRN Spasm  nicotine  Polacrilex Gum 4 milliGRAM(s) Oral every 6 hours PRN nicotine craving  ondansetron Injectable 4 milliGRAM(s) IV Push every 6 hours PRN Nausea  oxyCODONE    IR 5 milliGRAM(s) Oral every 4 hours PRN Moderate Pain (4 - 6)  oxyCODONE    IR 10 milliGRAM(s) Oral every 4 hours PRN Severe Pain (7 - 10)  polyethylene glycol 3350 17 Gram(s) Oral daily PRN Constipation

## 2019-05-07 PROCEDURE — 99233 SBSQ HOSP IP/OBS HIGH 50: CPT

## 2019-05-07 RX ADMIN — DESMOPRESSIN ACETATE 0.2 MILLIGRAM(S): 0.1 TABLET ORAL at 21:11

## 2019-05-07 RX ADMIN — PANTOPRAZOLE SODIUM 40 MILLIGRAM(S): 20 TABLET, DELAYED RELEASE ORAL at 05:16

## 2019-05-07 RX ADMIN — OXYCODONE HYDROCHLORIDE 5 MILLIGRAM(S): 5 TABLET ORAL at 05:50

## 2019-05-07 RX ADMIN — Medication 2 MILLIGRAM(S): at 05:16

## 2019-05-07 RX ADMIN — Medication 4 MILLIGRAM(S): at 09:17

## 2019-05-07 RX ADMIN — Medication 750 MILLIGRAM(S): at 21:12

## 2019-05-07 RX ADMIN — FLUPHENAZINE HYDROCHLORIDE 5 MILLIGRAM(S): 1 TABLET, FILM COATED ORAL at 17:10

## 2019-05-07 RX ADMIN — Medication 100 MILLIGRAM(S): at 21:12

## 2019-05-07 RX ADMIN — Medication 100 MILLIGRAM(S): at 05:15

## 2019-05-07 RX ADMIN — Medication 0.5 MILLIGRAM(S): at 05:16

## 2019-05-07 RX ADMIN — Medication 175 MICROGRAM(S): at 05:16

## 2019-05-07 RX ADMIN — Medication 500 MILLIGRAM(S): at 09:16

## 2019-05-07 RX ADMIN — Medication 1 PATCH: at 11:13

## 2019-05-07 RX ADMIN — SENNA PLUS 2 TABLET(S): 8.6 TABLET ORAL at 21:12

## 2019-05-07 RX ADMIN — OXYCODONE HYDROCHLORIDE 5 MILLIGRAM(S): 5 TABLET ORAL at 05:16

## 2019-05-07 RX ADMIN — Medication 5 MILLIGRAM(S): at 05:25

## 2019-05-07 RX ADMIN — OXYCODONE HYDROCHLORIDE 10 MILLIGRAM(S): 5 TABLET ORAL at 17:10

## 2019-05-07 RX ADMIN — Medication 50 MILLIGRAM(S): at 05:17

## 2019-05-07 RX ADMIN — Medication 1 PATCH: at 06:07

## 2019-05-07 RX ADMIN — OXYCODONE HYDROCHLORIDE 10 MILLIGRAM(S): 5 TABLET ORAL at 21:44

## 2019-05-07 RX ADMIN — GABAPENTIN 100 MILLIGRAM(S): 400 CAPSULE ORAL at 17:09

## 2019-05-07 RX ADMIN — Medication 1 PATCH: at 11:12

## 2019-05-07 RX ADMIN — Medication 0.5 MILLIGRAM(S): at 17:10

## 2019-05-07 RX ADMIN — Medication 4 MILLIGRAM(S): at 20:25

## 2019-05-07 RX ADMIN — ENOXAPARIN SODIUM 40 MILLIGRAM(S): 100 INJECTION SUBCUTANEOUS at 21:12

## 2019-05-07 RX ADMIN — OXYCODONE HYDROCHLORIDE 10 MILLIGRAM(S): 5 TABLET ORAL at 21:12

## 2019-05-07 RX ADMIN — OXYCODONE HYDROCHLORIDE 10 MILLIGRAM(S): 5 TABLET ORAL at 18:10

## 2019-05-07 RX ADMIN — FLUPHENAZINE HYDROCHLORIDE 5 MILLIGRAM(S): 1 TABLET, FILM COATED ORAL at 05:15

## 2019-05-07 RX ADMIN — GABAPENTIN 100 MILLIGRAM(S): 400 CAPSULE ORAL at 05:16

## 2019-05-07 NOTE — PROGRESS NOTE ADULT - SUBJECTIVE AND OBJECTIVE BOX
Pipe Winkler MD  Interventional Cardiology  [place] Office : [phone] [place] [address] [place]. [address]  Tel:   [place] Office : [address] [facility] [place] [address]  Tel: [phone]  Cell : [phone]    Subjective : Pt lying in bed comfortable, not in distress, denies any chest pain or SOB  	  MEDICATIONS:  enoxaparin Injectable 40 milliGRAM(s) SubCutaneous at bedtime  metoprolol succinate ER 50 milliGRAM(s) Oral daily  acetaminophen   Tablet .. 650 milliGRAM(s) Oral every 8 hours PRN  baclofen 10 milliGRAM(s) Oral three times a day PRN  benztropine 0.5 milliGRAM(s) Oral two times a day  diazepam    Tablet 5 milliGRAM(s) Oral every 6 hours PRN  fluPHENAZine 5 milliGRAM(s) Oral two times a day  gabapentin 100 milliGRAM(s) Oral two times a day  ondansetron Injectable 4 milliGRAM(s) IV Push every 6 hours PRN  oxyCODONE    IR 5 milliGRAM(s) Oral every 4 hours PRN  oxyCODONE    IR 10 milliGRAM(s) Oral every 4 hours PRN  valproic  acid Syrup 500 milliGRAM(s) Oral <User Schedule>  valproic  acid Syrup 750 milliGRAM(s) Oral at bedtime  bisacodyl 5 milliGRAM(s) Oral every 12 hours  docusate sodium 100 milliGRAM(s) Oral three times a day  pantoprazole    Tablet 40 milliGRAM(s) Oral before breakfast  polyethylene glycol 3350 17 Gram(s) Oral daily PRN  senna 2 Tablet(s) Oral at bedtime    desmopressin 0.2 milliGRAM(s) Oral at bedtime  dexamethasone     Tablet   Oral   dexamethasone     Tablet 1 milliGRAM(s) Oral daily  levothyroxine 175 MICROGram(s) Oral daily        PHYSICAL EXAM:  T(C): 36.2 (05-07-19 @ 21:11), Max: 36.8 (05-07-19 @ 09:25)  HR: 64 (05-07-19 @ 21:11) (60 - 69)  BP: 105/60 (05-07-19 @ 21:11) (104/55 - 126/78)  RR: 18 (05-07-19 @ 21:11) (16 - 18)  SpO2: 99% (05-07-19 @ 21:11) (97% - 100%)  Wt(kg): --  I&O's Summary    06 May 2019 07:01  -  07 May 2019 07:00  --------------------------------------------------------  IN: 0 mL / OUT: 1095 mL / NET: -1095 mL    07 May 2019 07:01  -  07 May 2019 21:34  --------------------------------------------------------  IN: 0 mL / OUT: 34.5 mL / NET: -34.5 mL          Appearance: Normal	  HEENT:   Normal oral mucosa, drain in place 	  Cardiovascular: Normal S1 S2, No JVD, No murmurs  Respiratory: Lungs clear to auscultation	  Gastrointestinal:  Soft, Non-tender, + BS	  Extremities: No clubbing, cyanosis or edema                      proBNP:   Lipid Profile:   HgA1c:   TSH:

## 2019-05-07 NOTE — PROGRESS NOTE ADULT - SUBJECTIVE AND OBJECTIVE BOX
CHIEF COMPLAINT: Patient is a 53y old  male who presents with a chief complaint of Cannot move legs (07 May 2019 01:29)      SUBJECTIVE / OVERNIGHT EVENTS:    Fell overnight per primary team. At time of visit, denies R knee pain. Denies SOB. Denies abdominal pain. Denies constipation.    MEDICATIONS  (STANDING):  benztropine 0.5 milliGRAM(s) Oral two times a day  bisacodyl 5 milliGRAM(s) Oral every 12 hours  desmopressin 0.2 milliGRAM(s) Oral at bedtime  dexamethasone     Tablet   Oral   dexamethasone     Tablet 1 milliGRAM(s) Oral daily  docusate sodium 100 milliGRAM(s) Oral three times a day  enoxaparin Injectable 40 milliGRAM(s) SubCutaneous at bedtime  fluPHENAZine 5 milliGRAM(s) Oral two times a day  gabapentin 100 milliGRAM(s) Oral two times a day  levothyroxine 175 MICROGram(s) Oral daily  metoprolol succinate ER 50 milliGRAM(s) Oral daily  nicotine - 21 mG/24Hr(s) Patch 1 patch Transdermal daily  pantoprazole    Tablet 40 milliGRAM(s) Oral before breakfast  senna 2 Tablet(s) Oral at bedtime  valproic  acid Syrup 500 milliGRAM(s) Oral <User Schedule>  valproic  acid Syrup 750 milliGRAM(s) Oral at bedtime    MEDICATIONS  (PRN):  acetaminophen   Tablet .. 650 milliGRAM(s) Oral every 8 hours PRN Temp greater or equal to 38C (100.4F), Mild Pain (1 - 3)  baclofen 10 milliGRAM(s) Oral three times a day PRN Muscle Spasm  diazepam    Tablet 5 milliGRAM(s) Oral every 6 hours PRN Spasm  nicotine  Polacrilex Gum 4 milliGRAM(s) Oral every 6 hours PRN nicotine craving  ondansetron Injectable 4 milliGRAM(s) IV Push every 6 hours PRN Nausea  oxyCODONE    IR 5 milliGRAM(s) Oral every 4 hours PRN Moderate Pain (4 - 6)  oxyCODONE    IR 10 milliGRAM(s) Oral every 4 hours PRN Severe Pain (7 - 10)  polyethylene glycol 3350 17 Gram(s) Oral daily PRN Constipation      VITALS:  T(F): 98.2 (05-07-19 @ 09:25), Max: 98.3 (05-06-19 @ 13:21)  HR: 69 (05-07-19 @ 09:25) (60 - 84)  BP: 124/64 (05-07-19 @ 09:25) (104/55 - 135/69)  RR: 18 (05-07-19 @ 09:25) (16 - 18)  SpO2: 100% (05-07-19 @ 09:25)      CAPILLARY BLOOD GLUCOSE    Output     I&O's Summary  T(F): 98.2 (05-07-19 @ 09:25), Max: 98.3 (05-06-19 @ 13:21)  HR: 69 (05-07-19 @ 09:25) (60 - 84)  BP: 124/64 (05-07-19 @ 09:25) (104/55 - 135/69)  RR: 18 (05-07-19 @ 09:25) (16 - 18)  SpO2: 100% (05-07-19 @ 09:25)    PHYSICAL EXAM:  GENERAL: NAD, well-developed  HEAD:  Atraumatic, Normocephalic  EYES: EOMI  NECK: Supple, No JVD  CHEST/LUNG: nonlabored breathing  HEART: nl S1/S2  ABDOMEN: nondistended, soft  EXTREMITIES:  no LE edema  PSYCH: alert, answering questions appropriately  SKIN: No rashes noted    LABS:                        MICROBIOLOGY:        RADIOLOGY & ADDITIONAL TESTS:    Imaging Personally Reviewed:    < from: Xray Knee 3 Views, Left (05.06.19 @ 17:38) >      < end of copied text >        [x] Care Discussed with Consultants/Other Providers:      Daria Reilly M.D.  Hospitalist  Pager [phone]

## 2019-05-07 NOTE — PROGRESS NOTE ADULT - SUBJECTIVE AND OBJECTIVE BOX
Patient noted to slip from the chair to the floor yesterday, c/o left knee pain, stated observed swelling preexisited the fall  Vital Signs Last 24 Hrs  T(C): 36.2 (07 May 2019 01:17), Max: 36.8 (06 May 2019 13:21)  T(F): 97.2 (07 May 2019 01:17), Max: 98.3 (06 May 2019 13:21)  HR: 65 (07 May 2019 01:17) (58 - 84)  BP: 104/55 (07 May 2019 01:17) (104/55 - 135/69)  BP(mean): --  RR: 18 (07 May 2019 01:17) (16 - 18)  SpO2: 99% (07 May 2019 01:17) (97% - 100%)    Awake, alert  PERRLA, EOMI  CHING strength 5/5  SILT    HMV : 75cc    MEDICATIONS  (STANDING):  benztropine 0.5 milliGRAM(s) Oral two times a day  bisacodyl 5 milliGRAM(s) Oral every 12 hours  desmopressin 0.2 milliGRAM(s) Oral at bedtime  dexamethasone     Tablet   Oral   dexamethasone     Tablet 2 milliGRAM(s) Oral every 12 hours  dexamethasone     Tablet 1 milliGRAM(s) Oral daily  docusate sodium 100 milliGRAM(s) Oral three times a day  enoxaparin Injectable 40 milliGRAM(s) SubCutaneous at bedtime  fluPHENAZine 5 milliGRAM(s) Oral two times a day  gabapentin 100 milliGRAM(s) Oral two times a day  levothyroxine 175 MICROGram(s) Oral daily  metoprolol succinate ER 50 milliGRAM(s) Oral daily  nicotine - 21 mG/24Hr(s) Patch 1 patch Transdermal daily  pantoprazole    Tablet 40 milliGRAM(s) Oral before breakfast  senna 2 Tablet(s) Oral at bedtime  valproic  acid Syrup 500 milliGRAM(s) Oral <User Schedule>  valproic  acid Syrup 750 milliGRAM(s) Oral at bedtime    MEDICATIONS  (PRN):  acetaminophen   Tablet .. 650 milliGRAM(s) Oral every 8 hours PRN Temp greater or equal to 38C (100.4F), Mild Pain (1 - 3)  baclofen 10 milliGRAM(s) Oral three times a day PRN Muscle Spasm  diazepam    Tablet 5 milliGRAM(s) Oral every 6 hours PRN Spasm  nicotine  Polacrilex Gum 4 milliGRAM(s) Oral every 6 hours PRN nicotine craving  ondansetron Injectable 4 milliGRAM(s) IV Push every 6 hours PRN Nausea  oxyCODONE    IR 5 milliGRAM(s) Oral every 4 hours PRN Moderate Pain (4 - 6)  oxyCODONE    IR 10 milliGRAM(s) Oral every 4 hours PRN Severe Pain (7 - 10)  polyethylene glycol 3350 17 Gram(s) Oral daily PRN Constipation    < from: Xray Knee 3 Views, Left (05.06.19 @ 17:38) >  IMPRESSION:  Nonspecific sizable knee joint effusion.    Chronic appearing small notch-like defect along anterior articular   surface of femoral condyle on lateral view. Otherwise no dislocations or   acute appearing fractures.    Redemonstrated moderate tricompartment osteoarthritis with degenerative   loose bodies in posterior knee.    No discrete lytic or blastic lesions.    < end of copied text >

## 2019-05-08 PROCEDURE — 99232 SBSQ HOSP IP/OBS MODERATE 35: CPT | Mod: GC

## 2019-05-08 PROCEDURE — 99233 SBSQ HOSP IP/OBS HIGH 50: CPT

## 2019-05-08 RX ADMIN — GABAPENTIN 100 MILLIGRAM(S): 400 CAPSULE ORAL at 17:44

## 2019-05-08 RX ADMIN — Medication 4 MILLIGRAM(S): at 18:16

## 2019-05-08 RX ADMIN — Medication 100 MILLIGRAM(S): at 13:26

## 2019-05-08 RX ADMIN — Medication 5 MILLIGRAM(S): at 05:04

## 2019-05-08 RX ADMIN — OXYCODONE HYDROCHLORIDE 10 MILLIGRAM(S): 5 TABLET ORAL at 05:47

## 2019-05-08 RX ADMIN — Medication 1 MILLIGRAM(S): at 05:04

## 2019-05-08 RX ADMIN — SENNA PLUS 2 TABLET(S): 8.6 TABLET ORAL at 22:03

## 2019-05-08 RX ADMIN — OXYCODONE HYDROCHLORIDE 10 MILLIGRAM(S): 5 TABLET ORAL at 05:04

## 2019-05-08 RX ADMIN — Medication 100 MILLIGRAM(S): at 05:04

## 2019-05-08 RX ADMIN — Medication 0.5 MILLIGRAM(S): at 17:43

## 2019-05-08 RX ADMIN — Medication 5 MILLIGRAM(S): at 17:44

## 2019-05-08 RX ADMIN — Medication 175 MICROGRAM(S): at 05:04

## 2019-05-08 RX ADMIN — PANTOPRAZOLE SODIUM 40 MILLIGRAM(S): 20 TABLET, DELAYED RELEASE ORAL at 05:04

## 2019-05-08 RX ADMIN — OXYCODONE HYDROCHLORIDE 10 MILLIGRAM(S): 5 TABLET ORAL at 15:14

## 2019-05-08 RX ADMIN — FLUPHENAZINE HYDROCHLORIDE 5 MILLIGRAM(S): 1 TABLET, FILM COATED ORAL at 05:04

## 2019-05-08 RX ADMIN — DESMOPRESSIN ACETATE 0.2 MILLIGRAM(S): 0.1 TABLET ORAL at 22:04

## 2019-05-08 RX ADMIN — ENOXAPARIN SODIUM 40 MILLIGRAM(S): 100 INJECTION SUBCUTANEOUS at 22:03

## 2019-05-08 RX ADMIN — Medication 1 PATCH: at 11:42

## 2019-05-08 RX ADMIN — Medication 750 MILLIGRAM(S): at 22:03

## 2019-05-08 RX ADMIN — GABAPENTIN 100 MILLIGRAM(S): 400 CAPSULE ORAL at 05:04

## 2019-05-08 RX ADMIN — Medication 0.5 MILLIGRAM(S): at 05:04

## 2019-05-08 RX ADMIN — Medication 100 MILLIGRAM(S): at 22:03

## 2019-05-08 RX ADMIN — Medication 500 MILLIGRAM(S): at 08:55

## 2019-05-08 RX ADMIN — Medication 1 PATCH: at 18:17

## 2019-05-08 RX ADMIN — FLUPHENAZINE HYDROCHLORIDE 5 MILLIGRAM(S): 1 TABLET, FILM COATED ORAL at 17:43

## 2019-05-08 RX ADMIN — OXYCODONE HYDROCHLORIDE 10 MILLIGRAM(S): 5 TABLET ORAL at 14:18

## 2019-05-08 NOTE — PROGRESS NOTE ADULT - SUBJECTIVE AND OBJECTIVE BOX
Pipe Winkler MD  Interventional Cardiology  [place] Office : [address] [place] [address] [place]. [address]  Tel:   [place] Office : [address] [facility] [place] [address]  Tel: [phone]  Cell : [phone]    Subjective : Pt lying in bed comfortable, not in distress, denies any chest pain or SOB  	  MEDICATIONS:  enoxaparin Injectable 40 milliGRAM(s) SubCutaneous at bedtime  metoprolol succinate ER 50 milliGRAM(s) Oral daily  acetaminophen   Tablet .. 650 milliGRAM(s) Oral every 8 hours PRN  baclofen 10 milliGRAM(s) Oral three times a day PRN  benztropine 0.5 milliGRAM(s) Oral two times a day  diazepam    Tablet 5 milliGRAM(s) Oral every 6 hours PRN  fluPHENAZine 5 milliGRAM(s) Oral two times a day  gabapentin 100 milliGRAM(s) Oral two times a day  ondansetron Injectable 4 milliGRAM(s) IV Push every 6 hours PRN  oxyCODONE    IR 5 milliGRAM(s) Oral every 4 hours PRN  oxyCODONE    IR 10 milliGRAM(s) Oral every 4 hours PRN  valproic  acid Syrup 500 milliGRAM(s) Oral <User Schedule>  valproic  acid Syrup 750 milliGRAM(s) Oral at bedtime  bisacodyl 5 milliGRAM(s) Oral every 12 hours  docusate sodium 100 milliGRAM(s) Oral three times a day  pantoprazole    Tablet 40 milliGRAM(s) Oral before breakfast  polyethylene glycol 3350 17 Gram(s) Oral daily PRN  senna 2 Tablet(s) Oral at bedtime    desmopressin 0.2 milliGRAM(s) Oral at bedtime  levothyroxine 175 MICROGram(s) Oral daily        PHYSICAL EXAM:  T(C): 36.8 (05-08-19 @ 17:35), Max: 37 (05-08-19 @ 10:16)  HR: 77 (05-08-19 @ 17:35) (57 - 78)  BP: 124/62 (05-08-19 @ 17:35) (100/54 - 124/62)  RR: 19 (05-08-19 @ 17:35) (16 - 19)  SpO2: 100% (05-08-19 @ 17:35) (96% - 100%)  Wt(kg): --  I&O's Summary    07 May 2019 07:01  -  08 May 2019 07:00  --------------------------------------------------------  IN: 0 mL / OUT: 520.5 mL / NET: -520.5 mL    08 May 2019 07:01  -  08 May 2019 17:58  --------------------------------------------------------  IN: 0 mL / OUT: 30 mL / NET: -30 mL            Appearance: Normal	  HEENT:   Normal oral mucosa, drain in place 	  Cardiovascular: Normal S1 S2, No JVD, No murmurs  Respiratory: Lungs clear to auscultation	  Gastrointestinal:  Soft, Non-tender, + BS	  Extremities: No clubbing, cyanosis or edema                  proBNP:   Lipid Profile:   HgA1c:   TSH:
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
## 2019-05-08 NOTE — PROGRESS NOTE ADULT - SUBJECTIVE AND OBJECTIVE BOX
NEUROSURGERY NOTE  RAUDEL SUGGS   05-08-19 @ 03:58    PAST 24HR EVENTS: no acute overnight events, pending rehab placement    PHYSICAL EXAM:  Vital Signs Last 24 Hrs  T(C): 36.2 (07 May 2019 21:11), Max: 36.8 (07 May 2019 09:25)  T(F): 97.2 (07 May 2019 21:11), Max: 98.3 (07 May 2019 13:23)  HR: 64 (07 May 2019 21:11) (60 - 69)  BP: 105/60 (07 May 2019 21:11) (105/60 - 126/78)  BP(mean): --  RR: 18 (07 May 2019 21:11) (16 - 18)  SpO2: 99% (07 May 2019 21:11) (97% - 100%)    Awake, alert  PERRLA, EOMI  CHING strength 5/5  SILT  Incision C/D/I    MEDS:   acetaminophen   Tablet .. 650 milliGRAM(s) Oral every 8 hours PRN  baclofen 10 milliGRAM(s) Oral three times a day PRN  benztropine 0.5 milliGRAM(s) Oral two times a day  bisacodyl 5 milliGRAM(s) Oral every 12 hours  desmopressin 0.2 milliGRAM(s) Oral at bedtime  dexamethasone     Tablet   Oral   dexamethasone     Tablet 1 milliGRAM(s) Oral daily  diazepam    Tablet 5 milliGRAM(s) Oral every 6 hours PRN  docusate sodium 100 milliGRAM(s) Oral three times a day  enoxaparin Injectable 40 milliGRAM(s) SubCutaneous at bedtime  fluPHENAZine 5 milliGRAM(s) Oral two times a day  gabapentin 100 milliGRAM(s) Oral two times a day  levothyroxine 175 MICROGram(s) Oral daily  metoprolol succinate ER 50 milliGRAM(s) Oral daily  nicotine  Polacrilex Gum 4 milliGRAM(s) Oral every 6 hours PRN  nicotine - 21 mG/24Hr(s) Patch 1 patch Transdermal daily  ondansetron Injectable 4 milliGRAM(s) IV Push every 6 hours PRN  oxyCODONE    IR 5 milliGRAM(s) Oral every 4 hours PRN  oxyCODONE    IR 10 milliGRAM(s) Oral every 4 hours PRN  pantoprazole    Tablet 40 milliGRAM(s) Oral before breakfast  polyethylene glycol 3350 17 Gram(s) Oral daily PRN  senna 2 Tablet(s) Oral at bedtime  valproic  acid Syrup 500 milliGRAM(s) Oral <User Schedule>  valproic  acid Syrup 750 milliGRAM(s) Oral at bedtime

## 2019-05-08 NOTE — PROGRESS NOTE ADULT - SUBJECTIVE AND OBJECTIVE BOX
CHIEF COMPLAINT: Patient is a 53y old  male who presents with a chief complaint of Cannot move legs (07 May 2019 13:05)      SUBJECTIVE / OVERNIGHT EVENTS:    Denies constipation. Denies SOB.    MEDICATIONS  (STANDING):  benztropine 0.5 milliGRAM(s) Oral two times a day  bisacodyl 5 milliGRAM(s) Oral every 12 hours  desmopressin 0.2 milliGRAM(s) Oral at bedtime  docusate sodium 100 milliGRAM(s) Oral three times a day  enoxaparin Injectable 40 milliGRAM(s) SubCutaneous at bedtime  fluPHENAZine 5 milliGRAM(s) Oral two times a day  gabapentin 100 milliGRAM(s) Oral two times a day  levothyroxine 175 MICROGram(s) Oral daily  metoprolol succinate ER 50 milliGRAM(s) Oral daily  nicotine - 21 mG/24Hr(s) Patch 1 patch Transdermal daily  pantoprazole    Tablet 40 milliGRAM(s) Oral before breakfast  senna 2 Tablet(s) Oral at bedtime  valproic  acid Syrup 500 milliGRAM(s) Oral <User Schedule>  valproic  acid Syrup 750 milliGRAM(s) Oral at bedtime    MEDICATIONS  (PRN):  acetaminophen   Tablet .. 650 milliGRAM(s) Oral every 8 hours PRN Temp greater or equal to 38C (100.4F), Mild Pain (1 - 3)  baclofen 10 milliGRAM(s) Oral three times a day PRN Muscle Spasm  diazepam    Tablet 5 milliGRAM(s) Oral every 6 hours PRN Spasm  nicotine  Polacrilex Gum 4 milliGRAM(s) Oral every 6 hours PRN nicotine craving  ondansetron Injectable 4 milliGRAM(s) IV Push every 6 hours PRN Nausea  oxyCODONE    IR 5 milliGRAM(s) Oral every 4 hours PRN Moderate Pain (4 - 6)  oxyCODONE    IR 10 milliGRAM(s) Oral every 4 hours PRN Severe Pain (7 - 10)  polyethylene glycol 3350 17 Gram(s) Oral daily PRN Constipation      VITALS:  T(F): 98.6 (05-08-19 @ 10:16), Max: 98.6 (05-08-19 @ 10:16)  HR: 69 (05-08-19 @ 10:16) (57 - 69)  BP: 100/54 (05-08-19 @ 10:16) (100/54 - 121/63)  RR: 16 (05-08-19 @ 10:16) (16 - 18)  SpO2: 99% (05-08-19 @ 10:16)      CAPILLARY BLOOD GLUCOSE    Output     I&O's Summary  T(F): 98.6 (05-08-19 @ 10:16), Max: 98.6 (05-08-19 @ 10:16)  HR: 69 (05-08-19 @ 10:16) (57 - 69)  BP: 100/54 (05-08-19 @ 10:16) (100/54 - 121/63)  RR: 16 (05-08-19 @ 10:16) (16 - 18)  SpO2: 99% (05-08-19 @ 10:16)    PHYSICAL EXAM:  GENERAL: NAD, well-developed  HEAD:  Atraumatic, Normocephalic  EYES: EOMI  NECK: Supple, No JVD  CHEST/LUNG: nonlabored breathing  HEART: nl S1/S2  ABDOMEN: nondistended, soft  EXTREMITIES:  no LE edema  PSYCH: alert, answering questions appropriately  SKIN: No rashes noted    LABS:                        MICROBIOLOGY:        RADIOLOGY & ADDITIONAL TESTS:    Imaging Personally Reviewed:    < from: Xray Knee 3 Views, Left (05.06.19 @ 17:38) >      < end of copied text >        [x] Care Discussed with Consultants/Other Providers:      Daria Reilly M.D.  Hospitalist  Pager [phone]

## 2019-05-09 LAB
ANION GAP SERPL CALC-SCNC: 10 MMO/L — SIGNIFICANT CHANGE UP (ref 7–14)
BUN SERPL-MCNC: 17 MG/DL — SIGNIFICANT CHANGE UP (ref 7–23)
CALCIUM SERPL-MCNC: 9.3 MG/DL — SIGNIFICANT CHANGE UP (ref 8.4–10.5)
CHLORIDE SERPL-SCNC: 102 MMOL/L — SIGNIFICANT CHANGE UP (ref 98–107)
CO2 SERPL-SCNC: 27 MMOL/L — SIGNIFICANT CHANGE UP (ref 22–31)
CREAT SERPL-MCNC: 0.49 MG/DL — LOW (ref 0.5–1.3)
GLUCOSE SERPL-MCNC: 97 MG/DL — SIGNIFICANT CHANGE UP (ref 70–99)
HCT VFR BLD CALC: 47.1 % — SIGNIFICANT CHANGE UP (ref 39–50)
HGB BLD-MCNC: 15.3 G/DL — SIGNIFICANT CHANGE UP (ref 13–17)
MCHC RBC-ENTMCNC: 28.2 PG — SIGNIFICANT CHANGE UP (ref 27–34)
MCHC RBC-ENTMCNC: 32.5 % — SIGNIFICANT CHANGE UP (ref 32–36)
MCV RBC AUTO: 86.9 FL — SIGNIFICANT CHANGE UP (ref 80–100)
NRBC # FLD: 0 K/UL — SIGNIFICANT CHANGE UP (ref 0–0)
PLATELET # BLD AUTO: 316 K/UL — SIGNIFICANT CHANGE UP (ref 150–400)
PMV BLD: 9.1 FL — SIGNIFICANT CHANGE UP (ref 7–13)
POTASSIUM SERPL-MCNC: 5 MMOL/L — SIGNIFICANT CHANGE UP (ref 3.5–5.3)
POTASSIUM SERPL-SCNC: 5 MMOL/L — SIGNIFICANT CHANGE UP (ref 3.5–5.3)
RBC # BLD: 5.42 M/UL — SIGNIFICANT CHANGE UP (ref 4.2–5.8)
RBC # FLD: 14.4 % — SIGNIFICANT CHANGE UP (ref 10.3–14.5)
SODIUM SERPL-SCNC: 139 MMOL/L — SIGNIFICANT CHANGE UP (ref 135–145)
WBC # BLD: 11.44 K/UL — HIGH (ref 3.8–10.5)
WBC # FLD AUTO: 11.44 K/UL — HIGH (ref 3.8–10.5)

## 2019-05-09 PROCEDURE — 99232 SBSQ HOSP IP/OBS MODERATE 35: CPT | Mod: GC

## 2019-05-09 PROCEDURE — 99233 SBSQ HOSP IP/OBS HIGH 50: CPT

## 2019-05-09 RX ORDER — DIAZEPAM 5 MG
5 TABLET ORAL EVERY 6 HOURS
Refills: 0 | Status: DISCONTINUED | OUTPATIENT
Start: 2019-05-09 | End: 2019-05-14

## 2019-05-09 RX ORDER — OXYCODONE HYDROCHLORIDE 5 MG/1
10 TABLET ORAL EVERY 4 HOURS
Refills: 0 | Status: DISCONTINUED | OUTPATIENT
Start: 2019-05-09 | End: 2019-05-15

## 2019-05-09 RX ORDER — OXYCODONE HYDROCHLORIDE 5 MG/1
5 TABLET ORAL EVERY 4 HOURS
Refills: 0 | Status: DISCONTINUED | OUTPATIENT
Start: 2019-05-09 | End: 2019-05-15

## 2019-05-09 RX ADMIN — Medication 4 MILLIGRAM(S): at 02:23

## 2019-05-09 RX ADMIN — Medication 50 MILLIGRAM(S): at 06:04

## 2019-05-09 RX ADMIN — Medication 100 MILLIGRAM(S): at 06:05

## 2019-05-09 RX ADMIN — PANTOPRAZOLE SODIUM 40 MILLIGRAM(S): 20 TABLET, DELAYED RELEASE ORAL at 06:04

## 2019-05-09 RX ADMIN — GABAPENTIN 100 MILLIGRAM(S): 400 CAPSULE ORAL at 17:46

## 2019-05-09 RX ADMIN — Medication 100 MILLIGRAM(S): at 14:19

## 2019-05-09 RX ADMIN — OXYCODONE HYDROCHLORIDE 10 MILLIGRAM(S): 5 TABLET ORAL at 17:45

## 2019-05-09 RX ADMIN — FLUPHENAZINE HYDROCHLORIDE 5 MILLIGRAM(S): 1 TABLET, FILM COATED ORAL at 17:46

## 2019-05-09 RX ADMIN — GABAPENTIN 100 MILLIGRAM(S): 400 CAPSULE ORAL at 06:04

## 2019-05-09 RX ADMIN — ENOXAPARIN SODIUM 40 MILLIGRAM(S): 100 INJECTION SUBCUTANEOUS at 22:37

## 2019-05-09 RX ADMIN — Medication 0.5 MILLIGRAM(S): at 06:04

## 2019-05-09 RX ADMIN — Medication 1 PATCH: at 11:25

## 2019-05-09 RX ADMIN — Medication 4 MILLIGRAM(S): at 11:03

## 2019-05-09 RX ADMIN — OXYCODONE HYDROCHLORIDE 10 MILLIGRAM(S): 5 TABLET ORAL at 03:10

## 2019-05-09 RX ADMIN — FLUPHENAZINE HYDROCHLORIDE 5 MILLIGRAM(S): 1 TABLET, FILM COATED ORAL at 06:03

## 2019-05-09 RX ADMIN — DESMOPRESSIN ACETATE 0.2 MILLIGRAM(S): 0.1 TABLET ORAL at 22:37

## 2019-05-09 RX ADMIN — Medication 0.5 MILLIGRAM(S): at 17:46

## 2019-05-09 RX ADMIN — OXYCODONE HYDROCHLORIDE 10 MILLIGRAM(S): 5 TABLET ORAL at 12:00

## 2019-05-09 RX ADMIN — Medication 1 PATCH: at 06:22

## 2019-05-09 RX ADMIN — Medication 4 MILLIGRAM(S): at 17:47

## 2019-05-09 RX ADMIN — OXYCODONE HYDROCHLORIDE 10 MILLIGRAM(S): 5 TABLET ORAL at 18:38

## 2019-05-09 RX ADMIN — Medication 750 MILLIGRAM(S): at 22:37

## 2019-05-09 RX ADMIN — Medication 175 MICROGRAM(S): at 06:04

## 2019-05-09 RX ADMIN — Medication 5 MILLIGRAM(S): at 06:04

## 2019-05-09 RX ADMIN — OXYCODONE HYDROCHLORIDE 10 MILLIGRAM(S): 5 TABLET ORAL at 11:03

## 2019-05-09 RX ADMIN — OXYCODONE HYDROCHLORIDE 10 MILLIGRAM(S): 5 TABLET ORAL at 02:19

## 2019-05-09 RX ADMIN — Medication 500 MILLIGRAM(S): at 08:19

## 2019-05-09 NOTE — PROGRESS NOTE ADULT - SUBJECTIVE AND OBJECTIVE BOX
Pipe Winkler MD  Interventional Cardiology  [place] Office : [address] [place] [address] [place] [address]  Tel:   [place] Office : [address] [facility] [place] [address]  Tel: [phone]  Cell : [phone]    Subjective : Pt lying in bed comfortable, not in distress, denies any chest pain or SOB  	  MEDICATIONS:  enoxaparin Injectable 40 milliGRAM(s) SubCutaneous at bedtime  metoprolol succinate ER 50 milliGRAM(s) Oral daily        acetaminophen   Tablet .. 650 milliGRAM(s) Oral every 8 hours PRN  baclofen 10 milliGRAM(s) Oral three times a day PRN  benztropine 0.5 milliGRAM(s) Oral two times a day  diazepam    Tablet 5 milliGRAM(s) Oral every 6 hours PRN  fluPHENAZine 5 milliGRAM(s) Oral two times a day  gabapentin 100 milliGRAM(s) Oral two times a day  ondansetron Injectable 4 milliGRAM(s) IV Push every 6 hours PRN  oxyCODONE    IR 5 milliGRAM(s) Oral every 4 hours PRN  oxyCODONE    IR 10 milliGRAM(s) Oral every 4 hours PRN  valproic  acid Syrup 500 milliGRAM(s) Oral <User Schedule>  valproic  acid Syrup 750 milliGRAM(s) Oral at bedtime    bisacodyl 5 milliGRAM(s) Oral every 12 hours  docusate sodium 100 milliGRAM(s) Oral three times a day  pantoprazole    Tablet 40 milliGRAM(s) Oral before breakfast  polyethylene glycol 3350 17 Gram(s) Oral daily PRN  senna 2 Tablet(s) Oral at bedtime    desmopressin 0.2 milliGRAM(s) Oral at bedtime  levothyroxine 175 MICROGram(s) Oral daily        PHYSICAL EXAM:  T(C): 36.5 (05-09-19 @ 17:28), Max: 36.9 (05-09-19 @ 06:00)  HR: 64 (05-09-19 @ 17:28) (63 - 71)  BP: 128/76 (05-09-19 @ 17:28) (110/57 - 128/76)  RR: 20 (05-09-19 @ 17:28) (20 - 20)  SpO2: 100% (05-09-19 @ 17:28) (98% - 100%)  Wt(kg): --  I&O's Summary    08 May 2019 07:01  -  09 May 2019 07:00  --------------------------------------------------------  IN: 0 mL / OUT: 330 mL / NET: -330 mL    09 May 2019 07:01  -  09 May 2019 19:52  --------------------------------------------------------  IN: 0 mL / OUT: 332 mL / NET: -332 mL        Appearance: Normal	  HEENT:   Normal oral mucosa, drain in place 	  Cardiovascular: Normal S1 S2, No JVD, No murmurs  Respiratory: Lungs clear to auscultation	  Gastrointestinal:  Soft, Non-tender, + BS	  Extremities: No clubbing, cyanosis or edema                                  15.3   11.44 )-----------( 316      ( 09 May 2019 08:16 )             47.1     05-09    139  |  102  |  17  ----------------------------<  97  5.0   |  27  |  0.49<L>    Ca    9.3      09 May 2019 08:16      proBNP:   Lipid Profile:   HgA1c:   TSH:

## 2019-05-09 NOTE — CHART NOTE - NSCHARTNOTEFT_GEN_A_CORE
Source: Patient [x] Medical record reviewed. Patient seen for length of stay nutrition follow-up. Patient s/p cervical spinal fusion (4/02). Patient reports having some neck pain. Otherwise, eating well, tolerating diet. No GI distress (nausea/vomiting/diarrhea/constipation) noted at this time.    Diet : Regular, Ensure Enlive 240mls 1x daily (350 kcal, 20g protein).  PO intake:  < 50% [ ] 50-75% [ ]   % [x]  other :    Current Weight: 99.6kg (5/09), 102.4kg (5/01)  % Weight Change: 2.7% in 8 days  - patient eating well - weight change possibly 2/2 bed-scale discrepancies versus some muscle deconditioning as patient with limited activity s/p surgery    Pertinent Medications: MEDICATIONS  (STANDING):  benztropine 0.5 milliGRAM(s) Oral two times a day  bisacodyl 5 milliGRAM(s) Oral every 12 hours  desmopressin 0.2 milliGRAM(s) Oral at bedtime  docusate sodium 100 milliGRAM(s) Oral three times a day  enoxaparin Injectable 40 milliGRAM(s) SubCutaneous at bedtime  fluPHENAZine 5 milliGRAM(s) Oral two times a day  gabapentin 100 milliGRAM(s) Oral two times a day  levothyroxine 175 MICROGram(s) Oral daily  metoprolol succinate ER 50 milliGRAM(s) Oral daily  nicotine - 21 mG/24Hr(s) Patch 1 patch Transdermal daily  pantoprazole    Tablet 40 milliGRAM(s) Oral before breakfast  senna 2 Tablet(s) Oral at bedtime  valproic  acid Syrup 500 milliGRAM(s) Oral <User Schedule>  valproic  acid Syrup 750 milliGRAM(s) Oral at bedtime    MEDICATIONS  (PRN):  acetaminophen   Tablet .. 650 milliGRAM(s) Oral every 8 hours PRN Temp greater or equal to 38C (100.4F), Mild Pain (1 - 3)  baclofen 10 milliGRAM(s) Oral three times a day PRN Muscle Spasm  diazepam    Tablet 5 milliGRAM(s) Oral every 6 hours PRN Spasm  nicotine  Polacrilex Gum 4 milliGRAM(s) Oral every 6 hours PRN nicotine craving  ondansetron Injectable 4 milliGRAM(s) IV Push every 6 hours PRN Nausea  oxyCODONE    IR 5 milliGRAM(s) Oral every 4 hours PRN Moderate Pain (4 - 6)  oxyCODONE    IR 10 milliGRAM(s) Oral every 4 hours PRN Severe Pain (7 - 10)  polyethylene glycol 3350 17 Gram(s) Oral daily PRN Constipation    Pertinent Labs:  05-09 Na139 mmol/L Glu 97 mg/dL K+ 5.0 mmol/L Cr  0.49 mg/dL<L> BUN 17 mg/dL    Skin: surgical incision posterior neck per nursing documentation  Edema: 2+ left knee    Estimated Needs:   [x] no change since previous assessment  [ ] recalculated:     Previous Nutrition Diagnosis:   [x]  Increased nutrient needs (specify); protein   Nutrition Diagnosis is [x] ongoing  [ ] resolved [ ] not applicable     Interventions:   1. Continue Regular diet.   2. Continue Ensure Enlive 240mls 1x daily (350 kcal, 20g protein).   3. Please Encourage po intake, assist with meals and menu selections, provide alternatives PRN.     Monitoring and Evaluation:   1. Monitor weights, labs, BM's, skin integrity, p.o. intake.   2. Patient to meet > 75% estimated pro/kcal needs.   RD to remain available, Ana Puga RD, CDN Pager #[phone]

## 2019-05-09 NOTE — PROGRESS NOTE ADULT - SUBJECTIVE AND OBJECTIVE BOX
NEUROSURGERY NOTE  RAUDEL SUGGS   05-09-19 @ 00:10    PAST 24HR EVENTS:  no acute overnight events, pending rehab      PHYSICAL EXAM:  Vital Signs Last 24 Hrs  T(C): 36.4 (08 May 2019 21:05), Max: 37 (08 May 2019 10:16)  T(F): 97.6 (08 May 2019 21:05), Max: 98.6 (08 May 2019 10:16)  HR: 63 (08 May 2019 21:05) (57 - 78)  BP: 122/72 (08 May 2019 21:05) (100/54 - 124/62)  BP(mean): --  RR: 20 (08 May 2019 21:05) (16 - 20)  SpO2: 100% (08 May 2019 21:05) (96% - 100%)    Awake, alert  PERRLA, EOMI  CHING strength 5/5  SILT  Incision C/D/I    MEDS:   acetaminophen   Tablet .. 650 milliGRAM(s) Oral every 8 hours PRN  baclofen 10 milliGRAM(s) Oral three times a day PRN  benztropine 0.5 milliGRAM(s) Oral two times a day  bisacodyl 5 milliGRAM(s) Oral every 12 hours  desmopressin 0.2 milliGRAM(s) Oral at bedtime  diazepam    Tablet 5 milliGRAM(s) Oral every 6 hours PRN  docusate sodium 100 milliGRAM(s) Oral three times a day  enoxaparin Injectable 40 milliGRAM(s) SubCutaneous at bedtime  fluPHENAZine 5 milliGRAM(s) Oral two times a day  gabapentin 100 milliGRAM(s) Oral two times a day  levothyroxine 175 MICROGram(s) Oral daily  metoprolol succinate ER 50 milliGRAM(s) Oral daily  nicotine  Polacrilex Gum 4 milliGRAM(s) Oral every 6 hours PRN  nicotine - 21 mG/24Hr(s) Patch 1 patch Transdermal daily  ondansetron Injectable 4 milliGRAM(s) IV Push every 6 hours PRN  oxyCODONE    IR 5 milliGRAM(s) Oral every 4 hours PRN  oxyCODONE    IR 10 milliGRAM(s) Oral every 4 hours PRN  pantoprazole    Tablet 40 milliGRAM(s) Oral before breakfast  polyethylene glycol 3350 17 Gram(s) Oral daily PRN  senna 2 Tablet(s) Oral at bedtime  valproic  acid Syrup 500 milliGRAM(s) Oral <User Schedule>  valproic  acid Syrup 750 milliGRAM(s) Oral at bedtime

## 2019-05-10 RX ADMIN — DESMOPRESSIN ACETATE 0.2 MILLIGRAM(S): 0.1 TABLET ORAL at 21:12

## 2019-05-10 RX ADMIN — FLUPHENAZINE HYDROCHLORIDE 5 MILLIGRAM(S): 1 TABLET, FILM COATED ORAL at 18:40

## 2019-05-10 RX ADMIN — Medication 1 PATCH: at 11:25

## 2019-05-10 RX ADMIN — Medication 5 MILLIGRAM(S): at 07:46

## 2019-05-10 RX ADMIN — Medication 1 PATCH: at 19:54

## 2019-05-10 RX ADMIN — GABAPENTIN 100 MILLIGRAM(S): 400 CAPSULE ORAL at 06:15

## 2019-05-10 RX ADMIN — Medication 50 MILLIGRAM(S): at 06:15

## 2019-05-10 RX ADMIN — Medication 500 MILLIGRAM(S): at 07:46

## 2019-05-10 RX ADMIN — Medication 10 MILLIGRAM(S): at 14:02

## 2019-05-10 RX ADMIN — Medication 175 MICROGRAM(S): at 06:15

## 2019-05-10 RX ADMIN — Medication 650 MILLIGRAM(S): at 11:26

## 2019-05-10 RX ADMIN — Medication 5 MILLIGRAM(S): at 21:12

## 2019-05-10 RX ADMIN — Medication 650 MILLIGRAM(S): at 10:54

## 2019-05-10 RX ADMIN — GABAPENTIN 100 MILLIGRAM(S): 400 CAPSULE ORAL at 18:41

## 2019-05-10 RX ADMIN — Medication 100 MILLIGRAM(S): at 13:58

## 2019-05-10 RX ADMIN — OXYCODONE HYDROCHLORIDE 5 MILLIGRAM(S): 5 TABLET ORAL at 03:55

## 2019-05-10 RX ADMIN — OXYCODONE HYDROCHLORIDE 5 MILLIGRAM(S): 5 TABLET ORAL at 04:30

## 2019-05-10 RX ADMIN — Medication 0.5 MILLIGRAM(S): at 06:15

## 2019-05-10 RX ADMIN — PANTOPRAZOLE SODIUM 40 MILLIGRAM(S): 20 TABLET, DELAYED RELEASE ORAL at 06:14

## 2019-05-10 RX ADMIN — ENOXAPARIN SODIUM 40 MILLIGRAM(S): 100 INJECTION SUBCUTANEOUS at 21:12

## 2019-05-10 RX ADMIN — FLUPHENAZINE HYDROCHLORIDE 5 MILLIGRAM(S): 1 TABLET, FILM COATED ORAL at 06:14

## 2019-05-10 RX ADMIN — Medication 0.5 MILLIGRAM(S): at 18:42

## 2019-05-10 RX ADMIN — Medication 750 MILLIGRAM(S): at 21:12

## 2019-05-10 NOTE — PROGRESS NOTE ADULT - SUBJECTIVE AND OBJECTIVE BOX
No issues overnight  Vital Signs Last 24 Hrs  T(C): 36.5 (09 May 2019 17:28), Max: 36.9 (09 May 2019 06:00)  T(F): 97.7 (09 May 2019 17:28), Max: 98.4 (09 May 2019 06:00)  HR: 64 (09 May 2019 17:28) (63 - 71)  BP: 128/76 (09 May 2019 17:28) (110/57 - 128/76)  BP(mean): --  RR: 20 (09 May 2019 17:28) (20 - 20)  SpO2: 100% (09 May 2019 17:28) (98% - 100%)    Awake, alert  PERRLA, EOMI  CHING strength 5/5  SILT    HMV: 32cc    MEDICATIONS  (STANDING):  benztropine 0.5 milliGRAM(s) Oral two times a day  bisacodyl 5 milliGRAM(s) Oral every 12 hours  desmopressin 0.2 milliGRAM(s) Oral at bedtime  docusate sodium 100 milliGRAM(s) Oral three times a day  enoxaparin Injectable 40 milliGRAM(s) SubCutaneous at bedtime  fluPHENAZine 5 milliGRAM(s) Oral two times a day  gabapentin 100 milliGRAM(s) Oral two times a day  levothyroxine 175 MICROGram(s) Oral daily  metoprolol succinate ER 50 milliGRAM(s) Oral daily  nicotine - 21 mG/24Hr(s) Patch 1 patch Transdermal daily  pantoprazole    Tablet 40 milliGRAM(s) Oral before breakfast  senna 2 Tablet(s) Oral at bedtime  valproic  acid Syrup 500 milliGRAM(s) Oral <User Schedule>  valproic  acid Syrup 750 milliGRAM(s) Oral at bedtime    MEDICATIONS  (PRN):  acetaminophen   Tablet .. 650 milliGRAM(s) Oral every 8 hours PRN Temp greater or equal to 38C (100.4F), Mild Pain (1 - 3)  baclofen 10 milliGRAM(s) Oral three times a day PRN Muscle Spasm  diazepam    Tablet 5 milliGRAM(s) Oral every 6 hours PRN Spasm  nicotine  Polacrilex Gum 4 milliGRAM(s) Oral every 6 hours PRN nicotine craving  ondansetron Injectable 4 milliGRAM(s) IV Push every 6 hours PRN Nausea  oxyCODONE    IR 5 milliGRAM(s) Oral every 4 hours PRN Moderate Pain (4 - 6)  oxyCODONE    IR 10 milliGRAM(s) Oral every 4 hours PRN Severe Pain (7 - 10)  polyethylene glycol 3350 17 Gram(s) Oral daily PRN Constipation                          15.3   11.44 )-----------( 316      ( 09 May 2019 08:16 )             47.1     05-09    139  |  102  |  17  ----------------------------<  97  5.0   |  27  |  0.49<L>    Ca    9.3      09 May 2019 08:16

## 2019-05-10 NOTE — PROGRESS NOTE ADULT - SUBJECTIVE AND OBJECTIVE BOX
Pipe Winkler MD  Interventional Cardiology  [place] Office : [address] [place] [address] [facility] [address]  Tel:   [place] Office : [address] [facility] [place] [address]  Tel: [phone]  Cell : [phone]    Subjective : Pt lying in bed comfortable, not in distress, denies any chest pain or SOB  	  MEDICATIONS:  enoxaparin Injectable 40 milliGRAM(s) SubCutaneous at bedtime  metoprolol succinate ER 50 milliGRAM(s) Oral daily  acetaminophen   Tablet .. 650 milliGRAM(s) Oral every 8 hours PRN  baclofen 10 milliGRAM(s) Oral three times a day PRN  benztropine 0.5 milliGRAM(s) Oral two times a day  diazepam    Tablet 5 milliGRAM(s) Oral every 6 hours PRN  fluPHENAZine 5 milliGRAM(s) Oral two times a day  gabapentin 100 milliGRAM(s) Oral two times a day  ondansetron Injectable 4 milliGRAM(s) IV Push every 6 hours PRN  oxyCODONE    IR 5 milliGRAM(s) Oral every 4 hours PRN  oxyCODONE    IR 10 milliGRAM(s) Oral every 4 hours PRN  valproic  acid Syrup 500 milliGRAM(s) Oral <User Schedule>  valproic  acid Syrup 750 milliGRAM(s) Oral at bedtime    bisacodyl 5 milliGRAM(s) Oral every 12 hours  docusate sodium 100 milliGRAM(s) Oral three times a day  pantoprazole    Tablet 40 milliGRAM(s) Oral before breakfast  polyethylene glycol 3350 17 Gram(s) Oral daily PRN  senna 2 Tablet(s) Oral at bedtime    desmopressin 0.2 milliGRAM(s) Oral at bedtime  levothyroxine 175 MICROGram(s) Oral daily        PHYSICAL EXAM:  T(C): 37.1 (05-10-19 @ 21:10), Max: 37.1 (05-10-19 @ 21:10)  HR: 74 (05-10-19 @ 21:10) (63 - 76)  BP: 137/63 (05-10-19 @ 21:10) (105/58 - 137/63)  RR: 18 (05-10-19 @ 21:10) (18 - 18)  SpO2: 100% (05-10-19 @ 21:10) (97% - 100%)  Wt(kg): --  I&O's Summary    09 May 2019 07:01  -  10 May 2019 07:00  --------------------------------------------------------  IN: 0 mL / OUT: 357 mL / NET: -357 mL    10 May 2019 07:01  -  11 May 2019 02:10  --------------------------------------------------------  IN: 0 mL / OUT: 265 mL / NET: -265 mL          Appearance: Normal	  HEENT:   Normal oral mucosa 	  Cardiovascular: Normal S1 S2, No JVD, No murmurs  Respiratory: Lungs clear to auscultation	  Gastrointestinal:  Soft, Non-tender, + BS	  Extremities: No clubbing, cyanosis or edema                                      15.3   11.44 )-----------( 316      ( 09 May 2019 08:16 )             47.1     05-09    139  |  102  |  17  ----------------------------<  97  5.0   |  27  |  0.49<L>    Ca    9.3      09 May 2019 08:16      proBNP:   Lipid Profile:   HgA1c:   TSH:

## 2019-05-11 PROCEDURE — 99232 SBSQ HOSP IP/OBS MODERATE 35: CPT

## 2019-05-11 RX ADMIN — SENNA PLUS 2 TABLET(S): 8.6 TABLET ORAL at 21:36

## 2019-05-11 RX ADMIN — Medication 5 MILLIGRAM(S): at 07:21

## 2019-05-11 RX ADMIN — Medication 100 MILLIGRAM(S): at 14:02

## 2019-05-11 RX ADMIN — Medication 500 MILLIGRAM(S): at 07:21

## 2019-05-11 RX ADMIN — Medication 50 MILLIGRAM(S): at 05:21

## 2019-05-11 RX ADMIN — FLUPHENAZINE HYDROCHLORIDE 5 MILLIGRAM(S): 1 TABLET, FILM COATED ORAL at 17:33

## 2019-05-11 RX ADMIN — OXYCODONE HYDROCHLORIDE 5 MILLIGRAM(S): 5 TABLET ORAL at 12:07

## 2019-05-11 RX ADMIN — Medication 0.5 MILLIGRAM(S): at 17:33

## 2019-05-11 RX ADMIN — GABAPENTIN 100 MILLIGRAM(S): 400 CAPSULE ORAL at 05:21

## 2019-05-11 RX ADMIN — DESMOPRESSIN ACETATE 0.2 MILLIGRAM(S): 0.1 TABLET ORAL at 21:37

## 2019-05-11 RX ADMIN — OXYCODONE HYDROCHLORIDE 10 MILLIGRAM(S): 5 TABLET ORAL at 21:43

## 2019-05-11 RX ADMIN — Medication 1 PATCH: at 06:01

## 2019-05-11 RX ADMIN — GABAPENTIN 100 MILLIGRAM(S): 400 CAPSULE ORAL at 17:33

## 2019-05-11 RX ADMIN — Medication 650 MILLIGRAM(S): at 10:15

## 2019-05-11 RX ADMIN — Medication 1 PATCH: at 12:07

## 2019-05-11 RX ADMIN — Medication 10 MILLIGRAM(S): at 17:33

## 2019-05-11 RX ADMIN — Medication 650 MILLIGRAM(S): at 09:43

## 2019-05-11 RX ADMIN — Medication 1 PATCH: at 11:50

## 2019-05-11 RX ADMIN — ENOXAPARIN SODIUM 40 MILLIGRAM(S): 100 INJECTION SUBCUTANEOUS at 21:37

## 2019-05-11 RX ADMIN — Medication 5 MILLIGRAM(S): at 16:29

## 2019-05-11 RX ADMIN — Medication 750 MILLIGRAM(S): at 21:39

## 2019-05-11 RX ADMIN — PANTOPRAZOLE SODIUM 40 MILLIGRAM(S): 20 TABLET, DELAYED RELEASE ORAL at 06:00

## 2019-05-11 RX ADMIN — OXYCODONE HYDROCHLORIDE 5 MILLIGRAM(S): 5 TABLET ORAL at 12:40

## 2019-05-11 RX ADMIN — OXYCODONE HYDROCHLORIDE 10 MILLIGRAM(S): 5 TABLET ORAL at 06:27

## 2019-05-11 RX ADMIN — FLUPHENAZINE HYDROCHLORIDE 5 MILLIGRAM(S): 1 TABLET, FILM COATED ORAL at 05:20

## 2019-05-11 RX ADMIN — Medication 0.5 MILLIGRAM(S): at 05:21

## 2019-05-11 RX ADMIN — Medication 100 MILLIGRAM(S): at 05:23

## 2019-05-11 RX ADMIN — OXYCODONE HYDROCHLORIDE 10 MILLIGRAM(S): 5 TABLET ORAL at 22:31

## 2019-05-11 RX ADMIN — OXYCODONE HYDROCHLORIDE 10 MILLIGRAM(S): 5 TABLET ORAL at 05:19

## 2019-05-11 RX ADMIN — Medication 5 MILLIGRAM(S): at 17:34

## 2019-05-11 RX ADMIN — Medication 100 MILLIGRAM(S): at 21:37

## 2019-05-11 RX ADMIN — Medication 5 MILLIGRAM(S): at 05:20

## 2019-05-11 RX ADMIN — Medication 4 MILLIGRAM(S): at 05:23

## 2019-05-11 RX ADMIN — Medication 175 MICROGRAM(S): at 05:21

## 2019-05-11 RX ADMIN — Medication 1 PATCH: at 18:53

## 2019-05-11 NOTE — PROGRESS NOTE ADULT - SUBJECTIVE AND OBJECTIVE BOX
Pipe Winkler MD  Interventional Cardiology  [place] Office : [address] [place] [address] [facility] [address]  Tel:   [place] Office : [address] [facility] [place] [address]  Tel: [phone]  Cell : [phone]    Subjective : Pt lying in bed comfortable, not in distress, denies any chest pain or SOB  	  MEDICATIONS:  enoxaparin Injectable 40 milliGRAM(s) SubCutaneous at bedtime  metoprolol succinate ER 50 milliGRAM(s) Oral daily        acetaminophen   Tablet .. 650 milliGRAM(s) Oral every 8 hours PRN  baclofen 10 milliGRAM(s) Oral three times a day PRN  benztropine 0.5 milliGRAM(s) Oral two times a day  diazepam    Tablet 5 milliGRAM(s) Oral every 6 hours PRN  fluPHENAZine 5 milliGRAM(s) Oral two times a day  gabapentin 100 milliGRAM(s) Oral two times a day  ondansetron Injectable 4 milliGRAM(s) IV Push every 6 hours PRN  oxyCODONE    IR 5 milliGRAM(s) Oral every 4 hours PRN  oxyCODONE    IR 10 milliGRAM(s) Oral every 4 hours PRN  valproic  acid Syrup 500 milliGRAM(s) Oral <User Schedule>  valproic  acid Syrup 750 milliGRAM(s) Oral at bedtime    bisacodyl 5 milliGRAM(s) Oral every 12 hours  docusate sodium 100 milliGRAM(s) Oral three times a day  pantoprazole    Tablet 40 milliGRAM(s) Oral before breakfast  polyethylene glycol 3350 17 Gram(s) Oral daily PRN  senna 2 Tablet(s) Oral at bedtime    desmopressin 0.2 milliGRAM(s) Oral at bedtime  levothyroxine 175 MICROGram(s) Oral daily        PHYSICAL EXAM:  T(C): 36.9 (05-11-19 @ 21:36), Max: 36.9 (05-11-19 @ 17:09)  HR: 93 (05-11-19 @ 21:36) (71 - 93)  BP: 139/66 (05-11-19 @ 21:36) (120/67 - 139/66)  RR: 19 (05-11-19 @ 21:36) (18 - 19)  SpO2: 98% (05-11-19 @ 21:36) (98% - 100%)  Wt(kg): --  I&O's Summary    10 May 2019 07:01  -  11 May 2019 07:00  --------------------------------------------------------  IN: 0 mL / OUT: 265 mL / NET: -265 mL          Appearance: Normal	  HEENT:   Normal oral mucosa 	  Cardiovascular: Normal S1 S2, No JVD, No murmurs  Respiratory: Lungs clear to auscultation	  Gastrointestinal:  Soft, Non-tender, + BS	  Extremities: No clubbing, cyanosis or edema                    proBNP:   Lipid Profile:   HgA1c:   TSH:

## 2019-05-11 NOTE — PROGRESS NOTE ADULT - SUBJECTIVE AND OBJECTIVE BOX
No issues overnight  Vital Signs Last 24 Hrs  T(C): 37.1 (10 May 2019 21:10), Max: 37.1 (10 May 2019 21:10)  T(F): 98.8 (10 May 2019 21:10), Max: 98.8 (10 May 2019 21:10)  HR: 74 (10 May 2019 21:10) (63 - 76)  BP: 137/63 (10 May 2019 21:10) (105/58 - 137/63)  BP(mean): --  RR: 18 (10 May 2019 21:10) (18 - 18)  SpO2: 100% (10 May 2019 21:10) (97% - 100%)    Awake, alert  PERRLA, EOMI  CHING strength 5/5  SILT    MEDICATIONS  (STANDING):  benztropine 0.5 milliGRAM(s) Oral two times a day  bisacodyl 5 milliGRAM(s) Oral every 12 hours  desmopressin 0.2 milliGRAM(s) Oral at bedtime  docusate sodium 100 milliGRAM(s) Oral three times a day  enoxaparin Injectable 40 milliGRAM(s) SubCutaneous at bedtime  fluPHENAZine 5 milliGRAM(s) Oral two times a day  gabapentin 100 milliGRAM(s) Oral two times a day  levothyroxine 175 MICROGram(s) Oral daily  metoprolol succinate ER 50 milliGRAM(s) Oral daily  nicotine - 21 mG/24Hr(s) Patch 1 patch Transdermal daily  pantoprazole    Tablet 40 milliGRAM(s) Oral before breakfast  senna 2 Tablet(s) Oral at bedtime  valproic  acid Syrup 500 milliGRAM(s) Oral <User Schedule>  valproic  acid Syrup 750 milliGRAM(s) Oral at bedtime    MEDICATIONS  (PRN):  acetaminophen   Tablet .. 650 milliGRAM(s) Oral every 8 hours PRN Temp greater or equal to 38C (100.4F), Mild Pain (1 - 3)  baclofen 10 milliGRAM(s) Oral three times a day PRN Muscle Spasm  diazepam    Tablet 5 milliGRAM(s) Oral every 6 hours PRN Spasm  nicotine  Polacrilex Gum 4 milliGRAM(s) Oral every 6 hours PRN nicotine craving  ondansetron Injectable 4 milliGRAM(s) IV Push every 6 hours PRN Nausea  oxyCODONE    IR 5 milliGRAM(s) Oral every 4 hours PRN Moderate Pain (4 - 6)  oxyCODONE    IR 10 milliGRAM(s) Oral every 4 hours PRN Severe Pain (7 - 10)  polyethylene glycol 3350 17 Gram(s) Oral daily PRN Constipation                          15.3   11.44 )-----------( 316      ( 09 May 2019 08:16 )             47.1   05-09    139  |  102  |  17  ----------------------------<  97  5.0   |  27  |  0.49<L>    Ca    9.3      09 May 2019 08:16

## 2019-05-11 NOTE — PROGRESS NOTE ADULT - SUBJECTIVE AND OBJECTIVE BOX
Patient is a 53y old  Male who presents with a chief complaint of Cannot move legs    SUBJECTIVE / OVERNIGHT EVENTS:    Denies pain, states he is feeling well  No CP, SOB, nausea/vomiting  Is very polite and cooperative, is covered in chips/pretzels   wants to shower as states his drains were removed today    MEDICATIONS  (STANDING):  benztropine 0.5 milliGRAM(s) Oral two times a day  bisacodyl 5 milliGRAM(s) Oral every 12 hours  desmopressin 0.2 milliGRAM(s) Oral at bedtime  docusate sodium 100 milliGRAM(s) Oral three times a day  enoxaparin Injectable 40 milliGRAM(s) SubCutaneous at bedtime  fluPHENAZine 5 milliGRAM(s) Oral two times a day  gabapentin 100 milliGRAM(s) Oral two times a day  levothyroxine 175 MICROGram(s) Oral daily  metoprolol succinate ER 50 milliGRAM(s) Oral daily  nicotine - 21 mG/24Hr(s) Patch 1 patch Transdermal daily  pantoprazole    Tablet 40 milliGRAM(s) Oral before breakfast  senna 2 Tablet(s) Oral at bedtime  valproic  acid Syrup 500 milliGRAM(s) Oral <User Schedule>  valproic  acid Syrup 750 milliGRAM(s) Oral at bedtime    MEDICATIONS  (PRN):  acetaminophen   Tablet .. 650 milliGRAM(s) Oral every 8 hours PRN Temp greater or equal to 38C (100.4F), Mild Pain (1 - 3)  baclofen 10 milliGRAM(s) Oral three times a day PRN Muscle Spasm  diazepam    Tablet 5 milliGRAM(s) Oral every 6 hours PRN Spasm  nicotine  Polacrilex Gum 4 milliGRAM(s) Oral every 6 hours PRN nicotine craving  ondansetron Injectable 4 milliGRAM(s) IV Push every 6 hours PRN Nausea  oxyCODONE    IR 5 milliGRAM(s) Oral every 4 hours PRN Moderate Pain (4 - 6)  oxyCODONE    IR 10 milliGRAM(s) Oral every 4 hours PRN Severe Pain (7 - 10)  polyethylene glycol 3350 17 Gram(s) Oral daily PRN Constipation    T(C): 36.6 (05-11-19 @ 14:45), Max: 37.1 (05-10-19 @ 21:10)  HR: 85 (05-11-19 @ 14:45) (71 - 85)  BP: 127/64 (05-11-19 @ 14:45) (112/66 - 137/63)  RR: 18 (05-11-19 @ 09:39) (18 - 18)  SpO2: 98% (05-11-19 @ 14:45) (98% - 100%)    I&O's Summary    10 May 2019 07:01  -  11 May 2019 07:00  --------------------------------------------------------  IN: 0 mL / OUT: 265 mL / NET: -265 mL    PHYSICAL EXAM:  GENERAL: NAD, obese   CHEST/LUNG: Clear to auscultation bilaterally; No wheeze  HEART: Regular rate and rhythm; No murmurs, rubs, or gallops  ABDOMEN: Soft, Nontender, Nondistended; Bowel sounds present  EXTREMITIES:   warm and well perfused, No clubbing, cyanosis, or edema  PSYCH: AAOx3  NEUROLOGY: LE 3/5, unable to stand with 2 person assist (me and PT)  SKIN: midline cervical neck incision c/d/i     LABS:  no new labs    Notes Reviewed:  GARRETT  Care Discussed with  Providers: GARRETT

## 2019-05-12 PROCEDURE — 99232 SBSQ HOSP IP/OBS MODERATE 35: CPT

## 2019-05-12 RX ADMIN — Medication 100 MILLIGRAM(S): at 21:18

## 2019-05-12 RX ADMIN — FLUPHENAZINE HYDROCHLORIDE 5 MILLIGRAM(S): 1 TABLET, FILM COATED ORAL at 05:54

## 2019-05-12 RX ADMIN — OXYCODONE HYDROCHLORIDE 10 MILLIGRAM(S): 5 TABLET ORAL at 21:19

## 2019-05-12 RX ADMIN — Medication 1 PATCH: at 12:11

## 2019-05-12 RX ADMIN — Medication 750 MILLIGRAM(S): at 21:21

## 2019-05-12 RX ADMIN — OXYCODONE HYDROCHLORIDE 10 MILLIGRAM(S): 5 TABLET ORAL at 03:22

## 2019-05-12 RX ADMIN — DESMOPRESSIN ACETATE 0.2 MILLIGRAM(S): 0.1 TABLET ORAL at 21:18

## 2019-05-12 RX ADMIN — Medication 100 MILLIGRAM(S): at 05:53

## 2019-05-12 RX ADMIN — OXYCODONE HYDROCHLORIDE 10 MILLIGRAM(S): 5 TABLET ORAL at 12:12

## 2019-05-12 RX ADMIN — Medication 50 MILLIGRAM(S): at 05:53

## 2019-05-12 RX ADMIN — Medication 175 MICROGRAM(S): at 05:53

## 2019-05-12 RX ADMIN — Medication 100 MILLIGRAM(S): at 13:51

## 2019-05-12 RX ADMIN — Medication 650 MILLIGRAM(S): at 18:52

## 2019-05-12 RX ADMIN — SENNA PLUS 2 TABLET(S): 8.6 TABLET ORAL at 21:18

## 2019-05-12 RX ADMIN — Medication 650 MILLIGRAM(S): at 19:42

## 2019-05-12 RX ADMIN — ENOXAPARIN SODIUM 40 MILLIGRAM(S): 100 INJECTION SUBCUTANEOUS at 21:17

## 2019-05-12 RX ADMIN — Medication 0.5 MILLIGRAM(S): at 05:52

## 2019-05-12 RX ADMIN — Medication 0.5 MILLIGRAM(S): at 18:51

## 2019-05-12 RX ADMIN — Medication 500 MILLIGRAM(S): at 08:38

## 2019-05-12 RX ADMIN — Medication 5 MILLIGRAM(S): at 18:51

## 2019-05-12 RX ADMIN — Medication 4 MILLIGRAM(S): at 21:25

## 2019-05-12 RX ADMIN — Medication 5 MILLIGRAM(S): at 06:47

## 2019-05-12 RX ADMIN — Medication 650 MILLIGRAM(S): at 04:38

## 2019-05-12 RX ADMIN — Medication 1 PATCH: at 20:47

## 2019-05-12 RX ADMIN — OXYCODONE HYDROCHLORIDE 10 MILLIGRAM(S): 5 TABLET ORAL at 13:07

## 2019-05-12 RX ADMIN — OXYCODONE HYDROCHLORIDE 10 MILLIGRAM(S): 5 TABLET ORAL at 22:21

## 2019-05-12 RX ADMIN — FLUPHENAZINE HYDROCHLORIDE 5 MILLIGRAM(S): 1 TABLET, FILM COATED ORAL at 18:51

## 2019-05-12 RX ADMIN — Medication 5 MILLIGRAM(S): at 05:53

## 2019-05-12 RX ADMIN — Medication 650 MILLIGRAM(S): at 05:51

## 2019-05-12 RX ADMIN — GABAPENTIN 100 MILLIGRAM(S): 400 CAPSULE ORAL at 18:52

## 2019-05-12 RX ADMIN — PANTOPRAZOLE SODIUM 40 MILLIGRAM(S): 20 TABLET, DELAYED RELEASE ORAL at 06:22

## 2019-05-12 RX ADMIN — OXYCODONE HYDROCHLORIDE 10 MILLIGRAM(S): 5 TABLET ORAL at 04:25

## 2019-05-12 RX ADMIN — GABAPENTIN 100 MILLIGRAM(S): 400 CAPSULE ORAL at 05:54

## 2019-05-12 NOTE — PROGRESS NOTE ADULT - SUBJECTIVE AND OBJECTIVE BOX
Patient is a 53y old  Male who presents with a chief complaint of Cannot move legs    SUBJECTIVE / OVERNIGHT EVENTS:    Feels well, no pain  Tolerating meals (always covered in food)  No CP, SOB, f/c/n/v  Reports last BM Friday, no abdominal pain     MEDICATIONS  (STANDING):  benztropine 0.5 milliGRAM(s) Oral two times a day  bisacodyl 5 milliGRAM(s) Oral every 12 hours  desmopressin 0.2 milliGRAM(s) Oral at bedtime  docusate sodium 100 milliGRAM(s) Oral three times a day  enoxaparin Injectable 40 milliGRAM(s) SubCutaneous at bedtime  fluPHENAZine 5 milliGRAM(s) Oral two times a day  gabapentin 100 milliGRAM(s) Oral two times a day  levothyroxine 175 MICROGram(s) Oral daily  metoprolol succinate ER 50 milliGRAM(s) Oral daily  nicotine - 21 mG/24Hr(s) Patch 1 patch Transdermal daily  pantoprazole    Tablet 40 milliGRAM(s) Oral before breakfast  senna 2 Tablet(s) Oral at bedtime  valproic  acid Syrup 500 milliGRAM(s) Oral <User Schedule>  valproic  acid Syrup 750 milliGRAM(s) Oral at bedtime    MEDICATIONS  (PRN):  acetaminophen   Tablet .. 650 milliGRAM(s) Oral every 8 hours PRN Temp greater or equal to 38C (100.4F), Mild Pain (1 - 3)  baclofen 10 milliGRAM(s) Oral three times a day PRN Muscle Spasm  diazepam    Tablet 5 milliGRAM(s) Oral every 6 hours PRN Spasm  nicotine  Polacrilex Gum 4 milliGRAM(s) Oral every 6 hours PRN nicotine craving  ondansetron Injectable 4 milliGRAM(s) IV Push every 6 hours PRN Nausea  oxyCODONE    IR 5 milliGRAM(s) Oral every 4 hours PRN Moderate Pain (4 - 6)  oxyCODONE    IR 10 milliGRAM(s) Oral every 4 hours PRN Severe Pain (7 - 10)  polyethylene glycol 3350 17 Gram(s) Oral daily PRN Constipation    T(C): 36.8 (05-12-19 @ 10:38), Max: 36.9 (05-11-19 @ 17:09)  HR: 67 (05-12-19 @ 10:38) (67 - 93)  BP: 103/57 (05-12-19 @ 10:38) (103/57 - 139/66)  RR: 17 (05-12-19 @ 10:38) (17 - 19)  SpO2: 99% (05-12-19 @ 10:38) (98% - 100%)    PHYSICAL EXAM:  GENERAL: NAD, obese   CHEST/LUNG: Clear to auscultation bilaterally; No wheeze  HEART: Regular rate and rhythm; No murmurs, rubs, or gallops  ABDOMEN: Soft, Nontender, Nondistended; Bowel sounds present  EXTREMITIES:   warm and well perfused, No clubbing, cyanosis, or edema  PSYCH: AAOx3  NEUROLOGY: LE 3/5  SKIN: midline cervical neck incision c/d/i     LABS: no new labs     Notes Reviewed:  NSY  Care Discussed with Providers: NSY, PT/OT

## 2019-05-12 NOTE — PROGRESS NOTE ADULT - SUBJECTIVE AND OBJECTIVE BOX
No issues overnight  Vital Signs Last 24 Hrs  T(C): 36.9 (11 May 2019 21:36), Max: 36.9 (11 May 2019 17:09)  T(F): 98.4 (11 May 2019 21:36), Max: 98.5 (11 May 2019 17:09)  HR: 93 (11 May 2019 21:36) (71 - 93)  BP: 139/66 (11 May 2019 21:36) (120/67 - 139/66)  BP(mean): --  RR: 19 (11 May 2019 21:36) (18 - 19)  SpO2: 98% (11 May 2019 21:36) (98% - 100%)    Awake, alert  PERRLA, EOMI  CHING strength 5/5  SILT    MEDICATIONS  (STANDING):  benztropine 0.5 milliGRAM(s) Oral two times a day  bisacodyl 5 milliGRAM(s) Oral every 12 hours  desmopressin 0.2 milliGRAM(s) Oral at bedtime  docusate sodium 100 milliGRAM(s) Oral three times a day  enoxaparin Injectable 40 milliGRAM(s) SubCutaneous at bedtime  fluPHENAZine 5 milliGRAM(s) Oral two times a day  gabapentin 100 milliGRAM(s) Oral two times a day  levothyroxine 175 MICROGram(s) Oral daily  metoprolol succinate ER 50 milliGRAM(s) Oral daily  nicotine - 21 mG/24Hr(s) Patch 1 patch Transdermal daily  pantoprazole    Tablet 40 milliGRAM(s) Oral before breakfast  senna 2 Tablet(s) Oral at bedtime  valproic  acid Syrup 500 milliGRAM(s) Oral <User Schedule>  valproic  acid Syrup 750 milliGRAM(s) Oral at bedtime    MEDICATIONS  (PRN):  acetaminophen   Tablet .. 650 milliGRAM(s) Oral every 8 hours PRN Temp greater or equal to 38C (100.4F), Mild Pain (1 - 3)  baclofen 10 milliGRAM(s) Oral three times a day PRN Muscle Spasm  diazepam    Tablet 5 milliGRAM(s) Oral every 6 hours PRN Spasm  nicotine  Polacrilex Gum 4 milliGRAM(s) Oral every 6 hours PRN nicotine craving  ondansetron Injectable 4 milliGRAM(s) IV Push every 6 hours PRN Nausea  oxyCODONE    IR 5 milliGRAM(s) Oral every 4 hours PRN Moderate Pain (4 - 6)  oxyCODONE    IR 10 milliGRAM(s) Oral every 4 hours PRN Severe Pain (7 - 10)  polyethylene glycol 3350 17 Gram(s) Oral daily PRN Constipation

## 2019-05-12 NOTE — PROGRESS NOTE ADULT - SUBJECTIVE AND OBJECTIVE BOX
Pipe Winkler MD  Interventional Cardiology  [place] Office : [address] [place] [address] [facility] [address]  Tel:   [place] Office : [address] [facility] [place] [address]  Tel: [phone]  Cell : [phone]    Subjective : Pt lying in bed comfortable, not in distress, denies any chest pain or SOB  	  MEDICATIONS:  enoxaparin Injectable 40 milliGRAM(s) SubCutaneous at bedtime  metoprolol succinate ER 50 milliGRAM(s) Oral daily  acetaminophen   Tablet .. 650 milliGRAM(s) Oral every 8 hours PRN  baclofen 10 milliGRAM(s) Oral three times a day PRN  benztropine 0.5 milliGRAM(s) Oral two times a day  diazepam    Tablet 5 milliGRAM(s) Oral every 6 hours PRN  fluPHENAZine 5 milliGRAM(s) Oral two times a day  gabapentin 100 milliGRAM(s) Oral two times a day  ondansetron Injectable 4 milliGRAM(s) IV Push every 6 hours PRN  oxyCODONE    IR 5 milliGRAM(s) Oral every 4 hours PRN  oxyCODONE    IR 10 milliGRAM(s) Oral every 4 hours PRN  valproic  acid Syrup 500 milliGRAM(s) Oral <User Schedule>  valproic  acid Syrup 750 milliGRAM(s) Oral at bedtime  aluminum hydroxide/magnesium hydroxide/simethicone Suspension 30 milliLiter(s) Oral every 4 hours PRN  bisacodyl 5 milliGRAM(s) Oral every 12 hours  docusate sodium 100 milliGRAM(s) Oral three times a day  pantoprazole    Tablet 40 milliGRAM(s) Oral before breakfast  polyethylene glycol 3350 17 Gram(s) Oral daily PRN  senna 2 Tablet(s) Oral at bedtime    desmopressin 0.2 milliGRAM(s) Oral at bedtime  levothyroxine 175 MICROGram(s) Oral daily        PHYSICAL EXAM:  T(C): 36.6 (05-13-19 @ 01:56), Max: 36.9 (05-12-19 @ 18:06)  HR: 70 (05-13-19 @ 01:56) (67 - 90)  BP: 111/62 (05-13-19 @ 01:56) (103/57 - 134/71)  RR: 17 (05-13-19 @ 01:56) (17 - 17)  SpO2: 100% (05-13-19 @ 01:56) (98% - 100%)  Wt(kg): --  I&O's Summary    12 May 2019 07:01  -  13 May 2019 02:38  --------------------------------------------------------  IN: 0 mL / OUT: 100 mL / NET: -100 mL        Appearance: Normal	  HEENT:   Normal oral mucosa 	  Cardiovascular: Normal S1 S2, No JVD, No murmurs  Respiratory: Lungs clear to auscultation	  Gastrointestinal:  Soft, Non-tender, + BS	  Extremities: No clubbing, cyanosis or edema                        proBNP:   Lipid Profile:   HgA1c:   TSH:

## 2019-05-13 ENCOUNTER — TRANSCRIPTION ENCOUNTER (OUTPATIENT)
Age: 54
End: 2019-05-13

## 2019-05-13 PROCEDURE — 99232 SBSQ HOSP IP/OBS MODERATE 35: CPT | Mod: GC

## 2019-05-13 PROCEDURE — 99232 SBSQ HOSP IP/OBS MODERATE 35: CPT

## 2019-05-13 RX ORDER — PANTOPRAZOLE SODIUM 20 MG/1
1 TABLET, DELAYED RELEASE ORAL
Qty: 0 | Refills: 0 | DISCHARGE
Start: 2019-05-13

## 2019-05-13 RX ORDER — DIAZEPAM 5 MG
1 TABLET ORAL
Qty: 0 | Refills: 0 | DISCHARGE
Start: 2019-05-13

## 2019-05-13 RX ORDER — DOCUSATE SODIUM 100 MG
1 CAPSULE ORAL
Qty: 0 | Refills: 0 | DISCHARGE
Start: 2019-05-13

## 2019-05-13 RX ORDER — ENOXAPARIN SODIUM 100 MG/ML
40 INJECTION SUBCUTANEOUS
Qty: 0 | Refills: 0 | DISCHARGE
Start: 2019-05-13

## 2019-05-13 RX ORDER — SENNA PLUS 8.6 MG/1
2 TABLET ORAL
Qty: 0 | Refills: 0 | DISCHARGE
Start: 2019-05-13

## 2019-05-13 RX ORDER — ACETAMINOPHEN 500 MG
2 TABLET ORAL
Qty: 0 | Refills: 0 | DISCHARGE
Start: 2019-05-13

## 2019-05-13 RX ORDER — DOCUSATE SODIUM 100 MG
2 CAPSULE ORAL
Qty: 0 | Refills: 0 | DISCHARGE
Start: 2019-05-13

## 2019-05-13 RX ORDER — FLUPHENAZINE HYDROCHLORIDE 1 MG/1
50 TABLET, FILM COATED ORAL
Qty: 0 | Refills: 0 | DISCHARGE

## 2019-05-13 RX ORDER — POLYETHYLENE GLYCOL 3350 17 G/17G
17 POWDER, FOR SOLUTION ORAL
Qty: 0 | Refills: 0 | DISCHARGE
Start: 2019-05-13

## 2019-05-13 RX ORDER — OXYCODONE HYDROCHLORIDE 5 MG/1
1 TABLET ORAL
Qty: 0 | Refills: 0 | DISCHARGE
Start: 2019-05-13

## 2019-05-13 RX ORDER — OMEPRAZOLE 10 MG/1
1 CAPSULE, DELAYED RELEASE ORAL
Qty: 0 | Refills: 0 | DISCHARGE

## 2019-05-13 RX ADMIN — Medication 175 MICROGRAM(S): at 06:21

## 2019-05-13 RX ADMIN — OXYCODONE HYDROCHLORIDE 10 MILLIGRAM(S): 5 TABLET ORAL at 22:45

## 2019-05-13 RX ADMIN — Medication 5 MILLIGRAM(S): at 17:28

## 2019-05-13 RX ADMIN — FLUPHENAZINE HYDROCHLORIDE 5 MILLIGRAM(S): 1 TABLET, FILM COATED ORAL at 17:28

## 2019-05-13 RX ADMIN — OXYCODONE HYDROCHLORIDE 10 MILLIGRAM(S): 5 TABLET ORAL at 22:09

## 2019-05-13 RX ADMIN — Medication 1 PATCH: at 08:52

## 2019-05-13 RX ADMIN — Medication 1 PATCH: at 19:30

## 2019-05-13 RX ADMIN — Medication 650 MILLIGRAM(S): at 09:15

## 2019-05-13 RX ADMIN — Medication 1 PATCH: at 12:00

## 2019-05-13 RX ADMIN — Medication 750 MILLIGRAM(S): at 21:56

## 2019-05-13 RX ADMIN — FLUPHENAZINE HYDROCHLORIDE 5 MILLIGRAM(S): 1 TABLET, FILM COATED ORAL at 06:22

## 2019-05-13 RX ADMIN — SENNA PLUS 2 TABLET(S): 8.6 TABLET ORAL at 21:55

## 2019-05-13 RX ADMIN — Medication 50 MILLIGRAM(S): at 06:22

## 2019-05-13 RX ADMIN — Medication 0.5 MILLIGRAM(S): at 17:28

## 2019-05-13 RX ADMIN — Medication 500 MILLIGRAM(S): at 08:47

## 2019-05-13 RX ADMIN — Medication 0.5 MILLIGRAM(S): at 06:26

## 2019-05-13 RX ADMIN — Medication 5 MILLIGRAM(S): at 13:03

## 2019-05-13 RX ADMIN — GABAPENTIN 100 MILLIGRAM(S): 400 CAPSULE ORAL at 17:28

## 2019-05-13 RX ADMIN — PANTOPRAZOLE SODIUM 40 MILLIGRAM(S): 20 TABLET, DELAYED RELEASE ORAL at 06:22

## 2019-05-13 RX ADMIN — Medication 100 MILLIGRAM(S): at 13:04

## 2019-05-13 RX ADMIN — ENOXAPARIN SODIUM 40 MILLIGRAM(S): 100 INJECTION SUBCUTANEOUS at 21:56

## 2019-05-13 RX ADMIN — Medication 5 MILLIGRAM(S): at 06:21

## 2019-05-13 RX ADMIN — Medication 1 PATCH: at 13:04

## 2019-05-13 RX ADMIN — GABAPENTIN 100 MILLIGRAM(S): 400 CAPSULE ORAL at 06:21

## 2019-05-13 RX ADMIN — Medication 650 MILLIGRAM(S): at 08:46

## 2019-05-13 RX ADMIN — Medication 100 MILLIGRAM(S): at 21:55

## 2019-05-13 RX ADMIN — DESMOPRESSIN ACETATE 0.2 MILLIGRAM(S): 0.1 TABLET ORAL at 21:56

## 2019-05-13 RX ADMIN — Medication 5 MILLIGRAM(S): at 00:19

## 2019-05-13 RX ADMIN — OXYCODONE HYDROCHLORIDE 10 MILLIGRAM(S): 5 TABLET ORAL at 06:58

## 2019-05-13 RX ADMIN — Medication 100 MILLIGRAM(S): at 06:21

## 2019-05-13 NOTE — DISCHARGE NOTE PROVIDER - CARE PROVIDER_API CALL
Alex Villasenor)  Neurosurgery  General  [address] [facility], [address]  [place] [address]  Phone: [phone]  Fax: [phone]  Follow Up Time: 1 week

## 2019-05-13 NOTE — DISCHARGE NOTE PROVIDER - NSDCFUADDINST_GEN_ALL_CORE_FT
1. Incision should be left uncovered after post op day 3.   2. Begin showering with shampoo on post operative day 4. Avoid long soaks and do not submerge incision in bathtub. Regular shower only and allow soap and water to run over the incision. Pat incision area dry with clean towel- do not scrub. Please shower regularly to ensure incision stays clean to avoid post operative infections.   3. Notify your surgeon if you notice increased redness, drainage or you notice incision area opening.   4. Return to ER immediately for high fevers, severe headache, vomiting, lethargy or weakness  5. Please call your neurosurgeon following discharge to make follow up appointment in 1 week after discharge unless otherwise specified. See contact information below.   6. Ambulate as tolerate. Continue with all "activities of daily living." Avoid strenuous activity or lifting more than 10 pounds until cleared for additional activity at your follow up appointment.

## 2019-05-13 NOTE — DISCHARGE NOTE PROVIDER - CARE PROVIDERS DIRECT ADDRESSES
[email or web address]
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
## 2019-05-13 NOTE — PROGRESS NOTE ADULT - SUBJECTIVE AND OBJECTIVE BOX
Pipe Winkler MD  Interventional Cardiology  [place] Office : [phone] [place], [place] [address]  Tel:   [place] Office : [address] [facility] [place] [address]  Tel: [phone]  Cell : [phone]    Subjective : Pt lying in bed comfortable, not in distress, denies any chest pain or SOB  	  MEDICATIONS:  enoxaparin Injectable 40 milliGRAM(s) SubCutaneous at bedtime  metoprolol succinate ER 50 milliGRAM(s) Oral daily  acetaminophen   Tablet .. 650 milliGRAM(s) Oral every 8 hours PRN  baclofen 10 milliGRAM(s) Oral three times a day PRN  benztropine 0.5 milliGRAM(s) Oral two times a day  diazepam    Tablet 5 milliGRAM(s) Oral every 6 hours PRN  fluPHENAZine 5 milliGRAM(s) Oral two times a day  gabapentin 100 milliGRAM(s) Oral two times a day  ondansetron Injectable 4 milliGRAM(s) IV Push every 6 hours PRN  oxyCODONE    IR 5 milliGRAM(s) Oral every 4 hours PRN  oxyCODONE    IR 10 milliGRAM(s) Oral every 4 hours PRN  valproic  acid Syrup 500 milliGRAM(s) Oral <User Schedule>  valproic  acid Syrup 750 milliGRAM(s) Oral at bedtime  aluminum hydroxide/magnesium hydroxide/simethicone Suspension 30 milliLiter(s) Oral every 4 hours PRN  bisacodyl 5 milliGRAM(s) Oral every 12 hours  docusate sodium 100 milliGRAM(s) Oral three times a day  pantoprazole    Tablet 40 milliGRAM(s) Oral before breakfast  polyethylene glycol 3350 17 Gram(s) Oral daily PRN  senna 2 Tablet(s) Oral at bedtime  desmopressin 0.2 milliGRAM(s) Oral at bedtime  levothyroxine 175 MICROGram(s) Oral daily        PHYSICAL EXAM:  T(C): 36.9 (05-13-19 @ 10:49), Max: 36.9 (05-12-19 @ 18:06)  HR: 82 (05-13-19 @ 10:49) (70 - 90)  BP: 106/61 (05-13-19 @ 10:49) (106/61 - 119/60)  RR: 20 (05-13-19 @ 10:49) (17 - 20)  SpO2: 98% (05-13-19 @ 10:49) (98% - 100%)  Wt(kg): --  I&O's Summary    12 May 2019 07:01  -  13 May 2019 07:00  --------------------------------------------------------  IN: 0 mL / OUT: 220 mL / NET: -220 mL    13 May 2019 07:01  -  13 May 2019 13:17  --------------------------------------------------------  IN: 500 mL / OUT: 400 mL / NET: 100 mL        Appearance: Normal	  HEENT:   Normal oral mucosa 	  Cardiovascular: Normal S1 S2, No JVD, No murmurs  Respiratory: Lungs clear to auscultation	  Gastrointestinal:  Soft, Non-tender, + BS	  Extremities: No clubbing, cyanosis or edema                    proBNP:   Lipid Profile:   HgA1c:   TSH:

## 2019-05-13 NOTE — PROGRESS NOTE ADULT - SUBJECTIVE AND OBJECTIVE BOX
C/O [place]burn  Vital Signs Last 24 Hrs  T(C): 36.9 (12 May 2019 22:00), Max: 36.9 (12 May 2019 18:06)  T(F): 98.5 (12 May 2019 22:00), Max: 98.5 (12 May 2019 22:00)  HR: 90 (12 May 2019 22:00) (67 - 90)  BP: 119/60 (12 May 2019 22:00) (103/57 - 134/71)  BP(mean): --  RR: 17 (12 May 2019 22:00) (17 - 17)  SpO2: 100% (12 May 2019 22:00) (98% - 100%)    Awake, alert  PERRLA, EOMI  CHING strength 5/5  SILT    MEDICATIONS  (STANDING):  benztropine 0.5 milliGRAM(s) Oral two times a day  bisacodyl 5 milliGRAM(s) Oral every 12 hours  desmopressin 0.2 milliGRAM(s) Oral at bedtime  docusate sodium 100 milliGRAM(s) Oral three times a day  enoxaparin Injectable 40 milliGRAM(s) SubCutaneous at bedtime  fluPHENAZine 5 milliGRAM(s) Oral two times a day  gabapentin 100 milliGRAM(s) Oral two times a day  levothyroxine 175 MICROGram(s) Oral daily  metoprolol succinate ER 50 milliGRAM(s) Oral daily  nicotine - 21 mG/24Hr(s) Patch 1 patch Transdermal daily  pantoprazole    Tablet 40 milliGRAM(s) Oral before breakfast  senna 2 Tablet(s) Oral at bedtime  valproic  acid Syrup 500 milliGRAM(s) Oral <User Schedule>  valproic  acid Syrup 750 milliGRAM(s) Oral at bedtime    MEDICATIONS  (PRN):  acetaminophen   Tablet .. 650 milliGRAM(s) Oral every 8 hours PRN Temp greater or equal to 38C (100.4F), Mild Pain (1 - 3)  aluminum hydroxide/magnesium hydroxide/simethicone Suspension 30 milliLiter(s) Oral every 4 hours PRN Dyspepsia  baclofen 10 milliGRAM(s) Oral three times a day PRN Muscle Spasm  diazepam    Tablet 5 milliGRAM(s) Oral every 6 hours PRN Spasm  nicotine  Polacrilex Gum 4 milliGRAM(s) Oral every 6 hours PRN nicotine craving  ondansetron Injectable 4 milliGRAM(s) IV Push every 6 hours PRN Nausea  oxyCODONE    IR 5 milliGRAM(s) Oral every 4 hours PRN Moderate Pain (4 - 6)  oxyCODONE    IR 10 milliGRAM(s) Oral every 4 hours PRN Severe Pain (7 - 10)  polyethylene glycol 3350 17 Gram(s) Oral daily PRN Constipation

## 2019-05-13 NOTE — DISCHARGE NOTE PROVIDER - NSDCCPCAREPLAN_GEN_ALL_CORE_FT
PRINCIPAL DISCHARGE DIAGNOSIS  Diagnosis: Stenosis, cervical spine  Assessment and Plan of Treatment:

## 2019-05-13 NOTE — DISCHARGE NOTE PROVIDER - HOSPITAL COURSE
Hospital Course:     52 y/o Male, resident of [facility], with h/o bipolar d/o, HTN, nocturnal enuresis, deformity of lower leg, hypothyroid, schizophrenia, BPH, IVDA initially p/w c/o inability to walk due to lower extremity weakness which has been worsening for the last two weeks. Also complaining of lower back pain since sustaining a fall 2 weeks ago. In additions the pt reported paresthesias in both hands and frequent falls. Patient found to have C3-C6 moderate to severe cervical stenosis. He is s/p C3-C6 laminectomy and fusion on 4/27/19. Post operatively patient had left knee swelling. Knee xray done no fractures. Patient seen by PT recommending DC to [facility]. Patient is accepted to [facility] and is stable for DC.

## 2019-05-13 NOTE — PROGRESS NOTE ADULT - SUBJECTIVE AND OBJECTIVE BOX
[facility] Medicine  Pager [phone]    Patient is a 53y old  Male who presents with a chief complaint of Cannot move legs (13 May 2019 07:05)      INTERVAL HPI/OVERNIGHT EVENTS:    MEDICATIONS  (STANDING):  benztropine 0.5 milliGRAM(s) Oral two times a day  bisacodyl 5 milliGRAM(s) Oral every 12 hours  desmopressin 0.2 milliGRAM(s) Oral at bedtime  docusate sodium 100 milliGRAM(s) Oral three times a day  enoxaparin Injectable 40 milliGRAM(s) SubCutaneous at bedtime  fluPHENAZine 5 milliGRAM(s) Oral two times a day  gabapentin 100 milliGRAM(s) Oral two times a day  levothyroxine 175 MICROGram(s) Oral daily  metoprolol succinate ER 50 milliGRAM(s) Oral daily  nicotine - 21 mG/24Hr(s) Patch 1 patch Transdermal daily  pantoprazole    Tablet 40 milliGRAM(s) Oral before breakfast  senna 2 Tablet(s) Oral at bedtime  valproic  acid Syrup 500 milliGRAM(s) Oral <User Schedule>  valproic  acid Syrup 750 milliGRAM(s) Oral at bedtime    MEDICATIONS  (PRN):  acetaminophen   Tablet .. 650 milliGRAM(s) Oral every 8 hours PRN Temp greater or equal to 38C (100.4F), Mild Pain (1 - 3)  aluminum hydroxide/magnesium hydroxide/simethicone Suspension 30 milliLiter(s) Oral every 4 hours PRN Dyspepsia  baclofen 10 milliGRAM(s) Oral three times a day PRN Muscle Spasm  diazepam    Tablet 5 milliGRAM(s) Oral every 6 hours PRN Spasm  nicotine  Polacrilex Gum 4 milliGRAM(s) Oral every 6 hours PRN nicotine craving  ondansetron Injectable 4 milliGRAM(s) IV Push every 6 hours PRN Nausea  oxyCODONE    IR 5 milliGRAM(s) Oral every 4 hours PRN Moderate Pain (4 - 6)  oxyCODONE    IR 10 milliGRAM(s) Oral every 4 hours PRN Severe Pain (7 - 10)  polyethylene glycol 3350 17 Gram(s) Oral daily PRN Constipation      Allergies    No Known Allergies    Intolerances    Haldol (Unknown)  Thorazine (Unknown)      REVIEW OF SYSTEMS:  Please see interval HPI:    Vital Signs Last 24 Hrs  T(C): 36.7 (13 May 2019 06:14), Max: 36.9 (12 May 2019 18:06)  T(F): 98.1 (13 May 2019 06:14), Max: 98.5 (12 May 2019 22:00)  HR: 77 (13 May 2019 06:14) (67 - 90)  BP: 119/58 (13 May 2019 06:14) (103/57 - 119/60)  BP(mean): --  RR: 18 (13 May 2019 06:14) (17 - 18)  SpO2: 100% (13 May 2019 06:14) (98% - 100%)  I&O's Detail    12 May 2019 07:01  -  13 May 2019 07:00  --------------------------------------------------------  IN:  Total IN: 0 mL    OUT:    Voided: 220 mL  Total OUT: 220 mL    Total NET: -220 mL            PHYSICAL EXAM:  GENERAL:   HEAD:    EYES:   ENMT:   NECK:   NERVOUS SYSTEM:    CHEST/LUNG:   HEART:   ABDOMEN:   EXTREMITIES:    LYMPH:   SKIN:     LABS:            CAPILLARY BLOOD GLUCOSE        BLOOD CULTURE    RADIOLOGY & ADDITIONAL TESTS:    Imaging Personally Reviewed:  [ ] YES     Consultant(s) Notes Reviewed:      Care Discussed with Consultants/Other Providers: Hamilton PENNINGTON [facility] Medicine  Pager [phone]    Patient is a 53y old  Male who presents with a chief complaint of Cannot move legs (13 May 2019 07:05)      INTERVAL HPI/OVERNIGHT EVENTS:  Feels well, looking forward to going to rehab, states he's gonna get stronger and do it right. Calls the food here amazing, especially breakfast. Reports pain controlled but having some trouble with BMs. Is passing gas.     MEDICATIONS  (STANDING):  benztropine 0.5 milliGRAM(s) Oral two times a day  bisacodyl 5 milliGRAM(s) Oral every 12 hours  desmopressin 0.2 milliGRAM(s) Oral at bedtime  docusate sodium 100 milliGRAM(s) Oral three times a day  enoxaparin Injectable 40 milliGRAM(s) SubCutaneous at bedtime  fluPHENAZine 5 milliGRAM(s) Oral two times a day  gabapentin 100 milliGRAM(s) Oral two times a day  levothyroxine 175 MICROGram(s) Oral daily  metoprolol succinate ER 50 milliGRAM(s) Oral daily  nicotine - 21 mG/24Hr(s) Patch 1 patch Transdermal daily  pantoprazole    Tablet 40 milliGRAM(s) Oral before breakfast  senna 2 Tablet(s) Oral at bedtime  valproic  acid Syrup 500 milliGRAM(s) Oral <User Schedule>  valproic  acid Syrup 750 milliGRAM(s) Oral at bedtime    MEDICATIONS  (PRN):  acetaminophen   Tablet .. 650 milliGRAM(s) Oral every 8 hours PRN Temp greater or equal to 38C (100.4F), Mild Pain (1 - 3)  aluminum hydroxide/magnesium hydroxide/simethicone Suspension 30 milliLiter(s) Oral every 4 hours PRN Dyspepsia  baclofen 10 milliGRAM(s) Oral three times a day PRN Muscle Spasm  diazepam    Tablet 5 milliGRAM(s) Oral every 6 hours PRN Spasm  nicotine  Polacrilex Gum 4 milliGRAM(s) Oral every 6 hours PRN nicotine craving  ondansetron Injectable 4 milliGRAM(s) IV Push every 6 hours PRN Nausea  oxyCODONE    IR 5 milliGRAM(s) Oral every 4 hours PRN Moderate Pain (4 - 6)  oxyCODONE    IR 10 milliGRAM(s) Oral every 4 hours PRN Severe Pain (7 - 10)  polyethylene glycol 3350 17 Gram(s) Oral daily PRN Constipation    Allergies  No Known Allergies    Intolerances    Haldol (Unknown)  Thorazine (Unknown)    REVIEW OF SYSTEMS:  Please see interval HPI:    Vital Signs Last 24 Hrs  T(C): 36.7 (13 May 2019 06:14), Max: 36.9 (12 May 2019 18:06)  T(F): 98.1 (13 May 2019 06:14), Max: 98.5 (12 May 2019 22:00)  HR: 77 (13 May 2019 06:14) (67 - 90)  BP: 119/58 (13 May 2019 06:14) (103/57 - 119/60)  BP(mean): --  RR: 18 (13 May 2019 06:14) (17 - 18)  SpO2: 100% (13 May 2019 06:14) (98% - 100%)  I&O's Detail    12 May 2019 07:01  -  13 May 2019 07:00  --------------------------------------------------------  IN:  Total IN: 0 mL    OUT:    Voided: 220 mL  Total OUT: 220 mL    Total NET: -220 mL    PHYSICAL EXAM:  GENERAL: NAD, lying in bed  HEAD:  NC/AT  EYES: EOMI, clear sclera/conjunctiva  ENMT: MMM  NECK: Supple  NERVOUS SYSTEM: moving all extremities though LE weaker, SILT grossly  CHEST/LUNG: CTAB, comfortable on RA, speaking in full sentences  HEART: S1S2 RRR  ABDOMEN: soft, non-tender +BS  EXTREMITIES:  no c/c/e    LABS:    CAPILLARY BLOOD GLUCOSE    BLOOD CULTURE    RADIOLOGY & ADDITIONAL TESTS:    Imaging Personally Reviewed:  [ ] YES     Consultant(s) Notes Reviewed:  Nsx    Care Discussed with Consultants/Other Providers: Hamilton PENNINGTON

## 2019-05-14 PROCEDURE — 99232 SBSQ HOSP IP/OBS MODERATE 35: CPT

## 2019-05-14 RX ADMIN — Medication 5 MILLIGRAM(S): at 17:22

## 2019-05-14 RX ADMIN — Medication 1 PATCH: at 07:24

## 2019-05-14 RX ADMIN — FLUPHENAZINE HYDROCHLORIDE 5 MILLIGRAM(S): 1 TABLET, FILM COATED ORAL at 17:23

## 2019-05-14 RX ADMIN — Medication 100 MILLIGRAM(S): at 06:09

## 2019-05-14 RX ADMIN — OXYCODONE HYDROCHLORIDE 10 MILLIGRAM(S): 5 TABLET ORAL at 13:00

## 2019-05-14 RX ADMIN — Medication 100 MILLIGRAM(S): at 13:00

## 2019-05-14 RX ADMIN — GABAPENTIN 100 MILLIGRAM(S): 400 CAPSULE ORAL at 17:22

## 2019-05-14 RX ADMIN — PANTOPRAZOLE SODIUM 40 MILLIGRAM(S): 20 TABLET, DELAYED RELEASE ORAL at 07:27

## 2019-05-14 RX ADMIN — Medication 5 MILLIGRAM(S): at 06:08

## 2019-05-14 RX ADMIN — OXYCODONE HYDROCHLORIDE 10 MILLIGRAM(S): 5 TABLET ORAL at 21:27

## 2019-05-14 RX ADMIN — Medication 175 MICROGRAM(S): at 06:08

## 2019-05-14 RX ADMIN — Medication 50 MILLIGRAM(S): at 06:09

## 2019-05-14 RX ADMIN — Medication 750 MILLIGRAM(S): at 21:26

## 2019-05-14 RX ADMIN — Medication 5 MILLIGRAM(S): at 00:58

## 2019-05-14 RX ADMIN — Medication 5 MILLIGRAM(S): at 07:27

## 2019-05-14 RX ADMIN — Medication 0.5 MILLIGRAM(S): at 06:08

## 2019-05-14 RX ADMIN — OXYCODONE HYDROCHLORIDE 10 MILLIGRAM(S): 5 TABLET ORAL at 22:16

## 2019-05-14 RX ADMIN — ENOXAPARIN SODIUM 40 MILLIGRAM(S): 100 INJECTION SUBCUTANEOUS at 21:27

## 2019-05-14 RX ADMIN — Medication 100 MILLIGRAM(S): at 21:27

## 2019-05-14 RX ADMIN — SENNA PLUS 2 TABLET(S): 8.6 TABLET ORAL at 21:27

## 2019-05-14 RX ADMIN — Medication 1 PATCH: at 11:07

## 2019-05-14 RX ADMIN — Medication 500 MILLIGRAM(S): at 07:33

## 2019-05-14 RX ADMIN — GABAPENTIN 100 MILLIGRAM(S): 400 CAPSULE ORAL at 06:08

## 2019-05-14 RX ADMIN — Medication 1 PATCH: at 18:11

## 2019-05-14 RX ADMIN — OXYCODONE HYDROCHLORIDE 10 MILLIGRAM(S): 5 TABLET ORAL at 13:39

## 2019-05-14 RX ADMIN — FLUPHENAZINE HYDROCHLORIDE 5 MILLIGRAM(S): 1 TABLET, FILM COATED ORAL at 06:09

## 2019-05-14 RX ADMIN — DESMOPRESSIN ACETATE 0.2 MILLIGRAM(S): 0.1 TABLET ORAL at 21:27

## 2019-05-14 RX ADMIN — OXYCODONE HYDROCHLORIDE 10 MILLIGRAM(S): 5 TABLET ORAL at 06:18

## 2019-05-14 RX ADMIN — Medication 0.5 MILLIGRAM(S): at 17:22

## 2019-05-14 NOTE — PROGRESS NOTE ADULT - SUBJECTIVE AND OBJECTIVE BOX
No issues overnight  Vital Signs Last 24 Hrs  T(C): 36.8 (13 May 2019 21:45), Max: 36.9 (13 May 2019 10:49)  T(F): 98.2 (13 May 2019 21:45), Max: 98.5 (13 May 2019 10:49)  HR: 97 (13 May 2019 21:45) (70 - 97)  BP: 113/63 (13 May 2019 21:45) (106/61 - 130/72)  BP(mean): --  RR: 18 (13 May 2019 21:45) (17 - 20)  SpO2: 98% (13 May 2019 21:45) (98% - 100%)    Awake, alert  PERRLA, EOMI  CHING strength 5/5  SILT    MEDICATIONS  (STANDING):  benztropine 0.5 milliGRAM(s) Oral two times a day  bisacodyl 5 milliGRAM(s) Oral every 12 hours  desmopressin 0.2 milliGRAM(s) Oral at bedtime  docusate sodium 100 milliGRAM(s) Oral three times a day  enoxaparin Injectable 40 milliGRAM(s) SubCutaneous at bedtime  fluPHENAZine 5 milliGRAM(s) Oral two times a day  gabapentin 100 milliGRAM(s) Oral two times a day  levothyroxine 175 MICROGram(s) Oral daily  metoprolol succinate ER 50 milliGRAM(s) Oral daily  nicotine - 21 mG/24Hr(s) Patch 1 patch Transdermal daily  pantoprazole    Tablet 40 milliGRAM(s) Oral before breakfast  senna 2 Tablet(s) Oral at bedtime  valproic  acid Syrup 500 milliGRAM(s) Oral <User Schedule>  valproic  acid Syrup 750 milliGRAM(s) Oral at bedtime    MEDICATIONS  (PRN):  acetaminophen   Tablet .. 650 milliGRAM(s) Oral every 8 hours PRN Temp greater or equal to 38C (100.4F), Mild Pain (1 - 3)  aluminum hydroxide/magnesium hydroxide/simethicone Suspension 30 milliLiter(s) Oral every 4 hours PRN Dyspepsia  baclofen 10 milliGRAM(s) Oral three times a day PRN Muscle Spasm  diazepam    Tablet 5 milliGRAM(s) Oral every 6 hours PRN Spasm  nicotine  Polacrilex Gum 4 milliGRAM(s) Oral every 6 hours PRN nicotine craving  ondansetron Injectable 4 milliGRAM(s) IV Push every 6 hours PRN Nausea  oxyCODONE    IR 5 milliGRAM(s) Oral every 4 hours PRN Moderate Pain (4 - 6)  oxyCODONE    IR 10 milliGRAM(s) Oral every 4 hours PRN Severe Pain (7 - 10)  polyethylene glycol 3350 17 Gram(s) Oral daily PRN Constipation

## 2019-05-14 NOTE — PROGRESS NOTE ADULT - ATTENDING COMMENTS
Patient in agreement with discharge plan to rehab.
Pt to be transferred to neurosx service for post op care  PT scottal
cardiology eval requested for medical clearance
Patient in agreement with discharge plan to rehab.

## 2019-05-14 NOTE — PROGRESS NOTE ADULT - PROBLEM SELECTOR PLAN 3
- c/w bowel regimen  - may escalate at rehab if still no bowel movement - likely component of decreased physical mobility as well as opioid pain medications  - c/w bowel regimen  - still no BM, requesting stronger medication  - will provide suppository vs enema prn  - may escalate at rehab if still no bowel movement

## 2019-05-14 NOTE — PROGRESS NOTE ADULT - SUBJECTIVE AND OBJECTIVE BOX
[facility] Medicine  Pager [phone]    Patient is a 53y old  Male who presents with a chief complaint of Cannot move legs (14 May 2019 00:57)      INTERVAL HPI/OVERNIGHT EVENTS:  Bed was not available yesterday at rehab.     MEDICATIONS  (STANDING):  benztropine 0.5 milliGRAM(s) Oral two times a day  bisacodyl 5 milliGRAM(s) Oral every 12 hours  desmopressin 0.2 milliGRAM(s) Oral at bedtime  docusate sodium 100 milliGRAM(s) Oral three times a day  enoxaparin Injectable 40 milliGRAM(s) SubCutaneous at bedtime  fluPHENAZine 5 milliGRAM(s) Oral two times a day  gabapentin 100 milliGRAM(s) Oral two times a day  levothyroxine 175 MICROGram(s) Oral daily  metoprolol succinate ER 50 milliGRAM(s) Oral daily  nicotine - 21 mG/24Hr(s) Patch 1 patch Transdermal daily  pantoprazole    Tablet 40 milliGRAM(s) Oral before breakfast  senna 2 Tablet(s) Oral at bedtime  valproic  acid Syrup 500 milliGRAM(s) Oral <User Schedule>  valproic  acid Syrup 750 milliGRAM(s) Oral at bedtime    MEDICATIONS  (PRN):  acetaminophen   Tablet .. 650 milliGRAM(s) Oral every 8 hours PRN Temp greater or equal to 38C (100.4F), Mild Pain (1 - 3)  aluminum hydroxide/magnesium hydroxide/simethicone Suspension 30 milliLiter(s) Oral every 4 hours PRN Dyspepsia  baclofen 10 milliGRAM(s) Oral three times a day PRN Muscle Spasm  diazepam    Tablet 5 milliGRAM(s) Oral every 6 hours PRN Spasm  nicotine  Polacrilex Gum 4 milliGRAM(s) Oral every 6 hours PRN nicotine craving  ondansetron Injectable 4 milliGRAM(s) IV Push every 6 hours PRN Nausea  oxyCODONE    IR 5 milliGRAM(s) Oral every 4 hours PRN Moderate Pain (4 - 6)  oxyCODONE    IR 10 milliGRAM(s) Oral every 4 hours PRN Severe Pain (7 - 10)  polyethylene glycol 3350 17 Gram(s) Oral daily PRN Constipation      Allergies    No Known Allergies    Intolerances    Haldol (Unknown)  Thorazine (Unknown)      REVIEW OF SYSTEMS:  Please see interval HPI:    Vital Signs Last 24 Hrs  T(C): 36.6 (14 May 2019 09:31), Max: 37 (14 May 2019 06:02)  T(F): 97.8 (14 May 2019 09:31), Max: 98.6 (14 May 2019 06:02)  HR: 91 (14 May 2019 09:31) (78 - 98)  BP: 111/62 (14 May 2019 09:31) (106/61 - 130/72)  BP(mean): --  RR: 18 (14 May 2019 09:31) (18 - 20)  SpO2: 96% (14 May 2019 09:31) (96% - 99%)  I&O's Detail    13 May 2019 07:01  -  14 May 2019 07:00  --------------------------------------------------------  IN:    Oral Fluid: 700 mL  Total IN: 700 mL    OUT:    Voided: 400 mL  Total OUT: 400 mL    Total NET: 300 mL            PHYSICAL EXAM:  GENERAL:   HEAD:    EYES:   ENMT:   NECK:   NERVOUS SYSTEM:    CHEST/LUNG:   HEART:   ABDOMEN:   EXTREMITIES:    LYMPH:   SKIN:     LABS:            CAPILLARY BLOOD GLUCOSE        BLOOD CULTURE    RADIOLOGY & ADDITIONAL TESTS:    Imaging Personally Reviewed:  [ ] YES     Consultant(s) Notes Reviewed:  NSx    Care Discussed with Consultants/Other Providers: Nsx GORGE Hanson [facility] Medicine  Pager [phone]    Patient is a 53y old  Male who presents with a chief complaint of Cannot move legs (14 May 2019 00:57)      INTERVAL HPI/OVERNIGHT EVENTS:  Bed was not available yesterday at rehab. Reports no bowel movement yet, but able to pass gas. Feels uncomfortable (abdomen) and requests something "from below" to help him have a bowel movement.     MEDICATIONS  (STANDING):  benztropine 0.5 milliGRAM(s) Oral two times a day  bisacodyl 5 milliGRAM(s) Oral every 12 hours  desmopressin 0.2 milliGRAM(s) Oral at bedtime  docusate sodium 100 milliGRAM(s) Oral three times a day  enoxaparin Injectable 40 milliGRAM(s) SubCutaneous at bedtime  fluPHENAZine 5 milliGRAM(s) Oral two times a day  gabapentin 100 milliGRAM(s) Oral two times a day  levothyroxine 175 MICROGram(s) Oral daily  metoprolol succinate ER 50 milliGRAM(s) Oral daily  nicotine - 21 mG/24Hr(s) Patch 1 patch Transdermal daily  pantoprazole    Tablet 40 milliGRAM(s) Oral before breakfast  senna 2 Tablet(s) Oral at bedtime  valproic  acid Syrup 500 milliGRAM(s) Oral <User Schedule>  valproic  acid Syrup 750 milliGRAM(s) Oral at bedtime    MEDICATIONS  (PRN):  acetaminophen   Tablet .. 650 milliGRAM(s) Oral every 8 hours PRN Temp greater or equal to 38C (100.4F), Mild Pain (1 - 3)  aluminum hydroxide/magnesium hydroxide/simethicone Suspension 30 milliLiter(s) Oral every 4 hours PRN Dyspepsia  baclofen 10 milliGRAM(s) Oral three times a day PRN Muscle Spasm  diazepam    Tablet 5 milliGRAM(s) Oral every 6 hours PRN Spasm  nicotine  Polacrilex Gum 4 milliGRAM(s) Oral every 6 hours PRN nicotine craving  ondansetron Injectable 4 milliGRAM(s) IV Push every 6 hours PRN Nausea  oxyCODONE    IR 5 milliGRAM(s) Oral every 4 hours PRN Moderate Pain (4 - 6)  oxyCODONE    IR 10 milliGRAM(s) Oral every 4 hours PRN Severe Pain (7 - 10)  polyethylene glycol 3350 17 Gram(s) Oral daily PRN Constipation      Allergies  No Known Allergies    Intolerances    Haldol (Unknown)  Thorazine (Unknown)      REVIEW OF SYSTEMS:  Please see interval HPI:    Vital Signs Last 24 Hrs  T(C): 36.6 (14 May 2019 09:31), Max: 37 (14 May 2019 06:02)  T(F): 97.8 (14 May 2019 09:31), Max: 98.6 (14 May 2019 06:02)  HR: 91 (14 May 2019 09:31) (78 - 98)  BP: 111/62 (14 May 2019 09:31) (106/61 - 130/72)  BP(mean): --  RR: 18 (14 May 2019 09:31) (18 - 20)  SpO2: 96% (14 May 2019 09:31) (96% - 99%)  I&O's Detail    13 May 2019 07:01  -  14 May 2019 07:00  --------------------------------------------------------  IN:    Oral Fluid: 700 mL  Total IN: 700 mL    OUT:    Voided: 400 mL  Total OUT: 400 mL    Total NET: 300 mL    PHYSICAL EXAM:  GENERAL: NAD, lying in bed, arousable to voice  HEAD:  NC/AT  EYES: EOMI, clear sclera/conjunctiva  ENMT: MMM  NECK: supple  NERVOUS SYSTEM: able to move all extremities, SILT grossly intact   CHEST/LUNG: CTAB, comfortable on RA  HEART: S1S2 RRR  ABDOMEN: soft, +BS, mildly tender to palpation, no guarding  EXTREMITIES:  no c/c/e      LABS:    CAPILLARY BLOOD GLUCOSE    BLOOD CULTURE    RADIOLOGY & ADDITIONAL TESTS:    Imaging Personally Reviewed:  [ ] YES     Consultant(s) Notes Reviewed:  NSx    Care Discussed with Consultants/Other Providers: Nsx GORGE Hanson

## 2019-05-14 NOTE — PROGRESS NOTE ADULT - SUBJECTIVE AND OBJECTIVE BOX
Pipe Winkler MD  Interventional Cardiology  [place] Office : [address] [place] [address]. [address]  Tel:   [place] Office : [address] [facility] [place] [address]  Tel: [phone]  Cell : [phone]    Subjective : Pt lying in bed comfortable, not in distress, denies any chest pain or SOB  	  MEDICATIONS:  enoxaparin Injectable 40 milliGRAM(s) SubCutaneous at bedtime  metoprolol succinate ER 50 milliGRAM(s) Oral daily  acetaminophen   Tablet .. 650 milliGRAM(s) Oral every 8 hours PRN  baclofen 10 milliGRAM(s) Oral three times a day PRN  benztropine 0.5 milliGRAM(s) Oral two times a day  diazepam    Tablet 5 milliGRAM(s) Oral every 6 hours PRN  fluPHENAZine 5 milliGRAM(s) Oral two times a day  gabapentin 100 milliGRAM(s) Oral two times a day  ondansetron Injectable 4 milliGRAM(s) IV Push every 6 hours PRN  oxyCODONE    IR 5 milliGRAM(s) Oral every 4 hours PRN  oxyCODONE    IR 10 milliGRAM(s) Oral every 4 hours PRN  valproic  acid Syrup 500 milliGRAM(s) Oral <User Schedule>  valproic  acid Syrup 750 milliGRAM(s) Oral at bedtime    aluminum hydroxide/magnesium hydroxide/simethicone Suspension 30 milliLiter(s) Oral every 4 hours PRN  bisacodyl 5 milliGRAM(s) Oral every 12 hours  bisacodyl Suppository 10 milliGRAM(s) Rectal daily PRN  docusate sodium 100 milliGRAM(s) Oral three times a day  pantoprazole    Tablet 40 milliGRAM(s) Oral before breakfast  polyethylene glycol 3350 17 Gram(s) Oral daily PRN  senna 2 Tablet(s) Oral at bedtime    desmopressin 0.2 milliGRAM(s) Oral at bedtime  levothyroxine 175 MICROGram(s) Oral daily        PHYSICAL EXAM:  T(C): 36.9 (05-14-19 @ 21:05), Max: 37.2 (05-14-19 @ 12:58)  HR: 75 (05-14-19 @ 21:05) (75 - 98)  BP: 120/60 (05-14-19 @ 21:05) (111/62 - 124/66)  RR: 18 (05-14-19 @ 21:05) (17 - 18)  SpO2: 96% (05-14-19 @ 21:05) (94% - 98%)  Wt(kg): --  I&O's Summary    13 May 2019 07:01  -  14 May 2019 07:00  --------------------------------------------------------  IN: 700 mL / OUT: 400 mL / NET: 300 mL    14 May 2019 07:01  -  14 May 2019 21:38  --------------------------------------------------------  IN: 0 mL / OUT: 0 mL / NET: 0 mL      Appearance: Normal	  HEENT:   Normal oral mucosa 	  Cardiovascular: Normal S1 S2, No JVD, No murmurs  Respiratory: Lungs clear to auscultation	  Gastrointestinal:  Soft, Non-tender, + BS	  Extremities: No clubbing, cyanosis or edema                        proBNP:   Lipid Profile:   HgA1c:   TSH:

## 2019-05-15 PROCEDURE — 72141 MRI NECK SPINE W/O DYE: CPT | Mod: 26

## 2019-05-15 PROCEDURE — 99232 SBSQ HOSP IP/OBS MODERATE 35: CPT | Mod: GC

## 2019-05-15 PROCEDURE — 72148 MRI LUMBAR SPINE W/O DYE: CPT | Mod: 26

## 2019-05-15 PROCEDURE — 99233 SBSQ HOSP IP/OBS HIGH 50: CPT

## 2019-05-15 RX ADMIN — Medication 175 MICROGRAM(S): at 05:09

## 2019-05-15 RX ADMIN — Medication 50 MILLIGRAM(S): at 05:08

## 2019-05-15 RX ADMIN — GABAPENTIN 100 MILLIGRAM(S): 400 CAPSULE ORAL at 17:04

## 2019-05-15 RX ADMIN — DESMOPRESSIN ACETATE 0.2 MILLIGRAM(S): 0.1 TABLET ORAL at 23:49

## 2019-05-15 RX ADMIN — Medication 1 PATCH: at 12:01

## 2019-05-15 RX ADMIN — Medication 0.5 MILLIGRAM(S): at 17:04

## 2019-05-15 RX ADMIN — Medication 5 MILLIGRAM(S): at 05:08

## 2019-05-15 RX ADMIN — ENOXAPARIN SODIUM 40 MILLIGRAM(S): 100 INJECTION SUBCUTANEOUS at 22:59

## 2019-05-15 RX ADMIN — Medication 100 MILLIGRAM(S): at 14:34

## 2019-05-15 RX ADMIN — SENNA PLUS 2 TABLET(S): 8.6 TABLET ORAL at 23:00

## 2019-05-15 RX ADMIN — GABAPENTIN 100 MILLIGRAM(S): 400 CAPSULE ORAL at 05:08

## 2019-05-15 RX ADMIN — Medication 0.5 MILLIGRAM(S): at 05:08

## 2019-05-15 RX ADMIN — FLUPHENAZINE HYDROCHLORIDE 5 MILLIGRAM(S): 1 TABLET, FILM COATED ORAL at 05:10

## 2019-05-15 RX ADMIN — Medication 100 MILLIGRAM(S): at 23:00

## 2019-05-15 RX ADMIN — PANTOPRAZOLE SODIUM 40 MILLIGRAM(S): 20 TABLET, DELAYED RELEASE ORAL at 06:05

## 2019-05-15 RX ADMIN — Medication 1 TABLET(S): at 17:04

## 2019-05-15 RX ADMIN — OXYCODONE HYDROCHLORIDE 10 MILLIGRAM(S): 5 TABLET ORAL at 11:20

## 2019-05-15 RX ADMIN — Medication 1 PATCH: at 19:29

## 2019-05-15 RX ADMIN — Medication 1 PATCH: at 06:05

## 2019-05-15 RX ADMIN — Medication 5 MILLIGRAM(S): at 17:05

## 2019-05-15 RX ADMIN — Medication 500 MILLIGRAM(S): at 10:30

## 2019-05-15 RX ADMIN — Medication 750 MILLIGRAM(S): at 23:00

## 2019-05-15 RX ADMIN — OXYCODONE HYDROCHLORIDE 10 MILLIGRAM(S): 5 TABLET ORAL at 10:30

## 2019-05-15 RX ADMIN — FLUPHENAZINE HYDROCHLORIDE 5 MILLIGRAM(S): 1 TABLET, FILM COATED ORAL at 17:04

## 2019-05-15 RX ADMIN — Medication 100 MILLIGRAM(S): at 05:08

## 2019-05-15 RX ADMIN — Medication 1 PATCH: at 12:00

## 2019-05-15 NOTE — PROGRESS NOTE ADULT - SUBJECTIVE AND OBJECTIVE BOX
P      HPI:  54 y/o Male, resident of [facility], with h/o bipolar d/o, HTN, nocturnal enuresis, deformity of lower leg, hypothyroid, schizophrenia, BPH, IVDA c/o inability to walk due to lower extremity weakness which has been worsening for the last two weeks. Also complaining of lower back pain since sustaining a fall 2 weeks ago. In additions the pt reports paresthesias in both hands, however says that able to feel his feet. Sates that believes that he is being "possessed" and "held down" by somebody who passed away. Otherwise reports no SI/HI, fever, chills, chest pain, SOB, abdominal pain, nausea, vomiting, diarrhea, dysuria, increased frequency. Of note, the pt states that someone is trying to "poison" and "finish him off" at [place] and that he is being followed. Last BM 3 days ago; (26 Apr 2019 03:25)    noted to have severe cervical stenosis on MRI in need for cervical decompression and fusion now s/p C3-C5 decompression and fusion.   has had progressive LE weakness over last 6 months, requiring a walker, then a  WC.   noted to have worsening function over last week going from mod-> max a   MRI c-spine -A dorsal paraspinal subcutaneous nonspecific fluid collection is noted from   the C1-C7 levels measuring 10.2 cm cephalocaudad, 3.8 cm AP, and 7.1 cm   transverse. Differential considerations include an evolving postoperative   seroma/hematoma or a CSF leak. Serial imaging follow-up over time is   recommended to exclude possibility of early developing abscess.         REVIEW OF SYSTEMS: No chest pain, shortness of breath, nausea, vomiting or diarrhea.      CURRENT FUNCTIONAL STATUS: max a     MEDICATIONS  (STANDING):  benztropine 0.5 milliGRAM(s) Oral two times a day  bisacodyl 5 milliGRAM(s) Oral every 12 hours  desmopressin 0.2 milliGRAM(s) Oral at bedtime  docusate sodium 100 milliGRAM(s) Oral three times a day  enoxaparin Injectable 40 milliGRAM(s) SubCutaneous at bedtime  fluPHENAZine 5 milliGRAM(s) Oral two times a day  gabapentin 100 milliGRAM(s) Oral two times a day  levothyroxine 175 MICROGram(s) Oral daily  metoprolol succinate ER 50 milliGRAM(s) Oral daily  nicotine - 21 mG/24Hr(s) Patch 1 patch Transdermal daily  pantoprazole    Tablet 40 milliGRAM(s) Oral before breakfast  senna 2 Tablet(s) Oral at bedtime  trimethoprim  160 mG/sulfamethoxazole 800 mG 1 Tablet(s) Oral two times a day  valproic  acid Syrup 500 milliGRAM(s) Oral <User Schedule>  valproic  acid Syrup 750 milliGRAM(s) Oral at bedtime    MEDICATIONS  (PRN):  acetaminophen   Tablet .. 650 milliGRAM(s) Oral every 8 hours PRN Temp greater or equal to 38C (100.4F), Mild Pain (1 - 3)  aluminum hydroxide/magnesium hydroxide/simethicone Suspension 30 milliLiter(s) Oral every 4 hours PRN Dyspepsia  baclofen 10 milliGRAM(s) Oral three times a day PRN Muscle Spasm  bisacodyl Suppository 10 milliGRAM(s) Rectal daily PRN Constipation  diazepam    Tablet 5 milliGRAM(s) Oral every 6 hours PRN Spasm  nicotine  Polacrilex Gum 4 milliGRAM(s) Oral every 6 hours PRN nicotine craving  ondansetron Injectable 4 milliGRAM(s) IV Push every 6 hours PRN Nausea  polyethylene glycol 3350 17 Gram(s) Oral daily PRN Constipation    Vital Signs Last 24 Hrs  T(C): 36.7 (15 May 2019 16:33), Max: 37 (15 May 2019 14:46)  T(F): 98 (15 May 2019 16:33), Max: 98.6 (15 May 2019 14:46)  HR: 73 (15 May 2019 16:33) (69 - 81)  BP: 116/59 (15 May 2019 16:33) (107/63 - 124/66)  BP(mean): --  RR: 17 (15 May 2019 16:33) (16 - 20)  SpO2: 99% (15 May 2019 16:33) (96% - 99%)    ----------------------------------------------------------------------------------------  PHYSICAL EXAM  Constitutional - NAD, Comfortable  Extremities - No C/C/E, No calf tenderness   Neurologic Exam -                    Cognitive - Awake, Alert, AAO to self, place, date, year, situation     Communication - Fluent, No dysarthria, no aphasia     Cranial Nerves - CN 2-12 intact     Motor - 1/5 b/l HF/KE/ 3/5 DF. UE 4/5 throughout except  2/5. hand intrinsic atrophy                        Sensory - Intact to LT     Reflexes - DTR Intact, No primitive reflexive     Balance - unable to test   Psychiatric - Mood stable, Affect WNL

## 2019-05-15 NOTE — PROGRESS NOTE ADULT - SUBJECTIVE AND OBJECTIVE BOX
Pipe Winkler MD  Interventional Cardiology  [place] Office : [phone] [place] [address]  Tel:   [place] Office : [address] [facility] [place] [address]  Tel: [phone]  Cell : [phone]    Subjective : Pt lying in bed comfortable, not in distress, denies any chest pain or SOB  	  MEDICATIONS:  enoxaparin Injectable 40 milliGRAM(s) SubCutaneous at bedtime  metoprolol succinate ER 50 milliGRAM(s) Oral daily  trimethoprim  160 mG/sulfamethoxazole 800 mG 1 Tablet(s) Oral two times a day  acetaminophen   Tablet .. 650 milliGRAM(s) Oral every 8 hours PRN  baclofen 10 milliGRAM(s) Oral three times a day PRN  benztropine 0.5 milliGRAM(s) Oral two times a day  diazepam    Tablet 5 milliGRAM(s) Oral every 6 hours PRN  fluPHENAZine 5 milliGRAM(s) Oral two times a day  gabapentin 100 milliGRAM(s) Oral two times a day  ondansetron Injectable 4 milliGRAM(s) IV Push every 6 hours PRN  valproic  acid Syrup 500 milliGRAM(s) Oral <User Schedule>  valproic  acid Syrup 750 milliGRAM(s) Oral at bedtime  aluminum hydroxide/magnesium hydroxide/simethicone Suspension 30 milliLiter(s) Oral every 4 hours PRN  bisacodyl 5 milliGRAM(s) Oral every 12 hours  bisacodyl Suppository 10 milliGRAM(s) Rectal daily PRN  docusate sodium 100 milliGRAM(s) Oral three times a day  pantoprazole    Tablet 40 milliGRAM(s) Oral before breakfast  polyethylene glycol 3350 17 Gram(s) Oral daily PRN  senna 2 Tablet(s) Oral at bedtime  desmopressin 0.2 milliGRAM(s) Oral at bedtime  levothyroxine 175 MICROGram(s) Oral daily        PHYSICAL EXAM:  T(C): 36.7 (05-15-19 @ 20:51), Max: 37 (05-15-19 @ 14:46)  HR: 81 (05-15-19 @ 20:51) (69 - 81)  BP: 130/78 (05-15-19 @ 20:51) (107/63 - 130/78)  RR: 18 (05-15-19 @ 20:51) (16 - 20)  SpO2: 100% (05-15-19 @ 20:51) (96% - 100%)  Wt(kg): --  I&O's Summary    14 May 2019 07:01  -  15 May 2019 07:00  --------------------------------------------------------  IN: 0 mL / OUT: 0 mL / NET: 0 mL    15 May 2019 07:01  -  15 May 2019 20:58  --------------------------------------------------------  IN: 0 mL / OUT: 400 mL / NET: -400 mL      Appearance: Normal	  HEENT:   Normal oral mucosa 	  Cardiovascular: Normal S1 S2, No JVD, No murmurs  Respiratory: Lungs clear to auscultation	  Gastrointestinal:  Soft, Non-tender, + BS	  Extremities: No clubbing, cyanosis or edema                      proBNP:   Lipid Profile:   HgA1c:   TSH:

## 2019-05-15 NOTE — PROGRESS NOTE ADULT - PROBLEM SELECTOR PLAN 3
- likely component of decreased physical mobility as well as opioid pain medications  - c/w bowel regimen  - still no BM, requesting stronger medication  - has suppository prn  - may escalate at rehab if still no bowel movement

## 2019-05-15 NOTE — PROGRESS NOTE ADULT - SUBJECTIVE AND OBJECTIVE BOX
No issues overnight, PER PT notes, has regressed slightly, now requiring max assist with walking. Patient does not notice this, says he only ambulated to door and back to bed with PT      Vital Signs Last 24 Hrs  T(C): 36.8 (13 May 2019 21:45), Max: 36.9 (13 May 2019 10:49)  T(F): 98.2 (13 May 2019 21:45), Max: 98.5 (13 May 2019 10:49)  HR: 97 (13 May 2019 21:45) (70 - 97)  BP: 113/63 (13 May 2019 21:45) (106/61 - 130/72)  BP(mean): --  RR: 18 (13 May 2019 21:45) (17 - 20)  SpO2: 98% (13 May 2019 21:45) (98% - 100%)    Awake Alert oriented x 3    [X] Upper extremity                      Delt       Bicep    Tricep                                                  R         5/5        4/5        5/5       5/5                                               L          5/5        5/5        5/5       5/5  [X] Lower extremity                       HF          KE          KF        DF         PF    EHL                                               R        4/5        5/5        5/5       4/5       5/5      5/5                                               L         4/5        5/5       5/5       4/5        5/5       4/5  + GRIFFITH'S  + BABINKSI  + CLONUS  Diminished sensation to LE leg to light touch however difficult to exam and patient is easily distracted  Incision intact- redness adjacent to staples    MEDICATIONS  (STANDING):  benztropine 0.5 milliGRAM(s) Oral two times a day  bisacodyl 5 milliGRAM(s) Oral every 12 hours  desmopressin 0.2 milliGRAM(s) Oral at bedtime  docusate sodium 100 milliGRAM(s) Oral three times a day  enoxaparin Injectable 40 milliGRAM(s) SubCutaneous at bedtime  fluPHENAZine 5 milliGRAM(s) Oral two times a day  gabapentin 100 milliGRAM(s) Oral two times a day  levothyroxine 175 MICROGram(s) Oral daily  metoprolol succinate ER 50 milliGRAM(s) Oral daily  nicotine - 21 mG/24Hr(s) Patch 1 patch Transdermal daily  pantoprazole    Tablet 40 milliGRAM(s) Oral before breakfast  senna 2 Tablet(s) Oral at bedtime  valproic  acid Syrup 500 milliGRAM(s) Oral <User Schedule>  valproic  acid Syrup 750 milliGRAM(s) Oral at bedtime    MEDICATIONS  (PRN):  acetaminophen   Tablet .. 650 milliGRAM(s) Oral every 8 hours PRN Temp greater or equal to 38C (100.4F), Mild Pain (1 - 3)  aluminum hydroxide/magnesium hydroxide/simethicone Suspension 30 milliLiter(s) Oral every 4 hours PRN Dyspepsia  baclofen 10 milliGRAM(s) Oral three times a day PRN Muscle Spasm  diazepam    Tablet 5 milliGRAM(s) Oral every 6 hours PRN Spasm  nicotine  Polacrilex Gum 4 milliGRAM(s) Oral every 6 hours PRN nicotine craving  ondansetron Injectable 4 milliGRAM(s) IV Push every 6 hours PRN Nausea  oxyCODONE    IR 5 milliGRAM(s) Oral every 4 hours PRN Moderate Pain (4 - 6)  oxyCODONE    IR 10 milliGRAM(s) Oral every 4 hours PRN Severe Pain (7 - 10)  polyethylene glycol 3350 17 Gram(s) Oral daily PRN Constipation

## 2019-05-15 NOTE — PROGRESS NOTE ADULT - SUBJECTIVE AND OBJECTIVE BOX
[facility]  Pager [phone]    Patient is a 53y old  Male who presents with a chief complaint of Cannot move legs (15 May 2019 01:11)    INTERVAL HPI/OVERNIGHT EVENTS:  Reports no bowel movement yet. Did not request prn suppository from nurse yet. Felt by PT to be weaker in the lower extremities, Nsx ordered patient for urgent MRIs to rule out worsening stenosis.     D/w nurse, re: bowel movements, none noted today so far.     MEDICATIONS  (STANDING):  benztropine 0.5 milliGRAM(s) Oral two times a day  bisacodyl 5 milliGRAM(s) Oral every 12 hours  desmopressin 0.2 milliGRAM(s) Oral at bedtime  docusate sodium 100 milliGRAM(s) Oral three times a day  enoxaparin Injectable 40 milliGRAM(s) SubCutaneous at bedtime  fluPHENAZine 5 milliGRAM(s) Oral two times a day  gabapentin 100 milliGRAM(s) Oral two times a day  levothyroxine 175 MICROGram(s) Oral daily  metoprolol succinate ER 50 milliGRAM(s) Oral daily  nicotine - 21 mG/24Hr(s) Patch 1 patch Transdermal daily  pantoprazole    Tablet 40 milliGRAM(s) Oral before breakfast  senna 2 Tablet(s) Oral at bedtime  valproic  acid Syrup 500 milliGRAM(s) Oral <User Schedule>  valproic  acid Syrup 750 milliGRAM(s) Oral at bedtime    MEDICATIONS  (PRN):  acetaminophen   Tablet .. 650 milliGRAM(s) Oral every 8 hours PRN Temp greater or equal to 38C (100.4F), Mild Pain (1 - 3)  aluminum hydroxide/magnesium hydroxide/simethicone Suspension 30 milliLiter(s) Oral every 4 hours PRN Dyspepsia  baclofen 10 milliGRAM(s) Oral three times a day PRN Muscle Spasm  bisacodyl Suppository 10 milliGRAM(s) Rectal daily PRN Constipation  diazepam    Tablet 5 milliGRAM(s) Oral every 6 hours PRN Spasm  nicotine  Polacrilex Gum 4 milliGRAM(s) Oral every 6 hours PRN nicotine craving  ondansetron Injectable 4 milliGRAM(s) IV Push every 6 hours PRN Nausea  oxyCODONE    IR 5 milliGRAM(s) Oral every 4 hours PRN Moderate Pain (4 - 6)  oxyCODONE    IR 10 milliGRAM(s) Oral every 4 hours PRN Severe Pain (7 - 10)  polyethylene glycol 3350 17 Gram(s) Oral daily PRN Constipation    Allergies  No Known Allergies    Intolerances    Haldol (Unknown)  Thorazine (Unknown)      REVIEW OF SYSTEMS:  Please see interval HPI:    Vital Signs Last 24 Hrs  T(C): 36.8 (15 May 2019 10:46), Max: 37.2 (14 May 2019 12:58)  T(F): 98.3 (15 May 2019 10:46), Max: 98.9 (14 May 2019 12:58)  HR: 78 (15 May 2019 10:46) (69 - 87)  BP: 120/62 (15 May 2019 10:46) (107/63 - 124/66)  BP(mean): --  RR: 20 (15 May 2019 10:46) (17 - 20)  SpO2: 98% (15 May 2019 10:46) (94% - 98%)  I&O's Detail    14 May 2019 07:01  -  15 May 2019 07:00  --------------------------------------------------------  IN:  Total IN: 0 mL    OUT:  Total OUT: 0 mL    Total NET: 0 mL      15 May 2019 07:01  -  15 May 2019 10:52  --------------------------------------------------------  IN:  Total IN: 0 mL    OUT:    Voided: 400 mL  Total OUT: 400 mL    Total NET: -400 mL    PHYSICAL EXAM:  GENERAL: NAD, sleeping in bed, arousable to voice.   HEAD:  NC/AT  EYES: EOMI, clear sclera/conjunctiva  ENMT: MMM  NECK: supple  NERVOUS SYSTEM: decreased movement in b/l LE when asked to do so today, able to move b/l upper extremities   CHEST/LUNG: CTAB, comfortable on RA  HEART: S1S2 RRR  ABDOMEN: soft, non-tender  EXTREMITIES:  no c/c/e    LABS:    CAPILLARY BLOOD GLUCOSE    BLOOD CULTURE    RADIOLOGY & ADDITIONAL TESTS:    Imaging Personally Reviewed:  [ ] YES   MRI spine pending    Consultant(s) Notes Reviewed:  Nsx    Care Discussed with Consultants/Other Providers: Js Roa

## 2019-05-16 ENCOUNTER — TRANSCRIPTION ENCOUNTER (OUTPATIENT)
Age: 54
End: 2019-05-16

## 2019-05-16 ENCOUNTER — INPATIENT (INPATIENT)
Facility: HOSPITAL | Age: 54
LOS: 13 days | Discharge: NURSING, REHAB OR LONG-TERM CARE | DRG: 950 | End: 2019-05-30
Attending: PHYSICAL MEDICINE & REHABILITATION | Admitting: SPECIALIST
Payer: MEDICARE

## 2019-05-16 VITALS
TEMPERATURE: 98 F | OXYGEN SATURATION: 98 % | HEART RATE: 78 BPM | RESPIRATION RATE: 18 BRPM | DIASTOLIC BLOOD PRESSURE: 73 MMHG | SYSTOLIC BLOOD PRESSURE: 119 MMHG

## 2019-05-16 VITALS
RESPIRATION RATE: 14 BRPM | DIASTOLIC BLOOD PRESSURE: 78 MMHG | WEIGHT: 242.29 LBS | TEMPERATURE: 98 F | SYSTOLIC BLOOD PRESSURE: 132 MMHG | OXYGEN SATURATION: 98 % | HEART RATE: 83 BPM

## 2019-05-16 DIAGNOSIS — M48.02 SPINAL STENOSIS, CERVICAL REGION: ICD-10-CM

## 2019-05-16 DIAGNOSIS — Z98.1 ARTHRODESIS STATUS: ICD-10-CM

## 2019-05-16 PROCEDURE — 99233 SBSQ HOSP IP/OBS HIGH 50: CPT

## 2019-05-16 RX ORDER — ENOXAPARIN SODIUM 100 MG/ML
40 INJECTION SUBCUTANEOUS AT BEDTIME
Refills: 0 | Status: DISCONTINUED | OUTPATIENT
Start: 2019-05-16 | End: 2019-05-16

## 2019-05-16 RX ORDER — POLYETHYLENE GLYCOL 3350 17 G/17G
17 POWDER, FOR SOLUTION ORAL DAILY
Refills: 0 | Status: DISCONTINUED | OUTPATIENT
Start: 2019-05-16 | End: 2019-05-30

## 2019-05-16 RX ORDER — PANTOPRAZOLE SODIUM 20 MG/1
40 TABLET, DELAYED RELEASE ORAL
Refills: 0 | Status: DISCONTINUED | OUTPATIENT
Start: 2019-05-16 | End: 2019-05-30

## 2019-05-16 RX ORDER — ALBUTEROL 90 UG/1
2 AEROSOL, METERED ORAL EVERY 6 HOURS
Refills: 0 | Status: DISCONTINUED | OUTPATIENT
Start: 2019-05-16 | End: 2019-05-23

## 2019-05-16 RX ORDER — METOPROLOL TARTRATE 50 MG
50 TABLET ORAL DAILY
Refills: 0 | Status: DISCONTINUED | OUTPATIENT
Start: 2019-05-16 | End: 2019-05-30

## 2019-05-16 RX ORDER — SENNA PLUS 8.6 MG/1
2 TABLET ORAL AT BEDTIME
Refills: 0 | Status: DISCONTINUED | OUTPATIENT
Start: 2019-05-16 | End: 2019-05-20

## 2019-05-16 RX ORDER — ACETAMINOPHEN 500 MG
650 TABLET ORAL EVERY 8 HOURS
Refills: 0 | Status: DISCONTINUED | OUTPATIENT
Start: 2019-05-16 | End: 2019-05-16

## 2019-05-16 RX ORDER — NICOTINE POLACRILEX 2 MG
1 GUM BUCCAL DAILY
Refills: 0 | Status: DISCONTINUED | OUTPATIENT
Start: 2019-05-16 | End: 2019-05-30

## 2019-05-16 RX ORDER — BACLOFEN 100 %
10 POWDER (GRAM) MISCELLANEOUS THREE TIMES A DAY
Refills: 0 | Status: DISCONTINUED | OUTPATIENT
Start: 2019-05-16 | End: 2019-05-17

## 2019-05-16 RX ORDER — DOCUSATE SODIUM 100 MG
100 CAPSULE ORAL
Refills: 0 | Status: DISCONTINUED | OUTPATIENT
Start: 2019-05-16 | End: 2019-05-30

## 2019-05-16 RX ORDER — ACETAMINOPHEN 500 MG
650 TABLET ORAL EVERY 6 HOURS
Refills: 0 | Status: DISCONTINUED | OUTPATIENT
Start: 2019-05-16 | End: 2019-05-30

## 2019-05-16 RX ORDER — VALPROIC ACID (AS SODIUM SALT) 250 MG/5ML
750 SOLUTION, ORAL ORAL AT BEDTIME
Refills: 0 | Status: DISCONTINUED | OUTPATIENT
Start: 2019-05-16 | End: 2019-05-20

## 2019-05-16 RX ORDER — DIAZEPAM 5 MG
5 TABLET ORAL EVERY 6 HOURS
Refills: 0 | Status: DISCONTINUED | OUTPATIENT
Start: 2019-05-16 | End: 2019-05-23

## 2019-05-16 RX ORDER — OXYCODONE HYDROCHLORIDE 5 MG/1
5 TABLET ORAL EVERY 4 HOURS
Refills: 0 | Status: DISCONTINUED | OUTPATIENT
Start: 2019-05-16 | End: 2019-05-17

## 2019-05-16 RX ORDER — LEVOTHYROXINE SODIUM 125 MCG
175 TABLET ORAL DAILY
Refills: 0 | Status: DISCONTINUED | OUTPATIENT
Start: 2019-05-16 | End: 2019-05-30

## 2019-05-16 RX ORDER — SENNA PLUS 8.6 MG/1
2 TABLET ORAL AT BEDTIME
Refills: 0 | Status: DISCONTINUED | OUTPATIENT
Start: 2019-05-16 | End: 2019-05-16

## 2019-05-16 RX ORDER — METOPROLOL TARTRATE 50 MG
50 TABLET ORAL DAILY
Refills: 0 | Status: DISCONTINUED | OUTPATIENT
Start: 2019-05-16 | End: 2019-05-16

## 2019-05-16 RX ORDER — PANTOPRAZOLE SODIUM 20 MG/1
40 TABLET, DELAYED RELEASE ORAL
Refills: 0 | Status: DISCONTINUED | OUTPATIENT
Start: 2019-05-16 | End: 2019-05-16

## 2019-05-16 RX ORDER — DESMOPRESSIN ACETATE 0.1 MG/1
0.2 TABLET ORAL AT BEDTIME
Refills: 0 | Status: DISCONTINUED | OUTPATIENT
Start: 2019-05-16 | End: 2019-05-16

## 2019-05-16 RX ORDER — DOCUSATE SODIUM 100 MG
100 CAPSULE ORAL THREE TIMES A DAY
Refills: 0 | Status: DISCONTINUED | OUTPATIENT
Start: 2019-05-16 | End: 2019-05-16

## 2019-05-16 RX ORDER — FLUPHENAZINE HYDROCHLORIDE 1 MG/1
5 TABLET, FILM COATED ORAL
Refills: 0 | Status: DISCONTINUED | OUTPATIENT
Start: 2019-05-16 | End: 2019-05-30

## 2019-05-16 RX ORDER — BENZTROPINE MESYLATE 1 MG
0.5 TABLET ORAL
Refills: 0 | Status: DISCONTINUED | OUTPATIENT
Start: 2019-05-16 | End: 2019-05-30

## 2019-05-16 RX ORDER — LEVOTHYROXINE SODIUM 125 MCG
175 TABLET ORAL DAILY
Refills: 0 | Status: DISCONTINUED | OUTPATIENT
Start: 2019-05-16 | End: 2019-05-16

## 2019-05-16 RX ORDER — VALPROIC ACID (AS SODIUM SALT) 250 MG/5ML
500 SOLUTION, ORAL ORAL
Refills: 0 | Status: DISCONTINUED | OUTPATIENT
Start: 2019-05-16 | End: 2019-05-28

## 2019-05-16 RX ORDER — NICOTINE POLACRILEX 2 MG
1 GUM BUCCAL DAILY
Refills: 0 | Status: DISCONTINUED | OUTPATIENT
Start: 2019-05-16 | End: 2019-05-16

## 2019-05-16 RX ORDER — NICOTINE POLACRILEX 2 MG
4 GUM BUCCAL EVERY 6 HOURS
Refills: 0 | Status: DISCONTINUED | OUTPATIENT
Start: 2019-05-16 | End: 2019-05-30

## 2019-05-16 RX ORDER — VALPROIC ACID (AS SODIUM SALT) 250 MG/5ML
750 SOLUTION, ORAL ORAL AT BEDTIME
Refills: 0 | Status: DISCONTINUED | OUTPATIENT
Start: 2019-05-16 | End: 2019-05-16

## 2019-05-16 RX ORDER — POLYETHYLENE GLYCOL 3350 17 G/17G
17 POWDER, FOR SOLUTION ORAL DAILY
Refills: 0 | Status: DISCONTINUED | OUTPATIENT
Start: 2019-05-16 | End: 2019-05-16

## 2019-05-16 RX ORDER — BENZTROPINE MESYLATE 1 MG
0.5 TABLET ORAL
Refills: 0 | Status: DISCONTINUED | OUTPATIENT
Start: 2019-05-16 | End: 2019-05-16

## 2019-05-16 RX ORDER — DIAZEPAM 5 MG
5 TABLET ORAL EVERY 6 HOURS
Refills: 0 | Status: DISCONTINUED | OUTPATIENT
Start: 2019-05-16 | End: 2019-05-16

## 2019-05-16 RX ORDER — ENOXAPARIN SODIUM 100 MG/ML
40 INJECTION SUBCUTANEOUS EVERY 24 HOURS
Refills: 0 | Status: DISCONTINUED | OUTPATIENT
Start: 2019-05-16 | End: 2019-05-30

## 2019-05-16 RX ORDER — VALPROIC ACID (AS SODIUM SALT) 250 MG/5ML
500 SOLUTION, ORAL ORAL
Refills: 0 | Status: DISCONTINUED | OUTPATIENT
Start: 2019-05-16 | End: 2019-05-16

## 2019-05-16 RX ORDER — BACLOFEN 100 %
10 POWDER (GRAM) MISCELLANEOUS THREE TIMES A DAY
Refills: 0 | Status: DISCONTINUED | OUTPATIENT
Start: 2019-05-16 | End: 2019-05-16

## 2019-05-16 RX ORDER — GABAPENTIN 400 MG/1
100 CAPSULE ORAL
Refills: 0 | Status: DISCONTINUED | OUTPATIENT
Start: 2019-05-16 | End: 2019-05-16

## 2019-05-16 RX ORDER — FLUPHENAZINE HYDROCHLORIDE 1 MG/1
5 TABLET, FILM COATED ORAL
Refills: 0 | Status: DISCONTINUED | OUTPATIENT
Start: 2019-05-16 | End: 2019-05-16

## 2019-05-16 RX ORDER — GABAPENTIN 400 MG/1
100 CAPSULE ORAL
Refills: 0 | Status: DISCONTINUED | OUTPATIENT
Start: 2019-05-16 | End: 2019-05-28

## 2019-05-16 RX ORDER — DESMOPRESSIN ACETATE 0.1 MG/1
0.2 TABLET ORAL AT BEDTIME
Refills: 0 | Status: DISCONTINUED | OUTPATIENT
Start: 2019-05-16 | End: 2019-05-30

## 2019-05-16 RX ORDER — IPRATROPIUM/ALBUTEROL SULFATE 18-103MCG
3 AEROSOL WITH ADAPTER (GRAM) INHALATION EVERY 6 HOURS
Refills: 0 | Status: DISCONTINUED | OUTPATIENT
Start: 2019-05-16 | End: 2019-05-20

## 2019-05-16 RX ADMIN — GABAPENTIN 100 MILLIGRAM(S): 400 CAPSULE ORAL at 23:12

## 2019-05-16 RX ADMIN — Medication 650 MILLIGRAM(S): at 14:48

## 2019-05-16 RX ADMIN — FLUPHENAZINE HYDROCHLORIDE 5 MILLIGRAM(S): 1 TABLET, FILM COATED ORAL at 06:37

## 2019-05-16 RX ADMIN — Medication 750 MILLIGRAM(S): at 23:12

## 2019-05-16 RX ADMIN — Medication 100 MILLIGRAM(S): at 06:36

## 2019-05-16 RX ADMIN — Medication 100 MILLIGRAM(S): at 23:12

## 2019-05-16 RX ADMIN — FLUPHENAZINE HYDROCHLORIDE 5 MILLIGRAM(S): 1 TABLET, FILM COATED ORAL at 23:12

## 2019-05-16 RX ADMIN — Medication 5 MILLIGRAM(S): at 23:12

## 2019-05-16 RX ADMIN — Medication 1 PATCH: at 16:40

## 2019-05-16 RX ADMIN — ENOXAPARIN SODIUM 40 MILLIGRAM(S): 100 INJECTION SUBCUTANEOUS at 23:13

## 2019-05-16 RX ADMIN — Medication 1 PATCH: at 14:40

## 2019-05-16 RX ADMIN — Medication 1 PATCH: at 14:26

## 2019-05-16 RX ADMIN — Medication 0.5 MILLIGRAM(S): at 23:12

## 2019-05-16 RX ADMIN — Medication 1 TABLET(S): at 06:36

## 2019-05-16 RX ADMIN — GABAPENTIN 100 MILLIGRAM(S): 400 CAPSULE ORAL at 06:37

## 2019-05-16 RX ADMIN — PANTOPRAZOLE SODIUM 40 MILLIGRAM(S): 20 TABLET, DELAYED RELEASE ORAL at 06:36

## 2019-05-16 RX ADMIN — Medication 500 MILLIGRAM(S): at 10:06

## 2019-05-16 RX ADMIN — DESMOPRESSIN ACETATE 0.2 MILLIGRAM(S): 0.1 TABLET ORAL at 23:12

## 2019-05-16 RX ADMIN — Medication 650 MILLIGRAM(S): at 15:30

## 2019-05-16 RX ADMIN — Medication 100 MILLIGRAM(S): at 14:40

## 2019-05-16 RX ADMIN — Medication 1 TABLET(S): at 23:12

## 2019-05-16 RX ADMIN — Medication 5 MILLIGRAM(S): at 06:37

## 2019-05-16 RX ADMIN — Medication 50 MILLIGRAM(S): at 06:37

## 2019-05-16 RX ADMIN — Medication 175 MICROGRAM(S): at 06:37

## 2019-05-16 RX ADMIN — Medication 0.5 MILLIGRAM(S): at 06:37

## 2019-05-16 NOTE — PROGRESS NOTE ADULT - PROBLEM SELECTOR PLAN 2
Recent TSH wnl (3/11/19). Mildly elevated on this admission.   - continue home regimen of levothyroxine 175 mcg daily   - will need outpatient f/u
Recent TSH wnl (3/11/19). Mildly elevated on this admission.   - continue home regimen of levothyroxine 175 mcg daily   - will need outpatient f/u for repeat TFTs in 4-6 wks
Recent TSH wnl (3/11/19). Mildly elevated on this admission.   - continue home regimen of levothyroxine 175 mcg daily   - will need outpatient f/u for repeat TFTs in 4-6 wks
Recent TSH wnl (3/11/19). Mildly elevated on this admission.   - continue home regimen of levothyroxine 175 mcg daily   - will need to clarify with [place] medical providers if pt's dose was recently adjusted  - will need outpatient f/u
Recent TSH wnl (3/11/19). Mildly elevated on this admission.   - continue home regimen of levothyroxine 175 mcg daily   - will need to clarify with [place] medical providers if pt's dose was recently adjusted  - will need outpatient f/u
Recent TSH wnl (3/11/19). Mildly elevated on this admission.   - continue home regimen of levothyroxine 175 mcg daily   - will clarify with [place] medical providers if pt's dose was recently adjusted  - will need outpatient f/u
Recent TSH wnl (3/11/19). Mildly elevated on this admission.   - continue home regimen of levothyroxine 175 mcg daily   - will clarify with [place] medical providers if pt's dose was recently adjusted  - will need outpatient f/u
Recent TSH wnl (3/11/19). Mildly elevated on this admission.   - continue home regimen of levothyroxine 175 mcg daily   - will need outpatient f/u
Recent TSH wnl (3/11/19). Mildly elevated on this admission.   - continue home regimen of levothyroxine 175 mcg daily   - will need outpatient f/u
Recent TSH wnl (3/11/19). Mildly elevated on this admission.   - continue home regimen of levothyroxine 175 mcg daily   - will need outpatient f/u for repeat TFTs in 4-6 wks
Recent TSH wnl (3/11/19). Mildly elevated on this admission.   - continue home regimen of levothyroxine 175 mcg daily   - will need outpatient f/u for repeat TFTs in 4-6 wks
Recent TSH wnl (3/11/19). Mildly elevated on this admission.   - continue home regimen of levothyroxine 175 mcg daily   - will need to clarify with [place] medical providers if pt's dose was recently adjusted  - will need outpatient f/u

## 2019-05-16 NOTE — H&P ADULT - NSHPLABSRESULTS_GEN_ALL_CORE
5/15/19 Cervical Spine MRI  IMPRESSION:    There has been a marked interval improvement in the spinal cord   compression status post canal decompression compared to the 04/26/2019   preoperative MRI study. The spinal cord edema appears decreased. Moderate   canal stenosis is however present secondary to the dorsal epidural   paraspinal fluid collection, most prominent at the C4-C5 level.    Last WBC 5/9/19  11.4 5/15/19 Cervical Spine MRI  IMPRESSION:    There has been a marked interval improvement in the spinal cord   compression status post canal decompression compared to the 04/26/2019   preoperative MRI study. The spinal cord edema appears decreased. Moderate   canal stenosis is however present secondary to the dorsal epidural   paraspinal fluid collection, most prominent at the C4-C5 level.                            12.7   6.44  )-----------( 384      ( 17 May 2019 06:12 )             39.2     05-17    140  |  104  |  16  ----------------------------<  93  4.2   |  29  |  0.62    Ca    9.4      17 May 2019 06:12    TPro  6.6  /  Alb  2.0<L>  /  TBili  0.2  /  DBili  x   /  AST  15  /  ALT  16  /  AlkPhos  102  05-17

## 2019-05-16 NOTE — H&P ADULT - ATTENDING COMMENTS
Agree with above.  Pt is a 53 year-old man with a PMH of bipolar disorder/schizophrenia, lower leg deformity  with Gait Instability, ADL impairments and Functional impairments after cervical stenosis s/p cervical fusion and laminectomy.  Pt requires comprehensive rehab with physician management of comorbidities.

## 2019-05-16 NOTE — PROGRESS NOTE ADULT - PROBLEM SELECTOR PROBLEM 4
Suprapubic tenderness
Bipolar disorder
Suprapubic tenderness

## 2019-05-16 NOTE — H&P ADULT - HISTORY OF PRESENT ILLNESS
53 year old man, resident of [facility], with h/o bipolar d/o, HTN, nocturnal enuresis, deformity of lower leg, hypothyroid, schizophrenia, BPH, IVDA presented to [facility] on 4/25 with inability to walk due to lower extremity weakness which had been worsening for the previous two weeks. Also complained of lower back pain since sustaining a fall 2 weeks prior. In additions the pt reported paresthesias in both hands, however stated that he was able to feel his feet. Stated that he believes that he is being "possessed" and "held down" by somebody who passed away. Of note, the pt states that someone is trying to "poison" and "finish him off" at [place] and that he is being followed. Motion degraded MRI Cervical and thoracic spine obtained on 4/26 after patient had +duarte's, clonus and babinski on exam, showed multilevel cervical stenosis and possible cord signal change, started on decadron. On 5/1 he had C3-C6 laminectomy and fusion.  MRI C Spine on 5/15 to check for worsening stenosis showed A dorsal paraspinal subcutaneous nonspecific fluid collection noted from the C1-C7 levels measuring 10.2 cm cephalocaudad, 3.8 cm AP, and 7.1 cm transverse. Differential considerations include an evolving postoperative seroma/hematoma or a CSF leak. Serial imaging follow-up over time is recommended to exclude possibility of early developing abscess. Reviewed by neurosurgery, no need for surgical intervention with no new symptoms.

## 2019-05-16 NOTE — PROGRESS NOTE ADULT - PROVIDER SPECIALTY LIST ADULT
Cardiology
Hospitalist
Neurology
Neurology
Neurosurgery
Rehab Medicine
Cardiology
Hospitalist
Neurosurgery
Rehab Medicine
Rehab Medicine
Neurosurgery

## 2019-05-16 NOTE — PROGRESS NOTE ADULT - SUBJECTIVE AND OBJECTIVE BOX
Pipe Winkler MD  Interventional Cardiology  [place] Office : [phone] [place] [address]  Tel:   [place] Office : [address] [facility] [place] [address]  Tel: [phone]  Cell : [phone]    Subjective : Pt lying in bed comfortable, not in distress, denies any chest pain or SOB  	  MEDICATIONS:  enoxaparin Injectable 40 milliGRAM(s) SubCutaneous at bedtime  metoprolol succinate ER 50 milliGRAM(s) Oral daily  trimethoprim  160 mG/sulfamethoxazole 800 mG 1 Tablet(s) Oral two times a day  acetaminophen   Tablet .. 650 milliGRAM(s) Oral every 8 hours PRN  baclofen 10 milliGRAM(s) Oral three times a day PRN  benztropine 0.5 milliGRAM(s) Oral two times a day  diazepam    Tablet 5 milliGRAM(s) Oral every 6 hours PRN  fluPHENAZine 5 milliGRAM(s) Oral two times a day  gabapentin 100 milliGRAM(s) Oral two times a day  ondansetron Injectable 4 milliGRAM(s) IV Push every 6 hours PRN  valproic  acid Syrup 500 milliGRAM(s) Oral <User Schedule>  valproic  acid Syrup 750 milliGRAM(s) Oral at bedtime  aluminum hydroxide/magnesium hydroxide/simethicone Suspension 30 milliLiter(s) Oral every 4 hours PRN  bisacodyl 5 milliGRAM(s) Oral every 12 hours  bisacodyl Suppository 10 milliGRAM(s) Rectal daily PRN  docusate sodium 100 milliGRAM(s) Oral three times a day  pantoprazole    Tablet 40 milliGRAM(s) Oral before breakfast  polyethylene glycol 3350 17 Gram(s) Oral daily PRN  senna 2 Tablet(s) Oral at bedtime  desmopressin 0.2 milliGRAM(s) Oral at bedtime  levothyroxine 175 MICROGram(s) Oral daily        PHYSICAL EXAM:  T(C): 36.7 (05-16-19 @ 10:19), Max: 37 (05-15-19 @ 14:46)  HR: 66 (05-16-19 @ 10:19) (66 - 81)  BP: 120/63 (05-16-19 @ 10:19) (116/59 - 130/78)  RR: 16 (05-16-19 @ 10:19) (16 - 20)  SpO2: 100% (05-16-19 @ 10:19) (97% - 100%)  Wt(kg): --  I&O's Summary    15 May 2019 07:01  -  16 May 2019 07:00  --------------------------------------------------------  IN: 0 mL / OUT: 400 mL / NET: -400 mL        Appearance: Normal	  HEENT:   Normal oral mucosa 	  Cardiovascular: Normal S1 S2, No JVD, No murmurs  Respiratory: Lungs clear to auscultation	  Gastrointestinal:  Soft, Non-tender, + BS	  Extremities: No clubbing, cyanosis or edema                    proBNP:   Lipid Profile:   HgA1c:   TSH:

## 2019-05-16 NOTE — PROGRESS NOTE ADULT - PROBLEM SELECTOR PLAN 5
- continue with fluphenazine   - Neurontin 100mg po bid  - Prolixin 5mg po bid  - Cogentin 0.5mg po bid  ** Pt also receives Prolixin 50mg IM decanoate monthly, last received 4/23/19- due on 5/21/19.  - appreciate psych recs
- continue with fluphenazine   - Neurontin 100mg po bid  - Prolixin 5mg po bid  - Cogentin 0.5mg po bid  ** Pt also receives Prolixin 50mg IM decanoate monthly, last received 4/23/19- due on 5/21/19.  - appreciate psych recs
- continue with fluphenazine   - psychiatry consulted for further management, recs appreciated  - Neurontin 100mg po bid  - Prolixin 5mg po bid  - Cogentin 0.5mg po bid  ** Pt also receives Prolixin 50mg IM decanoate monthly, last received 4/23/19- due on 5/21/19
- continue with fluphenazine   - Neurontin 100mg po bid  - Prolixin 5mg po bid  - Cogentin 0.5mg po bid  ** Pt also receives Prolixin 50mg IM decanoate monthly, last received 4/23/19- due on 5/21/19.  - appreciate psych recs
- continue with fluphenazine   - Neurontin 100mg po bid  - Prolixin 5mg po bid  - Cogentin 0.5mg po bid  ** Pt also receives Prolixin 50mg IM decanoate monthly, last received 4/23/19- due on 5/21/19.  - appreciate psych recs
- continue with fluphenazine   - psychiatry consulted for further management, recs appreciated  - Neurontin 100mg po bid  - Prolixin 5mg po bid  - Cogentin 0.5mg po bid  ** Pt also receives Prolixin 50mg IM decanoate monthly, last received 4/23/19- due on 5/21/19
Noted subtherapeutic valproic acid level. Lithium recently d/c'd on 4/23. Thus, explains reason for low lithium level. Psychiatry consulted for further management, recs appreciated  - VPA 500mg qam + 750mg qhs  -Neurontin 100mg po bid  Prolixin 5mg po bid  Cogentin 0.5mg po bid

## 2019-05-16 NOTE — PROGRESS NOTE ADULT - PROBLEM SELECTOR PROBLEM 5
Bipolar disorder
Schizophrenia, unspecified type
Bipolar disorder
Schizophrenia, unspecified type

## 2019-05-16 NOTE — PROGRESS NOTE ADULT - SUBJECTIVE AND OBJECTIVE BOX
[facility] Medicine  Pager [phone]    Patient is a 53y old  Male who presents with a chief complaint of Cannot move legs (15 May 2019 16:32)      INTERVAL HPI/OVERNIGHT EVENTS:    MEDICATIONS  (STANDING):  benztropine 0.5 milliGRAM(s) Oral two times a day  bisacodyl 5 milliGRAM(s) Oral every 12 hours  desmopressin 0.2 milliGRAM(s) Oral at bedtime  docusate sodium 100 milliGRAM(s) Oral three times a day  enoxaparin Injectable 40 milliGRAM(s) SubCutaneous at bedtime  fluPHENAZine 5 milliGRAM(s) Oral two times a day  gabapentin 100 milliGRAM(s) Oral two times a day  levothyroxine 175 MICROGram(s) Oral daily  metoprolol succinate ER 50 milliGRAM(s) Oral daily  nicotine - 21 mG/24Hr(s) Patch 1 patch Transdermal daily  pantoprazole    Tablet 40 milliGRAM(s) Oral before breakfast  senna 2 Tablet(s) Oral at bedtime  trimethoprim  160 mG/sulfamethoxazole 800 mG 1 Tablet(s) Oral two times a day  valproic  acid Syrup 500 milliGRAM(s) Oral <User Schedule>  valproic  acid Syrup 750 milliGRAM(s) Oral at bedtime    MEDICATIONS  (PRN):  acetaminophen   Tablet .. 650 milliGRAM(s) Oral every 8 hours PRN Temp greater or equal to 38C (100.4F), Mild Pain (1 - 3)  aluminum hydroxide/magnesium hydroxide/simethicone Suspension 30 milliLiter(s) Oral every 4 hours PRN Dyspepsia  baclofen 10 milliGRAM(s) Oral three times a day PRN Muscle Spasm  bisacodyl Suppository 10 milliGRAM(s) Rectal daily PRN Constipation  diazepam    Tablet 5 milliGRAM(s) Oral every 6 hours PRN Spasm  nicotine  Polacrilex Gum 4 milliGRAM(s) Oral every 6 hours PRN nicotine craving  ondansetron Injectable 4 milliGRAM(s) IV Push every 6 hours PRN Nausea  polyethylene glycol 3350 17 Gram(s) Oral daily PRN Constipation      Allergies    No Known Allergies    Intolerances    Haldol (Unknown)  Thorazine (Unknown)      REVIEW OF SYSTEMS:  Please see interval HPI:    Vital Signs Last 24 Hrs  T(C): 36.7 (16 May 2019 10:19), Max: 37 (15 May 2019 14:46)  T(F): 98.1 (16 May 2019 10:19), Max: 98.6 (15 May 2019 14:46)  HR: 66 (16 May 2019 10:19) (66 - 81)  BP: 120/63 (16 May 2019 10:19) (116/59 - 130/78)  BP(mean): --  RR: 16 (16 May 2019 10:19) (16 - 20)  SpO2: 100% (16 May 2019 10:19) (97% - 100%)  I&O's Detail    15 May 2019 07:01  -  16 May 2019 07:00  --------------------------------------------------------  IN:  Total IN: 0 mL    OUT:    Voided: 400 mL  Total OUT: 400 mL    Total NET: -400 mL            PHYSICAL EXAM:  GENERAL:   HEAD:    EYES:   ENMT:   NECK:   NERVOUS SYSTEM:    CHEST/LUNG:   HEART:   ABDOMEN:   EXTREMITIES:    LYMPH:   SKIN:     LABS:            CAPILLARY BLOOD GLUCOSE        BLOOD CULTURE    RADIOLOGY & ADDITIONAL TESTS:    Imaging Personally Reviewed:  [ ] YES     Consultant(s) Notes Reviewed:      Care Discussed with Consultants/Other Providers: [facility] Medicine  Pager [phone]    Patient is a 53y old  Male who presents with a chief complaint of Cannot move legs (15 May 2019 16:32)      INTERVAL HPI/OVERNIGHT EVENTS:  Underwent MRI showing interval improvement in cord compression. No cauda equina syndrome.   Reports feeling well, states finally had a bowel movement yesterday. No complaints today.   Encouraged patient to work with physical therapist.       MEDICATIONS  (STANDING):  benztropine 0.5 milliGRAM(s) Oral two times a day  bisacodyl 5 milliGRAM(s) Oral every 12 hours  desmopressin 0.2 milliGRAM(s) Oral at bedtime  docusate sodium 100 milliGRAM(s) Oral three times a day  enoxaparin Injectable 40 milliGRAM(s) SubCutaneous at bedtime  fluPHENAZine 5 milliGRAM(s) Oral two times a day  gabapentin 100 milliGRAM(s) Oral two times a day  levothyroxine 175 MICROGram(s) Oral daily  metoprolol succinate ER 50 milliGRAM(s) Oral daily  nicotine - 21 mG/24Hr(s) Patch 1 patch Transdermal daily  pantoprazole    Tablet 40 milliGRAM(s) Oral before breakfast  senna 2 Tablet(s) Oral at bedtime  trimethoprim  160 mG/sulfamethoxazole 800 mG 1 Tablet(s) Oral two times a day  valproic  acid Syrup 500 milliGRAM(s) Oral <User Schedule>  valproic  acid Syrup 750 milliGRAM(s) Oral at bedtime    MEDICATIONS  (PRN):  acetaminophen   Tablet .. 650 milliGRAM(s) Oral every 8 hours PRN Temp greater or equal to 38C (100.4F), Mild Pain (1 - 3)  aluminum hydroxide/magnesium hydroxide/simethicone Suspension 30 milliLiter(s) Oral every 4 hours PRN Dyspepsia  baclofen 10 milliGRAM(s) Oral three times a day PRN Muscle Spasm  bisacodyl Suppository 10 milliGRAM(s) Rectal daily PRN Constipation  diazepam    Tablet 5 milliGRAM(s) Oral every 6 hours PRN Spasm  nicotine  Polacrilex Gum 4 milliGRAM(s) Oral every 6 hours PRN nicotine craving  ondansetron Injectable 4 milliGRAM(s) IV Push every 6 hours PRN Nausea  polyethylene glycol 3350 17 Gram(s) Oral daily PRN Constipation    Allergies  No Known Allergies    Intolerances    Haldol (Unknown)  Thorazine (Unknown)    REVIEW OF SYSTEMS:  Please see interval HPI:    Vital Signs Last 24 Hrs  T(C): 36.7 (16 May 2019 10:19), Max: 37 (15 May 2019 14:46)  T(F): 98.1 (16 May 2019 10:19), Max: 98.6 (15 May 2019 14:46)  HR: 66 (16 May 2019 10:19) (66 - 81)  BP: 120/63 (16 May 2019 10:19) (116/59 - 130/78)  BP(mean): --  RR: 16 (16 May 2019 10:19) (16 - 20)  SpO2: 100% (16 May 2019 10:19) (97% - 100%)  I&O's Detail    15 May 2019 07:01  -  16 May 2019 07:00  --------------------------------------------------------  IN:  Total IN: 0 mL    OUT:    Voided: 400 mL  Total OUT: 400 mL    Total NET: -400 mL    PHYSICAL EXAM:  GENERAL: NAD, lying in bed, arousable to voice  HEAD:  NC/AT  EYES: EOMI, clear sclera/conjunctiva  ENMT: MMM  NECK: supple  NERVOUS SYSTEM: decreased movement in b/l LE, asked provider to readjust his legs for him    CHEST/LUNG: CTAB   HEART: S1S2 RRR  ABDOMEN: soft, non-tender  EXTREMITIES:  no c/c/e    LABS:    CAPILLARY BLOOD GLUCOSE    BLOOD CULTURE    RADIOLOGY & ADDITIONAL TESTS:    Imaging Personally Reviewed:  [ x] YES   MRI : interval improvement in spinal cord compression s/p surgical procedure.     < from: MR Cervical Spine No Cont (05.15.19 @ 13:46) >  IMPRESSION:    There has been a marked interval improvement in the spinal cord   compression status post canal decompression compared to the 04/26/2019   preoperative MRI study. The spinal cord edema appears decreased. Moderate   canal stenosis is however present secondary to the dorsal epidural   paraspinal fluid collection, most prominent at the C4-C5 level.    < end of copied text >  < from: MR Lumbar Spine No Cont (05.15.19 @ 13:47) >  IMPRESSION:    Degenerative changes involve the lumbar spine with distribution as   described. There is no lower thoracic spinal cord compression or cauda   equina compression.      < end of copied text >    Consultant(s) Notes Reviewed:  Nsx, cards, PMR    Care Discussed with Consultants/Other Providers: Js Howe

## 2019-05-16 NOTE — PROGRESS NOTE ADULT - PROBLEM SELECTOR PROBLEM 1
Cervical spinal stenosis
S/P cervical spinal fusion
Stenosis, cervical spine
Cervical spinal stenosis
Leg weakness, bilateral
S/P cervical spinal fusion
Leg weakness, bilateral
S/P cervical spinal fusion

## 2019-05-16 NOTE — H&P ADULT - NSHPPHYSICALEXAM_GEN_ALL_CORE
----------------------------------------------------------------------------------------  PHYSICAL EXAM  Constitutional - NAD, Comfortable  HEENT - NCAT, EOMI  Neck - Supple, No limited ROM  Chest - Breathing comfortably, No wheezing  Cardiovascular - S1S2   Abdomen - Soft   Extremities - No C/C/E, No calf tenderness   Neurologic Exam -                    Cognitive - Awake, Alert, AAO to self, place, date, year, situation     Communication - Fluent, No dysarthria     Cranial Nerves - CN 2-12 intact     Motor - No focal deficits                    LEFT    UE - ShAB 5/5, EF 5/5, EE 5/5, WE 5/5,  5/5                    RIGHT UE - ShAB 5/5, EF 5/5, EE 5/5, WE 5/5,  5/5                    LEFT    LE - HF 5/5, KE 5/5, DF 5/5, PF 5/5                    RIGHT LE - HF 5/5, KE 5/5, DF 5/5, PF 5/5        Sensory - Intact to LT     Reflexes - DTR Intact, No primitive reflexive     Coordination - FTN intact     OculoVestibular - No saccades, No nystagmus, VOR         Balance - WNL Static  Psychiatric - Mood stable, Affect WNL ----------------------------------------------------------------------------------------  Vital Signs Last 24 Hrs  T(C): 36.8 (16 May 2019 14:22), Max: 36.8 (16 May 2019 14:22)  T(F): 98.2 (16 May 2019 14:22), Max: 98.2 (16 May 2019 14:22)  HR: 78 (16 May 2019 14:22) (66 - 78)  BP: 119/73 (16 May 2019 14:22) (119/73 - 128/73)  BP(mean): --  RR: 18 (16 May 2019 14:22) (16 - 20)  SpO2: 98% (16 May 2019 14:22) (97% - 100%)    PHYSICAL EXAM  Constitutional - NAD, Comfortable  HEENT - NCAT, EOMI  Neck - Supple, No limited ROM  Chest - Breathing comfortably, No wheezing  Cardiovascular - S1S2   Abdomen - Soft   Extremities - No C/C/E, No calf tenderness   Neurologic Exam -                    Cognitive - Awake, Alert, AAO to self, place, date, year, situation     Communication - Fluent, No dysarthria     Cranial Nerves - CN 2-12 intact     Motor - No focal deficits                    LEFT    UE - 4+/5 throughout                     RIGHT UE - 4-/5 throughout                     LEFT    LE - HF 2/5, KE 3/5, DF 3+/5, PF 4/5                    RIGHT LE - HF 2/5, KE 3/5, DF 3+/5, PF 4/5        Sensory - reduced to light touch in b/l UE and b/l LE      Reflexes - DTR brisk, R ankle 6 beats clonus   Psychiatric - Mood stable, Affect WNL ----------------------------------------------------------------------------------------  Vital Signs Last 24 Hrs  T(C): 36.8 (16 May 2019 14:22), Max: 36.8 (16 May 2019 14:22)  T(F): 98.2 (16 May 2019 14:22), Max: 98.2 (16 May 2019 14:22)  HR: 78 (16 May 2019 14:22) (66 - 78)  BP: 119/73 (16 May 2019 14:22) (119/73 - 128/73)  BP(mean): --  RR: 18 (16 May 2019 14:22) (16 - 20)  SpO2: 98% (16 May 2019 14:22) (97% - 100%)    PHYSICAL EXAM  Constitutional - NAD, Comfortable  HEENT - NCAT, EOMI  Neck - Supple, No limited ROM  Chest - Breathing comfortably, No wheezing  Cardiovascular - S1S2   Abdomen - Soft   Extremities - No C/C/E, No calf tenderness   Neurologic Exam -                    Cognitive - Awake, Alert, AAO to self, place, date, year, situation     Communication - Fluent, No dysarthria     Cranial Nerves - CN 2-12 intact     Motor -      Motor: no increased tone     RUE: ShFl 4/5   EE 4/5    EF 4/5   WrEx 5/5   Interossei 3/5   ADM 4/5     LUE: ShFl 4/5   EE 4/5    EF 4/5   WrEx 5/5   Interossei 3/5   ADM 4/5     RLE: HF  2/5   KE 3-/5    DF 5/5   PF 4/5   EHL 1/5   Evertors 1/5     LLE: HF  2/5   KE 4/5    DF 5/5   PF 4/5   EHL 1/5   Evertors 1/5    Sensory: decreased pinprick from feet up to distal 1/3 tibia bilaterally    Reflexes: Right: Triceps 2+, Biceps 1+, Brachioradialis 1+, +Hoffmans,   patellar 2+ Achilles 2+                  Left: Triceps 2+, Biceps 1+, Brachioradialis 1+,  + Hoffmans    Patellar 1+, Achilles 2+  Down-going plantars     Sensory - reduced to light touch in b/l UE and b/l LE      Reflexes - DTR brisk, R ankle 6 beats clonus   Psychiatric - Mood stable, Affect WNL

## 2019-05-16 NOTE — PROGRESS NOTE ADULT - PROBLEM SELECTOR PROBLEM 3
Slow transit constipation
Suprapubic tenderness
Slow transit constipation
Suprapubic tenderness

## 2019-05-16 NOTE — PROGRESS NOTE ADULT - PROBLEM SELECTOR PROBLEM 6
Schizophrenia, unspecified type
Need for prophylactic measure
Schizophrenia, unspecified type

## 2019-05-16 NOTE — PROGRESS NOTE ADULT - PROBLEM SELECTOR PROBLEM 2
Acquired hypothyroidism

## 2019-05-16 NOTE — DISCHARGE NOTE NURSING/CASE MANAGEMENT/SOCIAL WORK - NSDCDPATPORTLINK_GEN_ALL_CORE
You can access the [facility] Portal, offered by [facility], by registering with the following website: [email or web address]

## 2019-05-16 NOTE — H&P ADULT - ASSESSMENT
ASSESSMENT/PLAN  53 year-old man with a PMH of bipolar disorder/schizophrenia, lower leg deformity  with Gait Instability, ADL impairments and Functional impairments after cervical stenosis s/p cervical fusion and laminectomy    COMORBIDITES/ACTIVE MEDICAL ISSUES    #Cervical Stenosis- s/p C3-C6 laminectomy and fusion with Gait Instability, ADL impairments, and Functional impairments- follow up MRI with improved stenosis and epidural fluid collection  -Fluid collection on MRI Deemed stable by NSGY- monitor  -completed Decadron taper  -start Comprehensive Rehab Program of PT/OT   -Baclofen and Diazepam prn for spasm  -Bactrim ? PCP prophylaxis for steroids use    #Bipolar Disorder/Schizophrenia  -c/w Cogentin, Prolixin, Valproic Acid, Neurontin    #Hypothyroidism  -c/w synthroid    #HTN  -c/w metoprolol    #Nocturnal Enuresis  -c/w Desmopressin     Pain Mgmt - Tylenol PRN, Oxycodone PRN  GI/Bowel Mgmt -  Continent c/w Colace, Senna,  Dulcolax PRN, Miralax PRN,  /Bladder Mgmt - Continent, PVR    FEN   - Diet - Regular + Thins  [CCHO, DASH/TLC]    - Dysphagia  SLP - evaluation and treatment    Precautions / PROPHYLAXIS:   - Falls, Spinal  - ortho: Weight bearing status: WBAT   - Lungs: Aspiration, Incentive Spirometer   - Pressure injury/Skin: Turn Q2hrs while in bed, OOB to Chair, PT/OT    - DVT: Lovenox, SCDs, TEDs     MEDICAL PROGNOSIS: GOOD            REHAB POTENTIAL: GOOD             ESTIMATED DISPOSITION: HOME WITH HOME CARE            ELOS: 10-14 Days   EXPECTED THERAPY:     P.T. 1.5hr/day       O.T. 1.5hr/day      S.L.P. 0hr/day     P&O Unnecessary     EXP FREQUENCY: 5 days per 7 day period     PRESCREEN COMPARISION:   I have reviewed the prescreen information and I have found no relevant changes between the preadmission screening and my post admission evaluation     RATIONALE FOR INPATIENT ADMISSION - Patient demonstrates the following: (check all that apply)  [X] Medically appropriate for rehabilitation admission  [X] Has attainable rehab goals with an appropriate initial discharge plan  [X] Has rehabilitation potential (expected to make a significant improvement within a reasonable period of time)   [X] Requires close medical management by a rehab physician, rehab nursing care, Hospitalist and comprehensive interdisciplinary team (including PT, OT, & or SLP, Prosthetics and Orthotics) ASSESSMENT/PLAN  53 year-old man with a PMH of bipolar disorder/schizophrenia, lower leg deformity  with Gait Instability, ADL impairments and Functional impairments after cervical stenosis s/p cervical fusion and laminectomy    COMORBIDITES/ACTIVE MEDICAL ISSUES    #Cervical Stenosis- s/p C3-C6 laminectomy and fusion with Gait Instability, ADL impairments, and Functional impairments- follow up MRI with improved stenosis and epidural fluid collection  -Fluid collection on MRI Deemed stable by NSGY- monitor  -completed Decadron taper  -start Comprehensive Rehab Program of PT/OT   -Baclofen and Diazepam prn for spasm  -Bactrim ? PCP prophylaxis for steroids use    #Bipolar Disorder/Schizophrenia  -c/w Cogentin, Prolixin, Valproic Acid, Neurontin  - Neuropsych eval     #Hypothyroidism  -c/w synthroid    #HTN  -c/w metoprolol    #Nocturnal Enuresis  -c/w Desmopressin     Pain Mgmt - Tylenol PRN, Oxycodone PRN  GI/Bowel Mgmt -  Continent c/w Colace, Senna,  Dulcolax PRN, Miralax PRN,  /Bladder Mgmt - Continent, PVR    FEN   - Diet - Regular + Thins  [CCHO, DASH/TLC]    - Dysphagia  SLP - evaluation and treatment    Precautions / PROPHYLAXIS:   - Falls, Spinal  - ortho: Weight bearing status: WBAT   - Lungs: Aspiration, Incentive Spirometer   - Pressure injury/Skin: Turn Q2hrs while in bed, OOB to Chair, PT/OT    - DVT: Lovenox, SCDs, TEDs     MEDICAL PROGNOSIS: GOOD            REHAB POTENTIAL: GOOD             ESTIMATED DISPOSITION: HOME WITH HOME CARE            ELOS: 10-14 Days   EXPECTED THERAPY:     P.T. 1.5hr/day       O.T. 1.5hr/day      S.L.P. 0hr/day     P&O Unnecessary     EXP FREQUENCY: 5 days per 7 day period     PRESCREEN COMPARISON   I have reviewed the prescreen information and I have found no relevant changes between the preadmission screening and my post admission evaluation     RATIONALE FOR INPATIENT ADMISSION - Patient demonstrates the following: (check all that apply)  [X] Medically appropriate for rehabilitation admission  [X] Has attainable rehab goals with an appropriate initial discharge plan  [X] Has rehabilitation potential (expected to make a significant improvement within a reasonable period of time)   [X] Requires close medical management by a rehab physician, rehab nursing care, Hospitalist and comprehensive interdisciplinary team (including PT, OT, & or SLP, Prosthetics and Orthotics) ASSESSMENT/PLAN  53 year-old man with a PMH of bipolar disorder/schizophrenia, lower leg deformity  with Gait Instability, ADL impairments and Functional impairments after cervical stenosis s/p cervical fusion and laminectomy    COMORBIDITES/ACTIVE MEDICAL ISSUES    #Cervical Stenosis- s/p C3-C6 laminectomy and fusion with Gait Instability, ADL impairments, and Functional impairments- follow up MRI with improved stenosis and epidural fluid collection  -Fluid collection on MRI Deemed stable by NSGY- monitor  -completed Decadron taper  -start Comprehensive Rehab Program of PT/OT   -DC Baclofen and cont Diazepam prn for spasm or anxiety  -Bactrim - for wound prophylaxis x 14 days    #Bipolar Disorder/Schizophrenia  -c/w Cogentin, Prolixin, Valproic Acid, Neurontin  - Neuropsych eval     #Hypothyroidism  -c/w synthroid    #HTN  -c/w metoprolol    #Nocturnal Enuresis  -c/w Desmopressin     Pain Mgmt - Tylenol PRN, Oxycodone PRN  GI/Bowel Mgmt -  Continent c/w Colace, Senna,  Dulcolax PRN, Miralax PRN,  /Bladder Mgmt - Continent, PVR    FEN   - Diet - Regular + Thins  [CCHO, DASH/TLC]    - Dysphagia  SLP - evaluation and treatment    Precautions / PROPHYLAXIS:   - Falls, Spinal  - ortho: Weight bearing status: WBAT   - Lungs: Aspiration, Incentive Spirometer   - Pressure injury/Skin: Turn Q2hrs while in bed, OOB to Chair, PT/OT    - DVT: Lovenox, SCDs, TEDs     MEDICAL PROGNOSIS: GOOD            REHAB POTENTIAL: GOOD             ESTIMATED DISPOSITION: HOME WITH HOME CARE            ELOS: 14-17 Days   EXPECTED THERAPY:     P.T. 1.5hr/day       O.T. 1.5hr/day      S.L.P. 0hr/day     P&O Unnecessary     EXP FREQUENCY: 5 days per 7 day period     PRESCREEN COMPARISON   I have reviewed the prescreen information and I have found no relevant changes between the preadmission screening and my post admission evaluation     RATIONALE FOR INPATIENT ADMISSION - Patient demonstrates the following: (check all that apply)  [X] Medically appropriate for rehabilitation admission  [X] Has attainable rehab goals with an appropriate initial discharge plan  [X] Has rehabilitation potential (expected to make a significant improvement within a reasonable period of time)   [X] Requires close medical management by a rehab physician, rehab nursing care, Hospitalist and comprehensive interdisciplinary team (including PT, OT, & or SLP, Prosthetics and Orthotics)

## 2019-05-16 NOTE — H&P ADULT - NSHPREVIEWOFSYSTEMS_GEN_ALL_CORE
REVIEW OF SYSTEMS  Constitutional: No fever, No Chills, No fatigue  HEENT: No eye pain, No visual disturbances, No difficulty hearing  Pulm: No cough,  No shortness of breath  Cardio: No chest pain, No palpitations  GI:  No abdominal pain, No nausea, No vomiting, No diarrhea, No constipation  : No dysuria, No frequency, No hematuria  Neuro: No headaches, No memory loss, No loss of strength, No numbness, No tremors  Skin: No itching, No rashes, No lesions   Endo: No temperature intolerance  MSK: No joint pain, No joint swelling, No muscle pain, No Neck or back pain  Psych:  No depression, No anxiety REVIEW OF SYSTEMS  Constitutional: No fever, No Chills, No fatigue  HEENT: No eye pain, No visual disturbances, No difficulty hearing  Pulm: No cough,  No shortness of breath  Cardio: No chest pain, No palpitations  GI:  No abdominal pain, No nausea, No vomiting, No diarrhea, No constipation  : No dysuria, No frequency, No hematuria  Neuro: bilateral upper extremity (R > L) weakness since the surgery, b/l LE weakness, numbness hands and feet since the surgery; denies pain in neck currently   Skin: No itching, No rashes, No lesions   Endo: No temperature intolerance, hypothyroidism   MSK: No joint pain, No joint swelling, No muscle pain, No Neck or back pain  Psych:  bipolar, schizophrenia REVIEW OF SYSTEMS  Constitutional: No fever, No Chills, No fatigue  HEENT: No eye pain, No visual disturbances, No difficulty hearing  Pulm: No cough,  No shortness of breath  Cardio: No chest pain, No palpitations  GI:  No abdominal pain, No nausea, No vomiting, No diarrhea, + constipation  : No dysuria, No frequency, No hematuria  Chronic incontinence  Neuro: bilateral upper extremity (R > L) weakness, b/l LE weakness, numbness hands and feet since the surgery; denies pain in neck currently   Skin: No itching, No rashes, No lesions   Endo: No temperature intolerance, hypothyroidism   MSK: No joint pain, No joint swelling, No muscle pain, + Neck pain 8/10 which is non-radiating.  Denies back pain  Psych:  bipolar, schizophrenia

## 2019-05-16 NOTE — PROGRESS NOTE ADULT - PROBLEM SELECTOR PLAN 1
Monitor drain output  Continue PT  Discharge planning
1. OR today at 7:30
1. HMV to be removed today   2. pending rehab   Case discussed with attending neurosurgeon.
1. HMV to be removed when output less than 30cc/24hrs    2. pending rehab   Case discussed with attending neurosurgeon.
Continue PT  Await bed availability at rehab
Monitor drain output  Continue PT  Discharge planning
imaging and patient stable  d/c to rehab today
- HMV x 2 drain output monitoring.  - q1 neuro checks in pacu x 4 hrs.  - PACU to floor.    case d/w plan as per attending.
- with severe cervical stenosis now s/p C3-C6 laminectomy and fusion; post op care as per neuro sx   - s/p decadron taper, no longer on steroids  - drains dc on 5/10  - reports pain controlled  - PT concerned for decreased performance, requiring increased assistance with walking, Nsx ordered urgent MRIs to rule worsening stenosis, f/u results  - PT/OT eval appreciated, awaiting dc to rehab
- with severe cervical stenosis now s/p C3-C6 laminectomy and fusion; post op care as per neuro sx   - s/p decadron taper, no longer on steroids  - drains dc on 5/10  - reports pain controlled  - PT/OT eval appreciated, awaiting dc to rehab
CT Cervical spine today  Increase activity  PT/OT consults
Continue PT  Await bed availability at rehab
Continue PT  Plan for rehab placement
Given decline in PT will repeat MRI Cervical spine to ensure no worsening stenosis + MRI Lumbar spine  Will DC staples today  Will expedite imaging  Dr. Villasenor aware
Monitor drain output  Continue PT  Discharge planning
Patient also with signs of intrinsic hand muscle wasting on exam, concerning for underlying muscular dystrophy (such as myotonic dystrophy) vs. myelopathy. Neurology and neurosurgery following, recs reviewed and appreciated. CT cervical spine done on 4/27 to assess bone quality as per neurosurgery   -  pending repeat MRI cervical spine WITH and without contrast as patient moved too much during initial study to evaluate exact levels of compression.   - MRI Lumbar spine +/- contrast given back pain and EHL weakness.   - HIV negative; f/u hepatitis panel results  - c/w dexamethasone 4mg q6h  - Hold Aspirin, NSAIDS, Any blood thinners
Patient also with signs of intrinsic hand muscle wasting on exam, concerning for underlying muscular dystrophy (such as myotonic dystrophy) vs. myelopathy. Neurology and neurosurgery following, recs reviewed and appreciated. CT cervical spine done on 4/27 to assess bone quality as per neurosurgery   -  pending repeat MRI cervical spine WITH and without contrast as patient moved too much during initial study to evaluate exact levels of compression.   - MRI Lumbar spine +/- contrast given back pain and EHL weakness. MRI to be done with anesthesia   - HIV negative; hepatitis panel negative   - c/w dexamethasone 4mg q6h  - Hold Aspirin, NSAIDS, Any blood thinners
Patient also with signs of intrinsic hand muscle wasting on exam, concerning for underlying muscular dystrophy (such as myotonic dystrophy) vs. myelopathy. Neurology and neurosurgery following, recs reviewed and appreciated. CT cervical spine done on 4/27 to assess bone quality as per neurosurgery , cardiology eval appreciated  -pt s/p C3-C6 laminectomy ,post op care as per neuro sx   - HIV negative; hepatitis panel negative   - c/w dexamethasone 4mg q6h  - Hold Aspirin, NSAIDS, Any blood thinners
- with severe cervical stenosis now s/p C3-C6 laminectomy and fusion; post op care as per neuro sx   - s/p decadron taper, no longer on steroids  - drains dc on 5/10  - reports pain controlled  - MRI without acute worsening, encouraged patient to work with the physical therapist  - PT/OT scottal appreciated, awaiting dc to rehab
- with severe cervical stenosis now s/p C3-C6 laminectomy and fusion; post op care as per neuro sx   - s/p decadron taper, no longer on steroids  - drains dc on 5/10  - reports pain controlled  - PT/OT eval appreciated, awaiting dc to rehab
Patient also with signs of intrinsic hand muscle wasting on exam, concerning for underlying muscular dystrophy (such as myotonic dystrophy) vs. myelopathy. Neurology and neurosurgery following, recs reviewed and appreciated.   -  repeat MRI cervical spine WITH and without contrast as patient moved too much during study to evaluate exact levels of compression.   - MRI Lumbar spine +/- contrast given back pain and EHL weakness.   - HIV/Hepatitis panel  - CT cervical spine w/o contrast to assess bone quality  - start dexamethasone 4mg q6h  - Hold Aspirin, NSAIDS, Any blood thinners
Patient also with signs of intrinsic hand muscle wasting on exam, concerning for underlying muscular dystrophy (such as myotonic dystrophy) vs. myelopathy. Neurology and neurosurgery following, recs reviewed and appreciated. CT cervical spine done on 4/27 to assess bone quality as per neurosurgery   -  pending repeat MRI cervical spine WITH and without contrast as patient moved too much during initial study to evaluate exact levels of compression.   - MRI Lumbar spine +/- contrast given back pain and EHL weakness.   - HIV negative; f/u hepatitis panel results  - c/w dexamethasone 4mg q6h  - Hold Aspirin, NSAIDS, Any blood thinners
Patient also with signs of intrinsic hand muscle wasting on exam, concerning for underlying muscular dystrophy (such as myotonic dystrophy) vs. myelopathy. Neurology and neurosurgery following, recs reviewed and appreciated. CT cervical spine done on 4/27 to assess bone quality as per neurosurgery , cardiology eval appreciated  -pt s/p C3-C6 laminectomy ,post op care as per neuro sx   - HIV negative; hepatitis panel negative   - c/w dexamethasone 4mg q6h  - Hold Aspirin, NSAIDS, Any blood thinners  -Improving s/p laminectomy yesterday  -c/w PT/OT eval
Patient also with signs of intrinsic hand muscle wasting on exam, concerning for underlying muscular dystrophy (such as myotonic dystrophy) vs. myelopathy. Neurology and neurosurgery following, recs reviewed and appreciated. CT cervical spine done on 4/27 to assess bone quality as per neurosurgery , cardiology eval appreciated  -pt s/p C3-C6 laminectomy ,post op care as per neuro sx   - Hold Aspirin, NSAIDS, Any blood thinners  -Improving s/p laminectomy yesterday  -c/w dexamethasone as per Nsx  -PT/OT eval appreciated, awaiting dc to [facility]
Patient also with signs of intrinsic hand muscle wasting on exam, concerning for underlying muscular dystrophy (such as myotonic dystrophy) vs. myelopathy. Neurology following, recs reviewed and appreciated.   -obtain MRI of cervical, thoracic and lumbar spine

## 2019-05-16 NOTE — PROGRESS NOTE ADULT - PROBLEM SELECTOR PLAN 3
- likely component of decreased physical mobility as well as opioid pain medications  - c/w bowel regimen  - reports finally having a BM

## 2019-05-16 NOTE — PROGRESS NOTE ADULT - ASSESSMENT
52 YO male stable S/P C3-6 laminectomy & fusion
52 YO male stable S/P C3-6 laminectomy & fusion
spinal stenosis, LE weakness  1. PT- bed mobility,transfers, gait and balance training  2. OT- ADL'S  3. pain control  4. Bowel Regimen  4. Patient would benefit from acute rehab, needs a multidisciplinary team including PT, OT. Can tolerate 3 hours of therapy a day.  Will follow.
spinal stenosis, LE weakness  1. epidural collection- management as per NSG  2. PT- bed mobility,transfers, gait and balance training  3. OT- ADL'S  4. pain control  5. Bowel Regimen  6. Patient would benefit from acute rehab, needs a multidisciplinary team including PT, OT. Can tolerate 3 hours of therapy a day.  Will follow.
52 YO male stable POD # 2 S/P C3-6 laminectomy & fusion
52 YO male stable S/P C3-6 laminectomy & fusion
52 y/o Male, resident of [facility], with h/o bipolar d/o, HTN, nocturnal enuresis, deformity of lower leg, hypothyroid, schizophrenia, BPH, IVDA c/o inability to walk due to lower extremity weakness which has been worsening for the last two weeks. Also complaining of lower back pain since sustaining a fall 2 weeks ago. In additions the pt reports paresthesias in both hands, however says that able to feel his feet. Sates that believes that he is being "possessed" and "held down" by somebody who passed away   MRI shows severe steneois in the cervical [facility]     neurosurgery input appreciated
52 yo M from  [facility] residence with PMHx notable for h/o bipolar d/o, HTN, nocturnal enuresis, hypothyroid, schizophrenia, apresenting with b/l weakness, inability to walk and paresthesias of b/l UE, admitted for further w/u of possible myelopathy, now noted to have severe cervical stenosis on MRI in need for cervical decompression and fusion now s/p C3-C5 decompression and fusion    Problem/Plan - 1:  ·  Problem: Leg weakness, bilateral.  Plan: Patient also with signs of intrinsic hand muscle wasting on exam, concerning for underlying muscular dystrophy (such as myotonic dystrophy) vs. myelopathy. Neurology and neurosurgery following, recs reviewed and appreciated. CT cervical spine done on 4/27 to assess bone quality as per neurosurgery , cardiology eval appreciated  -pt s/p C3-C6 laminectomy ,post op care as per neuro sx   -decadron being tapered off  -PT/OT eval appreciated, awaiting dc to [facility].     Problem/Plan - 2:  ·  Problem: Acquired hypothyroidism.  Plan: Recent TSH wnl (3/11/19). Mildly elevated on this admission.   - continue home regimen of levothyroxine 175 mcg daily   - will need outpatient f/u    Problem/Plan - 3:  ·  Problem: Slow transit constipation.  Plan: c/w bowel regimen    Problem/Plan - 4:  ·  Problem: Suprapubic tenderness.  Plan: Pt without any complaints. UA normal. No further w/u needed.     Problem/Plan - 5:  ·  Problem: Bipolar disorder.  Plan: Noted subtherapeutic valproic acid level. Lithium recently d/c'd on 4/23. Thus, explains reason for low lithium level. Psychiatry consulted for further management, recs appreciated  - VPA 500mg qam + 750mg qhs  -Neurontin 100mg po bid  Prolixin 5mg po bid  Cogentin 0.5mg po bid.     Problem/Plan - 6:  Problem: Schizophrenia, unspecified type. Plan: - continue with fluphenazine   - psychiatry consulted for further management, recs appreciated  - Neurontin 100mg po bid  - Prolixin 5mg po bid  - Cogentin 0.5mg po bid  ** Pt also receives Prolixin 50mg IM decanoate monthly, last received 4/23/19- due on 5/21/19.    Problem/Plan - 7:  ·  Problem: Need for prophylactic measure.  Plan: SCD for DVT ppx.
52 yo M from  [facility] residence with PMHx notable for h/o bipolar d/o, HTN, nocturnal enuresis, hypothyroid, schizophrenia, apresenting with b/l weakness, inability to walk and paresthesias of b/l UE, admitted for further w/u of possible myelopathy, now noted to have severe cervical stenosis on MRI in need for cervical decompression and fusion now s/p C3-C5 decompression and fusion-currently POD2.     Problem/Plan - 1:  ·  Problem: Leg weakness, bilateral.  Plan: Patient also with signs of intrinsic hand muscle wasting on exam, concerning for underlying muscular dystrophy (such as myotonic dystrophy) vs. myelopathy. Neurology and neurosurgery following, recs reviewed and appreciated. CT cervical spine done on 4/27 to assess bone quality as per neurosurgery , cardiology eval appreciated  -pt s/p C3-C6 laminectomy ,post op care as per neuro sx   -decadron being tapered off  -PT/OT eval appreciated, awaiting dc to [facility].     Problem/Plan - 2:  ·  Problem: Acquired hypothyroidism.  Plan: Recent TSH wnl (3/11/19). Mildly elevated on this admission.   - continue home regimen of levothyroxine 175 mcg daily   - will need outpatient f/u.     Problem/Plan - 3:  ·  Problem: Slow transit constipation.  Plan: -c/w senna, colace, dulcolax  -Would consider adding Miralax 17g po Qdaily.     Problem/Plan - 4:  ·  Problem: Suprapubic tenderness.  Plan: Pt without any complaints. UA normal. No further w/u needed.     Problem/Plan - 5:  ·  Problem: Bipolar disorder.  Plan: Noted subtherapeutic valproic acid level. Lithium recently d/c'd on 4/23. Thus, explains reason for low lithium level. Psychiatry consulted for further management, recs appreciated  - VPA 500mg qam + 750mg qhs  -Neurontin 100mg po bid  Prolixin 5mg po bid  Cogentin 0.5mg po bid.     Problem/Plan - 6:  Problem: Schizophrenia, unspecified type. Plan: - continue with fluphenazine   - psychiatry consulted for further management, recs appreciated  - Neurontin 100mg po bid  - Prolixin 5mg po bid  - Cogentin 0.5mg po bid  ** Pt also receives Prolixin 50mg IM decanoate monthly, last received 4/23/19- due on 5/21/19.    Problem/Plan - 7:  ·  Problem: Need for prophylactic measure.  Plan: SCD for DVT ppx.
52 yo M from  [facility] residence with PMHx notable for h/o bipolar d/o, HTN, nocturnal enuresis, hypothyroid, schizophrenia, apresenting with b/l weakness, inability to walk and paresthesias of b/l UE, admitted for further w/u of possible myelopathy, now noted to have severe cervical stenosis on MRI in need for cervical decompression and fusion now s/p C3-C5 decompression and fusion    Leg weakness, bilateral.  Plan: Patient also with signs of intrinsic hand muscle wasting on exam, concerning for underlying muscular dystrophy (such as myotonic dystrophy) vs. myelopathy. Neurology and neurosurgery following, recs reviewed and appreciated. CT cervical spine done on 4/27 to assess bone quality as per neurosurgery , cardiology eval appreciated  -pt s/p C3-C6 laminectomy ,post op care as per neuro sx   -decadron being tapered off  -PT/OT eval appreciated, awaiting dc to rehab    Acquired hypothyroidism.  Plan: Recent TSH wnl (3/11/19). Mildly elevated on this admission.   - continue home regimen of levothyroxine 175 mcg daily   - will need outpatient f/u    Slow transit constipation.  Plan: c/w bowel regimen    Bipolar disorder.  Plan: Noted subtherapeutic valproic acid level. Lithium recently d/c'd on 4/23. Thus, explains reason for low lithium level. Psychiatry consulted for further management, recs appreciated  - VPA 500mg qam + 750mg qhs  - Neurontin 100mg po bid  - Prolixin 5mg po bid  - Cogentin 0.5mg po bid.     Schizophrenia, unspecified type. Plan: - continue with fluphenazine   - psychiatry consulted for further management, recs appreciated  - Neurontin 100mg po bid  - Prolixin 5mg po bid  - Cogentin 0.5mg po bid  ** Pt also receives Prolixin 50mg IM decanoate monthly, last received 4/23/19- due on 5/21/19.    Need for prophylactic measure.  Plan: on [place] Lovenox for DVT ppx
52 yo M from [facility] residence with PMHx notable for bipolar vs schizoaffective disorder, HTN, nocturnal enuresis, hypothyroid presenting with bilateral weakness, inability to walk and paresthesias of b/l UE, admitted for further w/u of possible myelopathy, now noted to have severe cervical stenosis on MRI, s/p C3-C5 decompression and fusion. Going to rehab (pending bed availability).
54 YO male stable POD # 1 S/P C3-6 laminectomy, fusion, HMV X2
54 YO male stable S/P C3-6 laminectomy & fusion
54 YO male stable S/P C3-6 laminectomy & fusion POD # 14
54 yo M from  [facility] residence with PMHx notable for h/o bipolar d/o, HTN, nocturnal enuresis, hypothyroid, schizophrenia, apresenting with b/l weakness, inability to walk and paresthesias of b/l UE, admitted for further w/u of possible myelopathy, now noted to have severe cervical stenosis on MRI in need for cervical decompression and fusion now s/p C3-C5 decompression and fusion    Problem/Plan - 1:  ·  Problem: Leg weakness, bilateral.  Plan: Patient also with signs of intrinsic hand muscle wasting on exam, concerning for underlying muscular dystrophy (such as myotonic dystrophy) vs. myelopathy. Neurology and neurosurgery following, recs reviewed and appreciated. CT cervical spine done on 4/27 to assess bone quality as per neurosurgery , cardiology eval appreciated  -pt s/p C3-C6 laminectomy ,post op care as per neuro sx   -decadron being tapered off  -PT/OT eval appreciated, awaiting dc to [facility].     Problem/Plan - 2:  ·  Problem: Acquired hypothyroidism.  Plan: Recent TSH wnl (3/11/19). Mildly elevated on this admission.   - continue home regimen of levothyroxine 175 mcg daily   - will need outpatient f/u    Problem/Plan - 3:  ·  Problem: Slow transit constipation.  Plan: c/w bowel regimen    Problem/Plan - 4:  ·  Problem: Suprapubic tenderness.  Plan: Pt without any complaints. UA normal. No further w/u needed.     Problem/Plan - 5:  ·  Problem: Bipolar disorder.  Plan: Noted subtherapeutic valproic acid level. Lithium recently d/c'd on 4/23. Thus, explains reason for low lithium level. Psychiatry consulted for further management, recs appreciated  - VPA 500mg qam + 750mg qhs  -Neurontin 100mg po bid  Prolixin 5mg po bid  Cogentin 0.5mg po bid.     Problem/Plan - 6:  Problem: Schizophrenia, unspecified type. Plan: - continue with fluphenazine   - psychiatry consulted for further management, recs appreciated  - Neurontin 100mg po bid  - Prolixin 5mg po bid  - Cogentin 0.5mg po bid  ** Pt also receives Prolixin 50mg IM decanoate monthly, last received 4/23/19- due on 5/21/19.    Problem/Plan - 7:  ·  Problem: Need for prophylactic measure.  Plan: SCD for DVT ppx.
54 yo M from  [facility] residence with PMHx notable for h/o bipolar d/o, HTN, nocturnal enuresis, hypothyroid, schizophrenia, apresenting with b/l weakness, inability to walk and paresthesias of b/l UE, admitted for further w/u of possible myelopathy, now noted to have severe cervical stenosis on MRI in need for cervical decompression and fusion now s/p C3-C5 decompression and fusion-currently POD2.     Problem/Plan - 1:  ·  Problem: Leg weakness, bilateral.  Plan: Patient also with signs of intrinsic hand muscle wasting on exam, concerning for underlying muscular dystrophy (such as myotonic dystrophy) vs. myelopathy. Neurology and neurosurgery following, recs reviewed and appreciated. CT cervical spine done on 4/27 to assess bone quality as per neurosurgery , cardiology eval appreciated  -pt s/p C3-C6 laminectomy ,post op care as per neuro sx   -Improving s/p laminectomy yesterday  -c/w dexamethasone as per Nsx  -PT/OT eval appreciated, awaiting dc to [facility].     Problem/Plan - 2:  ·  Problem: Acquired hypothyroidism.  Plan: Recent TSH wnl (3/11/19). Mildly elevated on this admission.   - continue home regimen of levothyroxine 175 mcg daily   - will need outpatient f/u.     Problem/Plan - 3:  ·  Problem: Slow transit constipation.  Plan: -c/w senna, colace, dulcolax  -Would consider adding Miralax 17g po Qdaily.     Problem/Plan - 4:  ·  Problem: Suprapubic tenderness.  Plan: Pt without any complaints. UA normal. No further w/u needed.     Problem/Plan - 5:  ·  Problem: Bipolar disorder.  Plan: Noted subtherapeutic valproic acid level. Lithium recently d/c'd on 4/23. Thus, explains reason for low lithium level. Psychiatry consulted for further management, recs appreciated  - VPA 500mg qam + 750mg qhs  -Neurontin 100mg po bid  Prolixin 5mg po bid  Cogentin 0.5mg po bid.     Problem/Plan - 6:  Problem: Schizophrenia, unspecified type. Plan: - continue with fluphenazine   - psychiatry consulted for further management, recs appreciated  - Neurontin 100mg po bid  - Prolixin 5mg po bid  - Cogentin 0.5mg po bid  ** Pt also receives Prolixin 50mg IM decanoate monthly, last received 4/23/19- due on 5/21/19.    Problem/Plan - 7:  ·  Problem: Need for prophylactic measure.  Plan: SCD for DVT ppx.
54 yo M from [facility] residence with PMHx notable for bipolar vs schizoaffective disorder, HTN, nocturnal enuresis, hypothyroid presenting with bilateral weakness, inability to walk and paresthesias of b/l UE, admitted for further w/u of possible myelopathy, now noted to have severe cervical stenosis on MRI, s/p C3-C5 decompression and fusion. Eventual plan is for rehab (pending bed availability) if imaging without change.
54 yo M from [facility] residence with PMHx notable for bipolar vs schizoaffective disorder, HTN, nocturnal enuresis, hypothyroid presenting with bilateral weakness, inability to walk and paresthesias of b/l UE, admitted for further w/u of possible myelopathy, now noted to have severe cervical stenosis on MRI in need for cervical decompression and fusion now s/p C3-C5 decompression and fusion.    Leg weakness, bilateral:  Patient also with signs of intrinsic hand muscle wasting on exam, concerning for underlying muscular dystrophy (such as myotonic dystrophy) vs. myelopathy. Neurology and neurosurgery following, recs reviewed and appreciated. CT cervical spine done on 4/27 to assess bone quality as per neurosurgery , cardiology eval appreciated  - pt s/p C3-C6 laminectomy ,post op care as per neuro sx   - s/p decadron being taper  - per pt drains dc today  - PT/OT eval appreciated, awaiting dc to rehab  - seen with PT today, not able to stand yet however requesting to shower?    Acquired hypothyroidism: Recent TSH wnl (3/11/19). Mildly elevated on this admission.   - continue home regimen of levothyroxine 175 mcg daily   - will need outpatient f/u for repeat TFTs in 4-6 wks    Slow transit constipation:   - c/w bowel regimen    Bipolar disorder: Noted subtherapeutic valproic acid level. Lithium recently d/c'd on 4/23. Thus, explains reason for low lithium level. Psychiatry consulted for further management, recs appreciated  - VPA 500mg qam + 750mg qhs  - Neurontin 100mg po bid  - Prolixin 5mg po bid  - Cogentin 0.5mg po bid.     Schizophrenia, unspecified type:  - continue with fluphenazine   - psychiatry consulted for further management, recs appreciated  - Neurontin 100mg po bid  - Prolixin 5mg po bid  - Cogentin 0.5mg po bid  ** Pt also receives Prolixin 50mg IM decanoate monthly, last received 4/23/19- due on 5/21/19.    Need for prophylactic measure  - lovenox ppx  - PT/OT  - dispo per primary team, pending BRIGID
54 yo M from [facility] residence with PMHx notable for bipolar vs schizoaffective disorder, HTN, nocturnal enuresis, hypothyroid presenting with bilateral weakness, inability to walk and paresthesias of b/l UE, admitted for further w/u of possible myelopathy, now noted to have severe cervical stenosis on MRI in need for cervical decompression and fusion now s/p C3-C5 decompression and fusion.    Leg weakness, bilateral:  Patient also with signs of intrinsic hand muscle wasting on exam, concerning for underlying muscular dystrophy (such as myotonic dystrophy) vs. myelopathy. Neurology and neurosurgery following, recs reviewed and appreciated. CT cervical spine done on 4/27 to assess bone quality as per neurosurgery , cardiology eval appreciated  - pt s/p C3-C6 laminectomy; post op care as per neuro sx   - s/p decadron taper, no longer on steroids  - drains dc on 5/10  - PT/OT eval appreciated, awaiting dc to rehab    Acquired hypothyroidism: Recent TSH wnl (3/11/19). Mildly elevated on this admission.   - continue home regimen of levothyroxine 175 mcg daily   - will need outpatient f/u for repeat TFTs in 4-6 wks    Slow transit constipation:   - c/w bowel regimen    Bipolar disorder: Noted subtherapeutic valproic acid level. Lithium recently d/c'd on 4/23. Thus, explains reason for low lithium level. Psychiatry consulted for further management, recs appreciated  - VPA 500mg qam + 750mg qhs  - Neurontin 100mg po bid  - Prolixin 5mg po bid  - Cogentin 0.5mg po bid.     Schizophrenia, unspecified type:  - continue with fluphenazine   - psychiatry consulted for further management, recs appreciated  - Neurontin 100mg po bid  - Prolixin 5mg po bid  - Cogentin 0.5mg po bid  ** Pt also receives Prolixin 50mg IM decanoate monthly, last received 4/23/19- due on 5/21/19.    Need for prophylactic measure  - lovenox ppx  - PT/OT  - dispo per primary team, pending BRIGID likely Monday
EKG: NSR no ischemic changes     Echo: < from: TTE with Doppler (w/Cont) (04.30.19 @ 13:23) >  1. Normal mitral valve. Minimal mitral regurgitation.  2. Normal left ventricular internal dimensions and wall  thicknesses.  3. Endocardium not well visualized; grossly normal left  ventricular systolicfunction.  Endocardial visualization  enhanced with intravenous injection of echo contrast  (Definity).  4. The right ventricle is not well visualized; grossly  normal right ventricular systolic function.    < end of copied text >    Assessment and Plan     1) Perioperative risk assessment: s/p Cervical spine decompression tolerated the procedure well     2) Cervical spine compression: in steroids, neuro surg on board      3) HTN: c/w metoprolol    4) DVT PPX lovenox
EKG: NSR no ischemic changes     Echo: < from: TTE with Doppler (w/Cont) (04.30.19 @ 13:23) >  1. Normal mitral valve. Minimal mitral regurgitation.  2. Normal left ventricular internal dimensions and wall  thicknesses.  3. Endocardium not well visualized; grossly normal left  ventricular systolicfunction.  Endocardial visualization  enhanced with intravenous injection of echo contrast  (Definity).  4. The right ventricle is not well visualized; grossly  normal right ventricular systolic function.    < end of copied text >    Assessment and Plan     1) Perioperative risk assessment: s/p Cervical spine decompression tolerated the procedure well     2) HTN: c/w metoprolol    3) DVT PPX lovenox
EKG: NSR no ischemic changes     Echo: < from: TTE with Doppler (w/Cont) (04.30.19 @ 13:23) >  1. Normal mitral valve. Minimal mitral regurgitation.  2. Normal left ventricular internal dimensions and wall  thicknesses.  3. Endocardium not well visualized; grossly normal left  ventricular systolicfunction.  Endocardial visualization  enhanced with intravenous injection of echo contrast  (Definity).  4. The right ventricle is not well visualized; grossly  normal right ventricular systolic function.    < end of copied text >    Assessment and Plan     1) Perioperative risk assessment: s/p Cervical spine decompression tolerated the procedure well     2) HTN: c/w metoprolol    3) DVT PPX lovenox
HPI:  52 y/o Male, resident of [facility], with h/o bipolar d/o, HTN, nocturnal enuresis, deformity of lower leg, hypothyroid, schizophrenia, BPH, IVDA c/o inability to walk due to lower extremity weakness which has been worsening for the last two weeks. Also complaining of lower back pain since sustaining a fall 2 weeks ago. In additions the pt reports paresthesias in both hands, however says that able to feel his feet. Sates that believes that he is being "possessed" and "held down" by somebody who passed away. Otherwise reports no SI/HI, fever, chills, chest pain, SOB, abdominal pain, nausea, vomiting, diarrhea, dysuria, increased frequency. Of note, the pt states that someone is trying to "poison" and "finish him off" at [place] and that he is being followed. Last BM 3 days ago; (26 Apr 2019 03:25)  s/p C3-C6 laminectomy fusion with instrumentation. Neuro exam stable.
Mr. Townsend is a 52 yo man at [facility] with PMHx notable for h/o bipolar d/o, HTN, nocturnal enuresis, hypothyroid, schizophrenia, and other comorbidities presenting with b/l weakness, inability to walk and paresthesias of b/l UE, admitted for further w/u of possible myelopathy, now noted to have severe cervical stenosis on MRI in need for cervical decompression and fusion.
Mr. Townsend is a 54 yo man at [facility] with PMHx notable for h/o bipolar d/o, HTN, nocturnal enuresis, hypothyroid, schizophrenia, and other comorbidities presenting with b/l weakness, inability to walk and paresthesias of b/l UE, admitted for further w/u of possible myelopathy, now noted to have severe cervical stenosis on MRI in need for cervical decompression and fusion.
Mr. Townsend is a 54 yo man at [facility] with PMHx notable for h/o bipolar d/o, HTN, nocturnal enuresis, hypothyroid, schizophrenia, and other comorbidities presenting with b/l weakness, inability to walk and paresthesias of b/l UE, admitted for further w/u of possible myelopathy, now noted to have severe cervical stenosis on MRI in need for cervical decompression and fusion.
spinal stenosis, LE weakness  1. PT- bed mobility,transfers, gait and balance training  2. OT- ADL'S  3. pain control  4. Bowel Regimen  4. Patient would benefit from acute rehab, needs a multidisciplinary team including PT, OT. Can tolerate 3 hours of therapy a day.  Will follow.
EKG: NSR no ischemic changes     Echo: < from: TTE with Doppler (w/Cont) (04.30.19 @ 13:23) >  1. Normal mitral valve. Minimal mitral regurgitation.  2. Normal left ventricular internal dimensions and wall  thicknesses.  3. Endocardium not well visualized; grossly normal left  ventricular systolicfunction.  Endocardial visualization  enhanced with intravenous injection of echo contrast  (Definity).  4. The right ventricle is not well visualized; grossly  normal right ventricular systolic function.    < end of copied text >    Assessment and Plan     1) Perioperative risk assessment: s/p Cervical spine decompression tolerated the procedure well     2) Cervical spine compression: in steroids, neuro surg on board      3) HTN: c/w metoprolol    4) DVT PPX lovenox
52 yo M from  [facility] residence with PMHx notable for h/o bipolar d/o, HTN, nocturnal enuresis, hypothyroid, schizophrenia, apresenting with b/l weakness, inability to walk and paresthesias of b/l UE, admitted for further w/u of possible myelopathy, now noted to have severe cervical stenosis on MRI in need for cervical decompression and fusion now s/p C3-C5 decompression and fusion-currently POD2.
52 yo M from  [facility] residence with PMHx notable for h/o bipolar d/o, HTN, nocturnal enuresis, hypothyroid, schizophrenia, apresenting with b/l weakness, inability to walk and paresthesias of b/l UE, admitted for further w/u of possible myelopathy, now noted to have severe cervical stenosis on MRI in need for cervical decompression and fusion.
54 yo M from [facility] residence with PMHx notable for bipolar vs schizoaffective disorder, HTN, nocturnal enuresis, hypothyroid presenting with bilateral weakness, inability to walk and paresthesias of b/l UE, admitted for further w/u of possible myelopathy, now noted to have severe cervical stenosis on MRI, s/p C3-C5 decompression and fusion. Repeat MRI with interval improvement in cord compression. Currently pending rehab placement.
54 yo M from [facility] residence with PMHx notable for bipolar vs schizoaffective disorder, HTN, nocturnal enuresis, hypothyroid presenting with bilateral weakness, inability to walk and paresthesias of b/l UE, admitted for further w/u of possible myelopathy, now noted to have severe cervical stenosis on MRI, s/p C3-C5 decompression and fusion. Going to rehab.
Mr. Townsend is a 52 yo man at [facility] [facility] with PMHx notable for h/o bipolar d/o, HTN, nocturnal enuresis, hypothyroid, schizophrenia, and other comorbidities presenting with b/l weakness, inability to walk and paresthesias of b/l UE, admitted for further w/u of possible myelopathy, found to have subtherapeutic lithium and valproic acid levels.
Mr. Townsend is a 54 yo man at [facility] with PMHx notable for h/o bipolar d/o, HTN, nocturnal enuresis, hypothyroid, schizophrenia, and other comorbidities presenting with b/l weakness, inability to walk and paresthesias of b/l UE, admitted for further w/u of possible myelopathy, now noted to have severe cervical stenosis on MRI in need for cervical decompression and fusion.
Mr. Townsend is a 54 yo man at [facility] with PMHx notable for h/o bipolar d/o, HTN, nocturnal enuresis, hypothyroid, schizophrenia, and other comorbidities presenting with b/l weakness, inability to walk and paresthesias of b/l UE, admitted for further w/u of possible myelopathy, now noted to have severe cervical stenosis on MRI in need for cervical decompression and fusion.
54 YO male stable S/P C3-6 laminectomy & fusion

## 2019-05-16 NOTE — PROGRESS NOTE ADULT - REASON FOR ADMISSION
Cannot move legs
C3-c6 laminectomy
Cannot move legs

## 2019-05-16 NOTE — DISCHARGE NOTE NURSING/CASE MANAGEMENT/SOCIAL WORK - NSDCPEEMAIL_GEN_ALL_CORE
[facility] email [email or web address]

## 2019-05-16 NOTE — PROGRESS NOTE ADULT - PROBLEM SELECTOR PLAN 6
DVT ppx: lovenox  Dispo: Did not have private bed at [place], currently awaiting bed availability at a rehab. d/c planning as per Nsx.
DVT ppx: lovenox  Dispo: currently waiting urgent MRI to rule out worsening stenosis per NSx, plan is for rehab. d/c planning as per Nsx.
Lovenox sc dvt ppx
Lovenox sc dvt ppx  - will f/u with [place] medical providers to obtain collateral history of pt's currentl illness
SCD for DVT ppx
- continue with fluphenazine   - psychiatry consulted for further management, recs appreciated  - Neurontin 100mg po bid  - Prolixin 5mg po bid  - Cogentin 0.5mg po bid  ** Pt also receives Prolixin 50mg IM decanoate monthly, last received 4/23/19- due on 5/21/19
DVT ppx: lovenox  Dispo: Accepted to [facility], going to rehab today. d/c planning as per Nsx.
DVT ppx: lovenox  Dispo: appreciate CM/SW assistance with placement, plan is for rehab. d/c planning as per Nsx.
Lovenox sc dvt ppx  - will f/u with [place] medical providers to obtain collateral history of pt's currentl illness
Lovenox sc dvt ppx  - will f/u with [place] medical providers to obtain collateral history of pt's currentl illness
Lovenox sc dvt ppx  - will f/u with [place] medical providers to obtain collateral history of pt's currentl illness
SCD for DVT ppx

## 2019-05-16 NOTE — H&P ADULT - NSHPSOCIALHISTORY_GEN_ALL_CORE
Social History  Reports former smoker prior to 12 months ago  States sober for 4 years  History of substance abuse including cocaine and marijuana over 4 years ago    Functional History  Pt reports that he is from [facility] where he was ambulating independently, however has had progressive LE weakness over last 6 months, requiring a walker, then a  WC.    Current Functional Status  Bed Mobility Max Assist

## 2019-05-16 NOTE — PROGRESS NOTE ADULT - PROBLEM SELECTOR PLAN 4
- VPA 500mg qam + 750mg qhs  - Neurontin 100mg po bid  - Prolixin 5mg po bid  - Cogentin 0.5mg po bid.   - appreciate psychiatry input
- VPA 500mg qam + 750mg qhs  - Neurontin 100mg po bid  - Prolixin 5mg po bid  - Cogentin 0.5mg po bid.   - appreciate psychiatry input
Noted subtherapeutic valproic acid level. Lithium recently d/c'd on 4/23. Thus, explains reason for low lithium level. Psychiatry consulted for further management, recs appreciated  - VPA 500mg qam + 750mg qhs  -Neurontin 100mg po bid  Prolixin 5mg po bid  Cogentin 0.5mg po bid
- VPA 500mg qam + 750mg qhs  - Neurontin 100mg po bid  - Prolixin 5mg po bid  - Cogentin 0.5mg po bid.   - appreciate psychiatry input
- VPA 500mg qam + 750mg qhs  - Neurontin 100mg po bid  - Prolixin 5mg po bid  - Cogentin 0.5mg po bid.   - appreciate psychiatry input
Noted subtherapeutic valproic acid level. Lithium recently d/c'd on 4/23. Thus, explains reason for low lithium level. Psychiatry consulted for further management, recs appreciated  - VPA 500mg qam + 750mg qhs
Noted subtherapeutic valproic acid level. Lithium recently d/c'd on 4/23. Thus, explains reason for low lithium level. Psychiatry consulted for further management, recs appreciated  - VPA 500mg qam + 750mg qhs  -Neurontin 100mg po bid  Prolixin 5mg po bid  Cogentin 0.5mg po bid
Pt without any complaints. UA normal. No further w/u needed

## 2019-05-16 NOTE — PROGRESS NOTE ADULT - SUBJECTIVE AND OBJECTIVE BOX
NEUROSURGERY NOTE  RAUDEL SUGGS   05-16-19 @ 00:11    PAST 24HR EVENTS: no issues overnight. MRI stable. [facility] to [facility].    PHYSICAL EXAM:  Vital Signs Last 24 Hrs  T(C): 36.7 (15 May 2019 20:51), Max: 37 (15 May 2019 14:46)  T(F): 98.1 (15 May 2019 20:51), Max: 98.6 (15 May 2019 14:46)  HR: 81 (15 May 2019 20:51) (69 - 81)  BP: 130/78 (15 May 2019 20:51) (107/63 - 130/78)  BP(mean): --  RR: 18 (15 May 2019 20:51) (16 - 20)  SpO2: 100% (15 May 2019 20:51) (96% - 100%)    Awake Alert oriented x 3    [X] Upper extremity                      Delt       Bicep    Tricep                                                  R         5/5        4/5        5/5       5/5                                               L          5/5        5/5        5/5       5/5  [X] Lower extremity                       HF          KE          KF        DF         PF    EHL                                               R        4/5        5/5        5/5       4/5       5/5      5/5                                               L         4/5        5/5       5/5       4/5        5/5       4/5  + GRIFFITH'S  + BABINKSI  + CLONUS  Diminished sensation to LE leg to light touch however difficult to exam and patient is easily distracted  Incision intact- redness adjacent to staples    MEDS:   acetaminophen   Tablet .. 650 milliGRAM(s) Oral every 8 hours PRN  aluminum hydroxide/magnesium hydroxide/simethicone Suspension 30 milliLiter(s) Oral every 4 hours PRN  baclofen 10 milliGRAM(s) Oral three times a day PRN  benztropine 0.5 milliGRAM(s) Oral two times a day  bisacodyl 5 milliGRAM(s) Oral every 12 hours  bisacodyl Suppository 10 milliGRAM(s) Rectal daily PRN  desmopressin 0.2 milliGRAM(s) Oral at bedtime  diazepam    Tablet 5 milliGRAM(s) Oral every 6 hours PRN  docusate sodium 100 milliGRAM(s) Oral three times a day  enoxaparin Injectable 40 milliGRAM(s) SubCutaneous at bedtime  fluPHENAZine 5 milliGRAM(s) Oral two times a day  gabapentin 100 milliGRAM(s) Oral two times a day  levothyroxine 175 MICROGram(s) Oral daily  metoprolol succinate ER 50 milliGRAM(s) Oral daily  nicotine  Polacrilex Gum 4 milliGRAM(s) Oral every 6 hours PRN  nicotine - 21 mG/24Hr(s) Patch 1 patch Transdermal daily  ondansetron Injectable 4 milliGRAM(s) IV Push every 6 hours PRN  pantoprazole    Tablet 40 milliGRAM(s) Oral before breakfast  polyethylene glycol 3350 17 Gram(s) Oral daily PRN  senna 2 Tablet(s) Oral at bedtime  trimethoprim  160 mG/sulfamethoxazole 800 mG 1 Tablet(s) Oral two times a day  valproic  acid Syrup 500 milliGRAM(s) Oral <User Schedule>  valproic  acid Syrup 750 milliGRAM(s) Oral at bedtime    RADIOLOGY:   < from: MR Lumbar Spine No Cont (05.15.19 @ 13:47) >    A transitional vertebral body is present at the lumbar sacral junction.   The last large disc space is designated as S1-S2, which is confirmed when   counting inferiorly from the cervical level. This places the conus at the   expected L1-L2 level.    Bone marrow edema involves the L2 and L3 vertebral bodies adjacent to the   disc space. No intervening fluid is noted in the disc space so the bone   marrow edema most likely reflects active degenerative changes. Small   Schmorl's nodes are present at this level.    There is no acute compression fracture within the lumbar spine. There is   no lower thoracic spinal cord compression or cauda equina compression.    L1-L2: A minimal disc bulge and facet hypertrophy result in minimal   central canal stenosis. The neural foramina appear patent.    L2-L3: A broad small disc bulge, facet hypertrophy, and ligamentum flavum   hypertrophy result in mild central canal stenosis and minimal bilateral   neural foraminal narrowing.    L3-L4: A broad small disc bulge, facet hypertrophy, and ligamentum flavum   hypertrophy result in mild central canal stenosis, mild to moderate right   and mild left neural foramen narrowing.    L4-L5: A broad disc bulge, facet hypertrophy, and ligamentum flavum   hypertrophy result in mild central canal stenosis and mild to moderate   right neural foramen narrowing, and mild left neural foramen narrowing.    L5-S1: Minimal posterior subluxation of the L5 vertebral body is present.   No significant central canal stenosis is present at this level. Facet and   ligamentum flavum hypertrophy result in severe left neural foramen   narrowing and mild right neural foramen narrowing.    IMPRESSION:    Degenerative changes involve the lumbar spine with distribution as   described. There is no lower thoracic spinal cord compression or cauda   equina compression.            < end of copied text >

## 2019-05-17 LAB
ALBUMIN SERPL ELPH-MCNC: 2 G/DL — LOW (ref 3.3–5)
ALP SERPL-CCNC: 102 U/L — SIGNIFICANT CHANGE UP (ref 40–120)
ALT FLD-CCNC: 16 U/L DA — SIGNIFICANT CHANGE UP (ref 10–45)
ANION GAP SERPL CALC-SCNC: 7 MMOL/L — SIGNIFICANT CHANGE UP (ref 5–17)
AST SERPL-CCNC: 15 U/L — SIGNIFICANT CHANGE UP (ref 10–40)
BASOPHILS # BLD AUTO: 0.04 K/UL — SIGNIFICANT CHANGE UP (ref 0–0.2)
BASOPHILS NFR BLD AUTO: 0.6 % — SIGNIFICANT CHANGE UP (ref 0–2)
BILIRUB SERPL-MCNC: 0.2 MG/DL — SIGNIFICANT CHANGE UP (ref 0.2–1.2)
BUN SERPL-MCNC: 16 MG/DL — SIGNIFICANT CHANGE UP (ref 7–23)
CALCIUM SERPL-MCNC: 9.4 MG/DL — SIGNIFICANT CHANGE UP (ref 8.4–10.5)
CHLORIDE SERPL-SCNC: 104 MMOL/L — SIGNIFICANT CHANGE UP (ref 96–108)
CO2 SERPL-SCNC: 29 MMOL/L — SIGNIFICANT CHANGE UP (ref 22–31)
CREAT SERPL-MCNC: 0.62 MG/DL — SIGNIFICANT CHANGE UP (ref 0.5–1.3)
EOSINOPHIL # BLD AUTO: 0.16 K/UL — SIGNIFICANT CHANGE UP (ref 0–0.5)
EOSINOPHIL NFR BLD AUTO: 2.5 % — SIGNIFICANT CHANGE UP (ref 0–6)
GLUCOSE SERPL-MCNC: 93 MG/DL — SIGNIFICANT CHANGE UP (ref 70–99)
HCT VFR BLD CALC: 39.2 % — SIGNIFICANT CHANGE UP (ref 39–50)
HGB BLD-MCNC: 12.7 G/DL — LOW (ref 13–17)
IMM GRANULOCYTES NFR BLD AUTO: 0.6 % — SIGNIFICANT CHANGE UP (ref 0–1.5)
LYMPHOCYTES # BLD AUTO: 0.94 K/UL — LOW (ref 1–3.3)
LYMPHOCYTES # BLD AUTO: 14.6 % — SIGNIFICANT CHANGE UP (ref 13–44)
MCHC RBC-ENTMCNC: 28.7 PG — SIGNIFICANT CHANGE UP (ref 27–34)
MCHC RBC-ENTMCNC: 32.4 GM/DL — SIGNIFICANT CHANGE UP (ref 32–36)
MCV RBC AUTO: 88.5 FL — SIGNIFICANT CHANGE UP (ref 80–100)
MONOCYTES # BLD AUTO: 0.58 K/UL — SIGNIFICANT CHANGE UP (ref 0–0.9)
MONOCYTES NFR BLD AUTO: 9 % — SIGNIFICANT CHANGE UP (ref 2–14)
NEUTROPHILS # BLD AUTO: 4.68 K/UL — SIGNIFICANT CHANGE UP (ref 1.8–7.4)
NEUTROPHILS NFR BLD AUTO: 72.7 % — SIGNIFICANT CHANGE UP (ref 43–77)
NRBC # BLD: 0 /100 WBCS — SIGNIFICANT CHANGE UP (ref 0–0)
PLATELET # BLD AUTO: 384 K/UL — SIGNIFICANT CHANGE UP (ref 150–400)
POTASSIUM SERPL-MCNC: 4.2 MMOL/L — SIGNIFICANT CHANGE UP (ref 3.5–5.3)
POTASSIUM SERPL-SCNC: 4.2 MMOL/L — SIGNIFICANT CHANGE UP (ref 3.5–5.3)
PREALB SERPL-MCNC: 20 MG/DL — SIGNIFICANT CHANGE UP (ref 20–40)
PROT SERPL-MCNC: 6.6 G/DL — SIGNIFICANT CHANGE UP (ref 6–8.3)
RBC # BLD: 4.43 M/UL — SIGNIFICANT CHANGE UP (ref 4.2–5.8)
RBC # FLD: 13.7 % — SIGNIFICANT CHANGE UP (ref 10.3–14.5)
SODIUM SERPL-SCNC: 140 MMOL/L — SIGNIFICANT CHANGE UP (ref 135–145)
WBC # BLD: 6.44 K/UL — SIGNIFICANT CHANGE UP (ref 3.8–10.5)
WBC # FLD AUTO: 6.44 K/UL — SIGNIFICANT CHANGE UP (ref 3.8–10.5)

## 2019-05-17 PROCEDURE — 96116 NUBHVL XM PHYS/QHP 1ST HR: CPT

## 2019-05-17 PROCEDURE — 99223 1ST HOSP IP/OBS HIGH 75: CPT

## 2019-05-17 PROCEDURE — 99231 SBSQ HOSP IP/OBS SF/LOW 25: CPT

## 2019-05-17 PROCEDURE — 99222 1ST HOSP IP/OBS MODERATE 55: CPT | Mod: GC

## 2019-05-17 PROCEDURE — 93010 ELECTROCARDIOGRAM REPORT: CPT

## 2019-05-17 RX ORDER — ASCORBIC ACID 60 MG
500 TABLET,CHEWABLE ORAL DAILY
Refills: 0 | Status: DISCONTINUED | OUTPATIENT
Start: 2019-05-17 | End: 2019-05-30

## 2019-05-17 RX ORDER — SACCHAROMYCES BOULARDII 250 MG
250 POWDER IN PACKET (EA) ORAL
Refills: 0 | Status: DISCONTINUED | OUTPATIENT
Start: 2019-05-17 | End: 2019-05-30

## 2019-05-17 RX ADMIN — Medication 1 TABLET(S): at 17:53

## 2019-05-17 RX ADMIN — GABAPENTIN 100 MILLIGRAM(S): 400 CAPSULE ORAL at 06:33

## 2019-05-17 RX ADMIN — FLUPHENAZINE HYDROCHLORIDE 5 MILLIGRAM(S): 1 TABLET, FILM COATED ORAL at 17:53

## 2019-05-17 RX ADMIN — ALBUTEROL 2 PUFF(S): 90 AEROSOL, METERED ORAL at 04:06

## 2019-05-17 RX ADMIN — Medication 650 MILLIGRAM(S): at 22:30

## 2019-05-17 RX ADMIN — Medication 650 MILLIGRAM(S): at 14:12

## 2019-05-17 RX ADMIN — Medication 5 MILLIGRAM(S): at 17:51

## 2019-05-17 RX ADMIN — Medication 0.5 MILLIGRAM(S): at 17:51

## 2019-05-17 RX ADMIN — ALBUTEROL 2 PUFF(S): 90 AEROSOL, METERED ORAL at 22:22

## 2019-05-17 RX ADMIN — Medication 250 MILLIGRAM(S): at 17:52

## 2019-05-17 RX ADMIN — Medication 50 MILLIGRAM(S): at 06:34

## 2019-05-17 RX ADMIN — ENOXAPARIN SODIUM 40 MILLIGRAM(S): 100 INJECTION SUBCUTANEOUS at 21:14

## 2019-05-17 RX ADMIN — Medication 1 TABLET(S): at 13:03

## 2019-05-17 RX ADMIN — Medication 500 MILLIGRAM(S): at 08:12

## 2019-05-17 RX ADMIN — PANTOPRAZOLE SODIUM 40 MILLIGRAM(S): 20 TABLET, DELAYED RELEASE ORAL at 06:33

## 2019-05-17 RX ADMIN — Medication 650 MILLIGRAM(S): at 21:11

## 2019-05-17 RX ADMIN — ALBUTEROL 2 PUFF(S): 90 AEROSOL, METERED ORAL at 09:07

## 2019-05-17 RX ADMIN — Medication 650 MILLIGRAM(S): at 08:59

## 2019-05-17 RX ADMIN — DESMOPRESSIN ACETATE 0.2 MILLIGRAM(S): 0.1 TABLET ORAL at 21:11

## 2019-05-17 RX ADMIN — GABAPENTIN 100 MILLIGRAM(S): 400 CAPSULE ORAL at 17:52

## 2019-05-17 RX ADMIN — Medication 1 PATCH: at 19:02

## 2019-05-17 RX ADMIN — Medication 175 MICROGRAM(S): at 06:34

## 2019-05-17 RX ADMIN — Medication 650 MILLIGRAM(S): at 14:53

## 2019-05-17 RX ADMIN — Medication 10 MILLIGRAM(S): at 13:03

## 2019-05-17 RX ADMIN — FLUPHENAZINE HYDROCHLORIDE 5 MILLIGRAM(S): 1 TABLET, FILM COATED ORAL at 06:34

## 2019-05-17 RX ADMIN — Medication 1 TABLET(S): at 06:34

## 2019-05-17 RX ADMIN — Medication 650 MILLIGRAM(S): at 10:00

## 2019-05-17 RX ADMIN — Medication 100 MILLIGRAM(S): at 17:52

## 2019-05-17 RX ADMIN — Medication 0.5 MILLIGRAM(S): at 06:34

## 2019-05-17 RX ADMIN — Medication 750 MILLIGRAM(S): at 21:11

## 2019-05-17 RX ADMIN — Medication 100 MILLIGRAM(S): at 06:33

## 2019-05-17 RX ADMIN — Medication 1 PATCH: at 13:02

## 2019-05-17 RX ADMIN — ALBUTEROL 2 PUFF(S): 90 AEROSOL, METERED ORAL at 16:17

## 2019-05-17 NOTE — CONSULT NOTE ADULT - ASSESSMENT
53M with bipolar disorder, schizophrenia, cervical stenosis now s/p C3-C6 laminectomy/fusion complicated by epidural fluid collection now at acute rehab for ambulatory dysfunction    #Cervical spinal stenosis  #Ambulatory dysfunction secondary to above  -PT/OT  -Pain control    #Epidural spinal fluid collection  -suspect postoperative seroma  -will need eventual repeat MRI C-spine to assess for resolution  -(No current signs of infection or blood loss)    #Bipolar disorder  #Schizophrenia  -c/w psychotropic medications  -OTc reviewed: 429 ms    #Nocturnal enuresis  -Desmopressin     #Essential HTN  -stable     -Would need to clarify why patient on Bactrim - if was for PCP ppx while on steroids, okay to d/c since no longer on steroids    DVT ppx: Lovenox

## 2019-05-17 NOTE — DIETITIAN INITIAL EVALUATION ADULT. - OTHER INFO
53yr Old Male - Dx: Laminectomy - Initial Assessment - Regular Diet w/ Thin Liquids (Requests Ensure Enlive 8oz PO Daily), Denies Food Allergy, Tolerates Diet Well, No Chewing/Swallowing Complications (Per Patient), No Pressure Ulcers (as Per Flow Sheets), No Edema Noted (as Per Flow Sheets), No Recent Nausea/Diarrhea/Constipation

## 2019-05-17 NOTE — CONSULT NOTE ADULT - SUBJECTIVE AND OBJECTIVE BOX
Pt seen and evaluated today, full consult to follow. Would continue psych meds as ordered.  Would want to know more in the way of psychiatric history. Will contact collateral on Monday in terms of [facility].   Pt currently stable, c/o constipation, and was going to the bathroom when writer stopped by, then took a phone call.  Doubt he has both Bipolar d/o and Schizophrenia, most likely is diagnosed with Schizoaffective d/o Bipolar type.  Pt denied any acute psychiatric distress.   Psychiatry will follow as needed.   Get VPA level if not already done.

## 2019-05-17 NOTE — CONSULT NOTE ADULT - SUBJECTIVE AND OBJECTIVE BOX
Patient is a 53y old  Male who presents with a chief complaint of Functional Deficits After Laminectomy (16 May 2019 15:30)    HPI:  53 year old man, resident of [facility], with h/o bipolar d/o, HTN, nocturnal enuresis, deformity of lower leg, hypothyroid, schizophrenia, BPH, IVDA presented to [facility] on 4/25 with inability to walk due to lower extremity weakness which had been worsening for the previous two weeks. Also complained of lower back pain since sustaining a fall 2 weeks prior. In additions the pt reported paresthesias in both hands, however stated that he was able to feel his feet. Stated that he believes that he is being "possessed" and "held down" by somebody who passed away. Of note, the pt states that someone is trying to "poison" and "finish him off" at [place] and that he is being followed. Motion degraded MRI Cervical and thoracic spine obtained on 4/26 after patient had +duarte's, clonus and babinski on exam, showed multilevel cervical stenosis and possible cord signal change, started on decadron. On 5/1 he had C3-C6 laminectomy and fusion.  MRI C Spine on 5/15 to check for worsening stenosis showed A dorsal paraspinal subcutaneous nonspecific fluid collection noted from the C1-C7 levels measuring 10.2 cm cephalocaudad, 3.8 cm AP, and 7.1 cm transverse. Differential considerations include an evolving postoperative seroma/hematoma or a CSF leak. Serial imaging follow-up over time is recommended to exclude possibility of early developing abscess. Reviewed by neurosurgery, no need for surgical intervention with no new symptoms. (16 May 2019 15:30)    Currently, patient complains of neck pain, "just a little bit" but then states it is a 9/10 on a pain scale, located midline of neck, in context of recent surgery for known cervical stenosis, non-radiating, improved with tylenol, worsened by movements. ROS pertinent for weakness in all four extremities, occasional numbness/tingling in the hands (not persistent), difficulty walking and constipation.     PAST MEDICAL & SURGICAL HISTORY:  Bipolar disorder  Drug abuse  Incontinence  Schizophrenia  Hypothyroid  Hypertension  No significant past surgical history    SOCIAL HISTORY: Former smoker, 2 PPD x 20 years, quit 1 year ago  FAMILY HISTORY: Mother and father both living in their 70s and healthy     ALLERGIES:  Haldol (Unknown)  No Known Allergies  Thorazine (Unknown)    MEDICATIONS  (STANDING):  ALBUTerol    90 MICROgram(s) HFA Inhaler 2 Puff(s) Inhalation every 6 hours  benztropine 0.5 milliGRAM(s) Oral two times a day  bisacodyl 5 milliGRAM(s) Oral every 12 hours  desmopressin 0.2 milliGRAM(s) Oral at bedtime  docusate sodium 100 milliGRAM(s) Oral two times a day  enoxaparin Injectable 40 milliGRAM(s) SubCutaneous every 24 hours  fluPHENAZine 5 milliGRAM(s) Oral two times a day  gabapentin 100 milliGRAM(s) Oral two times a day  levothyroxine 175 MICROGram(s) Oral daily  metoprolol succinate ER 50 milliGRAM(s) Oral daily  multivitamin 1 Tablet(s) Oral daily  nicotine - 21 mG/24Hr(s) Patch 1 patch Transdermal daily  pantoprazole    Tablet 40 milliGRAM(s) Oral before breakfast  trimethoprim  160 mG/sulfamethoxazole 800 mG 1 Tablet(s) Oral two times a day  valproic  acid Syrup 500 milliGRAM(s) Oral <User Schedule>  valproic  acid Syrup 750 milliGRAM(s) Oral at bedtime    MEDICATIONS  (PRN):  acetaminophen   Tablet .. 650 milliGRAM(s) Oral every 6 hours PRN Temp greater or equal to 38C (100.4F), Mild Pain (1 - 3)  ALBUTerol/ipratropium for Nebulization 3 milliLiter(s) Nebulizer every 6 hours PRN Shortness of Breath and/or Wheezing  aluminum hydroxide/magnesium hydroxide/simethicone Suspension 30 milliLiter(s) Oral every 4 hours PRN Indigestion  baclofen 10 milliGRAM(s) Oral three times a day PRN Muscle Spasm  bisacodyl Suppository 10 milliGRAM(s) Rectal daily PRN Constipation  diazepam    Tablet 5 milliGRAM(s) Oral every 6 hours PRN Spasm  nicotine  Polacrilex Gum 4 milliGRAM(s) Oral every 6 hours PRN nicotine craving  oxyCODONE    IR 5 milliGRAM(s) Oral every 4 hours PRN Moderate Pain (4 - 6)  polyethylene glycol 3350 17 Gram(s) Oral daily PRN Constipation  senna 2 Tablet(s) Oral at bedtime PRN Constipation    Review of Systems: Refer to HPI for pertinent positives and negatives. All other ROS reviewed and negative except as otherwise stated above.    Vital Signs Last 24 Hrs  T(F): 98.1 (17 May 2019 07:30), Max: 98.2 (16 May 2019 14:22)  HR: 76 (17 May 2019 09:09) (66 - 83)  BP: 131/80 (17 May 2019 07:30) (118/73 - 132/78)  RR: 12 (17 May 2019 07:30) (12 - 18)  SpO2: 96% (17 May 2019 09:09) (96% - 98%)  I&O's Summary    PHYSICAL EXAM:  GENERAL: NAD, well-groomed, well-developed  HEAD:  Atraumatic, Normocephalic  EYES: EOMI, PERRL, conjunctiva and sclera clear  ENMT: Moist mucous membranes, Good dentition  NECK: Supple, No JVD, posterior neck wound C/D/I/healing  CHEST/LUNG: Clear to auscultation bilaterally, non-labored breathing, good air entry  HEART: Regular rate and rhythm; S1/S2, No murmurs  ABDOMEN: Soft, Nontender, Nondistended; Bowel sounds present  VASCULAR: Normal pulses, Normal capillary refill  EXTREMITIES: No cyanosis, No edema, bilateral leg weakness  LYMPH: No lymphadenopathy noted  SKIN: Warm, Intact, see "neck" exam above  PSYCH: Normal mood and affect  NERVOUS SYSTEM:  A/O x3, Good concentration; CN 2-12 intact    LABS:                        12.7   6.44  )-----------( 384      ( 17 May 2019 06:12 )             39.2     05-17    140  |  104  |  16  ----------------------------<  93  4.2   |  29  |  0.62    Ca    9.4      17 May 2019 06:12    TPro  6.6  /  Alb  2.0  /  TBili  0.2  /  DBili  x   /  AST  15  /  ALT  16  /  AlkPhos  102  05-17    eGFR if Non African American: 114 mL/min/1.73M2 (05-17-19 @ 06:12)  eGFR if African American: 132 mL/min/1.73M2 (05-17-19 @ 06:12)    RADIOLOGY & ADDITIONAL TESTS:  < from: MR Cervical Spine No Cont (05.15.19 @ 13:46) >  There has been a marked interval improvement in thespinal cord   compression status post canal decompression compared to the 04/26/2019   preoperative MRI study. The spinal cord edema appears decreased. Moderate   canal stenosis is however present secondary to the dorsal epidural   paraspinal fluid collection, most prominent at the C4-C5 level.    < end of copied text >    Care Discussed with Consultants/Other Providers: Dr. Ni

## 2019-05-17 NOTE — PROGRESS NOTE ADULT - SUBJECTIVE AND OBJECTIVE BOX
Patient is a 53y old  Male who presents with a chief complaint of Functional Deficits After Laminectomy (17 May 2019 11:30)      HPI:  53 year old man, resident of [facility], with h/o bipolar d/o, HTN, nocturnal enuresis, deformity of lower leg, hypothyroid, schizophrenia, BPH, IVDA presented to [facility] on  with inability to walk due to lower extremity weakness which had been worsening for the previous two weeks. Also complained of lower back pain since sustaining a fall 2 weeks prior. In additions the pt reported paresthesias in both hands, however stated that he was able to feel his feet. Stated that he believes that he is being "possessed" and "held down" by somebody who passed away. Of note, the pt states that someone is trying to "poison" and "finish him off" at [place] and that he is being followed. Motion degraded MRI Cervical and thoracic spine obtained on  after patient had +duarte's, clonus and babinski on exam, showed multilevel cervical stenosis and possible cord signal change, started on decadron. On  he had C3-C6 laminectomy and fusion.  MRI C Spine on 5/15 to check for worsening stenosis showed A dorsal paraspinal subcutaneous nonspecific fluid collection noted from the C1-C7 levels measuring 10.2 cm cephalocaudad, 3.8 cm AP, and 7.1 cm transverse. Differential considerations include an evolving postoperative seroma/hematoma or a CSF leak. Serial imaging follow-up over time is recommended to exclude possibility of early developing abscess. Reviewed by neurosurgery, no need for surgical intervention with no new symptoms. (16 May 2019 15:30)      PAST MEDICAL & SURGICAL HISTORY:  Bipolar disorder  Drug abuse  Incontinence  Schizophrenia  Hypothyroid  Hypertension  No significant past surgical history      MEDICATIONS  (STANDING):  ALBUTerol    90 MICROgram(s) HFA Inhaler 2 Puff(s) Inhalation every 6 hours  benztropine 0.5 milliGRAM(s) Oral two times a day  bisacodyl 5 milliGRAM(s) Oral every 12 hours  desmopressin 0.2 milliGRAM(s) Oral at bedtime  docusate sodium 100 milliGRAM(s) Oral two times a day  enoxaparin Injectable 40 milliGRAM(s) SubCutaneous every 24 hours  fluPHENAZine 5 milliGRAM(s) Oral two times a day  gabapentin 100 milliGRAM(s) Oral two times a day  levothyroxine 175 MICROGram(s) Oral daily  metoprolol succinate ER 50 milliGRAM(s) Oral daily  multivitamin 1 Tablet(s) Oral daily  nicotine - 21 mG/24Hr(s) Patch 1 patch Transdermal daily  pantoprazole    Tablet 40 milliGRAM(s) Oral before breakfast  trimethoprim  160 mG/sulfamethoxazole 800 mG 1 Tablet(s) Oral two times a day  valproic  acid Syrup 500 milliGRAM(s) Oral <User Schedule>  valproic  acid Syrup 750 milliGRAM(s) Oral at bedtime    MEDICATIONS  (PRN):  acetaminophen   Tablet .. 650 milliGRAM(s) Oral every 6 hours PRN Temp greater or equal to 38C (100.4F), Mild Pain (1 - 3)  ALBUTerol/ipratropium for Nebulization 3 milliLiter(s) Nebulizer every 6 hours PRN Shortness of Breath and/or Wheezing  aluminum hydroxide/magnesium hydroxide/simethicone Suspension 30 milliLiter(s) Oral every 4 hours PRN Indigestion  bisacodyl Suppository 10 milliGRAM(s) Rectal daily PRN Constipation  diazepam    Tablet 5 milliGRAM(s) Oral every 6 hours PRN Spasm  nicotine  Polacrilex Gum 4 milliGRAM(s) Oral every 6 hours PRN nicotine craving  polyethylene glycol 3350 17 Gram(s) Oral daily PRN Constipation  senna 2 Tablet(s) Oral at bedtime PRN Constipation        Allergies    No Known Allergies    Intolerances    Haldol (Unknown)  Thorazine (Unknown)      TODAY'S SUBJECTIVE & REVIEW OF SYMPTOMS:  Patient was seen and examined today, there were no acute events overnight.   Patient calm and pleasant, good spirits, states he has neck pain relieved by Tylenol, describes it as a little bit of pain but says 8/10, no acute distress.  +numbness, tingling b/l hands  +constipation      PHYSICAL EXAM  53y  Vital Signs Last 24 Hrs  T(C): 36.7 (17 May 2019 07:30), Max: 36.8 (16 May 2019 14:22)  T(F): 98.1 (17 May 2019 07:30), Max: 98.2 (16 May 2019 14:22)  HR: 76 (17 May 2019 09:09) (66 - 83)  BP: 131/80 (17 May 2019 07:30) (118/73 - 132/78)  BP(mean): --  RR: 12 (17 May 2019 07:30) (12 - 18)  SpO2: 96% (17 May 2019 09:09) (96% - 98%)  Daily     Daily Weight in k.9 (16 May 2019 21:10)    General: NAD    Resp: CTAB  Cardio: +S1, S2  Abdomen: soft, NT, ND, normoactive bowel sounds  Extremities: no edema or calf tenderness. +Osgood-Schlatter lumps noted bilaterally  Neuro: awake and alert     Motor: no increased tone     RUE: ShFl 4/5   EE 4/5    EF 4/5   WrEx 5/5   Interossei 3/5   ADM 4/5     LUE: ShFl 4/5   EE 4/5    EF 4/5   WrEx 5/5   Interossei 3/5   ADM 4/5     RLE: HF  2/5   KE 3-/5    DF 5/5   PF 4/5   EHL 1/5   Evertors 1/5     LLE: HF  2/5   KE 4/5    DF 5/5   PF 4/5   EHL 1/5   Evertors 1/5    Sensory: decreased pinprick from feet up to distal 1/3 tibia bilaterally    Reflexes: Right: Triceps 2+, Biceps 1+, Brachioradialis 1+, +Hoffmans,   patellar 2+ Achilles 2+                  Left: Triceps 2+, Biceps 1+, Brachioradialis 1+,  + Hoffmans    Patellar 1+, Achilles 2+  Skin: abrasions bilateral below patella, posterior neck surgical site- sutures removed, clean, no drainage      RECENT LABS:                          12.7   6.44  )-----------( 384      ( 17 May 2019 06:12 )             39.2     05-17    140  |  104  |  16  ----------------------------<  93  4.2   |  29  |  0.62    Ca    9.4      17 May 2019 06:12    TPro  6.6  /  Alb  2.0<L>  /  TBili  0.2  /  DBili  x   /  AST  15  /  ALT  16  /  AlkPhos  102  05-17    LIVER FUNCTIONS - ( 17 May 2019 06:12 )  Alb: 2.0 g/dL / Pro: 6.6 g/dL / ALK PHOS: 102 U/L / ALT: 16 U/L DA / AST: 15 U/L / GGT: x                   CAPILLARY BLOOD GLUCOSE        IMPRESSION AND PLAN:  53 year-old man with a PMH of bipolar disorder/schizophrenia, lower leg deformity  with Gait Instability, ADL impairments and Functional impairments after cervical stenosis s/p cervical fusion and laminectomy    #Cervical Stenosis- s/p C3-C6 laminectomy and fusion with Gait Instability, ADL impairments, and Functional impairments- follow up MRI with improved stenosis and epidural fluid collection  -Fluid collection on MRI Deemed stable by NSGY- monitor  -completed Decadron taper  -start Comprehensive Rehab Program of PT/OT   -Spinal, fall precautions  -Dc baclofen for spasms, keep diazepam- also for anxiety  -Bactrim for wound ppx total 14 days (5/15-)    #Bipolar Disorder/Schizophrenia  -c/w Cogentin, Prolixin, Valproic Acid, Neurontin  - Neuropsych eval     #Hypothyroidism  -c/w synthroid    #HTN  -c/w metoprolol    #Nocturnal Enuresis  -c/w Desmopressin    #Hygeine  -Oral Care  -Podiatry Consult    #B/L Eversion weakness, EHL-   -Orthotist Eval     Pain Mgmt - Tylenol PRN, DC oxycodone   Bowel Regimen - c/w colace, senna; Dulcolax suppository and Miralax prn  / Bladder - occasional incontinence- bladder scan q8h, PVR, SC > 400ml  DVT ppx: lovenox Patient is a 53y old  Male who presents with a chief complaint of Functional Deficits After Laminectomy (17 May 2019 11:30)      HPI:  53 year old man, resident of [facility], with h/o bipolar d/o, HTN, nocturnal enuresis, deformity of lower leg, hypothyroid, schizophrenia, BPH, IVDA presented to [facility] on  with inability to walk due to lower extremity weakness which had been worsening for the previous two weeks. Also complained of lower back pain since sustaining a fall 2 weeks prior. In additions the pt reported paresthesias in both hands, however stated that he was able to feel his feet. Stated that he believes that he is being "possessed" and "held down" by somebody who passed away. Of note, the pt states that someone is trying to "poison" and "finish him off" at [place] and that he is being followed. Motion degraded MRI Cervical and thoracic spine obtained on  after patient had +duarte's, clonus and babinski on exam, showed multilevel cervical stenosis and possible cord signal change, started on decadron. On  he had C3-C6 laminectomy and fusion.  MRI C Spine on 5/15 to check for worsening stenosis showed A dorsal paraspinal subcutaneous nonspecific fluid collection noted from the C1-C7 levels measuring 10.2 cm cephalocaudad, 3.8 cm AP, and 7.1 cm transverse. Differential considerations include an evolving postoperative seroma/hematoma or a CSF leak. Serial imaging follow-up over time is recommended to exclude possibility of early developing abscess. Reviewed by neurosurgery, no need for surgical intervention with no new symptoms. (16 May 2019 15:30)      PAST MEDICAL & SURGICAL HISTORY:  Bipolar disorder  Drug abuse  Incontinence  Schizophrenia  Hypothyroid  Hypertension  No significant past surgical history      MEDICATIONS  (STANDING):  ALBUTerol    90 MICROgram(s) HFA Inhaler 2 Puff(s) Inhalation every 6 hours  benztropine 0.5 milliGRAM(s) Oral two times a day  bisacodyl 5 milliGRAM(s) Oral every 12 hours  desmopressin 0.2 milliGRAM(s) Oral at bedtime  docusate sodium 100 milliGRAM(s) Oral two times a day  enoxaparin Injectable 40 milliGRAM(s) SubCutaneous every 24 hours  fluPHENAZine 5 milliGRAM(s) Oral two times a day  gabapentin 100 milliGRAM(s) Oral two times a day  levothyroxine 175 MICROGram(s) Oral daily  metoprolol succinate ER 50 milliGRAM(s) Oral daily  multivitamin 1 Tablet(s) Oral daily  nicotine - 21 mG/24Hr(s) Patch 1 patch Transdermal daily  pantoprazole    Tablet 40 milliGRAM(s) Oral before breakfast  trimethoprim  160 mG/sulfamethoxazole 800 mG 1 Tablet(s) Oral two times a day  valproic  acid Syrup 500 milliGRAM(s) Oral <User Schedule>  valproic  acid Syrup 750 milliGRAM(s) Oral at bedtime    MEDICATIONS  (PRN):  acetaminophen   Tablet .. 650 milliGRAM(s) Oral every 6 hours PRN Temp greater or equal to 38C (100.4F), Mild Pain (1 - 3)  ALBUTerol/ipratropium for Nebulization 3 milliLiter(s) Nebulizer every 6 hours PRN Shortness of Breath and/or Wheezing  aluminum hydroxide/magnesium hydroxide/simethicone Suspension 30 milliLiter(s) Oral every 4 hours PRN Indigestion  bisacodyl Suppository 10 milliGRAM(s) Rectal daily PRN Constipation  diazepam    Tablet 5 milliGRAM(s) Oral every 6 hours PRN Spasm  nicotine  Polacrilex Gum 4 milliGRAM(s) Oral every 6 hours PRN nicotine craving  polyethylene glycol 3350 17 Gram(s) Oral daily PRN Constipation  senna 2 Tablet(s) Oral at bedtime PRN Constipation        Allergies    No Known Allergies    Intolerances    Haldol (Unknown)  Thorazine (Unknown)      TODAY'S SUBJECTIVE & REVIEW OF SYMPTOMS:  Patient was seen and examined today, there were no acute events overnight.   Patient calm and pleasant, good spirits, states he has neck pain relieved by Tylenol, describes it as a little bit of pain but says 8/10, no acute distress.  +numbness, tingling b/l hands  +constipation  +chronic urinary incontinence      PHYSICAL EXAM  53y  Vital Signs Last 24 Hrs  T(C): 36.7 (17 May 2019 07:30), Max: 36.8 (16 May 2019 14:22)  T(F): 98.1 (17 May 2019 07:30), Max: 98.2 (16 May 2019 14:22)  HR: 76 (17 May 2019 09:09) (66 - 83)  BP: 131/80 (17 May 2019 07:30) (118/73 - 132/78)  BP(mean): --  RR: 12 (17 May 2019 07:30) (12 - 18)  SpO2: 96% (17 May 2019 09:09) (96% - 98%)  Daily     Daily Weight in k.9 (16 May 2019 21:10)    General: NAD    Resp: CTAB  Cardio: +S1, S2  Abdomen: soft, NT, ND, normoactive bowel sounds  Extremities: no edema or calf tenderness. +Osgood-Schlatters noted bilaterally  Neuro: awake and alert     Motor: no increased tone     RUE: ShFl 4/5   EE 4/5    EF 4/5   WrEx 5/5   Interossei 3/5   ADM 4/5     LUE: ShFl 4/5   EE 4/5    EF 4/5   WrEx 5/5   Interossei 3/5   ADM 4/5     RLE: HF  2/5   KE 3-/5    DF 5/5   PF 4/5   EHL 1/5   Evertors 1/5     LLE: HF  2/5   KE 4/5    DF 5/5   PF 4/5   EHL 1/5   Evertors 1/5    Sensory: decreased pinprick from feet up to distal 1/3 tibia bilaterally    Reflexes: Right: Triceps 2+, Biceps 1+, Brachioradialis 1+, +Hoffmans,   patellar 2+ Achilles 2+                  Left: Triceps 2+, Biceps 1+, Brachioradialis 1+,  + Hoffmans    Patellar 1+, Achilles 2+  Down-going plantars  Skin: abrasions bilateral below patella, posterior neck surgical site- sutures removed, clean, no drainage, no erythema or edema noted; CDI    Functional status:  Bed mobility: Min/mod A  Transfers: Mod A  Gait: Amb 25' RW mod A x 2  ADLs: UE dress Mod A, LE dress Total A      RECENT LABS:                          12.7   6.44  )-----------( 384      ( 17 May 2019 06:12 )             39.2     05-17    140  |  104  |  16  ----------------------------<  93  4.2   |  29  |  0.62    Ca    9.4      17 May 2019 06:12    TPro  6.6  /  Alb  2.0<L>  /  TBili  0.2  /  DBili  x   /  AST  15  /  ALT  16  /  AlkPhos  102  05-17    LIVER FUNCTIONS - ( 17 May 2019 06:12 )  Alb: 2.0 g/dL / Pro: 6.6 g/dL / ALK PHOS: 102 U/L / ALT: 16 U/L DA / AST: 15 U/L / GGT: x                   CAPILLARY BLOOD GLUCOSE        IMPRESSION AND PLAN:  53 year-old man with a PMH of bipolar disorder/schizophrenia, lower leg deformity  with Gait Instability, ADL impairments and Functional impairments after cervical stenosis s/p cervical fusion and laminectomy    #Cervical Stenosis- s/p C3-C6 laminectomy and fusion with Gait Instability, ADL impairments, and Functional impairments- follow up MRI with improved stenosis and epidural fluid collection  -Fluid collection on MRI Deemed stable by NSGY- monitor  -completed Decadron taper  -start Comprehensive Rehab Program of PT/OT   -Spinal, fall precautions  -Dc baclofen for spasms, keep diazepam- also for anxiety  -Bactrim for wound ppx total 14 days (5/15-)    #Bipolar Disorder/Schizophrenia  -c/w Cogentin, Prolixin, Valproic Acid, Neurontin  - Neuropsych eval     #Hypothyroidism  -c/w synthroid    #HTN  -c/w metoprolol    #Nocturnal Enuresis  -c/w Desmopressin    #Hygeine  -Oral Care  -Podiatry Consult    #B/L Eversion weakness, EHL-   -Orthotist Eval     Pain Mgmt - Tylenol PRN, DC oxycodone   Bowel Regimen - c/w colace, senna; Dulcolax suppository and Miralax prn.  Enema prn  / Bladder - occasional incontinence- bladder scan q8h, PVR, SC > 400ml  DVT ppx: lovenox Patient is a 53y old  Male who presents with a chief complaint of Functional Deficits After Laminectomy (17 May 2019 11:30)      HPI:  53 year old man, resident of [facility], with h/o bipolar d/o, HTN, nocturnal enuresis, deformity of lower leg, hypothyroid, schizophrenia, BPH, IVDA presented to [facility] on  with inability to walk due to lower extremity weakness which had been worsening for the previous two weeks. Also complained of lower back pain since sustaining a fall 2 weeks prior. In additions the pt reported paresthesias in both hands, however stated that he was able to feel his feet. Stated that he believes that he is being "possessed" and "held down" by somebody who passed away. Of note, the pt states that someone is trying to "poison" and "finish him off" at [place] and that he is being followed. Motion degraded MRI Cervical and thoracic spine obtained on  after patient had +duarte's, clonus and babinski on exam, showed multilevel cervical stenosis and possible cord signal change, started on decadron. On  he had C3-C6 laminectomy and fusion.  MRI C Spine on 5/15 to check for worsening stenosis showed A dorsal paraspinal subcutaneous nonspecific fluid collection noted from the C1-C7 levels measuring 10.2 cm cephalocaudad, 3.8 cm AP, and 7.1 cm transverse. Differential considerations include an evolving postoperative seroma/hematoma or a CSF leak. Serial imaging follow-up over time is recommended to exclude possibility of early developing abscess. Reviewed by neurosurgery, no need for surgical intervention with no new symptoms. (16 May 2019 15:30)      PAST MEDICAL & SURGICAL HISTORY:  Bipolar disorder  Drug abuse  Incontinence  Schizophrenia  Hypothyroid  Hypertension  No significant past surgical history      MEDICATIONS  (STANDING):  ALBUTerol    90 MICROgram(s) HFA Inhaler 2 Puff(s) Inhalation every 6 hours  benztropine 0.5 milliGRAM(s) Oral two times a day  bisacodyl 5 milliGRAM(s) Oral every 12 hours  desmopressin 0.2 milliGRAM(s) Oral at bedtime  docusate sodium 100 milliGRAM(s) Oral two times a day  enoxaparin Injectable 40 milliGRAM(s) SubCutaneous every 24 hours  fluPHENAZine 5 milliGRAM(s) Oral two times a day  gabapentin 100 milliGRAM(s) Oral two times a day  levothyroxine 175 MICROGram(s) Oral daily  metoprolol succinate ER 50 milliGRAM(s) Oral daily  multivitamin 1 Tablet(s) Oral daily  nicotine - 21 mG/24Hr(s) Patch 1 patch Transdermal daily  pantoprazole    Tablet 40 milliGRAM(s) Oral before breakfast  trimethoprim  160 mG/sulfamethoxazole 800 mG 1 Tablet(s) Oral two times a day  valproic  acid Syrup 500 milliGRAM(s) Oral <User Schedule>  valproic  acid Syrup 750 milliGRAM(s) Oral at bedtime    MEDICATIONS  (PRN):  acetaminophen   Tablet .. 650 milliGRAM(s) Oral every 6 hours PRN Temp greater or equal to 38C (100.4F), Mild Pain (1 - 3)  ALBUTerol/ipratropium for Nebulization 3 milliLiter(s) Nebulizer every 6 hours PRN Shortness of Breath and/or Wheezing  aluminum hydroxide/magnesium hydroxide/simethicone Suspension 30 milliLiter(s) Oral every 4 hours PRN Indigestion  bisacodyl Suppository 10 milliGRAM(s) Rectal daily PRN Constipation  diazepam    Tablet 5 milliGRAM(s) Oral every 6 hours PRN Spasm  nicotine  Polacrilex Gum 4 milliGRAM(s) Oral every 6 hours PRN nicotine craving  polyethylene glycol 3350 17 Gram(s) Oral daily PRN Constipation  senna 2 Tablet(s) Oral at bedtime PRN Constipation        Allergies    No Known Allergies    Intolerances    Haldol (Unknown)  Thorazine (Unknown)      TODAY'S SUBJECTIVE & REVIEW OF SYMPTOMS:  Patient was seen and examined today, there were no acute events overnight.   Patient calm and pleasant, good spirits, states he has neck pain relieved by Tylenol, describes it as a little bit of pain but says 8/10, no acute distress.  +numbness, tingling b/l hands  +constipation  +chronic urinary incontinence      PHYSICAL EXAM  53y  Vital Signs Last 24 Hrs  T(C): 36.7 (17 May 2019 07:30), Max: 36.8 (16 May 2019 14:22)  T(F): 98.1 (17 May 2019 07:30), Max: 98.2 (16 May 2019 14:22)  HR: 76 (17 May 2019 09:09) (66 - 83)  BP: 131/80 (17 May 2019 07:30) (118/73 - 132/78)  BP(mean): --  RR: 12 (17 May 2019 07:30) (12 - 18)  SpO2: 96% (17 May 2019 09:09) (96% - 98%)  Daily     Daily Weight in k.9 (16 May 2019 21:10)    General: NAD    Resp: CTAB  Cardio: +S1, S2  Abdomen: soft, NT, ND, normoactive bowel sounds  Extremities: no edema or calf tenderness. +Osgood-Schlatters noted bilaterally  Neuro: awake and alert     Motor: no increased tone     RUE: ShFl 4/5   EE 4/5    EF 4/5   WrEx 5/5   Interossei 3/5   ADM 4/5     LUE: ShFl 4/5   EE 4/5    EF 4/5   WrEx 5/5   Interossei 3/5   ADM 4/5     RLE: HF  2/5   KE 3-/5    DF 5/5   PF 4/5   EHL 1/5   Evertors 1/5     LLE: HF  2/5   KE 4/5    DF 5/5   PF 4/5   EHL 1/5   Evertors 1/5    Sensory: decreased pinprick from feet up to distal 1/3 tibia bilaterally    Reflexes: Right: Triceps 2+, Biceps 1+, Brachioradialis 1+, +Hoffmans,   patellar 2+ Achilles 2+                  Left: Triceps 2+, Biceps 1+, Brachioradialis 1+,  + Hoffmans    Patellar 1+, Achilles 2+  Down-going plantars  Skin: abrasions bilateral below patella, posterior neck surgical site- sutures removed, clean, no drainage, no erythema or edema noted; CDI    Functional status:  Bed mobility: Max A x 2   Transfers: Max A x 2  Gait: Unable  ADLs: UE dress Mod A, LE dress Total A      RECENT LABS:                          12.7   6.44  )-----------( 384      ( 17 May 2019 06:12 )             39.2     05-17    140  |  104  |  16  ----------------------------<  93  4.2   |  29  |  0.62    Ca    9.4      17 May 2019 06:12    TPro  6.6  /  Alb  2.0<L>  /  TBili  0.2  /  DBili  x   /  AST  15  /  ALT  16  /  AlkPhos  102  05-17    LIVER FUNCTIONS - ( 17 May 2019 06:12 )  Alb: 2.0 g/dL / Pro: 6.6 g/dL / ALK PHOS: 102 U/L / ALT: 16 U/L DA / AST: 15 U/L / GGT: x                   CAPILLARY BLOOD GLUCOSE        IMPRESSION AND PLAN:  53 year-old man with a PMH of bipolar disorder/schizophrenia, lower leg deformity  with Gait Instability, ADL impairments and Functional impairments after cervical stenosis s/p cervical fusion and laminectomy    #Cervical Stenosis- s/p C3-C6 laminectomy and fusion with Gait Instability, ADL impairments, and Functional impairments- follow up MRI with improved stenosis and epidural fluid collection  -Fluid collection on MRI Deemed stable by NSGY- monitor  -completed Decadron taper  -start Comprehensive Rehab Program of PT/OT   -Spinal, fall precautions  -Dc baclofen for spasms, keep diazepam- also for anxiety  -Bactrim for wound ppx total 14 days (5/15-)    #Bipolar Disorder/Schizophrenia  -c/w Cogentin, Prolixin, Valproic Acid, Neurontin  - Neuropsych eval     #Hypothyroidism  -c/w synthroid    #HTN  -c/w metoprolol    #Nocturnal Enuresis  -c/w Desmopressin    #Hygeine  -Oral Care  -Podiatry Consult    #B/L Eversion weakness, EHL-   -Orthotist Eval     Pain Mgmt - Tylenol PRN, DC oxycodone   Bowel Regimen - c/w colace, senna; Dulcolax suppository and Miralax prn.  Enema prn  / Bladder - occasional incontinence- bladder scan q8h, PVR, SC > 400ml  DVT ppx: lovenox

## 2019-05-18 PROCEDURE — 99232 SBSQ HOSP IP/OBS MODERATE 35: CPT

## 2019-05-18 RX ADMIN — Medication 5 MILLIGRAM(S): at 17:17

## 2019-05-18 RX ADMIN — Medication 0.5 MILLIGRAM(S): at 06:48

## 2019-05-18 RX ADMIN — Medication 5 MILLIGRAM(S): at 06:47

## 2019-05-18 RX ADMIN — Medication 500 MILLIGRAM(S): at 12:25

## 2019-05-18 RX ADMIN — Medication 1 TABLET(S): at 17:18

## 2019-05-18 RX ADMIN — Medication 175 MICROGRAM(S): at 06:47

## 2019-05-18 RX ADMIN — Medication 1 TABLET(S): at 06:48

## 2019-05-18 RX ADMIN — Medication 100 MILLIGRAM(S): at 06:47

## 2019-05-18 RX ADMIN — ALBUTEROL 2 PUFF(S): 90 AEROSOL, METERED ORAL at 15:48

## 2019-05-18 RX ADMIN — Medication 1 PATCH: at 13:37

## 2019-05-18 RX ADMIN — DESMOPRESSIN ACETATE 0.2 MILLIGRAM(S): 0.1 TABLET ORAL at 22:33

## 2019-05-18 RX ADMIN — Medication 750 MILLIGRAM(S): at 22:34

## 2019-05-18 RX ADMIN — Medication 650 MILLIGRAM(S): at 10:35

## 2019-05-18 RX ADMIN — ALBUTEROL 2 PUFF(S): 90 AEROSOL, METERED ORAL at 21:07

## 2019-05-18 RX ADMIN — ENOXAPARIN SODIUM 40 MILLIGRAM(S): 100 INJECTION SUBCUTANEOUS at 22:34

## 2019-05-18 RX ADMIN — GABAPENTIN 100 MILLIGRAM(S): 400 CAPSULE ORAL at 17:17

## 2019-05-18 RX ADMIN — FLUPHENAZINE HYDROCHLORIDE 5 MILLIGRAM(S): 1 TABLET, FILM COATED ORAL at 17:18

## 2019-05-18 RX ADMIN — Medication 1 PATCH: at 06:51

## 2019-05-18 RX ADMIN — Medication 0.5 MILLIGRAM(S): at 17:17

## 2019-05-18 RX ADMIN — Medication 1 PATCH: at 12:23

## 2019-05-18 RX ADMIN — Medication 650 MILLIGRAM(S): at 11:11

## 2019-05-18 RX ADMIN — Medication 50 MILLIGRAM(S): at 06:48

## 2019-05-18 RX ADMIN — Medication 1 TABLET(S): at 12:25

## 2019-05-18 RX ADMIN — Medication 500 MILLIGRAM(S): at 08:13

## 2019-05-18 RX ADMIN — PANTOPRAZOLE SODIUM 40 MILLIGRAM(S): 20 TABLET, DELAYED RELEASE ORAL at 06:47

## 2019-05-18 RX ADMIN — ALBUTEROL 2 PUFF(S): 90 AEROSOL, METERED ORAL at 08:46

## 2019-05-18 RX ADMIN — Medication 100 MILLIGRAM(S): at 17:17

## 2019-05-18 RX ADMIN — FLUPHENAZINE HYDROCHLORIDE 5 MILLIGRAM(S): 1 TABLET, FILM COATED ORAL at 06:48

## 2019-05-18 RX ADMIN — GABAPENTIN 100 MILLIGRAM(S): 400 CAPSULE ORAL at 06:47

## 2019-05-18 RX ADMIN — Medication 250 MILLIGRAM(S): at 17:17

## 2019-05-18 RX ADMIN — ALBUTEROL 2 PUFF(S): 90 AEROSOL, METERED ORAL at 04:56

## 2019-05-18 RX ADMIN — Medication 250 MILLIGRAM(S): at 06:47

## 2019-05-18 NOTE — PROGRESS NOTE ADULT - SUBJECTIVE AND OBJECTIVE BOX
Patient is a 53y old  Male who presents with a chief complaint of Functional Deficits After Laminectomy (17 May 2019 11:30)      HPI:  53 year old man, resident of [facility], with h/o bipolar d/o, HTN, nocturnal enuresis, deformity of lower leg, hypothyroid, schizophrenia, BPH, IVDA presented to [facility] on  with inability to walk due to lower extremity weakness which had been worsening for the previous two weeks. Also complained of lower back pain since sustaining a fall 2 weeks prior. In additions the pt reported paresthesias in both hands, however stated that he was able to feel his feet. Stated that he believes that he is being "possessed" and "held down" by somebody who passed away. Of note, the pt states that someone is trying to "poison" and "finish him off" at [place] and that he is being followed. Motion degraded MRI Cervical and thoracic spine obtained on  after patient had +duarte's, clonus and babinski on exam, showed multilevel cervical stenosis and possible cord signal change, started on decadron. On  he had C3-C6 laminectomy and fusion.  MRI C Spine on 5/15 to check for worsening stenosis showed A dorsal paraspinal subcutaneous nonspecific fluid collection noted from the C1-C7 levels measuring 10.2 cm cephalocaudad, 3.8 cm AP, and 7.1 cm transverse. Differential considerations include an evolving postoperative seroma/hematoma or a CSF leak. Serial imaging follow-up over time is recommended to exclude possibility of early developing abscess. Reviewed by neurosurgery, no need for surgical intervention with no new symptoms. (16 May 2019 15:30)      PAST MEDICAL & SURGICAL HISTORY:  Bipolar disorder  Drug abuse  Incontinence  Schizophrenia  Hypothyroid  Hypertension  No significant past surgical history      MEDICATIONS  (STANDING):  ALBUTerol    90 MICROgram(s) HFA Inhaler 2 Puff(s) Inhalation every 6 hours  benztropine 0.5 milliGRAM(s) Oral two times a day  bisacodyl 5 milliGRAM(s) Oral every 12 hours  desmopressin 0.2 milliGRAM(s) Oral at bedtime  docusate sodium 100 milliGRAM(s) Oral two times a day  enoxaparin Injectable 40 milliGRAM(s) SubCutaneous every 24 hours  fluPHENAZine 5 milliGRAM(s) Oral two times a day  gabapentin 100 milliGRAM(s) Oral two times a day  levothyroxine 175 MICROGram(s) Oral daily  metoprolol succinate ER 50 milliGRAM(s) Oral daily  multivitamin 1 Tablet(s) Oral daily  nicotine - 21 mG/24Hr(s) Patch 1 patch Transdermal daily  pantoprazole    Tablet 40 milliGRAM(s) Oral before breakfast  trimethoprim  160 mG/sulfamethoxazole 800 mG 1 Tablet(s) Oral two times a day  valproic  acid Syrup 500 milliGRAM(s) Oral <User Schedule>  valproic  acid Syrup 750 milliGRAM(s) Oral at bedtime    MEDICATIONS  (PRN):  acetaminophen   Tablet .. 650 milliGRAM(s) Oral every 6 hours PRN Temp greater or equal to 38C (100.4F), Mild Pain (1 - 3)  ALBUTerol/ipratropium for Nebulization 3 milliLiter(s) Nebulizer every 6 hours PRN Shortness of Breath and/or Wheezing  aluminum hydroxide/magnesium hydroxide/simethicone Suspension 30 milliLiter(s) Oral every 4 hours PRN Indigestion  bisacodyl Suppository 10 milliGRAM(s) Rectal daily PRN Constipation  diazepam    Tablet 5 milliGRAM(s) Oral every 6 hours PRN Spasm  nicotine  Polacrilex Gum 4 milliGRAM(s) Oral every 6 hours PRN nicotine craving  polyethylene glycol 3350 17 Gram(s) Oral daily PRN Constipation  senna 2 Tablet(s) Oral at bedtime PRN Constipation        Allergies    No Known Allergies    Intolerances    Haldol (Unknown)  Thorazine (Unknown)      TODAY'S SUBJECTIVE & REVIEW OF SYMPTOMS:  Patient was seen and examined today, there were no acute events overnight.   Pt reports that he "slept like a baby".  No longer c/o neck pain.  Chronic pins and needles and numbness.  Good appetite.  Last BM yesterday.  Urinating well with exception of nocturnal enuresis    PHYSICAL EXAM  53y  Vital Signs Last 24 Hrs  T(C): 36.4 (18 May 2019 09:24), Max: 36.4 (17 May 2019 21:00)  T(F): 97.5 (18 May 2019 09:24), Max: 97.6 (17 May 2019 21:00)  HR: 69 (18 May 2019 09:24) (69 - 81)  BP: 126/84 (18 May 2019 09:24) (111/66 - 126/84)  BP(mean): --  RR: 12 (18 May 2019 09:24) (12 - 14)  SpO2: 98% (18 May 2019 09:24) (94% - 98%)  Daily     Daily Weight in k.9 (16 May 2019 21:10)    General: NAD    Resp: CTAB  Cardio: +S1, S2  Abdomen: soft, NT, ND, normoactive bowel sounds  Extremities: no edema or calf tenderness. +Osgood-Schlatters noted bilaterally  Neuro: awake and alert     Motor: no increased tone     RUE: ShFl 4/5   EE 4/5    EF 4/5   WrEx 5/5   Interossei 3/5   ADM 4/5     LUE: ShFl 4/5   EE 4/5    EF 4/5   WrEx 5/5   Interossei 3/5   ADM 4/5     RLE: HF  2/5   KE 3-/5    DF 5/5   PF 4/5   EHL 1/5   Evertors 1/5     LLE: HF  2/5   KE 4/5    DF 5/5   PF 4/5   EHL 1/5   Evertors 1/5    Sensory: decreased pinprick from feet up to distal 1/3 tibia bilaterally    Reflexes: Right: Triceps 2+, Biceps 1+, Brachioradialis 1+, +Hoffmans,   patellar 2+ Achilles 2+                  Left: Triceps 2+, Biceps 1+, Brachioradialis 1+,  + Hoffmans    Patellar 1+, Achilles 2+  Down-going plantars  Skin: abrasions bilateral below patella, posterior neck surgical site- sutures removed, clean, no drainage, no erythema or edema noted; CDI    Functional status:  Bed mobility: Max A x 2   Transfers: Max A x 2  Gait: Unable  ADLs: UE dress Mod A, LE dress Total A      RECENT LABS:                          12.7   6.44  )-----------( 384      ( 17 May 2019 06:12 )             39.2     -    140  |  104  |  16  ----------------------------<  93  4.2   |  29  |  0.62    Ca    9.4      17 May 2019 06:12    TPro  6.6  /  Alb  2.0<L>  /  TBili  0.2  /  DBili  x   /  AST  15  /  ALT  16  /  AlkPhos  102  -    LIVER FUNCTIONS - ( 17 May 2019 06:12 )  Alb: 2.0 g/dL / Pro: 6.6 g/dL / ALK PHOS: 102 U/L / ALT: 16 U/L DA / AST: 15 U/L / GGT: x                   CAPILLARY BLOOD GLUCOSE        IMPRESSION AND PLAN:  53 year-old man with a PMH of bipolar disorder/schizophrenia, lower leg deformity  with Gait Instability, ADL impairments and Functional impairments after cervical stenosis s/p cervical fusion and laminectomy    #Cervical Stenosis- s/p C3-C6 laminectomy and fusion with Gait Instability, ADL impairments, and Functional impairments- follow up MRI with improved stenosis and epidural fluid collection  -Fluid collection on MRI Deemed stable by NSGY- monitor  -completed Decadron taper  -Continue Comprehensive Rehab Program of PT/OT   -Spinal, fall precautions  -Dc baclofen for spasms, keep diazepam- also for anxiety  -Bactrim for wound ppx total 14 days (5/15-)    #Bipolar Disorder/Schizophrenia  -c/w Cogentin, Prolixin, Valproic Acid, Neurontin  - Neuropsych eval   - Appreciate psychiatry consult -most likely is diagnosed with Schizoaffective d/o Bipolar type.  Will f/u prn.  Obtain vpa level    #Hypothyroidism  -c/w synthroid    #HTN  -c/w metoprolol    #Nocturnal Enuresis  -c/w Desmopressin    #Hygeine  -Oral Care  -Podiatry Consult    #B/L Eversion weakness, EHL-   -Orthotist Eval     Pain Mgmt - Tylenol PRN, DC oxycodone   Bowel Regimen - c/w colace, senna; Dulcolax suppository and Miralax prn.  Enema prn  / Bladder - occasional incontinence- bladder scan q12h, PVR, SC > 400ml  Latest   DVT ppx: lovenox

## 2019-05-19 PROCEDURE — 99232 SBSQ HOSP IP/OBS MODERATE 35: CPT

## 2019-05-19 PROCEDURE — 90792 PSYCH DIAG EVAL W/MED SRVCS: CPT

## 2019-05-19 RX ADMIN — Medication 750 MILLIGRAM(S): at 22:09

## 2019-05-19 RX ADMIN — Medication 100 MILLIGRAM(S): at 18:00

## 2019-05-19 RX ADMIN — ALBUTEROL 2 PUFF(S): 90 AEROSOL, METERED ORAL at 22:02

## 2019-05-19 RX ADMIN — ALBUTEROL 2 PUFF(S): 90 AEROSOL, METERED ORAL at 08:24

## 2019-05-19 RX ADMIN — FLUPHENAZINE HYDROCHLORIDE 5 MILLIGRAM(S): 1 TABLET, FILM COATED ORAL at 18:00

## 2019-05-19 RX ADMIN — Medication 250 MILLIGRAM(S): at 06:22

## 2019-05-19 RX ADMIN — ENOXAPARIN SODIUM 40 MILLIGRAM(S): 100 INJECTION SUBCUTANEOUS at 22:09

## 2019-05-19 RX ADMIN — ALBUTEROL 2 PUFF(S): 90 AEROSOL, METERED ORAL at 04:32

## 2019-05-19 RX ADMIN — Medication 1 PATCH: at 06:44

## 2019-05-19 RX ADMIN — Medication 500 MILLIGRAM(S): at 08:08

## 2019-05-19 RX ADMIN — Medication 1 TABLET(S): at 06:22

## 2019-05-19 RX ADMIN — Medication 0.5 MILLIGRAM(S): at 06:21

## 2019-05-19 RX ADMIN — GABAPENTIN 100 MILLIGRAM(S): 400 CAPSULE ORAL at 18:01

## 2019-05-19 RX ADMIN — Medication 0.5 MILLIGRAM(S): at 18:00

## 2019-05-19 RX ADMIN — Medication 5 MILLIGRAM(S): at 18:00

## 2019-05-19 RX ADMIN — Medication 100 MILLIGRAM(S): at 06:21

## 2019-05-19 RX ADMIN — Medication 50 MILLIGRAM(S): at 06:22

## 2019-05-19 RX ADMIN — DESMOPRESSIN ACETATE 0.2 MILLIGRAM(S): 0.1 TABLET ORAL at 22:08

## 2019-05-19 RX ADMIN — Medication 1 PATCH: at 19:54

## 2019-05-19 RX ADMIN — Medication 1 TABLET(S): at 17:59

## 2019-05-19 RX ADMIN — Medication 175 MICROGRAM(S): at 06:22

## 2019-05-19 RX ADMIN — ALBUTEROL 2 PUFF(S): 90 AEROSOL, METERED ORAL at 16:00

## 2019-05-19 RX ADMIN — Medication 5 MILLIGRAM(S): at 06:21

## 2019-05-19 RX ADMIN — Medication 1 PATCH: at 13:10

## 2019-05-19 RX ADMIN — Medication 250 MILLIGRAM(S): at 18:01

## 2019-05-19 RX ADMIN — GABAPENTIN 100 MILLIGRAM(S): 400 CAPSULE ORAL at 06:22

## 2019-05-19 RX ADMIN — FLUPHENAZINE HYDROCHLORIDE 5 MILLIGRAM(S): 1 TABLET, FILM COATED ORAL at 06:21

## 2019-05-19 RX ADMIN — Medication 1 TABLET(S): at 13:02

## 2019-05-19 RX ADMIN — Medication 650 MILLIGRAM(S): at 13:02

## 2019-05-19 RX ADMIN — Medication 500 MILLIGRAM(S): at 13:02

## 2019-05-19 RX ADMIN — Medication 1 PATCH: at 13:02

## 2019-05-19 RX ADMIN — PANTOPRAZOLE SODIUM 40 MILLIGRAM(S): 20 TABLET, DELAYED RELEASE ORAL at 06:21

## 2019-05-19 NOTE — PROGRESS NOTE ADULT - SUBJECTIVE AND OBJECTIVE BOX
Patient is a 53y old  Male who presents with a chief complaint of Functional Deficits After Laminectomy (17 May 2019 11:30)      HPI:  53 year old man, resident of [facility], with h/o bipolar d/o, HTN, nocturnal enuresis, deformity of lower leg, hypothyroid, schizophrenia, BPH, IVDA presented to [facility] on  with inability to walk due to lower extremity weakness which had been worsening for the previous two weeks. Also complained of lower back pain since sustaining a fall 2 weeks prior. In additions the pt reported paresthesias in both hands, however stated that he was able to feel his feet. Stated that he believes that he is being "possessed" and "held down" by somebody who passed away. Of note, the pt states that someone is trying to "poison" and "finish him off" at [place] and that he is being followed. Motion degraded MRI Cervical and thoracic spine obtained on  after patient had +duarte's, clonus and babinski on exam, showed multilevel cervical stenosis and possible cord signal change, started on decadron. On  he had C3-C6 laminectomy and fusion.  MRI C Spine on 5/15 to check for worsening stenosis showed A dorsal paraspinal subcutaneous nonspecific fluid collection noted from the C1-C7 levels measuring 10.2 cm cephalocaudad, 3.8 cm AP, and 7.1 cm transverse. Differential considerations include an evolving postoperative seroma/hematoma or a CSF leak. Serial imaging follow-up over time is recommended to exclude possibility of early developing abscess. Reviewed by neurosurgery, no need for surgical intervention with no new symptoms. (16 May 2019 15:30)      PAST MEDICAL & SURGICAL HISTORY:  Bipolar disorder  Drug abuse  Incontinence  Schizophrenia  Hypothyroid  Hypertension  No significant past surgical history      MEDICATIONS  (STANDING):  ALBUTerol    90 MICROgram(s) HFA Inhaler 2 Puff(s) Inhalation every 6 hours  benztropine 0.5 milliGRAM(s) Oral two times a day  bisacodyl 5 milliGRAM(s) Oral every 12 hours  desmopressin 0.2 milliGRAM(s) Oral at bedtime  docusate sodium 100 milliGRAM(s) Oral two times a day  enoxaparin Injectable 40 milliGRAM(s) SubCutaneous every 24 hours  fluPHENAZine 5 milliGRAM(s) Oral two times a day  gabapentin 100 milliGRAM(s) Oral two times a day  levothyroxine 175 MICROGram(s) Oral daily  metoprolol succinate ER 50 milliGRAM(s) Oral daily  multivitamin 1 Tablet(s) Oral daily  nicotine - 21 mG/24Hr(s) Patch 1 patch Transdermal daily  pantoprazole    Tablet 40 milliGRAM(s) Oral before breakfast  trimethoprim  160 mG/sulfamethoxazole 800 mG 1 Tablet(s) Oral two times a day  valproic  acid Syrup 500 milliGRAM(s) Oral <User Schedule>  valproic  acid Syrup 750 milliGRAM(s) Oral at bedtime    MEDICATIONS  (PRN):  acetaminophen   Tablet .. 650 milliGRAM(s) Oral every 6 hours PRN Temp greater or equal to 38C (100.4F), Mild Pain (1 - 3)  ALBUTerol/ipratropium for Nebulization 3 milliLiter(s) Nebulizer every 6 hours PRN Shortness of Breath and/or Wheezing  aluminum hydroxide/magnesium hydroxide/simethicone Suspension 30 milliLiter(s) Oral every 4 hours PRN Indigestion  bisacodyl Suppository 10 milliGRAM(s) Rectal daily PRN Constipation  diazepam    Tablet 5 milliGRAM(s) Oral every 6 hours PRN Spasm  nicotine  Polacrilex Gum 4 milliGRAM(s) Oral every 6 hours PRN nicotine craving  polyethylene glycol 3350 17 Gram(s) Oral daily PRN Constipation  senna 2 Tablet(s) Oral at bedtime PRN Constipation        Allergies    No Known Allergies    Intolerances    Haldol (Unknown)  Thorazine (Unknown)      TODAY'S SUBJECTIVE & REVIEW OF SYMPTOMS:  Patient was seen and examined today, there were no acute events overnight.   Pt reports that he "slept like a baby" again.  No longer c/o neck pain.  Chronic pins and needles and numbness.  Good appetite.  Last BM today.  Urinating well with exception of nocturnal enuresis    PHYSICAL EXAM  53y  Vital Signs Last 24 Hrs  T(C): 36.4 (19 May 2019 07:59), Max: 36.4 (19 May 2019 07:59)  T(F): 97.6 (19 May 2019 07:59), Max: 97.6 (19 May 2019 07:59)  HR: 74 (19 May 2019 08:25) (68 - 83)  BP: 118/77 (19 May 2019 07:59) (116/71 - 123/72)  BP(mean): --  RR: 12 (19 May 2019 07:59) (12 - 14)  SpO2: 97% (19 May 2019 08:25) (95% - 99%)  Weight in k.9 (16 May 2019 21:10)    General: NAD    Resp: CTAB  Cardio: +S1, S2  Abdomen: soft, NT, ND, normoactive bowel sounds  Extremities: no edema or calf tenderness. +Osgood-Schlatters noted bilaterally  Neuro: awake and alert     Motor: no increased tone     RUE: ShFl 4/5   EE 5/5    EF 5/5   WrEx 5/5   Interossei 3/5   ADM 4/5     LUE: ShFl 4/5   EE 5/5    EF 5/5   WrEx 5/5   Interossei 3/5   ADM 4/5     RLE: HF  2/5   KE 3-/5    DF 5/5   PF 4/5   EHL 1/5   Evertors 1/5     LLE: HF  2/5   KE 4/5    DF 5/5   PF 4/5   EHL 1/5   Evertors 1/5    Sensory: decreased pinprick from feet up to distal 1/3 tibia bilaterally    Reflexes: Right: Triceps 2+, Biceps 1+, Brachioradialis 1+, +Hoffmans,   patellar 2+ Achilles 2+                  Left: Triceps 2+, Biceps 1+, Brachioradialis 1+,  + Hoffmans    Patellar 1+, Achilles 2+  Down-going plantars  Skin: abrasions bilateral below patella, posterior neck surgical site- sutures removed, clean, no drainage, no erythema or edema noted; CDI    Functional status:  Bed mobility: Max A x 2   Transfers: Max A x 2  Gait: Unable  ADLs: UE dress Mod A, LE dress Total A      RECENT LABS:                          12.7   6.44  )-----------( 384      ( 17 May 2019 06:12 )             39.2     05-    140  |  104  |  16  ----------------------------<  93  4.2   |  29  |  0.62    Ca    9.4      17 May 2019 06:12    TPro  6.6  /  Alb  2.0<L>  /  TBili  0.2  /  DBili  x   /  AST  15  /  ALT  16  /  AlkPhos  102  05-    LIVER FUNCTIONS - ( 17 May 2019 06:12 )  Alb: 2.0 g/dL / Pro: 6.6 g/dL / ALK PHOS: 102 U/L / ALT: 16 U/L DA / AST: 15 U/L / GGT: x                   IMPRESSION AND PLAN:  53 year-old man with a PMH of bipolar disorder/schizophrenia, lower leg deformity  with Gait Instability, ADL impairments and Functional impairments after cervical stenosis s/p cervical fusion and laminectomy    #Cervical Stenosis- s/p C3-C6 laminectomy and fusion with Gait Instability, ADL impairments, and Functional impairments- follow up MRI with improved stenosis and epidural fluid collection  -Fluid collection on MRI Deemed stable by NSGY- monitor  -completed Decadron taper  -Continue Comprehensive Rehab Program of PT/OT   -Spinal, fall precautions  -Dc baclofen for spasms, keep diazepam- also for anxiety  -Bactrim for wound ppx total 14 days (5/15-)    #Bipolar Disorder/Schizophrenia  -c/w Cogentin, Prolixin, Valproic Acid, Neurontin  - Neuropsych eval   - Appreciate psychiatry consult -most likely is diagnosed with Schizoaffective d/o Bipolar type.  Will f/u prn.  Obtain vpa level    #Hypothyroidism  -c/w synthroid    #HTN  -c/w metoprolol    #Nocturnal Enuresis  -c/w Desmopressin    #Hygeine  -Oral Care  -Podiatry Consult    #B/L Eversion weakness, EHL-   -Orthotist Eval     Pain Mgmt - Tylenol PRN, DC oxycodone   Bowel Regimen - c/w colace, senna; Dulcolax suppository and Miralax prn.  Enema prn  / Bladder - occasional incontinence- bladder scan q12h, PVR, SC > 400ml  Latest PVRs 270, 244-DC scans  DVT ppx: lovenox

## 2019-05-19 NOTE — CONSULT NOTE ADULT - SUBJECTIVE AND OBJECTIVE BOX
Psychiatric Consult Initial Note:    Identifying Data: 54yo Single Male presents to [facility] for acute rehab s/p Laminectomy.     History of Present Illness:    53 year old man, resident of [facility], with h/o bipolar d/o, HTN, nocturnal enuresis, deformity of lower leg, hypothyroid, schizophrenia, BPH, IVDA presented to [facility] on 4/25 with inability to walk due to lower extremity weakness which had been worsening for the previous two weeks. Also complained of lower back pain since sustaining a fall 2 weeks prior. In additions the pt reported paresthesias in both hands, however stated that he was able to feel his feet. Stated that he believes that he is being "possessed" and "held down" by somebody who passed away. Of note, the pt states that someone is trying to "poison" and "finish him off" at [place] and that he is being followed. Motion degraded MRI Cervical and thoracic spine obtained on 4/26 after patient had +duarte's, clonus and babinski on exam, showed multilevel cervical stenosis and possible cord signal change, started on decadron. On 5/1 he had C3-C6 laminectomy and fusion.  MRI C Spine on 5/15 to check for worsening stenosis showed A dorsal paraspinal subcutaneous nonspecific fluid collection noted from the C1-C7 levels measuring 10.2 cm cephalocaudad, 3.8 cm AP, and 7.1 cm transverse. Differential considerations include an evolving postoperative seroma/hematoma or a CSF leak. Serial imaging follow-up over time is recommended to exclude possibility of early developing abscess. Reviewed by neurosurgery, no need for surgical intervention with no new symptoms. (16 May 2019 15:30)    Chief Complaint/Reason for Consult: " I take medication for the voices. "     Psychiatric Review of Systems:  Pt today denied sadness, hopelessness or helplessness, denied suicidal ideation or homicidal ideation. Reports he takes Prolixin for his "voices".  He denied having them now. He is as per nursing staff "bored" "stir crazy". Went out onto the patio with recreation therapist. Behavior for the most part has been appropriate , at times is   over effusive and child like " I love you, want to get ?"   Pt reports no change in sleep, appetite, or memory and concentration.   He reports no overt delusional material, although chronic delusions are suspected ( see HPI).      Current medications: acetaminophen   Tablet .. 650 milliGRAM(s) Oral every 6 hours PRN  ALBUTerol    90 MICROgram(s) HFA Inhaler 2 Puff(s) Inhalation every 6 hours  ALBUTerol/ipratropium for Nebulization 3 milliLiter(s) Nebulizer every 6 hours PRN  aluminum hydroxide/magnesium hydroxide/simethicone Suspension 30 milliLiter(s) Oral every 4 hours PRN  ascorbic acid 500 milliGRAM(s) Oral daily  benztropine 0.5 milliGRAM(s) Oral two times a day  bisacodyl 5 milliGRAM(s) Oral every 12 hours  bisacodyl Suppository 10 milliGRAM(s) Rectal daily PRN  desmopressin 0.2 milliGRAM(s) Oral at bedtime  diazepam    Tablet 5 milliGRAM(s) Oral every 6 hours PRN  docusate sodium 100 milliGRAM(s) Oral two times a day  enoxaparin Injectable 40 milliGRAM(s) SubCutaneous every 24 hours  fluPHENAZine 5 milliGRAM(s) Oral two times a day  gabapentin 100 milliGRAM(s) Oral two times a day  levothyroxine 175 MICROGram(s) Oral daily  metoprolol succinate ER 50 milliGRAM(s) Oral daily  multivitamin 1 Tablet(s) Oral daily  nicotine  Polacrilex Gum 4 milliGRAM(s) Oral every 6 hours PRN  nicotine - 21 mG/24Hr(s) Patch 1 patch Transdermal daily  pantoprazole    Tablet 40 milliGRAM(s) Oral before breakfast  polyethylene glycol 3350 17 Gram(s) Oral daily PRN  saccharomyces boulardii 250 milliGRAM(s) Oral two times a day  senna 2 Tablet(s) Oral at bedtime PRN  trimethoprim  160 mG/sulfamethoxazole 800 mG 1 Tablet(s) Oral two times a day  valproic  acid Syrup 500 milliGRAM(s) Oral <User Schedule>  valproic  acid Syrup 750 milliGRAM(s) Oral at bedtime      Labs:    VPA level in March was 46.3  in April less than 3.2       Vital Signs Last 24 hours: T(C): 36.4 (05-19-19 @ 07:59), Max: 36.4 (05-19-19 @ 07:59)  HR: 74 (05-19-19 @ 08:25) (68 - 83)  BP: 118/77 (05-19-19 @ 07:59) (116/71 - 123/72)  RR: 12 (05-19-19 @ 07:59) (12 - 14)  SpO2: 97% (05-19-19 @ 08:25) (95% - 99%)    Allergies: Haldol (Unknown)  No Known Allergies  Thorazine (Unknown)    Past Psychiatric or Substance use History:   Has resided on the grounds of [place], for approximately 5 years, also reports sobriety from crack cocaine for the   same amount of time. He did state he takes his Prolixin via injection. Writer left a voice mail with  to   confirm and to inquire as to when his last decanoate shot was given.     Current Mental Status Exam:  Appearance:- disheveled male, with prominent eyes, adequately groomed.   Attitude: - cooperative.   Gait/Station: -findings as  per physiatry or PT notes.  Motor Activity: - calm, but has deficits with motor control, hunched over posture.   Affect: - appropriate, but mildly expansive.   Mood: -" good".  Speech: -loud, but not pressured.   Thought process: -intact, some poverty of content. Will answer in short sentences.   Thought content/perceptions:   Hallucinations: not present.  Delusions:  persecutory, and somatic, these may be chronic and his baseline.   Suicidal ideation: denied  Homicidal ideation:   denied  Sensorium: alert  Orientation: X3  Attention: impaired, but was spoken to during lunchtime.   Concentration: grossly intact.   Memory: Fair.   Insight/Judgment: Adequate for basic needs, has not been a management problem.     Assessment and Plan: Schizoaffective d/o Bipolar type  Cocaine abuse in sustained full remission.     Follow up status:  Will follow as needed.     Other recommendations:   Order level of VPA this week.   Awaiting call back from treatment team to get more collateral regarding treatment course.   If pt is supposed to be on Decanoate will order if possible.   Continue oral Prolixin, Depakote, Gabapentin, Cogentin.     Level of observation:  routine, but does need help with feeding himself, will eat quickly and not chew thoroughly without verbal prompts.

## 2019-05-20 DIAGNOSIS — M48.02 SPINAL STENOSIS, CERVICAL REGION: ICD-10-CM

## 2019-05-20 LAB
ANION GAP SERPL CALC-SCNC: 6 MMOL/L — SIGNIFICANT CHANGE UP (ref 5–17)
BUN SERPL-MCNC: 26 MG/DL — HIGH (ref 7–23)
CALCIUM SERPL-MCNC: 9.5 MG/DL — SIGNIFICANT CHANGE UP (ref 8.4–10.5)
CHLORIDE SERPL-SCNC: 102 MMOL/L — SIGNIFICANT CHANGE UP (ref 96–108)
CO2 SERPL-SCNC: 30 MMOL/L — SIGNIFICANT CHANGE UP (ref 22–31)
CREAT SERPL-MCNC: 0.76 MG/DL — SIGNIFICANT CHANGE UP (ref 0.5–1.3)
GLUCOSE SERPL-MCNC: 83 MG/DL — SIGNIFICANT CHANGE UP (ref 70–99)
HCT VFR BLD CALC: 39 % — SIGNIFICANT CHANGE UP (ref 39–50)
HGB BLD-MCNC: 12.5 G/DL — LOW (ref 13–17)
MCHC RBC-ENTMCNC: 28.1 PG — SIGNIFICANT CHANGE UP (ref 27–34)
MCHC RBC-ENTMCNC: 32.1 GM/DL — SIGNIFICANT CHANGE UP (ref 32–36)
MCV RBC AUTO: 87.6 FL — SIGNIFICANT CHANGE UP (ref 80–100)
NRBC # BLD: 0 /100 WBCS — SIGNIFICANT CHANGE UP (ref 0–0)
PLATELET # BLD AUTO: 385 K/UL — SIGNIFICANT CHANGE UP (ref 150–400)
POTASSIUM SERPL-MCNC: 4 MMOL/L — SIGNIFICANT CHANGE UP (ref 3.5–5.3)
POTASSIUM SERPL-SCNC: 4 MMOL/L — SIGNIFICANT CHANGE UP (ref 3.5–5.3)
RBC # BLD: 4.45 M/UL — SIGNIFICANT CHANGE UP (ref 4.2–5.8)
RBC # FLD: 13.9 % — SIGNIFICANT CHANGE UP (ref 10.3–14.5)
SODIUM SERPL-SCNC: 138 MMOL/L — SIGNIFICANT CHANGE UP (ref 135–145)
VALPROATE SERPL-MCNC: 33 UG/ML — LOW (ref 50–100)
WBC # BLD: 4.85 K/UL — SIGNIFICANT CHANGE UP (ref 3.8–10.5)
WBC # FLD AUTO: 4.85 K/UL — SIGNIFICANT CHANGE UP (ref 3.8–10.5)

## 2019-05-20 PROCEDURE — 99232 SBSQ HOSP IP/OBS MODERATE 35: CPT

## 2019-05-20 RX ORDER — VALPROIC ACID (AS SODIUM SALT) 250 MG/5ML
1000 SOLUTION, ORAL ORAL AT BEDTIME
Refills: 0 | Status: DISCONTINUED | OUTPATIENT
Start: 2019-05-20 | End: 2019-05-30

## 2019-05-20 RX ORDER — SENNA PLUS 8.6 MG/1
2 TABLET ORAL AT BEDTIME
Refills: 0 | Status: DISCONTINUED | OUTPATIENT
Start: 2019-05-21 | End: 2019-05-30

## 2019-05-20 RX ADMIN — Medication 0.5 MILLIGRAM(S): at 06:50

## 2019-05-20 RX ADMIN — Medication 1 PATCH: at 06:51

## 2019-05-20 RX ADMIN — FLUPHENAZINE HYDROCHLORIDE 5 MILLIGRAM(S): 1 TABLET, FILM COATED ORAL at 06:50

## 2019-05-20 RX ADMIN — Medication 50 MILLIGRAM(S): at 06:50

## 2019-05-20 RX ADMIN — GABAPENTIN 100 MILLIGRAM(S): 400 CAPSULE ORAL at 06:50

## 2019-05-20 RX ADMIN — ALBUTEROL 2 PUFF(S): 90 AEROSOL, METERED ORAL at 04:53

## 2019-05-20 RX ADMIN — Medication 500 MILLIGRAM(S): at 07:50

## 2019-05-20 RX ADMIN — FLUPHENAZINE HYDROCHLORIDE 5 MILLIGRAM(S): 1 TABLET, FILM COATED ORAL at 17:40

## 2019-05-20 RX ADMIN — Medication 100 MILLIGRAM(S): at 17:40

## 2019-05-20 RX ADMIN — Medication 1 TABLET(S): at 17:40

## 2019-05-20 RX ADMIN — Medication 1 TABLET(S): at 06:50

## 2019-05-20 RX ADMIN — Medication 1000 MILLIGRAM(S): at 22:03

## 2019-05-20 RX ADMIN — PANTOPRAZOLE SODIUM 40 MILLIGRAM(S): 20 TABLET, DELAYED RELEASE ORAL at 06:49

## 2019-05-20 RX ADMIN — Medication 100 MILLIGRAM(S): at 06:50

## 2019-05-20 RX ADMIN — ALBUTEROL 2 PUFF(S): 90 AEROSOL, METERED ORAL at 21:48

## 2019-05-20 RX ADMIN — GABAPENTIN 100 MILLIGRAM(S): 400 CAPSULE ORAL at 17:40

## 2019-05-20 RX ADMIN — Medication 5 MILLIGRAM(S): at 06:50

## 2019-05-20 RX ADMIN — Medication 1 TABLET(S): at 12:23

## 2019-05-20 RX ADMIN — ALBUTEROL 2 PUFF(S): 90 AEROSOL, METERED ORAL at 08:31

## 2019-05-20 RX ADMIN — Medication 0.5 MILLIGRAM(S): at 17:40

## 2019-05-20 RX ADMIN — ALBUTEROL 2 PUFF(S): 90 AEROSOL, METERED ORAL at 15:52

## 2019-05-20 RX ADMIN — DESMOPRESSIN ACETATE 0.2 MILLIGRAM(S): 0.1 TABLET ORAL at 22:03

## 2019-05-20 RX ADMIN — Medication 250 MILLIGRAM(S): at 06:50

## 2019-05-20 RX ADMIN — Medication 500 MILLIGRAM(S): at 12:23

## 2019-05-20 RX ADMIN — Medication 1 PATCH: at 22:01

## 2019-05-20 RX ADMIN — Medication 175 MICROGRAM(S): at 06:50

## 2019-05-20 RX ADMIN — Medication 1 PATCH: at 12:23

## 2019-05-20 RX ADMIN — Medication 650 MILLIGRAM(S): at 11:02

## 2019-05-20 RX ADMIN — Medication 650 MILLIGRAM(S): at 12:00

## 2019-05-20 RX ADMIN — Medication 250 MILLIGRAM(S): at 17:40

## 2019-05-20 RX ADMIN — Medication 1 PATCH: at 12:29

## 2019-05-20 RX ADMIN — ENOXAPARIN SODIUM 40 MILLIGRAM(S): 100 INJECTION SUBCUTANEOUS at 22:05

## 2019-05-20 NOTE — CHART NOTE - NSCHARTNOTEFT_GEN_A_CORE
[facility] Interdisciplinary Plan of Care    REHABILITATION DIAGNOSIS:  Spinal stenosis of cervical region        COMORBIDITIES/COMPLICATING CONDITIONS IMPACTING REHABILITATION:  HEALTH ISSUES - PROBLEM Dx: Bipolar disorder, Schizophrenia, HTN, Hypothyroidism, Neuropathy        PAST MEDICAL & SURGICAL HISTORY:  Bipolar disorder  Drug abuse  Incontinence  Schizophrenia  Hypothyroid  Hypertension  No significant past surgical history      Based upon consideration of the patient's impairments, functional status, complicating conditions and any other contributing factors and after information garnered from the assessments of all therapy disciplines involved in treating the patient and other pertinent clinicians:    INTERDISCIPLINARY REHABILITATION INTERVENTIONS:    [ x  ] Transfer Training  [ x  ] Bed Mobility  [  x ] Therapeutic Exercise  [x   ] Balance/Coordination Exercises  [  x ] Locomotion retraining  [  x ] Stairs  [ x  ] Functional Transfer Training  [ x  ] Bowel/Bladder program  [ x  ] Pain Management  [ x  ] Skin/Wound Care  [   ] Visual/Perceptual Training  [  x ] Therapeutic Recreation Activities  [   ] Neuromuscular Re-education  [ x  ] Activities of Daily Living  [   ] Speech Exercise  [   ] Swallowing Exercises  [   ] Vital Stim  [ x  ] Dietary Supplements  [   ] Calorie Count  [   ] Cognitive Exercises  [   ] Cognitive/Linguistic Treatment  [   ] Behavior Program  [ x  ] Neuropsych Therapy  [ x  ] Patient/Family Counseling  [ x  ] Family Training  [ x  ] Community Re-entry  [   ] Orthotic Evaluation  [   ] Prosthetic Eval/Training    MEDICAL PROGNOSIS:  Good    REHAB POTENTIAL:  Good  EXPECTED DAILY THERAPY:         PT:1.5hrs       OT:1,.5hrs       ST:0       P&O:0    EXPECTED INTENSITY OF PROGRAM:  3 hrs / Day    EXPECTED FREQUENCY OF PROGRAM: 5 Days/ Week    ESTIMATED LOS:  [  ] 5-7 Days  [  ] 7-10 Days  [  ] 10- 14 Days  [ x ] 14- 18 Days  [  ] 18- 21 Days    ESTIMATED DISPOSITION:  [  ] Home   [  ] Home with Outpatient Therapies  [ x ] Home with Home Therapies  [  ] Assisted Living  [  ] Nursing Home  [  ] Long Term Acute Care    INTERDISCIPLINARY FUNCTIONAL OUTCOMES/GOALS:         Gait/Mobility:6       Transfers:6       ADLs:6       Functional Transfers:6       Medication Management:4       Communication:na       Cognitive:na       Dysphagia:na       Bladder:6       Bowel:7     Functional Independent Measures:   7 = Independent  6 = Modified Independent  5 = Supervision  4 = Minimal Assist/ Contact Guard  3 = Moderate Assistance  2 = Maximum Assistance  1 = Total Assistance  0 = Unable to assess [facility] Interdisciplinary Plan of Care    REHABILITATION DIAGNOSIS:  Spinal stenosis of cervical region        COMORBIDITIES/COMPLICATING CONDITIONS IMPACTING REHABILITATION:  HEALTH ISSUES - PROBLEM Dx: Bipolar disorder, Schizophrenia, HTN, Hypothyroidism, Neuropathy        PAST MEDICAL & SURGICAL HISTORY:  Bipolar disorder  Drug abuse  Incontinence  Schizophrenia  Hypothyroid  Hypertension  No significant past surgical history      Based upon consideration of the patient's impairments, functional status, complicating conditions and any other contributing factors and after information garnered from the assessments of all therapy disciplines involved in treating the patient and other pertinent clinicians:    INTERDISCIPLINARY REHABILITATION INTERVENTIONS:    [ x  ] Transfer Training  [ x  ] Bed Mobility  [  x ] Therapeutic Exercise  [x   ] Balance/Coordination Exercises  [  x ] Locomotion retraining  [  x ] Stairs  [ x  ] Functional Transfer Training  [ x  ] Bowel/Bladder program  [ x  ] Pain Management  [ x  ] Skin/Wound Care  [   ] Visual/Perceptual Training  [  x ] Therapeutic Recreation Activities  [   ] Neuromuscular Re-education  [ x  ] Activities of Daily Living  [   ] Speech Exercise  [   ] Swallowing Exercises  [   ] Vital Stim  [ x  ] Dietary Supplements  [   ] Calorie Count  [   ] Cognitive Exercises  [   ] Cognitive/Linguistic Treatment  [   ] Behavior Program  [ x  ] Neuropsych Therapy  [ x  ] Patient/Family Counseling  [ x  ] Family Training  [ x  ] Community Re-entry  [   ] Orthotic Evaluation  [   ] Prosthetic Eval/Training    MEDICAL PROGNOSIS:  Good    REHAB POTENTIAL:  Good  EXPECTED DAILY THERAPY:         PT:1.5hrs       OT:1,.5hrs       ST:0       P&O: Eval    EXPECTED INTENSITY OF PROGRAM:  3 hrs / Day    EXPECTED FREQUENCY OF PROGRAM: 5 Days/ Week    ESTIMATED LOS:  [  ] 5-7 Days  [  ] 7-10 Days  [  ] 10- 14 Days  [ x ] 14- 18 Days  [  ] 18- 21 Days    ESTIMATED DISPOSITION:  [  ] Home   [  ] Home with Outpatient Therapies  [ x ] Home with Home Therapies  [  ] Assisted Living  [  ] Nursing Home  [  ] Long Term Acute Care    INTERDISCIPLINARY FUNCTIONAL OUTCOMES/GOALS:         Gait/Mobility:6       Transfers:6       ADLs:6       Functional Transfers:6       Medication Management:4       Communication:na       Cognitive:na       Dysphagia:na       Bladder:6       Bowel:7     Functional Independent Measures:   7 = Independent  6 = Modified Independent  5 = Supervision  4 = Minimal Assist/ Contact Guard  3 = Moderate Assistance  2 = Maximum Assistance  1 = Total Assistance  0 = Unable to assess

## 2019-05-20 NOTE — PROGRESS NOTE ADULT - SUBJECTIVE AND OBJECTIVE BOX
Patient is a 53y old  Male who presents with a chief complaint of Functional Deficits After Laminectomy (17 May 2019 11:30)      HPI:  53 year old man, resident of [facility], with h/o bipolar d/o, HTN, nocturnal enuresis, deformity of lower leg, hypothyroid, schizophrenia, BPH, IVDA presented to [facility] on 4/25 with inability to walk due to lower extremity weakness which had been worsening for the previous two weeks. Also complained of lower back pain since sustaining a fall 2 weeks prior. In additions the pt reported paresthesias in both hands, however stated that he was able to feel his feet. Stated that he believes that he is being "possessed" and "held down" by somebody who passed away. Of note, the pt states that someone is trying to "poison" and "finish him off" at [place] and that he is being followed. Motion degraded MRI Cervical and thoracic spine obtained on 4/26 after patient had +duarte's, clonus and babinski on exam, showed multilevel cervical stenosis and possible cord signal change, started on decadron. On 5/1 he had C3-C6 laminectomy and fusion.  MRI C Spine on 5/15 to check for worsening stenosis showed A dorsal paraspinal subcutaneous nonspecific fluid collection noted from the C1-C7 levels measuring 10.2 cm cephalocaudad, 3.8 cm AP, and 7.1 cm transverse. Differential considerations include an evolving postoperative seroma/hematoma or a CSF leak. Serial imaging follow-up over time is recommended to exclude possibility of early developing abscess. Reviewed by neurosurgery, no need for surgical intervention with no new symptoms. (16 May 2019 15:30)      PAST MEDICAL & SURGICAL HISTORY:  Bipolar disorder  Drug abuse  Incontinence  Schizophrenia  Hypothyroid  Hypertension  No significant past surgical history      MEDICATIONS  (STANDING):  ALBUTerol    90 MICROgram(s) HFA Inhaler 2 Puff(s) Inhalation every 6 hours  ascorbic acid 500 milliGRAM(s) Oral daily  benztropine 0.5 milliGRAM(s) Oral two times a day  desmopressin 0.2 milliGRAM(s) Oral at bedtime  docusate sodium 100 milliGRAM(s) Oral two times a day  enoxaparin Injectable 40 milliGRAM(s) SubCutaneous every 24 hours  fluPHENAZine 5 milliGRAM(s) Oral two times a day  gabapentin 100 milliGRAM(s) Oral two times a day  levothyroxine 175 MICROGram(s) Oral daily  metoprolol succinate ER 50 milliGRAM(s) Oral daily  multivitamin 1 Tablet(s) Oral daily  nicotine - 21 mG/24Hr(s) Patch 1 patch Transdermal daily  pantoprazole    Tablet 40 milliGRAM(s) Oral before breakfast  saccharomyces boulardii 250 milliGRAM(s) Oral two times a day  trimethoprim  160 mG/sulfamethoxazole 800 mG 1 Tablet(s) Oral two times a day  valproic  acid Syrup 1000 milliGRAM(s) Oral at bedtime  valproic  acid Syrup 500 milliGRAM(s) Oral <User Schedule>    MEDICATIONS  (PRN):  acetaminophen   Tablet .. 650 milliGRAM(s) Oral every 6 hours PRN Temp greater or equal to 38C (100.4F), Mild Pain (1 - 3)  aluminum hydroxide/magnesium hydroxide/simethicone Suspension 30 milliLiter(s) Oral every 4 hours PRN Indigestion  bisacodyl 5 milliGRAM(s) Oral every 12 hours PRN Constipation  diazepam    Tablet 5 milliGRAM(s) Oral every 6 hours PRN Spasm  nicotine  Polacrilex Gum 4 milliGRAM(s) Oral every 6 hours PRN nicotine craving  polyethylene glycol 3350 17 Gram(s) Oral daily PRN Constipation        Allergies    No Known Allergies    Intolerances    Haldol (Unknown)  Thorazine (Unknown)      TODAY'S SUBJECTIVE & REVIEW OF SYMPTOMS:  Patient was seen and examined today, there were no acute events overnight.   Pt reports that he "slept like a baby" again.  No longer c/o neck pain.  Chronic pins and needles and numbness both hands and feet.  Good appetite.  Last BM today.  Urinating well with exception of nocturnal enuresis although not last night    PHYSICAL EXAM  Vital Signs Last 24 Hrs  T(C): 36.4 (20 May 2019 08:15), Max: 36.6 (19 May 2019 22:11)  T(F): 97.5 (20 May 2019 08:15), Max: 97.8 (19 May 2019 22:11)  HR: 71 (20 May 2019 08:32) (63 - 85)  BP: 118/72 (20 May 2019 08:15) (118/72 - 120/74)  BP(mean): --  RR: 18 (20 May 2019 08:15) (14 - 18)  SpO2: 95% (20 May 2019 08:32) (95% - 100%)    General: NAD    Resp: CTAB  Cardio: +S1, S2  Abdomen: soft, NT, ND, normoactive bowel sounds  Extremities: no edema or calf tenderness. +Osgood-Schlatters noted bilaterally  Neuro: awake and alert     Motor: no increased tone     RUE: ShFl 5/5   EE 5/5    EF 5/5   WrEx 5/5   Interossei 3/5   ADM 4/5     LUE: ShFl 5/5   EE 5/5    EF 5/5   WrEx 5/5   Interossei 3/5   ADM 4/5     RLE: HF  3/5   KE 3-/5    DF 5/5   PF 4/5   EHL 1/5   Evertors 1/5     LLE: HF  3/5   KE 4/5    DF 5/5   PF 4/5   EHL 1/5   Evertors 1/5    Sensory: decreased pinprick from feet up to distal 1/3 tibia bilaterally    Reflexes: Right: Triceps 2+, Biceps 1+, Brachioradialis 1+, +Hoffmans,   patellar 2+ Achilles 2+                  Left: Triceps 2+, Biceps 1+, Brachioradialis 1+,  + Hoffmans    Patellar 1+, Achilles 2+  Down-going plantars  Skin: abrasions bilateral below patella, posterior neck surgical site- sutures removed, clean, no drainage, no erythema or edema noted; CDI    Functional status:  Bed mobility: Max A x 2  Transfers: Mod A x 2-3  Gait: Unable  ADLs: UE dress Mod A, LE dress Max A with AE      RECENT LABS:                        12.5   4.85  )-----------( 385      ( 20 May 2019 07:20 )             39.0     05-20    138  |  102  |  26<H>  ----------------------------<  83  4.0   |  30  |  0.76    Ca    9.5      20 May 2019 07:20    VPA 33 5/20            IMPRESSION AND PLAN:  53 year-old man with a PMH of bipolar disorder/schizophrenia, lower leg deformity  with Gait Instability, ADL impairments and Functional impairments after cervical stenosis s/p cervical fusion and laminectomy    #Cervical Stenosis- s/p C3-C6 laminectomy and fusion with Gait Instability, ADL impairments, and Functional impairments- follow up MRI with improved stenosis and epidural fluid collection  -Fluid collection on MRI Deemed stable by NSGY- monitor  -completed Decadron taper  -Continue Comprehensive Rehab Program of PT/OT   -Spinal, fall precautions  -Dcd baclofen for spasms as none experienced, keep diazepam- also for anxiety  -Bactrim for wound ppx total 14 days (5/15-5/28)    #Bipolar Disorder/Schizophrenia  -c/w Cogentin, Prolixin, Valproic Acid, Neurontin  - Neuropsych eval   - Appreciate psychiatry consult 5/17-most likely is diagnosed with Schizoaffective d/o Bipolar type.  Vpa level 33; d/w Dr Villasenor.  Increase VPA to 1000 qhs from 750 and cont 500 qam    #Hypothyroidism  -c/w synthroid    #HTN  -c/w metoprolol    #Nocturnal Enuresis  -c/w Desmopressin    #Hygeine  -Oral Care  -Podiatry Consult today 5/20 appreciated    #B/L Eversion weakness, EHL-   -Orthotist Eval     Pain Mgmt - Tylenol PRN, DC oxycodone   Bowel Regimen - c/w colace, senna; Dulcolax suppository and Miralax prn.  Enema prn  / Bladder - occasional incontinence- bladder scan q12h, PVR, SC > 400ml  Latest PVRs 270, 244-DCd scans  DVT ppx: lovenox Patient is a 53y old  Male who presents with a chief complaint of Functional Deficits After Laminectomy (17 May 2019 11:30)      HPI:  53 year old man, resident of [facility], with h/o bipolar d/o, HTN, nocturnal enuresis, deformity of lower leg, hypothyroid, schizophrenia, BPH, IVDA presented to [facility] on 4/25 with inability to walk due to lower extremity weakness which had been worsening for the previous two weeks. Also complained of lower back pain since sustaining a fall 2 weeks prior. In additions the pt reported paresthesias in both hands, however stated that he was able to feel his feet. Stated that he believes that he is being "possessed" and "held down" by somebody who passed away. Of note, the pt states that someone is trying to "poison" and "finish him off" at [place] and that he is being followed. Motion degraded MRI Cervical and thoracic spine obtained on 4/26 after patient had +duarte's, clonus and babinski on exam, showed multilevel cervical stenosis and possible cord signal change, started on decadron. On 5/1 he had C3-C6 laminectomy and fusion.  MRI C Spine on 5/15 to check for worsening stenosis showed A dorsal paraspinal subcutaneous nonspecific fluid collection noted from the C1-C7 levels measuring 10.2 cm cephalocaudad, 3.8 cm AP, and 7.1 cm transverse. Differential considerations include an evolving postoperative seroma/hematoma or a CSF leak. Serial imaging follow-up over time is recommended to exclude possibility of early developing abscess. Reviewed by neurosurgery, no need for surgical intervention with no new symptoms. (16 May 2019 15:30)      PAST MEDICAL & SURGICAL HISTORY:  Bipolar disorder  Drug abuse  Incontinence  Schizophrenia  Hypothyroid  Hypertension  No significant past surgical history      MEDICATIONS  (STANDING):  ALBUTerol    90 MICROgram(s) HFA Inhaler 2 Puff(s) Inhalation every 6 hours  ascorbic acid 500 milliGRAM(s) Oral daily  benztropine 0.5 milliGRAM(s) Oral two times a day  desmopressin 0.2 milliGRAM(s) Oral at bedtime  docusate sodium 100 milliGRAM(s) Oral two times a day  enoxaparin Injectable 40 milliGRAM(s) SubCutaneous every 24 hours  fluPHENAZine 5 milliGRAM(s) Oral two times a day  gabapentin 100 milliGRAM(s) Oral two times a day  levothyroxine 175 MICROGram(s) Oral daily  metoprolol succinate ER 50 milliGRAM(s) Oral daily  multivitamin 1 Tablet(s) Oral daily  nicotine - 21 mG/24Hr(s) Patch 1 patch Transdermal daily  pantoprazole    Tablet 40 milliGRAM(s) Oral before breakfast  saccharomyces boulardii 250 milliGRAM(s) Oral two times a day  trimethoprim  160 mG/sulfamethoxazole 800 mG 1 Tablet(s) Oral two times a day  valproic  acid Syrup 1000 milliGRAM(s) Oral at bedtime  valproic  acid Syrup 500 milliGRAM(s) Oral <User Schedule>    MEDICATIONS  (PRN):  acetaminophen   Tablet .. 650 milliGRAM(s) Oral every 6 hours PRN Temp greater or equal to 38C (100.4F), Mild Pain (1 - 3)  aluminum hydroxide/magnesium hydroxide/simethicone Suspension 30 milliLiter(s) Oral every 4 hours PRN Indigestion  bisacodyl 5 milliGRAM(s) Oral every 12 hours PRN Constipation  diazepam    Tablet 5 milliGRAM(s) Oral every 6 hours PRN Spasm  nicotine  Polacrilex Gum 4 milliGRAM(s) Oral every 6 hours PRN nicotine craving  polyethylene glycol 3350 17 Gram(s) Oral daily PRN Constipation        Allergies    No Known Allergies    Intolerances    Haldol (Unknown)  Thorazine (Unknown)      TODAY'S SUBJECTIVE & REVIEW OF SYMPTOMS:  Patient was seen and examined today, there were no acute events overnight.   Pt reports that he "slept like a baby" again.  No longer c/o neck pain.  Chronic pins and needles and numbness both hands and feet.  Good appetite.  Last BM today.  Urinating well with exception of nocturnal enuresis although not last night    PHYSICAL EXAM  Vital Signs Last 24 Hrs  T(C): 36.4 (20 May 2019 08:15), Max: 36.6 (19 May 2019 22:11)  T(F): 97.5 (20 May 2019 08:15), Max: 97.8 (19 May 2019 22:11)  HR: 71 (20 May 2019 08:32) (63 - 85)  BP: 118/72 (20 May 2019 08:15) (118/72 - 120/74)  BP(mean): --  RR: 18 (20 May 2019 08:15) (14 - 18)  SpO2: 95% (20 May 2019 08:32) (95% - 100%)    General: NAD    Resp: CTAB  Cardio: +S1, S2  Abdomen: soft, NT, ND, normoactive bowel sounds  Extremities: no edema or calf tenderness. +Osgood-Schlatters noted bilaterally  Neuro: awake and alert     Motor: no increased tone     RUE: ShFl 5/5   EE 5/5    EF 5/5   WrEx 5/5   Interossei 3/5   ADM 4/5     LUE: ShFl 5/5   EE 5/5    EF 5/5   WrEx 5/5   Interossei 3/5   ADM 4/5     RLE: HF  3/5   KE 3-/5    DF 5/5   PF 4/5   EHL 1/5   Evertors 1/5     LLE: HF  3/5   KE 4/5    DF 5/5   PF 4/5   EHL 1/5   Evertors 1/5    Sensory: decreased pinprick from feet up to distal 1/3 tibia bilaterally    Reflexes: Right: Triceps 2+, Biceps 1+, Brachioradialis 1+, +Hoffmans,   patellar 2+ Achilles 2+                  Left: Triceps 2+, Biceps 1+, Brachioradialis 1+,  + Hoffmans    Patellar 1+, Achilles 2+  Down-going plantars  Skin: abrasions bilateral below patella, posterior neck surgical site- sutures removed, clean, no drainage, no erythema or edema noted; CDI    Functional status:  Bed mobility: Max A x 2  Transfers: Mod A x 2-3  Gait: Unable  ADLs: UE dress Mod A, LE dress Max A with AE      RECENT LABS:                        12.5   4.85  )-----------( 385      ( 20 May 2019 07:20 )             39.0     05-20    138  |  102  |  26<H>  ----------------------------<  83  4.0   |  30  |  0.76    Ca    9.5      20 May 2019 07:20    VPA 33 5/20            IMPRESSION AND PLAN:  53 year-old man with a PMH of bipolar disorder/schizophrenia, lower leg deformity  with Gait Instability, ADL impairments and Functional impairments after cervical stenosis s/p cervical fusion and laminectomy    #Cervical Stenosis- s/p C3-C6 laminectomy and fusion with Gait Instability, ADL impairments, and Functional impairments- follow up MRI with improved stenosis and epidural fluid collection  -Fluid collection on MRI Deemed stable by NSGY- monitor  -completed Decadron taper  -Continue Comprehensive Rehab Program of PT/OT   -Spinal, fall precautions  -Dcd baclofen for spasms as none experienced, keep diazepam- also for anxiety  -Bactrim for wound ppx total 14 days (5/15-5/28)    #Bipolar Disorder/Schizophrenia  -c/w Cogentin, Prolixin, Valproic Acid, Neurontin  - Neuropsych eval   - Appreciate psychiatry consult 5/17-most likely is diagnosed with Schizoaffective d/o Bipolar type.  Vpa level 33; d/w Dr Villasenor.  Increase VPA to 1000 qhs from 750 and cont 500 qam    #Hypothyroidism  -c/w synthroid    #HTN  -c/w metoprolol    #Nocturnal Enuresis  -c/w Desmopressin    #Hygeine  -Oral Care  -Podiatry Consult today 5/20 appreciated    #Neuropathy:  Decreased sensation, B/L Eversion weakness, EHL-   -Orthotist Eval     Pain Mgmt - Tylenol PRN, DC oxycodone   Bowel Regimen - c/w colace, senna; Dulcolax suppository and Miralax prn.  Enema prn  / Bladder - occasional incontinence- bladder scan q12h, PVR, SC > 400ml  Latest PVRs 270, 244-DCd scans  DVT ppx: lovenox

## 2019-05-21 PROCEDURE — 99232 SBSQ HOSP IP/OBS MODERATE 35: CPT | Mod: GC

## 2019-05-21 RX ORDER — FLUPHENAZINE HYDROCHLORIDE 1 MG/1
25 TABLET, FILM COATED ORAL ONCE
Refills: 0 | Status: COMPLETED | OUTPATIENT
Start: 2019-05-22 | End: 2019-05-22

## 2019-05-21 RX ADMIN — Medication 0.5 MILLIGRAM(S): at 06:29

## 2019-05-21 RX ADMIN — FLUPHENAZINE HYDROCHLORIDE 5 MILLIGRAM(S): 1 TABLET, FILM COATED ORAL at 06:28

## 2019-05-21 RX ADMIN — Medication 1 TABLET(S): at 17:36

## 2019-05-21 RX ADMIN — Medication 100 MILLIGRAM(S): at 17:35

## 2019-05-21 RX ADMIN — Medication 1 TABLET(S): at 11:42

## 2019-05-21 RX ADMIN — Medication 1 PATCH: at 06:32

## 2019-05-21 RX ADMIN — Medication 500 MILLIGRAM(S): at 11:42

## 2019-05-21 RX ADMIN — GABAPENTIN 100 MILLIGRAM(S): 400 CAPSULE ORAL at 06:28

## 2019-05-21 RX ADMIN — ALBUTEROL 2 PUFF(S): 90 AEROSOL, METERED ORAL at 16:07

## 2019-05-21 RX ADMIN — Medication 50 MILLIGRAM(S): at 06:29

## 2019-05-21 RX ADMIN — ALBUTEROL 2 PUFF(S): 90 AEROSOL, METERED ORAL at 04:24

## 2019-05-21 RX ADMIN — Medication 100 MILLIGRAM(S): at 06:29

## 2019-05-21 RX ADMIN — Medication 0.5 MILLIGRAM(S): at 17:36

## 2019-05-21 RX ADMIN — PANTOPRAZOLE SODIUM 40 MILLIGRAM(S): 20 TABLET, DELAYED RELEASE ORAL at 06:28

## 2019-05-21 RX ADMIN — Medication 1000 MILLIGRAM(S): at 22:03

## 2019-05-21 RX ADMIN — Medication 1 PATCH: at 11:42

## 2019-05-21 RX ADMIN — GABAPENTIN 100 MILLIGRAM(S): 400 CAPSULE ORAL at 17:36

## 2019-05-21 RX ADMIN — Medication 650 MILLIGRAM(S): at 11:43

## 2019-05-21 RX ADMIN — Medication 1 TABLET(S): at 06:28

## 2019-05-21 RX ADMIN — DESMOPRESSIN ACETATE 0.2 MILLIGRAM(S): 0.1 TABLET ORAL at 22:03

## 2019-05-21 RX ADMIN — SENNA PLUS 2 TABLET(S): 8.6 TABLET ORAL at 22:06

## 2019-05-21 RX ADMIN — FLUPHENAZINE HYDROCHLORIDE 5 MILLIGRAM(S): 1 TABLET, FILM COATED ORAL at 17:36

## 2019-05-21 RX ADMIN — ALBUTEROL 2 PUFF(S): 90 AEROSOL, METERED ORAL at 09:19

## 2019-05-21 RX ADMIN — ENOXAPARIN SODIUM 40 MILLIGRAM(S): 100 INJECTION SUBCUTANEOUS at 22:11

## 2019-05-21 RX ADMIN — Medication 175 MICROGRAM(S): at 06:29

## 2019-05-21 RX ADMIN — Medication 250 MILLIGRAM(S): at 17:35

## 2019-05-21 RX ADMIN — Medication 1 PATCH: at 12:00

## 2019-05-21 RX ADMIN — Medication 1 PATCH: at 22:08

## 2019-05-21 RX ADMIN — ALBUTEROL 2 PUFF(S): 90 AEROSOL, METERED ORAL at 21:50

## 2019-05-21 RX ADMIN — Medication 500 MILLIGRAM(S): at 08:28

## 2019-05-21 RX ADMIN — Medication 250 MILLIGRAM(S): at 06:28

## 2019-05-21 RX ADMIN — Medication 650 MILLIGRAM(S): at 12:00

## 2019-05-21 NOTE — PROGRESS NOTE ADULT - ASSESSMENT
IMPRESSION AND PLAN:  53 year-old man with a PMH of bipolar disorder/schizophrenia, lower leg deformity  with Gait Instability, ADL impairments and Functional impairments after cervical stenosis s/p cervical fusion and laminectomy    #Cervical Stenosis- s/p C3-C6 laminectomy and fusion with Gait Instability, ADL impairments, and Functional impairments- follow up MRI with improved stenosis and epidural fluid collection  -Fluid collection on MRI Deemed stable by NSGY- monitor  -completed Decadron taper  -Continue Comprehensive Rehab Program of PT/OT   -Spinal, fall precautions  -Dcd baclofen for spasms as none experienced, keep diazepam- also for anxiety  -Bactrim for wound ppx total 14 days (5/15-5/28)    #Bipolar Disorder/Schizophrenia  -c/w Cogentin, Prolixin, Valproic Acid, Neurontin  - Neuropsych eval   - Appreciate psychiatry consult 5/17-most likely is diagnosed with Schizoaffective d/o Bipolar type.  Vpa level 33; d/w Dr Villasenor. c/w Increased VPA to 1000 qhs from 750 and cont 500 qam    #Hypothyroidism  -c/w synthroid    #HTN  -c/w metoprolol    #Nocturnal Enuresis  -c/w Desmopressin    #Hygeine  -Oral Care  -Podiatry Consult today 5/20 appreciated    #Neuropathy:  Decreased sensation, B/L Eversion weakness, EHL-   -Orthotist Eval     Pain Mgmt - Tylenol PRN, DC oxycodone   Bowel Regimen - c/w colace, senna; Dulcolax suppository and Miralax prn.  Enema prn  / Bladder - occasional incontinence- bladder scan q12h, PVR, SC > 400ml  Latest PVRs 270, 244-DCd scans  DVT ppx: lovenox IMPRESSION AND PLAN:  53 year-old man with a PMH of bipolar disorder/schizophrenia, lower leg deformity  with Gait Instability, ADL impairments and Functional impairments after cervical stenosis s/p cervical fusion and laminectomy    #Cervical Stenosis- s/p C3-C6 laminectomy and fusion with Gait Instability, ADL impairments, and Functional impairments- follow up MRI with improved stenosis and epidural fluid collection  -Fluid collection on MRI Deemed stable by NSGY- monitor  -completed Decadron taper  -Continue Comprehensive Rehab Program of PT/OT   -Spinal, fall precautions  -Dcd baclofen for spasms as none experienced, keep diazepam- also for anxiety  -Bactrim for wound ppx total 14 days (5/15-5/28)    #Bipolar Disorder/Schizophrenia  -c/w Cogentin, Prolixin, Valproic Acid, Neurontin  - Neuropsych eval   - Appreciate psychiatry consult 5/17-most likely is diagnosed with Schizoaffective d/o Bipolar type.  Vpa level 33; d/w Dr Villasenor. c/w Increased VPA to 1000 qhs from 750 and cont 500 qam    #Hypothyroidism  -c/w synthroid    #HTN  -c/w metoprolol    #Nocturnal Enuresis  -c/w Desmopressin    #Hygeine  -Oral Care  -Podiatry Consult 5/20 appreciated    #Neuropathy:  Decreased sensation, B/L Eversion weakness, EHL-   -Orthotist Eval     Pain Mgmt - Tylenol PRN, DC oxycodone   Bowel Regimen - c/w colace, senna; Dulcolax suppository and Miralax prn.  Enema prn  / Bladder - occasional incontinence- bladder scan q12h, PVR, SC > 400ml  Latest PVRs 270, 244-DCd scans  DVT ppx: lovenox

## 2019-05-21 NOTE — PROGRESS NOTE ADULT - SUBJECTIVE AND OBJECTIVE BOX
Patient is a 53y old  Male who presents with a chief complaint of Functional Deficits After Laminectomy (17 May 2019 11:30)      HPI:  53 year old man, resident of [facility], with h/o bipolar d/o, HTN, nocturnal enuresis, deformity of lower leg, hypothyroid, schizophrenia, BPH, IVDA presented to [facility] on 4/25 with inability to walk due to lower extremity weakness which had been worsening for the previous two weeks. Also complained of lower back pain since sustaining a fall 2 weeks prior. In additions the pt reported paresthesias in both hands, however stated that he was able to feel his feet. Stated that he believes that he is being "possessed" and "held down" by somebody who passed away. Of note, the pt states that someone is trying to "poison" and "finish him off" at [place] and that he is being followed. Motion degraded MRI Cervical and thoracic spine obtained on 4/26 after patient had +duarte's, clonus and babinski on exam, showed multilevel cervical stenosis and possible cord signal change, started on decadron. On 5/1 he had C3-C6 laminectomy and fusion.  MRI C Spine on 5/15 to check for worsening stenosis showed A dorsal paraspinal subcutaneous nonspecific fluid collection noted from the C1-C7 levels measuring 10.2 cm cephalocaudad, 3.8 cm AP, and 7.1 cm transverse. Differential considerations include an evolving postoperative seroma/hematoma or a CSF leak. Serial imaging follow-up over time is recommended to exclude possibility of early developing abscess. Reviewed by neurosurgery, no need for surgical intervention with no new symptoms. (16 May 2019 15:30)      PAST MEDICAL & SURGICAL HISTORY:  Bipolar disorder  Drug abuse  Incontinence  Schizophrenia  Hypothyroid  Hypertension  No significant past surgical history      MEDICATIONS  (STANDING):  ALBUTerol    90 MICROgram(s) HFA Inhaler 2 Puff(s) Inhalation every 6 hours  ascorbic acid 500 milliGRAM(s) Oral daily  benztropine 0.5 milliGRAM(s) Oral two times a day  desmopressin 0.2 milliGRAM(s) Oral at bedtime  docusate sodium 100 milliGRAM(s) Oral two times a day  enoxaparin Injectable 40 milliGRAM(s) SubCutaneous every 24 hours  fluPHENAZine 5 milliGRAM(s) Oral two times a day  gabapentin 100 milliGRAM(s) Oral two times a day  levothyroxine 175 MICROGram(s) Oral daily  metoprolol succinate ER 50 milliGRAM(s) Oral daily  multivitamin 1 Tablet(s) Oral daily  nicotine - 21 mG/24Hr(s) Patch 1 patch Transdermal daily  pantoprazole    Tablet 40 milliGRAM(s) Oral before breakfast  saccharomyces boulardii 250 milliGRAM(s) Oral two times a day  senna 2 Tablet(s) Oral at bedtime  trimethoprim  160 mG/sulfamethoxazole 800 mG 1 Tablet(s) Oral two times a day  valproic  acid Syrup 1000 milliGRAM(s) Oral at bedtime  valproic  acid Syrup 500 milliGRAM(s) Oral <User Schedule>    MEDICATIONS  (PRN):  acetaminophen   Tablet .. 650 milliGRAM(s) Oral every 6 hours PRN Temp greater or equal to 38C (100.4F), Mild Pain (1 - 3)  aluminum hydroxide/magnesium hydroxide/simethicone Suspension 30 milliLiter(s) Oral every 4 hours PRN Indigestion  bisacodyl 5 milliGRAM(s) Oral every 12 hours PRN Constipation  diazepam    Tablet 5 milliGRAM(s) Oral every 6 hours PRN Spasm  nicotine  Polacrilex Gum 4 milliGRAM(s) Oral every 6 hours PRN nicotine craving  polyethylene glycol 3350 17 Gram(s) Oral daily PRN Constipation          Allergies    No Known Allergies    Intolerances    Haldol (Unknown)  Thorazine (Unknown)      TODAY'S SUBJECTIVE & REVIEW OF SYMPTOMS:  No acute events overnight reported by the night team resident or nursing. Patient seen and evaluated in OT gym. Patient states he is doing well. no new issues or complaints.     [X] Constitutional WNL       [X] HEENT   [X] Cardio WNL              [X] Resp WNL            [X] GI +/- incontinence   [X]  + voiding at night                               [X] MSK back pain controlled.   [X] Neuro WNL                     [X] Cognitive WNL   [X] Psych WNL  [X] Skin WNL      [X] Heme WNL              [X] Endo WNL     PHYSICAL EXAM  Vital Signs Last 24 Hrs  T(C): 36.4 (20 May 2019 08:15), Max: 36.6 (19 May 2019 22:11)  T(F): 97.5 (20 May 2019 08:15), Max: 97.8 (19 May 2019 22:11)  HR: 71 (20 May 2019 08:32) (63 - 85)  BP: 118/72 (20 May 2019 08:15) (118/72 - 120/74)  BP(mean): --  RR: 18 (20 May 2019 08:15) (14 - 18)  SpO2: 95% (20 May 2019 08:32) (95% - 100%)    General: NAD    Resp: CTAB  Cardio: +S1, S2  Abdomen: soft, NT, ND, normoactive bowel sounds  Extremities: no edema or calf tenderness. +Osgood-Schlatters noted bilaterally  Neuro: awake and alert     Motor: no increased tone     RUE: ShFl 5/5   EE 5/5    EF 5/5   WrEx 5/5   Interossei 3/5   ADM 4/5     LUE: ShFl 5/5   EE 5/5    EF 5/5   WrEx 5/5   Interossei 3/5   ADM 4/5     RLE: HF  3/5   KE 3-/5    DF 5/5   PF 4/5   EHL 1/5   Evertors 1/5     LLE: HF  3/5   KE 4/5    DF 5/5   PF 4/5   EHL 1/5   Evertors 1/5    Sensory: decreased pinprick from feet up to distal 1/3 tibia bilaterally    Reflexes: Right: Triceps 2+, Biceps 1+, Brachioradialis 1+, +Hoffmans,   patellar 2+ Achilles 2+                  Left: Triceps 2+, Biceps 1+, Brachioradialis 1+,  + Hoffmans    Patellar 1+, Achilles 2+  Down-going plantars  Skin: abrasions bilateral below patella, posterior neck surgical site- sutures removed, clean, no drainage, no erythema or edema noted; CDI    Functional status:  Bed mobility: Max A x 2  Transfers: Mod A x 2-3  Gait: Unable  ADLs: UE dress Mod A, LE dress Max A with AE      RECENT LABS:                        12.5   4.85  )-----------( 385      ( 20 May 2019 07:20 )             39.0     05-20    138  |  102  |  26<H>  ----------------------------<  83  4.0   |  30  |  0.76    Ca    9.5      20 May 2019 07:20    VPA 33 5/20 Patient is a 53y old  Male who presents with a chief complaint of Functional Deficits After Laminectomy (17 May 2019 11:30)      HPI:  53 year old man, resident of [facility], with h/o bipolar d/o, HTN, nocturnal enuresis, deformity of lower leg, hypothyroid, schizophrenia, BPH, IVDA presented to [facility] on 4/25 with inability to walk due to lower extremity weakness which had been worsening for the previous two weeks. Also complained of lower back pain since sustaining a fall 2 weeks prior. In additions the pt reported paresthesias in both hands, however stated that he was able to feel his feet. Stated that he believes that he is being "possessed" and "held down" by somebody who passed away. Of note, the pt states that someone is trying to "poison" and "finish him off" at [place] and that he is being followed. Motion degraded MRI Cervical and thoracic spine obtained on 4/26 after patient had +duarte's, clonus and babinski on exam, showed multilevel cervical stenosis and possible cord signal change, started on decadron. On 5/1 he had C3-C6 laminectomy and fusion.  MRI C Spine on 5/15 to check for worsening stenosis showed A dorsal paraspinal subcutaneous nonspecific fluid collection noted from the C1-C7 levels measuring 10.2 cm cephalocaudad, 3.8 cm AP, and 7.1 cm transverse. Differential considerations include an evolving postoperative seroma/hematoma or a CSF leak. Serial imaging follow-up over time is recommended to exclude possibility of early developing abscess. Reviewed by neurosurgery, no need for surgical intervention with no new symptoms. (16 May 2019 15:30)      PAST MEDICAL & SURGICAL HISTORY:  Bipolar disorder  Drug abuse  Incontinence  Schizophrenia  Hypothyroid  Hypertension  No significant past surgical history      MEDICATIONS  (STANDING):  ALBUTerol    90 MICROgram(s) HFA Inhaler 2 Puff(s) Inhalation every 6 hours  ascorbic acid 500 milliGRAM(s) Oral daily  benztropine 0.5 milliGRAM(s) Oral two times a day  desmopressin 0.2 milliGRAM(s) Oral at bedtime  docusate sodium 100 milliGRAM(s) Oral two times a day  enoxaparin Injectable 40 milliGRAM(s) SubCutaneous every 24 hours  fluPHENAZine 5 milliGRAM(s) Oral two times a day  gabapentin 100 milliGRAM(s) Oral two times a day  levothyroxine 175 MICROGram(s) Oral daily  metoprolol succinate ER 50 milliGRAM(s) Oral daily  multivitamin 1 Tablet(s) Oral daily  nicotine - 21 mG/24Hr(s) Patch 1 patch Transdermal daily  pantoprazole    Tablet 40 milliGRAM(s) Oral before breakfast  saccharomyces boulardii 250 milliGRAM(s) Oral two times a day  senna 2 Tablet(s) Oral at bedtime  trimethoprim  160 mG/sulfamethoxazole 800 mG 1 Tablet(s) Oral two times a day  valproic  acid Syrup 1000 milliGRAM(s) Oral at bedtime  valproic  acid Syrup 500 milliGRAM(s) Oral <User Schedule>    MEDICATIONS  (PRN):  acetaminophen   Tablet .. 650 milliGRAM(s) Oral every 6 hours PRN Temp greater or equal to 38C (100.4F), Mild Pain (1 - 3)  aluminum hydroxide/magnesium hydroxide/simethicone Suspension 30 milliLiter(s) Oral every 4 hours PRN Indigestion  bisacodyl 5 milliGRAM(s) Oral every 12 hours PRN Constipation  diazepam    Tablet 5 milliGRAM(s) Oral every 6 hours PRN Spasm  nicotine  Polacrilex Gum 4 milliGRAM(s) Oral every 6 hours PRN nicotine craving  polyethylene glycol 3350 17 Gram(s) Oral daily PRN Constipation          Allergies    No Known Allergies    Intolerances    Haldol (Unknown)  Thorazine (Unknown)      TODAY'S SUBJECTIVE & REVIEW OF SYMPTOMS:  No acute events overnight reported by the night team resident or nursing. Patient seen and evaluated in OT gym. Patient states he is doing well. no new issues or complaints.     [X] Constitutional WNL       [X] HEENT   [X] Cardio WNL              [X] Resp WNL            [X] GI WNL  [X]  + voiding at night                               [X] MSK back pain controlled.   [X] Neuro WNL                     [X] Cognitive WNL   [X] Psych WNL  [X] Skin WNL      [X] Heme WNL              [X] Endo WNL     PHYSICAL EXAM  Vital Signs Last 24 Hrs  T(C): 36.3 (21 May 2019 08:30), Max: 36.4 (20 May 2019 23:13)  T(F): 97.4 (21 May 2019 08:30), Max: 97.5 (20 May 2019 23:13)  HR: 78 (21 May 2019 09:20) (65 - 80)  BP: 102/66 (21 May 2019 08:30) (101/62 - 102/66)  BP(mean): --  RR: 14 (21 May 2019 08:30) (14 - 14)  SpO2: 97% (21 May 2019 09:20) (97% - 100%)    General: NAD    Resp: CTAB  Cardio: +S1, S2  Abdomen: soft, NT, ND, normoactive bowel sounds  Extremities: no edema or calf tenderness. +Osgood-Schlatters noted bilaterally  Neuro: awake and alert     Motor: no increased tone     RUE: ShFl 5/5   EE 5/5    EF 5/5   WrEx 5/5   Interossei 3/5   ADM 4/5     LUE: ShFl 5/5   EE 5/5    EF 5/5   WrEx 5/5   Interossei 3/5   ADM 4/5     RLE: HF  3/5   KE 3-/5    DF 5/5   PF 4/5   EHL 1/5   Evertors 1/5     LLE: HF  3/5   KE 4/5    DF 5/5   PF 4/5   EHL 1/5   Evertors 1/5    Sensory: decreased pinprick from feet up to distal 1/3 tibia bilaterally    Reflexes: Right: Triceps 2+, Biceps 1+, Brachioradialis 1+, +Hoffmans,   patellar 2+ Achilles 2+                  Left: Triceps 2+, Biceps 1+, Brachioradialis 1+,  + Hoffmans    Patellar 1+, Achilles 2+  Down-going plantars  Skin: abrasions bilateral below patella, posterior neck surgical site- sutures removed, clean, no drainage, no erythema or edema noted; CDI    Functional status:  Bed mobility: Max A x 2  Transfers: Mod A x 2  Gait: Unable  ADLs: UE dress Mod A, LE dress Mod A with AE      RECENT LABS:                        12.5   4.85  )-----------( 385      ( 20 May 2019 07:20 )             39.0     05-20    138  |  102  |  26<H>  ----------------------------<  83  4.0   |  30  |  0.76    Ca    9.5      20 May 2019 07:20    VPA 33 5/20

## 2019-05-22 PROCEDURE — 99232 SBSQ HOSP IP/OBS MODERATE 35: CPT

## 2019-05-22 RX ADMIN — Medication 650 MILLIGRAM(S): at 18:45

## 2019-05-22 RX ADMIN — GABAPENTIN 100 MILLIGRAM(S): 400 CAPSULE ORAL at 17:12

## 2019-05-22 RX ADMIN — Medication 1000 MILLIGRAM(S): at 22:16

## 2019-05-22 RX ADMIN — Medication 100 MILLIGRAM(S): at 05:29

## 2019-05-22 RX ADMIN — Medication 650 MILLIGRAM(S): at 11:23

## 2019-05-22 RX ADMIN — Medication 650 MILLIGRAM(S): at 12:00

## 2019-05-22 RX ADMIN — Medication 1 PATCH: at 05:34

## 2019-05-22 RX ADMIN — Medication 1 PATCH: at 22:21

## 2019-05-22 RX ADMIN — ALBUTEROL 2 PUFF(S): 90 AEROSOL, METERED ORAL at 04:40

## 2019-05-22 RX ADMIN — ALBUTEROL 2 PUFF(S): 90 AEROSOL, METERED ORAL at 21:03

## 2019-05-22 RX ADMIN — Medication 100 MILLIGRAM(S): at 17:12

## 2019-05-22 RX ADMIN — DESMOPRESSIN ACETATE 0.2 MILLIGRAM(S): 0.1 TABLET ORAL at 22:16

## 2019-05-22 RX ADMIN — Medication 0.5 MILLIGRAM(S): at 05:28

## 2019-05-22 RX ADMIN — Medication 1 TABLET(S): at 11:24

## 2019-05-22 RX ADMIN — Medication 500 MILLIGRAM(S): at 08:00

## 2019-05-22 RX ADMIN — GABAPENTIN 100 MILLIGRAM(S): 400 CAPSULE ORAL at 05:30

## 2019-05-22 RX ADMIN — Medication 250 MILLIGRAM(S): at 17:12

## 2019-05-22 RX ADMIN — Medication 0.5 MILLIGRAM(S): at 17:12

## 2019-05-22 RX ADMIN — Medication 500 MILLIGRAM(S): at 11:23

## 2019-05-22 RX ADMIN — ENOXAPARIN SODIUM 40 MILLIGRAM(S): 100 INJECTION SUBCUTANEOUS at 22:16

## 2019-05-22 RX ADMIN — ALBUTEROL 2 PUFF(S): 90 AEROSOL, METERED ORAL at 16:02

## 2019-05-22 RX ADMIN — FLUPHENAZINE HYDROCHLORIDE 5 MILLIGRAM(S): 1 TABLET, FILM COATED ORAL at 05:29

## 2019-05-22 RX ADMIN — Medication 1 PATCH: at 11:24

## 2019-05-22 RX ADMIN — Medication 1 PATCH: at 11:25

## 2019-05-22 RX ADMIN — Medication 250 MILLIGRAM(S): at 05:30

## 2019-05-22 RX ADMIN — Medication 50 MILLIGRAM(S): at 05:34

## 2019-05-22 RX ADMIN — PANTOPRAZOLE SODIUM 40 MILLIGRAM(S): 20 TABLET, DELAYED RELEASE ORAL at 05:35

## 2019-05-22 RX ADMIN — Medication 175 MICROGRAM(S): at 05:30

## 2019-05-22 RX ADMIN — FLUPHENAZINE HYDROCHLORIDE 25 MILLIGRAM(S): 1 TABLET, FILM COATED ORAL at 13:18

## 2019-05-22 RX ADMIN — Medication 1 TABLET(S): at 17:12

## 2019-05-22 RX ADMIN — Medication 650 MILLIGRAM(S): at 18:14

## 2019-05-22 RX ADMIN — ALBUTEROL 2 PUFF(S): 90 AEROSOL, METERED ORAL at 08:50

## 2019-05-22 RX ADMIN — Medication 1 TABLET(S): at 05:31

## 2019-05-22 RX ADMIN — SENNA PLUS 2 TABLET(S): 8.6 TABLET ORAL at 22:16

## 2019-05-22 RX ADMIN — FLUPHENAZINE HYDROCHLORIDE 5 MILLIGRAM(S): 1 TABLET, FILM COATED ORAL at 17:12

## 2019-05-22 NOTE — PROGRESS NOTE ADULT - ASSESSMENT
IMPRESSION AND PLAN:  53 year-old man with a PMH of bipolar disorder/schizophrenia, lower leg deformity  with Gait Instability, ADL impairments and Functional impairments after cervical stenosis s/p cervical fusion and laminectomy    #Cervical Stenosis- s/p C3-C6 laminectomy and fusion with Gait Instability, ADL impairments, and Functional impairments- follow up MRI with improved stenosis and epidural fluid collection  -Fluid collection on MRI Deemed stable by NSGY- monitor  -completed Decadron taper  -Continue Comprehensive Rehab Program of PT/OT   -Spinal, fall precautions  -Dcd baclofen for spasms as none experienced, keep diazepam- also for anxiety  -Bactrim for wound ppx total 14 days (5/15-5/28)    #Bipolar Disorder/Schizophrenia  -c/w Cogentin, Prolixin, Valproic Acid, Neurontin  - Neuropsych eval   - Appreciate psychiatry consult 5/17-most likely is diagnosed with Schizoaffective d/o Bipolar type.  Vpa level 33; d/w Dr Villasenor. c/w Increased VPA to 1000 qhs from 750 and cont 500 qam    #Hypothyroidism  -c/w synthroid    #HTN  -c/w metoprolol    #Nocturnal Enuresis  -c/w Desmopressin    #Hygeine  -Oral Care  -Podiatry Consult 5/20 appreciated    #Neuropathy:  Decreased sensation, B/L Eversion weakness, EHL-   -Orthotist Eval     Pain Mgmt - Tylenol PRN, DC oxycodone   Bowel Regimen - c/w colace, senna; Dulcolax suppository and Miralax prn.  Enema prn  / Bladder - occasional incontinence- bladder scan q12h, PVR, SC > 400ml  Latest PVRs 270, 244-DCd scans  DVT ppx: lovenox

## 2019-05-22 NOTE — CHART NOTE - NSCHARTNOTEFT_GEN_A_CORE
Nutrition Follow Up Note  Hospital Course (Per Electronic Medical Record):     Source:   Patient [X]  Medical Record [X]      Diet:   Regular Diet w/ Thin Liquids  Tolerates Diet Well  No Chewing/Swallowing Difficulties   No Recent Nausea, Vomiting, Diarrhea or Constipation   Consumes 100% of Meals (as Per Documentation)   on Ensure Enlive 8oz PO TID (Provides 1,050kcal & 60grams of Protein)   Patient Takes Nutrition Supplement   Declines to Reduce Supplementation  Educated Patient on Proper Nutrition & Need for Supplementation     Enteral/Parenteral Nutrition: N/A    Current Weight: 242.20lb on 5/16  Obtain New Weight  Obtain Weights Weekly     Pertinent Medications: MEDICATIONS  (STANDING):  ALBUTerol    90 MICROgram(s) HFA Inhaler 2 Puff(s) Inhalation every 6 hours  ascorbic acid 500 milliGRAM(s) Oral daily  benztropine 0.5 milliGRAM(s) Oral two times a day  desmopressin 0.2 milliGRAM(s) Oral at bedtime  docusate sodium 100 milliGRAM(s) Oral two times a day  enoxaparin Injectable 40 milliGRAM(s) SubCutaneous every 24 hours  fluPHENAZine 5 milliGRAM(s) Oral two times a day  fluPHENAZine decanoate Injectable, Long Acting 25 milliGRAM(s) IntraMuscular once  gabapentin 100 milliGRAM(s) Oral two times a day  levothyroxine 175 MICROGram(s) Oral daily  metoprolol succinate ER 50 milliGRAM(s) Oral daily  multivitamin 1 Tablet(s) Oral daily  nicotine - 21 mG/24Hr(s) Patch 1 patch Transdermal daily  pantoprazole    Tablet 40 milliGRAM(s) Oral before breakfast  saccharomyces boulardii 250 milliGRAM(s) Oral two times a day  senna 2 Tablet(s) Oral at bedtime  trimethoprim  160 mG/sulfamethoxazole 800 mG 1 Tablet(s) Oral two times a day  valproic  acid Syrup 1000 milliGRAM(s) Oral at bedtime  valproic  acid Syrup 500 milliGRAM(s) Oral <User Schedule>    MEDICATIONS  (PRN):  acetaminophen   Tablet .. 650 milliGRAM(s) Oral every 6 hours PRN Temp greater or equal to 38C (100.4F), Mild Pain (1 - 3)  aluminum hydroxide/magnesium hydroxide/simethicone Suspension 30 milliLiter(s) Oral every 4 hours PRN Indigestion  bisacodyl 5 milliGRAM(s) Oral every 12 hours PRN Constipation  diazepam    Tablet 5 milliGRAM(s) Oral every 6 hours PRN Spasm  nicotine  Polacrilex Gum 4 milliGRAM(s) Oral every 6 hours PRN nicotine craving  polyethylene glycol 3350 17 Gram(s) Oral daily PRN Constipation    Pertinent Labs:  05-20 Na138 mmol/L Glu 83 mg/dL K+ 4.0 mmol/L Cr  0.76 mg/dL BUN 26 mg/dL<H> 05-17 Alb 2.0 g/dL<L> 05-17 PAB 20 mg/dL    Skin: No Pressure Ulcers  Surgical Incision(as Per Nursing Flow Sheet)     Edema: None Noted     Last BM: on 5/21    Estimated Needs:   [X] No Change Since Previous Assessment    Previous Nutrition Diagnosis:   Obese    Nutrition Diagnosis is [X] Ongoing - Educated Patient on Proper Nutrition & Need for Supplementation     New Nutrition Diagnosis: [X] Not Applicable    Interventions:   1. Recommend Continue Nutrition Plan of Care   2. Educated Patient on Proper Nutrition & Need for Supplementation     Monitoring & Evaluation:   [X] Weights   [X] PO Intake   [X] Follow Up (Per Protocol)  [X] Tolerance to Diet Prescription   [X] Other: Labs     Registered Dietitian/Nutritionist Remains Available.  Pool Moise RDN    Pager # [phone]  Phone# [phone]

## 2019-05-22 NOTE — PROGRESS NOTE ADULT - SUBJECTIVE AND OBJECTIVE BOX
Patient is a 53y old  Male who presents with a chief complaint of Functional Deficits After Laminectomy (17 May 2019 11:30)      HPI:  53 year old man, resident of [facility], with h/o bipolar d/o, HTN, nocturnal enuresis, deformity of lower leg, hypothyroid, schizophrenia, BPH, IVDA presented to [facility] on 4/25 with inability to walk due to lower extremity weakness which had been worsening for the previous two weeks. Also complained of lower back pain since sustaining a fall 2 weeks prior. In additions the pt reported paresthesias in both hands, however stated that he was able to feel his feet. Stated that he believes that he is being "possessed" and "held down" by somebody who passed away. Of note, the pt states that someone is trying to "poison" and "finish him off" at [place] and that he is being followed. Motion degraded MRI Cervical and thoracic spine obtained on 4/26 after patient had +duarte's, clonus and babinski on exam, showed multilevel cervical stenosis and possible cord signal change, started on decadron. On 5/1 he had C3-C6 laminectomy and fusion.  MRI C Spine on 5/15 to check for worsening stenosis showed A dorsal paraspinal subcutaneous nonspecific fluid collection noted from the C1-C7 levels measuring 10.2 cm cephalocaudad, 3.8 cm AP, and 7.1 cm transverse. Differential considerations include an evolving postoperative seroma/hematoma or a CSF leak. Serial imaging follow-up over time is recommended to exclude possibility of early developing abscess. Reviewed by neurosurgery, no need for surgical intervention with no new symptoms. (16 May 2019 15:30)      PAST MEDICAL & SURGICAL HISTORY:  Bipolar disorder  Drug abuse  Incontinence  Schizophrenia  Hypothyroid  Hypertension  No significant past surgical history      MEDICATIONS  (STANDING):  ALBUTerol    90 MICROgram(s) HFA Inhaler 2 Puff(s) Inhalation every 6 hours  ascorbic acid 500 milliGRAM(s) Oral daily  benztropine 0.5 milliGRAM(s) Oral two times a day  desmopressin 0.2 milliGRAM(s) Oral at bedtime  docusate sodium 100 milliGRAM(s) Oral two times a day  enoxaparin Injectable 40 milliGRAM(s) SubCutaneous every 24 hours  fluPHENAZine 5 milliGRAM(s) Oral two times a day  fluPHENAZine decanoate Injectable, Long Acting 25 milliGRAM(s) IntraMuscular once  gabapentin 100 milliGRAM(s) Oral two times a day  levothyroxine 175 MICROGram(s) Oral daily  metoprolol succinate ER 50 milliGRAM(s) Oral daily  multivitamin 1 Tablet(s) Oral daily  nicotine - 21 mG/24Hr(s) Patch 1 patch Transdermal daily  pantoprazole    Tablet 40 milliGRAM(s) Oral before breakfast  saccharomyces boulardii 250 milliGRAM(s) Oral two times a day  senna 2 Tablet(s) Oral at bedtime  trimethoprim  160 mG/sulfamethoxazole 800 mG 1 Tablet(s) Oral two times a day  valproic  acid Syrup 1000 milliGRAM(s) Oral at bedtime  valproic  acid Syrup 500 milliGRAM(s) Oral <User Schedule>    MEDICATIONS  (PRN):  acetaminophen   Tablet .. 650 milliGRAM(s) Oral every 6 hours PRN Temp greater or equal to 38C (100.4F), Mild Pain (1 - 3)  aluminum hydroxide/magnesium hydroxide/simethicone Suspension 30 milliLiter(s) Oral every 4 hours PRN Indigestion  bisacodyl 5 milliGRAM(s) Oral every 12 hours PRN Constipation  diazepam    Tablet 5 milliGRAM(s) Oral every 6 hours PRN Spasm  nicotine  Polacrilex Gum 4 milliGRAM(s) Oral every 6 hours PRN nicotine craving  polyethylene glycol 3350 17 Gram(s) Oral daily PRN Constipation          Allergies    No Known Allergies    Intolerances    Haldol (Unknown)  Thorazine (Unknown)      TODAY'S SUBJECTIVE & REVIEW OF SYMPTOMS:  No acute events overnight reported by the night team resident or nursing. Patient seen and evaluated. Patient states that he slept well.  Denies neck pain.  Persistent numbness and tingling both hands and feet.  Last BM yesterday.    [X] Constitutional WNL       [X] HEENT   [X] Cardio WNL              [X] Resp WNL            [X] GI WNL  [X]  + voiding at night                               [X] MSK back pain controlled.   [X] Neuro WNL                     [X] Cognitive WNL   [X] Psych WNL  [X] Skin WNL      [X] Heme WNL              [X] Endo WNL     PHYSICAL EXAM  Vital Signs Last 24 Hrs  T(C): 36.4 (22 May 2019 09:15), Max: 36.4 (21 May 2019 23:26)  T(F): 97.6 (22 May 2019 09:15), Max: 97.6 (22 May 2019 09:15)  HR: 64 (22 May 2019 09:15) (64 - 76)  BP: 100/67 (22 May 2019 09:15) (100/67 - 110/66)  BP(mean): --  RR: 16 (22 May 2019 09:15) (16 - 16)  SpO2: 98% (22 May 2019 09:15) (96% - 100%)    General: NAD    Resp: CTAB  Cardio: +S1, S2  Abdomen: soft, NT, ND, normoactive bowel sounds  Extremities: no edema or calf tenderness. +Osgood-Schlatters noted bilaterally  Neuro: awake and alert     Motor: no increased tone     RUE: ShFl 5/5   EE 5/5    EF 5/5   WrEx 5/5   Interossei 3/5   ADM 4/5     LUE: ShFl 5/5   EE 5/5    EF 5/5   WrEx 5/5   Interossei 3/5   ADM 4/5     RLE: HF  3/5   KE 3-/5    DF 5/5   PF 4/5   EHL 1/5   Evertors 1/5     LLE: HF  3/5   KE 4/5    DF 5/5   PF 4/5   EHL 1/5   Evertors 1/5    Sensory: decreased pinprick from feet up to distal 1/3 tibia bilaterally    Reflexes: Right: Triceps 2+, Biceps 1+, Brachioradialis 1+, +Hoffmans,   patellar 2+ Achilles 2+                  Left: Triceps 2+, Biceps 1+, Brachioradialis 1+,  + Hoffmans    Patellar 1+, Achilles 2+  Down-going plantars  Skin: abrasions bilateral below patella, posterior neck surgical site- sutures removed, clean, no drainage, no erythema or edema noted; CDI    Functional status:  Bed mobility: Max A x 2  Transfers: Mod A x 2  Gait: Unable  ADLs: UE dress Mod A, LE dress Mod A with AE      RECENT LABS:                        12.5   4.85  )-----------( 385      ( 20 May 2019 07:20 )             39.0     05-20    138  |  102  |  26<H>  ----------------------------<  83  4.0   |  30  |  0.76    Ca    9.5      20 May 2019 07:20    VPA 33 5/20

## 2019-05-23 PROCEDURE — 99232 SBSQ HOSP IP/OBS MODERATE 35: CPT

## 2019-05-23 RX ORDER — PYRIDOXINE HCL (VITAMIN B6) 100 MG
50 TABLET ORAL DAILY
Refills: 0 | Status: DISCONTINUED | OUTPATIENT
Start: 2019-05-23 | End: 2019-05-30

## 2019-05-23 RX ORDER — ALBUTEROL 90 UG/1
2 AEROSOL, METERED ORAL EVERY 6 HOURS
Refills: 0 | Status: DISCONTINUED | OUTPATIENT
Start: 2019-05-23 | End: 2019-05-30

## 2019-05-23 RX ORDER — DIAZEPAM 5 MG
5 TABLET ORAL EVERY 6 HOURS
Refills: 0 | Status: DISCONTINUED | OUTPATIENT
Start: 2019-05-23 | End: 2019-05-30

## 2019-05-23 RX ADMIN — Medication 500 MILLIGRAM(S): at 11:47

## 2019-05-23 RX ADMIN — Medication 175 MICROGRAM(S): at 05:45

## 2019-05-23 RX ADMIN — Medication 500 MILLIGRAM(S): at 08:21

## 2019-05-23 RX ADMIN — Medication 50 MILLIGRAM(S): at 06:58

## 2019-05-23 RX ADMIN — Medication 1 PATCH: at 08:25

## 2019-05-23 RX ADMIN — Medication 0.5 MILLIGRAM(S): at 17:37

## 2019-05-23 RX ADMIN — Medication 250 MILLIGRAM(S): at 17:37

## 2019-05-23 RX ADMIN — GABAPENTIN 100 MILLIGRAM(S): 400 CAPSULE ORAL at 17:37

## 2019-05-23 RX ADMIN — Medication 650 MILLIGRAM(S): at 04:13

## 2019-05-23 RX ADMIN — PANTOPRAZOLE SODIUM 40 MILLIGRAM(S): 20 TABLET, DELAYED RELEASE ORAL at 06:58

## 2019-05-23 RX ADMIN — Medication 5 MILLIGRAM(S): at 19:37

## 2019-05-23 RX ADMIN — Medication 650 MILLIGRAM(S): at 16:09

## 2019-05-23 RX ADMIN — Medication 1 PATCH: at 11:47

## 2019-05-23 RX ADMIN — Medication 1000 MILLIGRAM(S): at 22:16

## 2019-05-23 RX ADMIN — GABAPENTIN 100 MILLIGRAM(S): 400 CAPSULE ORAL at 06:58

## 2019-05-23 RX ADMIN — Medication 100 MILLIGRAM(S): at 06:57

## 2019-05-23 RX ADMIN — Medication 250 MILLIGRAM(S): at 06:58

## 2019-05-23 RX ADMIN — FLUPHENAZINE HYDROCHLORIDE 5 MILLIGRAM(S): 1 TABLET, FILM COATED ORAL at 06:57

## 2019-05-23 RX ADMIN — ALBUTEROL 2 PUFF(S): 90 AEROSOL, METERED ORAL at 03:49

## 2019-05-23 RX ADMIN — ALBUTEROL 2 PUFF(S): 90 AEROSOL, METERED ORAL at 08:33

## 2019-05-23 RX ADMIN — Medication 650 MILLIGRAM(S): at 16:11

## 2019-05-23 RX ADMIN — Medication 50 MILLIGRAM(S): at 11:47

## 2019-05-23 RX ADMIN — FLUPHENAZINE HYDROCHLORIDE 5 MILLIGRAM(S): 1 TABLET, FILM COATED ORAL at 17:37

## 2019-05-23 RX ADMIN — Medication 1 TABLET(S): at 06:57

## 2019-05-23 RX ADMIN — Medication 100 MILLIGRAM(S): at 17:37

## 2019-05-23 RX ADMIN — Medication 1 TABLET(S): at 17:37

## 2019-05-23 RX ADMIN — Medication 1 TABLET(S): at 11:47

## 2019-05-23 RX ADMIN — DESMOPRESSIN ACETATE 0.2 MILLIGRAM(S): 0.1 TABLET ORAL at 22:16

## 2019-05-23 RX ADMIN — Medication 0.5 MILLIGRAM(S): at 06:57

## 2019-05-23 RX ADMIN — Medication 1 PATCH: at 19:34

## 2019-05-23 RX ADMIN — Medication 650 MILLIGRAM(S): at 05:00

## 2019-05-23 RX ADMIN — SENNA PLUS 2 TABLET(S): 8.6 TABLET ORAL at 22:16

## 2019-05-23 RX ADMIN — ENOXAPARIN SODIUM 40 MILLIGRAM(S): 100 INJECTION SUBCUTANEOUS at 22:16

## 2019-05-23 NOTE — PROGRESS NOTE ADULT - SUBJECTIVE AND OBJECTIVE BOX
Patient is a 53y old  Male who presents with a chief complaint of Functional Deficits After Laminectomy (17 May 2019 11:30)      HPI:  53 year old man, resident of [facility], with h/o bipolar d/o, HTN, nocturnal enuresis, deformity of lower leg, hypothyroid, schizophrenia, BPH, IVDA presented to [facility] on 4/25 with inability to walk due to lower extremity weakness which had been worsening for the previous two weeks. Also complained of lower back pain since sustaining a fall 2 weeks prior. In additions the pt reported paresthesias in both hands, however stated that he was able to feel his feet. Stated that he believes that he is being "possessed" and "held down" by somebody who passed away. Of note, the pt states that someone is trying to "poison" and "finish him off" at [place] and that he is being followed. Motion degraded MRI Cervical and thoracic spine obtained on 4/26 after patient had +duarte's, clonus and babinski on exam, showed multilevel cervical stenosis and possible cord signal change, started on decadron. On 5/1 he had C3-C6 laminectomy and fusion.  MRI C Spine on 5/15 to check for worsening stenosis showed A dorsal paraspinal subcutaneous nonspecific fluid collection noted from the C1-C7 levels measuring 10.2 cm cephalocaudad, 3.8 cm AP, and 7.1 cm transverse. Differential considerations include an evolving postoperative seroma/hematoma or a CSF leak. Serial imaging follow-up over time is recommended to exclude possibility of early developing abscess. Reviewed by neurosurgery, no need for surgical intervention with no new symptoms. (16 May 2019 15:30)      PAST MEDICAL & SURGICAL HISTORY:  Bipolar disorder  Drug abuse  Incontinence  Schizophrenia  Hypothyroid  Hypertension  No significant past surgical history      MEDICATIONS  (STANDING):  ascorbic acid 500 milliGRAM(s) Oral daily  benztropine 0.5 milliGRAM(s) Oral two times a day  desmopressin 0.2 milliGRAM(s) Oral at bedtime  docusate sodium 100 milliGRAM(s) Oral two times a day  enoxaparin Injectable 40 milliGRAM(s) SubCutaneous every 24 hours  fluPHENAZine 5 milliGRAM(s) Oral two times a day  gabapentin 100 milliGRAM(s) Oral two times a day  levothyroxine 175 MICROGram(s) Oral daily  metoprolol succinate ER 50 milliGRAM(s) Oral daily  multivitamin 1 Tablet(s) Oral daily  nicotine - 21 mG/24Hr(s) Patch 1 patch Transdermal daily  pantoprazole    Tablet 40 milliGRAM(s) Oral before breakfast  saccharomyces boulardii 250 milliGRAM(s) Oral two times a day  senna 2 Tablet(s) Oral at bedtime  trimethoprim  160 mG/sulfamethoxazole 800 mG 1 Tablet(s) Oral two times a day  valproic  acid Syrup 1000 milliGRAM(s) Oral at bedtime  valproic  acid Syrup 500 milliGRAM(s) Oral <User Schedule>    MEDICATIONS  (PRN):  acetaminophen   Tablet .. 650 milliGRAM(s) Oral every 6 hours PRN Temp greater or equal to 38C (100.4F), Mild Pain (1 - 3)  ALBUTerol    90 MICROgram(s) HFA Inhaler 2 Puff(s) Inhalation every 6 hours PRN Shortness of Breath and/or Wheezing  aluminum hydroxide/magnesium hydroxide/simethicone Suspension 30 milliLiter(s) Oral every 4 hours PRN Indigestion  bisacodyl 5 milliGRAM(s) Oral every 12 hours PRN Constipation  diazepam    Tablet 5 milliGRAM(s) Oral every 6 hours PRN Spasm  nicotine  Polacrilex Gum 4 milliGRAM(s) Oral every 6 hours PRN nicotine craving  polyethylene glycol 3350 17 Gram(s) Oral daily PRN Constipation            Allergies    No Known Allergies    Intolerances    Haldol (Unknown)  Thorazine (Unknown)      TODAY'S SUBJECTIVE & REVIEW OF SYMPTOMS:  No acute events overnight reported by the night team resident or nursing. Patient seen and evaluated. Patient states that he slept well and feels better today.  Denies neck pain.  Persistent numbness and tingling both hands and feet.  Last BM 5/21.  Denies dysuria, urinary incontinence in last several days, SOB, dyspnea or cough    [X] Constitutional WNL       [X] HEENT   [X] Cardio WNL              [X] Resp WNL            [X] GI WNL  [X]  + nocturnal enuresis occasionally                            [X] MSK back pain controlled.   [X] Neuro WNL                     [X] Cognitive WNL   [X] Psych WNL  [X] Skin WNL      [X] Heme WNL              [X] Endo WNL     PHYSICAL EXAM  Vital Signs Last 24 Hrs  T(C): 36.4 (23 May 2019 08:20), Max: 36.4 (22 May 2019 22:21)  T(F): 97.5 (23 May 2019 08:20), Max: 97.5 (22 May 2019 22:21)  HR: 86 (23 May 2019 08:35) (62 - 86)  BP: 118/76 (23 May 2019 08:20) (118/76 - 135/86)  BP(mean): --  RR: 14 (23 May 2019 08:20) (14 - 14)  SpO2: 99% (23 May 2019 08:35) (95% - 99%)    General: NAD  Resp: CTAB  Cardio: +S1, S2  Abdomen: soft, NT, ND, normoactive bowel sounds  Extremities: no edema or calf tenderness. +Osgood-Schlatters noted bilaterally  Neuro: awake and alert     Motor: no increased tone     RUE: ShFl 5/5   EE 5/5    EF 5/5   WrEx 5/5   Interossei 3/5   ADM 4/5     LUE: ShFl 5/5   EE 5/5    EF 5/5   WrEx 5/5   Interossei 3/5   ADM 4/5     RLE: HF  3/5   KE 4/5    DF 5/5   PF 4/5   EHL 2/5   Evertors 1/5     LLE: HF  3/5   KE 4/5    DF 5/5   PF 4/5   EHL 1/5   Evertors 1/5    Sensory: decreased pinprick from feet up to distal 1/3 tibia bilaterally    Reflexes: Right: Triceps 2+, Biceps 1+, Brachioradialis 1+, +Hoffmans,   patellar 2+ Achilles 2+                  Left: Triceps 2+, Biceps 1+, Brachioradialis 1+,  + Hoffmans    Patellar 1+, Achilles 2+  Down-going plantars  Skin: abrasions bilateral below patella, posterior neck surgical site- sutures removed, clean, no drainage, no erythema or edema noted; CDI    Functional status:  Bed mobility: Max A x 2  Transfers: Mod A x 2  Gait: Unable  ADLs: UE dress Mod A, LE dress Mod A with AE      RECENT LABS:                        12.5   4.85  )-----------( 385      ( 20 May 2019 07:20 )             39.0     05-20    138  |  102  |  26<H>  ----------------------------<  83  4.0   |  30  |  0.76    Ca    9.5      20 May 2019 07:20    VPA 33 5/20

## 2019-05-23 NOTE — CONSULT NOTE ADULT - SUBJECTIVE AND OBJECTIVE BOX
Psychiatry Consultation-Liaison Follow-up Note:   54yo Single never  Male.   HPI:  53 year old man, resident of [facility], with h/o Schizoaffective disorder Bipolar type HTN, nocturnal enuresis, deformity of lower leg, hypothyroid, BPH, IVDA presented to [facility] on 4/25 with inability to walk due to lower extremity weakness which had been worsening for the previous two weeks. Also complained of lower back pain since sustaining a fall 2 weeks prior. In additions the pt reported paresthesias in both hands, however stated that he was able to feel his feet. Stated that he believes that he is being "possessed" and "held down" by somebody who passed away. Of note, the pt states that someone is trying to "poison" and "finish him off" at [place] and that he is being followed. Motion degraded MRI Cervical and thoracic spine obtained on 4/26 after patient had +duarte's, clonus and babinski on exam, showed multilevel cervical stenosis and possible cord signal change, started on decadron. On 5/1 he had C3-C6 laminectomy and fusion.  MRI C Spine on 5/15 to check for worsening stenosis showed A dorsal paraspinal subcutaneous nonspecific fluid collection noted from the C1-C7 levels measuring 10.2 cm cephalocaudad, 3.8 cm AP, and 7.1 cm transverse. Differential considerations include an evolving postoperative seroma/hematoma or a CSF leak. Serial imaging follow-up over time is recommended to exclude possibility of early developing abscess. Reviewed by neurosurgery, no need for surgical intervention with no new symptoms. (16 May 2019 15:30)  Encounter: Rehab  Chart reviewed. 	  Contacted: Out patient psychiatrist / other treatment providers in community  Case Discussed with: Nursing staff/Treatment Team/Attending of Record.     Interval History:  Pt had been at one point on Haldol decanoate 100 mg monthly and Seroquel 750 mg as well as Depakote.   Pt was hospitalized either at [facility] or [place] and medications changed.  Pt is on oral Prolixin with the plan being to give 50 mg IM q 2 weeks and then taper oral medications.   Pt was supposed to get an injection on May 8th, unclear if that occurred, and so pt was given Decanoate of Prolixin 25mg ( which is on formulary here) on May 21st, next dose   due June 4th.     Symptom progression/resolution:   Pt offers no new psychiatric complaints, and had improvement in ADL's with buzz cut and shaved beard.   He is tolerating therapies but may need longer term rehab services.     MEDICATIONS  (STANDING):  ascorbic acid 500 milliGRAM(s) Oral daily  benztropine 0.5 milliGRAM(s) Oral two times a day  desmopressin 0.2 milliGRAM(s) Oral at bedtime  docusate sodium 100 milliGRAM(s) Oral two times a day  enoxaparin Injectable 40 milliGRAM(s) SubCutaneous every 24 hours  fluPHENAZine 5 milliGRAM(s) Oral two times a day  gabapentin 100 milliGRAM(s) Oral two times a day  levothyroxine 175 MICROGram(s) Oral daily  metoprolol succinate ER 50 milliGRAM(s) Oral daily  multivitamin 1 Tablet(s) Oral daily  nicotine - 21 mG/24Hr(s) Patch 1 patch Transdermal daily  pantoprazole    Tablet 40 milliGRAM(s) Oral before breakfast  pyridoxine 50 milliGRAM(s) Oral daily  saccharomyces boulardii 250 milliGRAM(s) Oral two times a day  senna 2 Tablet(s) Oral at bedtime  trimethoprim  160 mG/sulfamethoxazole 800 mG 1 Tablet(s) Oral two times a day  valproic  acid Syrup 1000 milliGRAM(s) Oral at bedtime  valproic  acid Syrup 500 milliGRAM(s) Oral <User Schedule>    MEDICATIONS  (PRN):  acetaminophen   Tablet .. 650 milliGRAM(s) Oral every 6 hours PRN Temp greater or equal to 38C (100.4F), Mild Pain (1 - 3)  ALBUTerol    90 MICROgram(s) HFA Inhaler 2 Puff(s) Inhalation every 6 hours PRN Shortness of Breath and/or Wheezing  aluminum hydroxide/magnesium hydroxide/simethicone Suspension 30 milliLiter(s) Oral every 4 hours PRN Indigestion  bisacodyl 5 milliGRAM(s) Oral every 12 hours PRN Constipation  diazepam    Tablet 5 milliGRAM(s) Oral every 6 hours PRN Spasm  nicotine  Polacrilex Gum 4 milliGRAM(s) Oral every 6 hours PRN nicotine craving  polyethylene glycol 3350 17 Gram(s) Oral daily PRN Constipation    Current Mental Status Exam:  Appearance:- well groomed, with new hair cut and shaved beard.   Attitude: - cooperative.   Gait/Station: -findings as  per physiatry or PT notes.  Motor Activity: - calm, but has deficits with motor control, hunched over posture.   Affect: - appropriate, but mildly expansive.   Mood: -" good".  Speech: -loud, but not pressured.   Thought process: -intact, some poverty of content. Will answer in short sentences.   Thought content/perceptions: generally goal directed, somewhat concrete.   Hallucinations: not present.  Delusions:  persecutory, and somatic, these may be chronic and his baseline.   Suicidal ideation: denied  Homicidal ideation:   denied  Sensorium: alert  Orientation: X3  Attention: impaired, but was spoken to during lunchtime.   Concentration: grossly intact.   Memory: Fair.   Insight/Judgment: Adequate for basic needs, has not been a management problem.     Studies:    Laboratory testing-  Repeat VPA prior to transfer, at baseline pt may have been on 1500 mg total dose.     Imaging-defer to neuro and physiatry.     Examinations-Pt substance use is in sustained remission and there is no psychiatric contraindication  for transfer to [facility], would need one however with a psychiatric consultant so that psych meds can   be adequately monitored.     Vital Signs Last 24 Hrs  T(C): 36.4 (23 May 2019 08:20), Max: 36.4 (22 May 2019 22:21)  T(F): 97.5 (23 May 2019 08:20), Max: 97.5 (22 May 2019 22:21)  HR: 86 (23 May 2019 08:35) (62 - 86)  BP: 118/76 (23 May 2019 08:20) (118/76 - 135/86)  BP(mean): --  RR: 14 (23 May 2019 08:20) (14 - 14)  SpO2: 99% (23 May 2019 08:35) (95% - 99%)    Psychiatric Diagnosis: Schizoaffective disorder- Bipolar type.     Recommendations:  See ablove    Medication:  Changes/blood levels if applicable:  VPA level next week.     Other Treatment considerations-  Level of supervision:  routine  Guidance for team/ family management-per case coordinator.     Guidance for patient management- can decrease Prolixin to q 3 weeks if given 50 mg IM and if tolerated. Would then taper and d/c oral Prolixin.     Referrals- [facility].    Hospital course Follow-up:   (will follow as needed. )-

## 2019-05-24 PROCEDURE — 99232 SBSQ HOSP IP/OBS MODERATE 35: CPT

## 2019-05-24 RX ADMIN — GABAPENTIN 100 MILLIGRAM(S): 400 CAPSULE ORAL at 17:34

## 2019-05-24 RX ADMIN — Medication 650 MILLIGRAM(S): at 02:06

## 2019-05-24 RX ADMIN — Medication 1 PATCH: at 06:14

## 2019-05-24 RX ADMIN — FLUPHENAZINE HYDROCHLORIDE 5 MILLIGRAM(S): 1 TABLET, FILM COATED ORAL at 17:34

## 2019-05-24 RX ADMIN — Medication 1 PATCH: at 12:00

## 2019-05-24 RX ADMIN — Medication 500 MILLIGRAM(S): at 08:06

## 2019-05-24 RX ADMIN — Medication 250 MILLIGRAM(S): at 06:14

## 2019-05-24 RX ADMIN — GABAPENTIN 100 MILLIGRAM(S): 400 CAPSULE ORAL at 06:13

## 2019-05-24 RX ADMIN — Medication 5 MILLIGRAM(S): at 11:42

## 2019-05-24 RX ADMIN — Medication 0.5 MILLIGRAM(S): at 06:13

## 2019-05-24 RX ADMIN — ENOXAPARIN SODIUM 40 MILLIGRAM(S): 100 INJECTION SUBCUTANEOUS at 21:55

## 2019-05-24 RX ADMIN — Medication 50 MILLIGRAM(S): at 06:14

## 2019-05-24 RX ADMIN — Medication 1 PATCH: at 18:36

## 2019-05-24 RX ADMIN — Medication 100 MILLIGRAM(S): at 06:14

## 2019-05-24 RX ADMIN — Medication 1 TABLET(S): at 17:34

## 2019-05-24 RX ADMIN — Medication 250 MILLIGRAM(S): at 17:34

## 2019-05-24 RX ADMIN — Medication 500 MILLIGRAM(S): at 11:42

## 2019-05-24 RX ADMIN — Medication 0.5 MILLIGRAM(S): at 17:34

## 2019-05-24 RX ADMIN — Medication 175 MICROGRAM(S): at 06:14

## 2019-05-24 RX ADMIN — Medication 50 MILLIGRAM(S): at 11:42

## 2019-05-24 RX ADMIN — SENNA PLUS 2 TABLET(S): 8.6 TABLET ORAL at 21:55

## 2019-05-24 RX ADMIN — Medication 1000 MILLIGRAM(S): at 21:55

## 2019-05-24 RX ADMIN — Medication 100 MILLIGRAM(S): at 17:34

## 2019-05-24 RX ADMIN — Medication 1 TABLET(S): at 11:42

## 2019-05-24 RX ADMIN — Medication 650 MILLIGRAM(S): at 03:00

## 2019-05-24 RX ADMIN — Medication 1 TABLET(S): at 06:14

## 2019-05-24 RX ADMIN — PANTOPRAZOLE SODIUM 40 MILLIGRAM(S): 20 TABLET, DELAYED RELEASE ORAL at 06:14

## 2019-05-24 RX ADMIN — Medication 1 PATCH: at 11:42

## 2019-05-24 RX ADMIN — DESMOPRESSIN ACETATE 0.2 MILLIGRAM(S): 0.1 TABLET ORAL at 21:55

## 2019-05-24 RX ADMIN — Medication 5 MILLIGRAM(S): at 21:56

## 2019-05-24 RX ADMIN — FLUPHENAZINE HYDROCHLORIDE 5 MILLIGRAM(S): 1 TABLET, FILM COATED ORAL at 06:13

## 2019-05-24 NOTE — PROGRESS NOTE ADULT - ASSESSMENT
IMPRESSION AND PLAN:  53 year-old man with a PMH of bipolar disorder/schizophrenia, lower leg deformity  with Gait Instability, ADL impairments and Functional impairments after cervical stenosis s/p cervical fusion and laminectomy    #Cervical Stenosis- s/p C3-C6 laminectomy and fusion with Gait Instability, ADL impairments, and Functional impairments- follow up MRI with improved stenosis and epidural fluid collection  -Fluid collection on MRI Deemed stable by NSGY- monitor  -completed Decadron taper  -Continue Comprehensive Rehab Program of PT/OT   -Spinal, fall precautions  -Dcd baclofen for spasms as none experienced, keep diazepam- also for anxiety  -Bactrim for wound ppx total 14 days (5/15-5/28)    #Bipolar Disorder/Schizophrenia  -c/w Cogentin, Prolixin, Valproic Acid, Neurontin  - Neuropsych eval   - Appreciate psychiatry consult 5/17-most likely is diagnosed with Schizoaffective d/o Bipolar type.  Vpa level 33; d/w Dr Villasenor. c/w Increased VPA to 1000 qhs from 750 and cont 500 qam    #Hypothyroidism  -c/w synthroid    #HTN  -c/w metoprolol    #Nocturnal Enuresis  -c/w Desmopressin    #Hygeine  -Oral Care  -Podiatry Consult 5/20 appreciated    #Neuropathy:  Decreased sensation, B/L Eversion weakness, EHL-   -Orthotist Eval     Pain Mgmt - Tylenol PRN, DC oxycodone   Bowel Regimen - c/w colace, senna; Dulcolax suppository and Miralax prn.  Enema prn  / Bladder - occasional incontinence- bladder scan q12h, PVR, SC > 400ml  Latest PVRs 270, 244-DCd scans  DVT ppx: lovenox IMPRESSION AND PLAN:  53 year-old man with a PMH of bipolar disorder/schizophrenia, lower leg deformity  with Gait Instability, ADL impairments and Functional impairments after cervical stenosis s/p cervical fusion and laminectomy    #Cervical Stenosis- s/p C3-C6 laminectomy and fusion with Gait Instability, ADL impairments, and Functional impairments- follow up MRI with improved stenosis and epidural fluid collection  -Fluid collection on MRI Deemed stable by NSGY- monitor  -completed Decadron taper  -Continue Comprehensive Rehab Program of PT/OT   -Spinal, fall precautions  -Dcd baclofen for spasms as none experienced, keep diazepam- also for anxiety  -Bactrim for wound ppx total 14 days (5/15-5/28)    #Bipolar Disorder/Schizophrenia  -c/w Cogentin, Prolixin, Valproic Acid, Neurontin  - Neuropsych eval   - Appreciate psychiatry consult 5/17-most likely is diagnosed with Schizoaffective d/o Bipolar type.  Vpa level 33; d/w Dr Villasenor. c/w Increased VPA to 1000 qhs from 750 and cont 500 qam  Appreciate psychiatry f/u.  F/u VPA next week.  Can decrease Prolixin to q 3 weeks if given 50 mg IM and if tolerated. Would then taper and d/c oral Prolixin.     #Hypothyroidism  -c/w synthroid    #HTN  -c/w metoprolol    #Nocturnal Enuresis  -c/w Desmopressin    #Hygeine  -Oral Care  -Podiatry Consult 5/20 appreciated    #Neuropathy:  Decreased sensation, B/L Eversion weakness, EHL-   -Orthotist Eval     Pain Mgmt - Tylenol PRN, DC oxycodone   Bowel Regimen - c/w colace, senna; Dulcolax suppository and Miralax prn.  Enema prn  / Bladder - occasional incontinence- bladder scan q12h, PVR, SC > 400ml  Latest PVRs 270, 244-DCd scans  DVT ppx: lovenox

## 2019-05-24 NOTE — PROGRESS NOTE ADULT - SUBJECTIVE AND OBJECTIVE BOX
Patient is a 53y old  Male who presents with a chief complaint of Functional Deficits After Laminectomy (17 May 2019 11:30)      HPI:  53 year old man, resident of [facility], with h/o bipolar d/o, HTN, nocturnal enuresis, deformity of lower leg, hypothyroid, schizophrenia, BPH, IVDA presented to [facility] on 4/25 with inability to walk due to lower extremity weakness which had been worsening for the previous two weeks. Also complained of lower back pain since sustaining a fall 2 weeks prior. In additions the pt reported paresthesias in both hands, however stated that he was able to feel his feet. Stated that he believes that he is being "possessed" and "held down" by somebody who passed away. Of note, the pt states that someone is trying to "poison" and "finish him off" at [place] and that he is being followed. Motion degraded MRI Cervical and thoracic spine obtained on 4/26 after patient had +duarte's, clonus and babinski on exam, showed multilevel cervical stenosis and possible cord signal change, started on decadron. On 5/1 he had C3-C6 laminectomy and fusion.  MRI C Spine on 5/15 to check for worsening stenosis showed A dorsal paraspinal subcutaneous nonspecific fluid collection noted from the C1-C7 levels measuring 10.2 cm cephalocaudad, 3.8 cm AP, and 7.1 cm transverse. Differential considerations include an evolving postoperative seroma/hematoma or a CSF leak. Serial imaging follow-up over time is recommended to exclude possibility of early developing abscess. Reviewed by neurosurgery, no need for surgical intervention with no new symptoms. (16 May 2019 15:30)      PAST MEDICAL & SURGICAL HISTORY:  Bipolar disorder  Drug abuse  Incontinence  Schizophrenia  Hypothyroid  Hypertension  No significant past surgical history    MEDICATIONS  (STANDING):  ascorbic acid 500 milliGRAM(s) Oral daily  benztropine 0.5 milliGRAM(s) Oral two times a day  desmopressin 0.2 milliGRAM(s) Oral at bedtime  docusate sodium 100 milliGRAM(s) Oral two times a day  enoxaparin Injectable 40 milliGRAM(s) SubCutaneous every 24 hours  fluPHENAZine 5 milliGRAM(s) Oral two times a day  gabapentin 100 milliGRAM(s) Oral two times a day  levothyroxine 175 MICROGram(s) Oral daily  metoprolol succinate ER 50 milliGRAM(s) Oral daily  multivitamin 1 Tablet(s) Oral daily  nicotine - 21 mG/24Hr(s) Patch 1 patch Transdermal daily  pantoprazole    Tablet 40 milliGRAM(s) Oral before breakfast  pyridoxine 50 milliGRAM(s) Oral daily  saccharomyces boulardii 250 milliGRAM(s) Oral two times a day  senna 2 Tablet(s) Oral at bedtime  trimethoprim  160 mG/sulfamethoxazole 800 mG 1 Tablet(s) Oral two times a day  valproic  acid Syrup 1000 milliGRAM(s) Oral at bedtime  valproic  acid Syrup 500 milliGRAM(s) Oral <User Schedule>    MEDICATIONS  (PRN):  acetaminophen   Tablet .. 650 milliGRAM(s) Oral every 6 hours PRN Temp greater or equal to 38C (100.4F), Mild Pain (1 - 3)  ALBUTerol    90 MICROgram(s) HFA Inhaler 2 Puff(s) Inhalation every 6 hours PRN Shortness of Breath and/or Wheezing  aluminum hydroxide/magnesium hydroxide/simethicone Suspension 30 milliLiter(s) Oral every 4 hours PRN Indigestion  bisacodyl 5 milliGRAM(s) Oral every 12 hours PRN Constipation  diazepam    Tablet 5 milliGRAM(s) Oral every 6 hours PRN Spasm  nicotine  Polacrilex Gum 4 milliGRAM(s) Oral every 6 hours PRN nicotine craving  polyethylene glycol 3350 17 Gram(s) Oral daily PRN Constipation      Allergies    No Known Allergies    Intolerances    Haldol (Unknown)  Thorazine (Unknown)      TODAY'S SUBJECTIVE & REVIEW OF SYMPTOMS:  No acute events overnight reported by the night team resident or nursing. Patient seen and evaluated. Patient states that he slept well.  Denies neck pain.  Persistent numbness and tingling both hands and feet.  Last BM today.  Denies dysuria, urinary incontinence in last several days, SOB, dyspnea or cough    [X] Constitutional WNL       [X] HEENT   [X] Cardio WNL              [X] Resp WNL            [X] GI WNL  [X]  + nocturnal enuresis occasionally                            [X] MSK back pain controlled.   [X] Neuro WNL                     [X] Cognitive WNL   [X] Psych WNL  [X] Skin WNL      [X] Heme WNL              [X] Endo WNL     PHYSICAL EXAM  Vital Signs Last 24 Hrs  T(C): 36.4 (23 May 2019 08:20), Max: 36.4 (22 May 2019 22:21)  T(F): 97.5 (23 May 2019 08:20), Max: 97.5 (22 May 2019 22:21)  HR: 86 (23 May 2019 08:35) (62 - 86)  BP: 118/76 (23 May 2019 08:20) (118/76 - 135/86)  BP(mean): --  RR: 14 (23 May 2019 08:20) (14 - 14)  SpO2: 99% (23 May 2019 08:35) (95% - 99%)    General: NAD  Resp: CTAB  Cardio: +S1, S2  Abdomen: soft, NT, ND, normoactive bowel sounds  Extremities: no edema or calf tenderness. +Osgood-Schlatters noted bilaterally  Neuro: awake and alert     Motor: no increased tone     RUE: ShFl 5/5   EE 5/5    EF 5/5   WrEx 5/5   Interossei 3/5   ADM 4/5     LUE: ShFl 5/5   EE 5/5    EF 5/5   WrEx 5/5   Interossei 3/5   ADM 4/5     RLE: HF  3/5   KE 4/5    DF 5/5   PF 4/5   EHL 2/5   Evertors 1/5     LLE: HF  3/5   KE 4/5    DF 5/5   PF 4/5   EHL 1/5   Evertors 1/5    Sensory: decreased pinprick from feet up to distal 1/3 tibia bilaterally    Reflexes: Right: Triceps 2+, Biceps 1+, Brachioradialis 1+, +Hoffmans,   patellar 2+ Achilles 2+                  Left: Triceps 2+, Biceps 1+, Brachioradialis 1+,  + Hoffmans    Patellar 1+, Achilles 2+  Down-going plantars  Skin: abrasions bilateral below patella, posterior neck surgical site- sutures removed, clean, no drainage, no erythema or edema noted; CDI    Functional status:  Bed mobility: Max A x 2  Transfers: Mod A x 2  Gait: Unable  ADLs: UE dress Mod A, LE dress Mod A with AE      RECENT LABS:                        12.5   4.85  )-----------( 385      ( 20 May 2019 07:20 )             39.0     05-20    138  |  102  |  26<H>  ----------------------------<  83  4.0   |  30  |  0.76    Ca    9.5      20 May 2019 07:20    VPA 33 5/20 Patient is a 53y old  Male who presents with a chief complaint of Functional Deficits After Laminectomy (17 May 2019 11:30)      HPI:  53 year old man, resident of [facility], with h/o bipolar d/o, HTN, nocturnal enuresis, deformity of lower leg, hypothyroid, schizophrenia, BPH, IVDA presented to [facility] on 4/25 with inability to walk due to lower extremity weakness which had been worsening for the previous two weeks. Also complained of lower back pain since sustaining a fall 2 weeks prior. In additions the pt reported paresthesias in both hands, however stated that he was able to feel his feet. Stated that he believes that he is being "possessed" and "held down" by somebody who passed away. Of note, the pt states that someone is trying to "poison" and "finish him off" at [place] and that he is being followed. Motion degraded MRI Cervical and thoracic spine obtained on 4/26 after patient had +duarte's, clonus and babinski on exam, showed multilevel cervical stenosis and possible cord signal change, started on decadron. On 5/1 he had C3-C6 laminectomy and fusion.  MRI C Spine on 5/15 to check for worsening stenosis showed A dorsal paraspinal subcutaneous nonspecific fluid collection noted from the C1-C7 levels measuring 10.2 cm cephalocaudad, 3.8 cm AP, and 7.1 cm transverse. Differential considerations include an evolving postoperative seroma/hematoma or a CSF leak. Serial imaging follow-up over time is recommended to exclude possibility of early developing abscess. Reviewed by neurosurgery, no need for surgical intervention with no new symptoms. (16 May 2019 15:30)      PAST MEDICAL & SURGICAL HISTORY:  Bipolar disorder  Drug abuse  Incontinence  Schizophrenia  Hypothyroid  Hypertension  No significant past surgical history    MEDICATIONS  (STANDING):  ascorbic acid 500 milliGRAM(s) Oral daily  benztropine 0.5 milliGRAM(s) Oral two times a day  desmopressin 0.2 milliGRAM(s) Oral at bedtime  docusate sodium 100 milliGRAM(s) Oral two times a day  enoxaparin Injectable 40 milliGRAM(s) SubCutaneous every 24 hours  fluPHENAZine 5 milliGRAM(s) Oral two times a day  gabapentin 100 milliGRAM(s) Oral two times a day  levothyroxine 175 MICROGram(s) Oral daily  metoprolol succinate ER 50 milliGRAM(s) Oral daily  multivitamin 1 Tablet(s) Oral daily  nicotine - 21 mG/24Hr(s) Patch 1 patch Transdermal daily  pantoprazole    Tablet 40 milliGRAM(s) Oral before breakfast  pyridoxine 50 milliGRAM(s) Oral daily  saccharomyces boulardii 250 milliGRAM(s) Oral two times a day  senna 2 Tablet(s) Oral at bedtime  trimethoprim  160 mG/sulfamethoxazole 800 mG 1 Tablet(s) Oral two times a day  valproic  acid Syrup 1000 milliGRAM(s) Oral at bedtime  valproic  acid Syrup 500 milliGRAM(s) Oral <User Schedule>    MEDICATIONS  (PRN):  acetaminophen   Tablet .. 650 milliGRAM(s) Oral every 6 hours PRN Temp greater or equal to 38C (100.4F), Mild Pain (1 - 3)  ALBUTerol    90 MICROgram(s) HFA Inhaler 2 Puff(s) Inhalation every 6 hours PRN Shortness of Breath and/or Wheezing  aluminum hydroxide/magnesium hydroxide/simethicone Suspension 30 milliLiter(s) Oral every 4 hours PRN Indigestion  bisacodyl 5 milliGRAM(s) Oral every 12 hours PRN Constipation  diazepam    Tablet 5 milliGRAM(s) Oral every 6 hours PRN Spasm  nicotine  Polacrilex Gum 4 milliGRAM(s) Oral every 6 hours PRN nicotine craving  polyethylene glycol 3350 17 Gram(s) Oral daily PRN Constipation      Allergies    No Known Allergies    Intolerances    Haldol (Unknown)  Thorazine (Unknown)      TODAY'S SUBJECTIVE & REVIEW OF SYMPTOMS:  No acute events overnight reported by the night team resident or nursing. Patient seen and evaluated. Patient states that he slept well.  Denies neck pain.  Persistent numbness and tingling both hands and feet.  Last BM today.  Denies dysuria, urinary incontinence in last several days, SOB, dyspnea or cough    [X] Constitutional WNL       [X] HEENT   [X] Cardio WNL              [X] Resp WNL            [X] GI WNL  [X]  + nocturnal enuresis occasionally                            [X] MSK back pain controlled.   [X] Neuro numbness and tingling both hands and feet                     [X] Cognitive  [X] Psych Bipolar/schizoaffective  [X] Skin     [X] Heme WNL              [X] Endo WNL     PHYSICAL EXAM  Vital Signs Last 24 Hrs  T(C): 36.7 (23 May 2019 22:00), Max: 36.7 (23 May 2019 22:00)  T(F): 98 (23 May 2019 22:00), Max: 98 (23 May 2019 22:00)  HR: 67 (24 May 2019 06:21) (67 - 70)  BP: 125/76 (24 May 2019 06:21) (125/76 - 130/62)  BP(mean): --  RR: 14 (24 May 2019 06:21) (14 - 14)  SpO2: 95% (24 May 2019 06:21) (95% - 96%)    General: NAD  Resp: CTAB  Cardio: +S1, S2  Abdomen: soft, NT, ND, normoactive bowel sounds  Extremities: no edema or calf tenderness. +Osgood-Schlatters noted bilaterally  Neuro: awake and alert     Motor: no increased tone     RUE: ShFl 5/5   EE 5/5    EF 5/5   WrEx 5/5   Interossei 3/5   ADM 4/5     LUE: ShFl 5/5   EE 5/5    EF 5/5   WrEx 5/5   Interossei 3/5   ADM 4/5     RLE: HF  3/5   KE 4/5    DF 5/5   PF 4/5   EHL 2/5   Evertors 1/5     LLE: HF  3/5   KE 4/5    DF 5/5   PF 4/5   EHL 1/5   Evertors 1/5    Sensory: decreased pinprick from feet up to distal 1/3 tibia bilaterally    Reflexes: Right: Triceps 2+, Biceps 1+, Brachioradialis 1+, +Hoffmans,   patellar 2+ Achilles 2+                  Left: Triceps 2+, Biceps 1+, Brachioradialis 1+,  + Hoffmans    Patellar 1+, Achilles 2+  Down-going plantars  Skin: abrasions bilateral below patella, posterior neck surgical site- sutures removed, clean, no drainage, no erythema or edema noted; CDI    Functional status:  Bed mobility: Max A   Transfers: Mod A x 2  Gait: Unable  ADLs: UE dress Min A, LE dress Max A with AE      RECENT LABS:                        12.5   4.85  )-----------( 385      ( 20 May 2019 07:20 )             39.0     05-20    138  |  102  |  26<H>  ----------------------------<  83  4.0   |  30  |  0.76    Ca    9.5      20 May 2019 07:20    VPA 33 5/20

## 2019-05-25 PROCEDURE — 99232 SBSQ HOSP IP/OBS MODERATE 35: CPT

## 2019-05-25 RX ADMIN — Medication 5 MILLIGRAM(S): at 21:58

## 2019-05-25 RX ADMIN — GABAPENTIN 100 MILLIGRAM(S): 400 CAPSULE ORAL at 17:43

## 2019-05-25 RX ADMIN — Medication 0.5 MILLIGRAM(S): at 17:43

## 2019-05-25 RX ADMIN — Medication 1 TABLET(S): at 06:26

## 2019-05-25 RX ADMIN — Medication 650 MILLIGRAM(S): at 09:54

## 2019-05-25 RX ADMIN — Medication 650 MILLIGRAM(S): at 10:15

## 2019-05-25 RX ADMIN — Medication 50 MILLIGRAM(S): at 12:22

## 2019-05-25 RX ADMIN — Medication 1 PATCH: at 12:15

## 2019-05-25 RX ADMIN — Medication 1 PATCH: at 22:01

## 2019-05-25 RX ADMIN — PANTOPRAZOLE SODIUM 40 MILLIGRAM(S): 20 TABLET, DELAYED RELEASE ORAL at 06:26

## 2019-05-25 RX ADMIN — Medication 1 TABLET(S): at 17:43

## 2019-05-25 RX ADMIN — Medication 1000 MILLIGRAM(S): at 21:57

## 2019-05-25 RX ADMIN — FLUPHENAZINE HYDROCHLORIDE 5 MILLIGRAM(S): 1 TABLET, FILM COATED ORAL at 06:26

## 2019-05-25 RX ADMIN — Medication 1 PATCH: at 07:00

## 2019-05-25 RX ADMIN — Medication 0.5 MILLIGRAM(S): at 06:26

## 2019-05-25 RX ADMIN — Medication 1 PATCH: at 12:21

## 2019-05-25 RX ADMIN — Medication 250 MILLIGRAM(S): at 17:46

## 2019-05-25 RX ADMIN — Medication 175 MICROGRAM(S): at 05:41

## 2019-05-25 RX ADMIN — Medication 250 MILLIGRAM(S): at 06:26

## 2019-05-25 RX ADMIN — GABAPENTIN 100 MILLIGRAM(S): 400 CAPSULE ORAL at 06:26

## 2019-05-25 RX ADMIN — DESMOPRESSIN ACETATE 0.2 MILLIGRAM(S): 0.1 TABLET ORAL at 21:57

## 2019-05-25 RX ADMIN — Medication 5 MILLIGRAM(S): at 10:38

## 2019-05-25 RX ADMIN — FLUPHENAZINE HYDROCHLORIDE 5 MILLIGRAM(S): 1 TABLET, FILM COATED ORAL at 17:43

## 2019-05-25 RX ADMIN — ENOXAPARIN SODIUM 40 MILLIGRAM(S): 100 INJECTION SUBCUTANEOUS at 21:58

## 2019-05-25 RX ADMIN — Medication 100 MILLIGRAM(S): at 06:26

## 2019-05-25 RX ADMIN — Medication 1 TABLET(S): at 12:22

## 2019-05-25 RX ADMIN — Medication 500 MILLIGRAM(S): at 08:19

## 2019-05-25 RX ADMIN — Medication 100 MILLIGRAM(S): at 17:46

## 2019-05-25 RX ADMIN — Medication 500 MILLIGRAM(S): at 12:22

## 2019-05-25 RX ADMIN — Medication 50 MILLIGRAM(S): at 06:26

## 2019-05-25 NOTE — PROGRESS NOTE ADULT - SUBJECTIVE AND OBJECTIVE BOX
Patient is a 53y old  Male who presents with a chief complaint of Functional Deficits After Laminectomy (24 May 2019 09:54)      HPI:  53 year old man, resident of [facility], with h/o bipolar d/o, HTN, nocturnal enuresis, deformity of lower leg, hypothyroid, schizophrenia, BPH, IVDA presented to [facility] on 4/25 with inability to walk due to lower extremity weakness which had been worsening for the previous two weeks. Also complained of lower back pain since sustaining a fall 2 weeks prior. In additions the pt reported paresthesias in both hands, however stated that he was able to feel his feet. Stated that he believes that he is being "possessed" and "held down" by somebody who passed away. Of note, the pt states that someone is trying to "poison" and "finish him off" at [place] and that he is being followed. Motion degraded MRI Cervical and thoracic spine obtained on 4/26 after patient had +duarte's, clonus and babinski on exam, showed multilevel cervical stenosis and possible cord signal change, started on decadron. On 5/1 he had C3-C6 laminectomy and fusion.  MRI C Spine on 5/15 to check for worsening stenosis showed A dorsal paraspinal subcutaneous nonspecific fluid collection noted from the C1-C7 levels measuring 10.2 cm cephalocaudad, 3.8 cm AP, and 7.1 cm transverse. Differential considerations include an evolving postoperative seroma/hematoma or a CSF leak. Serial imaging follow-up over time is recommended to exclude possibility of early developing abscess. Reviewed by neurosurgery, no need for surgical intervention with no new symptoms. (16 May 2019 15:30)      PAST MEDICAL & SURGICAL HISTORY:  Bipolar disorder  Drug abuse  Incontinence  Schizophrenia  Hypothyroid  Hypertension  No significant past surgical history      MEDICATIONS  (STANDING):  ascorbic acid 500 milliGRAM(s) Oral daily  benztropine 0.5 milliGRAM(s) Oral two times a day  desmopressin 0.2 milliGRAM(s) Oral at bedtime  docusate sodium 100 milliGRAM(s) Oral two times a day  enoxaparin Injectable 40 milliGRAM(s) SubCutaneous every 24 hours  fluPHENAZine 5 milliGRAM(s) Oral two times a day  gabapentin 100 milliGRAM(s) Oral two times a day  levothyroxine 175 MICROGram(s) Oral daily  metoprolol succinate ER 50 milliGRAM(s) Oral daily  multivitamin 1 Tablet(s) Oral daily  nicotine - 21 mG/24Hr(s) Patch 1 patch Transdermal daily  pantoprazole    Tablet 40 milliGRAM(s) Oral before breakfast  pyridoxine 50 milliGRAM(s) Oral daily  saccharomyces boulardii 250 milliGRAM(s) Oral two times a day  senna 2 Tablet(s) Oral at bedtime  trimethoprim  160 mG/sulfamethoxazole 800 mG 1 Tablet(s) Oral two times a day  valproic  acid Syrup 1000 milliGRAM(s) Oral at bedtime  valproic  acid Syrup 500 milliGRAM(s) Oral <User Schedule>    MEDICATIONS  (PRN):  acetaminophen   Tablet .. 650 milliGRAM(s) Oral every 6 hours PRN Temp greater or equal to 38C (100.4F), Mild Pain (1 - 3)  ALBUTerol    90 MICROgram(s) HFA Inhaler 2 Puff(s) Inhalation every 6 hours PRN Shortness of Breath and/or Wheezing  aluminum hydroxide/magnesium hydroxide/simethicone Suspension 30 milliLiter(s) Oral every 4 hours PRN Indigestion  bisacodyl 5 milliGRAM(s) Oral every 12 hours PRN Constipation  diazepam    Tablet 5 milliGRAM(s) Oral every 6 hours PRN Spasm  nicotine  Polacrilex Gum 4 milliGRAM(s) Oral every 6 hours PRN nicotine craving  polyethylene glycol 3350 17 Gram(s) Oral daily PRN Constipation      Allergies    No Known Allergies    Intolerances    Haldol (Unknown)  Thorazine (Unknown)        VITALS  53y  Vital Signs Last 24 Hrs  T(C): 36.3 (25 May 2019 08:30), Max: 36.7 (24 May 2019 22:01)  T(F): 97.3 (25 May 2019 08:30), Max: 98 (24 May 2019 22:01)  HR: 63 (25 May 2019 08:30) (59 - 66)  BP: 112/75 (25 May 2019 08:30) (110/72 - 118/74)  BP(mean): --  RR: 15 (25 May 2019 08:30) (14 - 15)  SpO2: 96% (25 May 2019 08:30) (96% - 97%)  Daily     Daily         RECENT LABS:                      CAPILLARY BLOOD GLUCOSE

## 2019-05-25 NOTE — PROGRESS NOTE ADULT - ASSESSMENT
IMPRESSION AND PLAN:  53 year-old man with a PMH of bipolar disorder/schizophrenia, lower leg deformity  with Gait Instability, ADL impairments and Functional impairments after cervical stenosis s/p cervical fusion and laminectomy    #Cervical Stenosis- s/p C3-C6 laminectomy and fusion with Gait Instability, ADL impairments, and Functional impairments- follow up MRI with improved stenosis and epidural fluid collection. -Fluid collection on MRI Deemed stable by NSGY- monitor  -Continue Comprehensive Rehab Program of PT/OT   -Bactrim for wound ppx total 14 days (5/15-5/28)    #Bipolar Disorder/Schizophrenia  -c/w Cogentin, Prolixin, Valproic Acid, Neurontin  - Neuropsych eval   - Appreciate psychiatry consult 5/17-most likely is diagnosed with Schizoaffective d/o Bipolar type.   -continue VPA 1000 qhs from 750 and cont 500 qam  Appreciate psychiatry f/u.: can decrease Prolixin to q 3 weeks if given 50 mg IM and if tolerated. Would then taper and d/c oral Prolixin.     #Hypothyroidism  -c/w synthroid    #HTN  -c/w metoprolol    #Nocturnal Enuresis  -c/w Desmopressin    #Hygeine  -Oral Care  -Podiatry Consult 5/20 appreciated    #Neuropathy:  Decreased sensation, B/L Eversion weakness, EHL-   -Orthotist Eval     Pain Mgmt - Tylenol PRN, DC oxycodone   Bowel Regimen - c/w colace, senna; Dulcolax suppository and Miralax prn.  Enema prn  / Bladder - occasional incontinence- bladder scan q12h, PVR, SC > 400ml  Latest PVRs 270, 244-DCd scans    DVT ppx: lovenox

## 2019-05-26 PROCEDURE — 99232 SBSQ HOSP IP/OBS MODERATE 35: CPT

## 2019-05-26 RX ORDER — BENZOCAINE AND MENTHOL 5; 1 G/100ML; G/100ML
1 LIQUID ORAL THREE TIMES A DAY
Refills: 0 | Status: DISCONTINUED | OUTPATIENT
Start: 2019-05-26 | End: 2019-05-30

## 2019-05-26 RX ORDER — NYSTATIN CREAM 100000 [USP'U]/G
1 CREAM TOPICAL
Refills: 0 | Status: DISCONTINUED | OUTPATIENT
Start: 2019-05-26 | End: 2019-05-30

## 2019-05-26 RX ADMIN — SENNA PLUS 2 TABLET(S): 8.6 TABLET ORAL at 21:22

## 2019-05-26 RX ADMIN — Medication 1 PATCH: at 12:55

## 2019-05-26 RX ADMIN — Medication 100 MILLIGRAM(S): at 17:09

## 2019-05-26 RX ADMIN — Medication 50 MILLIGRAM(S): at 06:08

## 2019-05-26 RX ADMIN — Medication 1000 MILLIGRAM(S): at 21:22

## 2019-05-26 RX ADMIN — Medication 1 TABLET(S): at 12:26

## 2019-05-26 RX ADMIN — Medication 650 MILLIGRAM(S): at 18:06

## 2019-05-26 RX ADMIN — Medication 1 TABLET(S): at 06:08

## 2019-05-26 RX ADMIN — Medication 650 MILLIGRAM(S): at 18:46

## 2019-05-26 RX ADMIN — FLUPHENAZINE HYDROCHLORIDE 5 MILLIGRAM(S): 1 TABLET, FILM COATED ORAL at 17:09

## 2019-05-26 RX ADMIN — Medication 500 MILLIGRAM(S): at 12:27

## 2019-05-26 RX ADMIN — GABAPENTIN 100 MILLIGRAM(S): 400 CAPSULE ORAL at 17:09

## 2019-05-26 RX ADMIN — Medication 1 TABLET(S): at 17:09

## 2019-05-26 RX ADMIN — Medication 250 MILLIGRAM(S): at 06:08

## 2019-05-26 RX ADMIN — GABAPENTIN 100 MILLIGRAM(S): 400 CAPSULE ORAL at 06:08

## 2019-05-26 RX ADMIN — PANTOPRAZOLE SODIUM 40 MILLIGRAM(S): 20 TABLET, DELAYED RELEASE ORAL at 06:08

## 2019-05-26 RX ADMIN — DESMOPRESSIN ACETATE 0.2 MILLIGRAM(S): 0.1 TABLET ORAL at 21:22

## 2019-05-26 RX ADMIN — Medication 0.5 MILLIGRAM(S): at 17:09

## 2019-05-26 RX ADMIN — Medication 650 MILLIGRAM(S): at 12:25

## 2019-05-26 RX ADMIN — Medication 50 MILLIGRAM(S): at 12:27

## 2019-05-26 RX ADMIN — Medication 5 MILLIGRAM(S): at 04:01

## 2019-05-26 RX ADMIN — Medication 100 MILLIGRAM(S): at 06:08

## 2019-05-26 RX ADMIN — Medication 5 MILLIGRAM(S): at 18:06

## 2019-05-26 RX ADMIN — Medication 250 MILLIGRAM(S): at 17:08

## 2019-05-26 RX ADMIN — Medication 175 MICROGRAM(S): at 05:28

## 2019-05-26 RX ADMIN — Medication 1 PATCH: at 20:00

## 2019-05-26 RX ADMIN — ENOXAPARIN SODIUM 40 MILLIGRAM(S): 100 INJECTION SUBCUTANEOUS at 21:22

## 2019-05-26 RX ADMIN — FLUPHENAZINE HYDROCHLORIDE 5 MILLIGRAM(S): 1 TABLET, FILM COATED ORAL at 06:08

## 2019-05-26 RX ADMIN — Medication 650 MILLIGRAM(S): at 13:00

## 2019-05-26 RX ADMIN — Medication 500 MILLIGRAM(S): at 08:15

## 2019-05-26 RX ADMIN — NYSTATIN CREAM 1 APPLICATION(S): 100000 CREAM TOPICAL at 21:26

## 2019-05-26 RX ADMIN — Medication 0.5 MILLIGRAM(S): at 06:08

## 2019-05-26 RX ADMIN — Medication 1 PATCH: at 06:15

## 2019-05-26 RX ADMIN — Medication 1 PATCH: at 12:27

## 2019-05-26 NOTE — PROGRESS NOTE ADULT - SUBJECTIVE AND OBJECTIVE BOX
Patient is a 53y old  Male who presents with a chief complaint of Functional Deficits After Laminectomy (25 May 2019 13:22)      HPI:  53 year old man, resident of [facility], with h/o bipolar d/o, HTN, nocturnal enuresis, deformity of lower leg, hypothyroid, schizophrenia, BPH, IVDA presented to [facility] on 4/25 with inability to walk due to lower extremity weakness which had been worsening for the previous two weeks. Also complained of lower back pain since sustaining a fall 2 weeks prior. In additions the pt reported paresthesias in both hands, however stated that he was able to feel his feet. Stated that he believes that he is being "possessed" and "held down" by somebody who passed away. Of note, the pt states that someone is trying to "poison" and "finish him off" at [place] and that he is being followed. Motion degraded MRI Cervical and thoracic spine obtained on 4/26 after patient had +duarte's, clonus and babinski on exam, showed multilevel cervical stenosis and possible cord signal change, started on decadron. On 5/1 he had C3-C6 laminectomy and fusion.  MRI C Spine on 5/15 to check for worsening stenosis showed A dorsal paraspinal subcutaneous nonspecific fluid collection noted from the C1-C7 levels measuring 10.2 cm cephalocaudad, 3.8 cm AP, and 7.1 cm transverse. Differential considerations include an evolving postoperative seroma/hematoma or a CSF leak. Serial imaging follow-up over time is recommended to exclude possibility of early developing abscess. Reviewed by neurosurgery, no need for surgical intervention with no new symptoms. (16 May 2019 15:30)      PAST MEDICAL & SURGICAL HISTORY:  Bipolar disorder  Drug abuse  Incontinence  Schizophrenia  Hypothyroid  Hypertension  No significant past surgical history      MEDICATIONS  (STANDING):  ascorbic acid 500 milliGRAM(s) Oral daily  benztropine 0.5 milliGRAM(s) Oral two times a day  desmopressin 0.2 milliGRAM(s) Oral at bedtime  docusate sodium 100 milliGRAM(s) Oral two times a day  enoxaparin Injectable 40 milliGRAM(s) SubCutaneous every 24 hours  fluPHENAZine 5 milliGRAM(s) Oral two times a day  gabapentin 100 milliGRAM(s) Oral two times a day  levothyroxine 175 MICROGram(s) Oral daily  metoprolol succinate ER 50 milliGRAM(s) Oral daily  multivitamin 1 Tablet(s) Oral daily  nicotine - 21 mG/24Hr(s) Patch 1 patch Transdermal daily  pantoprazole    Tablet 40 milliGRAM(s) Oral before breakfast  pyridoxine 50 milliGRAM(s) Oral daily  saccharomyces boulardii 250 milliGRAM(s) Oral two times a day  senna 2 Tablet(s) Oral at bedtime  trimethoprim  160 mG/sulfamethoxazole 800 mG 1 Tablet(s) Oral two times a day  valproic  acid Syrup 1000 milliGRAM(s) Oral at bedtime  valproic  acid Syrup 500 milliGRAM(s) Oral <User Schedule>    MEDICATIONS  (PRN):  acetaminophen   Tablet .. 650 milliGRAM(s) Oral every 6 hours PRN Temp greater or equal to 38C (100.4F), Mild Pain (1 - 3)  ALBUTerol    90 MICROgram(s) HFA Inhaler 2 Puff(s) Inhalation every 6 hours PRN Shortness of Breath and/or Wheezing  aluminum hydroxide/magnesium hydroxide/simethicone Suspension 30 milliLiter(s) Oral every 4 hours PRN Indigestion  benzocaine 15 mG/menthol 3.6 mG Lozenge 1 Lozenge Oral three times a day PRN sore throat/cough  bisacodyl 5 milliGRAM(s) Oral every 12 hours PRN Constipation  diazepam    Tablet 5 milliGRAM(s) Oral every 6 hours PRN Spasm  nicotine  Polacrilex Gum 4 milliGRAM(s) Oral every 6 hours PRN nicotine craving  polyethylene glycol 3350 17 Gram(s) Oral daily PRN Constipation      Allergies    No Known Allergies    Intolerances    Haldol (Unknown)  Thorazine (Unknown)        VITALS  53y  Vital Signs Last 24 Hrs  T(C): 36.6 (26 May 2019 08:00), Max: 36.6 (25 May 2019 20:31)  T(F): 97.9 (26 May 2019 08:00), Max: 97.9 (25 May 2019 20:31)  HR: 64 (26 May 2019 08:00) (60 - 71)  BP: 99/65 (26 May 2019 08:00) (99/65 - 116/74)  BP(mean): --  RR: 14 (26 May 2019 08:00) (14 - 14)  SpO2: 94% (26 May 2019 08:00) (94% - 98%)  Daily     Daily         RECENT LABS:                      CAPILLARY BLOOD GLUCOSE

## 2019-05-26 NOTE — PROGRESS NOTE ADULT - ASSESSMENT
IMPRESSION AND PLAN:  53 year-old man with a PMH of bipolar disorder/schizophrenia, lower leg deformity  with Gait Instability, ADL impairments and Functional impairments after cervical stenosis s/p cervical fusion and laminectomy    #Cervical Stenosis- s/p C3-C6 laminectomy and fusion with Gait Instability, ADL impairments, and Functional impairments- follow up MRI with improved stenosis and epidural fluid collection. -Fluid collection on MRI Deemed stable by NSGY- monitor  -Continue Comprehensive Rehab Program of PT/OT   -Bactrim for wound ppx total 14 days (5/15-5/28)    #Bipolar Disorder/Schizophrenia  -c/w Cogentin, Prolixin, Valproic Acid, Neurontin  - Neuropsych eval   - Appreciate psychiatry consult 5/17-most likely is diagnosed with Schizoaffective d/o Bipolar type.   -continue VPA 1000 qhs from 750 and cont 500 qam  Appreciate psychiatry f/u.: can decrease Prolixin to q 3 weeks if given 50 mg IM and if tolerated. Would then taper and d/c oral Prolixin.     #Hypothyroidism  -c/w synthroid    #HTN  -c/w metoprolol    #Nocturnal Enuresis  -c/w Desmopressin    #Hygeine  -Oral Care  -Podiatry Consult 5/20 appreciated    #Neuropathy:  Decreased sensation, B/L Eversion weakness, EHL-   -Orthotist Eval     Pain Mgmt - Tylenol PRN, DC oxycodone   -mild bicipital tnedonitis and trapezius soreness. Will order warm compress, patient agreeable    Bowel Regimen - c/w colace, senna; Dulcolax suppository and Miralax prn.  Enema prn  / Bladder - occasional incontinence- bladder scan q12h, PVR, SC > 400ml  Latest PVRs 270, 244-DCd scans    DVT ppx: lovenox

## 2019-05-27 LAB
24R-OH-CALCIDIOL SERPL-MCNC: 26.5 NG/ML — LOW (ref 30–80)
ANION GAP SERPL CALC-SCNC: 7 MMOL/L — SIGNIFICANT CHANGE UP (ref 5–17)
BUN SERPL-MCNC: 16 MG/DL — SIGNIFICANT CHANGE UP (ref 7–23)
CALCIUM SERPL-MCNC: 9.3 MG/DL — SIGNIFICANT CHANGE UP (ref 8.4–10.5)
CHLORIDE SERPL-SCNC: 98 MMOL/L — SIGNIFICANT CHANGE UP (ref 96–108)
CO2 SERPL-SCNC: 31 MMOL/L — SIGNIFICANT CHANGE UP (ref 22–31)
CREAT SERPL-MCNC: 0.78 MG/DL — SIGNIFICANT CHANGE UP (ref 0.5–1.3)
GLUCOSE SERPL-MCNC: 79 MG/DL — SIGNIFICANT CHANGE UP (ref 70–99)
HCT VFR BLD CALC: 39.6 % — SIGNIFICANT CHANGE UP (ref 39–50)
HGB BLD-MCNC: 13.1 G/DL — SIGNIFICANT CHANGE UP (ref 13–17)
MCHC RBC-ENTMCNC: 28.5 PG — SIGNIFICANT CHANGE UP (ref 27–34)
MCHC RBC-ENTMCNC: 33.1 GM/DL — SIGNIFICANT CHANGE UP (ref 32–36)
MCV RBC AUTO: 86.3 FL — SIGNIFICANT CHANGE UP (ref 80–100)
NRBC # BLD: 0 /100 WBCS — SIGNIFICANT CHANGE UP (ref 0–0)
PLATELET # BLD AUTO: 347 K/UL — SIGNIFICANT CHANGE UP (ref 150–400)
POTASSIUM SERPL-MCNC: 4.2 MMOL/L — SIGNIFICANT CHANGE UP (ref 3.5–5.3)
POTASSIUM SERPL-SCNC: 4.2 MMOL/L — SIGNIFICANT CHANGE UP (ref 3.5–5.3)
RBC # BLD: 4.59 M/UL — SIGNIFICANT CHANGE UP (ref 4.2–5.8)
RBC # FLD: 13.8 % — SIGNIFICANT CHANGE UP (ref 10.3–14.5)
SODIUM SERPL-SCNC: 136 MMOL/L — SIGNIFICANT CHANGE UP (ref 135–145)
VALPROATE SERPL-MCNC: 40 UG/ML — LOW (ref 50–100)
VIT B12 SERPL-MCNC: 1441 PG/ML — HIGH (ref 232–1245)
WBC # BLD: 5.11 K/UL — SIGNIFICANT CHANGE UP (ref 3.8–10.5)
WBC # FLD AUTO: 5.11 K/UL — SIGNIFICANT CHANGE UP (ref 3.8–10.5)

## 2019-05-27 PROCEDURE — 99232 SBSQ HOSP IP/OBS MODERATE 35: CPT

## 2019-05-27 RX ADMIN — Medication 1 PATCH: at 11:34

## 2019-05-27 RX ADMIN — Medication 5 MILLIGRAM(S): at 02:08

## 2019-05-27 RX ADMIN — GABAPENTIN 100 MILLIGRAM(S): 400 CAPSULE ORAL at 06:17

## 2019-05-27 RX ADMIN — Medication 100 MILLIGRAM(S): at 17:38

## 2019-05-27 RX ADMIN — Medication 500 MILLIGRAM(S): at 08:01

## 2019-05-27 RX ADMIN — Medication 250 MILLIGRAM(S): at 17:38

## 2019-05-27 RX ADMIN — Medication 650 MILLIGRAM(S): at 12:09

## 2019-05-27 RX ADMIN — Medication 1 TABLET(S): at 17:38

## 2019-05-27 RX ADMIN — SENNA PLUS 2 TABLET(S): 8.6 TABLET ORAL at 21:25

## 2019-05-27 RX ADMIN — Medication 1 PATCH: at 19:40

## 2019-05-27 RX ADMIN — ENOXAPARIN SODIUM 40 MILLIGRAM(S): 100 INJECTION SUBCUTANEOUS at 21:27

## 2019-05-27 RX ADMIN — Medication 1 TABLET(S): at 06:17

## 2019-05-27 RX ADMIN — Medication 650 MILLIGRAM(S): at 11:33

## 2019-05-27 RX ADMIN — GABAPENTIN 100 MILLIGRAM(S): 400 CAPSULE ORAL at 17:38

## 2019-05-27 RX ADMIN — Medication 50 MILLIGRAM(S): at 06:17

## 2019-05-27 RX ADMIN — DESMOPRESSIN ACETATE 0.2 MILLIGRAM(S): 0.1 TABLET ORAL at 21:25

## 2019-05-27 RX ADMIN — Medication 500 MILLIGRAM(S): at 11:34

## 2019-05-27 RX ADMIN — FLUPHENAZINE HYDROCHLORIDE 5 MILLIGRAM(S): 1 TABLET, FILM COATED ORAL at 06:17

## 2019-05-27 RX ADMIN — PANTOPRAZOLE SODIUM 40 MILLIGRAM(S): 20 TABLET, DELAYED RELEASE ORAL at 06:18

## 2019-05-27 RX ADMIN — Medication 250 MILLIGRAM(S): at 06:18

## 2019-05-27 RX ADMIN — NYSTATIN CREAM 1 APPLICATION(S): 100000 CREAM TOPICAL at 06:20

## 2019-05-27 RX ADMIN — FLUPHENAZINE HYDROCHLORIDE 5 MILLIGRAM(S): 1 TABLET, FILM COATED ORAL at 17:38

## 2019-05-27 RX ADMIN — Medication 650 MILLIGRAM(S): at 19:38

## 2019-05-27 RX ADMIN — Medication 650 MILLIGRAM(S): at 20:30

## 2019-05-27 RX ADMIN — Medication 1 PATCH: at 06:21

## 2019-05-27 RX ADMIN — Medication 5 MILLIGRAM(S): at 19:06

## 2019-05-27 RX ADMIN — Medication 100 MILLIGRAM(S): at 06:17

## 2019-05-27 RX ADMIN — Medication 0.5 MILLIGRAM(S): at 17:38

## 2019-05-27 RX ADMIN — Medication 1000 MILLIGRAM(S): at 21:26

## 2019-05-27 RX ADMIN — Medication 50 MILLIGRAM(S): at 11:33

## 2019-05-27 RX ADMIN — NYSTATIN CREAM 1 APPLICATION(S): 100000 CREAM TOPICAL at 17:39

## 2019-05-27 RX ADMIN — Medication 0.5 MILLIGRAM(S): at 06:18

## 2019-05-27 RX ADMIN — Medication 175 MICROGRAM(S): at 05:22

## 2019-05-27 RX ADMIN — Medication 1 TABLET(S): at 11:34

## 2019-05-27 NOTE — PROGRESS NOTE ADULT - SUBJECTIVE AND OBJECTIVE BOX
Patient is a 53y old  Male who presents with a chief complaint of Functional Deficits After Laminectomy (26 May 2019 12:19)      HPI:  53 year old man, resident of [facility], with h/o bipolar d/o, HTN, nocturnal enuresis, deformity of lower leg, hypothyroid, schizophrenia, BPH, IVDA presented to [facility] on 4/25 with inability to walk due to lower extremity weakness which had been worsening for the previous two weeks. Also complained of lower back pain since sustaining a fall 2 weeks prior. In additions the pt reported paresthesias in both hands, however stated that he was able to feel his feet. Stated that he believes that he is being "possessed" and "held down" by somebody who passed away. Of note, the pt states that someone is trying to "poison" and "finish him off" at [place] and that he is being followed. Motion degraded MRI Cervical and thoracic spine obtained on 4/26 after patient had +duarte's, clonus and babinski on exam, showed multilevel cervical stenosis and possible cord signal change, started on decadron. On 5/1 he had C3-C6 laminectomy and fusion.  MRI C Spine on 5/15 to check for worsening stenosis showed A dorsal paraspinal subcutaneous nonspecific fluid collection noted from the C1-C7 levels measuring 10.2 cm cephalocaudad, 3.8 cm AP, and 7.1 cm transverse. Differential considerations include an evolving postoperative seroma/hematoma or a CSF leak. Serial imaging follow-up over time is recommended to exclude possibility of early developing abscess. Reviewed by neurosurgery, no need for surgical intervention with no new symptoms. (16 May 2019 15:30)      PAST MEDICAL & SURGICAL HISTORY:  Bipolar disorder  Drug abuse  Incontinence  Schizophrenia  Hypothyroid  Hypertension  No significant past surgical history      MEDICATIONS  (STANDING):  ascorbic acid 500 milliGRAM(s) Oral daily  benztropine 0.5 milliGRAM(s) Oral two times a day  desmopressin 0.2 milliGRAM(s) Oral at bedtime  docusate sodium 100 milliGRAM(s) Oral two times a day  enoxaparin Injectable 40 milliGRAM(s) SubCutaneous every 24 hours  fluPHENAZine 5 milliGRAM(s) Oral two times a day  gabapentin 100 milliGRAM(s) Oral two times a day  levothyroxine 175 MICROGram(s) Oral daily  metoprolol succinate ER 50 milliGRAM(s) Oral daily  multivitamin 1 Tablet(s) Oral daily  nicotine - 21 mG/24Hr(s) Patch 1 patch Transdermal daily  nystatin Powder 1 Application(s) Topical two times a day  pantoprazole    Tablet 40 milliGRAM(s) Oral before breakfast  pyridoxine 50 milliGRAM(s) Oral daily  saccharomyces boulardii 250 milliGRAM(s) Oral two times a day  senna 2 Tablet(s) Oral at bedtime  trimethoprim  160 mG/sulfamethoxazole 800 mG 1 Tablet(s) Oral two times a day  valproic  acid Syrup 1000 milliGRAM(s) Oral at bedtime  valproic  acid Syrup 500 milliGRAM(s) Oral <User Schedule>    MEDICATIONS  (PRN):  acetaminophen   Tablet .. 650 milliGRAM(s) Oral every 6 hours PRN Temp greater or equal to 38C (100.4F), Mild Pain (1 - 3)  ALBUTerol    90 MICROgram(s) HFA Inhaler 2 Puff(s) Inhalation every 6 hours PRN Shortness of Breath and/or Wheezing  aluminum hydroxide/magnesium hydroxide/simethicone Suspension 30 milliLiter(s) Oral every 4 hours PRN Indigestion  benzocaine 15 mG/menthol 3.6 mG Lozenge 1 Lozenge Oral three times a day PRN sore throat/cough  bisacodyl 5 milliGRAM(s) Oral every 12 hours PRN Constipation  diazepam    Tablet 5 milliGRAM(s) Oral every 6 hours PRN Spasm  nicotine  Polacrilex Gum 4 milliGRAM(s) Oral every 6 hours PRN nicotine craving  polyethylene glycol 3350 17 Gram(s) Oral daily PRN Constipation      Allergies    No Known Allergies    Intolerances    Haldol (Unknown)  Thorazine (Unknown)        VITALS  53y  Vital Signs Last 24 Hrs  T(C): 36.6 (27 May 2019 08:00), Max: 36.6 (27 May 2019 08:00)  T(F): 97.9 (27 May 2019 08:00), Max: 97.9 (27 May 2019 08:00)  HR: 63 (27 May 2019 08:00) (63 - 77)  BP: 106/64 (27 May 2019 08:00) (106/64 - 121/75)  BP(mean): --  RR: 14 (27 May 2019 08:00) (14 - 14)  SpO2: 100% (27 May 2019 08:00) (96% - 100%)  Daily     Daily         RECENT LABS:                          13.1   5.11  )-----------( 347      ( 27 May 2019 05:43 )             39.6     05-27    136  |  98  |  16  ----------------------------<  79  4.2   |  31  |  0.78    Ca    9.3      27 May 2019 05:43                CAPILLARY BLOOD GLUCOSE

## 2019-05-27 NOTE — PROGRESS NOTE ADULT - RS GEN PE MLT RESP DETAILS PC
clear to auscultation bilaterally/respirations non-labored
breath sounds equal/respirations non-labored/clear to auscultation bilaterally
respirations non-labored/clear to auscultation bilaterally/breath sounds equal/good air movement

## 2019-05-27 NOTE — PROGRESS NOTE ADULT - ASSESSMENT
IMPRESSION AND PLAN:  53 year-old man with a PMH of bipolar disorder/schizophrenia, lower leg deformity  with Gait Instability, ADL impairments and Functional impairments after cervical stenosis s/p cervical fusion and laminectomy    #Cervical Stenosis- s/p C3-C6 laminectomy and fusion with Gait Instability, ADL impairments, and Functional impairments- follow up MRI with improved stenosis and epidural fluid collection. -Fluid collection on MRI Deemed stable by NSGY- monitor  -Continue Comprehensive Rehab Program of PT/OT . neuropsychological services  -Bactrim for wound ppx total 14 days ( ends 5/28)    #Schizoaffective d/o Bipolar type  -c/w Cogentin, Prolixin, Valproic Acid, Neurontin  -continue VPA 1000 qhs from 750 and cont 500 qam  Appreciate psychiatry f/u.: can decrease Prolixin to q 3 weeks if given 50 mg IM and if tolerated. Would then taper and d/c oral Prolixin.     #Hypothyroidism  -c/w synthroid    #HTN  -c/w metoprolol    #Nocturnal Enuresis  -c/w Desmopressin    #Hygeine  -Oral Care  -Podiatry Consult 5/20 appreciated    #Neuropathy:  Decreased sensation, B/L Eversion weakness, EHL-   -Orthotist Eval     Pain Mgmt - Tylenol PRN, DC oxycodone   -mild bicipital tnedonitis and trapezius soreness. Will order warm compress, patient agreeable    Bowel Regimen - c/w colace, senna; Dulcolax suppository and Miralax prn.  Enema prn  / Bladder - occasional incontinence- bladder scan q12h, PVR, SC > 400ml  Latest PVRs 270, 244-DCd scans    #LAbs 5/27 reviewed, stable    DVT ppx: lovenox

## 2019-05-27 NOTE — PROGRESS NOTE ADULT - GENERAL COMMENTS
Patient stable. NAD. Ate breakfast, good appetite. Same cervical pain,s table, localized. also some upper trap/shoulder discomfrot from yesterday but did not receive warm compress
Patient sitting out in hallways. compliant, mood stable. NAD. Denies pain, H./A, cervical discomfort or difficulty sleeping. no issues overnight
Patient stable overnight. mild pain, upper shoudlers, states was wheeling himself yesterday. NAD. Does not like sittingi n WC in front of nurse's station "I get bored"

## 2019-05-27 NOTE — PROGRESS NOTE ADULT - EXTREMITIES COMMENTS
TTp and tightness upper trap left > rigth  no calf TTP, no erythema or warmth  +soft, distensible
calves soft, TN claudia rythema or warmth
no calf TTP no erythema or warmth

## 2019-05-27 NOTE — PROGRESS NOTE ADULT - ADDITIONAL PE
cervical incision intact, dry, no oozing
cervical incision healing well, trace redness but not warm  +TTP upper and mid traps and into bicipital tendon

## 2019-05-28 PROCEDURE — 99232 SBSQ HOSP IP/OBS MODERATE 35: CPT

## 2019-05-28 RX ORDER — OXYCODONE HYDROCHLORIDE 5 MG/1
5 TABLET ORAL EVERY 4 HOURS
Refills: 0 | Status: DISCONTINUED | OUTPATIENT
Start: 2019-05-28 | End: 2019-05-30

## 2019-05-28 RX ORDER — VALPROIC ACID (AS SODIUM SALT) 250 MG/5ML
750 SOLUTION, ORAL ORAL
Refills: 0 | Status: DISCONTINUED | OUTPATIENT
Start: 2019-05-28 | End: 2019-05-30

## 2019-05-28 RX ORDER — CHOLECALCIFEROL (VITAMIN D3) 125 MCG
1000 CAPSULE ORAL DAILY
Refills: 0 | Status: DISCONTINUED | OUTPATIENT
Start: 2019-05-28 | End: 2019-05-30

## 2019-05-28 RX ADMIN — FLUPHENAZINE HYDROCHLORIDE 5 MILLIGRAM(S): 1 TABLET, FILM COATED ORAL at 17:25

## 2019-05-28 RX ADMIN — Medication 0.5 MILLIGRAM(S): at 06:45

## 2019-05-28 RX ADMIN — Medication 50 MILLIGRAM(S): at 06:46

## 2019-05-28 RX ADMIN — OXYCODONE HYDROCHLORIDE 5 MILLIGRAM(S): 5 TABLET ORAL at 09:52

## 2019-05-28 RX ADMIN — GABAPENTIN 100 MILLIGRAM(S): 400 CAPSULE ORAL at 06:45

## 2019-05-28 RX ADMIN — Medication 1 PATCH: at 21:58

## 2019-05-28 RX ADMIN — PANTOPRAZOLE SODIUM 40 MILLIGRAM(S): 20 TABLET, DELAYED RELEASE ORAL at 06:46

## 2019-05-28 RX ADMIN — ALBUTEROL 2 PUFF(S): 90 AEROSOL, METERED ORAL at 05:18

## 2019-05-28 RX ADMIN — Medication 250 MILLIGRAM(S): at 17:25

## 2019-05-28 RX ADMIN — OXYCODONE HYDROCHLORIDE 5 MILLIGRAM(S): 5 TABLET ORAL at 20:44

## 2019-05-28 RX ADMIN — Medication 500 MILLIGRAM(S): at 12:29

## 2019-05-28 RX ADMIN — Medication 50 MILLIGRAM(S): at 12:29

## 2019-05-28 RX ADMIN — OXYCODONE HYDROCHLORIDE 5 MILLIGRAM(S): 5 TABLET ORAL at 10:37

## 2019-05-28 RX ADMIN — Medication 1000 MILLIGRAM(S): at 21:57

## 2019-05-28 RX ADMIN — FLUPHENAZINE HYDROCHLORIDE 5 MILLIGRAM(S): 1 TABLET, FILM COATED ORAL at 06:45

## 2019-05-28 RX ADMIN — OXYCODONE HYDROCHLORIDE 5 MILLIGRAM(S): 5 TABLET ORAL at 21:05

## 2019-05-28 RX ADMIN — Medication 5 MILLIGRAM(S): at 20:44

## 2019-05-28 RX ADMIN — DESMOPRESSIN ACETATE 0.2 MILLIGRAM(S): 0.1 TABLET ORAL at 21:55

## 2019-05-28 RX ADMIN — Medication 650 MILLIGRAM(S): at 17:25

## 2019-05-28 RX ADMIN — NYSTATIN CREAM 1 APPLICATION(S): 100000 CREAM TOPICAL at 17:26

## 2019-05-28 RX ADMIN — ENOXAPARIN SODIUM 40 MILLIGRAM(S): 100 INJECTION SUBCUTANEOUS at 21:58

## 2019-05-28 RX ADMIN — Medication 1000 UNIT(S): at 12:29

## 2019-05-28 RX ADMIN — Medication 1 PATCH: at 06:47

## 2019-05-28 RX ADMIN — Medication 100 MILLIGRAM(S): at 17:25

## 2019-05-28 RX ADMIN — Medication 1 TABLET(S): at 12:29

## 2019-05-28 RX ADMIN — Medication 175 MICROGRAM(S): at 06:45

## 2019-05-28 RX ADMIN — SENNA PLUS 2 TABLET(S): 8.6 TABLET ORAL at 21:57

## 2019-05-28 RX ADMIN — Medication 0.5 MILLIGRAM(S): at 17:25

## 2019-05-28 RX ADMIN — Medication 1 PATCH: at 12:29

## 2019-05-28 RX ADMIN — Medication 250 MILLIGRAM(S): at 06:46

## 2019-05-28 RX ADMIN — Medication 100 MILLIGRAM(S): at 06:45

## 2019-05-28 RX ADMIN — Medication 1 PATCH: at 12:33

## 2019-05-28 RX ADMIN — Medication 1 TABLET(S): at 17:25

## 2019-05-28 RX ADMIN — Medication 1 TABLET(S): at 06:46

## 2019-05-28 RX ADMIN — Medication 650 MILLIGRAM(S): at 18:14

## 2019-05-28 RX ADMIN — Medication 500 MILLIGRAM(S): at 09:52

## 2019-05-28 NOTE — PROGRESS NOTE ADULT - SUBJECTIVE AND OBJECTIVE BOX
Patient is a 53y old  Male who presents with a chief complaint of Functional Deficits After Laminectomy (26 May 2019 12:19)      HPI:  53 year old man, resident of [facility], with h/o bipolar d/o, HTN, nocturnal enuresis, deformity of lower leg, hypothyroid, schizophrenia, BPH, IVDA presented to [facility] on 4/25 with inability to walk due to lower extremity weakness which had been worsening for the previous two weeks. Also complained of lower back pain since sustaining a fall 2 weeks prior. In additions the pt reported paresthesias in both hands, however stated that he was able to feel his feet. Stated that he believes that he is being "possessed" and "held down" by somebody who passed away. Of note, the pt states that someone is trying to "poison" and "finish him off" at [place] and that he is being followed. Motion degraded MRI Cervical and thoracic spine obtained on 4/26 after patient had +duarte's, clonus and babinski on exam, showed multilevel cervical stenosis and possible cord signal change, started on decadron. On 5/1 he had C3-C6 laminectomy and fusion.  MRI C Spine on 5/15 to check for worsening stenosis showed A dorsal paraspinal subcutaneous nonspecific fluid collection noted from the C1-C7 levels measuring 10.2 cm cephalocaudad, 3.8 cm AP, and 7.1 cm transverse. Differential considerations include an evolving postoperative seroma/hematoma or a CSF leak. Serial imaging follow-up over time is recommended to exclude possibility of early developing abscess. Reviewed by neurosurgery, no need for surgical intervention with no new symptoms. (16 May 2019 15:30)      PAST MEDICAL & SURGICAL HISTORY:  Bipolar disorder  Drug abuse  Incontinence  Schizophrenia  Hypothyroid  Hypertension  No significant past surgical history      MEDICATIONS  (STANDING):  ascorbic acid 500 milliGRAM(s) Oral daily  benztropine 0.5 milliGRAM(s) Oral two times a day  desmopressin 0.2 milliGRAM(s) Oral at bedtime  docusate sodium 100 milliGRAM(s) Oral two times a day  enoxaparin Injectable 40 milliGRAM(s) SubCutaneous every 24 hours  fluPHENAZine 5 milliGRAM(s) Oral two times a day  gabapentin 100 milliGRAM(s) Oral two times a day  levothyroxine 175 MICROGram(s) Oral daily  metoprolol succinate ER 50 milliGRAM(s) Oral daily  multivitamin 1 Tablet(s) Oral daily  nicotine - 21 mG/24Hr(s) Patch 1 patch Transdermal daily  nystatin Powder 1 Application(s) Topical two times a day  pantoprazole    Tablet 40 milliGRAM(s) Oral before breakfast  pyridoxine 50 milliGRAM(s) Oral daily  saccharomyces boulardii 250 milliGRAM(s) Oral two times a day  senna 2 Tablet(s) Oral at bedtime  trimethoprim  160 mG/sulfamethoxazole 800 mG 1 Tablet(s) Oral two times a day  valproic  acid Syrup 1000 milliGRAM(s) Oral at bedtime  valproic  acid Syrup 500 milliGRAM(s) Oral <User Schedule>    MEDICATIONS  (PRN):  acetaminophen   Tablet .. 650 milliGRAM(s) Oral every 6 hours PRN Temp greater or equal to 38C (100.4F), Mild Pain (1 - 3)  ALBUTerol    90 MICROgram(s) HFA Inhaler 2 Puff(s) Inhalation every 6 hours PRN Shortness of Breath and/or Wheezing  aluminum hydroxide/magnesium hydroxide/simethicone Suspension 30 milliLiter(s) Oral every 4 hours PRN Indigestion  benzocaine 15 mG/menthol 3.6 mG Lozenge 1 Lozenge Oral three times a day PRN sore throat/cough  bisacodyl 5 milliGRAM(s) Oral every 12 hours PRN Constipation  diazepam    Tablet 5 milliGRAM(s) Oral every 6 hours PRN Spasm  nicotine  Polacrilex Gum 4 milliGRAM(s) Oral every 6 hours PRN nicotine craving  polyethylene glycol 3350 17 Gram(s) Oral daily PRN Constipation      Allergies    No Known Allergies    Intolerances    Haldol (Unknown)  Thorazine (Unknown)    Subjective:  Pt seen and examined.  Pt c/o mild neck pain which is non-radiating and eased with heat application.  Pt c/o persistent numbness and tingling both hands and feet.  Slept well.  Last BM 5/27.  Denies dysuria, frequency, urgency, incontinence, dyspnea, SOB or cough    VITALS  Vital Signs Last 24 Hrs  T(C): 36.6 (28 May 2019 08:45), Max: 36.6 (28 May 2019 08:45)  T(F): 97.9 (28 May 2019 08:45), Max: 97.9 (28 May 2019 08:45)  HR: 92 (28 May 2019 08:45) (74 - 98)  BP: 114/73 (28 May 2019 08:45) (110/60 - 122/62)  BP(mean): --  RR: 14 (28 May 2019 08:45) (14 - 16)  SpO2: 94% (28 May 2019 08:45) (94% - 98%)    General: NAD  Resp: CTAB  Cardio: +S1, S2  Abdomen: soft, NT, ND, normoactive bowel sounds  Extremities: no edema or calf tenderness. +Osgood-Schlatters noted bilaterally  Neuro: awake and alert     Motor: no increased tone     RUE: ShFl 5/5   EE 5/5    EF 5/5   WrEx 5/5   Interossei 3/5   ADM 4/5     LUE: ShFl 5/5   EE 5/5    EF 5/5   WrEx 5/5   Interossei 3/5   ADM 4/5     RLE: HF  3/5   KE 3-/5    DF 5/5   PF 4/5   EHL 1/5   Evertors 1/5     LLE: HF  3/5   KE 4/5    DF 5/5   PF 4/5   EHL 1/5   Evertors 1/5    Sensory: decreased pinprick from feet up to distal 1/3 tibia bilaterally    Reflexes: Right: Triceps 2+, Biceps 1+, Brachioradialis 1+, +Hoffmans,   patellar 2+ Achilles 2+                  Left: Triceps 2+, Biceps 1+, Brachioradialis 1+,  + Hoffmans    Patellar 1+, Achilles 2+  Down-going plantars  Skin: abrasions bilateral below patella, posterior neck surgical site- sutures removed, clean, no drainage, no erythema or edema noted; CDI    Functional status:  Bed mobility: Max A x 2  Transfers: Mod A x 2  Gait: Unable  ADLs: UE dress Mod A, LE dress Mod A with AE    RECENT LABS:                          13.1   5.11  )-----------( 347      ( 27 May 2019 05:43 )             39.6     05-27    136  |  98  |  16  ----------------------------<  79  4.2   |  31  |  0.78    Ca    9.3      27 May 2019 05:43

## 2019-05-28 NOTE — PROGRESS NOTE ADULT - ASSESSMENT
IMPRESSION AND PLAN:  53 year-old man with a PMH of bipolar disorder/schizophrenia, lower leg deformity  with Gait Instability, ADL impairments and Functional impairments after cervical stenosis s/p cervical fusion and laminectomy    #Cervical Stenosis- s/p C3-C6 laminectomy and fusion with Gait Instability, ADL impairments, and Functional impairments- follow up MRI with improved stenosis and epidural fluid collection. -Fluid collection on MRI Deemed stable by NSGY- monitor  -Continue Comprehensive Rehab Program of PT/OT . neuropsychological services  -Bactrim for wound ppx total 14 days ( ends 5/28)    #Schizoaffective d/o Bipolar type  -c/w Cogentin, Prolixin, Valproic Acid, Neurontin  -continue VPA 1000 qhs from 750 and cont 500 qam-VPA level on 5/27 40; Increase VA from 500-750 qam and cont 1000 qhs  Appreciate psychiatry f/u.: can decrease Prolixin to q 3 weeks if given 50 mg IM and if tolerated. Would then taper and d/c oral Prolixin.     #Hypothyroidism  -c/w synthroid    #HTN  -c/w metoprolol    #Nocturnal Enuresis  -c/w Desmopressin    #Hygeine  -Oral Care  -Podiatry Consult 5/20 appreciated    #Neuropathy:  Decreased sensation, B/L Eversion weakness, EHL-   -Orthotist Eval     Pain Mgmt - Tylenol PRN, oxycodone 5 prn.  DC gabapentin 100 bid  -mild bicipital tendinitis and trapezius soreness; warm compress, patient agreeable    Bowel Regimen - c/w colace, senna; Dulcolax suppository, Miralax prn.    / Bladder - occasional incontinence- bladder scan q12h, PVR, SC > 400ml  Latest PVRs 270, 244-DCd scans    DVT ppx: lovenox

## 2019-05-28 NOTE — CHART NOTE - NSCHARTNOTEFT_GEN_A_CORE
Nutrition Follow Up Note  Hospital Course (Per Electronic Medical Record):     Source:   Medical Record [X]      Diet:   Regular Diet w/ Thin Liquids  Tolerates Diet Well  Consumes 100% of Meals (as Per Documentation)   Continues on Ensure Enlive 8oz PO TID (Provides 1,050kcal & 60grams of Protein) - Per Request    Enteral/Parenteral Nutrition: N/A    Current Weight: 242.2lb on 5/16  Obtain New Weight  Obtain Weights Weekly     Pertinent Medications: MEDICATIONS  (STANDING):  ascorbic acid 500 milliGRAM(s) Oral daily  benztropine 0.5 milliGRAM(s) Oral two times a day  cholecalciferol 1000 Unit(s) Oral daily  desmopressin 0.2 milliGRAM(s) Oral at bedtime  docusate sodium 100 milliGRAM(s) Oral two times a day  enoxaparin Injectable 40 milliGRAM(s) SubCutaneous every 24 hours  fluPHENAZine 5 milliGRAM(s) Oral two times a day  gabapentin 100 milliGRAM(s) Oral two times a day  levothyroxine 175 MICROGram(s) Oral daily  metoprolol succinate ER 50 milliGRAM(s) Oral daily  multivitamin 1 Tablet(s) Oral daily  nicotine - 21 mG/24Hr(s) Patch 1 patch Transdermal daily  nystatin Powder 1 Application(s) Topical two times a day  pantoprazole    Tablet 40 milliGRAM(s) Oral before breakfast  pyridoxine 50 milliGRAM(s) Oral daily  saccharomyces boulardii 250 milliGRAM(s) Oral two times a day  senna 2 Tablet(s) Oral at bedtime  trimethoprim  160 mG/sulfamethoxazole 800 mG 1 Tablet(s) Oral two times a day  valproic  acid Syrup 1000 milliGRAM(s) Oral at bedtime  valproic  acid Syrup 500 milliGRAM(s) Oral <User Schedule>    MEDICATIONS  (PRN):  acetaminophen   Tablet .. 650 milliGRAM(s) Oral every 6 hours PRN Temp greater or equal to 38C (100.4F), Mild Pain (1 - 3)  ALBUTerol    90 MICROgram(s) HFA Inhaler 2 Puff(s) Inhalation every 6 hours PRN Shortness of Breath and/or Wheezing  aluminum hydroxide/magnesium hydroxide/simethicone Suspension 30 milliLiter(s) Oral every 4 hours PRN Indigestion  benzocaine 15 mG/menthol 3.6 mG Lozenge 1 Lozenge Oral three times a day PRN sore throat/cough  bisacodyl 5 milliGRAM(s) Oral every 12 hours PRN Constipation  diazepam    Tablet 5 milliGRAM(s) Oral every 6 hours PRN Spasm  nicotine  Polacrilex Gum 4 milliGRAM(s) Oral every 6 hours PRN nicotine craving  oxyCODONE    IR 5 milliGRAM(s) Oral every 4 hours PRN Moderate Pain (4 - 6)  polyethylene glycol 3350 17 Gram(s) Oral daily PRN Constipation    Pertinent Labs:  05-27 Na136 mmol/L Glu 79 mg/dL K+ 4.2 mmol/L Cr  0.78 mg/dL BUN 16 mg/dL 05-17 PAB 20 mg/dL    Skin: No Pressure Ulcers   Surgical Incision(as Per Nursing Flow Sheet)     Edema: None Noted     Last BM: on 5/27    Estimated Needs:   [X] No Change Since Previous Assessment    Previous Nutrition Diagnosis:   Obese    Nutrition Diagnosis is [X] Ongoing     New Nutrition Diagnosis: [X] Not Applicable    Interventions:   1. Recommend Continue Nutrition Plan of Care     Monitoring & Evaluation:   [X] Weights   [X] PO Intake   [X] Follow Up (Per Protocol)  [X] Tolerance to Diet Prescription   [X] Other: Labs     Registered Dietitian/Nutritionist Remains Available.  Pool Moise RDN    Pager # [phone]  Phone# [phone]

## 2019-05-29 PROCEDURE — 99232 SBSQ HOSP IP/OBS MODERATE 35: CPT

## 2019-05-29 RX ADMIN — Medication 5 MILLIGRAM(S): at 21:34

## 2019-05-29 RX ADMIN — Medication 1 PATCH: at 11:31

## 2019-05-29 RX ADMIN — PANTOPRAZOLE SODIUM 40 MILLIGRAM(S): 20 TABLET, DELAYED RELEASE ORAL at 06:09

## 2019-05-29 RX ADMIN — Medication 1 TABLET(S): at 06:09

## 2019-05-29 RX ADMIN — NYSTATIN CREAM 1 APPLICATION(S): 100000 CREAM TOPICAL at 18:13

## 2019-05-29 RX ADMIN — Medication 750 MILLIGRAM(S): at 08:22

## 2019-05-29 RX ADMIN — Medication 100 MILLIGRAM(S): at 06:08

## 2019-05-29 RX ADMIN — Medication 50 MILLIGRAM(S): at 06:08

## 2019-05-29 RX ADMIN — Medication 1000 UNIT(S): at 11:30

## 2019-05-29 RX ADMIN — DESMOPRESSIN ACETATE 0.2 MILLIGRAM(S): 0.1 TABLET ORAL at 21:34

## 2019-05-29 RX ADMIN — ENOXAPARIN SODIUM 40 MILLIGRAM(S): 100 INJECTION SUBCUTANEOUS at 22:26

## 2019-05-29 RX ADMIN — Medication 650 MILLIGRAM(S): at 12:56

## 2019-05-29 RX ADMIN — Medication 1 PATCH: at 11:30

## 2019-05-29 RX ADMIN — Medication 175 MICROGRAM(S): at 06:08

## 2019-05-29 RX ADMIN — Medication 50 MILLIGRAM(S): at 11:30

## 2019-05-29 RX ADMIN — Medication 1000 MILLIGRAM(S): at 21:34

## 2019-05-29 RX ADMIN — Medication 250 MILLIGRAM(S): at 06:09

## 2019-05-29 RX ADMIN — Medication 0.5 MILLIGRAM(S): at 06:09

## 2019-05-29 RX ADMIN — Medication 650 MILLIGRAM(S): at 22:30

## 2019-05-29 RX ADMIN — Medication 1 PATCH: at 19:10

## 2019-05-29 RX ADMIN — Medication 0.5 MILLIGRAM(S): at 18:12

## 2019-05-29 RX ADMIN — FLUPHENAZINE HYDROCHLORIDE 5 MILLIGRAM(S): 1 TABLET, FILM COATED ORAL at 06:08

## 2019-05-29 RX ADMIN — Medication 1 TABLET(S): at 11:30

## 2019-05-29 RX ADMIN — Medication 5 MILLIGRAM(S): at 12:56

## 2019-05-29 RX ADMIN — POLYETHYLENE GLYCOL 3350 17 GRAM(S): 17 POWDER, FOR SOLUTION ORAL at 11:31

## 2019-05-29 RX ADMIN — Medication 650 MILLIGRAM(S): at 21:34

## 2019-05-29 RX ADMIN — FLUPHENAZINE HYDROCHLORIDE 5 MILLIGRAM(S): 1 TABLET, FILM COATED ORAL at 18:13

## 2019-05-29 RX ADMIN — Medication 100 MILLIGRAM(S): at 18:12

## 2019-05-29 RX ADMIN — Medication 250 MILLIGRAM(S): at 18:12

## 2019-05-29 RX ADMIN — Medication 1 PATCH: at 07:29

## 2019-05-29 RX ADMIN — SENNA PLUS 2 TABLET(S): 8.6 TABLET ORAL at 21:34

## 2019-05-29 RX ADMIN — Medication 650 MILLIGRAM(S): at 13:00

## 2019-05-29 RX ADMIN — Medication 500 MILLIGRAM(S): at 11:30

## 2019-05-29 NOTE — PROGRESS NOTE ADULT - ASSESSMENT
IMPRESSION AND PLAN:  53 year-old man with a PMH of bipolar disorder/schizophrenia, lower leg deformity  with Gait Instability, ADL impairments and Functional impairments after cervical stenosis s/p cervical fusion and laminectomy    #Cervical Stenosis- s/p C3-C6 laminectomy and fusion with Gait Instability, ADL impairments, and Functional impairments- follow up MRI with improved stenosis and epidural fluid collection. -Fluid collection on MRI Deemed stable by NSGY- monitor  -Continue Comprehensive Rehab Program of PT/OT . neuropsychological services  -Bactrim for wound ppx total 14 days-course completed    #Schizoaffective d/o Bipolar type  -c/w Cogentin, Prolixin, Valproic Acid, Neurontin  -continue VPA 1000 qhs from 750 and cont 500 qam-VPA level on 5/27 40; Increased VA from 500-750 qam and cont 1000 qhs  Appreciate psychiatry f/u.: can decrease Prolixin to q 3 weeks if given 50 mg IM and if tolerated. Would then taper and d/c oral Prolixin.     #Hypothyroidism  -c/w synthroid    #HTN  -c/w metoprolol    #Nocturnal Enuresis  -c/w Desmopressin    #Hygeine  -Oral Care  -Podiatry Consult 5/20 appreciated    #Neuropathy:  Decreased sensation, B/L Eversion weakness, EHL-   -Orthotist Eval     Pain Mgmt - Tylenol PRN, oxycodone 5 prn.  DCd gabapentin 100 bid  - warm compress prn    Bowel Regimen - c/w colace, senna; Dulcolax suppository, Miralax prn.    / Bladder - occasional incontinence-chronic nocturnal enuresis- bladder scan q12h, PVR, SC > 400ml  Latest PVRs 270, 244-DCd scans    DVT ppx: lovenox

## 2019-05-29 NOTE — PROGRESS NOTE ADULT - SUBJECTIVE AND OBJECTIVE BOX
Patient is a 53y old  Male who presents with a chief complaint of Functional Deficits After Laminectomy (26 May 2019 12:19)      HPI:  53 year old man, resident of [facility], with h/o bipolar d/o, HTN, nocturnal enuresis, deformity of lower leg, hypothyroid, schizophrenia, BPH, IVDA presented to [facility] on 4/25 with inability to walk due to lower extremity weakness which had been worsening for the previous two weeks. Also complained of lower back pain since sustaining a fall 2 weeks prior. In additions the pt reported paresthesias in both hands, however stated that he was able to feel his feet. Stated that he believes that he is being "possessed" and "held down" by somebody who passed away. Of note, the pt states that someone is trying to "poison" and "finish him off" at [place] and that he is being followed. Motion degraded MRI Cervical and thoracic spine obtained on 4/26 after patient had +duarte's, clonus and babinski on exam, showed multilevel cervical stenosis and possible cord signal change, started on decadron. On 5/1 he had C3-C6 laminectomy and fusion.  MRI C Spine on 5/15 to check for worsening stenosis showed A dorsal paraspinal subcutaneous nonspecific fluid collection noted from the C1-C7 levels measuring 10.2 cm cephalocaudad, 3.8 cm AP, and 7.1 cm transverse. Differential considerations include an evolving postoperative seroma/hematoma or a CSF leak. Serial imaging follow-up over time is recommended to exclude possibility of early developing abscess. Reviewed by neurosurgery, no need for surgical intervention with no new symptoms. (16 May 2019 15:30)      PAST MEDICAL & SURGICAL HISTORY:  Bipolar disorder  Drug abuse  Incontinence  Schizophrenia  Hypothyroid  Hypertension  No significant past surgical history      MEDICATIONS  (STANDING):  ascorbic acid 500 milliGRAM(s) Oral daily  benztropine 0.5 milliGRAM(s) Oral two times a day  desmopressin 0.2 milliGRAM(s) Oral at bedtime  docusate sodium 100 milliGRAM(s) Oral two times a day  enoxaparin Injectable 40 milliGRAM(s) SubCutaneous every 24 hours  fluPHENAZine 5 milliGRAM(s) Oral two times a day  gabapentin 100 milliGRAM(s) Oral two times a day  levothyroxine 175 MICROGram(s) Oral daily  metoprolol succinate ER 50 milliGRAM(s) Oral daily  multivitamin 1 Tablet(s) Oral daily  nicotine - 21 mG/24Hr(s) Patch 1 patch Transdermal daily  nystatin Powder 1 Application(s) Topical two times a day  pantoprazole    Tablet 40 milliGRAM(s) Oral before breakfast  pyridoxine 50 milliGRAM(s) Oral daily  saccharomyces boulardii 250 milliGRAM(s) Oral two times a day  senna 2 Tablet(s) Oral at bedtime  trimethoprim  160 mG/sulfamethoxazole 800 mG 1 Tablet(s) Oral two times a day  valproic  acid Syrup 1000 milliGRAM(s) Oral at bedtime  valproic  acid Syrup 500 milliGRAM(s) Oral <User Schedule>    MEDICATIONS  (PRN):  acetaminophen   Tablet .. 650 milliGRAM(s) Oral every 6 hours PRN Temp greater or equal to 38C (100.4F), Mild Pain (1 - 3)  ALBUTerol    90 MICROgram(s) HFA Inhaler 2 Puff(s) Inhalation every 6 hours PRN Shortness of Breath and/or Wheezing  aluminum hydroxide/magnesium hydroxide/simethicone Suspension 30 milliLiter(s) Oral every 4 hours PRN Indigestion  benzocaine 15 mG/menthol 3.6 mG Lozenge 1 Lozenge Oral three times a day PRN sore throat/cough  bisacodyl 5 milliGRAM(s) Oral every 12 hours PRN Constipation  diazepam    Tablet 5 milliGRAM(s) Oral every 6 hours PRN Spasm  nicotine  Polacrilex Gum 4 milliGRAM(s) Oral every 6 hours PRN nicotine craving  polyethylene glycol 3350 17 Gram(s) Oral daily PRN Constipation      Allergies    No Known Allergies    Intolerances    Haldol (Unknown)  Thorazine (Unknown)    Subjective:  Pt seen and examined.  Pt c/o mild neck pain which has lessened since yesterday; the pain is non-radiating and eased with heat application.  Pt c/o persistent numbness and tingling both hands and feet.  Slept well.  Last BM 5/27.  Denies dysuria, frequency, urgency, incontinence, dyspnea, SOB or cough    VITALS  Vital Signs Last 24 Hrs  T(C): 36.6 (29 May 2019 09:06), Max: 36.6 (29 May 2019 09:06)  T(F): 97.9 (29 May 2019 09:06), Max: 97.9 (29 May 2019 09:06)  HR: 75 (29 May 2019 09:06) (75 - 90)  BP: 102/62 (29 May 2019 09:06) (102/62 - 120/72)  BP(mean): --  RR: 16 (29 May 2019 09:06) (16 - 18)  SpO2: 99% (29 May 2019 09:06) (97% - 99%)    General: NAD  Resp: CTAB  Cardio: +S1, S2  Abdomen: soft, NT, ND, normoactive bowel sounds  Extremities: no edema or calf tenderness. +Osgood-Schlatters noted bilaterally  Neuro: awake and alert     Motor: no increased tone     RUE: ShFl 5/5   EE 5/5    EF 5/5   WrEx 5/5   Interossei 3/5   ADM 4/5     LUE: ShFl 5/5   EE 5/5    EF 5/5   WrEx 5/5   Interossei 3/5   ADM 4/5     RLE: HF  3/5   KE 3-/5    DF 5/5   PF 4/5   EHL 1/5   Evertors 1/5     LLE: HF  3/5   KE 4/5    DF 5/5   PF 4/5   EHL 1/5   Evertors 1/5    Sensory: decreased pinprick from feet up to distal 1/3 tibia bilaterally    Reflexes: Right: Triceps 2+, Biceps 1+, Brachioradialis 1+, +Hoffmans,   patellar 2+ Achilles 2+                  Left: Triceps 2+, Biceps 1+, Brachioradialis 1+,  + Hoffmans    Patellar 1+, Achilles 2+  Down-going plantars  Skin: abrasions bilateral below patella, posterior neck surgical site- sutures removed, clean, no drainage, no erythema or edema noted; CDI    Functional status:  Bed mobility: Max A x 2  Transfers: Mod A   Gait: Unable  ADLs: UE dress Mod A, LE dress Mod A with AE    RECENT LABS:                          13.1   5.11  )-----------( 347      ( 27 May 2019 05:43 )             39.6     05-27    136  |  98  |  16  ----------------------------<  79  4.2   |  31  |  0.78    Ca    9.3      27 May 2019 05:43

## 2019-05-30 ENCOUNTER — TRANSCRIPTION ENCOUNTER (OUTPATIENT)
Age: 54
End: 2019-05-30

## 2019-05-30 VITALS — OXYGEN SATURATION: 98 %

## 2019-05-30 PROCEDURE — 99238 HOSP IP/OBS DSCHRG MGMT 30/<: CPT | Mod: GC

## 2019-05-30 RX ORDER — NICOTINE POLACRILEX 2 MG
21 GUM BUCCAL
Qty: 0 | Refills: 0 | DISCHARGE
Start: 2019-05-30

## 2019-05-30 RX ORDER — BENZTROPINE MESYLATE 1 MG
1 TABLET ORAL
Qty: 0 | Refills: 0 | DISCHARGE

## 2019-05-30 RX ORDER — BENZTROPINE MESYLATE 1 MG
1 TABLET ORAL
Qty: 0 | Refills: 0 | DISCHARGE
Start: 2019-05-30

## 2019-05-30 RX ORDER — VALPROIC ACID (AS SODIUM SALT) 250 MG/5ML
20 SOLUTION, ORAL ORAL
Qty: 600 | Refills: 0
Start: 2019-05-30 | End: 2019-06-28

## 2019-05-30 RX ORDER — FLUPHENAZINE HYDROCHLORIDE 1 MG/1
50 TABLET, FILM COATED ORAL
Qty: 0 | Refills: 0 | DISCHARGE
Start: 2019-05-30

## 2019-05-30 RX ORDER — SACCHAROMYCES BOULARDII 250 MG
1 POWDER IN PACKET (EA) ORAL
Qty: 0 | Refills: 0 | DISCHARGE
Start: 2019-05-30

## 2019-05-30 RX ORDER — MAGNESIUM OXIDE 400 MG ORAL TABLET 241.3 MG
1 TABLET ORAL
Qty: 0 | Refills: 0 | DISCHARGE

## 2019-05-30 RX ORDER — FLUPHENAZINE HYDROCHLORIDE 1 MG/1
1 TABLET, FILM COATED ORAL
Qty: 0 | Refills: 0 | DISCHARGE

## 2019-05-30 RX ORDER — CHOLECALCIFEROL (VITAMIN D3) 125 MCG
1000 CAPSULE ORAL
Qty: 0 | Refills: 0 | DISCHARGE
Start: 2019-05-30

## 2019-05-30 RX ORDER — NYSTATIN CREAM 100000 [USP'U]/G
1 CREAM TOPICAL
Qty: 0 | Refills: 0 | DISCHARGE
Start: 2019-05-30

## 2019-05-30 RX ORDER — DIVALPROEX SODIUM 500 MG/1
1 TABLET, DELAYED RELEASE ORAL
Qty: 0 | Refills: 0 | DISCHARGE

## 2019-05-30 RX ORDER — ASCORBIC ACID 60 MG
1 TABLET,CHEWABLE ORAL
Qty: 0 | Refills: 0 | DISCHARGE
Start: 2019-05-30

## 2019-05-30 RX ORDER — GABAPENTIN 400 MG/1
1 CAPSULE ORAL
Qty: 0 | Refills: 0 | DISCHARGE

## 2019-05-30 RX ORDER — DIAZEPAM 5 MG
2 TABLET ORAL ONCE
Refills: 0 | Status: DISCONTINUED | OUTPATIENT
Start: 2019-05-30 | End: 2019-05-30

## 2019-05-30 RX ORDER — ALBUTEROL 90 UG/1
2 AEROSOL, METERED ORAL
Qty: 0 | Refills: 0 | DISCHARGE
Start: 2019-05-30

## 2019-05-30 RX ORDER — BACLOFEN 100 %
1 POWDER (GRAM) MISCELLANEOUS
Qty: 0 | Refills: 0 | DISCHARGE

## 2019-05-30 RX ORDER — ALBUTEROL 90 UG/1
2 AEROSOL, METERED ORAL
Qty: 0 | Refills: 0 | DISCHARGE

## 2019-05-30 RX ORDER — PYRIDOXINE HCL (VITAMIN B6) 100 MG
1 TABLET ORAL
Qty: 0 | Refills: 0 | DISCHARGE
Start: 2019-05-30

## 2019-05-30 RX ORDER — VALPROIC ACID (AS SODIUM SALT) 250 MG/5ML
20 SOLUTION, ORAL ORAL
Qty: 0 | Refills: 0 | DISCHARGE
Start: 2019-05-30

## 2019-05-30 RX ORDER — VALPROIC ACID (AS SODIUM SALT) 250 MG/5ML
15 SOLUTION, ORAL ORAL
Qty: 0 | Refills: 0 | DISCHARGE
Start: 2019-05-30

## 2019-05-30 RX ORDER — FLUPHENAZINE HYDROCHLORIDE 1 MG/1
50 TABLET, FILM COATED ORAL
Refills: 0 | Status: CANCELLED | OUTPATIENT
Start: 2019-06-12 | End: 2019-05-30

## 2019-05-30 RX ADMIN — Medication 100 MILLIGRAM(S): at 06:01

## 2019-05-30 RX ADMIN — Medication 1 TABLET(S): at 12:39

## 2019-05-30 RX ADMIN — Medication 50 MILLIGRAM(S): at 06:01

## 2019-05-30 RX ADMIN — Medication 1 PATCH: at 12:38

## 2019-05-30 RX ADMIN — NYSTATIN CREAM 1 APPLICATION(S): 100000 CREAM TOPICAL at 06:01

## 2019-05-30 RX ADMIN — FLUPHENAZINE HYDROCHLORIDE 5 MILLIGRAM(S): 1 TABLET, FILM COATED ORAL at 06:01

## 2019-05-30 RX ADMIN — Medication 250 MILLIGRAM(S): at 06:01

## 2019-05-30 RX ADMIN — Medication 0.5 MILLIGRAM(S): at 06:01

## 2019-05-30 RX ADMIN — Medication 1 PATCH: at 08:23

## 2019-05-30 RX ADMIN — Medication 650 MILLIGRAM(S): at 08:29

## 2019-05-30 RX ADMIN — PANTOPRAZOLE SODIUM 40 MILLIGRAM(S): 20 TABLET, DELAYED RELEASE ORAL at 06:01

## 2019-05-30 RX ADMIN — Medication 2 MILLIGRAM(S): at 01:54

## 2019-05-30 RX ADMIN — Medication 1000 UNIT(S): at 12:39

## 2019-05-30 RX ADMIN — Medication 750 MILLIGRAM(S): at 08:25

## 2019-05-30 RX ADMIN — Medication 500 MILLIGRAM(S): at 12:39

## 2019-05-30 RX ADMIN — Medication 650 MILLIGRAM(S): at 09:00

## 2019-05-30 RX ADMIN — Medication 175 MICROGRAM(S): at 06:01

## 2019-05-30 RX ADMIN — Medication 4 MILLIGRAM(S): at 12:44

## 2019-05-30 RX ADMIN — Medication 50 MILLIGRAM(S): at 12:39

## 2019-05-30 NOTE — DISCHARGE NOTE PROVIDER - HOSPITAL COURSE
Hospital Course:     52 y/o Male, resident of [facility], with h/o bipolar d/o, HTN, nocturnal enuresis, deformity of lower leg, hypothyroid, schizophrenia, BPH, IVDA initially p/w c/o inability to walk due to lower extremity weakness which has been worsening for the last two weeks. Also complaining of lower back pain since sustaining a fall 2 weeks ago. In additions the pt reported paresthesias in both hands and frequent falls. Patient found to have C3-C6 moderate to severe cervical stenosis. He is s/p C3-C6 laminectomy and fusion on 4/27/19. Post operatively patient had left knee swelling. Knee xray done no fractures. Patient seen by PT recommending DC to  Rehab. Patient is accepted to [facility] and is stable for DC.         The patient was admitted to [facility] on 5/16/19. The patient participated in an comprehensive and rigorous rehabilitation program consisting of physical and occupational therapy. The patient was seen daily by a Physiatrist to oversee their rehabilitation care. The patient was admitted with deficits in gait and ambulation, ability to perform Activities of daily living, and overall function. There were weekly team meeting pertaining to the patients abilities to perform Tasks in PT/OT and goals were assigned. Due to the patient continuing to need help with ADLs and Ambulation it was recommended that he be discharged to Subacute Rehabilitation to continue to have therapy and work on goal of becoming independent.         Rehabilitation course: The patient had laboratory tests to monitor CBC (complete blood counts) and CMP (Complete metabolic Panel) that incorporates electrolytes and liver function tests. The patient had low valproate levels and his medication was adjusted.         The discharge plan was reviewed with the patient, medications were reviewed, and all questions were answered.

## 2019-05-30 NOTE — DISCHARGE NOTE PROVIDER - CARE PROVIDER_API CALL
Alex Villasenor)  Neurosurgery  General  [address], [address]  [place] [address]  Phone: [phone]  Fax: [phone]  Follow Up Time: 1 week    Haley Rodriguez)  PhysicalRehab Medicine  [address] [place] [id number]  Phone: [phone]  Fax: [phone]  Follow Up Time:

## 2019-05-30 NOTE — CONSULT NOTE ADULT - SUBJECTIVE AND OBJECTIVE BOX
Psychiatry Consultation-Liaison Follow-up Note:   52yo Male with extensive psychiatric history, remote hx of cocaine abuse.   Encounter: [facility]  Chart reviewed. Late entry, pt seen prior to d/c.   Contacted: Family for collateral/other treatment providers in community  Case Discussed with: Nursing staff/Treatment Team/Attending of Record.     Chief Complaint: " I'm doing alright, I'm leaving."     Interval History:  Pt clinically has done well while at rehab from a psychiatric perspective. Had not been a management problem.   He was cooperative, did not require rescue medications. Psychosis felt to still be present in an attenuated form, with mild thought   disorder and the occasional odd comment in terms of reality testing.   Pt was medication adherent. Pt is due Decanoate June 4th, give  50 mg can decrease oral prolixin to 5 mg,   2-3  weeks later depending on clinical response, can stop oral prolixin and leave him on Prolixin decanoate 50 mg q 3 weeks. ( target June 25th for next dose of 50 mg and d/c oral prolixin altogether).   Monitor for EPS.    Symptom progression/resolution:   Pt fairly asymptomatic while here, without mood or overt psychotic symptoms.   Depakote level remained low and so was adjusted several times to 1750 mg total dose, would repeat a level while at [facility].     MEDICATIONS  (STANDING):  ascorbic acid 500 milliGRAM(s) Oral daily  benztropine 0.5 milliGRAM(s) Oral two times a day  cholecalciferol 1000 Unit(s) Oral daily  desmopressin 0.2 milliGRAM(s) Oral at bedtime  docusate sodium 100 milliGRAM(s) Oral two times a day  enoxaparin Injectable 40 milliGRAM(s) SubCutaneous every 24 hours  fluPHENAZine 5 milliGRAM(s) Oral two times a day  levothyroxine 175 MICROGram(s) Oral daily  metoprolol succinate ER 50 milliGRAM(s) Oral daily  multivitamin 1 Tablet(s) Oral daily  nicotine - 21 mG/24Hr(s) Patch 1 patch Transdermal daily  nystatin Powder 1 Application(s) Topical two times a day  pantoprazole    Tablet 40 milliGRAM(s) Oral before breakfast  pyridoxine 50 milliGRAM(s) Oral daily  saccharomyces boulardii 250 milliGRAM(s) Oral two times a day  senna 2 Tablet(s) Oral at bedtime  valproic  acid Syrup 1000 milliGRAM(s) Oral at bedtime  valproic  acid Syrup 750 milliGRAM(s) Oral <User Schedule>    MEDICATIONS  (PRN):  acetaminophen   Tablet .. 650 milliGRAM(s) Oral every 6 hours PRN Temp greater or equal to 38C (100.4F), Mild Pain (1 - 3)  ALBUTerol    90 MICROgram(s) HFA Inhaler 2 Puff(s) Inhalation every 6 hours PRN Shortness of Breath and/or Wheezing  aluminum hydroxide/magnesium hydroxide/simethicone Suspension 30 milliLiter(s) Oral every 4 hours PRN Indigestion  benzocaine 15 mG/menthol 3.6 mG Lozenge 1 Lozenge Oral three times a day PRN sore throat/cough  bisacodyl 5 milliGRAM(s) Oral every 12 hours PRN Constipation  diazepam    Tablet 5 milliGRAM(s) Oral every 6 hours PRN Spasm  nicotine  Polacrilex Gum 4 milliGRAM(s) Oral every 6 hours PRN nicotine craving  oxyCODONE    IR 5 milliGRAM(s) Oral every 4 hours PRN Moderate Pain (4 - 6)  polyethylene glycol 3350 17 Gram(s) Oral daily PRN Constipation      Mental Status Examination:  Appearance:- well groomed, with new hair cut and shaved beard.   Attitude: - cooperative.   Gait/Station: -findings as  per physiatry or PT notes.  Motor Activity: - calm, but has deficits with motor control, hunched over posture.   Affect: - appropriate.   Mood: -" good".  Speech: -loud, but not pressured.   Thought process: -intact, some poverty of content. Will answer in short sentences.   Thought content/perceptions: generally goal directed, somewhat concrete.   Hallucinations: not present.  Delusions:  persecutory, and somatic, these may be chronic and his baseline.   Suicidal ideation: denied  Homicidal ideation:   denied  Sensorium: alert  Orientation: X3  Attention: grossly inctac.   Concentration: grossly intact.   Memory: Fair.   Insight/Judgment: Adequate for basic needs, has not been a management problem.     Laboratory testing- Last VPA level before current adjustment in dose= 40 which is an increase from 33.   Vital Signs Last 24 Hrs:   T(C): 36.5 (30 May 2019 08:42), Max: 36.7 (29 May 2019 23:50)  T(F): 97.7 (30 May 2019 08:42), Max: 98 (29 May 2019 23:50)  HR: 61 (30 May 2019 08:42) (61 - 71)  BP: 111/70 (30 May 2019 08:42) (110/73 - 123/72)  BP(mean): --  RR: 14 (30 May 2019 08:42) (14 - 14)  SpO2: 98% (30 May 2019 12:08) (96% - 98%)    Psychiatric Diagnosis: Schizoaffective d/o Bipolar type.     Recommendations:  as above, if possible, would see if transportation could be arranged to outpatient clinic for psychiatric follow up   while at [facility].     Medication:  Repeat VPA level next week.     Other Treatment considerations-  Level of supervision:  Routine.       Referrals- out patient clinic at [place].     Hospital course Follow-up:   ( call for clearance prior to discharge)-done.

## 2019-05-30 NOTE — PROGRESS NOTE ADULT - PROVIDER SPECIALTY LIST ADULT
Physiatry
Rehab Medicine
Physiatry
Rehab Medicine
Physiatry

## 2019-05-30 NOTE — DISCHARGE NOTE PROVIDER - NSDCACTIVITY_GEN_ALL_CORE
Showering allowed/Do not drive or operate machinery/No heavy lifting/straining/Walking - Indoors allowed/Walking - Outdoors allowed

## 2019-05-30 NOTE — DISCHARGE NOTE PROVIDER - CARE PROVIDERS DIRECT ADDRESSES
[email or web address]

## 2019-05-30 NOTE — DISCHARGE NOTE PROVIDER - NSDCFUADDINST_GEN_ALL_CORE_FT
Avoid long soaks and do not submerge incision in bathtub. Regular shower only and allow soap and water to run over the incision. Pat incision area dry with clean towel- do not scrub. Please shower regularly to ensure incision stays clean to avoid post operative infections.     Notify your surgeon if you notice increased redness, drainage or you notice incision area opening.     Return to ER immediately for high fevers, severe headache, vomiting, lethargy or weakness    Please call your neurosurgeon following discharge to make follow up appointment in 1 week after discharge unless otherwise specified. See contact information below.     Ambulate as tolerate. Continue with all "activities of daily living." Avoid strenuous activity or lifting more than 10 pounds until cleared for additional activity at your follow up appointment.

## 2019-05-30 NOTE — PROGRESS NOTE ADULT - REASON FOR ADMISSION
Functional Deficits After Laminectomy

## 2019-05-30 NOTE — PROGRESS NOTE ADULT - SUBJECTIVE AND OBJECTIVE BOX
Patient is a 53y old  Male who presents with a chief complaint of Functional Deficits After Laminectomy (26 May 2019 12:19)      HPI:  53 year old man, resident of [facility], with h/o bipolar d/o, HTN, nocturnal enuresis, deformity of lower leg, hypothyroid, schizophrenia, BPH, IVDA presented to [facility] on 4/25 with inability to walk due to lower extremity weakness which had been worsening for the previous two weeks. Also complained of lower back pain since sustaining a fall 2 weeks prior. In additions the pt reported paresthesias in both hands, however stated that he was able to feel his feet. Stated that he believes that he is being "possessed" and "held down" by somebody who passed away. Of note, the pt states that someone is trying to "poison" and "finish him off" at [place] and that he is being followed. Motion degraded MRI Cervical and thoracic spine obtained on 4/26 after patient had +duarte's, clonus and babinski on exam, showed multilevel cervical stenosis and possible cord signal change, started on decadron. On 5/1 he had C3-C6 laminectomy and fusion.  MRI C Spine on 5/15 to check for worsening stenosis showed A dorsal paraspinal subcutaneous nonspecific fluid collection noted from the C1-C7 levels measuring 10.2 cm cephalocaudad, 3.8 cm AP, and 7.1 cm transverse. Differential considerations include an evolving postoperative seroma/hematoma or a CSF leak. Serial imaging follow-up over time is recommended to exclude possibility of early developing abscess. Reviewed by neurosurgery, no need for surgical intervention with no new symptoms. (16 May 2019 15:30)    Subjective: No acute events overnight reported by the night team resident or nursing. Patient seen and evaluated at the bedside. Patient did receive extra dose of diazepam due to anxiety overnight. Patient is feeling well today. No new complaints. He is very thankful today for his care and stay with us.     [X] Constitutional WNL       [X] HEENT   [X] Cardio WNL              [X] Resp WNL            [X] GI WNL +BM x 2 days                            [X]  WNL + void                             [X] MSK WNL            [X] Neuro +numbness/tingling   [X] Cognitive WNL   [X] Psych +stable   [X] Skin WNL      [X] Heme WNL              [X] Endo WNL      VITALS  Vital Signs Last 24 Hrs  T(C): 36.5 (30 May 2019 08:42), Max: 36.7 (29 May 2019 23:50)  T(F): 97.7 (30 May 2019 08:42), Max: 98 (29 May 2019 23:50)  HR: 61 (30 May 2019 08:42) (61 - 71)  BP: 111/70 (30 May 2019 08:42) (110/73 - 123/72)  BP(mean): --  RR: 14 (30 May 2019 08:42) (14 - 14)  SpO2: 97% (30 May 2019 08:42) (96% - 97%)    General: NAD  Resp: CTAB  Cardio: +S1, S2  Abdomen: soft, NT, ND, normoactive bowel sounds  Extremities: no edema or calf tenderness. +Osgood-Schlatters noted bilaterally  Neuro: awake and alert     Motor: no increased tone, stable exam.      RUE: ShFl 5/5   EE 5/5    EF 5/5   WrEx 5/5   Interossei 3/5   ADM 4/5     LUE: ShFl 5/5   EE 5/5    EF 5/5   WrEx 5/5   Interossei 3/5   ADM 4/5     RLE: HF  3/5   KE 3-/5    DF 5/5   PF 4/5   EHL 1/5   Evertors 1/5     LLE: HF  3/5   KE 4/5    DF 5/5   PF 4/5   EHL 1/5   Evertors 1/5    Sensory: decreased pinprick from feet up to distal 1/3 tibia bilaterally    Reflexes: Right: Triceps 2+, Biceps 1+, Brachioradialis 1+, +Hoffmans,   patellar 2+ Achilles 2+                  Left: Triceps 2+, Biceps 1+, Brachioradialis 1+,  + Hoffmans    Patellar 1+, Achilles 2+  Down-going plantars  Skin: abrasions bilateral below patella, posterior neck surgical site- sutures removed, clean, no drainage, no erythema or edema noted; CDI    Functional status:  Bed mobility: Max A x 2  Transfers: Mod A   Gait: Unable  ADLs: UE dress Mod A, LE dress Mod A with AE    RECENT LABS:                          13.1   5.11  )-----------( 347      ( 27 May 2019 05:43 )             39.6     05-27    136  |  98  |  16  ----------------------------<  79  4.2   |  31  |  0.78    Ca    9.3      27 May 2019 05:43 Patient is a 53y old  Male who presents with a chief complaint of Functional Deficits After Laminectomy (26 May 2019 12:19)      HPI:  53 year old man, resident of [facility], with h/o bipolar d/o, HTN, nocturnal enuresis, deformity of lower leg, hypothyroid, schizophrenia, BPH, IVDA presented to [facility] on 4/25 with inability to walk due to lower extremity weakness which had been worsening for the previous two weeks. Also complained of lower back pain since sustaining a fall 2 weeks prior. In additions the pt reported paresthesias in both hands, however stated that he was able to feel his feet. Stated that he believes that he is being "possessed" and "held down" by somebody who passed away. Of note, the pt states that someone is trying to "poison" and "finish him off" at [place] and that he is being followed. Motion degraded MRI Cervical and thoracic spine obtained on 4/26 after patient had +duarte's, clonus and babinski on exam, showed multilevel cervical stenosis and possible cord signal change, started on decadron. On 5/1 he had C3-C6 laminectomy and fusion.  MRI C Spine on 5/15 to check for worsening stenosis showed A dorsal paraspinal subcutaneous nonspecific fluid collection noted from the C1-C7 levels measuring 10.2 cm cephalocaudad, 3.8 cm AP, and 7.1 cm transverse. Differential considerations include an evolving postoperative seroma/hematoma or a CSF leak. Serial imaging follow-up over time is recommended to exclude possibility of early developing abscess. Reviewed by neurosurgery, no need for surgical intervention with no new symptoms. (16 May 2019 15:30)    Subjective: No acute events overnight reported by the night team resident or nursing. Patient seen and evaluated at the bedside. Patient did receive extra dose of diazepam due to anxiety overnight. Patient is feeling well today. No new complaints. He is very thankful today for his care and stay with us.     [X] Constitutional WNL       [X] HEENT   [X] Cardio WNL              [X] Resp WNL            [X] GI WNL +BM x 2 days                            [X]  WNL + void                             [X] MSK WNL            [X] Neuro +numbness/tingling   [X] Cognitive WNL   [X] Psych +stable   [X] Skin WNL      [X] Heme WNL              [X] Endo WNL      VITALS  Vital Signs Last 24 Hrs  T(C): 36.5 (30 May 2019 08:42), Max: 36.7 (29 May 2019 23:50)  T(F): 97.7 (30 May 2019 08:42), Max: 98 (29 May 2019 23:50)  HR: 61 (30 May 2019 08:42) (61 - 71)  BP: 111/70 (30 May 2019 08:42) (110/73 - 123/72)  BP(mean): --  RR: 14 (30 May 2019 08:42) (14 - 14)  SpO2: 97% (30 May 2019 08:42) (96% - 97%)    General: NAD  Resp: CTAB  Cardio: +S1, S2  Abdomen: soft, NT, ND, normoactive bowel sounds  Extremities: no edema or calf tenderness. +Osgood-Schlatters noted bilaterally  Neuro: awake and alert     Motor: no increased tone, stable exam.      RUE: ShFl 5/5   EE 5/5    EF 5/5   WrEx 5/5   Interossei 3/5   ADM 4/5     LUE: ShFl 5/5   EE 5/5    EF 5/5   WrEx 5/5   Interossei 3/5   ADM 4/5     RLE: HF  3/5   KE 3-/5    DF 5/5   PF 4/5   EHL 1/5   Evertors 1/5     LLE: HF  3/5   KE 4/5    DF 5/5   PF 4/5   EHL 1/5   Evertors 1/5    Sensory: decreased pinprick from feet up to distal 1/3 tibia bilaterally    Reflexes: Right: Triceps 2+, Biceps 1+, Brachioradialis 1+, +Hoffmans,   patellar 2+ Achilles 2+                  Left: Triceps 2+, Biceps 1+, Brachioradialis 1+,  + Hoffmans    Patellar 1+, Achilles 2+  Down-going plantars  Skin: abrasions bilateral below patella, posterior neck surgical site- sutures removed, clean, no drainage, no erythema or edema noted; CDI    Functional status:  Bed mobility: Mod A   Transfers: Mod A   Gait: Unable  ADLs: UE dress Mod A, LE dress Mod A with AE    RECENT LABS:                          13.1   5.11  )-----------( 347      ( 27 May 2019 05:43 )             39.6     05-27    136  |  98  |  16  ----------------------------<  79  4.2   |  31  |  0.78    Ca    9.3      27 May 2019 05:43

## 2019-05-30 NOTE — DISCHARGE NOTE NURSING/CASE MANAGEMENT/SOCIAL WORK - NSDCDPATPORTLINK_GEN_ALL_CORE
You can access the [facility] Portal, offered by [facility], by registering with the following website: [email or web address]

## 2019-05-30 NOTE — PROGRESS NOTE ADULT - ASSESSMENT
IMPRESSION AND PLAN:  53 year-old man with a PMH of bipolar disorder/schizophrenia, lower leg deformity  with Gait Instability, ADL impairments and Functional impairments after cervical stenosis s/p cervical fusion and laminectomy    #Cervical Stenosis- s/p C3-C6 laminectomy and fusion with Gait Instability, ADL impairments, and Functional impairments- follow up MRI with improved stenosis and epidural fluid collection. -Fluid collection on MRI Deemed stable by NSGY- monitor  -Continue Comprehensive Rehab Program of PT/OT . neuropsychological services  -Bactrim for wound ppx total 14 days-course completed    #Schizoaffective d/o Bipolar type  -c/w Cogentin, Prolixin, Valproic Acid, Neurontin  -continue VPA 1000 qhs from 750 and cont 750 qam-VPA level on 5/27 40;  Appreciate psychiatry f/u.: can decrease Prolixin to q 3 weeks if given 50 mg IM and if tolerated. Would then taper and d/c oral Prolixin.     #Hypothyroidism  -c/w synthroid    #HTN  -c/w metoprolol    #Nocturnal Enuresis  -c/w Desmopressin    #Hygeine  -Oral Care  -Podiatry Consult 5/20 appreciated    #Neuropathy:  Decreased sensation, B/L Eversion weakness, EHL-   -Orthotist Eval     Pain Mgmt - Tylenol PRN, oxycodone 5 prn.  DCd gabapentin 100 bid  - warm compress prn    Bowel Regimen - c/w colace, senna; Dulcolax suppository, Miralax prn.    / Bladder - occasional incontinence-chronic nocturnal enuresis- bladder scan q12h, PVR, SC > 400ml  Latest PVRs 270, 244-DCd scans    DVT ppx: lovenox IMPRESSION AND PLAN:  53 year-old man with a PMH of bipolar disorder/schizophrenia, lower leg deformity  with Gait Instability, ADL impairments and Functional impairments after cervical stenosis s/p cervical fusion and laminectomy    #Cervical Stenosis- s/p C3-C6 laminectomy and fusion with Gait Instability, ADL impairments, and Functional impairments- follow up MRI with improved stenosis and epidural fluid collection. -Fluid collection on MRI Deemed stable by NSGY- monitor  -Continue Comprehensive Rehab Program of PT/OT . neuropsychological services  -Bactrim for wound ppx total 14 days-course completed    #Schizoaffective d/o Bipolar type  -c/w Cogentin, Prolixin, Valproic Acid, Neurontin  -continue VPA 1000 qhs from 750 and cont 750 qam-VPA level on 5/27 40;  Appreciate psychiatry f/u.: Prolixin every 3 weeks if given 50 mg IM and if tolerated. last dose given 5/22/19 and next dose due 6/12/19.  taper off and discontinue oral Prolixin.    #Hypothyroidism  -c/w synthroid    #HTN  -c/w metoprolol    #Nocturnal Enuresis  -c/w Desmopressin    #Hygeine  -Oral Care  -Podiatry Consult 5/20 appreciated    #Neuropathy:  Decreased sensation, B/L Eversion weakness, EHL-   -Orthotist Eval appreicatd      Pain Mgmt - Tylenol PRN, oxycodone 5 prn.  DCd gabapentin 100 bid  - warm compress prn    Bowel Regimen - c/w colace, senna; Dulcolax suppository, Miralax prn.    / Bladder - occasional incontinence-chronic nocturnal enuresis, voiding     DVT ppx: lovenox    Dispo - Discharge today IMPRESSION AND PLAN:  53 year-old man with a PMH of bipolar disorder/schizophrenia, lower leg deformity  with Gait Instability, ADL impairments and Functional impairments after cervical stenosis s/p cervical fusion and laminectomy    #Cervical Stenosis- s/p C3-C6 laminectomy and fusion with Gait Instability, ADL impairments, and Functional impairments- follow up MRI with improved stenosis and epidural fluid collection. -Fluid collection on MRI Deemed stable by NSGY- monitor  -Continue Comprehensive Rehab Program of PT/OT . neuropsychological services  -Bactrim for wound ppx total 14 days-course completed    #Schizoaffective d/o Bipolar type  -c/w Cogentin, Prolixin, Valproic Acid, Neurontin  -continue VPA 1000 qhs from 750 and cont 750 qam-VPA level on 5/27 40;  Appreciate psychiatry f/u.: Prolixin goal is 50mg every 3 weeks.  Prolixin 25mg last dose given 5/22/19 and next dose due 6/12/19 for full 50mg.  Please taper off and discontinue oral Prolixin to 5mg on 6/12/19 to Oral prolixin 5mg daily and Finally on 7/3/19 when Prolixin 50mg IM is given discontinue oral prolixin. Prolixin every 3 weeks if given 50mg IM there after 7/3/19.     #Hypothyroidism  -c/w synthroid    #HTN  -c/w metoprolol    #Nocturnal Enuresis  -c/w Desmopressin    #Hygeine  -Oral Care  -Podiatry Consult 5/20 appreciated    #Neuropathy:  Decreased sensation, B/L Eversion weakness, EHL-   -Orthotist Eval appreicatd      Pain Mgmt - Tylenol PRN, oxycodone 5 prn.  DCd gabapentin 100 bid  - warm compress prn    Bowel Regimen - c/w colace, senna; Dulcolax suppository, Miralax prn.    / Bladder - occasional incontinence-chronic nocturnal enuresis, voiding     DVT ppx: lovenox    Dispo - Discharge today IMPRESSION AND PLAN:  53 year-old man with a PMH of bipolar disorder/schizophrenia, lower leg deformity  with Gait Instability, ADL impairments and Functional impairments after cervical stenosis s/p cervical fusion and laminectomy    #Cervical Stenosis- s/p C3-C6 laminectomy and fusion with Gait Instability, ADL impairments, and Functional impairments- follow up MRI with improved stenosis and epidural fluid collection. -Fluid collection on MRI Deemed stable by NSGY- monitor  -Continue Comprehensive Rehab Program of PT/OT . neuropsychological services  -Bactrim for wound ppx total 14 days-course completed    #Schizoaffective d/o Bipolar type  -c/w Cogentin, Prolixin, Valproic Acid, Neurontin  -continue VPA 1000 qhs from 750 and cont 750 qam-VPA level on 5/27 40;  Appreciate psychiatry f/u.: Prolixin goal is 50mg every 3 weeks.  Prolixin 25mg last dose given 5/22/19 and next dose due 6/12/19 for full 50mg.  Please taper off and discontinue oral Prolixin to 5mg on 6/12/19 to Oral prolixin 5mg daily and Finally on 7/3/19 when Prolixin 50mg IM is given discontinue oral prolixin. Prolixin every 3 weeks if given 50mg IM there after 7/3/19.     #Hypothyroidism  -c/w synthroid    #HTN  -c/w metoprolol    #Nocturnal Enuresis  -c/w Desmopressin    #Hygiene  -Oral Care  -Podiatry Consult 5/20 appreciated    #Neuropathy:  Decreased sensation, B/L Eversion weakness, EHL-   -Orthotist Eval appreicatd      Pain Mgmt - Tylenol PRN, oxycodone 5 prn.  DCd gabapentin 100 bid  - warm compress prn    Bowel Regimen - c/w colace, senna; Dulcolax suppository, Miralax prn.    / Bladder - occasional incontinence-chronic nocturnal enuresis, voiding     DVT ppx: lovenox    Dispo - Discharge today

## 2019-05-30 NOTE — CONSULT NOTE ADULT - REASON FOR ADMISSION
Functional Deficits After Laminectomy

## 2019-05-30 NOTE — PROGRESS NOTE ADULT - ATTENDING COMMENTS
Agree with above.  Medically stable.  Pt undergoing comprehensive rehab eval
Agree with above.  Doing well.  Medically stable.  Mood good.  Denies pain.  Cont comprehensive rehab, dvt ppx, medical management.
Agree with above.  Medically stable.  Appreciate psych input.  DC to [facility]

## 2019-05-30 NOTE — DISCHARGE NOTE PROVIDER - NSDCCPCAREPLAN_GEN_ALL_CORE_FT
PRINCIPAL DISCHARGE DIAGNOSIS  Diagnosis: Stenosis, cervical spine  Assessment and Plan of Treatment: Continue PT/OT while at [facility]acute rehab.      SECONDARY DISCHARGE DIAGNOSES  Diagnosis: Schizophrenia  Assessment and Plan of Treatment: Continue your medicaitons. PRINCIPAL DISCHARGE DIAGNOSIS  Diagnosis: Stenosis, cervical spine  Assessment and Plan of Treatment: Continue PT/OT while at Subacute rehab.      SECONDARY DISCHARGE DIAGNOSES  Diagnosis: Schizophrenia  Assessment and Plan of Treatment: Continue your medicaitons. Prolixin every 3 weeks if given 50 mg IM and if tolerated. last dose given 5/22/19 and next dose due 6/12/19.  Please taper off and discontinue oral Prolixin. PRINCIPAL DISCHARGE DIAGNOSIS  Diagnosis: Stenosis, cervical spine  Assessment and Plan of Treatment: Continue PT/OT while at Subacute rehab.      SECONDARY DISCHARGE DIAGNOSES  Diagnosis: Schizophrenia  Assessment and Plan of Treatment: Continue your medicaitons. Prolixin goal is 50mg every 3 weeks.    Prolixin 25mg last dose given 5/22/19 and next dose due 6/12/19 for full 50mg.  Please taper off and discontinue oral Prolixin to 5mg on 6/12/19 to Oral prolixin 5mg daily. and Finaly on 7/3/19 when Prolixin 50mg IM is given discontinue oral prolixin.   Prolixin every 3 weeks if given 50mg IM there after 7/3/19.

## 2019-06-07 PROCEDURE — 80164 ASSAY DIPROPYLACETIC ACD TOT: CPT

## 2019-06-07 PROCEDURE — 97110 THERAPEUTIC EXERCISES: CPT

## 2019-06-07 PROCEDURE — 93005 ELECTROCARDIOGRAM TRACING: CPT

## 2019-06-07 PROCEDURE — 97163 PT EVAL HIGH COMPLEX 45 MIN: CPT

## 2019-06-07 PROCEDURE — 85027 COMPLETE CBC AUTOMATED: CPT

## 2019-06-07 PROCEDURE — 80048 BASIC METABOLIC PNL TOTAL CA: CPT

## 2019-06-07 PROCEDURE — 82607 VITAMIN B-12: CPT

## 2019-06-07 PROCEDURE — 97535 SELF CARE MNGMENT TRAINING: CPT

## 2019-06-07 PROCEDURE — 94640 AIRWAY INHALATION TREATMENT: CPT

## 2019-06-07 PROCEDURE — 80053 COMPREHEN METABOLIC PANEL: CPT

## 2019-06-07 PROCEDURE — 97530 THERAPEUTIC ACTIVITIES: CPT

## 2019-06-07 PROCEDURE — 97116 GAIT TRAINING THERAPY: CPT

## 2019-06-07 PROCEDURE — 97167 OT EVAL HIGH COMPLEX 60 MIN: CPT

## 2019-06-07 PROCEDURE — 36415 COLL VENOUS BLD VENIPUNCTURE: CPT

## 2019-06-07 PROCEDURE — 82306 VITAMIN D 25 HYDROXY: CPT

## 2019-06-07 PROCEDURE — 84134 ASSAY OF PREALBUMIN: CPT

## 2019-07-03 ENCOUNTER — INPATIENT (INPATIENT)
Facility: HOSPITAL | Age: 54
LOS: 18 days | Discharge: NURSING, REHAB OR LONG-TERM CARE | DRG: 885 | End: 2019-07-22
Attending: INTERNAL MEDICINE | Admitting: HOSPITALIST
Payer: MEDICARE

## 2019-07-03 VITALS
RESPIRATION RATE: 18 BRPM | TEMPERATURE: 97 F | DIASTOLIC BLOOD PRESSURE: 73 MMHG | SYSTOLIC BLOOD PRESSURE: 126 MMHG | HEIGHT: 75 IN | OXYGEN SATURATION: 97 % | HEART RATE: 76 BPM | WEIGHT: 220.02 LBS

## 2019-07-03 DIAGNOSIS — F20.9 SCHIZOPHRENIA, UNSPECIFIED: ICD-10-CM

## 2019-07-03 DIAGNOSIS — E03.9 HYPOTHYROIDISM, UNSPECIFIED: ICD-10-CM

## 2019-07-03 DIAGNOSIS — N39.44 NOCTURNAL ENURESIS: ICD-10-CM

## 2019-07-03 DIAGNOSIS — F17.200 NICOTINE DEPENDENCE, UNSPECIFIED, UNCOMPLICATED: ICD-10-CM

## 2019-07-03 DIAGNOSIS — M50.90 CERVICAL DISC DISORDER, UNSPECIFIED, UNSPECIFIED CERVICAL REGION: ICD-10-CM

## 2019-07-03 DIAGNOSIS — R45.851 SUICIDAL IDEATIONS: ICD-10-CM

## 2019-07-03 DIAGNOSIS — F31.9 BIPOLAR DISORDER, UNSPECIFIED: ICD-10-CM

## 2019-07-03 DIAGNOSIS — I10 ESSENTIAL (PRIMARY) HYPERTENSION: ICD-10-CM

## 2019-07-03 LAB
ALBUMIN SERPL ELPH-MCNC: 3.7 G/DL — SIGNIFICANT CHANGE UP (ref 3.3–5)
ALP SERPL-CCNC: 116 U/L — SIGNIFICANT CHANGE UP (ref 40–120)
ALT FLD-CCNC: 33 U/L DA — SIGNIFICANT CHANGE UP (ref 10–45)
AMPHET UR-MCNC: NEGATIVE — SIGNIFICANT CHANGE UP
ANION GAP SERPL CALC-SCNC: 7 MMOL/L — SIGNIFICANT CHANGE UP (ref 5–17)
APTT BLD: 36.8 SEC — HIGH (ref 27.5–36.3)
AST SERPL-CCNC: 22 U/L — SIGNIFICANT CHANGE UP (ref 10–40)
BARBITURATES UR SCN-MCNC: NEGATIVE — SIGNIFICANT CHANGE UP
BENZODIAZ UR-MCNC: NEGATIVE — SIGNIFICANT CHANGE UP
BILIRUB SERPL-MCNC: 0.4 MG/DL — SIGNIFICANT CHANGE UP (ref 0.2–1.2)
BUN SERPL-MCNC: 12 MG/DL — SIGNIFICANT CHANGE UP (ref 7–23)
CALCIUM SERPL-MCNC: 9.7 MG/DL — SIGNIFICANT CHANGE UP (ref 8.4–10.5)
CHLORIDE SERPL-SCNC: 102 MMOL/L — SIGNIFICANT CHANGE UP (ref 96–108)
CO2 SERPL-SCNC: 27 MMOL/L — SIGNIFICANT CHANGE UP (ref 22–31)
COCAINE METAB.OTHER UR-MCNC: NEGATIVE — SIGNIFICANT CHANGE UP
CREAT SERPL-MCNC: 0.65 MG/DL — SIGNIFICANT CHANGE UP (ref 0.5–1.3)
ETHANOL SERPL-MCNC: <3 MG/DL — SIGNIFICANT CHANGE UP (ref 0–3)
GLUCOSE SERPL-MCNC: 99 MG/DL — SIGNIFICANT CHANGE UP (ref 70–99)
HCT VFR BLD CALC: 42.9 % — SIGNIFICANT CHANGE UP (ref 39–50)
HGB BLD-MCNC: 14.4 G/DL — SIGNIFICANT CHANGE UP (ref 13–17)
INR BLD: 1.21 RATIO — HIGH (ref 0.88–1.16)
LIDOCAIN IGE QN: 88 U/L — SIGNIFICANT CHANGE UP (ref 73–393)
MAGNESIUM SERPL-MCNC: 2 MG/DL — SIGNIFICANT CHANGE UP (ref 1.6–2.6)
MCHC RBC-ENTMCNC: 28.7 PG — SIGNIFICANT CHANGE UP (ref 27–34)
MCHC RBC-ENTMCNC: 33.6 GM/DL — SIGNIFICANT CHANGE UP (ref 32–36)
MCV RBC AUTO: 85.5 FL — SIGNIFICANT CHANGE UP (ref 80–100)
METHADONE UR-MCNC: NEGATIVE — SIGNIFICANT CHANGE UP
NRBC # BLD: 0 /100 WBCS — SIGNIFICANT CHANGE UP (ref 0–0)
NT-PROBNP SERPL-SCNC: 129 PG/ML — SIGNIFICANT CHANGE UP (ref 0–300)
OPIATES UR-MCNC: NEGATIVE — SIGNIFICANT CHANGE UP
PCP SPEC-MCNC: SIGNIFICANT CHANGE UP
PCP UR-MCNC: NEGATIVE — SIGNIFICANT CHANGE UP
PLATELET # BLD AUTO: 394 K/UL — SIGNIFICANT CHANGE UP (ref 150–400)
POTASSIUM SERPL-MCNC: 4.3 MMOL/L — SIGNIFICANT CHANGE UP (ref 3.5–5.3)
POTASSIUM SERPL-SCNC: 4.3 MMOL/L — SIGNIFICANT CHANGE UP (ref 3.5–5.3)
PROT SERPL-MCNC: 7.8 G/DL — SIGNIFICANT CHANGE UP (ref 6–8.3)
PROTHROM AB SERPL-ACNC: 13.6 SEC — HIGH (ref 10–12.9)
RBC # BLD: 5.02 M/UL — SIGNIFICANT CHANGE UP (ref 4.2–5.8)
RBC # FLD: 13 % — SIGNIFICANT CHANGE UP (ref 10.3–14.5)
SODIUM SERPL-SCNC: 136 MMOL/L — SIGNIFICANT CHANGE UP (ref 135–145)
THC UR QL: NEGATIVE — SIGNIFICANT CHANGE UP
TROPONIN I SERPL-MCNC: <.017 NG/ML — LOW (ref 0.02–0.06)
WBC # BLD: 7.91 K/UL — SIGNIFICANT CHANGE UP (ref 3.8–10.5)
WBC # FLD AUTO: 7.91 K/UL — SIGNIFICANT CHANGE UP (ref 3.8–10.5)

## 2019-07-03 PROCEDURE — 99233 SBSQ HOSP IP/OBS HIGH 50: CPT

## 2019-07-03 PROCEDURE — 71045 X-RAY EXAM CHEST 1 VIEW: CPT | Mod: 26

## 2019-07-03 PROCEDURE — 99285 EMERGENCY DEPT VISIT HI MDM: CPT

## 2019-07-03 PROCEDURE — 93010 ELECTROCARDIOGRAM REPORT: CPT

## 2019-07-03 RX ORDER — ENOXAPARIN SODIUM 100 MG/ML
40 INJECTION SUBCUTANEOUS AT BEDTIME
Refills: 0 | Status: DISCONTINUED | OUTPATIENT
Start: 2019-07-03 | End: 2019-07-22

## 2019-07-03 RX ORDER — PYRIDOXINE HCL (VITAMIN B6) 100 MG
50 TABLET ORAL DAILY
Refills: 0 | Status: DISCONTINUED | OUTPATIENT
Start: 2019-07-03 | End: 2019-07-22

## 2019-07-03 RX ORDER — NICOTINE POLACRILEX 2 MG
1 GUM BUCCAL DAILY
Refills: 0 | Status: DISCONTINUED | OUTPATIENT
Start: 2019-07-03 | End: 2019-07-22

## 2019-07-03 RX ORDER — ACETAMINOPHEN 500 MG
650 TABLET ORAL EVERY 6 HOURS
Refills: 0 | Status: DISCONTINUED | OUTPATIENT
Start: 2019-07-03 | End: 2019-07-22

## 2019-07-03 RX ORDER — METOPROLOL TARTRATE 50 MG
50 TABLET ORAL DAILY
Refills: 0 | Status: DISCONTINUED | OUTPATIENT
Start: 2019-07-03 | End: 2019-07-22

## 2019-07-03 RX ORDER — ASCORBIC ACID 60 MG
500 TABLET,CHEWABLE ORAL DAILY
Refills: 0 | Status: DISCONTINUED | OUTPATIENT
Start: 2019-07-03 | End: 2019-07-22

## 2019-07-03 RX ORDER — BENZTROPINE MESYLATE 1 MG
1 TABLET ORAL
Refills: 0 | Status: DISCONTINUED | OUTPATIENT
Start: 2019-07-03 | End: 2019-07-11

## 2019-07-03 RX ORDER — DOCUSATE SODIUM 100 MG
100 CAPSULE ORAL THREE TIMES A DAY
Refills: 0 | Status: DISCONTINUED | OUTPATIENT
Start: 2019-07-03 | End: 2019-07-12

## 2019-07-03 RX ORDER — LEVOTHYROXINE SODIUM 125 MCG
175 TABLET ORAL DAILY
Refills: 0 | Status: DISCONTINUED | OUTPATIENT
Start: 2019-07-03 | End: 2019-07-22

## 2019-07-03 RX ORDER — FLUPHENAZINE HYDROCHLORIDE 1 MG/1
5 TABLET, FILM COATED ORAL
Refills: 0 | Status: DISCONTINUED | OUTPATIENT
Start: 2019-07-03 | End: 2019-07-04

## 2019-07-03 RX ORDER — DESMOPRESSIN ACETATE 0.1 MG/1
0.2 TABLET ORAL AT BEDTIME
Refills: 0 | Status: DISCONTINUED | OUTPATIENT
Start: 2019-07-03 | End: 2019-07-22

## 2019-07-03 RX ORDER — SENNA PLUS 8.6 MG/1
2 TABLET ORAL AT BEDTIME
Refills: 0 | Status: DISCONTINUED | OUTPATIENT
Start: 2019-07-03 | End: 2019-07-22

## 2019-07-03 RX ORDER — NYSTATIN CREAM 100000 [USP'U]/G
1 CREAM TOPICAL
Refills: 0 | Status: DISCONTINUED | OUTPATIENT
Start: 2019-07-03 | End: 2019-07-22

## 2019-07-03 RX ORDER — LANOLIN ALCOHOL/MO/W.PET/CERES
3 CREAM (GRAM) TOPICAL AT BEDTIME
Refills: 0 | Status: DISCONTINUED | OUTPATIENT
Start: 2019-07-03 | End: 2019-07-22

## 2019-07-03 RX ORDER — ACETAMINOPHEN 500 MG
650 TABLET ORAL ONCE
Refills: 0 | Status: COMPLETED | OUTPATIENT
Start: 2019-07-03 | End: 2019-07-03

## 2019-07-03 RX ORDER — FLUPHENAZINE HYDROCHLORIDE 1 MG/1
1 TABLET, FILM COATED ORAL
Qty: 0 | Refills: 0 | DISCHARGE

## 2019-07-03 RX ORDER — OXYCODONE HYDROCHLORIDE 5 MG/1
5 TABLET ORAL EVERY 4 HOURS
Refills: 0 | Status: DISCONTINUED | OUTPATIENT
Start: 2019-07-03 | End: 2019-07-09

## 2019-07-03 RX ORDER — NITROGLYCERIN 6.5 MG
0 CAPSULE, EXTENDED RELEASE ORAL
Qty: 0 | Refills: 0 | DISCHARGE

## 2019-07-03 RX ORDER — ALBUTEROL 90 UG/1
2 AEROSOL, METERED ORAL EVERY 6 HOURS
Refills: 0 | Status: DISCONTINUED | OUTPATIENT
Start: 2019-07-03 | End: 2019-07-22

## 2019-07-03 RX ORDER — POLYETHYLENE GLYCOL 3350 17 G/17G
17 POWDER, FOR SOLUTION ORAL DAILY
Refills: 0 | Status: DISCONTINUED | OUTPATIENT
Start: 2019-07-03 | End: 2019-07-06

## 2019-07-03 RX ADMIN — Medication 650 MILLIGRAM(S): at 13:00

## 2019-07-03 RX ADMIN — Medication 650 MILLIGRAM(S): at 12:01

## 2019-07-03 RX ADMIN — Medication 650 MILLIGRAM(S): at 17:45

## 2019-07-03 RX ADMIN — ENOXAPARIN SODIUM 40 MILLIGRAM(S): 100 INJECTION SUBCUTANEOUS at 22:48

## 2019-07-03 RX ADMIN — DESMOPRESSIN ACETATE 0.2 MILLIGRAM(S): 0.1 TABLET ORAL at 22:49

## 2019-07-03 RX ADMIN — Medication 650 MILLIGRAM(S): at 20:05

## 2019-07-03 RX ADMIN — SENNA PLUS 2 TABLET(S): 8.6 TABLET ORAL at 22:49

## 2019-07-03 RX ADMIN — Medication 100 MILLIGRAM(S): at 22:50

## 2019-07-03 NOTE — ED BEHAVIORAL HEALTH ASSESSMENT NOTE - OTHER
15 Melinda Alvarez Intense, wide-eyed staring Oddly related Needs further assessment from medical team Pt reports he is primarily bed-bound, able to transfer to a wheelchair with assist, stand with a walker and can walk a few steps with assistance from PT Intermittent latency noted especially when open-ended questions are asked Blunted concrete Impoverished Likely chronically poor Staff at [facility] Staff at [facility] peers hx of chronic mental illness medication compliance; Resides in a supervised facility Recent medical issues resulting in decreased functioning and independence. telepych, external billing

## 2019-07-03 NOTE — ED BEHAVIORAL HEALTH ASSESSMENT NOTE - RISK ASSESSMENT
Risk factors: Chronic mental illness, mood episode, current SI, severe anxiety r/t paranoia, chronic pain/acute medical problems, prior history of suicide attempts, [facility], anhedonia, impulsivity, hopelessness/despair, strained or limited social supports, minimal insight into illness, and unable to engage in safety planning.     Protective factors: Intact family supports, compliance with psychiatric treatment and medications, positive therapeutic relationships, abstinence from substances, no access to firearms, help seeking behaviors.    Acute Risk (harm to self/others, inability to care for self)  (X) High   (  ) Moderate   (  ) Low   (  ) Unable to determine   Rationale - See Above Risk/Protective Factors    Elevated Chronic Risk   (  ) No    (X) Yes  Details - See Above Risk/Protective Factors

## 2019-07-03 NOTE — ED ADULT NURSE NOTE - CHPI ED NUR SYMPTOMS NEG
no syncope/no fever/no diaphoresis/no shortness of breath/no dizziness/no chills/no nausea/no vomiting/no congestion

## 2019-07-03 NOTE — ED BEHAVIORAL HEALTH ASSESSMENT NOTE - SUICIDE RISK FACTORS
Mood episode/Anhedonia/Hopelessness/Other/Chronic pain or acute medical issue/Unable to engage in safety planning

## 2019-07-03 NOTE — ED BEHAVIORAL HEALTH ASSESSMENT NOTE - AXIS III
hypothyroidism, HLD, HTN, enuresis, s/p perianal fistulectomy 7/17 & BPH. HTN, HLD, Hypothyroidism, Enuresis, s/p perianal fistulectomy 7/17, and recent dx of spinal stenosis s/p laminectomy and fusion 05/01/19

## 2019-07-03 NOTE — ED BEHAVIORAL HEALTH ASSESSMENT NOTE - PSYCHIATRIC ISSUES AND PLAN (INCLUDE STANDING AND PRN MEDICATION)
Continue Depakote 750mg daily (would consider increasing once proper level is received tomorrow AM), Prolixin 5mg BID, Cogentin 1mg BID, and Melatonin 5mg HS. Pt states he best responds to Ativan as a PRN so would use first line Ativan 2mg PO/IM Q6H PRN for anxiety/agitation but can also use Prolixin 5mg PO/IM Q6H PRN for psychosis/agitation with Benadryl 50mg PO/IM Q6H PRN for insomnia at HS and agitation/EPS PPX with Prolixin. Continue Depakote 750mg daily (would consider increasing once proper level is received tomorrow AM), Prolixin 5mg BID, Cogentin 1mg BID, and Melatonin 5mg HS. Pt states he best responds to Ativan as a PRN so would use first line Ativan 2mg PO/IM Q6H PRN for anxiety/agitation but can also use Prolixin 5mg PO/IM Q6H PRN for psychosis/agitation with Benadryl 50mg PO/IM Q6H PRN for insomnia at HS and agitation/EPS PPX with Prolixin. please check EKG for qtc prolongation when giving antipsychotics/

## 2019-07-03 NOTE — ED PROVIDER NOTE - PROGRESS NOTE DETAILS
asking for lunch. no acute concerns. Vitals stable while here. EKG nSR Father spoke with patient on phone and then called me. He has concerns that his son is depressed. I went to re-evaluate the patient who states he feels depressed and when asked about suicidality, he affirms that he feels suicidal without plan. We have added BAL and Keira and will activate telepsych for evaluation. Rupesh by [facility] who recc psych admission but pt has many medical needs due to his recent surgery and disability. They recc medical admission with psych CL service. Will d/w Dr Arthur.

## 2019-07-03 NOTE — H&P ADULT - ATTENDING COMMENTS
I have personally seen and examined patient on the above date.  I discussed the case with GORGE Meredith and I agree with findings and plan as detailed per note above, which I have amended where appropriate.      Suicidal ideation but with intent  patient severely depressed with history of bipolar disorder  psych following   1:1 observation

## 2019-07-03 NOTE — ED BEHAVIORAL HEALTH ASSESSMENT NOTE - OTHER PAST PSYCHIATRIC HISTORY (INCLUDE DETAILS REGARDING ONSET, COURSE OF ILLNESS, INPATIENT/OUTPATIENT TREATMENT)
PSYCKES review:  Dx: Schizoaffective d/o, Cocaine related d/o, Schizophrenia, Adjustment d/o, Unspecified Bipolar, Alcohol related d/o, Cannabis related d/o.     Medications: None listed, pt on Medicare.     Hospitalizations: Two one day hospitalizations - [facility] 04/02/19 and [facility] 03/08-03/09 which are likely not real inpt admissions. Has a [facility] admission 08/24-10/11/28 and two admissions to [facility] 11/17/17-08/08/18 (264 days) and 07/30/15 - 10/25/17 (818 days).

## 2019-07-03 NOTE — ED BEHAVIORAL HEALTH ASSESSMENT NOTE - DESCRIPTION (FIRST USE, LAST USE, QUANTITY, FREQUENCY, DURATION)
Reports hx of alcohol abuse but states he is 4 years sober history of use; reports 4 years sober history crack use; reports 4 year sober

## 2019-07-03 NOTE — ED BEHAVIORAL HEALTH ASSESSMENT NOTE - DETAILS
Listed intolerances to Haldol and Thorazine Crohn's disease mother Had CP earlier but now resolved pt has remote hx of SA 20 yrs ago via OD, no recent attempts or self injury Documented hx of aggression but unclear details Dr. Villasenor self

## 2019-07-03 NOTE — H&P ADULT - NSHPSOCIALHISTORY_GEN_ALL_CORE
lives at [facility]  uses wheelchair  Quit smoking 4 weeks ago--prior to that smoked 1 ppd for over 20 yrs  denies etoh use  h.o cocaine use in the past

## 2019-07-03 NOTE — H&P ADULT - ASSESSMENT
54 yo M currently residing at [facility] s/p recent discharge from  acute rehab secondary to C3-C6 laminectomy and fusion on 19. with h/o bipolar d/o, HTN, nocturnal enuresis, deformity of lower leg, hypothyroid, schizophrenia, BPH, IVDA admitted to hospitalist with suicidal ideation. He currently has no plan.  Psychiatry consulted. Placed on 1:1 observation.    CAPRINI SCORE [CLOT]    AGE RELATED RISK FACTORS                                                       MOBILITY RELATED FACTORS  [x ] Age 41-60 years                                            (1 Point)                  [ ] Bed rest                                                        (1 Point)  [ ] Age: 61-74 years                                           (2 Points)                 [ ] Plaster cast                                                   (2 Points)  [ ] Age= 75 years                                              (3 Points)                 [ ] Bed bound for more than 72 hours                 (2 Points)    DISEASE RELATED RISK FACTORS                                               GENDER SPECIFIC FACTORS  [ ] Edema in the lower extremities                       (1 Point)                  [ ] Pregnancy                                                     (1 Point)  [ ] Varicose veins                                               (1 Point)                  [ ] Post-partum < 6 weeks                                   (1 Point)             [x ] BMI > 25 Kg/m2                                            (1 Point)                  [ ] Hormonal therapy  or oral contraception          (1 Point)                 [ ] Sepsis (in the previous month)                        (1 Point)                  [ ] History of pregnancy complications                 (1 point)  [ ] Pneumonia or serious lung disease                                               [ ] Unexplained or recurrent                     (1 Point)           (in the previous month)                               (1 Point)  [ ] Abnormal pulmonary function test                     (1 Point)                 SURGERY RELATED RISK FACTORS  [ ] Acute myocardial infarction                              (1 Point)                 [ ]  Section                                             (1 Point)  [ ] Congestive heart failure (in the previous month)  (1 Point)               [ ] Minor surgery                                                  (1 Point)   [ ] Inflammatory bowel disease                             (1 Point)                 [ ] Arthroscopic surgery                                        (2 Points)  [ ] Central venous access                                      (2 Points)                [ ] General surgery lasting more than 45 minutes   (2 Points)       [ ] Stroke (in the previous month)                          (5 Points)               [ ] Elective arthroplasty                                         (5 Points)                                                                                                                                               HEMATOLOGY RELATED FACTORS                                                 TRAUMA RELATED RISK FACTORS  [ ] Prior episodes of VTE                                     (3 Points)                 [ ] Fracture of the hip, pelvis, or leg                       (5 Points)  [ ] Positive family history for VTE                         (3 Points)                 [ ] Acute spinal cord injury (in the previous month)  (5 Points)  [ ] Prothrombin 40104 A                                     (3 Points)                 [ ] Paralysis  (less than 1 month)                             (5 Points)  [ ] Factor V Leiden                                             (3 Points)                  [ ] Multiple Trauma within 1 month                        (5 Points)  [ ] Lupus anticoagulants                                     (3 Points)                                                           [ ] Anticardiolipin antibodies                               (3 Points)                                                       [ ] High homocysteine in the blood                      (3 Points)                                             [ ] Other congenital or acquired thrombophilia      (3 Points)                                                [ ] Heparin induced thrombocytopenia                  (3 Points)                                          Total Score [     2     ] 54 yo M currently residing at [facility] s/p recent discharge from  acute rehab secondary to C3-C6 laminectomy and fusion on 19 with PMHx bipolar d/o, HTN, nocturnal enuresis, deformity of lower leg, hypothyroid, schizophrenia, BPH, IVDA admitted to hospitalist with suicidal ideation. He currently has no plan.  Psychiatry consulted. Placed on 1:1 observation.     CAPRINI SCORE [CLOT]    AGE RELATED RISK FACTORS                                                       MOBILITY RELATED FACTORS  [x ] Age 41-60 years                                            (1 Point)                  [ ] Bed rest                                                        (1 Point)  [ ] Age: 61-74 years                                           (2 Points)                 [ ] Plaster cast                                                   (2 Points)  [ ] Age= 75 years                                              (3 Points)                 [ ] Bed bound for more than 72 hours                 (2 Points)    DISEASE RELATED RISK FACTORS                                               GENDER SPECIFIC FACTORS  [ ] Edema in the lower extremities                       (1 Point)                  [ ] Pregnancy                                                     (1 Point)  [ ] Varicose veins                                               (1 Point)                  [ ] Post-partum < 6 weeks                                   (1 Point)             [x ] BMI > 25 Kg/m2                                            (1 Point)                  [ ] Hormonal therapy  or oral contraception          (1 Point)                 [ ] Sepsis (in the previous month)                        (1 Point)                  [ ] History of pregnancy complications                 (1 point)  [ ] Pneumonia or serious lung disease                                               [ ] Unexplained or recurrent                     (1 Point)           (in the previous month)                               (1 Point)  [ ] Abnormal pulmonary function test                     (1 Point)                 SURGERY RELATED RISK FACTORS  [ ] Acute myocardial infarction                              (1 Point)                 [ ]  Section                                             (1 Point)  [ ] Congestive heart failure (in the previous month)  (1 Point)               [ ] Minor surgery                                                  (1 Point)   [ ] Inflammatory bowel disease                             (1 Point)                 [ ] Arthroscopic surgery                                        (2 Points)  [ ] Central venous access                                      (2 Points)                [ ] General surgery lasting more than 45 minutes   (2 Points)       [ ] Stroke (in the previous month)                          (5 Points)               [ ] Elective arthroplasty                                         (5 Points)                                                                                                                                               HEMATOLOGY RELATED FACTORS                                                 TRAUMA RELATED RISK FACTORS  [ ] Prior episodes of VTE                                     (3 Points)                 [ ] Fracture of the hip, pelvis, or leg                       (5 Points)  [ ] Positive family history for VTE                         (3 Points)                 [ ] Acute spinal cord injury (in the previous month)  (5 Points)  [ ] Prothrombin 45448 A                                     (3 Points)                 [ ] Paralysis  (less than 1 month)                             (5 Points)  [ ] Factor V Leiden                                             (3 Points)                  [ ] Multiple Trauma within 1 month                        (5 Points)  [ ] Lupus anticoagulants                                     (3 Points)                                                           [ ] Anticardiolipin antibodies                               (3 Points)                                                       [ ] High homocysteine in the blood                      (3 Points)                                             [ ] Other congenital or acquired thrombophilia      (3 Points)                                                [ ] Heparin induced thrombocytopenia                  (3 Points)                                          Total Score [     2     ]

## 2019-07-03 NOTE — H&P ADULT - PROBLEM SELECTOR PLAN 4
Completed fluphenazine course as per prior psych notes  Appreciate psych recs  Hold depakote until level comes back

## 2019-07-03 NOTE — H&P ADULT - NSHPLABSRESULTS_GEN_ALL_CORE
Methadone, Urine (07.03.19 @ 14:15)    Methadone, Urine: Negative  Phencyclidine Level, Urine (07.03.19 @ 14:15)    Phencyclidine Level, Urine: Negative: INTERPRETATION OF URINE DRUG RESULTS  THE FOLLOWING CUT OFF CONCENTRATIONS ARE ESTABLISHED FOR THESE DRUGS:  DRUG CLASS                                              CUTOFF  LEVEL  AMPHETAMINES                                        1000 ng/mL  BARBITUATES                                             200   ng/mL  BENZODIAZEPINES                                    200   ng/mL  COCAINE METABOLITES                          300   ng/mL  CANNABINOIDS                                          50     ng/mL  METHADONE                                               300   ng/mL  OPIATES                                                        300   ng/mL  PHENCYCLIDINE                                         25    ng/mL  Urine drug screens are performed using the cut off levels listed. Results  are considered presumptive, require clinical correlation, and may be  subject to  cross reactivity with other drugs or metabolites. If clinically  indicated, confirmatory testing may be separately ordered. The laboratory  can supply a  reference of cross reacting or interfering substances.  THESE RESULTS SHOULD BE USED FOR MEDICAL PURPOSES ONLY AND NOT FOR ANY  LEGAL  OR EMPLOYMENT EVALUATIVE PURPOSES.  Drug Screen W/PCP, Urine (07.03.19 @ 14:15)    Drug Screen W/PCP, Urine: Done  < from: Xray Chest 1 View AP/PA (07.03.19 @ 09:47) >        < end of copied text >
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
## 2019-07-03 NOTE — H&P ADULT - PROBLEM SELECTOR PLAN 1
Expressed that he "wants to end it"  Does not have a plan at this time  Psych consulted  Cont 1:1 observation  Check depakote level  Utox negative

## 2019-07-03 NOTE — ED ADULT NURSE REASSESSMENT NOTE - NS ED NURSE REASSESS COMMENT FT1
Pt initially had no psych complaints however after speaking to his father pt states to this RN that he is depressed. Dr. Ortega notified and spoke with pt.

## 2019-07-03 NOTE — H&P ADULT - NSHPPHYSICALEXAM_GEN_ALL_CORE
T(C): 36.5 (07-03-19 @ 16:07), Max: 36.5 (07-03-19 @ 16:07)  HR: 79 (07-03-19 @ 16:30) (67 - 79)  BP: 131/89 (07-03-19 @ 16:30) (119/61 - 133/81)  RR: 18 (07-03-19 @ 16:30) (16 - 18)  SpO2: 97% (07-03-19 @ 16:30) (96% - 100%)  Wt(kg): --Vital Signs Last 24 Hrs  T(C): 36.5 (03 Jul 2019 16:07), Max: 36.5 (03 Jul 2019 16:07)  T(F): 97.7 (03 Jul 2019 16:07), Max: 97.7 (03 Jul 2019 16:07)  HR: 79 (03 Jul 2019 16:30) (67 - 79)  BP: 131/89 (03 Jul 2019 16:30) (119/61 - 133/81)  BP(mean): --  RR: 18 (03 Jul 2019 16:30) (16 - 18)  SpO2: 97% (03 Jul 2019 16:30) (96% - 100%)    PHYSICAL EXAM:  GENERAL: appears sad, conversant  HEAD:  Atraumatic, Normocephalic  EYES: EOMI, PERRLA, conjunctiva and sclera clear  ENMT: No tonsillar erythema, exudates, or enlargement; Moist mucous membranes, Good dentition, No lesions  NECK: Supple, posterior surgical site noted-healed well-appears to have no limited ROM neck   NERVOUS SYSTEM:  Alert & Oriented X3, good concentration  CHEST/LUNG: Clear to percussion bilaterally; No rales, rhonchi, wheezing, or rubs  HEART: Regular rate and rhythm; No murmurs, rubs, or gallops  ABDOMEN: Soft, Nontender, Nondistended; Bowel sounds present  EXTREMITIES:  2+ Peripheral Pulses, Osgood schlatters noted bilaterally, can lift bilaterally LE slightly off bed  LYMPH: No lymphadenopathy noted  SKIN: No rashes or lesions

## 2019-07-03 NOTE — ED BEHAVIORAL HEALTH ASSESSMENT NOTE - PAST PSYCHOTROPIC MEDICATION
multiple: gabapentin, lithium, haldol dec, zyprexa, depakote multiple: gabapentin, lithium, haldol dec, Zyprexa, Depakote, Thorazine, Clozapine. Likely others but unknown

## 2019-07-03 NOTE — ED ADULT NURSE NOTE - OBJECTIVE STATEMENT
Pt c/o chest pain starting last night in the middle of the chest described at pushing. Pt denies SOB. Pt here from [facility]. Pt had slight back pain upon Dr. Ortega palpating.

## 2019-07-03 NOTE — ED BEHAVIORAL HEALTH ASSESSMENT NOTE - DESCRIPTION
hypothyroidism, HLD, HTN, enuresis, s/p perianal fistulectomy 7/17 & BPH, spinal stenosis s/p laminectomy and fusion Single. No kids. Completed 11th grade of high school Attempted to speak with someone at pt's rehab facility ([phone]). Multiple calls were placed and calls were transferred to pt's unit multiple times without answer. Eventually call was escalated to nursing supervisor, Ms. Barbara who was able to fax over pt's orders but could not provide any information about pt's recent behaviors, presentation, progress, or if he has received his Prolixin DEC injections since arrival. Per faxed information, pt's current medications are: Albuterol PRN, Ascorbic Acid 500mg daily, Vitamin D 1000 U daily, DDAVP 0.2mg HS (listed for SIADH but possibly used for nocturnal enuresis), Colace 200mg HS, Lovenox 40mg SubQ HS, Synthroid 175mcg daily, MOM 30mL Q4H PRN, MVT daily, Nystatin cream [id number] Units/GM to groin area topically BID, Barrier Cream to B/L Buttock QShift, Oxycodone 5mg Q4H PRN pain, Miralax 17GM Q24H PRN, Vitamin b6 (Pyridoxine) 50mg daily, Ranitidine 300mg HS, Senna 8.6mg 2 tabs Q24H PRN, Toprol XL 50mg daily, Depakote 750mg daily, Cogentin 1mg BID, Prolixin 5mg BID, and Melatonin 5mg HS.    ---------------------------------------    Pt was in ED ~6 hours prior to telepsychiatry consult request at 15:30. RN reports that he took over patient’s care at ~19:20. Per RN and ED provider documentation: Patient presented to the ED by EMS for chest pain going down the left arm. Patient provided urine and allowed bloodwork, and allowed gowning without incident. Patient was given Tylenol 650mg orally at 12:01 and again at 17:45. Patient’s vitals were stable while in the ED, and EKG was performed. Patient asked for lunch in the ED and ate. Patient was observed sitting upright in stretcher at times. Per documentation, patient spoke with his father on the phone, and father informed ED staff that patient is depressed. Patient admitted to feeling depressed and endorses SI with no plan. Patient was cooperative in the ED, no agitation, aggression or behavioral outbursts. Patient denies other medical complaints, but states that he has chronic back pain and neck pain. Patient is unaccompanied by social or family supports. Patient was placed on 1:1 observation and placed in private room for telepsychiatry interview.     Vital Signs Last 24 Hrs  T(C): 36.5 (03 Jul 2019 16:07), Max: 36.5 (03 Jul 2019 16:07)  T(F): 97.7 (03 Jul 2019 16:07), Max: 97.7 (03 Jul 2019 16:07)  HR: 79 (03 Jul 2019 16:30) (67 - 79)  BP: 131/89 (03 Jul 2019 16:30) (119/61 - 133/81)  BP(mean): --  RR: 18 (03 Jul 2019 16:30) (16 - 18)  SpO2: 97% (03 Jul 2019 16:30) (96% - 100%)

## 2019-07-03 NOTE — ED PROVIDER NOTE - OBJECTIVE STATEMENT
Patient states that since last night, he has had chest pain. It is now improved. He had no nausea, vomitng, or diaphoresis. Non radiating pain. No new back pain. Has h/o chronic back and neck pain with h/o spinal stenosis. No fever. No cough. He states it was 8/10. Now it is 4/10. He was given ASA and Nitro by EMS.
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
## 2019-07-03 NOTE — ED ADULT TRIAGE NOTE - CHIEF COMPLAINT QUOTE
Per EMS " He is here for chest pain going down the left arm, 2 doses of nitro given and 324 of aspirin "

## 2019-07-03 NOTE — H&P ADULT - HISTORY OF PRESENT ILLNESS
54 yo M currently residing at [facility] s/p recent discharge from  acute rehab secondary to C3-C6 laminectomy and fusion on 5/1/19. with h/o bipolar d/o, HTN, nocturnal enuresis, deformity of lower leg, hypothyroid, schizophrenia, BPH, IVDA presented to [place] ED c/o chest pain 4/10.  In ED, chest pain resolved, trop negative, EKG nonischemic. Chest pain was deemed noncardiac in nature and discharge from ED started. Upon discharge. Dr Ortega spoke with patient father on phone who reports that patient is feeling depressed.  At that time patient expressed suicidal ideation. Reports he "wants to end it". Does not have a current plan.  Tele psych who recommended admission with psych CL service.  In ED, UTOX negative. ETOH level neg. Placed on 1:1 and admitted to hospitalist for further management. 52 yo M currently residing at [facility] s/p recent discharge from  acute rehab secondary to C3-C6 laminectomy and fusion on 5/1/19 with PMHx  of bipolar d/o, HTN, nocturnal enuresis,, hypothyroid, schizophrenia, BPH, IVDA presented to [place] ED c/o chest pain 4/10.  In ED, chest pain resolved, trop negative, EKG nonischemic. Chest pain was deemed noncardiac in nature and discharge from ED started. Upon discharge. Dr Ortega spoke with patient father on phone who reports that patient is feeling depressed.  At that time patient expressed suicidal ideation. Reports he "wants to end it". Does not have a current plan.  Tele psych who recommended admission with psych CL service.  In ED, UTOX negative. ETOH level neg. Placed on 1:1 and admitted to hospitalist for further management. 52 yo M currently residing at [facility] s/p recent discharge from  acute rehab secondary to C3-C6 laminectomy and fusion with PMHx  of bipolar d/o, HTN, nocturnal enuresis,, hypothyroid, schizophrenia, BPH, IVDA presented to [place] ED c/o chest pain 4/10.  In ED, chest pain resolved, trop negative, EKG nonischemic. Chest pain was deemed noncardiac in nature and discharge from ED started. Upon discharge. Dr Ortega spoke with patient father on phone who reports that patient is feeling depressed.  At that time patient expressed suicidal ideation. Reports he "wants to end it". Does not have a current plan.  Tele psych who recommended admission with psych CL service.  In ED, UTOX negative. ETOH level neg. Placed on 1:1 and admitted to hospitalist for further management.
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
## 2019-07-03 NOTE — H&P ADULT - NSHPREVIEWOFSYSTEMS_GEN_ALL_CORE
REVIEW OF SYSTEMS:  CONSTITUTIONAL: No fever, weight loss, or fatigue  EYES: No eye pain, visual disturbances, or discharge  ENMT:  No difficulty hearing, tinnitus, vertigo; No sinus or throat pain  NECK: No pain or stiffness  BREASTS: No pain, masses, or nipple discharge  RESPIRATORY: No cough, wheezing, chills or hemoptysis; No shortness of breath  CARDIOVASCULAR: see hpi  GASTROINTESTINAL: No abdominal or epigastric pain. No nausea, vomiting, or hematemesis; No diarrhea or constipation. No melena or hematochezia.  GENITOURINARY: No dysuria, frequency, hematuria, or incontinence  NEUROLOGICAL: No headaches, memory loss, loss of strength, numbness, or tremors  SKIN: No itching, burning, rashes, or lesions   LYMPH NODES: No enlarged glands  ENDOCRINE: No heat or cold intolerance; No hair loss  MUSCULOSKELETAL: No joint pain or swelling; No muscle, back, or extremity pain  PSYCHIATRIC: see hpi   HEME/LYMPH: No easy bruising, or bleeding gums  ALLERY AND IMMUNOLOGIC: No hives or eczema    ALL ROS REVIEWED AND NORMAL EXCEPT AS STATED ABOVE

## 2019-07-04 LAB — VALPROATE SERPL-MCNC: 11 UG/ML — LOW (ref 50–100)

## 2019-07-04 PROCEDURE — 99233 SBSQ HOSP IP/OBS HIGH 50: CPT

## 2019-07-04 RX ORDER — VALPROIC ACID (AS SODIUM SALT) 250 MG/5ML
750 SOLUTION, ORAL ORAL
Refills: 0 | Status: DISCONTINUED | OUTPATIENT
Start: 2019-07-05 | End: 2019-07-08

## 2019-07-04 RX ORDER — VALPROIC ACID (AS SODIUM SALT) 250 MG/5ML
750 SOLUTION, ORAL ORAL ONCE
Refills: 0 | Status: COMPLETED | OUTPATIENT
Start: 2019-07-04 | End: 2019-07-04

## 2019-07-04 RX ORDER — FLUPHENAZINE HYDROCHLORIDE 1 MG/1
10 TABLET, FILM COATED ORAL
Refills: 0 | Status: DISCONTINUED | OUTPATIENT
Start: 2019-07-04 | End: 2019-07-10

## 2019-07-04 RX ORDER — VALPROIC ACID (AS SODIUM SALT) 250 MG/5ML
250 SOLUTION, ORAL ORAL
Refills: 0 | Status: DISCONTINUED | OUTPATIENT
Start: 2019-07-04 | End: 2019-07-04

## 2019-07-04 RX ADMIN — Medication 100 MILLIGRAM(S): at 06:29

## 2019-07-04 RX ADMIN — Medication 1 TABLET(S): at 13:05

## 2019-07-04 RX ADMIN — Medication 750 MILLIGRAM(S): at 13:03

## 2019-07-04 RX ADMIN — Medication 50 MILLIGRAM(S): at 13:05

## 2019-07-04 RX ADMIN — OXYCODONE HYDROCHLORIDE 5 MILLIGRAM(S): 5 TABLET ORAL at 13:05

## 2019-07-04 RX ADMIN — Medication 1 MILLIGRAM(S): at 06:28

## 2019-07-04 RX ADMIN — FLUPHENAZINE HYDROCHLORIDE 5 MILLIGRAM(S): 1 TABLET, FILM COATED ORAL at 06:29

## 2019-07-04 RX ADMIN — Medication 3 MILLIGRAM(S): at 22:22

## 2019-07-04 RX ADMIN — Medication 50 MILLIGRAM(S): at 06:29

## 2019-07-04 RX ADMIN — OXYCODONE HYDROCHLORIDE 5 MILLIGRAM(S): 5 TABLET ORAL at 23:15

## 2019-07-04 RX ADMIN — FLUPHENAZINE HYDROCHLORIDE 10 MILLIGRAM(S): 1 TABLET, FILM COATED ORAL at 17:31

## 2019-07-04 RX ADMIN — Medication 1 PATCH: at 13:05

## 2019-07-04 RX ADMIN — Medication 175 MICROGRAM(S): at 06:28

## 2019-07-04 RX ADMIN — Medication 1 MILLIGRAM(S): at 17:31

## 2019-07-04 RX ADMIN — POLYETHYLENE GLYCOL 3350 17 GRAM(S): 17 POWDER, FOR SOLUTION ORAL at 13:07

## 2019-07-04 RX ADMIN — Medication 500 MILLIGRAM(S): at 13:05

## 2019-07-04 RX ADMIN — Medication 100 MILLIGRAM(S): at 13:08

## 2019-07-04 RX ADMIN — ENOXAPARIN SODIUM 40 MILLIGRAM(S): 100 INJECTION SUBCUTANEOUS at 22:22

## 2019-07-04 RX ADMIN — OXYCODONE HYDROCHLORIDE 5 MILLIGRAM(S): 5 TABLET ORAL at 03:48

## 2019-07-04 RX ADMIN — NYSTATIN CREAM 1 APPLICATION(S): 100000 CREAM TOPICAL at 17:31

## 2019-07-04 RX ADMIN — DESMOPRESSIN ACETATE 0.2 MILLIGRAM(S): 0.1 TABLET ORAL at 22:22

## 2019-07-04 RX ADMIN — OXYCODONE HYDROCHLORIDE 5 MILLIGRAM(S): 5 TABLET ORAL at 17:31

## 2019-07-04 RX ADMIN — SENNA PLUS 2 TABLET(S): 8.6 TABLET ORAL at 22:23

## 2019-07-04 RX ADMIN — Medication 1 PATCH: at 19:00

## 2019-07-04 RX ADMIN — Medication 100 MILLIGRAM(S): at 22:22

## 2019-07-04 RX ADMIN — OXYCODONE HYDROCHLORIDE 5 MILLIGRAM(S): 5 TABLET ORAL at 04:20

## 2019-07-04 NOTE — PROGRESS NOTE ADULT - PROBLEM SELECTOR PLAN 1
Expressed that he "wants to end it" at admission  Psych consult appreciated  Cont 1:1 observation per psych recommendation  Utox negative

## 2019-07-04 NOTE — PROGRESS NOTE ADULT - PROBLEM SELECTOR PLAN 4
Completed fluphenazine course as per prior psych notes  Appreciate psych recs  restart depakote today

## 2019-07-04 NOTE — PROGRESS NOTE ADULT - SUBJECTIVE AND OBJECTIVE BOX
Patient is a 53y old  Male who presents with a chief complaint of suicide ideation (03 Jul 2019 18:42)      Patient seen and examined at bedside.  Patient reports some back pain, denies any chest pain or shortness of breath.  Denies having suicidal thoughts today.   ALLERGIES:  Haldol (Unknown)  No Known Allergies  Thorazine (Unknown)    MEDICATIONS:  acetaminophen   Tablet .. 650 milliGRAM(s) Oral every 6 hours PRN  ALBUTerol    90 MICROgram(s) HFA Inhaler 2 Puff(s) Inhalation every 6 hours PRN  aluminum hydroxide/magnesium hydroxide/simethicone Suspension 30 milliLiter(s) Oral every 4 hours PRN  ascorbic acid 500 milliGRAM(s) Oral daily  benztropine 1 milliGRAM(s) Oral two times a day  desmopressin 0.2 milliGRAM(s) Oral at bedtime  docusate sodium 100 milliGRAM(s) Oral three times a day  enoxaparin Injectable 40 milliGRAM(s) SubCutaneous at bedtime  fluPHENAZine 10 milliGRAM(s) Oral two times a day  levothyroxine 175 MICROGram(s) Oral daily  melatonin 3 milliGRAM(s) Oral at bedtime  metoprolol succinate ER 50 milliGRAM(s) Oral daily  multivitamin 1 Tablet(s) Oral daily  nicotine - 21 mG/24Hr(s) Patch 1 patch Transdermal daily  nystatin Powder 1 Application(s) Topical two times a day  oxyCODONE    IR 5 milliGRAM(s) Oral every 4 hours PRN  polyethylene glycol 3350 17 Gram(s) Oral daily  pyridoxine 50 milliGRAM(s) Oral daily  senna 2 Tablet(s) Oral at bedtime    Vital Signs Last 24 Hrs  T(F): 98.3 (04 Jul 2019 06:31), Max: 98.3 (04 Jul 2019 06:31)  HR: 69 (04 Jul 2019 06:31) (65 - 79)  BP: 122/70 (04 Jul 2019 06:31) (119/61 - 136/76)  RR: 16 (04 Jul 2019 06:31) (16 - 18)  SpO2: 98% (04 Jul 2019 06:31) (95% - 98%)  I&O's Summary    04 Jul 2019 07:01  -  04 Jul 2019 11:01  --------------------------------------------------------  IN: 600 mL / OUT: 500 mL / NET: 100 mL        PHYSICAL EXAM:  General: NAD, A/O x 3  ENT: MMM  Neck: Supple, No JVD  Lungs: Clear to auscultation bilaterally  Cardio: RRR, S1/S2, No murmurs  Abdomen: Soft, Nontender, Nondistended; obese  Extremities: No cyanosis, No edema    LABS:                        14.4   7.91  )-----------( 394      ( 03 Jul 2019 09:50 )             42.9     07-03    136  |  102  |  12  ----------------------------<  99  4.3   |  27  |  0.65    Ca    9.7      03 Jul 2019 09:50  Mg     2.0     07-03    TPro  7.8  /  Alb  3.7  /  TBili  0.4  /  DBili  x   /  AST  22  /  ALT  33  /  AlkPhos  116  07-03    eGFR if Non African American: 112 mL/min/1.73M2 (07-03-19 @ 09:50)  eGFR if : 129 mL/min/1.73M2 (07-03-19 @ 09:50)    PT/INR - ( 03 Jul 2019 09:50 )   PT: 13.6 sec;   INR: 1.21 ratio         PTT - ( 03 Jul 2019 09:50 )  PTT:36.8 sec    CARDIAC MARKERS ( 03 Jul 2019 09:50 )  <.017 ng/mL / x     / x     / x     / x                                  RADIOLOGY & ADDITIONAL TESTS:    Care Discussed with Consultants/Other Providers:

## 2019-07-04 NOTE — PHYSICAL THERAPY INITIAL EVALUATION ADULT - ADDITIONAL COMMENTS
Pt admitted from [facility].  Pt is poor historian but states he was ambulating with a RW and needed assist with ADLs at rehab.

## 2019-07-05 PROCEDURE — 99233 SBSQ HOSP IP/OBS HIGH 50: CPT

## 2019-07-05 RX ORDER — ALPRAZOLAM 0.25 MG
0.25 TABLET ORAL ONCE
Refills: 0 | Status: DISCONTINUED | OUTPATIENT
Start: 2019-07-05 | End: 2019-07-05

## 2019-07-05 RX ORDER — LIDOCAINE 4 G/100G
1 CREAM TOPICAL DAILY
Refills: 0 | Status: DISCONTINUED | OUTPATIENT
Start: 2019-07-05 | End: 2019-07-22

## 2019-07-05 RX ORDER — METOPROLOL TARTRATE 50 MG
1 TABLET ORAL
Qty: 0 | Refills: 0 | DISCHARGE

## 2019-07-05 RX ADMIN — OXYCODONE HYDROCHLORIDE 5 MILLIGRAM(S): 5 TABLET ORAL at 22:38

## 2019-07-05 RX ADMIN — FLUPHENAZINE HYDROCHLORIDE 10 MILLIGRAM(S): 1 TABLET, FILM COATED ORAL at 06:10

## 2019-07-05 RX ADMIN — OXYCODONE HYDROCHLORIDE 5 MILLIGRAM(S): 5 TABLET ORAL at 00:15

## 2019-07-05 RX ADMIN — NYSTATIN CREAM 1 APPLICATION(S): 100000 CREAM TOPICAL at 06:11

## 2019-07-05 RX ADMIN — Medication 500 MILLIGRAM(S): at 12:02

## 2019-07-05 RX ADMIN — LIDOCAINE 1 PATCH: 4 CREAM TOPICAL at 19:39

## 2019-07-05 RX ADMIN — DESMOPRESSIN ACETATE 0.2 MILLIGRAM(S): 0.1 TABLET ORAL at 22:22

## 2019-07-05 RX ADMIN — ENOXAPARIN SODIUM 40 MILLIGRAM(S): 100 INJECTION SUBCUTANEOUS at 22:23

## 2019-07-05 RX ADMIN — FLUPHENAZINE HYDROCHLORIDE 10 MILLIGRAM(S): 1 TABLET, FILM COATED ORAL at 17:27

## 2019-07-05 RX ADMIN — Medication 50 MILLIGRAM(S): at 06:11

## 2019-07-05 RX ADMIN — Medication 750 MILLIGRAM(S): at 08:32

## 2019-07-05 RX ADMIN — Medication 0.25 MILLIGRAM(S): at 19:56

## 2019-07-05 RX ADMIN — Medication 100 MILLIGRAM(S): at 22:22

## 2019-07-05 RX ADMIN — Medication 1 PATCH: at 19:30

## 2019-07-05 RX ADMIN — LIDOCAINE 1 PATCH: 4 CREAM TOPICAL at 12:03

## 2019-07-05 RX ADMIN — NYSTATIN CREAM 1 APPLICATION(S): 100000 CREAM TOPICAL at 18:14

## 2019-07-05 RX ADMIN — Medication 1 MILLIGRAM(S): at 06:10

## 2019-07-05 RX ADMIN — Medication 3 MILLIGRAM(S): at 22:23

## 2019-07-05 RX ADMIN — Medication 1 PATCH: at 12:03

## 2019-07-05 RX ADMIN — Medication 50 MILLIGRAM(S): at 12:02

## 2019-07-05 RX ADMIN — Medication 100 MILLIGRAM(S): at 13:33

## 2019-07-05 RX ADMIN — OXYCODONE HYDROCHLORIDE 5 MILLIGRAM(S): 5 TABLET ORAL at 10:40

## 2019-07-05 RX ADMIN — OXYCODONE HYDROCHLORIDE 5 MILLIGRAM(S): 5 TABLET ORAL at 23:30

## 2019-07-05 RX ADMIN — OXYCODONE HYDROCHLORIDE 5 MILLIGRAM(S): 5 TABLET ORAL at 09:28

## 2019-07-05 RX ADMIN — SENNA PLUS 2 TABLET(S): 8.6 TABLET ORAL at 22:23

## 2019-07-05 RX ADMIN — Medication 1 PATCH: at 06:13

## 2019-07-05 RX ADMIN — Medication 175 MICROGRAM(S): at 06:11

## 2019-07-05 RX ADMIN — Medication 100 MILLIGRAM(S): at 06:10

## 2019-07-05 RX ADMIN — Medication 1 TABLET(S): at 12:02

## 2019-07-05 RX ADMIN — POLYETHYLENE GLYCOL 3350 17 GRAM(S): 17 POWDER, FOR SOLUTION ORAL at 12:04

## 2019-07-05 RX ADMIN — Medication 1 MILLIGRAM(S): at 17:27

## 2019-07-05 NOTE — PROGRESS NOTE ADULT - PROBLEM SELECTOR PLAN 3
- Continue Valproic acid 750mg daily  - Prolixin increased to 10mg BID yesterday, consider switching to atypical antipsychotic for more favorable side effects profile, will discuss with Psych

## 2019-07-05 NOTE — PROGRESS NOTE ADULT - PROBLEM SELECTOR PLAN 1
Expressed that he "wants to end it", still having suicidal ideation. Utox negative  - Cont 1:1 observation   - Continue Valproic acid 750mg daily  - Prolixin increased to 10mg BID yesterday, consider switching to atypical antipsychotic for more favorable side effects profile, will discuss with Psych

## 2019-07-05 NOTE — PROGRESS NOTE ADULT - PROBLEM SELECTOR PLAN 2
s/p  C3-C6 laminectomy and fusion on 5/1/19  - continue PT  -Pain control w PRN oxy  - add Lidocaine patch

## 2019-07-05 NOTE — PROGRESS NOTE ADULT - SUBJECTIVE AND OBJECTIVE BOX
Patient is a 53y old  Male who presents with a chief complaint of suicide ideation (04 Jul 2019 11:01)      Interval HPI/ Overnight events: No events overnight    Patient seen and examined at bedside, still has back pain and suicidal ideation.    REVIEW OF SYSTEMS:    CONSTITUTIONAL: No weakness, fevers or chills  EYES/ENT: No visual changes;  No vertigo or throat pain   NECK: No pain or stiffness  RESPIRATORY: No cough, wheezing, hemoptysis; No shortness of breath  CARDIOVASCULAR: No chest pain or palpitations  GASTROINTESTINAL: No abdominal or epigastric pain. No nausea, vomiting, or hematemesis; No diarrhea or constipation. No melena or hematochezia.  GENITOURINARY: No dysuria, frequency or hematuria  NEUROLOGICAL: No numbness or weakness  SKIN: No itching, burning, rashes, or lesions   MSK: back pain  All other review of systems is negative unless indicated above.      ALLERGIES:  Haldol (Unknown)  No Known Allergies  Thorazine (Unknown)    MEDICATIONS  (STANDING):  ascorbic acid 500 milliGRAM(s) Oral daily  benztropine 1 milliGRAM(s) Oral two times a day  desmopressin 0.2 milliGRAM(s) Oral at bedtime  docusate sodium 100 milliGRAM(s) Oral three times a day  enoxaparin Injectable 40 milliGRAM(s) SubCutaneous at bedtime  fluPHENAZine 10 milliGRAM(s) Oral two times a day  levothyroxine 175 MICROGram(s) Oral daily  lidocaine   Patch 1 Patch Transdermal daily  melatonin 3 milliGRAM(s) Oral at bedtime  metoprolol succinate ER 50 milliGRAM(s) Oral daily  multivitamin 1 Tablet(s) Oral daily  nicotine - 21 mG/24Hr(s) Patch 1 patch Transdermal daily  nystatin Powder 1 Application(s) Topical two times a day  polyethylene glycol 3350 17 Gram(s) Oral daily  pyridoxine 50 milliGRAM(s) Oral daily  senna 2 Tablet(s) Oral at bedtime  valproic  acid Syrup 750 milliGRAM(s) Oral <User Schedule>    MEDICATIONS  (PRN):  acetaminophen   Tablet .. 650 milliGRAM(s) Oral every 6 hours PRN Temp greater or equal to 38C (100.4F), Mild Pain (1 - 3)  ALBUTerol    90 MICROgram(s) HFA Inhaler 2 Puff(s) Inhalation every 6 hours PRN Shortness of Breath and/or Wheezing  aluminum hydroxide/magnesium hydroxide/simethicone Suspension 30 milliLiter(s) Oral every 4 hours PRN Dyspepsia  oxyCODONE    IR 5 milliGRAM(s) Oral every 4 hours PRN Moderate Pain (4 - 6)    Vital Signs Last 24 Hrs  T(F): 97.7 (05 Jul 2019 06:07), Max: 97.7 (04 Jul 2019 22:25)  HR: 58 (05 Jul 2019 06:07) (54 - 58)  BP: 117/74 (05 Jul 2019 06:07) (110/71 - 117/74)  RR: 15 (05 Jul 2019 06:07) (15 - 15)  SpO2: 96% (05 Jul 2019 06:07) (96% - 97%)  I&O's Summary    04 Jul 2019 07:01  -  05 Jul 2019 07:00  --------------------------------------------------------  IN: 1920 mL / OUT: 901 mL / NET: 1019 mL    05 Jul 2019 07:01  -  05 Jul 2019 12:21  --------------------------------------------------------  IN: 200 mL / OUT: 125 mL / NET: 75 mL        PHYSICAL EXAM:  General: NAD, well dressed, well nourished  Head: NT/AC  ENT: MMM, no oropharyngeal erythema or exudates  Neck: Supple, No JVD  Lungs: Clear to auscultation bilaterally, no wheezing, rales, or rhonchi, no accessory muscle use  Cardio: RRR, normal S1/S2, No M/R/G  Abdomen: Soft, Nontender, Nondistended; Bowel sounds present, no organomegaly  Extremities: No calf tenderness, no clubbing or  cyanosis  Vascular: no LE edema  MSK: well healed scar from C3-C6, mild surrounding erythema, but no warmth or tenderness, no palpable fluctuance  Skin: warm and dry  Neuro: AAOx3, answers all questions appropriately, moves all extremities    LABS:                        14.4   7.91  )-----------( 394      ( 03 Jul 2019 09:50 )             42.9     07-03    136  |  102  |  12  ----------------------------<  99  4.3   |  27  |  0.65    Ca    9.7      03 Jul 2019 09:50  Mg     2.0     07-03    TPro  7.8  /  Alb  3.7  /  TBili  0.4  /  DBili  x   /  AST  22  /  ALT  33  /  AlkPhos  116  07-03    eGFR if Non African American: 112 mL/min/1.73M2 (07-03-19 @ 09:50)  eGFR if : 129 mL/min/1.73M2 (07-03-19 @ 09:50)    PT/INR - ( 03 Jul 2019 09:50 )   PT: 13.6 sec;   INR: 1.21 ratio         PTT - ( 03 Jul 2019 09:50 )  PTT:36.8 sec    CARDIAC MARKERS ( 03 Jul 2019 09:50 )  <.017 ng/mL / x     / x     / x     / x                                  RADIOLOGY & ADDITIONAL TESTS:    Care Discussed with Consultants/Other Providers:

## 2019-07-06 PROCEDURE — 99233 SBSQ HOSP IP/OBS HIGH 50: CPT

## 2019-07-06 RX ORDER — POLYETHYLENE GLYCOL 3350 17 G/17G
17 POWDER, FOR SOLUTION ORAL
Refills: 0 | Status: DISCONTINUED | OUTPATIENT
Start: 2019-07-06 | End: 2019-07-10

## 2019-07-06 RX ADMIN — FLUPHENAZINE HYDROCHLORIDE 10 MILLIGRAM(S): 1 TABLET, FILM COATED ORAL at 06:42

## 2019-07-06 RX ADMIN — POLYETHYLENE GLYCOL 3350 17 GRAM(S): 17 POWDER, FOR SOLUTION ORAL at 11:48

## 2019-07-06 RX ADMIN — Medication 500 MILLIGRAM(S): at 11:48

## 2019-07-06 RX ADMIN — ENOXAPARIN SODIUM 40 MILLIGRAM(S): 100 INJECTION SUBCUTANEOUS at 21:45

## 2019-07-06 RX ADMIN — Medication 175 MICROGRAM(S): at 06:41

## 2019-07-06 RX ADMIN — LIDOCAINE 1 PATCH: 4 CREAM TOPICAL at 18:42

## 2019-07-06 RX ADMIN — Medication 1 MILLIGRAM(S): at 17:07

## 2019-07-06 RX ADMIN — SENNA PLUS 2 TABLET(S): 8.6 TABLET ORAL at 21:45

## 2019-07-06 RX ADMIN — LIDOCAINE 1 PATCH: 4 CREAM TOPICAL at 00:00

## 2019-07-06 RX ADMIN — Medication 100 MILLIGRAM(S): at 14:19

## 2019-07-06 RX ADMIN — OXYCODONE HYDROCHLORIDE 5 MILLIGRAM(S): 5 TABLET ORAL at 21:45

## 2019-07-06 RX ADMIN — Medication 1 PATCH: at 06:43

## 2019-07-06 RX ADMIN — Medication 750 MILLIGRAM(S): at 07:44

## 2019-07-06 RX ADMIN — LIDOCAINE 1 PATCH: 4 CREAM TOPICAL at 11:48

## 2019-07-06 RX ADMIN — FLUPHENAZINE HYDROCHLORIDE 10 MILLIGRAM(S): 1 TABLET, FILM COATED ORAL at 17:07

## 2019-07-06 RX ADMIN — LIDOCAINE 1 PATCH: 4 CREAM TOPICAL at 23:00

## 2019-07-06 RX ADMIN — Medication 50 MILLIGRAM(S): at 11:47

## 2019-07-06 RX ADMIN — Medication 1 MILLIGRAM(S): at 06:42

## 2019-07-06 RX ADMIN — Medication 100 MILLIGRAM(S): at 21:45

## 2019-07-06 RX ADMIN — OXYCODONE HYDROCHLORIDE 5 MILLIGRAM(S): 5 TABLET ORAL at 03:45

## 2019-07-06 RX ADMIN — Medication 50 MILLIGRAM(S): at 06:42

## 2019-07-06 RX ADMIN — NYSTATIN CREAM 1 APPLICATION(S): 100000 CREAM TOPICAL at 06:42

## 2019-07-06 RX ADMIN — Medication 1 PATCH: at 11:52

## 2019-07-06 RX ADMIN — OXYCODONE HYDROCHLORIDE 5 MILLIGRAM(S): 5 TABLET ORAL at 02:45

## 2019-07-06 RX ADMIN — Medication 1 TABLET(S): at 11:47

## 2019-07-06 RX ADMIN — DESMOPRESSIN ACETATE 0.2 MILLIGRAM(S): 0.1 TABLET ORAL at 21:45

## 2019-07-06 RX ADMIN — Medication 1 PATCH: at 18:42

## 2019-07-06 RX ADMIN — Medication 1 PATCH: at 11:48

## 2019-07-06 RX ADMIN — Medication 3 MILLIGRAM(S): at 21:45

## 2019-07-06 RX ADMIN — Medication 100 MILLIGRAM(S): at 06:41

## 2019-07-06 NOTE — PROGRESS NOTE ADULT - PROBLEM SELECTOR PLAN 1
Expressed that he "wants to end it" on admission  - Cont 1:1 observation   - Continue Valproic acid 750mg daily  - Prolixin increased to 10mg BID   -Cogentin added  -Psych consult appreciated

## 2019-07-06 NOTE — PROGRESS NOTE ADULT - PROBLEM SELECTOR PLAN 3
- Continue Valproic acid 750mg daily  - Prolixin increased to 10mg BID   -cogentin added  -appreciate psych cosult

## 2019-07-06 NOTE — PROGRESS NOTE ADULT - SUBJECTIVE AND OBJECTIVE BOX
Patient is a 54y old  Male who presents with a chief complaint of suicide ideation (05 Jul 2019 16:29)      Patient seen and examined at bedside.  Patient denies any chest pain or shortness of breaths.  Reports that his auditory hallucinations are still "kind of loud."    ALLERGIES:  Haldol (Unknown)  No Known Allergies  Thorazine (Unknown)    MEDICATIONS:  acetaminophen   Tablet .. 650 milliGRAM(s) Oral every 6 hours PRN  ALBUTerol    90 MICROgram(s) HFA Inhaler 2 Puff(s) Inhalation every 6 hours PRN  aluminum hydroxide/magnesium hydroxide/simethicone Suspension 30 milliLiter(s) Oral every 4 hours PRN  ascorbic acid 500 milliGRAM(s) Oral daily  benztropine 1 milliGRAM(s) Oral two times a day  desmopressin 0.2 milliGRAM(s) Oral at bedtime  docusate sodium 100 milliGRAM(s) Oral three times a day  enoxaparin Injectable 40 milliGRAM(s) SubCutaneous at bedtime  fluPHENAZine 10 milliGRAM(s) Oral two times a day  levothyroxine 175 MICROGram(s) Oral daily  lidocaine   Patch 1 Patch Transdermal daily  melatonin 3 milliGRAM(s) Oral at bedtime  metoprolol succinate ER 50 milliGRAM(s) Oral daily  multivitamin 1 Tablet(s) Oral daily  nicotine - 21 mG/24Hr(s) Patch 1 patch Transdermal daily  nystatin Powder 1 Application(s) Topical two times a day  oxyCODONE    IR 5 milliGRAM(s) Oral every 4 hours PRN  polyethylene glycol 3350 17 Gram(s) Oral two times a day PRN  pyridoxine 50 milliGRAM(s) Oral daily  senna 2 Tablet(s) Oral at bedtime  valproic  acid Syrup 750 milliGRAM(s) Oral <User Schedule>    Vital Signs Last 24 Hrs  T(F): 97.5 (06 Jul 2019 06:33), Max: 97.8 (05 Jul 2019 22:02)  HR: 62 (06 Jul 2019 06:33) (56 - 66)  BP: 122/82 (06 Jul 2019 06:33) (119/64 - 131/72)  RR: 16 (06 Jul 2019 06:33) (15 - 16)  SpO2: 98% (06 Jul 2019 06:33) (96% - 98%)  I&O's Summary    05 Jul 2019 07:01  -  06 Jul 2019 07:00  --------------------------------------------------------  IN: 440 mL / OUT: 1650 mL / NET: -1210 mL    06 Jul 2019 07:01  -  06 Jul 2019 12:38  --------------------------------------------------------  IN: 1100 mL / OUT: 800 mL / NET: 300 mL        PHYSICAL EXAM:  General: NAD, A/O x 3  ENT: MMM, poor dentition   Neck: Supple, No JVD  Lungs: Clear to auscultation bilaterally  Cardio: RRR, S1/S2, No murmurs  Abdomen: Soft, Nontender, Nondistended; obese  Extremities: No cyanosis, No edema    LABS:                                            RADIOLOGY & ADDITIONAL TESTS:    Care Discussed with Consultants/Other Providers:

## 2019-07-06 NOTE — PROGRESS NOTE ADULT - PROBLEM SELECTOR PLAN 2
s/p  C3-C6 laminectomy and fusion on 5/1/19  - continue PT  -Pain control w PRN oxy  - Lidocaine patch

## 2019-07-07 PROCEDURE — 99233 SBSQ HOSP IP/OBS HIGH 50: CPT

## 2019-07-07 RX ORDER — DIPHENHYDRAMINE HCL 50 MG
25 CAPSULE ORAL EVERY 4 HOURS
Refills: 0 | Status: DISCONTINUED | OUTPATIENT
Start: 2019-07-07 | End: 2019-07-14

## 2019-07-07 RX ADMIN — Medication 50 MILLIGRAM(S): at 06:33

## 2019-07-07 RX ADMIN — ENOXAPARIN SODIUM 40 MILLIGRAM(S): 100 INJECTION SUBCUTANEOUS at 21:23

## 2019-07-07 RX ADMIN — OXYCODONE HYDROCHLORIDE 5 MILLIGRAM(S): 5 TABLET ORAL at 22:20

## 2019-07-07 RX ADMIN — Medication 100 MILLIGRAM(S): at 13:49

## 2019-07-07 RX ADMIN — Medication 100 MILLIGRAM(S): at 06:33

## 2019-07-07 RX ADMIN — Medication 500 MILLIGRAM(S): at 11:34

## 2019-07-07 RX ADMIN — OXYCODONE HYDROCHLORIDE 5 MILLIGRAM(S): 5 TABLET ORAL at 18:40

## 2019-07-07 RX ADMIN — FLUPHENAZINE HYDROCHLORIDE 10 MILLIGRAM(S): 1 TABLET, FILM COATED ORAL at 17:45

## 2019-07-07 RX ADMIN — Medication 3 MILLIGRAM(S): at 21:23

## 2019-07-07 RX ADMIN — Medication 30 MILLILITER(S): at 13:48

## 2019-07-07 RX ADMIN — Medication 1 MILLIGRAM(S): at 17:45

## 2019-07-07 RX ADMIN — Medication 25 MILLIGRAM(S): at 02:30

## 2019-07-07 RX ADMIN — Medication 750 MILLIGRAM(S): at 11:33

## 2019-07-07 RX ADMIN — OXYCODONE HYDROCHLORIDE 5 MILLIGRAM(S): 5 TABLET ORAL at 17:45

## 2019-07-07 RX ADMIN — Medication 100 MILLIGRAM(S): at 21:23

## 2019-07-07 RX ADMIN — SENNA PLUS 2 TABLET(S): 8.6 TABLET ORAL at 21:23

## 2019-07-07 RX ADMIN — NYSTATIN CREAM 1 APPLICATION(S): 100000 CREAM TOPICAL at 17:49

## 2019-07-07 RX ADMIN — Medication 1 MILLIGRAM(S): at 06:33

## 2019-07-07 RX ADMIN — LIDOCAINE 1 PATCH: 4 CREAM TOPICAL at 11:34

## 2019-07-07 RX ADMIN — FLUPHENAZINE HYDROCHLORIDE 10 MILLIGRAM(S): 1 TABLET, FILM COATED ORAL at 06:33

## 2019-07-07 RX ADMIN — NYSTATIN CREAM 1 APPLICATION(S): 100000 CREAM TOPICAL at 06:34

## 2019-07-07 RX ADMIN — Medication 1 PATCH: at 11:32

## 2019-07-07 RX ADMIN — Medication 50 MILLIGRAM(S): at 11:34

## 2019-07-07 RX ADMIN — Medication 1 TABLET(S): at 11:34

## 2019-07-07 RX ADMIN — Medication 175 MICROGRAM(S): at 06:33

## 2019-07-07 RX ADMIN — OXYCODONE HYDROCHLORIDE 5 MILLIGRAM(S): 5 TABLET ORAL at 21:24

## 2019-07-07 RX ADMIN — Medication 1 PATCH: at 11:00

## 2019-07-07 RX ADMIN — Medication 1 PATCH: at 07:00

## 2019-07-07 RX ADMIN — DESMOPRESSIN ACETATE 0.2 MILLIGRAM(S): 0.1 TABLET ORAL at 21:23

## 2019-07-07 NOTE — PROGRESS NOTE ADULT - PROBLEM SELECTOR PLAN 1
Expressed that he "wants to end it" on admission, verbalizes improvement today  - Cont 1:1 observation   - Continue Valproic acid 750mg daily  - Prolixin increased to 10mg BID   -Cogentin added  -Psych consult appreciated

## 2019-07-07 NOTE — PROGRESS NOTE ADULT - PROBLEM SELECTOR PLAN 2
s/p  C3-C6 laminectomy and fusion on 5/1/19, plan to discuss ongoing pain with surgeon Dr.David Villasenor [phone], Pt's father reports speaking with Dr. Villasenor and the surgeon feeling that Mr. Townsend should be having less pain and ambulating better by this point.  Assess whether [facility] would like a follow up MRI or office visit before pt goes to [facility]  - continue PT  -Pain control w PRN oxy  - Lidocaine patch  -heat pack

## 2019-07-07 NOTE — PROGRESS NOTE ADULT - SUBJECTIVE AND OBJECTIVE BOX
Patient is a 54y old  Male who presents with a chief complaint of suicide ideation (06 Jul 2019 12:38)      Patient seen and examined at bedside.  Patient reports that his auditory hallucinations are improving. Denies any chest pain or shortness of breath.  reports continues to have back pain.     ALLERGIES:  Haldol (Unknown)  No Known Allergies  Thorazine (Unknown)    MEDICATIONS:  acetaminophen   Tablet .. 650 milliGRAM(s) Oral every 6 hours PRN  ALBUTerol    90 MICROgram(s) HFA Inhaler 2 Puff(s) Inhalation every 6 hours PRN  aluminum hydroxide/magnesium hydroxide/simethicone Suspension 30 milliLiter(s) Oral every 4 hours PRN  ascorbic acid 500 milliGRAM(s) Oral daily  benztropine 1 milliGRAM(s) Oral two times a day  desmopressin 0.2 milliGRAM(s) Oral at bedtime  diphenhydrAMINE 25 milliGRAM(s) Oral every 4 hours PRN  docusate sodium 100 milliGRAM(s) Oral three times a day  enoxaparin Injectable 40 milliGRAM(s) SubCutaneous at bedtime  fluPHENAZine 10 milliGRAM(s) Oral two times a day  levothyroxine 175 MICROGram(s) Oral daily  lidocaine   Patch 1 Patch Transdermal daily  melatonin 3 milliGRAM(s) Oral at bedtime  metoprolol succinate ER 50 milliGRAM(s) Oral daily  multivitamin 1 Tablet(s) Oral daily  nicotine - 21 mG/24Hr(s) Patch 1 patch Transdermal daily  nystatin Powder 1 Application(s) Topical two times a day  oxyCODONE    IR 5 milliGRAM(s) Oral every 4 hours PRN  polyethylene glycol 3350 17 Gram(s) Oral two times a day PRN  pyridoxine 50 milliGRAM(s) Oral daily  senna 2 Tablet(s) Oral at bedtime  valproic  acid Syrup 750 milliGRAM(s) Oral <User Schedule>    Vital Signs Last 24 Hrs  T(F): 97.8 (07 Jul 2019 05:31), Max: 97.8 (07 Jul 2019 05:31)  HR: 66 (07 Jul 2019 05:31) (63 - 66)  BP: 122/78 (07 Jul 2019 05:31) (120/66 - 122/78)  RR: 16 (07 Jul 2019 05:31) (15 - 16)  SpO2: 99% (07 Jul 2019 05:31) (98% - 99%)  I&O's Summary    06 Jul 2019 07:01  -  07 Jul 2019 07:00  --------------------------------------------------------  IN: 1700 mL / OUT: 3400 mL / NET: -1700 mL    07 Jul 2019 07:01  -  07 Jul 2019 11:39  --------------------------------------------------------  IN: 200 mL / OUT: 950 mL / NET: -750 mL        PHYSICAL EXAM:  General: NAD, A/O x 3  ENT: MMM  Neck: Supple, No JVD  Lungs: Clear to auscultation bilaterally  Cardio: RRR, S1/S2, No murmurs  Abdomen: Soft, Nontender, Nondistended; Bowel sounds present  Extremities: No cyanosis, No edema    LABS:                                            RADIOLOGY & ADDITIONAL TESTS:    Care Discussed with Consultants/Other Providers:

## 2019-07-08 PROCEDURE — 99233 SBSQ HOSP IP/OBS HIGH 50: CPT

## 2019-07-08 RX ORDER — OXYCODONE HYDROCHLORIDE 5 MG/1
10 TABLET ORAL EVERY 12 HOURS
Refills: 0 | Status: DISCONTINUED | OUTPATIENT
Start: 2019-07-08 | End: 2019-07-12

## 2019-07-08 RX ADMIN — POLYETHYLENE GLYCOL 3350 17 GRAM(S): 17 POWDER, FOR SOLUTION ORAL at 12:56

## 2019-07-08 RX ADMIN — FLUPHENAZINE HYDROCHLORIDE 10 MILLIGRAM(S): 1 TABLET, FILM COATED ORAL at 06:12

## 2019-07-08 RX ADMIN — Medication 100 MILLIGRAM(S): at 06:12

## 2019-07-08 RX ADMIN — LIDOCAINE 1 PATCH: 4 CREAM TOPICAL at 23:30

## 2019-07-08 RX ADMIN — Medication 750 MILLIGRAM(S): at 08:45

## 2019-07-08 RX ADMIN — Medication 1 MILLIGRAM(S): at 06:12

## 2019-07-08 RX ADMIN — OXYCODONE HYDROCHLORIDE 10 MILLIGRAM(S): 5 TABLET ORAL at 22:35

## 2019-07-08 RX ADMIN — OXYCODONE HYDROCHLORIDE 10 MILLIGRAM(S): 5 TABLET ORAL at 10:50

## 2019-07-08 RX ADMIN — FLUPHENAZINE HYDROCHLORIDE 10 MILLIGRAM(S): 1 TABLET, FILM COATED ORAL at 17:13

## 2019-07-08 RX ADMIN — Medication 1 PATCH: at 11:12

## 2019-07-08 RX ADMIN — Medication 100 MILLIGRAM(S): at 14:48

## 2019-07-08 RX ADMIN — OXYCODONE HYDROCHLORIDE 10 MILLIGRAM(S): 5 TABLET ORAL at 23:30

## 2019-07-08 RX ADMIN — NYSTATIN CREAM 1 APPLICATION(S): 100000 CREAM TOPICAL at 06:14

## 2019-07-08 RX ADMIN — LIDOCAINE 1 PATCH: 4 CREAM TOPICAL at 19:30

## 2019-07-08 RX ADMIN — Medication 500 MILLIGRAM(S): at 11:12

## 2019-07-08 RX ADMIN — DESMOPRESSIN ACETATE 0.2 MILLIGRAM(S): 0.1 TABLET ORAL at 22:33

## 2019-07-08 RX ADMIN — Medication 100 MILLIGRAM(S): at 22:33

## 2019-07-08 RX ADMIN — Medication 1 TABLET(S): at 11:12

## 2019-07-08 RX ADMIN — Medication 3 MILLIGRAM(S): at 22:33

## 2019-07-08 RX ADMIN — Medication 1 MILLIGRAM(S): at 17:13

## 2019-07-08 RX ADMIN — Medication 50 MILLIGRAM(S): at 11:12

## 2019-07-08 RX ADMIN — Medication 5 MILLIGRAM(S): at 11:13

## 2019-07-08 RX ADMIN — Medication 175 MICROGRAM(S): at 06:13

## 2019-07-08 RX ADMIN — Medication 1 PATCH: at 06:16

## 2019-07-08 RX ADMIN — SENNA PLUS 2 TABLET(S): 8.6 TABLET ORAL at 22:33

## 2019-07-08 RX ADMIN — Medication 50 MILLIGRAM(S): at 06:13

## 2019-07-08 RX ADMIN — ENOXAPARIN SODIUM 40 MILLIGRAM(S): 100 INJECTION SUBCUTANEOUS at 22:33

## 2019-07-08 RX ADMIN — Medication 1 PATCH: at 11:13

## 2019-07-08 RX ADMIN — OXYCODONE HYDROCHLORIDE 10 MILLIGRAM(S): 5 TABLET ORAL at 09:59

## 2019-07-08 RX ADMIN — LIDOCAINE 1 PATCH: 4 CREAM TOPICAL at 11:12

## 2019-07-08 NOTE — PROGRESS NOTE ADULT - PROBLEM SELECTOR PLAN 1
Expressed that he "wants to end it" on admission, verbalizes improvement today  - Cont 1:1 observation   - Valproic acid increased to 750mg BID  - Prolixin increased to 10mg BID   - Cogentin added  - Psych consult appreciated

## 2019-07-08 NOTE — PROGRESS NOTE ADULT - PROBLEM SELECTOR PLAN 2
s/p  C3-C6 laminectomy and fusion on 5/1/19, plan to discuss ongoing pain with surgeon Dr.David Villasenor [phone], Pt's father reports speaking with Dr. Villasenor and the surgeon feeling that Mr. Townsend should be having less pain and ambulating better by this point.  Assess whether [facility] would like a follow up MRI or office visit before pt goes to [facility]  - continue PT  - pt continues to c/o severe neck pain. trial of Oxycontin 10mg BID and Oxycodone 5mg q4 prn as breakthrough. May consider pain management   - Lidocaine patch  - heat pack

## 2019-07-08 NOTE — PROGRESS NOTE ADULT - PROBLEM SELECTOR PLAN 3
- Valproic acid level is low (11)  - Valproic acid 750mg BID  - Prolixin increased to 10mg BID   - cogentin added  - appreciate psych cosult

## 2019-07-08 NOTE — PROGRESS NOTE ADULT - SUBJECTIVE AND OBJECTIVE BOX
Patient is a 54y old  Male who presents with a chief complaint of suicide ideation (07 Jul 2019 11:39)      Patient seen and examined at bedside. No overnight events reported. Pt reports not hearing voices, but still having suicidal thoughts, "I don't want to live anymore. c/o neck pain and back pain, pain only relieved from 10/10 to 8/10. c/o constipation today.    ALLERGIES:  Haldol (Unknown)  No Known Allergies  Thorazine (Unknown)    MEDICATIONS  (STANDING):  ascorbic acid 500 milliGRAM(s) Oral daily  benztropine 1 milliGRAM(s) Oral two times a day  desmopressin 0.2 milliGRAM(s) Oral at bedtime  docusate sodium 100 milliGRAM(s) Oral three times a day  enoxaparin Injectable 40 milliGRAM(s) SubCutaneous at bedtime  fluPHENAZine 10 milliGRAM(s) Oral two times a day  levothyroxine 175 MICROGram(s) Oral daily  lidocaine   Patch 1 Patch Transdermal daily  melatonin 3 milliGRAM(s) Oral at bedtime  metoprolol succinate ER 50 milliGRAM(s) Oral daily  multivitamin 1 Tablet(s) Oral daily  nicotine - 21 mG/24Hr(s) Patch 1 patch Transdermal daily  nystatin Powder 1 Application(s) Topical two times a day  oxyCODONE  ER Tablet 10 milliGRAM(s) Oral every 12 hours  pyridoxine 50 milliGRAM(s) Oral daily  senna 2 Tablet(s) Oral at bedtime    MEDICATIONS  (PRN):  acetaminophen   Tablet .. 650 milliGRAM(s) Oral every 6 hours PRN Temp greater or equal to 38C (100.4F), Mild Pain (1 - 3)  ALBUTerol    90 MICROgram(s) HFA Inhaler 2 Puff(s) Inhalation every 6 hours PRN Shortness of Breath and/or Wheezing  aluminum hydroxide/magnesium hydroxide/simethicone Suspension 30 milliLiter(s) Oral every 4 hours PRN Dyspepsia  diphenhydrAMINE 25 milliGRAM(s) Oral every 4 hours PRN Rash and/or Itching  oxyCODONE    IR 5 milliGRAM(s) Oral every 4 hours PRN Moderate Pain (4 - 6)  polyethylene glycol 3350 17 Gram(s) Oral two times a day PRN Constipation    Vital Signs Last 24 Hrs  T(F): 97.3 (08 Jul 2019 05:46), Max: 97.6 (07 Jul 2019 21:16)  HR: 65 (08 Jul 2019 05:46) (65 - 71)  BP: 125/75 (08 Jul 2019 05:46) (125/75 - 134/73)  RR: 14 (08 Jul 2019 05:46) (14 - 15)  SpO2: 95% (08 Jul 2019 05:46) (95% - 95%)  I&O's Summary    07 Jul 2019 07:01  -  08 Jul 2019 07:00  --------------------------------------------------------  IN: 1075 mL / OUT: 3451 mL / NET: -2376 mL    08 Jul 2019 07:01  -  08 Jul 2019 12:09  --------------------------------------------------------  IN: 50 mL / OUT: 100 mL / NET: -50 mL      PHYSICAL EXAM:  General: NAD. flat affect.   ENT: MMM, no thrush  Neck: Supple, No JVD  Lungs: Clear to auscultation bilaterally, good air entry, non-labored breathing  Cardio: RRR, S1/S2, No murmur  Abdomen: Soft, Nontender, Nondistended; Bowel sounds present  Extremities: No calf tenderness, No pitting edema    LABS:      RADIOLOGY & ADDITIONAL TESTS:    Care Discussed with Consultants/Other Providers: Dr. Demetria Delarosa

## 2019-07-09 DIAGNOSIS — Z00.00 ENCOUNTER FOR GENERAL ADULT MEDICAL EXAMINATION WITHOUT ABNORMAL FINDINGS: ICD-10-CM

## 2019-07-09 PROCEDURE — 99233 SBSQ HOSP IP/OBS HIGH 50: CPT

## 2019-07-09 PROCEDURE — 99232 SBSQ HOSP IP/OBS MODERATE 35: CPT

## 2019-07-09 RX ORDER — VALPROIC ACID (AS SODIUM SALT) 250 MG/5ML
750 SOLUTION, ORAL ORAL EVERY 12 HOURS
Refills: 0 | Status: DISCONTINUED | OUTPATIENT
Start: 2019-07-09 | End: 2019-07-22

## 2019-07-09 RX ORDER — OXYCODONE HYDROCHLORIDE 5 MG/1
5 TABLET ORAL EVERY 4 HOURS
Refills: 0 | Status: DISCONTINUED | OUTPATIENT
Start: 2019-07-09 | End: 2019-07-16

## 2019-07-09 RX ADMIN — FLUPHENAZINE HYDROCHLORIDE 10 MILLIGRAM(S): 1 TABLET, FILM COATED ORAL at 17:21

## 2019-07-09 RX ADMIN — OXYCODONE HYDROCHLORIDE 10 MILLIGRAM(S): 5 TABLET ORAL at 08:00

## 2019-07-09 RX ADMIN — Medication 500 MILLIGRAM(S): at 11:59

## 2019-07-09 RX ADMIN — FLUPHENAZINE HYDROCHLORIDE 10 MILLIGRAM(S): 1 TABLET, FILM COATED ORAL at 07:06

## 2019-07-09 RX ADMIN — LIDOCAINE 1 PATCH: 4 CREAM TOPICAL at 11:59

## 2019-07-09 RX ADMIN — OXYCODONE HYDROCHLORIDE 10 MILLIGRAM(S): 5 TABLET ORAL at 17:21

## 2019-07-09 RX ADMIN — Medication 1 TABLET(S): at 11:59

## 2019-07-09 RX ADMIN — ENOXAPARIN SODIUM 40 MILLIGRAM(S): 100 INJECTION SUBCUTANEOUS at 22:20

## 2019-07-09 RX ADMIN — Medication 1 PATCH: at 07:12

## 2019-07-09 RX ADMIN — Medication 3 MILLIGRAM(S): at 22:19

## 2019-07-09 RX ADMIN — SENNA PLUS 2 TABLET(S): 8.6 TABLET ORAL at 22:19

## 2019-07-09 RX ADMIN — Medication 100 MILLIGRAM(S): at 22:19

## 2019-07-09 RX ADMIN — LIDOCAINE 1 PATCH: 4 CREAM TOPICAL at 21:54

## 2019-07-09 RX ADMIN — Medication 1 MILLIGRAM(S): at 17:21

## 2019-07-09 RX ADMIN — Medication 1 PATCH: at 11:57

## 2019-07-09 RX ADMIN — NYSTATIN CREAM 1 APPLICATION(S): 100000 CREAM TOPICAL at 07:07

## 2019-07-09 RX ADMIN — Medication 175 MICROGRAM(S): at 07:06

## 2019-07-09 RX ADMIN — NYSTATIN CREAM 1 APPLICATION(S): 100000 CREAM TOPICAL at 17:21

## 2019-07-09 RX ADMIN — Medication 50 MILLIGRAM(S): at 11:59

## 2019-07-09 RX ADMIN — Medication 100 MILLIGRAM(S): at 07:06

## 2019-07-09 RX ADMIN — Medication 1 PATCH: at 21:54

## 2019-07-09 RX ADMIN — OXYCODONE HYDROCHLORIDE 10 MILLIGRAM(S): 5 TABLET ORAL at 07:09

## 2019-07-09 RX ADMIN — Medication 100 MILLIGRAM(S): at 16:15

## 2019-07-09 RX ADMIN — Medication 1 MILLIGRAM(S): at 07:06

## 2019-07-09 RX ADMIN — Medication 50 MILLIGRAM(S): at 07:07

## 2019-07-09 RX ADMIN — DESMOPRESSIN ACETATE 0.2 MILLIGRAM(S): 0.1 TABLET ORAL at 22:20

## 2019-07-09 RX ADMIN — Medication 750 MILLIGRAM(S): at 08:53

## 2019-07-09 RX ADMIN — Medication 750 MILLIGRAM(S): at 22:17

## 2019-07-09 RX ADMIN — Medication 1 PATCH: at 11:59

## 2019-07-09 NOTE — PROGRESS NOTE ADULT - PROBLEM SELECTOR PLAN 1
Expressed that he "wants to end it" on admission. No suicidal thoughts today per pt and auditory hallucination decreased today.   - Cont 1:1 observation   - on Valproic acid 750mg BID  - on Prolixin 10mg BID   - Cogentin added  - Per psych, might add Lithium and change Prolixin to Zyprexa.  - need to be ambulatory and independent to go to inpatient psych. therefore, pt is not a candidate currently.

## 2019-07-09 NOTE — DIETITIAN INITIAL EVALUATION ADULT. - OTHER INFO
Pt. reports good appetite. Tolerates DASH diet. Pt. not interested in any education at this time. Denies any N/V/ diarrhea. Last BM was 7/6. Skin intact. N oedema noted.

## 2019-07-09 NOTE — PROGRESS NOTE ADULT - PROBLEM SELECTOR PLAN 2
- spoke with Dr.David Villasenor [phone] today, agreed with MRI C spine with [facility]. Per Dr. Villasenor, may be normal to still have neck pain, will decrease gradually.  s/p C3-C6 laminectomy and fusion on 5/1/19  - continue PT  - Neck pain mildly improved today. cont trial of Oxycontin 10mg BID and Oxycodone 5mg q4 prn as breakthrough. May consider pain management   - Lidocaine patch  - heat pack

## 2019-07-09 NOTE — PROGRESS NOTE ADULT - PROBLEM SELECTOR PLAN 3
- 7/8 Valproic acid level is low (11)  - Valproic acid increased to 750mg BID  - c/w Prolixin 10mg BID   - cogentin added  - psych consult: may add lithium and change Prolixin to Zyprexa

## 2019-07-09 NOTE — PROGRESS NOTE ADULT - SUBJECTIVE AND OBJECTIVE BOX
Patient is a 54y old  Male who presents with a chief complaint of suicide ideation (08 Jul 2019 19:07)      Patient seen and examined at bedside. Pt reports improved neck pain from 10/10 yesterday to 8/10 today. Pt reports that he had a good night sleep last night. Pt states that he is not suicidal today, no suicidal thoughts. Also, he verbalized that the voices he hears subsided. Pt also participated with physical therapy more actively walked to the door with assistance.     ALLERGIES:  Haldol (Unknown)  No Known Allergies  Thorazine (Unknown)    MEDICATIONS  (STANDING):  ascorbic acid 500 milliGRAM(s) Oral daily  benztropine 1 milliGRAM(s) Oral two times a day  desmopressin 0.2 milliGRAM(s) Oral at bedtime  docusate sodium 100 milliGRAM(s) Oral three times a day  enoxaparin Injectable 40 milliGRAM(s) SubCutaneous at bedtime  fluPHENAZine 10 milliGRAM(s) Oral two times a day  levothyroxine 175 MICROGram(s) Oral daily  lidocaine   Patch 1 Patch Transdermal daily  melatonin 3 milliGRAM(s) Oral at bedtime  metoprolol succinate ER 50 milliGRAM(s) Oral daily  multivitamin 1 Tablet(s) Oral daily  nicotine - 21 mG/24Hr(s) Patch 1 patch Transdermal daily  nystatin Powder 1 Application(s) Topical two times a day  oxyCODONE  ER Tablet 10 milliGRAM(s) Oral every 12 hours  pyridoxine 50 milliGRAM(s) Oral daily  senna 2 Tablet(s) Oral at bedtime  valproic  acid Syrup 750 milliGRAM(s) Oral every 12 hours    MEDICATIONS  (PRN):  acetaminophen   Tablet .. 650 milliGRAM(s) Oral every 6 hours PRN Temp greater or equal to 38C (100.4F), Mild Pain (1 - 3)  ALBUTerol    90 MICROgram(s) HFA Inhaler 2 Puff(s) Inhalation every 6 hours PRN Shortness of Breath and/or Wheezing  aluminum hydroxide/magnesium hydroxide/simethicone Suspension 30 milliLiter(s) Oral every 4 hours PRN Dyspepsia  diphenhydrAMINE 25 milliGRAM(s) Oral every 4 hours PRN Rash and/or Itching  oxyCODONE    IR 5 milliGRAM(s) Oral every 4 hours PRN Moderate Pain (4 - 6)  polyethylene glycol 3350 17 Gram(s) Oral two times a day PRN Constipation    Vital Signs Last 24 Hrs  T(F): 100.5 (09 Jul 2019 10:51), Max: 100.5 (09 Jul 2019 10:51)  HR: 84 (09 Jul 2019 06:09) (65 - 84)  BP: 129/68 (09 Jul 2019 05:30) (129/68 - 133/72)  RR: 15 (09 Jul 2019 05:30) (14 - 15)  SpO2: 94% (09 Jul 2019 06:09) (94% - 98%)  I&O's Summary    08 Jul 2019 07:01  -  09 Jul 2019 07:00  --------------------------------------------------------  IN: 450 mL / OUT: 900 mL / NET: -450 mL      PHYSICAL EXAM:  General: NAD. much more engaging today. more conversant   ENT: MMM  Neck: Supple, No JVD  Lungs: Clear to auscultation bilaterally, good air entry, non-labored breathing  Cardio: RRR, S1/S2, No murmur  Abdomen: Soft, Nontender, Nondistended; Bowel sounds present  Extremities: No calf tenderness, No pitting edema  SKIN: intact    LABS:    RADIOLOGY & ADDITIONAL TESTS:     Care Discussed with Consultants/Other Providers: Dr. Demetria Delarosa

## 2019-07-10 ENCOUNTER — APPOINTMENT (OUTPATIENT)
Dept: MRI IMAGING | Facility: HOSPITAL | Age: 54
End: 2019-07-10

## 2019-07-10 DIAGNOSIS — R10.84 GENERALIZED ABDOMINAL PAIN: ICD-10-CM

## 2019-07-10 LAB
ANION GAP SERPL CALC-SCNC: 8 MMOL/L — SIGNIFICANT CHANGE UP (ref 5–17)
BASOPHILS # BLD AUTO: 0.07 K/UL — SIGNIFICANT CHANGE UP (ref 0–0.2)
BASOPHILS NFR BLD AUTO: 1.2 % — SIGNIFICANT CHANGE UP (ref 0–2)
BUN SERPL-MCNC: 19 MG/DL — SIGNIFICANT CHANGE UP (ref 7–23)
CALCIUM SERPL-MCNC: 9.4 MG/DL — SIGNIFICANT CHANGE UP (ref 8.4–10.5)
CHLORIDE SERPL-SCNC: 100 MMOL/L — SIGNIFICANT CHANGE UP (ref 96–108)
CO2 SERPL-SCNC: 28 MMOL/L — SIGNIFICANT CHANGE UP (ref 22–31)
CREAT SERPL-MCNC: 0.69 MG/DL — SIGNIFICANT CHANGE UP (ref 0.5–1.3)
EOSINOPHIL # BLD AUTO: 0.18 K/UL — SIGNIFICANT CHANGE UP (ref 0–0.5)
EOSINOPHIL NFR BLD AUTO: 3.1 % — SIGNIFICANT CHANGE UP (ref 0–6)
GLUCOSE SERPL-MCNC: 92 MG/DL — SIGNIFICANT CHANGE UP (ref 70–99)
HCT VFR BLD CALC: 42.4 % — SIGNIFICANT CHANGE UP (ref 39–50)
HGB BLD-MCNC: 13.9 G/DL — SIGNIFICANT CHANGE UP (ref 13–17)
IMM GRANULOCYTES NFR BLD AUTO: 0.3 % — SIGNIFICANT CHANGE UP (ref 0–1.5)
LIDOCAIN IGE QN: 144 U/L — SIGNIFICANT CHANGE UP (ref 73–393)
LYMPHOCYTES # BLD AUTO: 1.45 K/UL — SIGNIFICANT CHANGE UP (ref 1–3.3)
LYMPHOCYTES # BLD AUTO: 24.8 % — SIGNIFICANT CHANGE UP (ref 13–44)
MCHC RBC-ENTMCNC: 28 PG — SIGNIFICANT CHANGE UP (ref 27–34)
MCHC RBC-ENTMCNC: 32.8 GM/DL — SIGNIFICANT CHANGE UP (ref 32–36)
MCV RBC AUTO: 85.3 FL — SIGNIFICANT CHANGE UP (ref 80–100)
MONOCYTES # BLD AUTO: 0.51 K/UL — SIGNIFICANT CHANGE UP (ref 0–0.9)
MONOCYTES NFR BLD AUTO: 8.7 % — SIGNIFICANT CHANGE UP (ref 2–14)
NEUTROPHILS # BLD AUTO: 3.62 K/UL — SIGNIFICANT CHANGE UP (ref 1.8–7.4)
NEUTROPHILS NFR BLD AUTO: 61.9 % — SIGNIFICANT CHANGE UP (ref 43–77)
NRBC # BLD: 0 /100 WBCS — SIGNIFICANT CHANGE UP (ref 0–0)
PLATELET # BLD AUTO: 294 K/UL — SIGNIFICANT CHANGE UP (ref 150–400)
POTASSIUM SERPL-MCNC: 3.9 MMOL/L — SIGNIFICANT CHANGE UP (ref 3.5–5.3)
POTASSIUM SERPL-SCNC: 3.9 MMOL/L — SIGNIFICANT CHANGE UP (ref 3.5–5.3)
RBC # BLD: 4.97 M/UL — SIGNIFICANT CHANGE UP (ref 4.2–5.8)
RBC # FLD: 13.2 % — SIGNIFICANT CHANGE UP (ref 10.3–14.5)
SODIUM SERPL-SCNC: 136 MMOL/L — SIGNIFICANT CHANGE UP (ref 135–145)
WBC # BLD: 5.85 K/UL — SIGNIFICANT CHANGE UP (ref 3.8–10.5)
WBC # FLD AUTO: 5.85 K/UL — SIGNIFICANT CHANGE UP (ref 3.8–10.5)

## 2019-07-10 PROCEDURE — 72156 MRI NECK SPINE W/O & W/DYE: CPT | Mod: 26

## 2019-07-10 PROCEDURE — 99233 SBSQ HOSP IP/OBS HIGH 50: CPT

## 2019-07-10 PROCEDURE — 99232 SBSQ HOSP IP/OBS MODERATE 35: CPT

## 2019-07-10 RX ORDER — PANTOPRAZOLE SODIUM 20 MG/1
40 TABLET, DELAYED RELEASE ORAL
Refills: 0 | Status: DISCONTINUED | OUTPATIENT
Start: 2019-07-10 | End: 2019-07-11

## 2019-07-10 RX ORDER — OLANZAPINE 15 MG/1
10 TABLET, FILM COATED ORAL AT BEDTIME
Refills: 0 | Status: DISCONTINUED | OUTPATIENT
Start: 2019-07-10 | End: 2019-07-22

## 2019-07-10 RX ORDER — ONDANSETRON 8 MG/1
4 TABLET, FILM COATED ORAL ONCE
Refills: 0 | Status: COMPLETED | OUTPATIENT
Start: 2019-07-10 | End: 2019-07-10

## 2019-07-10 RX ORDER — ONDANSETRON 8 MG/1
4 TABLET, FILM COATED ORAL EVERY 6 HOURS
Refills: 0 | Status: DISCONTINUED | OUTPATIENT
Start: 2019-07-10 | End: 2019-07-14

## 2019-07-10 RX ORDER — MORPHINE SULFATE 50 MG/1
2 CAPSULE, EXTENDED RELEASE ORAL ONCE
Refills: 0 | Status: DISCONTINUED | OUTPATIENT
Start: 2019-07-10 | End: 2019-07-10

## 2019-07-10 RX ADMIN — Medication 175 MICROGRAM(S): at 05:23

## 2019-07-10 RX ADMIN — LIDOCAINE 1 PATCH: 4 CREAM TOPICAL at 12:17

## 2019-07-10 RX ADMIN — Medication 10 MILLIGRAM(S): at 18:58

## 2019-07-10 RX ADMIN — POLYETHYLENE GLYCOL 3350 17 GRAM(S): 17 POWDER, FOR SOLUTION ORAL at 09:15

## 2019-07-10 RX ADMIN — SENNA PLUS 2 TABLET(S): 8.6 TABLET ORAL at 20:56

## 2019-07-10 RX ADMIN — Medication 2 MILLIGRAM(S): at 12:17

## 2019-07-10 RX ADMIN — Medication 1 PATCH: at 19:51

## 2019-07-10 RX ADMIN — Medication 750 MILLIGRAM(S): at 09:15

## 2019-07-10 RX ADMIN — Medication 3 MILLIGRAM(S): at 20:53

## 2019-07-10 RX ADMIN — OLANZAPINE 10 MILLIGRAM(S): 15 TABLET, FILM COATED ORAL at 20:55

## 2019-07-10 RX ADMIN — Medication 1 PATCH: at 12:17

## 2019-07-10 RX ADMIN — Medication 100 MILLIGRAM(S): at 05:23

## 2019-07-10 RX ADMIN — OXYCODONE HYDROCHLORIDE 10 MILLIGRAM(S): 5 TABLET ORAL at 18:21

## 2019-07-10 RX ADMIN — Medication 1 PATCH: at 09:15

## 2019-07-10 RX ADMIN — DESMOPRESSIN ACETATE 0.2 MILLIGRAM(S): 0.1 TABLET ORAL at 20:55

## 2019-07-10 RX ADMIN — Medication 100 MILLIGRAM(S): at 18:21

## 2019-07-10 RX ADMIN — Medication 100 MILLIGRAM(S): at 20:55

## 2019-07-10 RX ADMIN — OXYCODONE HYDROCHLORIDE 10 MILLIGRAM(S): 5 TABLET ORAL at 05:23

## 2019-07-10 RX ADMIN — Medication 10 MILLIGRAM(S): at 05:23

## 2019-07-10 RX ADMIN — Medication 50 MILLIGRAM(S): at 12:17

## 2019-07-10 RX ADMIN — LIDOCAINE 1 PATCH: 4 CREAM TOPICAL at 05:26

## 2019-07-10 RX ADMIN — Medication 1 PATCH: at 12:23

## 2019-07-10 RX ADMIN — ENOXAPARIN SODIUM 40 MILLIGRAM(S): 100 INJECTION SUBCUTANEOUS at 20:56

## 2019-07-10 RX ADMIN — Medication 1 TABLET(S): at 12:17

## 2019-07-10 RX ADMIN — Medication 1 ENEMA: at 20:58

## 2019-07-10 RX ADMIN — Medication 1 MILLIGRAM(S): at 18:21

## 2019-07-10 RX ADMIN — LIDOCAINE 1 PATCH: 4 CREAM TOPICAL at 19:51

## 2019-07-10 RX ADMIN — Medication 50 MILLIGRAM(S): at 05:23

## 2019-07-10 RX ADMIN — ONDANSETRON 4 MILLIGRAM(S): 8 TABLET, FILM COATED ORAL at 09:17

## 2019-07-10 RX ADMIN — OXYCODONE HYDROCHLORIDE 10 MILLIGRAM(S): 5 TABLET ORAL at 18:57

## 2019-07-10 RX ADMIN — NYSTATIN CREAM 1 APPLICATION(S): 100000 CREAM TOPICAL at 05:23

## 2019-07-10 RX ADMIN — NYSTATIN CREAM 1 APPLICATION(S): 100000 CREAM TOPICAL at 18:21

## 2019-07-10 RX ADMIN — FLUPHENAZINE HYDROCHLORIDE 10 MILLIGRAM(S): 1 TABLET, FILM COATED ORAL at 05:23

## 2019-07-10 RX ADMIN — OXYCODONE HYDROCHLORIDE 10 MILLIGRAM(S): 5 TABLET ORAL at 06:45

## 2019-07-10 RX ADMIN — OXYCODONE HYDROCHLORIDE 10 MILLIGRAM(S): 5 TABLET ORAL at 18:12

## 2019-07-10 RX ADMIN — Medication 750 MILLIGRAM(S): at 20:53

## 2019-07-10 RX ADMIN — Medication 500 MILLIGRAM(S): at 12:18

## 2019-07-10 RX ADMIN — PANTOPRAZOLE SODIUM 40 MILLIGRAM(S): 20 TABLET, DELAYED RELEASE ORAL at 09:15

## 2019-07-10 RX ADMIN — Medication 1 MILLIGRAM(S): at 05:23

## 2019-07-10 NOTE — PROGRESS NOTE ADULT - PROBLEM SELECTOR PLAN 2
- spoke with Dr.David Villasenor [phone] (7/9), agreed with MRI C spine with Negro. Per Dr. Villasenor, may be normal to still have neck pain, will decrease gradually.  s/p C3-C6 laminectomy and fusion on 5/1/19  - continue PT  - cont Oxycontin 10mg BID and Oxycodone 5mg q4 prn as breakthrough. May consider pain management   - Lidocaine patch  - heat pack - spoke with Dr.David Villasenor [phone] (7/9), agreed with MRI C spine with Negro. Per Dr. Villasenor, may be normal to still have neck pain, will decrease gradually.  - MRI C spine scheduled today.   - s/p C3-C6 laminectomy and fusion on 5/1/19  - continue PT  - cont Oxycontin 10mg BID and Oxycodone 5mg q4 prn as breakthrough. May consider pain management   - Lidocaine patch  - heat pack - spoke with Dr.David Villasenor [phone] (7/9), agreed with MRI C spine with Negro. Per Dr. Villasenor, may be normal to still have neck pain, pain will decrease gradually. consider pain management if pain persists.   - MRI C spine scheduled today.   - s/p C3-C6 laminectomy and fusion on 5/1/19  - continue PT  - cont Oxycontin 10mg BID and Oxycodone 5mg q4 prn as breakthrough. May consider pain management   - Lidocaine patch  - heat pack - spoke with Dr.David Villasenor [phone] (7/9), agreed with MRI C spine with Negro to assess persistent severe neck pain. Per Dr. Villasenor, may be normal to still have neck pain, pain will decrease gradually. consider pain management if pain persists.   - MRI C spine scheduled today.   - s/p C3-C6 laminectomy and fusion on 5/1/19  - continue PT  - cont Oxycontin 10mg BID and Oxycodone 5mg q4 prn as breakthrough. May consider pain management   - Lidocaine patch  - heat pack

## 2019-07-10 NOTE — PROGRESS NOTE ADULT - PROBLEM SELECTOR PLAN 4
see above - 7/8 Valproic acid level is low (11). repeat level on 7/12 or 7/15  - Valproic acid increased to 750mg BID  - c/w Prolixin 10mg BID   - cogentin added

## 2019-07-10 NOTE — PROGRESS NOTE ADULT - PROBLEM SELECTOR PLAN 1
No current suicidal thoughts per pt however auditory hallucination persists.  - Cont 1:1 observation   - on Valproic acid 750mg BID  - on Prolixin 10mg BID   - Cogentin added  - Per psych, cont current regimen for now and if pt remains in this mood state, may be able to send pt to Subacute rehab with decreased observation.   - repeat VPA level on 7/12 or 7/15  - need to be ambulatory and independent to go to inpatient psych. therefore, pt is not a candidate currently.

## 2019-07-10 NOTE — PROGRESS NOTE ADULT - PROBLEM SELECTOR PLAN 9
DISPO: pending psych. would need subacute rehab for generalized weakness Nicotine patch    DVT ppx: lovenox

## 2019-07-10 NOTE — PROGRESS NOTE ADULT - PROBLEM SELECTOR PLAN 3
- 7/8 Valproic acid level is low (11). repeat level on 7/12 or 7/15  - Valproic acid increased to 750mg BID  - c/w Prolixin 10mg BID   - cogentin added acute on chronic diffuse abd pain, worse in epigastric area.   - r/o 2/2 constipation. r/o gastritis  - lipase 144  - PPI  - senna, colace and miralax. enema prn

## 2019-07-10 NOTE — PROGRESS NOTE ADULT - SUBJECTIVE AND OBJECTIVE BOX
Patient is a 54y old  Male who presents with a chief complaint of suicide ideation (10 Jul 2019 10:05)      Patient seen and examined at bedside. Pt reports constipation for 3-4 days and diffuse abd pain worse in epigastric area. PT reports that abd pain is chronic but worsened after suppository yesterday. Pt reports still hearing voices that tells him to hurt himself but he denies having      ALLERGIES:  Haldol (Unknown)  No Known Allergies  Thorazine (Unknown)    MEDICATIONS  (STANDING):  ascorbic acid 500 milliGRAM(s) Oral daily  benztropine 1 milliGRAM(s) Oral two times a day  desmopressin 0.2 milliGRAM(s) Oral at bedtime  docusate sodium 100 milliGRAM(s) Oral three times a day  enoxaparin Injectable 40 milliGRAM(s) SubCutaneous at bedtime  fluPHENAZine 10 milliGRAM(s) Oral two times a day  levothyroxine 175 MICROGram(s) Oral daily  lidocaine   Patch 1 Patch Transdermal daily  LORazepam   Injectable 2 milliGRAM(s) IntraMuscular once  melatonin 3 milliGRAM(s) Oral at bedtime  metoprolol succinate ER 50 milliGRAM(s) Oral daily  multivitamin 1 Tablet(s) Oral daily  nicotine - 21 mG/24Hr(s) Patch 1 patch Transdermal daily  nystatin Powder 1 Application(s) Topical two times a day  oxyCODONE  ER Tablet 10 milliGRAM(s) Oral every 12 hours  pantoprazole    Tablet 40 milliGRAM(s) Oral before breakfast  pyridoxine 50 milliGRAM(s) Oral daily  senna 2 Tablet(s) Oral at bedtime  valproic  acid Syrup 750 milliGRAM(s) Oral every 12 hours    MEDICATIONS  (PRN):  acetaminophen   Tablet .. 650 milliGRAM(s) Oral every 6 hours PRN Temp greater or equal to 38C (100.4F), Mild Pain (1 - 3)  ALBUTerol    90 MICROgram(s) HFA Inhaler 2 Puff(s) Inhalation every 6 hours PRN Shortness of Breath and/or Wheezing  aluminum hydroxide/magnesium hydroxide/simethicone Suspension 30 milliLiter(s) Oral every 4 hours PRN Dyspepsia  diphenhydrAMINE 25 milliGRAM(s) Oral every 4 hours PRN Rash and/or Itching  ondansetron Injectable 4 milliGRAM(s) IV Push every 6 hours PRN Nausea and/or Vomiting  oxyCODONE    IR 5 milliGRAM(s) Oral every 4 hours PRN Severe Pain (7 - 10)  polyethylene glycol 3350 17 Gram(s) Oral two times a day PRN Constipation    Vital Signs Last 24 Hrs  T(F): 97.5 (10 Jul 2019 05:22), Max: 98.4 (09 Jul 2019 22:15)  HR: 68 (10 Jul 2019 05:22) (66 - 68)  BP: 106/71 (10 Jul 2019 05:22) (106/71 - 117/70)  RR: 16 (10 Jul 2019 05:22) (16 - 16)  SpO2: 98% (10 Jul 2019 05:22) (98% - 98%)  I&O's Summary    09 Jul 2019 07:01  -  10 Jul 2019 07:00  --------------------------------------------------------  IN: 240 mL / OUT: 600 mL / NET: -360 mL    10 Jul 2019 07:01  -  10 Jul 2019 10:56  --------------------------------------------------------  IN: 200 mL / OUT: 300 mL / NET: -100 mL    GENERAL: NAD, well-developed  HEAD:  Atraumatic, Normocephalic  EYES: EOMI, PERRLA, conjunctiva and sclera clear  NECK: Supple, No JVD  CHEST/LUNG: Clear to auscultation bilaterally; No wheeze  HEART: Regular rate and rhythm; No murmurs, rubs, or gallops  ABDOMEN: Soft, Nontender, Nondistended; Bowel sounds present  EXTREMITIES:  2+ Peripheral Pulses, No clubbing, cyanosis, or edema  PSYCH: AAOx3  NEUROLOGY: non-focal  SKIN: No rashes or lesions    LABS:                        13.9   5.85  )-----------( 294      ( 10 Jul 2019 06:45 )             42.4     07-10    136  |  100  |  19  ----------------------------<  92  3.9   |  28  |  0.69    Ca    9.4      10 Jul 2019 06:45        Lipase, Serum: 144 U/L (07-10-19 @ 06:45)    eGFR if Non African American: 108 mL/min/1.73M2 (07-10-19 @ 06:45)  eGFR if African American: 125 mL/min/1.73M2 (07-10-19 @ 06:45)                                    RADIOLOGY & ADDITIONAL TESTS:    Care Discussed with Consultants/Other Providers: Patient is a 54y old  Male who presents with a chief complaint of suicide ideation (10 Jul 2019 10:05)      Patient seen and examined at bedside. Pt reports constipation for 3-4 days. Pt reports chronic diffuse abd pain worse in epigastric area that has worsened since suppository yesterday. +small BM yesterday.   Pt reports still hearing voices that tell him to hurt himself but he denies suicidal thoughts. continues to have neck pain.     ALLERGIES:  Haldol (Unknown)  No Known Allergies  Thorazine (Unknown)    MEDICATIONS  (STANDING):  ascorbic acid 500 milliGRAM(s) Oral daily  benztropine 1 milliGRAM(s) Oral two times a day  desmopressin 0.2 milliGRAM(s) Oral at bedtime  docusate sodium 100 milliGRAM(s) Oral three times a day  enoxaparin Injectable 40 milliGRAM(s) SubCutaneous at bedtime  fluPHENAZine 10 milliGRAM(s) Oral two times a day  levothyroxine 175 MICROGram(s) Oral daily  lidocaine   Patch 1 Patch Transdermal daily  LORazepam   Injectable 2 milliGRAM(s) IntraMuscular once  melatonin 3 milliGRAM(s) Oral at bedtime  metoprolol succinate ER 50 milliGRAM(s) Oral daily  multivitamin 1 Tablet(s) Oral daily  nicotine - 21 mG/24Hr(s) Patch 1 patch Transdermal daily  nystatin Powder 1 Application(s) Topical two times a day  oxyCODONE  ER Tablet 10 milliGRAM(s) Oral every 12 hours  pantoprazole    Tablet 40 milliGRAM(s) Oral before breakfast  pyridoxine 50 milliGRAM(s) Oral daily  senna 2 Tablet(s) Oral at bedtime  valproic  acid Syrup 750 milliGRAM(s) Oral every 12 hours    MEDICATIONS  (PRN):  acetaminophen   Tablet .. 650 milliGRAM(s) Oral every 6 hours PRN Temp greater or equal to 38C (100.4F), Mild Pain (1 - 3)  ALBUTerol    90 MICROgram(s) HFA Inhaler 2 Puff(s) Inhalation every 6 hours PRN Shortness of Breath and/or Wheezing  aluminum hydroxide/magnesium hydroxide/simethicone Suspension 30 milliLiter(s) Oral every 4 hours PRN Dyspepsia  diphenhydrAMINE 25 milliGRAM(s) Oral every 4 hours PRN Rash and/or Itching  ondansetron Injectable 4 milliGRAM(s) IV Push every 6 hours PRN Nausea and/or Vomiting  oxyCODONE    IR 5 milliGRAM(s) Oral every 4 hours PRN Severe Pain (7 - 10)  polyethylene glycol 3350 17 Gram(s) Oral two times a day PRN Constipation    Vital Signs Last 24 Hrs  T(F): 97.5 (10 Jul 2019 05:22), Max: 98.4 (09 Jul 2019 22:15)  HR: 68 (10 Jul 2019 05:22) (66 - 68)  BP: 106/71 (10 Jul 2019 05:22) (106/71 - 117/70)  RR: 16 (10 Jul 2019 05:22) (16 - 16)  SpO2: 98% (10 Jul 2019 05:22) (98% - 98%)  I&O's Summary    09 Jul 2019 07:01  -  10 Jul 2019 07:00  --------------------------------------------------------  IN: 240 mL / OUT: 600 mL / NET: -360 mL    10 Jul 2019 07:01  -  10 Jul 2019 10:56  --------------------------------------------------------  IN: 200 mL / OUT: 300 mL / NET: -100 mL    GENERAL: NAD. engaging during conservation. appears calm.   HEAD:  Atraumatic, Normocephalic  EYES: EOMI, PERRLA, conjunctiva and sclera clear  NECK: Supple, No JVD  CHEST/LUNG: Clear to auscultation bilaterally; No wheeze  HEART: Regular rate and rhythm; No murmurs. + S1 S2  ABDOMEN: Soft, ND, diffuse abd tenderness worse in epigastric area. + BS. no rebound no guarding  EXTREMITIES: No clubbing, cyanosis, or edema  NEUROLOGY: non-focal  SKIN: No rashes.    LABS:                        13.9   5.85  )-----------( 294      ( 10 Jul 2019 06:45 )             42.4     07-10    136  |  100  |  19  ----------------------------<  92  3.9   |  28  |  0.69    Ca    9.4      10 Jul 2019 06:45    Lipase, Serum: 144 U/L (07-10-19 @ 06:45)    eGFR if Non African American: 108 mL/min/1.73M2 (07-10-19 @ 06:45)  eGFR if African American: 125 mL/min/1.73M2 (07-10-19 @ 06:45)                                    RADIOLOGY & ADDITIONAL TESTS:    Care Discussed with Consultants/Other Providers: Patient is a 54y old  Male who presents with a chief complaint of suicide ideation (10 Jul 2019 10:05)      Patient seen and examined at bedside. Pt reports constipation for 3-4 days. Pt reports chronic diffuse abd pain worse in epigastric area that has worsened since suppository yesterday. +small BM yesterday.   Pt reports still hearing voices that tell him to hurt himself but he denies wanting to hurt himself. continues to have neck pain     ALLERGIES:   Haldol (Unknown)  No Known Allergies  Thorazine (Unknown)    MEDICATIONS  (STANDING):  ascorbic acid 500 milliGRAM(s) Oral daily  benztropine 1 milliGRAM(s) Oral two times a day  desmopressin 0.2 milliGRAM(s) Oral at bedtime  docusate sodium 100 milliGRAM(s) Oral three times a day  enoxaparin Injectable 40 milliGRAM(s) SubCutaneous at bedtime  fluPHENAZine 10 milliGRAM(s) Oral two times a day  levothyroxine 175 MICROGram(s) Oral daily  lidocaine   Patch 1 Patch Transdermal daily  LORazepam   Injectable 2 milliGRAM(s) IntraMuscular once  melatonin 3 milliGRAM(s) Oral at bedtime  metoprolol succinate ER 50 milliGRAM(s) Oral daily  multivitamin 1 Tablet(s) Oral daily  nicotine - 21 mG/24Hr(s) Patch 1 patch Transdermal daily  nystatin Powder 1 Application(s) Topical two times a day  oxyCODONE  ER Tablet 10 milliGRAM(s) Oral every 12 hours  pantoprazole    Tablet 40 milliGRAM(s) Oral before breakfast  pyridoxine 50 milliGRAM(s) Oral daily  senna 2 Tablet(s) Oral at bedtime  valproic  acid Syrup 750 milliGRAM(s) Oral every 12 hours    MEDICATIONS  (PRN):  acetaminophen   Tablet .. 650 milliGRAM(s) Oral every 6 hours PRN Temp greater or equal to 38C (100.4F), Mild Pain (1 - 3)  ALBUTerol    90 MICROgram(s) HFA Inhaler 2 Puff(s) Inhalation every 6 hours PRN Shortness of Breath and/or Wheezing  aluminum hydroxide/magnesium hydroxide/simethicone Suspension 30 milliLiter(s) Oral every 4 hours PRN Dyspepsia  diphenhydrAMINE 25 milliGRAM(s) Oral every 4 hours PRN Rash and/or Itching  ondansetron Injectable 4 milliGRAM(s) IV Push every 6 hours PRN Nausea and/or Vomiting  oxyCODONE    IR 5 milliGRAM(s) Oral every 4 hours PRN Severe Pain (7 - 10)  polyethylene glycol 3350 17 Gram(s) Oral two times a day PRN Constipation    Vital Signs Last 24 Hrs  T(F): 97.5 (10 Jul 2019 05:22), Max: 98.4 (09 Jul 2019 22:15)  HR: 68 (10 Jul 2019 05:22) (66 - 68)  BP: 106/71 (10 Jul 2019 05:22) (106/71 - 117/70)  RR: 16 (10 Jul 2019 05:22) (16 - 16)  SpO2: 98% (10 Jul 2019 05:22) (98% - 98%)  I&O's Summary    09 Jul 2019 07:01  -  10 Jul 2019 07:00  --------------------------------------------------------  IN: 240 mL / OUT: 600 mL / NET: -360 mL    10 Jul 2019 07:01  -  10 Jul 2019 10:56  --------------------------------------------------------  IN: 200 mL / OUT: 300 mL / NET: -100 mL    GENERAL: NAD. engaging during conservation. appears calm.   HEAD:  Atraumatic, Normocephalic  EYES: EOMI, PERRLA, conjunctiva and sclera clear  NECK: Supple, No JVD  CHEST/LUNG: Clear to auscultation bilaterally; No wheeze  HEART: Regular rate and rhythm; No murmurs. + S1 S2  ABDOMEN: Soft, ND, diffuse abd tenderness worse in epigastric area. + BS. no rebound no guarding  EXTREMITIES: No clubbing, cyanosis, or edema  NEUROLOGY: non-focal  SKIN: No rashes.    LABS:                        13.9   5.85  )-----------( 294      ( 10 Jul 2019 06:45 )             42.4     07-10    136  |  100  |  19  ----------------------------<  92  3.9   |  28  |  0.69    Ca    9.4      10 Jul 2019 06:45    Lipase, Serum: 144 U/L (07-10-19 @ 06:45)    eGFR if Non African American: 108 mL/min/1.73M2 (07-10-19 @ 06:45)  eGFR if African American: 125 mL/min/1.73M2 (07-10-19 @ 06:45)                                    RADIOLOGY & ADDITIONAL TESTS:    Care Discussed with Consultants/Other Providers:

## 2019-07-11 PROCEDURE — 99232 SBSQ HOSP IP/OBS MODERATE 35: CPT

## 2019-07-11 PROCEDURE — 99233 SBSQ HOSP IP/OBS HIGH 50: CPT

## 2019-07-11 RX ORDER — FAMOTIDINE 10 MG/ML
20 INJECTION INTRAVENOUS DAILY
Refills: 0 | Status: DISCONTINUED | OUTPATIENT
Start: 2019-07-11 | End: 2019-07-22

## 2019-07-11 RX ORDER — OLANZAPINE 15 MG/1
5 TABLET, FILM COATED ORAL DAILY
Refills: 0 | Status: DISCONTINUED | OUTPATIENT
Start: 2019-07-12 | End: 2019-07-22

## 2019-07-11 RX ORDER — BENZTROPINE MESYLATE 1 MG
0.5 TABLET ORAL
Refills: 0 | Status: DISCONTINUED | OUTPATIENT
Start: 2019-07-11 | End: 2019-07-22

## 2019-07-11 RX ADMIN — OXYCODONE HYDROCHLORIDE 10 MILLIGRAM(S): 5 TABLET ORAL at 07:24

## 2019-07-11 RX ADMIN — FAMOTIDINE 20 MILLIGRAM(S): 10 INJECTION INTRAVENOUS at 12:50

## 2019-07-11 RX ADMIN — Medication 500 MILLIGRAM(S): at 11:13

## 2019-07-11 RX ADMIN — ENOXAPARIN SODIUM 40 MILLIGRAM(S): 100 INJECTION SUBCUTANEOUS at 21:07

## 2019-07-11 RX ADMIN — Medication 3 MILLIGRAM(S): at 21:07

## 2019-07-11 RX ADMIN — Medication 100 MILLIGRAM(S): at 21:07

## 2019-07-11 RX ADMIN — OXYCODONE HYDROCHLORIDE 5 MILLIGRAM(S): 5 TABLET ORAL at 20:35

## 2019-07-11 RX ADMIN — Medication 100 MILLIGRAM(S): at 14:06

## 2019-07-11 RX ADMIN — Medication 1 TABLET(S): at 11:13

## 2019-07-11 RX ADMIN — SENNA PLUS 2 TABLET(S): 8.6 TABLET ORAL at 21:07

## 2019-07-11 RX ADMIN — OXYCODONE HYDROCHLORIDE 5 MILLIGRAM(S): 5 TABLET ORAL at 19:35

## 2019-07-11 RX ADMIN — OXYCODONE HYDROCHLORIDE 10 MILLIGRAM(S): 5 TABLET ORAL at 17:05

## 2019-07-11 RX ADMIN — NYSTATIN CREAM 1 APPLICATION(S): 100000 CREAM TOPICAL at 06:25

## 2019-07-11 RX ADMIN — LIDOCAINE 1 PATCH: 4 CREAM TOPICAL at 11:39

## 2019-07-11 RX ADMIN — Medication 100 MILLIGRAM(S): at 06:24

## 2019-07-11 RX ADMIN — OXYCODONE HYDROCHLORIDE 10 MILLIGRAM(S): 5 TABLET ORAL at 06:24

## 2019-07-11 RX ADMIN — OXYCODONE HYDROCHLORIDE 5 MILLIGRAM(S): 5 TABLET ORAL at 12:20

## 2019-07-11 RX ADMIN — OXYCODONE HYDROCHLORIDE 10 MILLIGRAM(S): 5 TABLET ORAL at 17:45

## 2019-07-11 RX ADMIN — OLANZAPINE 10 MILLIGRAM(S): 15 TABLET, FILM COATED ORAL at 21:07

## 2019-07-11 RX ADMIN — OXYCODONE HYDROCHLORIDE 5 MILLIGRAM(S): 5 TABLET ORAL at 11:39

## 2019-07-11 RX ADMIN — Medication 1 PATCH: at 11:39

## 2019-07-11 RX ADMIN — Medication 750 MILLIGRAM(S): at 08:12

## 2019-07-11 RX ADMIN — Medication 1 MILLIGRAM(S): at 06:24

## 2019-07-11 RX ADMIN — LIDOCAINE 1 PATCH: 4 CREAM TOPICAL at 18:40

## 2019-07-11 RX ADMIN — PANTOPRAZOLE SODIUM 40 MILLIGRAM(S): 20 TABLET, DELAYED RELEASE ORAL at 06:24

## 2019-07-11 RX ADMIN — LIDOCAINE 1 PATCH: 4 CREAM TOPICAL at 00:39

## 2019-07-11 RX ADMIN — Medication 50 MILLIGRAM(S): at 06:24

## 2019-07-11 RX ADMIN — LIDOCAINE 1 PATCH: 4 CREAM TOPICAL at 23:00

## 2019-07-11 RX ADMIN — Medication 50 MILLIGRAM(S): at 11:13

## 2019-07-11 RX ADMIN — DESMOPRESSIN ACETATE 0.2 MILLIGRAM(S): 0.1 TABLET ORAL at 21:07

## 2019-07-11 RX ADMIN — Medication 0.5 MILLIGRAM(S): at 17:06

## 2019-07-11 RX ADMIN — Medication 175 MICROGRAM(S): at 06:24

## 2019-07-11 RX ADMIN — Medication 1 PATCH: at 07:24

## 2019-07-11 RX ADMIN — Medication 750 MILLIGRAM(S): at 21:07

## 2019-07-11 RX ADMIN — NYSTATIN CREAM 1 APPLICATION(S): 100000 CREAM TOPICAL at 17:06

## 2019-07-11 RX ADMIN — Medication 1 PATCH: at 18:40

## 2019-07-11 RX ADMIN — Medication 1 PATCH: at 14:03

## 2019-07-11 NOTE — PROGRESS NOTE ADULT - PROBLEM SELECTOR PLAN 10
DISPO: pending psych. would need subacute rehab for generalized weakness DISPO: pending psych. would need subacute rehab for generalized weakness  - needs to be off 1:1 for 24hrs

## 2019-07-11 NOTE — PROGRESS NOTE ADULT - PROBLEM SELECTOR PLAN 2
- spoke with Dr.David Villasenor [phone] (7/9), agreed with MRI C spine with Negro. MRI shows to assess persistent severe neck pain. Per Dr. Villasenor, may be normal to still have neck pain, pain will decrease gradually. consider pain management if pain persists.   - MRI C spine scheduled today.   - s/p C3-C6 laminectomy and fusion on 5/1/19  - continue PT  - cont Oxycontin 10mg BID and Oxycodone 5mg q4 prn as breakthrough. May consider pain management   - Lidocaine patch  - heat pack - s/p C3-C6 laminectomy and fusion on 5/1/19. MRI shows interval resolution of the dorsal paraspinal/epidural fluid collection at   C3-C6, compatible with interval resolution of postoperative seroma/hematoma. Residual subcutaneous dorsal paraspinal edema extending from the C2-C7 level, with resolution of previously seen subcutaneous fluid collection, favored to represent resolved postoperative seroma/hematoma. No new fluid collections.  Per Dr. Villasenor,  pain may be normal to still have neck pain, pain will decrease gradually.   - continue PT  - cont Oxycontin 10mg BID and Oxycodone 5mg q4 prn as breakthrough  - Lidocaine patch  - heat pack

## 2019-07-11 NOTE — PROGRESS NOTE ADULT - SUBJECTIVE AND OBJECTIVE BOX
Patient is a 54y old  Male who presents with a chief complaint of suicide ideation (10 Jul 2019 16:57)      Interval HPI/ Overnight events: no overnight events    Patient seen and examined at bedside, still has neck pain but no longer has suicidal ideation    REVIEW OF SYSTEMS:    CONSTITUTIONAL: No weakness, fevers or chills  EYES/ENT: No visual changes;  No vertigo or throat pain   NECK: No pain or stiffness  RESPIRATORY: No cough, wheezing, hemoptysis; No shortness of breath  CARDIOVASCULAR: No chest pain or palpitations  GASTROINTESTINAL: No abdominal or epigastric pain. No nausea, vomiting, or hematemesis; No diarrhea or constipation. No melena or hematochezia.  GENITOURINARY: No dysuria, frequency or hematuria  NEUROLOGICAL: No numbness or weakness  SKIN: No itching, burning, rashes, or lesions   MSK: no joint pain, no joint swelling  All other review of systems is negative unless indicated above.      ALLERGIES:  Haldol (Unknown)  No Known Allergies  Thorazine (Unknown)    MEDICATIONS  (STANDING):  ascorbic acid 500 milliGRAM(s) Oral daily  benztropine 0.5 milliGRAM(s) Oral two times a day  desmopressin 0.2 milliGRAM(s) Oral at bedtime  docusate sodium 100 milliGRAM(s) Oral three times a day  enoxaparin Injectable 40 milliGRAM(s) SubCutaneous at bedtime  levothyroxine 175 MICROGram(s) Oral daily  lidocaine   Patch 1 Patch Transdermal daily  melatonin 3 milliGRAM(s) Oral at bedtime  metoprolol succinate ER 50 milliGRAM(s) Oral daily  multivitamin 1 Tablet(s) Oral daily  nicotine - 21 mG/24Hr(s) Patch 1 patch Transdermal daily  nystatin Powder 1 Application(s) Topical two times a day  OLANZapine 10 milliGRAM(s) Oral at bedtime  oxyCODONE  ER Tablet 10 milliGRAM(s) Oral every 12 hours  pantoprazole    Tablet 40 milliGRAM(s) Oral before breakfast  pyridoxine 50 milliGRAM(s) Oral daily  senna 2 Tablet(s) Oral at bedtime  valproic  acid Syrup 750 milliGRAM(s) Oral every 12 hours    MEDICATIONS  (PRN):  acetaminophen   Tablet .. 650 milliGRAM(s) Oral every 6 hours PRN Temp greater or equal to 38C (100.4F), Mild Pain (1 - 3)  ALBUTerol    90 MICROgram(s) HFA Inhaler 2 Puff(s) Inhalation every 6 hours PRN Shortness of Breath and/or Wheezing  aluminum hydroxide/magnesium hydroxide/simethicone Suspension 30 milliLiter(s) Oral every 4 hours PRN Dyspepsia  bisacodyl Suppository 10 milliGRAM(s) Rectal daily PRN Constipation  diphenhydrAMINE 25 milliGRAM(s) Oral every 4 hours PRN Rash and/or Itching  ondansetron Injectable 4 milliGRAM(s) IV Push every 6 hours PRN Nausea and/or Vomiting  oxyCODONE    IR 5 milliGRAM(s) Oral every 4 hours PRN Severe Pain (7 - 10)  saline laxative (FLEET) Rectal Enema 1 Enema Rectal at bedtime PRN constipation    Vital Signs Last 24 Hrs  T(F): 98.2 (11 Jul 2019 08:30), Max: 98.4 (10 Jul 2019 21:02)  HR: 70 (11 Jul 2019 08:30) (62 - 78)  BP: 112/56 (11 Jul 2019 08:30) (109/63 - 126/78)  RR: 16 (11 Jul 2019 08:30) (14 - 16)  SpO2: 96% (11 Jul 2019 08:30) (94% - 100%)  I&O's Summary    10 Jul 2019 07:01  -  11 Jul 2019 07:00  --------------------------------------------------------  IN: 500 mL / OUT: 3021 mL / NET: -2521 mL        PHYSICAL EXAM:  General: NAD, well dressed, well nourished  Head: NT/AC  ENT: MMM, no oropharyngeal erythema or exudates  Neck: Supple, No JVD  Lungs: Clear to auscultation bilaterally, no wheezing, rales, or rhonchi, no accessory muscle use  Cardio: RRR, normal S1/S2, No M/R/G  Abdomen: Soft, Nontender, Nondistended; Bowel sounds present, no organomegaly  Extremities: No calf tenderness, no clubbing or  cyanosis  Vascular: no LE edema  Lymph: no cervical LAD  Skin: warm and dry  Neuro: AAOx3, answers all questions appropriately, moves all extremities    LABS:                        13.9   5.85  )-----------( 294      ( 10 Jul 2019 06:45 )             42.4     07-10    136  |  100  |  19  ----------------------------<  92  3.9   |  28  |  0.69    Ca    9.4      10 Jul 2019 06:45      eGFR if Non African American: 108 mL/min/1.73M2 (07-10-19 @ 06:45)  eGFR if African American: 125 mL/min/1.73M2 (07-10-19 @ 06:45)                                    RADIOLOGY & ADDITIONAL TESTS:    Care Discussed with Consultants/Other Providers: Patient is a 54y old  Male who presents with a chief complaint of suicide ideation (10 Jul 2019 16:57)      Interval HPI/ Overnight events: no overnight events    Patient seen and examined at bedside, still has neck pain but no longer has suicidal ideation. Had BM yesterday    REVIEW OF SYSTEMS:    CONSTITUTIONAL: No weakness, fevers or chills  EYES/ENT: No visual changes;  No vertigo or throat pain   NECK: No pain or stiffness  RESPIRATORY: No cough, wheezing, hemoptysis; No shortness of breath  CARDIOVASCULAR: No chest pain or palpitations  GASTROINTESTINAL: No abdominal or epigastric pain. No nausea, vomiting, or hematemesis; No diarrhea or constipation. No melena or hematochezia.  GENITOURINARY: No dysuria, frequency or hematuria  NEUROLOGICAL: No numbness or weakness  SKIN: No itching, burning, rashes, or lesions   MSK: neck pain  All other review of systems is negative unless indicated above.      ALLERGIES:  Haldol (Unknown)  No Known Allergies  Thorazine (Unknown)    MEDICATIONS  (STANDING):  ascorbic acid 500 milliGRAM(s) Oral daily  benztropine 0.5 milliGRAM(s) Oral two times a day  desmopressin 0.2 milliGRAM(s) Oral at bedtime  docusate sodium 100 milliGRAM(s) Oral three times a day  enoxaparin Injectable 40 milliGRAM(s) SubCutaneous at bedtime  levothyroxine 175 MICROGram(s) Oral daily  lidocaine   Patch 1 Patch Transdermal daily  melatonin 3 milliGRAM(s) Oral at bedtime  metoprolol succinate ER 50 milliGRAM(s) Oral daily  multivitamin 1 Tablet(s) Oral daily  nicotine - 21 mG/24Hr(s) Patch 1 patch Transdermal daily  nystatin Powder 1 Application(s) Topical two times a day  OLANZapine 10 milliGRAM(s) Oral at bedtime  oxyCODONE  ER Tablet 10 milliGRAM(s) Oral every 12 hours  pantoprazole    Tablet 40 milliGRAM(s) Oral before breakfast  pyridoxine 50 milliGRAM(s) Oral daily  senna 2 Tablet(s) Oral at bedtime  valproic  acid Syrup 750 milliGRAM(s) Oral every 12 hours    MEDICATIONS  (PRN):  acetaminophen   Tablet .. 650 milliGRAM(s) Oral every 6 hours PRN Temp greater or equal to 38C (100.4F), Mild Pain (1 - 3)  ALBUTerol    90 MICROgram(s) HFA Inhaler 2 Puff(s) Inhalation every 6 hours PRN Shortness of Breath and/or Wheezing  aluminum hydroxide/magnesium hydroxide/simethicone Suspension 30 milliLiter(s) Oral every 4 hours PRN Dyspepsia  bisacodyl Suppository 10 milliGRAM(s) Rectal daily PRN Constipation  diphenhydrAMINE 25 milliGRAM(s) Oral every 4 hours PRN Rash and/or Itching  ondansetron Injectable 4 milliGRAM(s) IV Push every 6 hours PRN Nausea and/or Vomiting  oxyCODONE    IR 5 milliGRAM(s) Oral every 4 hours PRN Severe Pain (7 - 10)  saline laxative (FLEET) Rectal Enema 1 Enema Rectal at bedtime PRN constipation    Vital Signs Last 24 Hrs  T(F): 98.2 (11 Jul 2019 08:30), Max: 98.4 (10 Jul 2019 21:02)  HR: 70 (11 Jul 2019 08:30) (62 - 78)  BP: 112/56 (11 Jul 2019 08:30) (109/63 - 126/78)  RR: 16 (11 Jul 2019 08:30) (14 - 16)  SpO2: 96% (11 Jul 2019 08:30) (94% - 100%)  I&O's Summary    10 Jul 2019 07:01  -  11 Jul 2019 07:00  --------------------------------------------------------  IN: 500 mL / OUT: 3021 mL / NET: -2521 mL        PHYSICAL EXAM:  General: NAD, well dressed, well nourished  Head: NT/AC  ENT: MMM, no oropharyngeal erythema or exudates  Neck: Supple, No JVD  Lungs: Clear to auscultation bilaterally, no wheezing, rales, or rhonchi, no accessory muscle use  Cardio: RRR, normal S1/S2, No M/R/G  Abdomen: Soft, Nontender, Nondistended; Bowel sounds present, no organomegaly  Extremities: No calf tenderness, no clubbing or  cyanosis  Vascular: no LE edema  Lymph: no cervical LAD  Skin: warm and dry  MSK: cervical scar well healed, mild erythema, no fluctuance, no open wounds  Neuro: AAOx3, answers all questions appropriately, moves all extremities    LABS:                        13.9   5.85  )-----------( 294      ( 10 Jul 2019 06:45 )             42.4     07-10    136  |  100  |  19  ----------------------------<  92  3.9   |  28  |  0.69    Ca    9.4      10 Jul 2019 06:45      eGFR if Non African American: 108 mL/min/1.73M2 (07-10-19 @ 06:45)  eGFR if African American: 125 mL/min/1.73M2 (07-10-19 @ 06:45)                                    RADIOLOGY & ADDITIONAL TESTS:    < from: MR Cervical Spine w/wo IV Cont (07.10.19 @ 14:26) >  IMPRESSION:     Status post posterior decompression andcervical fusion extending from C3   to C6 level.  Focal T2/STIR hyperintense signal abnormality within the cervical cord at   the C4-C5 level , which may represent persistent cord edema and/or   developing myelomalacia.    Interval resolution of the dorsal paraspinal/epidural fluid collection at   C3-C6, compatible with interval resolution of postoperative   seroma/hematoma. Residual subcutaneous dorsal paraspinal edema extending   from the C2-C7 level, with resolution of previously seen subcutaneous   fluid collection, favored to represent resolved postoperative   seroma/hematoma. No new fluid collections.    Persistent bone marrow edema involving the C5 and to a lesser degree C4   vertebral body, nonspecific finding. Continued follow-up is advised.    < end of copied text >    Care Discussed with Consultants/Other Providers:

## 2019-07-11 NOTE — PROGRESS NOTE ADULT - PROBLEM SELECTOR PLAN 4
- 7/8 Valproic acid level is low (11). repeat level on 7/12 or 7/15  - Valproic acid increased to 750mg BID  - c/w Prolixin 10mg BID   - cogentin added - 7/8 Valproic acid level is low (11). repeat level on 7/12 or 7/15  - on Valproic acid 750mg BID  - prolixin discontinued, switched to Olanzapine 10mg qHS yesterday  - Cogentin tapered to 0.5mg BID

## 2019-07-11 NOTE — PROGRESS NOTE ADULT - PROBLEM SELECTOR PLAN 1
No current suicidal thoughts per pt however auditory hallucination persists.  - Will check with Psych regarding discontinuing 1:1 observation since pt no longer expresses suicidal thoughts  - on Valproic acid 750mg BID  - Prolixin discontinued yesterday, switched to Olanzapine 10mg at bedtime  - Cogentin added  - Per psych, cont current regimen for now and if pt remains in this mood state, may be able to send pt to Subacute rehab with decreased observation.   - repeat VPA level on 7/12 or 7/15  - plan for BRIGID once cleared by Psych No current suicidal thoughts per pt however auditory hallucination persists.  - Will check with Psych regarding discontinuing 1:1 observation since pt no longer expresses suicidal thoughts  - on Valproic acid 750mg BID  - Prolixin discontinued yesterday, switched to Olanzapine 10mg at bedtime  - Cogentin tapered to 0.5mg BID  - Per psych, cont current regimen for now and if pt remains in this mood state, may be able to send pt to Subacute rehab with decreased observation.   - repeat VPA level on 7/12 or 7/15  - plan for BRIGID once cleared by Psych

## 2019-07-11 NOTE — PROGRESS NOTE ADULT - PROBLEM SELECTOR PLAN 3
acute on chronic diffuse abd pain, worse in epigastric area.   - r/o 2/2 constipation. r/o gastritis  - lipase 144  - PPI  - senna, colace and miralax. enema prn 2/2 constipation, now resolved. Pt had BM yesterday  - senna, colace and miralax

## 2019-07-12 LAB
T4 FREE SERPL-MCNC: 1.7 NG/DL — SIGNIFICANT CHANGE UP (ref 0.9–1.8)
TSH SERPL-MCNC: 3.89 UIU/ML — SIGNIFICANT CHANGE UP (ref 0.27–4.2)
VALPROATE SERPL-MCNC: 38 UG/ML — LOW (ref 50–100)

## 2019-07-12 PROCEDURE — 99232 SBSQ HOSP IP/OBS MODERATE 35: CPT

## 2019-07-12 RX ORDER — DOCUSATE SODIUM 100 MG
100 CAPSULE ORAL
Refills: 0 | Status: DISCONTINUED | OUTPATIENT
Start: 2019-07-12 | End: 2019-07-22

## 2019-07-12 RX ORDER — OXYCODONE HYDROCHLORIDE 5 MG/1
15 TABLET ORAL EVERY 12 HOURS
Refills: 0 | Status: DISCONTINUED | OUTPATIENT
Start: 2019-07-12 | End: 2019-07-16

## 2019-07-12 RX ORDER — POLYETHYLENE GLYCOL 3350 17 G/17G
17 POWDER, FOR SOLUTION ORAL DAILY
Refills: 0 | Status: DISCONTINUED | OUTPATIENT
Start: 2019-07-12 | End: 2019-07-22

## 2019-07-12 RX ADMIN — Medication 0.5 MILLIGRAM(S): at 05:28

## 2019-07-12 RX ADMIN — Medication 175 MICROGRAM(S): at 05:28

## 2019-07-12 RX ADMIN — Medication 1 PATCH: at 07:18

## 2019-07-12 RX ADMIN — OLANZAPINE 10 MILLIGRAM(S): 15 TABLET, FILM COATED ORAL at 22:25

## 2019-07-12 RX ADMIN — DESMOPRESSIN ACETATE 0.2 MILLIGRAM(S): 0.1 TABLET ORAL at 22:24

## 2019-07-12 RX ADMIN — Medication 1 PATCH: at 19:30

## 2019-07-12 RX ADMIN — ENOXAPARIN SODIUM 40 MILLIGRAM(S): 100 INJECTION SUBCUTANEOUS at 22:24

## 2019-07-12 RX ADMIN — Medication 100 MILLIGRAM(S): at 05:28

## 2019-07-12 RX ADMIN — NYSTATIN CREAM 1 APPLICATION(S): 100000 CREAM TOPICAL at 05:28

## 2019-07-12 RX ADMIN — LIDOCAINE 1 PATCH: 4 CREAM TOPICAL at 12:13

## 2019-07-12 RX ADMIN — SENNA PLUS 2 TABLET(S): 8.6 TABLET ORAL at 22:25

## 2019-07-12 RX ADMIN — Medication 500 MILLIGRAM(S): at 12:12

## 2019-07-12 RX ADMIN — Medication 750 MILLIGRAM(S): at 20:45

## 2019-07-12 RX ADMIN — LIDOCAINE 1 PATCH: 4 CREAM TOPICAL at 19:30

## 2019-07-12 RX ADMIN — OXYCODONE HYDROCHLORIDE 15 MILLIGRAM(S): 5 TABLET ORAL at 18:00

## 2019-07-12 RX ADMIN — Medication 3 MILLIGRAM(S): at 22:25

## 2019-07-12 RX ADMIN — OLANZAPINE 5 MILLIGRAM(S): 15 TABLET, FILM COATED ORAL at 10:13

## 2019-07-12 RX ADMIN — Medication 0.5 MILLIGRAM(S): at 17:44

## 2019-07-12 RX ADMIN — Medication 1 PATCH: at 12:12

## 2019-07-12 RX ADMIN — Medication 750 MILLIGRAM(S): at 07:20

## 2019-07-12 RX ADMIN — OXYCODONE HYDROCHLORIDE 10 MILLIGRAM(S): 5 TABLET ORAL at 05:28

## 2019-07-12 RX ADMIN — Medication 1 TABLET(S): at 12:13

## 2019-07-12 RX ADMIN — Medication 30 MILLILITER(S): at 17:43

## 2019-07-12 RX ADMIN — Medication 100 MILLIGRAM(S): at 17:43

## 2019-07-12 RX ADMIN — POLYETHYLENE GLYCOL 3350 17 GRAM(S): 17 POWDER, FOR SOLUTION ORAL at 12:14

## 2019-07-12 RX ADMIN — Medication 50 MILLIGRAM(S): at 05:28

## 2019-07-12 RX ADMIN — NYSTATIN CREAM 1 APPLICATION(S): 100000 CREAM TOPICAL at 17:42

## 2019-07-12 RX ADMIN — FAMOTIDINE 20 MILLIGRAM(S): 10 INJECTION INTRAVENOUS at 12:13

## 2019-07-12 RX ADMIN — OXYCODONE HYDROCHLORIDE 15 MILLIGRAM(S): 5 TABLET ORAL at 17:43

## 2019-07-12 RX ADMIN — Medication 1 PATCH: at 12:13

## 2019-07-12 RX ADMIN — Medication 50 MILLIGRAM(S): at 12:14

## 2019-07-12 NOTE — PROGRESS NOTE ADULT - PROBLEM SELECTOR PLAN 4
- on Valproic acid 750mg BID  - Prolixin discontinued 07/10  - Olanzapine 5mg added to AM, also on 10mg at bedtime  - Cogentin 0.5mg BID  - Psych following, recs appreciated  - repeat VPA level and CBC on 7/15

## 2019-07-12 NOTE — PROGRESS NOTE ADULT - PROBLEM SELECTOR PLAN 2
- s/p C3-C6 laminectomy and fusion on 5/1/19. MRI shows interval resolution of the dorsal paraspinal/epidural fluid collection at C3-C6, compatible with interval resolution of postoperative seroma/hematoma. Residual subcutaneous dorsal paraspinal edema extending from the C2-C7 level, with resolution of previously seen subcutaneous fluid collection, favored to represent resolved postoperative seroma/hematoma. No new fluid collections. Per Dr. Villasenor,  pain may be normal to still have neck pain, pain will decrease gradually.   - continue PT  - pt has not been getting Oxycodone IR 5mg PRN, but always endorses pain,  will increase Oxycontin to 15mg BID and continue Oxycodone IR 5mg q4 prn as breakthrough  - Lidocaine patch  - heat pack

## 2019-07-12 NOTE — PROGRESS NOTE ADULT - PROBLEM SELECTOR PLAN 10
DISPO: pending psych. would need subacute rehab for generalized weakness  - needs to be off 1:1 for 24hrs

## 2019-07-12 NOTE — PROGRESS NOTE ADULT - SUBJECTIVE AND OBJECTIVE BOX
Patient is a 54y old  Male who presents with a chief complaint of suicide ideation (11 Jul 2019 16:32)      Interval HPI/ Overnight events: No overnight events    Patient seen and examined at bedside, still endorsing neck pain. ALso reports LLQ pain today, no BM yesteraday    REVIEW OF SYSTEMS:    CONSTITUTIONAL: No weakness, fevers or chills  EYES/ENT: No visual changes;  No vertigo or throat pain   NECK: No pain or stiffness  RESPIRATORY: No cough, wheezing, hemoptysis; No shortness of breath  CARDIOVASCULAR: No chest pain or palpitations  GASTROINTESTINAL: LLQ pain, No nausea, vomiting, or hematemesis; No diarrhea or constipation. No melena or hematochezia.  GENITOURINARY: No dysuria, frequency or hematuria  NEUROLOGICAL: No numbness or weakness  SKIN: No itching, burning, rashes, or lesions   MSK: neck pain  All other review of systems is negative unless indicated above.      ALLERGIES:  Haldol (Unknown)  No Known Allergies  Thorazine (Unknown)    MEDICATIONS  (STANDING):  aluminum hydroxide/magnesium hydroxide/simethicone Suspension 30 milliLiter(s) Oral every 12 hours  ascorbic acid 500 milliGRAM(s) Oral daily  benztropine 0.5 milliGRAM(s) Oral two times a day  desmopressin 0.2 milliGRAM(s) Oral at bedtime  docusate sodium 100 milliGRAM(s) Oral two times a day  enoxaparin Injectable 40 milliGRAM(s) SubCutaneous at bedtime  famotidine    Tablet 20 milliGRAM(s) Oral daily  levothyroxine 175 MICROGram(s) Oral daily  lidocaine   Patch 1 Patch Transdermal daily  melatonin 3 milliGRAM(s) Oral at bedtime  metoprolol succinate ER 50 milliGRAM(s) Oral daily  multivitamin 1 Tablet(s) Oral daily  nicotine - 21 mG/24Hr(s) Patch 1 patch Transdermal daily  nystatin Powder 1 Application(s) Topical two times a day  OLANZapine 10 milliGRAM(s) Oral at bedtime  OLANZapine 5 milliGRAM(s) Oral daily  oxyCODONE  ER Tablet 15 milliGRAM(s) Oral every 12 hours  polyethylene glycol 3350 17 Gram(s) Oral daily  pyridoxine 50 milliGRAM(s) Oral daily  senna 2 Tablet(s) Oral at bedtime  valproic  acid Syrup 750 milliGRAM(s) Oral every 12 hours    MEDICATIONS  (PRN):  acetaminophen   Tablet .. 650 milliGRAM(s) Oral every 6 hours PRN Temp greater or equal to 38C (100.4F), Mild Pain (1 - 3)  ALBUTerol    90 MICROgram(s) HFA Inhaler 2 Puff(s) Inhalation every 6 hours PRN Shortness of Breath and/or Wheezing  bisacodyl Suppository 10 milliGRAM(s) Rectal daily PRN Constipation  diphenhydrAMINE 25 milliGRAM(s) Oral every 4 hours PRN Rash and/or Itching  ondansetron Injectable 4 milliGRAM(s) IV Push every 6 hours PRN Nausea and/or Vomiting  oxyCODONE    IR 5 milliGRAM(s) Oral every 4 hours PRN Severe Pain (7 - 10)    Vital Signs Last 24 Hrs  T(F): 98.6 (12 Jul 2019 04:55), Max: 98.6 (12 Jul 2019 04:55)  HR: 85 (12 Jul 2019 04:55) (60 - 85)  BP: 146/75 (12 Jul 2019 04:55) (100/57 - 146/75)  RR: 15 (12 Jul 2019 04:55) (14 - 15)  SpO2: 96% (12 Jul 2019 04:55) (96% - 99%)  I&O's Summary    11 Jul 2019 07:01  -  12 Jul 2019 07:00  --------------------------------------------------------  IN: 720 mL / OUT: 803 mL / NET: -83 mL    12 Jul 2019 07:01  -  12 Jul 2019 10:20  --------------------------------------------------------  IN: 100 mL / OUT: 0 mL / NET: 100 mL      PHYSICAL EXAM:  General: NAD, well dressed, well nourished  Head: NT/AC  ENT: MMM, no oropharyngeal erythema or exudates  Neck: Supple, No JVD  Lungs: Clear to auscultation bilaterally, no wheezing, rales, or rhonchi, no accessory muscle use  Cardio: RRR, normal S1/S2, No M/R/G  Abdomen: Soft, Nondistended, mild tenderness to deep palpation of LLQ; Bowel sounds present, no organomegaly  Extremities: No calf tenderness, no clubbing or  cyanosis  Vascular: no LE edema  Skin: warm and dry  MSK: cervical scar well healed, mild erythema, no fluctuance, no open wounds  Neuro: AAOx3, answers all questions appropriately, moves all extremities    LABS:                        13.9   5.85  )-----------( 294      ( 10 Jul 2019 06:45 )             42.4     07-10    136  |  100  |  19  ----------------------------<  92  3.9   |  28  |  0.69    Ca    9.4      10 Jul 2019 06:45      eGFR if Non African American: 108 mL/min/1.73M2 (07-10-19 @ 06:45)  eGFR if African American: 125 mL/min/1.73M2 (07-10-19 @ 06:45)                                    RADIOLOGY & ADDITIONAL TESTS:    Care Discussed with Consultants/Other Providers:

## 2019-07-12 NOTE — PROGRESS NOTE ADULT - PROBLEM SELECTOR PLAN 1
No current suicidal thoughts per pt however auditory hallucination persists.  - Off 1:1 observation since yesterday, pt is not expressing suicidal thoughts  - on Valproic acid 750mg BID  - Prolixin discontinued 07/10  - Olanzapine 5mg added to AM, also on 10mg at bedtime  - Cogentin 0.5mg BID  - Psych following, recs appreciated  - repeat VPA level and CBC on 7/15  - plan for BRIGID once cleared by Psych

## 2019-07-13 PROCEDURE — 99232 SBSQ HOSP IP/OBS MODERATE 35: CPT

## 2019-07-13 RX ADMIN — Medication 1 PATCH: at 08:48

## 2019-07-13 RX ADMIN — OLANZAPINE 10 MILLIGRAM(S): 15 TABLET, FILM COATED ORAL at 21:05

## 2019-07-13 RX ADMIN — LIDOCAINE 1 PATCH: 4 CREAM TOPICAL at 19:26

## 2019-07-13 RX ADMIN — DESMOPRESSIN ACETATE 0.2 MILLIGRAM(S): 0.1 TABLET ORAL at 21:04

## 2019-07-13 RX ADMIN — SENNA PLUS 2 TABLET(S): 8.6 TABLET ORAL at 21:04

## 2019-07-13 RX ADMIN — Medication 30 MILLILITER(S): at 21:03

## 2019-07-13 RX ADMIN — Medication 1 PATCH: at 12:23

## 2019-07-13 RX ADMIN — Medication 3 MILLIGRAM(S): at 21:05

## 2019-07-13 RX ADMIN — LIDOCAINE 1 PATCH: 4 CREAM TOPICAL at 00:00

## 2019-07-13 RX ADMIN — Medication 0.5 MILLIGRAM(S): at 06:33

## 2019-07-13 RX ADMIN — OLANZAPINE 5 MILLIGRAM(S): 15 TABLET, FILM COATED ORAL at 08:46

## 2019-07-13 RX ADMIN — Medication 100 MILLIGRAM(S): at 06:34

## 2019-07-13 RX ADMIN — FAMOTIDINE 20 MILLIGRAM(S): 10 INJECTION INTRAVENOUS at 12:23

## 2019-07-13 RX ADMIN — NYSTATIN CREAM 1 APPLICATION(S): 100000 CREAM TOPICAL at 06:43

## 2019-07-13 RX ADMIN — OXYCODONE HYDROCHLORIDE 15 MILLIGRAM(S): 5 TABLET ORAL at 17:39

## 2019-07-13 RX ADMIN — OXYCODONE HYDROCHLORIDE 15 MILLIGRAM(S): 5 TABLET ORAL at 06:50

## 2019-07-13 RX ADMIN — Medication 0.5 MILLIGRAM(S): at 17:39

## 2019-07-13 RX ADMIN — Medication 500 MILLIGRAM(S): at 12:22

## 2019-07-13 RX ADMIN — Medication 1 PATCH: at 12:33

## 2019-07-13 RX ADMIN — Medication 100 MILLIGRAM(S): at 17:40

## 2019-07-13 RX ADMIN — NYSTATIN CREAM 1 APPLICATION(S): 100000 CREAM TOPICAL at 17:40

## 2019-07-13 RX ADMIN — Medication 30 MILLILITER(S): at 06:42

## 2019-07-13 RX ADMIN — Medication 50 MILLIGRAM(S): at 06:34

## 2019-07-13 RX ADMIN — ENOXAPARIN SODIUM 40 MILLIGRAM(S): 100 INJECTION SUBCUTANEOUS at 21:04

## 2019-07-13 RX ADMIN — Medication 50 MILLIGRAM(S): at 12:23

## 2019-07-13 RX ADMIN — Medication 1 TABLET(S): at 12:24

## 2019-07-13 RX ADMIN — OXYCODONE HYDROCHLORIDE 15 MILLIGRAM(S): 5 TABLET ORAL at 17:42

## 2019-07-13 RX ADMIN — Medication 750 MILLIGRAM(S): at 08:45

## 2019-07-13 RX ADMIN — Medication 175 MICROGRAM(S): at 06:34

## 2019-07-13 RX ADMIN — Medication 1 PATCH: at 20:26

## 2019-07-13 RX ADMIN — OXYCODONE HYDROCHLORIDE 15 MILLIGRAM(S): 5 TABLET ORAL at 08:00

## 2019-07-13 RX ADMIN — POLYETHYLENE GLYCOL 3350 17 GRAM(S): 17 POWDER, FOR SOLUTION ORAL at 12:22

## 2019-07-13 RX ADMIN — LIDOCAINE 1 PATCH: 4 CREAM TOPICAL at 12:24

## 2019-07-13 RX ADMIN — Medication 750 MILLIGRAM(S): at 21:04

## 2019-07-13 NOTE — PROGRESS NOTE ADULT - PROBLEM SELECTOR PLAN 10
DISPO: pending psych. would need subacute rehab for generalized weakness  - off 1:1 DISPO: pending psych. would need subacute rehab for generalized weakness  - pt placed back to 1:1 today DISPO: pending psych. would need subacute rehab for generalized weakness

## 2019-07-13 NOTE — PROGRESS NOTE ADULT - PROBLEM SELECTOR PLAN 2
- s/p C3-C6 laminectomy and fusion on 5/1/19. MRI shows interval resolution of the dorsal paraspinal/epidural fluid collection at C3-C6, compatible with interval resolution of postoperative seroma/hematoma. Residual subcutaneous dorsal paraspinal edema extending from the C2-C7 level, with resolution of previously seen subcutaneous fluid collection, favored to represent resolved postoperative seroma/hematoma. No new fluid collections. Per Dr. Villasenor,  pain may be normal to still have neck pain, pain will decrease gradually.   - continue PT  - continue Oxycontin 15mg BID and Oxycodone IR 5mg q4 prn as breakthrough  - Lidocaine patch  - heat pack

## 2019-07-13 NOTE — PROGRESS NOTE ADULT - SUBJECTIVE AND OBJECTIVE BOX
Patient is a 54y old  Male who presents with a chief complaint of suicide ideation (12 Jul 2019 10:19)      Interval HPI/ Overnight events: No overnight events    Patient seen and examined at bedside. Pt denies suicidal ideation but still feeling depressed    REVIEW OF SYSTEMS:    CONSTITUTIONAL: No weakness, fevers or chills  EYES/ENT: No visual changes;  No vertigo or throat pain   NECK: No pain or stiffness  RESPIRATORY: No cough, wheezing, hemoptysis; No shortness of breath  CARDIOVASCULAR: No chest pain or palpitations  GASTROINTESTINAL: No abdominal or epigastric pain. No nausea, vomiting, or hematemesis; No diarrhea or constipation. No melena or hematochezia.  GENITOURINARY: No dysuria, frequency or hematuria  NEUROLOGICAL: No numbness or weakness  SKIN: No itching, burning, rashes, or lesions   MSK: no joint pain, no joint swelling  All other review of systems is negative unless indicated above.      ALLERGIES:  Haldol (Unknown)  No Known Allergies  Thorazine (Unknown)    MEDICATIONS  (STANDING):  aluminum hydroxide/magnesium hydroxide/simethicone Suspension 30 milliLiter(s) Oral every 12 hours  ascorbic acid 500 milliGRAM(s) Oral daily  benztropine 0.5 milliGRAM(s) Oral two times a day  desmopressin 0.2 milliGRAM(s) Oral at bedtime  docusate sodium 100 milliGRAM(s) Oral two times a day  enoxaparin Injectable 40 milliGRAM(s) SubCutaneous at bedtime  famotidine    Tablet 20 milliGRAM(s) Oral daily  levothyroxine 175 MICROGram(s) Oral daily  lidocaine   Patch 1 Patch Transdermal daily  melatonin 3 milliGRAM(s) Oral at bedtime  metoprolol succinate ER 50 milliGRAM(s) Oral daily  multivitamin 1 Tablet(s) Oral daily  nicotine - 21 mG/24Hr(s) Patch 1 patch Transdermal daily  nystatin Powder 1 Application(s) Topical two times a day  OLANZapine 10 milliGRAM(s) Oral at bedtime  OLANZapine 5 milliGRAM(s) Oral daily  oxyCODONE  ER Tablet 15 milliGRAM(s) Oral every 12 hours  polyethylene glycol 3350 17 Gram(s) Oral daily  pyridoxine 50 milliGRAM(s) Oral daily  senna 2 Tablet(s) Oral at bedtime  valproic  acid Syrup 750 milliGRAM(s) Oral every 12 hours    MEDICATIONS  (PRN):  acetaminophen   Tablet .. 650 milliGRAM(s) Oral every 6 hours PRN Temp greater or equal to 38C (100.4F), Mild Pain (1 - 3)  ALBUTerol    90 MICROgram(s) HFA Inhaler 2 Puff(s) Inhalation every 6 hours PRN Shortness of Breath and/or Wheezing  bisacodyl Suppository 10 milliGRAM(s) Rectal daily PRN Constipation  diphenhydrAMINE 25 milliGRAM(s) Oral every 4 hours PRN Rash and/or Itching  ondansetron Injectable 4 milliGRAM(s) IV Push every 6 hours PRN Nausea and/or Vomiting  oxyCODONE    IR 5 milliGRAM(s) Oral every 4 hours PRN Severe Pain (7 - 10)    Vital Signs Last 24 Hrs  T(F): 98.2 (13 Jul 2019 06:08), Max: 98.2 (13 Jul 2019 06:08)  HR: 66 (13 Jul 2019 06:08) (64 - 96)  BP: 103/63 (13 Jul 2019 06:08) (103/63 - 105/71)  RR: 15 (13 Jul 2019 06:08) (14 - 15)  SpO2: 95% (12 Jul 2019 15:56) (95% - 95%)  I&O's Summary    12 Jul 2019 07:01  -  13 Jul 2019 07:00  --------------------------------------------------------  IN: 1220 mL / OUT: 1201 mL / NET: 19 mL    13 Jul 2019 07:01  -  13 Jul 2019 13:56  --------------------------------------------------------  IN: 300 mL / OUT: 0 mL / NET: 300 mL        PHYSICAL EXAM:  General: NAD, well dressed, well nourished  Head: NT/AC  ENT: MMM, no oropharyngeal erythema or exudates  Neck: Supple, No JVD  Lungs: Clear to auscultation bilaterally, no wheezing, rales, or rhonchi, no accessory muscle use  Cardio: RRR, normal S1/S2, No M/R/G  Abdomen: Soft, Nondistended, no tenderness; Bowel sounds present, no organomegaly  Extremities: No calf tenderness, no clubbing or  cyanosis  Vascular: no LE edema  Skin: warm and dry  MSK: cervical scar well healed, mild erythema, no fluctuance, no open wounds  Neuro: AAOx3, answers all questions appropriately, moves all extremities    LABS:                      TSH 3.89   TSH with FT4 reflex --  Total T3 --                        RADIOLOGY & ADDITIONAL TESTS:    Care Discussed with Consultants/Other Providers: Patient is a 54y old  Male who presents with a chief complaint of suicide ideation (12 Jul 2019 10:19)      Interval HPI/ Overnight events: No overnight events    Patient seen and examined at bedside. This morning, pt expresses suicidal ideation, sating that "he did something stupid, he is going to die today". Pt did not elaborate what he did, stating that he is too embarrassed to say despite my reassurance that he has nothing to be embarrassed about. He is not expressing any specific plans at this time.    REVIEW OF SYSTEMS:    CONSTITUTIONAL: No weakness, fevers or chills  EYES/ENT: No visual changes;  No vertigo or throat pain   NECK: No pain or stiffness  RESPIRATORY: No cough, wheezing, hemoptysis; No shortness of breath  CARDIOVASCULAR: No chest pain or palpitations  GASTROINTESTINAL: No abdominal or epigastric pain. No nausea, vomiting, or hematemesis; No diarrhea or constipation. No melena or hematochezia.  GENITOURINARY: No dysuria, frequency or hematuria  NEUROLOGICAL: No numbness or weakness  SKIN: No itching, burning, rashes, or lesions   MSK: neck pain  All other review of systems is negative unless indicated above.      ALLERGIES:  Haldol (Unknown)  No Known Allergies  Thorazine (Unknown)    MEDICATIONS  (STANDING):  aluminum hydroxide/magnesium hydroxide/simethicone Suspension 30 milliLiter(s) Oral every 12 hours  ascorbic acid 500 milliGRAM(s) Oral daily  benztropine 0.5 milliGRAM(s) Oral two times a day  desmopressin 0.2 milliGRAM(s) Oral at bedtime  docusate sodium 100 milliGRAM(s) Oral two times a day  enoxaparin Injectable 40 milliGRAM(s) SubCutaneous at bedtime  famotidine    Tablet 20 milliGRAM(s) Oral daily  levothyroxine 175 MICROGram(s) Oral daily  lidocaine   Patch 1 Patch Transdermal daily  melatonin 3 milliGRAM(s) Oral at bedtime  metoprolol succinate ER 50 milliGRAM(s) Oral daily  multivitamin 1 Tablet(s) Oral daily  nicotine - 21 mG/24Hr(s) Patch 1 patch Transdermal daily  nystatin Powder 1 Application(s) Topical two times a day  OLANZapine 10 milliGRAM(s) Oral at bedtime  OLANZapine 5 milliGRAM(s) Oral daily  oxyCODONE  ER Tablet 15 milliGRAM(s) Oral every 12 hours  polyethylene glycol 3350 17 Gram(s) Oral daily  pyridoxine 50 milliGRAM(s) Oral daily  senna 2 Tablet(s) Oral at bedtime  valproic  acid Syrup 750 milliGRAM(s) Oral every 12 hours    MEDICATIONS  (PRN):  acetaminophen   Tablet .. 650 milliGRAM(s) Oral every 6 hours PRN Temp greater or equal to 38C (100.4F), Mild Pain (1 - 3)  ALBUTerol    90 MICROgram(s) HFA Inhaler 2 Puff(s) Inhalation every 6 hours PRN Shortness of Breath and/or Wheezing  bisacodyl Suppository 10 milliGRAM(s) Rectal daily PRN Constipation  diphenhydrAMINE 25 milliGRAM(s) Oral every 4 hours PRN Rash and/or Itching  ondansetron Injectable 4 milliGRAM(s) IV Push every 6 hours PRN Nausea and/or Vomiting  oxyCODONE    IR 5 milliGRAM(s) Oral every 4 hours PRN Severe Pain (7 - 10)    Vital Signs Last 24 Hrs  T(F): 98.2 (13 Jul 2019 06:08), Max: 98.2 (13 Jul 2019 06:08)  HR: 66 (13 Jul 2019 06:08) (64 - 96)  BP: 103/63 (13 Jul 2019 06:08) (103/63 - 105/71)  RR: 15 (13 Jul 2019 06:08) (14 - 15)  SpO2: 95% (12 Jul 2019 15:56) (95% - 95%)  I&O's Summary    12 Jul 2019 07:01  -  13 Jul 2019 07:00  --------------------------------------------------------  IN: 1220 mL / OUT: 1201 mL / NET: 19 mL    13 Jul 2019 07:01  -  13 Jul 2019 13:56  --------------------------------------------------------  IN: 300 mL / OUT: 0 mL / NET: 300 mL      PHYSICAL EXAM:  General: NAD, well dressed, well nourished  Head: NT/AC  ENT: MMM, no oropharyngeal erythema or exudates  Neck: Supple, No JVD  Lungs: Clear to auscultation bilaterally, no wheezing, rales, or rhonchi, no accessory muscle use  Cardio: RRR, normal S1/S2, No M/R/G  Abdomen: Soft, Nondistended, no tenderness to palpation; Bowel sounds present, no organomegaly  Extremities: No calf tenderness, no clubbing or  cyanosis  Vascular: no LE edema  Skin: warm and dry  MSK: cervical scar well healed, mild erythema, no fluctuance, no open wounds  Neuro: AAOx3, answers all questions appropriately, moves all extremities      LABS:                      TSH 3.89   TSH with FT4 reflex --  Total T3 --                        RADIOLOGY & ADDITIONAL TESTS:    Care Discussed with Consultants/Other Providers: Patient is a 54y old  Male who presents with a chief complaint of suicide ideation      Interval HPI/ Overnight events: No overnight events    Patient seen and examined at bedside.     REVIEW OF SYSTEMS:    CONSTITUTIONAL: No weakness, fevers or chills  EYES/ENT: No visual changes;  No vertigo or throat pain   NECK: No pain or stiffness  RESPIRATORY: No cough, wheezing, hemoptysis; No shortness of breath  CARDIOVASCULAR: No chest pain or palpitations  GASTROINTESTINAL: No abdominal or epigastric pain. No nausea, vomiting, or hematemesis; No diarrhea or constipation. No melena or hematochezia.  GENITOURINARY: No dysuria, frequency or hematuria  NEUROLOGICAL: No numbness or weakness  SKIN: No itching, burning, rashes, or lesions   MSK: neck pain  All other review of systems is negative unless indicated above.      ALLERGIES:  Haldol (Unknown)  No Known Allergies  Thorazine (Unknown)    MEDICATIONS  (STANDING):  aluminum hydroxide/magnesium hydroxide/simethicone Suspension 30 milliLiter(s) Oral every 12 hours  ascorbic acid 500 milliGRAM(s) Oral daily  benztropine 0.5 milliGRAM(s) Oral two times a day  desmopressin 0.2 milliGRAM(s) Oral at bedtime  docusate sodium 100 milliGRAM(s) Oral two times a day  enoxaparin Injectable 40 milliGRAM(s) SubCutaneous at bedtime  famotidine    Tablet 20 milliGRAM(s) Oral daily  levothyroxine 175 MICROGram(s) Oral daily  lidocaine   Patch 1 Patch Transdermal daily  melatonin 3 milliGRAM(s) Oral at bedtime  metoprolol succinate ER 50 milliGRAM(s) Oral daily  multivitamin 1 Tablet(s) Oral daily  nicotine - 21 mG/24Hr(s) Patch 1 patch Transdermal daily  nystatin Powder 1 Application(s) Topical two times a day  OLANZapine 10 milliGRAM(s) Oral at bedtime  OLANZapine 5 milliGRAM(s) Oral daily  oxyCODONE  ER Tablet 15 milliGRAM(s) Oral every 12 hours  polyethylene glycol 3350 17 Gram(s) Oral daily  pyridoxine 50 milliGRAM(s) Oral daily  senna 2 Tablet(s) Oral at bedtime  valproic  acid Syrup 750 milliGRAM(s) Oral every 12 hours    MEDICATIONS  (PRN):  acetaminophen   Tablet .. 650 milliGRAM(s) Oral every 6 hours PRN Temp greater or equal to 38C (100.4F), Mild Pain (1 - 3)  ALBUTerol    90 MICROgram(s) HFA Inhaler 2 Puff(s) Inhalation every 6 hours PRN Shortness of Breath and/or Wheezing  bisacodyl Suppository 10 milliGRAM(s) Rectal daily PRN Constipation  diphenhydrAMINE 25 milliGRAM(s) Oral every 4 hours PRN Rash and/or Itching  ondansetron Injectable 4 milliGRAM(s) IV Push every 6 hours PRN Nausea and/or Vomiting  oxyCODONE    IR 5 milliGRAM(s) Oral every 4 hours PRN Severe Pain (7 - 10)    Vital Signs Last 24 Hrs  T(F): 98.2 (13 Jul 2019 06:08), Max: 98.2 (13 Jul 2019 06:08)  HR: 66 (13 Jul 2019 06:08) (64 - 96)  BP: 103/63 (13 Jul 2019 06:08) (103/63 - 105/71)  RR: 15 (13 Jul 2019 06:08) (14 - 15)  SpO2: 95% (12 Jul 2019 15:56) (95% - 95%)  I&O's Summary    12 Jul 2019 07:01  -  13 Jul 2019 07:00  --------------------------------------------------------  IN: 1220 mL / OUT: 1201 mL / NET: 19 mL    13 Jul 2019 07:01  -  13 Jul 2019 13:56  --------------------------------------------------------  IN: 300 mL / OUT: 0 mL / NET: 300 mL      PHYSICAL EXAM:  General: NAD, well dressed, well nourished  Head: NT/AC  ENT: MMM, no oropharyngeal erythema or exudates  Neck: Supple, No JVD  Lungs: Clear to auscultation bilaterally, no wheezing, rales, or rhonchi, no accessory muscle use  Cardio: RRR, normal S1/S2, No M/R/G  Abdomen: Soft, Nondistended, no tenderness to palpation; Bowel sounds present, no organomegaly  Extremities: No calf tenderness, no clubbing or  cyanosis  Vascular: no LE edema  Skin: warm and dry  MSK: cervical scar well healed, mild erythema, no fluctuance, no open wounds  Neuro: AAOx3, answers all questions appropriately, moves all extremities      LABS:                      TSH 3.89   TSH with FT4 reflex --  Total T3 --                        RADIOLOGY & ADDITIONAL TESTS:    Care Discussed with Consultants/Other Providers:

## 2019-07-13 NOTE — PROGRESS NOTE ADULT - PROBLEM SELECTOR PLAN 4
- on Valproic acid 750mg BID  - Prolixin discontinued 07/10  - continue Olanzapine 5mg qAM, 10mg qHS  - Cogentin 0.5mg BID  - Psych following, recs appreciated  - repeat VPA level and CBC on 7/15

## 2019-07-13 NOTE — PROGRESS NOTE ADULT - PROBLEM SELECTOR PLAN 1
No current suicidal thoughts per pt however auditory hallucination persists.  - Off 1:1 observation since 07/11, pt is not expressing suicidal thoughts  - on Valproic acid 750mg BID  - Prolixin discontinued 07/10  - continue Olanzapine 5mg qAM, 10mg qHS  - Cogentin 0.5mg BID  - Psych following, recs appreciated  - repeat VPA level and CBC on 7/15  - plan for BR[facility]ID once cleared by Psych Has suicidal ideation today, stating "he is going to die today", did not elaborate on any plans  - Resume 1:1 observation  - on Valproic acid 750mg BID  - Prolixin discontinued 07/10  - continue Olanzapine 5mg qAM, 10mg qHS  - Cogentin 0.5mg BID  - Psych following, recs appreciated  - repeat VPA level and CBC on 7/15 No current suicidal thoughts per pt however auditory hallucination persists.  - Off 1:1 observation, pt is not expressing suicidal thoughts  - on Valproic acid 750mg BID  - Prolixin discontinued 07/10  - Continue Olanzapine 5mg qAM, 10mg at bedtime  - Cogentin 0.5mg BID  - Psych following, recs appreciated  - repeat VPA level and CBC on 7/15  - plan for BRIGID once cleared by Psych

## 2019-07-14 PROCEDURE — 99233 SBSQ HOSP IP/OBS HIGH 50: CPT

## 2019-07-14 PROCEDURE — 99232 SBSQ HOSP IP/OBS MODERATE 35: CPT

## 2019-07-14 RX ADMIN — Medication 1 TABLET(S): at 11:31

## 2019-07-14 RX ADMIN — NYSTATIN CREAM 1 APPLICATION(S): 100000 CREAM TOPICAL at 05:45

## 2019-07-14 RX ADMIN — Medication 100 MILLIGRAM(S): at 05:39

## 2019-07-14 RX ADMIN — NYSTATIN CREAM 1 APPLICATION(S): 100000 CREAM TOPICAL at 17:23

## 2019-07-14 RX ADMIN — Medication 30 MILLILITER(S): at 05:38

## 2019-07-14 RX ADMIN — Medication 100 MILLIGRAM(S): at 17:22

## 2019-07-14 RX ADMIN — OLANZAPINE 10 MILLIGRAM(S): 15 TABLET, FILM COATED ORAL at 21:12

## 2019-07-14 RX ADMIN — Medication 50 MILLIGRAM(S): at 11:32

## 2019-07-14 RX ADMIN — OXYCODONE HYDROCHLORIDE 15 MILLIGRAM(S): 5 TABLET ORAL at 05:38

## 2019-07-14 RX ADMIN — OXYCODONE HYDROCHLORIDE 15 MILLIGRAM(S): 5 TABLET ORAL at 17:21

## 2019-07-14 RX ADMIN — OLANZAPINE 5 MILLIGRAM(S): 15 TABLET, FILM COATED ORAL at 09:44

## 2019-07-14 RX ADMIN — Medication 50 MILLIGRAM(S): at 05:39

## 2019-07-14 RX ADMIN — FAMOTIDINE 20 MILLIGRAM(S): 10 INJECTION INTRAVENOUS at 11:32

## 2019-07-14 RX ADMIN — Medication 1 PATCH: at 19:54

## 2019-07-14 RX ADMIN — Medication 1 PATCH: at 11:36

## 2019-07-14 RX ADMIN — DESMOPRESSIN ACETATE 0.2 MILLIGRAM(S): 0.1 TABLET ORAL at 21:12

## 2019-07-14 RX ADMIN — Medication 30 MILLILITER(S): at 17:35

## 2019-07-14 RX ADMIN — LIDOCAINE 1 PATCH: 4 CREAM TOPICAL at 00:54

## 2019-07-14 RX ADMIN — Medication 3 MILLIGRAM(S): at 21:12

## 2019-07-14 RX ADMIN — LIDOCAINE 1 PATCH: 4 CREAM TOPICAL at 19:54

## 2019-07-14 RX ADMIN — SENNA PLUS 2 TABLET(S): 8.6 TABLET ORAL at 21:12

## 2019-07-14 RX ADMIN — LIDOCAINE 1 PATCH: 4 CREAM TOPICAL at 11:31

## 2019-07-14 RX ADMIN — OXYCODONE HYDROCHLORIDE 15 MILLIGRAM(S): 5 TABLET ORAL at 17:35

## 2019-07-14 RX ADMIN — ENOXAPARIN SODIUM 40 MILLIGRAM(S): 100 INJECTION SUBCUTANEOUS at 21:13

## 2019-07-14 RX ADMIN — Medication 750 MILLIGRAM(S): at 21:12

## 2019-07-14 RX ADMIN — Medication 0.5 MILLIGRAM(S): at 05:42

## 2019-07-14 RX ADMIN — Medication 0.5 MILLIGRAM(S): at 17:21

## 2019-07-14 RX ADMIN — Medication 750 MILLIGRAM(S): at 09:43

## 2019-07-14 RX ADMIN — Medication 175 MICROGRAM(S): at 05:39

## 2019-07-14 RX ADMIN — OXYCODONE HYDROCHLORIDE 15 MILLIGRAM(S): 5 TABLET ORAL at 06:48

## 2019-07-14 RX ADMIN — Medication 500 MILLIGRAM(S): at 11:32

## 2019-07-14 RX ADMIN — Medication 1 PATCH: at 11:32

## 2019-07-14 NOTE — PROGRESS NOTE ADULT - SUBJECTIVE AND OBJECTIVE BOX
Patient is a 54y old  Male who presents with a chief complaint of suicide ideation (15 Jul 2019 11:37)      Interval HPI/ Overnight events:    Patient seen and examined at bedside.    REVIEW OF SYSTEMS:    CONSTITUTIONAL: No weakness, fevers or chills  EYES/ENT: No visual changes;  No vertigo or throat pain   NECK: No pain or stiffness  RESPIRATORY: No cough, wheezing, hemoptysis; No shortness of breath  CARDIOVASCULAR: No chest pain or palpitations  GASTROINTESTINAL: No abdominal or epigastric pain. No nausea, vomiting, or hematemesis; No diarrhea or constipation. No melena or hematochezia.  GENITOURINARY: No dysuria, frequency or hematuria  NEUROLOGICAL: No numbness or weakness  SKIN: No itching, burning, rashes, or lesions   MSK: no joint pain, no joint swelling  All other review of systems is negative unless indicated above.      ALLERGIES:  Haldol (Unknown)  No Known Allergies  Thorazine (Unknown)    MEDICATIONS  (STANDING):  aluminum hydroxide/magnesium hydroxide/simethicone Suspension 30 milliLiter(s) Oral every 12 hours  ascorbic acid 500 milliGRAM(s) Oral daily  benztropine 0.5 milliGRAM(s) Oral two times a day  desmopressin 0.2 milliGRAM(s) Oral at bedtime  docusate sodium 100 milliGRAM(s) Oral two times a day  enoxaparin Injectable 40 milliGRAM(s) SubCutaneous at bedtime  famotidine    Tablet 20 milliGRAM(s) Oral daily  levothyroxine 175 MICROGram(s) Oral daily  lidocaine   Patch 1 Patch Transdermal daily  lithium 300 milliGRAM(s) Oral every 12 hours  melatonin 3 milliGRAM(s) Oral at bedtime  metoprolol succinate ER 50 milliGRAM(s) Oral daily  multivitamin 1 Tablet(s) Oral daily  nicotine - 21 mG/24Hr(s) Patch 1 patch Transdermal daily  nystatin Powder 1 Application(s) Topical two times a day  OLANZapine 10 milliGRAM(s) Oral at bedtime  OLANZapine 5 milliGRAM(s) Oral daily  oxyCODONE  ER Tablet 15 milliGRAM(s) Oral every 12 hours  polyethylene glycol 3350 17 Gram(s) Oral daily  pyridoxine 50 milliGRAM(s) Oral daily  senna 2 Tablet(s) Oral at bedtime  valproic  acid Syrup 750 milliGRAM(s) Oral every 12 hours    MEDICATIONS  (PRN):  acetaminophen   Tablet .. 650 milliGRAM(s) Oral every 6 hours PRN Temp greater or equal to 38C (100.4F), Mild Pain (1 - 3)  ALBUTerol    90 MICROgram(s) HFA Inhaler 2 Puff(s) Inhalation every 6 hours PRN Shortness of Breath and/or Wheezing  bisacodyl Suppository 10 milliGRAM(s) Rectal daily PRN Constipation  oxyCODONE    IR 5 milliGRAM(s) Oral every 4 hours PRN Severe Pain (7 - 10)    Vital Signs Last 24 Hrs  T(F): 97.8 (14 Jul 2019 05:45), Max: 98.6 (14 Jul 2019 21:09)  HR: 61 (14 Jul 2019 05:45) (61 -74)  BP: 118/68 (14 Jul 2019 05:45) (103/63 - 133/59)  RR: 15 (14 Jul 2019 05:45) (15 - 15)  SpO2: 99% (14 Jul 2019 05:45) (95% - 99%)  I&O's Summary    13 Jul 2019 07:01  -  14 Jul 2019 07:00  --------------------------------------------------------  IN: 700 mL / OUT:   / NET: 700 mL      PHYSICAL EXAM:  General: NAD, well dressed, well nourished  Head: NT/AC  ENT: MMM, no oropharyngeal erythema or exudates  Neck: Supple, No JVD  Lungs: Clear to auscultation bilaterally, no wheezing, rales, or rhonchi, no accessory muscle use  Cardio: RRR, normal S1/S2, No M/R/G  Abdomen: Soft, Nondistended, no tenderness to palpation; Bowel sounds present, no organomegaly  Extremities: No calf tenderness, no clubbing or  cyanosis  Vascular: no LE edema  Skin: warm and dry  MSK: cervical scar well healed, mild erythema, no fluctuance, no open wounds  Neuro: AAOx3, answers all questions appropriately, moves all extremities    LABS:                                             RADIOLOGY & ADDITIONAL TESTS:    Care Discussed with Consultants/Other Providers: Patient is a 54y old  Male who presents with a chief complaint of suicide ideation      Interval HPI/ Overnight events: no overnight events    Patient seen and examined at bedside. This morning, pt expresses suicidal ideation, sating that "he did something stupid, he is going to die today". Pt did not elaborate what he did, stating that he is too embarrassed to say despite my reassurance that he has nothing to be embarrassed about. He is not expressing any specific plans at this time.        REVIEW OF SYSTEMS:    CONSTITUTIONAL: No weakness, fevers or chills  EYES/ENT: No visual changes;  No vertigo or throat pain   NECK: No pain or stiffness  RESPIRATORY: No cough, wheezing, hemoptysis; No shortness of breath  CARDIOVASCULAR: No chest pain or palpitations  GASTROINTESTINAL: No abdominal or epigastric pain. No nausea, vomiting, or hematemesis; No diarrhea or constipation. No melena or hematochezia.  GENITOURINARY: No dysuria, frequency or hematuria  NEUROLOGICAL: No numbness or weakness  SKIN: No itching, burning, rashes, or lesions   MSK: no joint pain, no joint swelling  All other review of systems is negative unless indicated above.      ALLERGIES:  Haldol (Unknown)  No Known Allergies  Thorazine (Unknown)    MEDICATIONS  (STANDING):  aluminum hydroxide/magnesium hydroxide/simethicone Suspension 30 milliLiter(s) Oral every 12 hours  ascorbic acid 500 milliGRAM(s) Oral daily  benztropine 0.5 milliGRAM(s) Oral two times a day  desmopressin 0.2 milliGRAM(s) Oral at bedtime  docusate sodium 100 milliGRAM(s) Oral two times a day  enoxaparin Injectable 40 milliGRAM(s) SubCutaneous at bedtime  famotidine    Tablet 20 milliGRAM(s) Oral daily  levothyroxine 175 MICROGram(s) Oral daily  lidocaine   Patch 1 Patch Transdermal daily  lithium 300 milliGRAM(s) Oral every 12 hours  melatonin 3 milliGRAM(s) Oral at bedtime  metoprolol succinate ER 50 milliGRAM(s) Oral daily  multivitamin 1 Tablet(s) Oral daily  nicotine - 21 mG/24Hr(s) Patch 1 patch Transdermal daily  nystatin Powder 1 Application(s) Topical two times a day  OLANZapine 10 milliGRAM(s) Oral at bedtime  OLANZapine 5 milliGRAM(s) Oral daily  oxyCODONE  ER Tablet 15 milliGRAM(s) Oral every 12 hours  polyethylene glycol 3350 17 Gram(s) Oral daily  pyridoxine 50 milliGRAM(s) Oral daily  senna 2 Tablet(s) Oral at bedtime  valproic  acid Syrup 750 milliGRAM(s) Oral every 12 hours    MEDICATIONS  (PRN):  acetaminophen   Tablet .. 650 milliGRAM(s) Oral every 6 hours PRN Temp greater or equal to 38C (100.4F), Mild Pain (1 - 3)  ALBUTerol    90 MICROgram(s) HFA Inhaler 2 Puff(s) Inhalation every 6 hours PRN Shortness of Breath and/or Wheezing  bisacodyl Suppository 10 milliGRAM(s) Rectal daily PRN Constipation  oxyCODONE    IR 5 milliGRAM(s) Oral every 4 hours PRN Severe Pain (7 - 10)    Vital Signs Last 24 Hrs  T(F): 97.8 (14 Jul 2019 05:45), Max: 98.6 (14 Jul 2019 21:09)  HR: 61 (14 Jul 2019 05:45) (61 -74)  BP: 118/68 (14 Jul 2019 05:45) (103/63 - 133/59)  RR: 15 (14 Jul 2019 05:45) (15 - 15)  SpO2: 99% (14 Jul 2019 05:45) (95% - 99%)  I&O's Summary    13 Jul 2019 07:01  -  14 Jul 2019 07:00  --------------------------------------------------------  IN: 700 mL / OUT:   / NET: 700 mL      PHYSICAL EXAM:  General: NAD, well dressed, well nourished  Head: NT/AC  ENT: MMM, no oropharyngeal erythema or exudates  Neck: Supple, No JVD  Lungs: Clear to auscultation bilaterally, no wheezing, rales, or rhonchi, no accessory muscle use  Cardio: RRR, normal S1/S2, No M/R/G  Abdomen: Soft, Nondistended, no tenderness to palpation; Bowel sounds present, no organomegaly  Extremities: No calf tenderness, no clubbing or  cyanosis  Vascular: no LE edema  Skin: warm and dry  MSK: cervical scar well healed, mild erythema, no fluctuance, no open wounds  Neuro: AAOx3, answers all questions appropriately, moves all extremities    LABS:                                             RADIOLOGY & ADDITIONAL TESTS:    Care Discussed with Consultants/Other Providers:

## 2019-07-14 NOTE — PROGRESS NOTE ADULT - PROBLEM SELECTOR PLAN 1
Has suicidal ideation today, stating "he is going to die today", did not elaborate on any plans  - Resume 1:1 observation  - on Valproic acid 750mg BID  - Prolixin discontinued 07/10  - continue Olanzapine 5mg qAM, 10mg qHS  - Cogentin 0.5mg BID  - Psych following, recs appreciated  - repeat VPA level and CBC on 7/15

## 2019-07-14 NOTE — PROGRESS NOTE ADULT - PROBLEM SELECTOR PLAN 10
DISPO: pending psych. would need subacute rehab for generalized weakness  - pt placed back to 1:1 today

## 2019-07-15 LAB
ANION GAP SERPL CALC-SCNC: 6 MMOL/L — SIGNIFICANT CHANGE UP (ref 5–17)
BASOPHILS # BLD AUTO: 0.07 K/UL — SIGNIFICANT CHANGE UP (ref 0–0.2)
BASOPHILS NFR BLD AUTO: 1 % — SIGNIFICANT CHANGE UP (ref 0–2)
BUN SERPL-MCNC: 29 MG/DL — HIGH (ref 7–23)
CALCIUM SERPL-MCNC: 9.8 MG/DL — SIGNIFICANT CHANGE UP (ref 8.4–10.5)
CHLORIDE SERPL-SCNC: 106 MMOL/L — SIGNIFICANT CHANGE UP (ref 96–108)
CO2 SERPL-SCNC: 32 MMOL/L — HIGH (ref 22–31)
CREAT SERPL-MCNC: 1.05 MG/DL — SIGNIFICANT CHANGE UP (ref 0.5–1.3)
EOSINOPHIL # BLD AUTO: 0.24 K/UL — SIGNIFICANT CHANGE UP (ref 0–0.5)
EOSINOPHIL NFR BLD AUTO: 3.3 % — SIGNIFICANT CHANGE UP (ref 0–6)
GLUCOSE SERPL-MCNC: 80 MG/DL — SIGNIFICANT CHANGE UP (ref 70–99)
HCT VFR BLD CALC: 46.9 % — SIGNIFICANT CHANGE UP (ref 39–50)
HGB BLD-MCNC: 15.3 G/DL — SIGNIFICANT CHANGE UP (ref 13–17)
IMM GRANULOCYTES NFR BLD AUTO: 0.4 % — SIGNIFICANT CHANGE UP (ref 0–1.5)
LYMPHOCYTES # BLD AUTO: 1.85 K/UL — SIGNIFICANT CHANGE UP (ref 1–3.3)
LYMPHOCYTES # BLD AUTO: 25.6 % — SIGNIFICANT CHANGE UP (ref 13–44)
MCHC RBC-ENTMCNC: 28.9 PG — SIGNIFICANT CHANGE UP (ref 27–34)
MCHC RBC-ENTMCNC: 32.6 GM/DL — SIGNIFICANT CHANGE UP (ref 32–36)
MCV RBC AUTO: 88.7 FL — SIGNIFICANT CHANGE UP (ref 80–100)
MONOCYTES # BLD AUTO: 0.6 K/UL — SIGNIFICANT CHANGE UP (ref 0–0.9)
MONOCYTES NFR BLD AUTO: 8.3 % — SIGNIFICANT CHANGE UP (ref 2–14)
NEUTROPHILS # BLD AUTO: 4.44 K/UL — SIGNIFICANT CHANGE UP (ref 1.8–7.4)
NEUTROPHILS NFR BLD AUTO: 61.4 % — SIGNIFICANT CHANGE UP (ref 43–77)
NRBC # BLD: 0 /100 WBCS — SIGNIFICANT CHANGE UP (ref 0–0)
PLATELET # BLD AUTO: 253 K/UL — SIGNIFICANT CHANGE UP (ref 150–400)
POTASSIUM SERPL-MCNC: 4.2 MMOL/L — SIGNIFICANT CHANGE UP (ref 3.5–5.3)
POTASSIUM SERPL-SCNC: 4.2 MMOL/L — SIGNIFICANT CHANGE UP (ref 3.5–5.3)
RBC # BLD: 5.29 M/UL — SIGNIFICANT CHANGE UP (ref 4.2–5.8)
RBC # FLD: 13.3 % — SIGNIFICANT CHANGE UP (ref 10.3–14.5)
SODIUM SERPL-SCNC: 144 MMOL/L — SIGNIFICANT CHANGE UP (ref 135–145)
VALPROATE SERPL-MCNC: 52 UG/ML — SIGNIFICANT CHANGE UP (ref 50–100)
WBC # BLD: 7.23 K/UL — SIGNIFICANT CHANGE UP (ref 3.8–10.5)
WBC # FLD AUTO: 7.23 K/UL — SIGNIFICANT CHANGE UP (ref 3.8–10.5)

## 2019-07-15 PROCEDURE — 99233 SBSQ HOSP IP/OBS HIGH 50: CPT

## 2019-07-15 RX ORDER — LITHIUM CARBONATE 300 MG/1
300 TABLET, EXTENDED RELEASE ORAL EVERY 12 HOURS
Refills: 0 | Status: DISCONTINUED | OUTPATIENT
Start: 2019-07-15 | End: 2019-07-22

## 2019-07-15 RX ADMIN — Medication 1 PATCH: at 08:49

## 2019-07-15 RX ADMIN — Medication 0.5 MILLIGRAM(S): at 05:30

## 2019-07-15 RX ADMIN — Medication 750 MILLIGRAM(S): at 21:37

## 2019-07-15 RX ADMIN — LIDOCAINE 1 PATCH: 4 CREAM TOPICAL at 00:00

## 2019-07-15 RX ADMIN — OXYCODONE HYDROCHLORIDE 15 MILLIGRAM(S): 5 TABLET ORAL at 17:16

## 2019-07-15 RX ADMIN — OXYCODONE HYDROCHLORIDE 15 MILLIGRAM(S): 5 TABLET ORAL at 05:30

## 2019-07-15 RX ADMIN — Medication 100 MILLIGRAM(S): at 17:11

## 2019-07-15 RX ADMIN — Medication 30 MILLILITER(S): at 05:31

## 2019-07-15 RX ADMIN — Medication 3 MILLIGRAM(S): at 21:38

## 2019-07-15 RX ADMIN — NYSTATIN CREAM 1 APPLICATION(S): 100000 CREAM TOPICAL at 17:11

## 2019-07-15 RX ADMIN — Medication 50 MILLIGRAM(S): at 05:31

## 2019-07-15 RX ADMIN — OXYCODONE HYDROCHLORIDE 15 MILLIGRAM(S): 5 TABLET ORAL at 18:13

## 2019-07-15 RX ADMIN — OXYCODONE HYDROCHLORIDE 15 MILLIGRAM(S): 5 TABLET ORAL at 08:49

## 2019-07-15 RX ADMIN — Medication 1 PATCH: at 11:34

## 2019-07-15 RX ADMIN — Medication 0.5 MILLIGRAM(S): at 17:11

## 2019-07-15 RX ADMIN — NYSTATIN CREAM 1 APPLICATION(S): 100000 CREAM TOPICAL at 05:32

## 2019-07-15 RX ADMIN — Medication 1 TABLET(S): at 11:34

## 2019-07-15 RX ADMIN — OLANZAPINE 10 MILLIGRAM(S): 15 TABLET, FILM COATED ORAL at 21:38

## 2019-07-15 RX ADMIN — Medication 30 MILLILITER(S): at 17:16

## 2019-07-15 RX ADMIN — Medication 100 MILLIGRAM(S): at 05:31

## 2019-07-15 RX ADMIN — SENNA PLUS 2 TABLET(S): 8.6 TABLET ORAL at 21:39

## 2019-07-15 RX ADMIN — Medication 1 PATCH: at 18:12

## 2019-07-15 RX ADMIN — Medication 500 MILLIGRAM(S): at 11:34

## 2019-07-15 RX ADMIN — ENOXAPARIN SODIUM 40 MILLIGRAM(S): 100 INJECTION SUBCUTANEOUS at 21:38

## 2019-07-15 RX ADMIN — Medication 1 PATCH: at 11:39

## 2019-07-15 RX ADMIN — POLYETHYLENE GLYCOL 3350 17 GRAM(S): 17 POWDER, FOR SOLUTION ORAL at 11:34

## 2019-07-15 RX ADMIN — LIDOCAINE 1 PATCH: 4 CREAM TOPICAL at 11:34

## 2019-07-15 RX ADMIN — OLANZAPINE 5 MILLIGRAM(S): 15 TABLET, FILM COATED ORAL at 08:54

## 2019-07-15 RX ADMIN — LITHIUM CARBONATE 300 MILLIGRAM(S): 300 TABLET, EXTENDED RELEASE ORAL at 17:16

## 2019-07-15 RX ADMIN — Medication 50 MILLIGRAM(S): at 11:34

## 2019-07-15 RX ADMIN — FAMOTIDINE 20 MILLIGRAM(S): 10 INJECTION INTRAVENOUS at 11:34

## 2019-07-15 RX ADMIN — Medication 175 MICROGRAM(S): at 05:32

## 2019-07-15 RX ADMIN — LIDOCAINE 1 PATCH: 4 CREAM TOPICAL at 18:12

## 2019-07-15 RX ADMIN — Medication 750 MILLIGRAM(S): at 08:54

## 2019-07-15 RX ADMIN — DESMOPRESSIN ACETATE 0.2 MILLIGRAM(S): 0.1 TABLET ORAL at 21:38

## 2019-07-15 NOTE — PROGRESS NOTE ADULT - PROBLEM SELECTOR PLAN 4
- on Valproic acid 750mg BID  - Prolixin discontinued 07/10  - continue Olanzapine 5mg qAM, 10mg qHS  - Lithium restarted today per Psych  - Cogentin 0.5mg BID  - Psych following, recs appreciated  - repeat VPA level and CBC today

## 2019-07-15 NOTE — PROGRESS NOTE ADULT - PROBLEM SELECTOR PLAN 1
Has suicidal ideation today, stating "he is going to die today", did not elaborate on any plans  - continue 1:1 observation  - on Valproic acid 750mg BID  - Prolixin discontinued 07/10  - continue Olanzapine 5mg qAM, 10mg qHS  - Cogentin 0.5mg BID  - Lithium restarted by psych today  - Psych following, recs appreciated  - repeat VPA level and CBC today

## 2019-07-15 NOTE — PROGRESS NOTE ADULT - SUBJECTIVE AND OBJECTIVE BOX
Patient is a 54y old  Male who presents with a chief complaint of suicide ideation (14 Jul 2019 16:48)      Interval HPI/ Overnight events:    Patient seen and examined at bedside. Yesterday, pt was expressing suicidal ideation. He feels a little bit better today but still having thoughts that he is dying.      REVIEW OF SYSTEMS:    CONSTITUTIONAL: No weakness, fevers or chills  EYES/ENT: No visual changes;  No vertigo or throat pain   NECK: No pain or stiffness  RESPIRATORY: No cough, wheezing, hemoptysis; No shortness of breath  CARDIOVASCULAR: No chest pain or palpitations  GASTROINTESTINAL: No abdominal or epigastric pain. No nausea, vomiting, or hematemesis; No diarrhea or constipation. No melena or hematochezia.  GENITOURINARY: No dysuria, frequency or hematuria  NEUROLOGICAL: No numbness or weakness  SKIN: No itching, burning, rashes, or lesions   MSK: no joint pain, no joint swelling  All other review of systems is negative unless indicated above.      ALLERGIES:  Haldol (Unknown)  No Known Allergies  Thorazine (Unknown)    MEDICATIONS  (STANDING):  aluminum hydroxide/magnesium hydroxide/simethicone Suspension 30 milliLiter(s) Oral every 12 hours  ascorbic acid 500 milliGRAM(s) Oral daily  benztropine 0.5 milliGRAM(s) Oral two times a day  desmopressin 0.2 milliGRAM(s) Oral at bedtime  docusate sodium 100 milliGRAM(s) Oral two times a day  enoxaparin Injectable 40 milliGRAM(s) SubCutaneous at bedtime  famotidine    Tablet 20 milliGRAM(s) Oral daily  levothyroxine 175 MICROGram(s) Oral daily  lidocaine   Patch 1 Patch Transdermal daily  lithium 300 milliGRAM(s) Oral every 12 hours  melatonin 3 milliGRAM(s) Oral at bedtime  metoprolol succinate ER 50 milliGRAM(s) Oral daily  multivitamin 1 Tablet(s) Oral daily  nicotine - 21 mG/24Hr(s) Patch 1 patch Transdermal daily  nystatin Powder 1 Application(s) Topical two times a day  OLANZapine 10 milliGRAM(s) Oral at bedtime  OLANZapine 5 milliGRAM(s) Oral daily  oxyCODONE  ER Tablet 15 milliGRAM(s) Oral every 12 hours  polyethylene glycol 3350 17 Gram(s) Oral daily  pyridoxine 50 milliGRAM(s) Oral daily  senna 2 Tablet(s) Oral at bedtime  valproic  acid Syrup 750 milliGRAM(s) Oral every 12 hours    MEDICATIONS  (PRN):  acetaminophen   Tablet .. 650 milliGRAM(s) Oral every 6 hours PRN Temp greater or equal to 38C (100.4F), Mild Pain (1 - 3)  ALBUTerol    90 MICROgram(s) HFA Inhaler 2 Puff(s) Inhalation every 6 hours PRN Shortness of Breath and/or Wheezing  bisacodyl Suppository 10 milliGRAM(s) Rectal daily PRN Constipation  oxyCODONE    IR 5 milliGRAM(s) Oral every 4 hours PRN Severe Pain (7 - 10)    Vital Signs Last 24 Hrs  T(F): 97.7 (15 Jul 2019 05:27), Max: 97.9 (14 Jul 2019 16:09)  HR: 92 (15 Jul 2019 07:51) (57 - 92)  BP: 110/58 (15 Jul 2019 05:27) (110/58 - 128/71)  RR: 15 (15 Jul 2019 05:27) (15 - 16)  SpO2: 95% (15 Jul 2019 07:51) (93% - 100%)  I&O's Summary    14 Jul 2019 07:01  -  15 Jul 2019 07:00  --------------------------------------------------------  IN: 800 mL / OUT: 700 mL / NET: 100 mL    15 Jul 2019 07:01  -  15 Jul 2019 11:35  --------------------------------------------------------  IN: 600 mL / OUT: 0 mL / NET: 600 mL      PHYSICAL EXAM:  General: NAD, well dressed, well nourished  Head: NT/AC  ENT: MMM, no oropharyngeal erythema or exudates  Neck: Supple, No JVD  Lungs: Clear to auscultation bilaterally, no wheezing, rales, or rhonchi, no accessory muscle use  Cardio: RRR, normal S1/S2, No M/R/G  Abdomen: Soft, Nondistended, no tenderness to palpation; Bowel sounds present, no organomegaly  Extremities: No calf tenderness, no clubbing or  cyanosis  Vascular: no LE edema  Skin: warm and dry  MSK: cervical scar well healed, mild erythema, no fluctuance, no open wounds  Neuro: AAOx3, answers all questions appropriately, moves all extremities    LABS:                        15.3   7.23  )-----------( 253      ( 15 Jul 2019 07:25 )             46.9     07-15    144  |  106  |  29  ----------------------------<  80  4.2   |  32  |  1.05    Ca    9.8      15 Jul 2019 07:25      eGFR if Non African American: 81 mL/min/1.73M2 (07-15-19 @ 07:25)  eGFR if African American: 94 mL/min/1.73M2 (07-15-19 @ 07:25)                                    RADIOLOGY & ADDITIONAL TESTS:    Care Discussed with Consultants/Other Providers:

## 2019-07-15 NOTE — CHART NOTE - NSCHARTNOTEFT_GEN_A_CORE
NUTRITION FOLLOW UP    SOURCE: Patient [X)   Family [ ]     other [ ]    DIET: DASH    PATIENT REPORT[ ] nausea  [ ] vomiting [ ] diarrhea [ ] constipation  [ ]chewing problems [ ] swallowing issues  [ ] other: no GI distress    PO INTAKE:  < 50% [ ]   50-75%  [ X]   %  []  other :    SOURCE: for PO intake [X] Patient [ ] family [ ] chart [ ] staff [ ] other    ENTERAL/PARENTERAL NUTRITION: n/a    CURRENT WEIGHT: Weight (kg): 107.5  ( 7/13)    PERTINENT LABS:  Date: 15 Jul 2019 07:25  Hemoglobin 15.3   Hematocrit 46.9     Date: 07-15  Sodium 144  Potassium 4.2  Glucose Serum 80  BUN 29<H>      Creatinine    ACCUCHECK      SKIN: intact, no edema noted, last BM was 7/13    ESTIMATED NEEDS:   [X] no change since previous assessment  [ ] recalculated:     PREVIOUS NUTRITION DIAGNOSIS: addressed    NUTRITION DIAGNOSIS is   [X] ongoing    NEW NUTRITION DIAGNOSIS: [X] not applicable    MONITORING AND EVALUATION:   Current diet order is appropriate and is well tolerated, but will monitor for any changes that may be needed    [X] PO intake [X] Tolerance to diet prescription [X] weights [X] follow up per protocol    NUTRITION RECOMMENDATIONS:    RD remains available MATI Hills RD CDE

## 2019-07-16 DIAGNOSIS — N39.0 URINARY TRACT INFECTION, SITE NOT SPECIFIED: ICD-10-CM

## 2019-07-16 LAB
ANION GAP SERPL CALC-SCNC: 7 MMOL/L — SIGNIFICANT CHANGE UP (ref 5–17)
APPEARANCE UR: ABNORMAL
BACTERIA # UR AUTO: ABNORMAL /HPF
BILIRUB UR-MCNC: NEGATIVE — SIGNIFICANT CHANGE UP
BUN SERPL-MCNC: 22 MG/DL — SIGNIFICANT CHANGE UP (ref 7–23)
CALCIUM SERPL-MCNC: 9.5 MG/DL — SIGNIFICANT CHANGE UP (ref 8.4–10.5)
CHLORIDE SERPL-SCNC: 104 MMOL/L — SIGNIFICANT CHANGE UP (ref 96–108)
CO2 SERPL-SCNC: 30 MMOL/L — SIGNIFICANT CHANGE UP (ref 22–31)
COLOR SPEC: YELLOW — SIGNIFICANT CHANGE UP
COMMENT - URINE: SIGNIFICANT CHANGE UP
CREAT SERPL-MCNC: 0.75 MG/DL — SIGNIFICANT CHANGE UP (ref 0.5–1.3)
DIFF PNL FLD: ABNORMAL
EPI CELLS # UR: SIGNIFICANT CHANGE UP
GLUCOSE SERPL-MCNC: 115 MG/DL — HIGH (ref 70–99)
GLUCOSE UR QL: NEGATIVE — SIGNIFICANT CHANGE UP
KETONES UR-MCNC: NEGATIVE — SIGNIFICANT CHANGE UP
LEUKOCYTE ESTERASE UR-ACNC: ABNORMAL
NITRITE UR-MCNC: NEGATIVE — SIGNIFICANT CHANGE UP
PH UR: 7 — SIGNIFICANT CHANGE UP (ref 5–8)
POTASSIUM SERPL-MCNC: 3.9 MMOL/L — SIGNIFICANT CHANGE UP (ref 3.5–5.3)
POTASSIUM SERPL-SCNC: 3.9 MMOL/L — SIGNIFICANT CHANGE UP (ref 3.5–5.3)
PROT UR-MCNC: 30 MG/DL
RBC CASTS # UR COMP ASSIST: ABNORMAL /HPF (ref 0–4)
SODIUM SERPL-SCNC: 141 MMOL/L — SIGNIFICANT CHANGE UP (ref 135–145)
SP GR SPEC: 1 — LOW (ref 1.01–1.02)
UROBILINOGEN FLD QL: NEGATIVE — SIGNIFICANT CHANGE UP
WBC UR QL: >50 /HPF (ref 0–5)

## 2019-07-16 PROCEDURE — 99232 SBSQ HOSP IP/OBS MODERATE 35: CPT

## 2019-07-16 PROCEDURE — 99233 SBSQ HOSP IP/OBS HIGH 50: CPT

## 2019-07-16 RX ORDER — OXYCODONE HYDROCHLORIDE 5 MG/1
10 TABLET ORAL EVERY 12 HOURS
Refills: 0 | Status: DISCONTINUED | OUTPATIENT
Start: 2019-07-16 | End: 2019-07-22

## 2019-07-16 RX ORDER — CEFTRIAXONE 500 MG/1
1000 INJECTION, POWDER, FOR SOLUTION INTRAMUSCULAR; INTRAVENOUS EVERY 24 HOURS
Refills: 0 | Status: DISCONTINUED | OUTPATIENT
Start: 2019-07-16 | End: 2019-07-17

## 2019-07-16 RX ADMIN — OXYCODONE HYDROCHLORIDE 15 MILLIGRAM(S): 5 TABLET ORAL at 08:23

## 2019-07-16 RX ADMIN — Medication 100 MILLIGRAM(S): at 06:42

## 2019-07-16 RX ADMIN — Medication 750 MILLIGRAM(S): at 08:22

## 2019-07-16 RX ADMIN — OXYCODONE HYDROCHLORIDE 10 MILLIGRAM(S): 5 TABLET ORAL at 17:50

## 2019-07-16 RX ADMIN — Medication 0.5 MILLIGRAM(S): at 06:41

## 2019-07-16 RX ADMIN — ENOXAPARIN SODIUM 40 MILLIGRAM(S): 100 INJECTION SUBCUTANEOUS at 21:02

## 2019-07-16 RX ADMIN — NYSTATIN CREAM 1 APPLICATION(S): 100000 CREAM TOPICAL at 17:39

## 2019-07-16 RX ADMIN — Medication 0.5 MILLIGRAM(S): at 17:40

## 2019-07-16 RX ADMIN — Medication 3 MILLIGRAM(S): at 21:01

## 2019-07-16 RX ADMIN — SENNA PLUS 2 TABLET(S): 8.6 TABLET ORAL at 21:02

## 2019-07-16 RX ADMIN — POLYETHYLENE GLYCOL 3350 17 GRAM(S): 17 POWDER, FOR SOLUTION ORAL at 11:04

## 2019-07-16 RX ADMIN — Medication 50 MILLIGRAM(S): at 06:43

## 2019-07-16 RX ADMIN — LITHIUM CARBONATE 300 MILLIGRAM(S): 300 TABLET, EXTENDED RELEASE ORAL at 17:40

## 2019-07-16 RX ADMIN — Medication 30 MILLILITER(S): at 17:40

## 2019-07-16 RX ADMIN — Medication 175 MICROGRAM(S): at 06:42

## 2019-07-16 RX ADMIN — Medication 1 TABLET(S): at 11:03

## 2019-07-16 RX ADMIN — OXYCODONE HYDROCHLORIDE 15 MILLIGRAM(S): 5 TABLET ORAL at 06:40

## 2019-07-16 RX ADMIN — Medication 50 MILLIGRAM(S): at 11:03

## 2019-07-16 RX ADMIN — LIDOCAINE 1 PATCH: 4 CREAM TOPICAL at 00:00

## 2019-07-16 RX ADMIN — OLANZAPINE 10 MILLIGRAM(S): 15 TABLET, FILM COATED ORAL at 21:01

## 2019-07-16 RX ADMIN — Medication 100 MILLIGRAM(S): at 17:40

## 2019-07-16 RX ADMIN — Medication 1 PATCH: at 12:13

## 2019-07-16 RX ADMIN — Medication 1 PATCH: at 07:00

## 2019-07-16 RX ADMIN — DESMOPRESSIN ACETATE 0.2 MILLIGRAM(S): 0.1 TABLET ORAL at 21:01

## 2019-07-16 RX ADMIN — LITHIUM CARBONATE 300 MILLIGRAM(S): 300 TABLET, EXTENDED RELEASE ORAL at 06:42

## 2019-07-16 RX ADMIN — Medication 30 MILLILITER(S): at 06:41

## 2019-07-16 RX ADMIN — OXYCODONE HYDROCHLORIDE 10 MILLIGRAM(S): 5 TABLET ORAL at 17:40

## 2019-07-16 RX ADMIN — Medication 1 PATCH: at 17:51

## 2019-07-16 RX ADMIN — CEFTRIAXONE 100 MILLIGRAM(S): 500 INJECTION, POWDER, FOR SOLUTION INTRAMUSCULAR; INTRAVENOUS at 11:47

## 2019-07-16 RX ADMIN — Medication 1 PATCH: at 11:04

## 2019-07-16 RX ADMIN — NYSTATIN CREAM 1 APPLICATION(S): 100000 CREAM TOPICAL at 06:43

## 2019-07-16 RX ADMIN — OLANZAPINE 5 MILLIGRAM(S): 15 TABLET, FILM COATED ORAL at 08:21

## 2019-07-16 RX ADMIN — FAMOTIDINE 20 MILLIGRAM(S): 10 INJECTION INTRAVENOUS at 11:03

## 2019-07-16 RX ADMIN — LIDOCAINE 1 PATCH: 4 CREAM TOPICAL at 11:06

## 2019-07-16 RX ADMIN — LIDOCAINE 1 PATCH: 4 CREAM TOPICAL at 17:50

## 2019-07-16 RX ADMIN — Medication 750 MILLIGRAM(S): at 21:02

## 2019-07-16 RX ADMIN — Medication 500 MILLIGRAM(S): at 11:03

## 2019-07-16 NOTE — PROGRESS NOTE ADULT - PROBLEM SELECTOR PLAN 4
- continue Valproic acid 750mg BID, level therapeutic 52  - Prolixin discontinued 07/10  - continue Olanzapine 5mg qAM, 10mg qHS  - continue Cogentin 0.5mg BID per Psych  - continue Lithium 300mg q12hrs, check level in 2 weeks  - Psych following - continue Valproic acid 750mg BID, level therapeutic 52  - Prolixin discontinued 07/10  - continue Olanzapine 5mg qAM, 10mg qHS  - continue Cogentin 0.5mg BID per Psych  - continue Lithium 300mg q12hrs, check level in 07/22  - Psych following

## 2019-07-16 NOTE — PROGRESS NOTE ADULT - SUBJECTIVE AND OBJECTIVE BOX
Patient is a 54y old  Male who presents with a chief complaint of suicide ideation (15 Jul 2019 18:44)      Interval HPI/ Overnight events: No overnight events    Patient seen and examined at bedside. Pt feels better today, complains of dysuria. He is still feeling depressed and hearing voices but no suicidal thoughts    REVIEW OF SYSTEMS:    CONSTITUTIONAL: No weakness, fevers or chills  EYES/ENT: No visual changes;  No vertigo or throat pain   NECK: No pain or stiffness  RESPIRATORY: No cough, wheezing, hemoptysis; No shortness of breath  CARDIOVASCULAR: No chest pain or palpitations  GASTROINTESTINAL: No abdominal or epigastric pain. No nausea, vomiting, or hematemesis; No diarrhea or constipation. No melena or hematochezia.  GENITOURINARY: No dysuria, frequency or hematuria  NEUROLOGICAL: No numbness or weakness  SKIN: No itching, burning, rashes, or lesions   MSK: no joint pain, no joint swelling  All other review of systems is negative unless indicated above.      ALLERGIES:  Haldol (Unknown)  No Known Allergies  Thorazine (Unknown)    MEDICATIONS  (STANDING):  aluminum hydroxide/magnesium hydroxide/simethicone Suspension 30 milliLiter(s) Oral every 12 hours  ascorbic acid 500 milliGRAM(s) Oral daily  benztropine 0.5 milliGRAM(s) Oral two times a day  cefTRIAXone   IVPB 1000 milliGRAM(s) IV Intermittent every 24 hours  desmopressin 0.2 milliGRAM(s) Oral at bedtime  docusate sodium 100 milliGRAM(s) Oral two times a day  enoxaparin Injectable 40 milliGRAM(s) SubCutaneous at bedtime  famotidine    Tablet 20 milliGRAM(s) Oral daily  levothyroxine 175 MICROGram(s) Oral daily  lidocaine   Patch 1 Patch Transdermal daily  lithium 300 milliGRAM(s) Oral every 12 hours  melatonin 3 milliGRAM(s) Oral at bedtime  metoprolol succinate ER 50 milliGRAM(s) Oral daily  multivitamin 1 Tablet(s) Oral daily  nicotine - 21 mG/24Hr(s) Patch 1 patch Transdermal daily  nystatin Powder 1 Application(s) Topical two times a day  OLANZapine 10 milliGRAM(s) Oral at bedtime  OLANZapine 5 milliGRAM(s) Oral daily  oxyCODONE  ER Tablet 10 milliGRAM(s) Oral every 12 hours  polyethylene glycol 3350 17 Gram(s) Oral daily  pyridoxine 50 milliGRAM(s) Oral daily  senna 2 Tablet(s) Oral at bedtime  valproic  acid Syrup 750 milliGRAM(s) Oral every 12 hours    MEDICATIONS  (PRN):  acetaminophen   Tablet .. 650 milliGRAM(s) Oral every 6 hours PRN Temp greater or equal to 38C (100.4F), Mild Pain (1 - 3)  ALBUTerol    90 MICROgram(s) HFA Inhaler 2 Puff(s) Inhalation every 6 hours PRN Shortness of Breath and/or Wheezing  bisacodyl Suppository 10 milliGRAM(s) Rectal daily PRN Constipation  oxyCODONE    IR 5 milliGRAM(s) Oral every 4 hours PRN Severe Pain (7 - 10)    Vital Signs Last 24 Hrs  T(F): 99.2 (2019 16:09), Max: 99.2 (2019 16:09)  HR: 90 (2019 16:09) (71 - 103)  BP: 114/68 (2019 16:09) (108/66 - 118/69)  RR: 15 (2019 16:09) (15 - 16)  SpO2: 96% (2019 16:09) (96% - 100%)  I&O's Summary    15 Jul 2019 07:  -  2019 07:00  --------------------------------------------------------  IN: 2560 mL / OUT: 1304 mL / NET: 1256 mL    2019 07:01  -  2019 16:22  --------------------------------------------------------  IN: 0 mL / OUT: 1200 mL / NET: -1200 mL        PHYSICAL EXAM:  General: NAD, well dressed, well nourished  Head: NT/AC  ENT: MMM, no oropharyngeal erythema or exudates  Neck: Supple, No JVD  Lungs: Clear to auscultation bilaterally, no wheezing, rales, or rhonchi, no accessory muscle use  Cardio: RRR, normal S1/S2, No M/R/G  Abdomen: Soft, Nondistended, no tenderness to palpation; Bowel sounds present, no organomegaly  Extremities: No calf tenderness, no clubbing or  cyanosis  Vascular: no LE edema  Skin: warm and dry  MSK: cervical scar well healed, mild erythema, no fluctuance, no open wounds  Neuro: AAOx3, answers all questions appropriately, moves all extremities    LABS:                        15.3   7.23  )-----------( 253      ( 15 Jul 2019 07:25 )             46.9     -    141  |  104  |  22  ----------------------------<  115  3.9   |  30  |  0.75    Ca    9.5      2019 09:15      eGFR if African American: 121 mL/min/1.73M2 (19 @ 09:15)  eGFR if Non African American: 105 mL/min/1.73M2 (19 @ 09:15)                              Urinalysis Basic - ( 2019 09:04 )    Color: Yellow / Appearance: Slightly Turbid / S.005 / pH: x  Gluc: x / Ketone: Negative  / Bili: Negative / Urobili: Negative   Blood: x / Protein: 30 mg/dL / Nitrite: Negative   Leuk Esterase: Moderate / RBC: 11-25 /HPF / WBC >50 /HPF   Sq Epi: x / Non Sq Epi: Neg.-Few / Bacteria: Many /HPF          RADIOLOGY & ADDITIONAL TESTS:    Care Discussed with Consultants/Other Providers: Patient is a 54y old  Male who presents with a chief complaint of suicide ideation (15 Jul 2019 18:44)      Interval HPI/ Overnight events: No overnight events    Patient seen and examined at bedside. Pt feels better today, states that his dysuria has improved. He is still feeling depressed and hearing voices but no suicidal thoughts    REVIEW OF SYSTEMS:    CONSTITUTIONAL: No weakness, fevers or chills  EYES/ENT: No visual changes;  No vertigo or throat pain   NECK: No pain or stiffness  RESPIRATORY: No cough, wheezing, hemoptysis; No shortness of breath  CARDIOVASCULAR: No chest pain or palpitations  GASTROINTESTINAL: No abdominal or epigastric pain. No nausea, vomiting, or hematemesis; No diarrhea or constipation. No melena or hematochezia.  GENITOURINARY: No dysuria, frequency or hematuria  NEUROLOGICAL: No numbness or weakness  SKIN: No itching, burning, rashes, or lesions   MSK: no joint pain, no joint swelling  All other review of systems is negative unless indicated above.      ALLERGIES:  Haldol (Unknown)  No Known Allergies  Thorazine (Unknown)    MEDICATIONS  (STANDING):  aluminum hydroxide/magnesium hydroxide/simethicone Suspension 30 milliLiter(s) Oral every 12 hours  ascorbic acid 500 milliGRAM(s) Oral daily  benztropine 0.5 milliGRAM(s) Oral two times a day  cefTRIAXone   IVPB 1000 milliGRAM(s) IV Intermittent every 24 hours  desmopressin 0.2 milliGRAM(s) Oral at bedtime  docusate sodium 100 milliGRAM(s) Oral two times a day  enoxaparin Injectable 40 milliGRAM(s) SubCutaneous at bedtime  famotidine    Tablet 20 milliGRAM(s) Oral daily  levothyroxine 175 MICROGram(s) Oral daily  lidocaine   Patch 1 Patch Transdermal daily  lithium 300 milliGRAM(s) Oral every 12 hours  melatonin 3 milliGRAM(s) Oral at bedtime  metoprolol succinate ER 50 milliGRAM(s) Oral daily  multivitamin 1 Tablet(s) Oral daily  nicotine - 21 mG/24Hr(s) Patch 1 patch Transdermal daily  nystatin Powder 1 Application(s) Topical two times a day  OLANZapine 10 milliGRAM(s) Oral at bedtime  OLANZapine 5 milliGRAM(s) Oral daily  oxyCODONE  ER Tablet 10 milliGRAM(s) Oral every 12 hours  polyethylene glycol 3350 17 Gram(s) Oral daily  pyridoxine 50 milliGRAM(s) Oral daily  senna 2 Tablet(s) Oral at bedtime  valproic  acid Syrup 750 milliGRAM(s) Oral every 12 hours    MEDICATIONS  (PRN):  acetaminophen   Tablet .. 650 milliGRAM(s) Oral every 6 hours PRN Temp greater or equal to 38C (100.4F), Mild Pain (1 - 3)  ALBUTerol    90 MICROgram(s) HFA Inhaler 2 Puff(s) Inhalation every 6 hours PRN Shortness of Breath and/or Wheezing  bisacodyl Suppository 10 milliGRAM(s) Rectal daily PRN Constipation  oxyCODONE    IR 5 milliGRAM(s) Oral every 4 hours PRN Severe Pain (7 - 10)    Vital Signs Last 24 Hrs  T(F): 99.2 (2019 16:09), Max: 99.2 (2019 16:09)  HR: 90 (2019 16:09) (71 - 103)  BP: 114/68 (2019 16:09) (108/66 - 118/69)  RR: 15 (2019 16:09) (15 - 16)  SpO2: 96% (2019 16:09) (96% - 100%)  I&O's Summary    15 Jul 2019 07:  -  2019 07:00  --------------------------------------------------------  IN: 2560 mL / OUT: 1304 mL / NET: 1256 mL    2019 07:01  -  2019 16:22  --------------------------------------------------------  IN: 0 mL / OUT: 1200 mL / NET: -1200 mL        PHYSICAL EXAM:  General: NAD, well dressed, well nourished  Head: NT/AC  ENT: MMM, no oropharyngeal erythema or exudates  Neck: Supple, No JVD  Lungs: Clear to auscultation bilaterally, no wheezing, rales, or rhonchi, no accessory muscle use  Cardio: RRR, normal S1/S2, No M/R/G  Abdomen: Soft, Nondistended, no tenderness to palpation; Bowel sounds present, no organomegaly  Extremities: No calf tenderness, no clubbing or  cyanosis  Vascular: no LE edema  Skin: warm and dry  MSK: cervical scar well healed, mild erythema, no fluctuance, no open wounds  Neuro: AAOx3, answers all questions appropriately, moves all extremities    LABS:                        15.3   7.23  )-----------( 253      ( 15 Jul 2019 07:25 )             46.9         141  |  104  |  22  ----------------------------<  115  3.9   |  30  |  0.75    Ca    9.5      2019 09:15      eGFR if African American: 121 mL/min/1.73M2 (19 @ 09:15)  eGFR if Non African American: 105 mL/min/1.73M2 (19 @ 09:15)                              Urinalysis Basic - ( 2019 09:04 )    Color: Yellow / Appearance: Slightly Turbid / S.005 / pH: x  Gluc: x / Ketone: Negative  / Bili: Negative / Urobili: Negative   Blood: x / Protein: 30 mg/dL / Nitrite: Negative   Leuk Esterase: Moderate / RBC: 11-25 /HPF / WBC >50 /HPF   Sq Epi: x / Non Sq Epi: Neg.-Few / Bacteria: Many /HPF          RADIOLOGY & ADDITIONAL TESTS:    Care Discussed with Consultants/Other Providers:

## 2019-07-16 NOTE — CONSULT NOTE ADULT - NSHPATTENDINGPLANDISCUSS_GEN_ALL_CORE
Dr. Piper and Case coordinator.
attending and nursing staff.
patient's mother and attending of record as well as other psychiatric consultant Dr. Louis.
with LISSETH NEVAREZ RN and SATYA

## 2019-07-16 NOTE — PROGRESS NOTE ADULT - PROBLEM SELECTOR PLAN 8
Dysuria + positive UA  - start ceftriaxone, can transition to Cefpodoxime x 7 days  - f/u urine cx Dysuria + positive UA  - can transition to Ceftin 250mg BID, total 7 day course  - f/u urine cx

## 2019-07-16 NOTE — CONSULT NOTE ADULT - SUBJECTIVE AND OBJECTIVE BOX
Psychiatry Consultation-Liaison Follow-up Note:  53yo Male   Encounter: 2 [facility]  Chart reviewed.  	  Case Discussed with: Nursing staff/Treatment Team/Attending of Record.     Chief Complaint: " I'm still depressed but I'm dealing with it."   Interval History:  Pt  seen and evaluated today, presents from [facility] with initial complaint of chest pain. Chest pain resolved but upon nearing d/c pt c/o depression and suicidal ideation.  Pt was interviewed by telepsychiatry that recommended patient be treated on the medical floor due to his inability to ambulate and need for anticoagulation while   non ambulatory. Pt since then has been on 1:1 evaluation, with little consistent change in mental status despite adjustments in Depakote and Prolixin.   Case discussed with C/L colleague who suggested Olanzapine, patient medication changed, he reports a good night's sleep. Today did stand and walk with PT , however his   knees buckle and he is deconditioned. Today allowed a shave as well. Was out of bed to chair when writer saw him. Still c/o depressive symptoms, denied auditory hallucinations today  and was able to tolerate television which he had not been able to do.     Symptom progression/resolution: Pt progress has been inconsistent, will have a good day, followed by resumption of symptoms, will trial pt off 1:1 tonight and monitor effects, will add  low dose of Zyprexa tomorrow am, of note, pt does have decreased platelets- will monitor, this may be common s/e of VPA. Would order level preferentially for Monday 7/15/19 ( as he is unlikely to be d/c over the we).     MEDICATIONS  (STANDING):  ascorbic acid 500 milliGRAM(s) Oral daily  benztropine 0.5 milliGRAM(s) Oral two times a day  desmopressin 0.2 milliGRAM(s) Oral at bedtime  docusate sodium 100 milliGRAM(s) Oral three times a day  enoxaparin Injectable 40 milliGRAM(s) SubCutaneous at bedtime  famotidine    Tablet 20 milliGRAM(s) Oral daily  levothyroxine 175 MICROGram(s) Oral daily  lidocaine   Patch 1 Patch Transdermal daily  melatonin 3 milliGRAM(s) Oral at bedtime  metoprolol succinate ER 50 milliGRAM(s) Oral daily  multivitamin 1 Tablet(s) Oral daily  nicotine - 21 mG/24Hr(s) Patch 1 patch Transdermal daily  nystatin Powder 1 Application(s) Topical two times a day  OLANZapine 10 milliGRAM(s) Oral at bedtime  oxyCODONE  ER Tablet 10 milliGRAM(s) Oral every 12 hours  pyridoxine 50 milliGRAM(s) Oral daily  senna 2 Tablet(s) Oral at bedtime  valproic  acid Syrup 750 milliGRAM(s) Oral every 12 hours    MEDICATIONS  (PRN):  acetaminophen   Tablet .. 650 milliGRAM(s) Oral every 6 hours PRN Temp greater or equal to 38C (100.4F), Mild Pain (1 - 3)  ALBUTerol    90 MICROgram(s) HFA Inhaler 2 Puff(s) Inhalation every 6 hours PRN Shortness of Breath and/or Wheezing  aluminum hydroxide/magnesium hydroxide/simethicone Suspension 30 milliLiter(s) Oral every 4 hours PRN Dyspepsia  bisacodyl Suppository 10 milliGRAM(s) Rectal daily PRN Constipation  diphenhydrAMINE 25 milliGRAM(s) Oral every 4 hours PRN Rash and/or Itching  ondansetron Injectable 4 milliGRAM(s) IV Push every 6 hours PRN Nausea and/or Vomiting  oxyCODONE    IR 5 milliGRAM(s) Oral every 4 hours PRN Severe Pain (7 - 10)      Mental Status Examination:  General Appearance: Adequately groomed and dressed, head held up.  Remarkable Features: Pt calm today, holding his head up.   Psychomotor State: deficits as per physiatry.   Abnormal movements and posture: no tremor or akathisia.   Social interaction and affect: Is interactive and not overtly paranoid today.   Cognition, perceptual abnormalities: + AH , but not today.   Consciousness: alert  Orientation: to person and circumstance.   Memory: Recent/Past/Remote: at baseline.   Attention: intact for short bursts.   Naming/Repetition/ Comprehension: intact  Insight/Judgment: Fair    Laboratory testing-                        13.9   5.85  )-----------( 294      ( 10 Jul 2019 06:45 )             42.4     07-10    136  |  100  |  19  ----------------------------<  92  3.9   |  28  |  0.69    Ca    9.4      10 Jul 2019 06:45      Vital Signs Last 24 Hrs:   T(C): 36.4 (11 Jul 2019 16:22), Max: 36.9 (10 Jul 2019 21:02)  T(F): 97.6 (11 Jul 2019 16:22), Max: 98.4 (10 Jul 2019 21:02)  HR: 60 (11 Jul 2019 16:22) (60 - 78)  BP: 100/57 (11 Jul 2019 16:22) (100/57 - 126/78)  BP(mean): --  RR: 14 (11 Jul 2019 16:22) (14 - 16)  SpO2: 99% (11 Jul 2019 16:22) (94% - 99%)    Psychiatric Diagnosis:  Schizoaffective Disorder- Bipolar Type  Recommendations:  Add Zyprexa 5 mg q am and continue 10 mg at hs.  Continue Depakote , repeat CBC with Diff with VPA level on 7/15/19.     Other Treatment considerations-  Level of supervision:  Enhanced Supervision:  Hospital course Follow-up:  (will follow with you)-
Psychiatry Consultation-Liaison Follow-up Note:  53yo Single Never  Male.   Encounter: 2 [facility]  Chart reviewed.   Met with:  Ubaldo Headley Case coordinator  Contacted: Outpatient care coordinator  Case Discussed with: Attending of Record.     Chief Complaint:  " I have no where to live."   Interval History:  Pt presented s/p Laminectomy and was initially seen by C/L Service due to history of Schizophrenia.  Pt diagnosis clarified and medication management as well.  He remained at the time on oral Prolixin with plan of use of Prolixin Decanoate. Pt made only fair progress in acute rehab and sent to [facility].  While there patient apparently acutely decompensated, pt Depakote level on admission was 11. Pt reported suicidal ideation to ED attending Dr. Ortega who   consulted Telepsychiatry. Hand off given to the undersigned by Dr. Natalya Lemus regarding his need for treatment, but his inability to ambulate and his need for Lovenox   limited the scope of transfer to acute inpatient psychiatry.      Symptom progression/resolution:  Pt has intermittently reported suicidal ideation at times due to depressed mood, at others due to command AH.   He is inconsistent in his response to medications- at some point tends to get better allow ADL care and come off 1:1.  Told writer today, he was voicing suicidal thoughts 'for attention", although that is questionably reliable due to hx of suicide   attempts in the remote past. Pt agreed today to get out of bed to chair, still requires maximal assist. Today told writer he had no place to live, but   could not tell writer how or why he came to this conclusion. Paranoid ideation and referential thoughts as per initial consult are chronic and   tend to be reality tested or not interfere with daily functioning. Mood symptoms impact his progress in PT.   Started Lithium yesterday, encouraged to stay hydrated, and he drank water when suggested.     MEDICATIONS  (STANDING):  aluminum hydroxide/magnesium hydroxide/simethicone Suspension 30 milliLiter(s) Oral every 12 hours  ascorbic acid 500 milliGRAM(s) Oral daily  benztropine 0.5 milliGRAM(s) Oral two times a day  cefTRIAXone   IVPB 1000 milliGRAM(s) IV Intermittent every 24 hours  desmopressin 0.2 milliGRAM(s) Oral at bedtime  docusate sodium 100 milliGRAM(s) Oral two times a day  enoxaparin Injectable 40 milliGRAM(s) SubCutaneous at bedtime  famotidine    Tablet 20 milliGRAM(s) Oral daily  levothyroxine 175 MICROGram(s) Oral daily  lidocaine   Patch 1 Patch Transdermal daily  lithium 300 milliGRAM(s) Oral every 12 hours  melatonin 3 milliGRAM(s) Oral at bedtime  metoprolol succinate ER 50 milliGRAM(s) Oral daily  multivitamin 1 Tablet(s) Oral daily  nicotine - 21 mG/24Hr(s) Patch 1 patch Transdermal daily  nystatin Powder 1 Application(s) Topical two times a day  OLANZapine 10 milliGRAM(s) Oral at bedtime  OLANZapine 5 milliGRAM(s) Oral daily  oxyCODONE  ER Tablet 10 milliGRAM(s) Oral every 12 hours  polyethylene glycol 3350 17 Gram(s) Oral daily  pyridoxine 50 milliGRAM(s) Oral daily  senna 2 Tablet(s) Oral at bedtime  valproic  acid Syrup 750 milliGRAM(s) Oral every 12 hours    MEDICATIONS  (PRN):  acetaminophen   Tablet .. 650 milliGRAM(s) Oral every 6 hours PRN Temp greater or equal to 38C (100.4F), Mild Pain (1 - 3)  ALBUTerol    90 MICROgram(s) HFA Inhaler 2 Puff(s) Inhalation every 6 hours PRN Shortness of Breath and/or Wheezing  bisacodyl Suppository 10 milliGRAM(s) Rectal daily PRN Constipation      Mental Status Examination:  General Appearance: Heavy set White Male who looks his stated age, is unshaven. Dressed in hospital gown.   Remarkable Features: as noted in previous documentation.   Psychomotor State: + psychomotor retardation.   Abnormal movements and posture: pt grimacing to self, oral movements.   Social interaction and affect: Speech normal and eye contact improved.   Cognition, perceptual abnormalities: Paranoid ideation. denied suicidal ideation intent or plan, has not been aggressive or inappropriate during the hospital stays.   Consciousness: alert  Orientation: X3  Memory: Recent/Past/Remote: poor historian, recent memory appears intact.   Attention: impaired   Naming/Repetition /Comprehension: intact     Laboratory testing-                        15.3   7.23  )-----------( 253      ( 15 Jul 2019 07:25 )             46.9     07-16    141  |  104  |  22  ----------------------------<  115<H>  3.9   |  30  |  0.75    Ca    9.5      2019 09:15      Urinalysis Basic - ( 2019 09:04 )    Color: Yellow / Appearance: Slightly Turbid / S.005 / pH: x  Gluc: x / Ketone: Negative  / Bili: Negative / Urobili: Negative   Blood: x / Protein: 30 mg/dL / Nitrite: Negative   Leuk Esterase: Moderate / RBC: 11-25 /HPF / WBC >50 /HPF   Sq Epi: x / Non Sq Epi: Neg.-Few / Bacteria: Many /HPF    Vital Signs Last 24 Hrs:  T(C): 37.3 (2019 16:09), Max: 37.3 (15 Jul 2019 20:57)  T(F): 99.2 (2019 16:09), Max: 99.2 (2019 16:09)  HR: 90 (2019 16:09) (71 - 103)  BP: 114/68 (2019 16:09) (108/66 - 118/69)  BP(mean): --  RR: 15 (2019 16:09) (15 - 16)  SpO2: 96% (2019 16:09) (96% - 100%)    Psychiatric Diagnosis:  Schizoaffective d/o Bipolar type.     Recommendations:  Continue Depakote and Zyprexa, monitor response to Lithium, get level in 5 days.     Medication:  VPA level therapeutic at 52 would not increase from 750 mg bid.     Other Treatment considerations-  Level of supervision:  Enhanced Supervision: trial off 1:1 observation.    Guidance for patient management-encourage other coping skills for attention rather than voicing he is suicidal.  Pt aware that optimally placement in an BR[facility] is most appropriate level of care to return to walking.     Referrals- When writer on vacation ( doubtful pt will be placed by 19 ) please contact covering physician   Dr. Queenie Louis.     Hospital course Follow-up:  (will follow)-  Message left for Rola - Care coordinator at [facility]. [phone].
Psychiatry Consultation-Liaison Follow-up Note:  53yo Male   Encounter: At the bedside  Chart reviewed, including telepsychiatry consult which has list of medications tried. 	  Contacted: Family for collateral/other treatment providers in community  Case Discussed with: Nursing staff/Treatment Team/Attending of Record.     Chief Complaint: " I'm really sick."   Interval History:  As per staff, patient had a great day yesterday and was out of bed to chair. Today , pt not maintaining those gains and was lying in bed, unshaven and   refusing shower. Pt reports that he is "sick", thinks he has cancer due to abdominal pain. He has been constipated, states he went " a little".  Pt also paranoid with the delusion that something bad is going to happen, told the attending of record today, that he is going to die. He was placed back on 1:1 for safety.     Symptom progression/resolution:  Pt mood state remains inconsistent- in that he has some relief of symptoms only to become internally preoccupied. He states the voices are telling him to harm himself  he denied wanting to do so, but also cannot tell what would keep him safe or what keeps him from self harm.   Writer suggested his family, and he agreed. Again has no specific plan. Appetite poor today, denied Thought broadcasting, feelings of control, may feel he is being punished for "bad thoughts",   but would not reveal these to writer and stated he was embarrassed by them. Rest of psychiatric ROS is as per notes documenting depressed mood, withdrawn affect.     MEDICATIONS  (STANDING):  aluminum hydroxide/magnesium hydroxide/simethicone Suspension 30 milliLiter(s) Oral every 12 hours  ascorbic acid 500 milliGRAM(s) Oral daily  benztropine 0.5 milliGRAM(s) Oral two times a day  desmopressin 0.2 milliGRAM(s) Oral at bedtime  docusate sodium 100 milliGRAM(s) Oral two times a day  enoxaparin Injectable 40 milliGRAM(s) SubCutaneous at bedtime  famotidine    Tablet 20 milliGRAM(s) Oral daily  levothyroxine 175 MICROGram(s) Oral daily  lidocaine   Patch 1 Patch Transdermal daily  melatonin 3 milliGRAM(s) Oral at bedtime  metoprolol succinate ER 50 milliGRAM(s) Oral daily  multivitamin 1 Tablet(s) Oral daily  nicotine - 21 mG/24Hr(s) Patch 1 patch Transdermal daily  nystatin Powder 1 Application(s) Topical two times a day  OLANZapine 10 milliGRAM(s) Oral at bedtime  OLANZapine 5 milliGRAM(s) Oral daily  oxyCODONE  ER Tablet 15 milliGRAM(s) Oral every 12 hours  polyethylene glycol 3350 17 Gram(s) Oral daily  pyridoxine 50 milliGRAM(s) Oral daily  senna 2 Tablet(s) Oral at bedtime  valproic  acid Syrup 750 milliGRAM(s) Oral every 12 hours    MEDICATIONS  (PRN):  acetaminophen   Tablet .. 650 milliGRAM(s) Oral every 6 hours PRN Temp greater or equal to 38C (100.4F), Mild Pain (1 - 3)  ALBUTerol    90 MICROgram(s) HFA Inhaler 2 Puff(s) Inhalation every 6 hours PRN Shortness of Breath and/or Wheezing  bisacodyl Suppository 10 milliGRAM(s) Rectal daily PRN Constipation  oxyCODONE    IR 5 milliGRAM(s) Oral every 4 hours PRN Severe Pain (7 - 10)      Mental Status Examination:  General Appearance: Heavy set White Male who looks his stated age, is unshaven. Dressed in hospital gown.   Remarkable Features: as noted in previous documentation.   Psychomotor State: + psychomotor retardation, did not get out of bed today.   Abnormal movements and posture: pt grimacing to self, oral movements.   Social interaction and affect: Not social today with poverty of speech and thought, reports +AH, and appears internally preoccupied, back to whispering his comments to writer.  Cognition, perceptual abnormalities: Paranoid ideation. delusions related to "bad thoughts", somatic preoccupation.   Consciousness: alert  Orientation: X3  Memory: Recent/Past/Remote: poor historian, recent memory appears intact.   Attention: impaired   Naming/Repetition /Comprehension: intact   Insight/Judgment: Fair, concern is for secondary gain in that hospital is much more comfortable than either [facility] or [facility].  However parents report sx are typical of patient when depressed and pre-date transfer to acute medical floor.       Vital Signs Last 24 Hrs:  T(C): 36.6 (14 Jul 2019 16:09), Max: 37 (13 Jul 2019 21:09)  T(F): 97.9 (14 Jul 2019 16:09), Max: 98.6 (13 Jul 2019 21:09)  HR: 83 (14 Jul 2019 16:09) (61 - 83)  BP: 128/71 (14 Jul 2019 16:09) (118/68 - 128/71)  BP(mean): --  RR: 15 (14 Jul 2019 16:09) (15 - 15)  SpO2: 100% (14 Jul 2019 16:09) (98% - 100%)    Psychiatric Diagnosis:  Schizoaffective disorder Bipolar type, most recent episode depressed, chronic with acute exacerbation.   Recommendations:  Pt mental status is impairing rehab goals and disposition.  Will resume Lithium and monitor effects- given it's effect in the literature regarding mitigating against suicidal thoughts.     Medication: Continue Depakote and Zyprexa.  Changes/blood levels if applicable: VPA level in the am.     Other Treatment considerations-  Level of supervision:  1:1      Hospital course Follow-up:  (will follow )-
Psychiatry Consultation-Liaison Follow-up Note:  53yo Male  Encounter: 2 [facility]  Chart reviewed. 	  Case Discussed with: Nursing staff/Treatment Team/Attending of Record.     Chief Complaint: " I will try to get up today."   Interval History:  52 yo M currently residing at [facility] s/p recent discharge from  acute rehab secondary to C3-C6 laminectomy and fusion with PMHx  of bipolar d/o, HTN, nocturnal enuresis,, hypothyroid, schizophrenia, BPH, IVDA presented to [facility] ED c/o chest pain 4/10.  In ED, chest pain resolved, trop negative, EKG nonischemic. Chest pain was deemed noncardiac in nature and discharge from ED started. Upon discharge. Dr Ortega spoke with patient father on phone who reports that patient is feeling depressed.  At that time patient expressed suicidal ideation. Reports he "wants to end it". Does not have a current plan.  Tele psych who recommended admission with psych CL service.  In ED, UTOX negative. ETOH level neg. Placed on 1:1 and admitted to hospitalist for further management. (03 Jul 2019 18:42)    Symptom progression/resolution:  Pt has remained consistently inconsistent. Yesterday acutely paranoid and depressed. Was put back on 1:1 over the weekend. Reports he slept well last night, and would attempt to ambulate today. Per sitters and nursing staff, he remains consistently sad. Pt reports + AH, this am, at times they are command to hurt himself, not that he has intent or plan. Pt agreed to a Lithium trial   as per parent did well on this, unclear why it was discontinued. Would monitor Bun Creatinine. Pt on Zyprexa, will max dosage out to 20 mg if no response. Should there still be no response will touch base with service line regarding need for acute inpatient psychiatric care as pt would have then failed trials on the medical floor, will again bring up with patient the possibility of ECT as  a treatment option. Pt non ambulatory status to some degree is worsened by his depression in that he lacks motivation and refuses at times (politely) to get out of bed even to chair.     MEDICATIONS  (STANDING):  aluminum hydroxide/magnesium hydroxide/simethicone Suspension 30 milliLiter(s) Oral every 12 hours  ascorbic acid 500 milliGRAM(s) Oral daily  benztropine 0.5 milliGRAM(s) Oral two times a day  desmopressin 0.2 milliGRAM(s) Oral at bedtime  docusate sodium 100 milliGRAM(s) Oral two times a day  enoxaparin Injectable 40 milliGRAM(s) SubCutaneous at bedtime  famotidine    Tablet 20 milliGRAM(s) Oral daily  levothyroxine 175 MICROGram(s) Oral daily  lidocaine   Patch 1 Patch Transdermal daily  lithium 300 milliGRAM(s) Oral every 12 hours  melatonin 3 milliGRAM(s) Oral at bedtime  metoprolol succinate ER 50 milliGRAM(s) Oral daily  multivitamin 1 Tablet(s) Oral daily  nicotine - 21 mG/24Hr(s) Patch 1 patch Transdermal daily  nystatin Powder 1 Application(s) Topical two times a day  OLANZapine 10 milliGRAM(s) Oral at bedtime  OLANZapine 5 milliGRAM(s) Oral daily  oxyCODONE  ER Tablet 15 milliGRAM(s) Oral every 12 hours  polyethylene glycol 3350 17 Gram(s) Oral daily  pyridoxine 50 milliGRAM(s) Oral daily  senna 2 Tablet(s) Oral at bedtime  valproic  acid Syrup 750 milliGRAM(s) Oral every 12 hours    MEDICATIONS  (PRN):  acetaminophen   Tablet .. 650 milliGRAM(s) Oral every 6 hours PRN Temp greater or equal to 38C (100.4F), Mild Pain (1 - 3)  ALBUTerol    90 MICROgram(s) HFA Inhaler 2 Puff(s) Inhalation every 6 hours PRN Shortness of Breath and/or Wheezing  bisacodyl Suppository 10 milliGRAM(s) Rectal daily PRN Constipation  oxyCODONE    IR 5 milliGRAM(s) Oral every 4 hours PRN Severe Pain (7 - 10)    Mental Status Examination:  General Appearance: Heavy set White Male who looks his stated age, is unshaven. Dressed in hospital gown.   Remarkable Features: as noted in previous documentation.   Psychomotor State: + psychomotor retardation, did not get out of bed today.   Abnormal movements and posture: pt grimacing to self, oral movements.   Social interaction and affect: Not social today with poverty of speech and thought, reports +AH, and appears internally preoccupied, back to whispering his comments to writer.  Cognition, perceptual abnormalities: Paranoid ideation. delusions related to "bad thoughts".   Consciousness: alert  Orientation: X3  Memory: Recent/Past/Remote: poor historian, recent memory appears intact.   Attention: impaired   Naming/Repetition /Comprehension: intact   Insight/Judgment: Fair.       Laboratory testing-                        15.3   7.23  )-----------( 253      ( 15 Jul 2019 07:25 )             46.9     07-15    144  |  106  |  29<H>  ----------------------------<  80  4.2   |  32<H>  |  1.05    Ca    9.8      15 Jul 2019 07:25      Vital Signs Last 24 Hrs:   T(C): 36.4 (15 Jul 2019 15:17), Max: 36.6 (14 Jul 2019 20:19)  T(F): 97.6 (15 Jul 2019 15:17), Max: 97.8 (14 Jul 2019 20:19)  HR: 73 (15 Jul 2019 15:17) (57 - 92)  BP: 109/67 (15 Jul 2019 15:17) (109/67 - 117/71)  BP(mean): --  RR: 16 (15 Jul 2019 15:17) (15 - 16)  SpO2: 96% (15 Jul 2019 15:17) (93% - 98%)    Psychiatric Diagnosis:  Schizoaffective disorder Bipolar type.     Recommendations:  Bun increased, ( pre- dating [place]- ) pt in May had BUN /Creatinine of 16/0.62, encourage hydration.     Medication: Continue Zyprexa and Depakote. VPA level therapeutic at 52 (which is highest it has been since previous admission).   Other Treatment considerations-  Level of supervision:  1:1    Guidance for patient management-Encourage ambulation, self care.     Referrals- sub acute should mood symptoms resolve, if unresolved will escalate to service line regarding clinical advice   acute psych placement although pt does not ambulate.     Hospital course Follow-up:  (will continue to follow with you)-
Psychiatry Consultation-Liaison Follow-up Note:  53yo Single Male  Encounter: 2 [facility]  Chart reviewed. 	  Contacted: Family for collateral/other treatment providers in community  Case Discussed with: Nursing staff/Treatment Team/Attending of Record.     Chief Complaint: " I'm not so good today."     Interval History:  Pt seen and evaluated today, presents from [facility] with initial complaint of chest pain. Chest pain resolved but upon nearing d/c pt c/o depression and suicidal ideation.  Pt was interviewed by telepsychiatry that recommended patient be treated on the medical floor due to his inability to ambulate and need for anticoagulation while   non ambulatory. Pt since then has been on 1:1 evaluation, with little consistent change in mental status despite adjustments in Depakote and Prolixin.   Case discussed with C/L colleague who suggested Olanzapine, the trial was deferred as patient seemed to be improving but today again is c/o Auditory hallucinations   and return of depression. Was not out of bed to chair today.   Symptom progression/resolution:  While patient denied overtly wanting to harm himself and has the sense he can resist AH that are command in nature, has not in the past with at least two attempts   of self harm (overdose and attempt to jump from a height). Pt is medication adherent, refused PT today, they promised to return tomorrow to assess his gait. Pt has had repeat   MRI of C-spine which is noted below.     MEDICATIONS  (STANDING):  ascorbic acid 500 milliGRAM(s) Oral daily  benztropine 1 milliGRAM(s) Oral two times a day  desmopressin 0.2 milliGRAM(s) Oral at bedtime  docusate sodium 100 milliGRAM(s) Oral three times a day  enoxaparin Injectable 40 milliGRAM(s) SubCutaneous at bedtime  levothyroxine 175 MICROGram(s) Oral daily  lidocaine   Patch 1 Patch Transdermal daily  melatonin 3 milliGRAM(s) Oral at bedtime  metoprolol succinate ER 50 milliGRAM(s) Oral daily  multivitamin 1 Tablet(s) Oral daily  nicotine - 21 mG/24Hr(s) Patch 1 patch Transdermal daily  nystatin Powder 1 Application(s) Topical two times a day  OLANZapine 10 milliGRAM(s) Oral at bedtime  oxyCODONE  ER Tablet 10 milliGRAM(s) Oral every 12 hours  pantoprazole    Tablet 40 milliGRAM(s) Oral before breakfast  pyridoxine 50 milliGRAM(s) Oral daily  senna 2 Tablet(s) Oral at bedtime  valproic  acid Syrup 750 milliGRAM(s) Oral every 12 hours    MEDICATIONS  (PRN):  acetaminophen   Tablet .. 650 milliGRAM(s) Oral every 6 hours PRN Temp greater or equal to 38C (100.4F), Mild Pain (1 - 3)  ALBUTerol    90 MICROgram(s) HFA Inhaler 2 Puff(s) Inhalation every 6 hours PRN Shortness of Breath and/or Wheezing  aluminum hydroxide/magnesium hydroxide/simethicone Suspension 30 milliLiter(s) Oral every 4 hours PRN Dyspepsia  diphenhydrAMINE 25 milliGRAM(s) Oral every 4 hours PRN Rash and/or Itching  ondansetron Injectable 4 milliGRAM(s) IV Push every 6 hours PRN Nausea and/or Vomiting  oxyCODONE    IR 5 milliGRAM(s) Oral every 4 hours PRN Severe Pain (7 - 10)      Mental Status Examination:  General Appearance: Heavy set White Male who looks his stated age, is unshaven. Dressed in hospital gown.   Remarkable Features: as noted in previous documentation.   Psychomotor State: + psychomotor retardation, did not get out of bed today.   Abnormal movements and posture: as per physiatry notes.   Social interaction and affect: Not social today with poverty of speech and thought, reports +AH, and appears internally preoccupied, back to whispering his comments to writer.  Cognition, perceptual abnormalities: Paranoid ideation. Denied IOR, when whispering feels he will be overheard by 'people in the hallway."  Consciousness: alert  Orientation: X3  Memory: Recent/Past/Remote: poor historian, recent memory appears intact.   Attention: impaired today, did not tolerate lengthy evaluation.   Naming/Repetition /Comprehension: intact   Insight/Judgment: Fair, concern is for secondary gain in that hospital is much more comfortable than either [facility] or [facility].  However parents report sx are typical of patient when depressed and pre-date transfer to acute medical floor.     Laboratory testing-                        13.9   5.85  )-----------( 294      ( 10 Jul 2019 06:45 )             42.4     07-10    136  |  100  |  19  ----------------------------<  92  3.9   |  28  |  0.69    Ca    9.4      10 Jul 2019 06:45    Imaging-  < from: MR Cervical Spine w/wo IV Cont (07.10.19 @ 14:26) >    EXAM:  MR SPINE CERVICAL WAW IC      PROCEDURE DATE:  07/10/2019        INTERPRETATION:  CLINICAL INDICATION: Neck pain. Status post C-spine   laminectomy and fusion.    TECHNIQUE: Multiplanar multisequence MRI of the cervical spine was   performedbefore and after the intravenous administration of 10 ml of   Gadavist, according to standard protocol.    COMPARISON: MRI cervical spine 5/15/2019. CT cervical spine 5/20/2019.    FINDINGS:    The patient is again noted to be status post posterior decompression and   cervical fusion extending from C3 to C6 level, with bilateral paraspinal   rods and pedicle screws in place.    There has been interval resolution of the dorsal paraspinal/epidural   fluid collection extending across the surgical bed level C3-C6 on prior   MRI cervical spine 5/19/2019, compatible with interval resolution of   postoperative seroma/hematoma. There is residual subcutaneous dorsal   paraspinal edema extending from the C2-C7 level, with interval resolution   of the previously seen subcutaneous fluid collection, favored to   represent resolved postoperative seroma/hematoma. No new fluid   collections identified.    There is focal T2/STIR hyperintense signal abnormality within the   cervical cord at the C4-C5 level(series 4, image 8), which may represent   persistent cord edema and/or developing myelomalacia.    There is persistent bone marrow edema involving the C5 and to a lesser   degree C4 vertebral body, similar compared to prior imaging.    ALIGNMENT: There is reversal of the expected cervical lordosis with a   mild focal kyphotic curvature to the cervical spine centered at the C5   level.    VERTEBRAE: The vertebral bodies are normal in height. There is no acute   fracture or definite MRI evidence for aggressive osseous lesion.    DISCS: There is multilevel degenerative disc desiccation of the nucleus   pulposus and lower of intervertebral disc space height.    ENHANCEMENT: There is no epidural mass or abnormal intrathecal   enhancement.    PARAVERTEBRAL SOFT TISSUES: Postoperative changes, as outlined in detail   above.    EVALUATION OF INDIVIDUAL LEVELS:   C2-3: No disc herniation, spinal canal or neuroforaminal stenosis.    C3-4: Status post posterior decompression. Degenerative changes of the   right facet and uncovertebral joints resulting in severe neural foraminal   narrowing. No neural foraminal narrowing on the left. No canal stenosis.    C4-5: Status post posterior decompression. Degenerative changes of the   facet and uncovertebral joints resulting in mild to moderate right   neuroforaminal narrowing. No canal stenosis or neural foraminal narrowing   on the left.    C5-6: Status post posterior decompression. Disc bulge and degenerative   changes of the bilateral facet joints resulting in moderate to severe   left and moderate right neuroforaminal narrowing. No canal stenosis.    C6-7: Disc bulge and degenerative changes of the bilateral facet and   uncovertebral joints resulting in mild neuroforaminal narrowing   bilaterally. No canal stenosis.    C7-T1: Disc bulge and degenerative changes of the bilateral facet joints   resulting in mild left, moderate right neural foraminal narrowing. No   canal stenosis.    IMPRESSION:     Status post posterior decompression andcervical fusion extending from C3   to C6 level.  Focal T2/STIR hyperintense signal abnormality within the cervical cord at   the C4-C5 level , which may represent persistent cord edema and/or   developing myelomalacia.    Interval resolution of the dorsal paraspinal/epidural fluid collection at   C3-C6, compatible with interval resolution of postoperative   seroma/hematoma. Residual subcutaneous dorsal paraspinal edema extending   from the C2-C7 level, with resolution of previously seen subcutaneous   fluid collection, favored to represent resolved postoperative   seroma/hematoma. No new fluid collections.    Persistent bone marrow edema involving the C5 and to a lesser degree C4   vertebral body, nonspecific finding. Continued follow-up is advised.    ARANZA NARANJO M.D., ATTENDING RADIOLOGIST  This document has been electronically signed. Jul 10 2019  3:10PM      < end of copied text >    Vital Signs Last 24 Hrs:  T(C): 36.3 (10 Jul 2019 16:14), Max: 36.9 (09 Jul 2019 22:15)  T(F): 97.4 (10 Jul 2019 16:14), Max: 98.4 (09 Jul 2019 22:15)  HR: 62 (10 Jul 2019 16:14) (62 - 68)  BP: 109/63 (10 Jul 2019 16:14) (106/71 - 117/70)  BP(mean): --  RR: 16 (10 Jul 2019 16:14) (16 - 16)  SpO2: 100% (10 Jul 2019 16:14) (98% - 100%)    Psychiatric Diagnosis: Schizoaffective disorder - bipolar type    Recommendations:  Stop Prolixin, start Olanzapine will monitor response, pt may not need Cogentin  given c/o constipation, consider tapering. ( Decrease dose to 0.5 mg bid).  Continue VPA. If benzo needed for acute anxiety recommend preferentially Klonopin.    Other Treatment considerations-  Level of supervision:  1:1  Guidance for team/ family management-Family resides in [place] consider team meeting involving undersigned and case coordinator   as with longer length of stay- acclimation to medical floor and having needs met immediately may reinforce regressed behavior.     Guidance for patient management- Pt encouraged to walk with PT and to get out of bed to chair.  Will change medications and monitor response, may need to escalate issues to service line regarding optimization of   treatment.     Hospital course Follow-up:  (will follow)-
Psychiatry Consultation-Liaison Follow-up Note:  53yo Single Male.   Encounter: [facility]  Chart reviewed.   Contacted: Family for collateral/other treatment providers in community  Case Discussed with: Nursing staff/Treatment Team/Attending of Record.     Chief Complaint: " Today is a good day, I had a shower."     Interval History:  HPI:  54 yo M currently residing at [facility] s/p recent discharge from  acute rehab secondary to C3-C6 laminectomy and fusion with PMHx  of bipolar d/o, HTN, nocturnal enuresis,, hypothyroid, schizophrenia, BPH, IVDA presented to [facility] ED c/o chest pain 4/10.  In ED, chest pain resolved, trop negative, EKG nonischemic. Chest pain was deemed noncardiac in nature and discharge from ED started. Upon discharge. Dr Ortega spoke with patient father on phone who reports that patient is feeling depressed.  At that time patient expressed suicidal ideation. Reports he "wants to end it". Does not have a current plan.  Tele psych who recommended admission with psych CL service.  In ED, UTOX negative. ETOH level neg. Placed on 1:1 and admitted to hospitalist for further management. (03 Jul 2019 18:42)    Symptom progression/resolution:  Pt reports today he is less scared, and that he had a shower, sleep and appetite are improved. He is having difficulty keeping his head up , when he is in a   sitting position. Does not have significant EPS or tremor. Pt has been on 1:1 due to suicidal thought, paranoid ideation and severe depression.  Today smiling, he tends to have a very different baseline once euthymic than he has been presenting with. Pt may not have been fully adherent with medications or   blood level dropped with medications given his low Depakote level on admission ( 11) when d/c level was 40.  Prolixin oral increased to 10 mg bid and Depakote increased to 750  mg bid.  Writer had reviewed what medication history was available with his mother.     MEDICATIONS  (STANDING):  ascorbic acid 500 milliGRAM(s) Oral daily  benztropine 1 milliGRAM(s) Oral two times a day  bisacodyl Suppository 10 milliGRAM(s) Rectal once  desmopressin 0.2 milliGRAM(s) Oral at bedtime  docusate sodium 100 milliGRAM(s) Oral three times a day  enoxaparin Injectable 40 milliGRAM(s) SubCutaneous at bedtime  fluPHENAZine 10 milliGRAM(s) Oral two times a day  levothyroxine 175 MICROGram(s) Oral daily  lidocaine   Patch 1 Patch Transdermal daily  melatonin 3 milliGRAM(s) Oral at bedtime  metoprolol succinate ER 50 milliGRAM(s) Oral daily  multivitamin 1 Tablet(s) Oral daily  nicotine - 21 mG/24Hr(s) Patch 1 patch Transdermal daily  nystatin Powder 1 Application(s) Topical two times a day  oxyCODONE  ER Tablet 10 milliGRAM(s) Oral every 12 hours  pyridoxine 50 milliGRAM(s) Oral daily  senna 2 Tablet(s) Oral at bedtime  valproic  acid Syrup 750 milliGRAM(s) Oral every 12 hours    MEDICATIONS  (PRN):  acetaminophen   Tablet .. 650 milliGRAM(s) Oral every 6 hours PRN Temp greater or equal to 38C (100.4F), Mild Pain (1 - 3)  ALBUTerol    90 MICROgram(s) HFA Inhaler 2 Puff(s) Inhalation every 6 hours PRN Shortness of Breath and/or Wheezing  aluminum hydroxide/magnesium hydroxide/simethicone Suspension 30 milliLiter(s) Oral every 4 hours PRN Dyspepsia  diphenhydrAMINE 25 milliGRAM(s) Oral every 4 hours PRN Rash and/or Itching  oxyCODONE    IR 5 milliGRAM(s) Oral every 4 hours PRN Severe Pain (7 - 10)  polyethylene glycol 3350 17 Gram(s) Oral two times a day PRN Constipation      Mental Status Examination:  General Appearance: Pt is sitting at the edge of his bed, head down, laminectomy scar visible.   Remarkable Features: none today.   Psychomotor State: not agitated, calm.   Abnormal movements and posture: no EPS or tremor, but has generalized weakness.   Social interaction and affect: socially appropriate and goal directed.  Speech: not whispered, less suspicious and guarded.   Cognition, perceptual abnormalities: denied suicidal thoughts, still has auditory hallucinations, but state they are less and content not menacing.   Consciousness: Alert  Orientation: X3  Memory: Recent/Past/Remote: tends to be a poor historian.   Attention: intact  Naming/Repetition/Comprehension: intact  Insight/Judgment: Fair.     Studies: no new imaging.     Laboratory testing-no new labs.     Vital Signs Last 24 Hrs:  T(C): 36.3 (09 Jul 2019 05:30), Max: 36.5 (08 Jul 2019 22:25)  T(F): 97.3 (09 Jul 2019 05:30), Max: 97.7 (08 Jul 2019 22:25)  HR: 84 (09 Jul 2019 06:09) (65 - 84)  BP: 129/68 (09 Jul 2019 05:30) (129/68 - 133/72)  BP(mean): --  RR: 15 (09 Jul 2019 05:30) (14 - 15)  SpO2: 94% (09 Jul 2019 06:09) (94% - 98%)    Psychiatric Diagnosis: Schizoaffective disorder- Bipolar type    Recommendations:  Continue Prolixin 20 mg total dose and Depakote 750 mg bid, if pt remains in this mood state, will clear for transfer to   [facility] as need for acute inpatient psychiatry will be resolved, would preferentially want an [facility] where there is psychiatric consultant.  Pt is medication adherent.     Other Treatment considerations- Defer adding Lithium or Zyprexa for now given + change in mental status.   Level of supervision:  1:1 if no changes overnight, pt agreed to discuss decreasing level of observation to q 15.     Guidance for team/ family management-    Guidance for patient management- repeat VPA level on 7/12/19, or 7/15, if still here.     Referrals-     Hospital course Follow-up:   (will follow with you)-
Psychiatry Consultation-Liaison Follow-up Note:  54yo Single Never  Male  Encounter: [facility]  Chart reviewed.	  Contacted: Family for collateral/other treatment providers in community  Case Discussed with: Nursing staff/Treatment Team/Attending of Record.     Chief Complaint: " I'm not too good ma'am."     Interval History:  Pt well known to the undersigned from his previous acute rehab stay, s/p Laminectomy.   He is a 53 year old male with history of Schizoaffective d/o Bipolar type. Pt was transferred to [facility] after laminectomy. Pt reports while there symptoms worsened in terms of   depression, culminating in voicing suicidal ideation, although he has no intent or plan. Pt with remote attempt with overdose, and another attempt where he was going to jump  from a height and was "talked down" from a roof top. Pt reports he has auditory hallucinations, denied command hallucinations currently.    Symptom progression/resolution:  Pt reports feeling depressed, hopeless and helpless. Pt states he feels safe while here at [facility], did not feel safe at the [facility]. Pt reports the voices "talk to him", he could not tolerate having the   television on today. He was not able to be transferred to acute inpatient psychiatry due to lack of mobility, need for Lovenox, and continued PT/OT needs.   Pt reports he was out of bed to chair today. Sleep and appetite fair, energy level low, pt anhedonic. Rest of psych ROS as noted.     MEDICATIONS  (STANDING):  ascorbic acid 500 milliGRAM(s) Oral daily  benztropine 1 milliGRAM(s) Oral two times a day  desmopressin 0.2 milliGRAM(s) Oral at bedtime  docusate sodium 100 milliGRAM(s) Oral three times a day  enoxaparin Injectable 40 milliGRAM(s) SubCutaneous at bedtime  fluPHENAZine 10 milliGRAM(s) Oral two times a day  levothyroxine 175 MICROGram(s) Oral daily  lidocaine   Patch 1 Patch Transdermal daily  melatonin 3 milliGRAM(s) Oral at bedtime  metoprolol succinate ER 50 milliGRAM(s) Oral daily  multivitamin 1 Tablet(s) Oral daily  nicotine - 21 mG/24Hr(s) Patch 1 patch Transdermal daily  nystatin Powder 1 Application(s) Topical two times a day  polyethylene glycol 3350 17 Gram(s) Oral daily  pyridoxine 50 milliGRAM(s) Oral daily  senna 2 Tablet(s) Oral at bedtime  valproic  acid Syrup 750 milliGRAM(s) Oral <User Schedule>    MEDICATIONS  (PRN):  acetaminophen   Tablet .. 650 milliGRAM(s) Oral every 6 hours PRN Temp greater or equal to 38C (100.4F), Mild Pain (1 - 3)  ALBUTerol    90 MICROgram(s) HFA Inhaler 2 Puff(s) Inhalation every 6 hours PRN Shortness of Breath and/or Wheezing  aluminum hydroxide/magnesium hydroxide/simethicone Suspension 30 milliLiter(s) Oral every 4 hours PRN Dyspepsia  oxyCODONE    IR 5 milliGRAM(s) Oral every 4 hours PRN Moderate Pain (4 - 6)      Mental Status Examination:  General Appearance: Heavy set pale  male , appears his stated age, good eye contact.   Remarkable Features: Large blue eyes.   Psychomotor State: + psychomotor retardation.   Abnormal movements and posture: as per physiatry notes, does have some oral grimacing, probable TD.   Social interaction and affect: restricted affect, socially appropriate, but withdrawn compared to usual mental status.   Cognition, perceptual abnormalities: as noted + PI/IOR/persecutory thoughts about staff at [facility] ( of note had this regarding staff at [facility] as well).  Speech: Slow, soft.   Consciousness: Alert  Orientation: X3  Memory: Recent/Past/Remote: Fair  Attention: intact  Naming/Repetition /Comprehension: intact  Insight/Judgment: Limited, but did ask for help rather than act out.     Laboratory testing-  Comprehensive Metabolic Panel (07.03.19 @ 09:50)    Sodium, Serum: 136 mmol/L    Potassium, Serum: 4.3 mmol/L    Chloride, Serum: 102 mmol/L    Carbon Dioxide, Serum: 27 mmol/L    Anion Gap, Serum: 7 mmol/L    Blood Urea Nitrogen, Serum: 12 mg/dL    Creatinine, Serum: 0.65 mg/dL    Glucose, Serum: 99 mg/dL    Calcium, Total Serum: 9.7 mg/dL    Protein Total, Serum: 7.8 g/dL    Albumin, Serum: 3.7 g/dL    Bilirubin Total, Serum: 0.4 mg/dL    Alkaline Phosphatase, Serum: 116 U/L    Aspartate Aminotransferase (AST/SGOT): 22 U/L    Alanine Aminotransferase (ALT/SGPT): 33 U/L DA      eGFR if African American: 129 mL/min/1.73M2      Imaging- EKG normal, QTc normal.     Examinations- Depakote level when last here was 40 , now is 11.     Vital Signs Last 24 Hrs:  T(C): 36.3 (05 Jul 2019 15:15), Max: 36.5 (04 Jul 2019 22:25)  T(F): 97.3 (05 Jul 2019 15:15), Max: 97.7 (04 Jul 2019 22:25)  HR: 56 (05 Jul 2019 15:15) (54 - 60)  BP: 131/72 (05 Jul 2019 15:15) (110/71 - 131/72)  BP(mean): --  RR: 15 (05 Jul 2019 15:15) (15 - 16)  SpO2: 97% (05 Jul 2019 15:15) (96% - 97%)    Psychiatric Diagnosis: Schizoaffective Disorder Bipolar type, defer use of antidepressant as pt has clear history of adrienne.     Recommendations: Pt reports constipation, address with primary treatment team.   urine tox negative.     Medication:  Per Dr. Louis, oral Prolixin increased to 20 mg total dose, will monitor and if need be add Cogentin.     Other Treatment considerations-  Level of supervision:  1:1      Guidance for team/ family management- Pt family is involved, but reside in [place], pt has 2 brothers as well.     Guidance for patient management- Reassurance regarding safety, encouragement for PT/OT.     Referrals- if more mobile, pt can either return to [facility] or if still suicidal despite treatment acute inpatient psychiatric services.     Hospital course Follow-up  (will follow with you)
Psychiatry Consultation-Liaison Follow-up Note:  55yo Single, never  unemployed male.   Encounter: [facility]  Chart reviewed. 	  Contacted: Mother for collateral information.   Case Discussed with: Nursing staff/Treatment Team/Attending of Record.     Chief Complaint:  "I'm scared."   Interval History:  Pt seen and evaluated today, he was admitted previously to [facility] for acute rehab after cervical laminectomy. Was transferred to an [facility]. As per mother  pt emotionally declined over the past three weeks. He has given up hope that he will ever walk again. He is embarrassed regarding nocturia  (urinary incontinence)which is a chronic issue.  He had a room mate that kept TV on loudly, as well as another room mate that kept the room warm as per parent. Per parent when under stress Auditory hallucinations tend to return. He complained to her that the voices say "Do it", meaning end his life.   Psychiatric ROS:   Pt remains on 1:1 since transferred back to [facility]- he remains paranoid with suspicion, and is whispering to writer. He also has the sense of thought broadcasting and   he remains feeling hopeless, helpless and suicidal. He denied plan or intent, but has no idea what would make him feel better or what in the past has ameliorated the AH. He does not  want to return to [place], and also declined a trial of ECT. " I'm not having shock treatments". In the past has been on several medications, most of which the patient could not recall.  Symptom progression/resolution:   Initially had a positive response to increased dose of Prolixin, but is still acutely psychotic, pt also had low Depakote level and so VPA increased to 750 mg bid, after conversation with   hospitalist this am. Pt has been on 750 mg of Seroquel with ? response and also on injectable antipsychotics. Per staff pt needs assist from bed to chair, from bed to commode.   Pt can stand but has not made expected gains post surgery. Per sitter, pt stares into space, seems internally preoccupied, but can make his needs known verbally.     MEDICATIONS  (STANDING):  ascorbic acid 500 milliGRAM(s) Oral daily  benztropine 1 milliGRAM(s) Oral two times a day  desmopressin 0.2 milliGRAM(s) Oral at bedtime  docusate sodium 100 milliGRAM(s) Oral three times a day  enoxaparin Injectable 40 milliGRAM(s) SubCutaneous at bedtime  fluPHENAZine 10 milliGRAM(s) Oral two times a day  levothyroxine 175 MICROGram(s) Oral daily  lidocaine   Patch 1 Patch Transdermal daily  melatonin 3 milliGRAM(s) Oral at bedtime  metoprolol succinate ER 50 milliGRAM(s) Oral daily  multivitamin 1 Tablet(s) Oral daily  nicotine - 21 mG/24Hr(s) Patch 1 patch Transdermal daily  nystatin Powder 1 Application(s) Topical two times a day  oxyCODONE  ER Tablet 10 milliGRAM(s) Oral every 12 hours  pyridoxine 50 milliGRAM(s) Oral daily  senna 2 Tablet(s) Oral at bedtime    MEDICATIONS  (PRN):  acetaminophen   Tablet .. 650 milliGRAM(s) Oral every 6 hours PRN Temp greater or equal to 38C (100.4F), Mild Pain (1 - 3)  ALBUTerol    90 MICROgram(s) HFA Inhaler 2 Puff(s) Inhalation every 6 hours PRN Shortness of Breath and/or Wheezing  aluminum hydroxide/magnesium hydroxide/simethicone Suspension 30 milliLiter(s) Oral every 4 hours PRN Dyspepsia  diphenhydrAMINE 25 milliGRAM(s) Oral every 4 hours PRN Rash and/or Itching  oxyCODONE    IR 5 milliGRAM(s) Oral every 4 hours PRN Moderate Pain (4 - 6)  polyethylene glycol 3350 17 Gram(s) Oral two times a day PRN Constipation      Mental Status Examination:  General Appearance:  male in hospital gown ,lying in bed.   Remarkable Features: Large blue eyes.   Psychomotor State: + psychomotor retardation.   Abnormal movements and posture: hands appear weak, ? spastic.   Speech: normal rate, whispered + paucity.   Social interaction and affect:  intense gaze, appears mistrustful, affect blunted. Mood: Depressed.   Cognition, perceptual abnormalities: + AH, denied command hallucinations today, but is preoccupied, paranoid.   Consciousness: alert   Orientation: X3  Memory: Recent/Past/Remote: poor historian.   Attention: impaired  Naming/Repitition/Comprehension: intact  Insight/Judgment: Fair    Studies: EKG normal, LFT's are normal Bun/Creatinine normal.     Laboratory testing-  Comprehensive Metabolic Panel (07.03.19 @ 09:50)    Sodium, Serum: 136 mmol/L    Potassium, Serum: 4.3 mmol/L    Chloride, Serum: 102 mmol/L    Carbon Dioxide, Serum: 27 mmol/L    Anion Gap, Serum: 7 mmol/L    Blood Urea Nitrogen, Serum: 12 mg/dL    Creatinine, Serum: 0.65 mg/dL    Glucose, Serum: 99 mg/dL    Calcium, Total Serum: 9.7 mg/dL    Protein Total, Serum: 7.8 g/dL    Albumin, Serum: 3.7 g/dL    Bilirubin Total, Serum: 0.4 mg/dL    Alkaline Phosphatase, Serum: 116 U/L    Aspartate Aminotransferase (AST/SGOT): 22 U/L    Alanine Aminotransferase (ALT/SGPT): 33 U/L DA       Vital Signs Last 24 Hrs:  T(C): 36.3 (08 Jul 2019 05:46), Max: 36.4 (07 Jul 2019 21:16)  T(F): 97.3 (08 Jul 2019 05:46), Max: 97.6 (07 Jul 2019 21:16)  HR: 65 (08 Jul 2019 05:46) (65 - 71)  BP: 125/75 (08 Jul 2019 05:46) (125/75 - 134/73)  BP(mean): --  RR: 14 (08 Jul 2019 05:46) (14 - 15)  SpO2: 95% (08 Jul 2019 05:46) (95% - 95%)    Psychiatric Diagnosis:  Schizoaffective disorder- bipolar type     Recommendations:  Start Lithium, as per mother- pt was on this for first break, pt had bad reaction to Thorazine with elevated transaminases and was in [facility] for 6 mos.   Adverse reaction to Haldol, but has also been on Haldol dec or gotten IM Haldol without incident.   Per mother patient's father has rest of medication history- pt reports he has been on Clozaril, discussed with Dr. Louis changing Prolixin to   Zyprexa, will try a Lithium trial on account of the depression and suicidality and monitor renal functions.   Other Treatment considerations-  Level of supervision:  1:1 for safety.     Guidance for team/ family management-clinical update given.     Guidance for patient management-pt not ambulatory enough for acute inpatient psychiatry  has been seen by PT, and surgery reconsulted. Expect that BRIGID may be appropriate- this is not the patient's typical baseline.  At baseline, he is conversant, pleasant, engaged in his care.     Hospital course Follow-up:   (will follow)-
Source: Chart , MD, Patient  Reliability:yes    Identifying Data: 53yyo  with long h/o Schizoaffective disorder  Smoking: Smoking  Hypothyroid: Hypothyroid  Enuresis, nocturnal only: Enuresis, nocturnal only  Hypertension: Hypertension  Schizophrenia: Schizophrenia  Bipolar disorder: Bipolar disorder  Cervical disc disorder: Cervical disc disorder  Suicidal ideation: Suicidal ideation          Chief Complaint: I am afraid ,i am hearing voces telling me to kill myself    History of Present Illness:  HPI:  54 yo M currently residing at [facility] s/p recent discharge from  acute rehab secondary to C3-C6 laminectomy and fusion with PMHx  of bipolar d/o, HTN, nocturnal enuresis,, hypothyroid, schizophrenia, BPH, IVDA presented to [place] ED c/o chest pain 4/10.  In ED, chest pain resolved, trop negative, EKG nonischemic. Chest pain was deemed noncardiac in nature and discharge from ED started. Upon discharge. Dr Ortega spoke with patient father on phone who reports that patient is feeling depressed.  At that time patient expressed suicidal ideation. Reports he "wants to end it". Does not have a current plan.  Tele psych who recommended admission with psych CL service.  In ED, UTOX negative. ETOH level neg. Placed on 1:1 and admitted to hospitalist for further management. (03 Jul 2019 18:42)      Psychiatric History of Present Illness: life long h/o schizoaffective disorder , resident of [facility], has been on haldol dec. 100mg  and seroquel 750mg, now on prolixin, im and po            Psychiatric Review of Systems: patient remains preoccupied, paranoid , hearing voices telling to kill himself, patients depacote level is 11.        Suicidality: has h/o S attempt 25 y ago with OD  Injury to others:none  Mental Status Exam:  General Appearance :well groomed   Remarkable Features: seems preocupied  Affect: constricted  Mood: sad  Psychomotor state:   Abnormal Movements and posture: as per PT notes  Cognition, Perceptual Abnormalities  Consciousness: alert  Orientation: x3  Memory: Recent/Past/Remote:  poor  Attention: poor  Judgment/Insight: limited  Fund of Information/Intelligence:poor  List abnormalities:    Hallucinations: Auditory/, reports multiple voices, telling to kill himself,   Delusions: Persecutory/somatic, fearful to go back to rehab.                  Medications:  MEDICATIONS  (STANDING):  ascorbic acid 500 milliGRAM(s) Oral daily  benztropine 1 milliGRAM(s) Oral two times a day  desmopressin 0.2 milliGRAM(s) Oral at bedtime  docusate sodium 100 milliGRAM(s) Oral three times a day  enoxaparin Injectable 40 milliGRAM(s) SubCutaneous at bedtime  fluPHENAZine 10 milliGRAM(s) Oral two times a day  levothyroxine 175 MICROGram(s) Oral daily  melatonin 3 milliGRAM(s) Oral at bedtime  metoprolol succinate ER 50 milliGRAM(s) Oral daily  multivitamin 1 Tablet(s) Oral daily  nicotine - 21 mG/24Hr(s) Patch 1 patch Transdermal daily  nystatin Powder 1 Application(s) Topical two times a day  polyethylene glycol 3350 17 Gram(s) Oral daily  pyridoxine 50 milliGRAM(s) Oral daily  senna 2 Tablet(s) Oral at bedtime    MEDICATIONS  (PRN):  acetaminophen   Tablet .. 650 milliGRAM(s) Oral every 6 hours PRN Temp greater or equal to 38C (100.4F), Mild Pain (1 - 3)  ALBUTerol    90 MICROgram(s) HFA Inhaler 2 Puff(s) Inhalation every 6 hours PRN Shortness of Breath and/or Wheezing  aluminum hydroxide/magnesium hydroxide/simethicone Suspension 30 milliLiter(s) Oral every 4 hours PRN Dyspepsia  oxyCODONE    IR 5 milliGRAM(s) Oral every 4 hours PRN Moderate Pain (4 - 6)      Allergies    No Known Allergies    Intolerances    Haldol (Unknown)  Thorazine (Unknown)      Past Medical and Surgical History:  PAST MEDICAL & SURGICAL HISTORY:  Bipolar disorder  Drug abuse  Incontinence  Schizophrenia  Hypothyroid  Hypertension  No significant past surgical history      Past Psychiatric History: as above-  Hospitalizations- multiple.     Psychotropic medication trials (agent/adequacy/ effects)-haldol dec, seroquel, prolixin, Depakote , neurontin          Substance Use History: /cocaine/crack in the past              Social and Personal History: lives in [facility]  Upbringing/ Family of Origin- not known    Educational/ /Vocational background-     s-         Family History:  FAMILY HISTORY:  Family history of Crohn's disease: in Mother         T(C): 36.8 (07-04-19 @ 06:31), Max: 36.8 (07-04-19 @ 06:31)  HR: 69 (07-04-19 @ 06:31) (65 - 79)  BP: 122/70 (07-04-19 @ 06:31) (119/61 - 136/76)  RR: 16 (07-04-19 @ 06:31) (16 - 18)  SpO2: 98% (07-04-19 @ 06:31) (95% - 98%)  Wt(kg): --          Laboratory testing-                        14.4   7.91  )-----------( 394      ( 03 Jul 2019 09:50 )             42.9     07-03    136  |  102  |  12  ----------------------------<  99  4.3   |  27  |  0.65    Ca    9.7      03 Jul 2019 09:50  Mg     2.0     07-03    TPro  7.8  /  Alb  3.7  /  TBili  0.4  /  DBili  x   /  AST  22  /  ALT  33  /  AlkPhos  116  07-03    CAPILLARY BLOOD GLUCOSE        LIVER FUNCTIONS - ( 03 Jul 2019 09:50 )  Alb: 3.7 g/dL / Pro: 7.8 g/dL / ALK PHOS: 116 U/L / ALT: 33 U/L DA / AST: 22 U/L / GGT: x             PT/INR - ( 03 Jul 2019 09:50 )   PT: 13.6 sec;   INR: 1.21 ratio         PTT - ( 03 Jul 2019 09:50 )  PTT:36.8 sec    Imaging-        Psychiatric Diagnosis: Schizoaffective  disorder ,  bipolar type,     Recommendations: Restart Depakote , level low, increase prolixin, 10 mg BID     Hospital course Follow-up  (sign off, follow, re-consult as needed, call for clearance prior to discharge)- daily

## 2019-07-16 NOTE — CONSULT NOTE ADULT - REASON FOR ADMISSION
suicide ideation

## 2019-07-16 NOTE — CONSULT NOTE ADULT - CONSULT REQUESTED DATE/TIME
04-Jul-2019 10:54
05-Jul-2019 16:29
08-Jul-2019 19:07
09-Jul-2019 18:12
10-Jul-2019 16:57
14-Jul-2019 16:48
15-Jul-2019 18:45
11-Jul-2019 16:33
16-Jul-2019 19:11

## 2019-07-16 NOTE — PROGRESS NOTE ADULT - PROBLEM SELECTOR PLAN 1
No suicidal thoughts today, but pt is still depressed and lacks motivation for rehabilitation  - discontinue 1:1 observation  - continue Valproic acid 750mg BID, level therapeutic 52  - Prolixin discontinued 07/10  - continue Olanzapine 5mg qAM, 10mg qHS  - continue Cogentin 0.5mg BID per Psych  - continue Lithium 300mg q12hrs, check level in 2 weeks  - Psych following No suicidal thoughts today, but pt is still depressed and lacks motivation for rehabilitation  - off 1:1 observation  - continue Valproic acid 750mg BID, level therapeutic 52  - Prolixin discontinued 07/10  - continue Olanzapine 5mg qAM, 10mg qHS  - continue Cogentin 0.5mg BID per Psych  - continue Lithium 300mg q12hrs, check level in 5 days (07/22)  - Psych following

## 2019-07-16 NOTE — CONSULT NOTE ADULT - CONSULT REASON
Depression/ suicidal ideation
Management of Schizophrenia, suicidal ideation
Management of psychosis and suicidal ideation
Management of psychosis and suicidal thoughts
Management of psychosis and suicidal thoughts.
Management of suicidal ideation/psychosis
Paranoya , Suicidal ideations
Management of suicidal thoughts and psychosis
Management of psychosis, suicidal thoughts.

## 2019-07-17 LAB
CULTURE RESULTS: SIGNIFICANT CHANGE UP
SPECIMEN SOURCE: SIGNIFICANT CHANGE UP

## 2019-07-17 RX ORDER — CEFUROXIME AXETIL 250 MG
250 TABLET ORAL EVERY 12 HOURS
Refills: 0 | Status: DISCONTINUED | OUTPATIENT
Start: 2019-07-18 | End: 2019-07-18

## 2019-07-17 RX ADMIN — OXYCODONE HYDROCHLORIDE 10 MILLIGRAM(S): 5 TABLET ORAL at 06:03

## 2019-07-17 RX ADMIN — CEFTRIAXONE 100 MILLIGRAM(S): 500 INJECTION, POWDER, FOR SOLUTION INTRAMUSCULAR; INTRAVENOUS at 11:08

## 2019-07-17 RX ADMIN — OLANZAPINE 5 MILLIGRAM(S): 15 TABLET, FILM COATED ORAL at 08:04

## 2019-07-17 RX ADMIN — Medication 1 PATCH: at 19:42

## 2019-07-17 RX ADMIN — Medication 3 MILLIGRAM(S): at 21:00

## 2019-07-17 RX ADMIN — Medication 1 PATCH: at 07:02

## 2019-07-17 RX ADMIN — Medication 100 MILLIGRAM(S): at 06:03

## 2019-07-17 RX ADMIN — OXYCODONE HYDROCHLORIDE 10 MILLIGRAM(S): 5 TABLET ORAL at 17:22

## 2019-07-17 RX ADMIN — Medication 1 PATCH: at 11:46

## 2019-07-17 RX ADMIN — LIDOCAINE 1 PATCH: 4 CREAM TOPICAL at 19:52

## 2019-07-17 RX ADMIN — LIDOCAINE 1 PATCH: 4 CREAM TOPICAL at 00:00

## 2019-07-17 RX ADMIN — LITHIUM CARBONATE 300 MILLIGRAM(S): 300 TABLET, EXTENDED RELEASE ORAL at 06:03

## 2019-07-17 RX ADMIN — Medication 0.5 MILLIGRAM(S): at 06:03

## 2019-07-17 RX ADMIN — Medication 500 MILLIGRAM(S): at 11:45

## 2019-07-17 RX ADMIN — Medication 175 MICROGRAM(S): at 06:03

## 2019-07-17 RX ADMIN — DESMOPRESSIN ACETATE 0.2 MILLIGRAM(S): 0.1 TABLET ORAL at 21:00

## 2019-07-17 RX ADMIN — Medication 30 MILLILITER(S): at 06:03

## 2019-07-17 RX ADMIN — SENNA PLUS 2 TABLET(S): 8.6 TABLET ORAL at 21:00

## 2019-07-17 RX ADMIN — Medication 750 MILLIGRAM(S): at 21:00

## 2019-07-17 RX ADMIN — Medication 1 PATCH: at 11:48

## 2019-07-17 RX ADMIN — LIDOCAINE 1 PATCH: 4 CREAM TOPICAL at 22:54

## 2019-07-17 RX ADMIN — NYSTATIN CREAM 1 APPLICATION(S): 100000 CREAM TOPICAL at 06:03

## 2019-07-17 RX ADMIN — Medication 30 MILLILITER(S): at 17:22

## 2019-07-17 RX ADMIN — FAMOTIDINE 20 MILLIGRAM(S): 10 INJECTION INTRAVENOUS at 11:45

## 2019-07-17 RX ADMIN — Medication 0.5 MILLIGRAM(S): at 17:17

## 2019-07-17 RX ADMIN — Medication 750 MILLIGRAM(S): at 08:04

## 2019-07-17 RX ADMIN — Medication 1 TABLET(S): at 11:45

## 2019-07-17 RX ADMIN — OXYCODONE HYDROCHLORIDE 10 MILLIGRAM(S): 5 TABLET ORAL at 07:02

## 2019-07-17 RX ADMIN — LITHIUM CARBONATE 300 MILLIGRAM(S): 300 TABLET, EXTENDED RELEASE ORAL at 17:17

## 2019-07-17 RX ADMIN — NYSTATIN CREAM 1 APPLICATION(S): 100000 CREAM TOPICAL at 17:16

## 2019-07-17 RX ADMIN — OLANZAPINE 10 MILLIGRAM(S): 15 TABLET, FILM COATED ORAL at 21:00

## 2019-07-17 RX ADMIN — Medication 50 MILLIGRAM(S): at 06:03

## 2019-07-17 RX ADMIN — LIDOCAINE 1 PATCH: 4 CREAM TOPICAL at 11:46

## 2019-07-17 RX ADMIN — POLYETHYLENE GLYCOL 3350 17 GRAM(S): 17 POWDER, FOR SOLUTION ORAL at 11:46

## 2019-07-17 RX ADMIN — OXYCODONE HYDROCHLORIDE 10 MILLIGRAM(S): 5 TABLET ORAL at 17:16

## 2019-07-17 RX ADMIN — Medication 50 MILLIGRAM(S): at 11:45

## 2019-07-17 RX ADMIN — Medication 100 MILLIGRAM(S): at 17:17

## 2019-07-17 RX ADMIN — ENOXAPARIN SODIUM 40 MILLIGRAM(S): 100 INJECTION SUBCUTANEOUS at 21:01

## 2019-07-18 PROCEDURE — 99232 SBSQ HOSP IP/OBS MODERATE 35: CPT | Mod: GC

## 2019-07-18 RX ADMIN — OXYCODONE HYDROCHLORIDE 10 MILLIGRAM(S): 5 TABLET ORAL at 17:13

## 2019-07-18 RX ADMIN — Medication 0.5 MILLIGRAM(S): at 17:13

## 2019-07-18 RX ADMIN — DESMOPRESSIN ACETATE 0.2 MILLIGRAM(S): 0.1 TABLET ORAL at 21:28

## 2019-07-18 RX ADMIN — OLANZAPINE 5 MILLIGRAM(S): 15 TABLET, FILM COATED ORAL at 09:36

## 2019-07-18 RX ADMIN — Medication 175 MICROGRAM(S): at 05:19

## 2019-07-18 RX ADMIN — OXYCODONE HYDROCHLORIDE 10 MILLIGRAM(S): 5 TABLET ORAL at 05:19

## 2019-07-18 RX ADMIN — Medication 750 MILLIGRAM(S): at 21:28

## 2019-07-18 RX ADMIN — Medication 30 MILLILITER(S): at 05:18

## 2019-07-18 RX ADMIN — FAMOTIDINE 20 MILLIGRAM(S): 10 INJECTION INTRAVENOUS at 11:40

## 2019-07-18 RX ADMIN — ENOXAPARIN SODIUM 40 MILLIGRAM(S): 100 INJECTION SUBCUTANEOUS at 21:29

## 2019-07-18 RX ADMIN — LITHIUM CARBONATE 300 MILLIGRAM(S): 300 TABLET, EXTENDED RELEASE ORAL at 05:19

## 2019-07-18 RX ADMIN — Medication 100 MILLIGRAM(S): at 05:18

## 2019-07-18 RX ADMIN — SENNA PLUS 2 TABLET(S): 8.6 TABLET ORAL at 21:29

## 2019-07-18 RX ADMIN — NYSTATIN CREAM 1 APPLICATION(S): 100000 CREAM TOPICAL at 17:14

## 2019-07-18 RX ADMIN — Medication 1 PATCH: at 11:42

## 2019-07-18 RX ADMIN — Medication 1 TABLET(S): at 11:39

## 2019-07-18 RX ADMIN — Medication 100 MILLIGRAM(S): at 17:14

## 2019-07-18 RX ADMIN — Medication 50 MILLIGRAM(S): at 05:18

## 2019-07-18 RX ADMIN — Medication 50 MILLIGRAM(S): at 11:39

## 2019-07-18 RX ADMIN — OXYCODONE HYDROCHLORIDE 10 MILLIGRAM(S): 5 TABLET ORAL at 17:15

## 2019-07-18 RX ADMIN — Medication 1 PATCH: at 11:40

## 2019-07-18 RX ADMIN — Medication 30 MILLILITER(S): at 17:13

## 2019-07-18 RX ADMIN — LITHIUM CARBONATE 300 MILLIGRAM(S): 300 TABLET, EXTENDED RELEASE ORAL at 17:14

## 2019-07-18 RX ADMIN — Medication 1 PATCH: at 07:37

## 2019-07-18 RX ADMIN — NYSTATIN CREAM 1 APPLICATION(S): 100000 CREAM TOPICAL at 05:19

## 2019-07-18 RX ADMIN — OLANZAPINE 10 MILLIGRAM(S): 15 TABLET, FILM COATED ORAL at 21:28

## 2019-07-18 RX ADMIN — Medication 3 MILLIGRAM(S): at 21:28

## 2019-07-18 RX ADMIN — LIDOCAINE 1 PATCH: 4 CREAM TOPICAL at 11:41

## 2019-07-18 RX ADMIN — Medication 0.5 MILLIGRAM(S): at 05:19

## 2019-07-18 RX ADMIN — Medication 500 MILLIGRAM(S): at 11:39

## 2019-07-18 RX ADMIN — Medication 750 MILLIGRAM(S): at 07:42

## 2019-07-18 NOTE — PROGRESS NOTE ADULT - PROBLEM SELECTOR PLAN 2
- s/p C3-C6 laminectomy and fusion on 5/1/19. MRI shows interval resolution of the dorsal paraspinal/epidural fluid collection at C3-C6, compatible with interval resolution of postoperative seroma/hematoma. Residual subcutaneous dorsal paraspinal edema extending from the C2-C7 level, with resolution of previously seen subcutaneous fluid collection, favored to represent resolved postoperative seroma/hematoma. No new fluid collections. Per Dr. Villasenor,  pain may be normal to still have neck pain, pain will decrease gradually.   - continue PT  - continue Oxycontin to 10mg BID  - Lidocaine patch  - heat pack - s/p C3-C6 laminectomy and fusion on 5/1/19. MRI shows interval resolution of the dorsal paraspinal/epidural fluid collection at C3-C6, compatible with interval resolution of postoperative seroma/hematoma. Residual subcutaneous dorsal paraspinal edema extending from the C2-C7 level, with resolution of previously seen subcutaneous fluid collection, favored to represent resolved postoperative seroma/hematoma. No new fluid collections. Per Dr. Villasenor, may be normal to still have neck pain, pain will decrease gradually.   - continue PT  - continue Oxycontin to 10mg BID  - Lidocaine patch  - heat pack

## 2019-07-18 NOTE — PROGRESS NOTE ADULT - PROBLEM SELECTOR PLAN 1
No suicidal thoughts today, but pt is still depressed and lacks motivation for rehabilitation  - off 1:1 observation  - continue Valproic acid 750mg BID, level therapeutic 52  - Prolixin discontinued 07/10  - continue Olanzapine 5mg qAM, 10mg qHS  - continue Cogentin 0.5mg BID per Psych  - continue Lithium 300mg q12hrs, check level in 5 days (07/22)  - Psych following

## 2019-07-18 NOTE — PROGRESS NOTE ADULT - PROBLEM SELECTOR PLAN 4
- continue Valproic acid 750mg BID, level therapeutic 52  - Prolixin discontinued 07/10  - continue Olanzapine 5mg qAM, 10mg qHS  - continue Cogentin 0.5mg BID per Psych  - continue Lithium 300mg q12hrs, check level in 07/22  - Psych following

## 2019-07-18 NOTE — PROGRESS NOTE ADULT - SUBJECTIVE AND OBJECTIVE BOX
Patient is a 54y old  Male who presents with a chief complaint of suicide ideation (2019 19:10). No acute events overnight. Feels "down"today but denies any SI or HI      Patient seen and examined at bedside.    ALLERGIES:  Haldol (Unknown)  No Known Allergies  Thorazine (Unknown)    MEDICATIONS  (STANDING):  aluminum hydroxide/magnesium hydroxide/simethicone Suspension 30 milliLiter(s) Oral every 12 hours  ascorbic acid 500 milliGRAM(s) Oral daily  benztropine 0.5 milliGRAM(s) Oral two times a day  desmopressin 0.2 milliGRAM(s) Oral at bedtime  docusate sodium 100 milliGRAM(s) Oral two times a day  enoxaparin Injectable 40 milliGRAM(s) SubCutaneous at bedtime  famotidine    Tablet 20 milliGRAM(s) Oral daily  levothyroxine 175 MICROGram(s) Oral daily  lidocaine   Patch 1 Patch Transdermal daily  lithium 300 milliGRAM(s) Oral every 12 hours  melatonin 3 milliGRAM(s) Oral at bedtime  metoprolol succinate ER 50 milliGRAM(s) Oral daily  multivitamin 1 Tablet(s) Oral daily  nicotine - 21 mG/24Hr(s) Patch 1 patch Transdermal daily  nystatin Powder 1 Application(s) Topical two times a day  OLANZapine 10 milliGRAM(s) Oral at bedtime  OLANZapine 5 milliGRAM(s) Oral daily  oxyCODONE  ER Tablet 10 milliGRAM(s) Oral every 12 hours  polyethylene glycol 3350 17 Gram(s) Oral daily  pyridoxine 50 milliGRAM(s) Oral daily  senna 2 Tablet(s) Oral at bedtime  valproic  acid Syrup 750 milliGRAM(s) Oral every 12 hours    MEDICATIONS  (PRN):  acetaminophen   Tablet .. 650 milliGRAM(s) Oral every 6 hours PRN Temp greater or equal to 38C (100.4F), Mild Pain (1 - 3)  ALBUTerol    90 MICROgram(s) HFA Inhaler 2 Puff(s) Inhalation every 6 hours PRN Shortness of Breath and/or Wheezing  bisacodyl Suppository 10 milliGRAM(s) Rectal daily PRN Constipation    Vital Signs Last 24 Hrs  T(F): 97.5 (2019 05:12), Max: 98.7 (2019 20:57)  HR: 90 (2019 05:12) (68 - 90)  BP: 124/70 (2019 05:12) (114/69 - 124/70)  RR: 97 (2019 05:12) (15 - 97)  SpO2: 96% (2019 20:57) (96% - 98%)  I&O's Summary    2019 07:  -  2019 07:00  --------------------------------------------------------  IN: 1320 mL / OUT: 201 mL / NET: 1119 mL    2019 07:01  -  2019 11:26  --------------------------------------------------------  IN: 500 mL / OUT: 400 mL / NET: 100 mL        PHYSICAL EXAM:  General: NAD, A/O x 3, flat mood  ENT: MMM  Neck: Supple, No JVD, posterior surgical scar healed  Lungs: Clear to auscultation bilaterally  Cardio: RRR, S1/S2,   Abdomen: Soft, Nontender, Nondistended; Bowel sounds present  Extremities: No calf tenderness, No pitting edema    LABS:          141  |  104  |  22  ----------------------------<  115  3.9   |  30  |  0.75    Ca    9.5      2019 09:15       eGFR if African American: 121 mL/min/1.73M2 (19 @ 09:15)  eGFR if Non African American: 105 mL/min/1.73M2 (19 @ 09:15)                               Urinalysis Basic - ( 2019 09:04 )    Color: Yellow / Appearance: Slightly Turbid / S.005 / pH: x  Gluc: x / Ketone: Negative  / Bili: Negative / Urobili: Negative   Blood: x / Protein: 30 mg/dL / Nitrite: Negative   Leuk Esterase: Moderate / RBC: 11-25 /HPF / WBC >50 /HPF   Sq Epi: x / Non Sq Epi: Neg.-Few / Bacteria: Many /HPF        Culture - Urine (collected 2019 10:50)  Source: .Urine Clean Catch (Midstream)  Final Report (2019 19:08):    >=3 organisms. Probable collection contamination.        RADIOLOGY & ADDITIONAL TESTS:    Care Discussed with Consultants/Other Providers: Patient is a 54y old  Male who presents with a chief complaint of suicide ideation (2019 19:10). No acute events overnight. Feels "down"today but denies any SI or HI      Patient seen and examined at bedside.    ALLERGIES:  Haldol (Unknown)  No Known Allergies  Thorazine (Unknown)    MEDICATIONS  (STANDING):  aluminum hydroxide/magnesium hydroxide/simethicone Suspension 30 milliLiter(s) Oral every 12 hours  ascorbic acid 500 milliGRAM(s) Oral daily  benztropine 0.5 milliGRAM(s) Oral two times a day  desmopressin 0.2 milliGRAM(s) Oral at bedtime  docusate sodium 100 milliGRAM(s) Oral two times a day  enoxaparin Injectable 40 milliGRAM(s) SubCutaneous at bedtime  famotidine    Tablet 20 milliGRAM(s) Oral daily  levothyroxine 175 MICROGram(s) Oral daily  lidocaine   Patch 1 Patch Transdermal daily  lithium 300 milliGRAM(s) Oral every 12 hours  melatonin 3 milliGRAM(s) Oral at bedtime  metoprolol succinate ER 50 milliGRAM(s) Oral daily  multivitamin 1 Tablet(s) Oral daily  nicotine - 21 mG/24Hr(s) Patch 1 patch Transdermal daily  nystatin Powder 1 Application(s) Topical two times a day  OLANZapine 10 milliGRAM(s) Oral at bedtime  OLANZapine 5 milliGRAM(s) Oral daily  oxyCODONE  ER Tablet 10 milliGRAM(s) Oral every 12 hours  polyethylene glycol 3350 17 Gram(s) Oral daily  pyridoxine 50 milliGRAM(s) Oral daily  senna 2 Tablet(s) Oral at bedtime  valproic  acid Syrup 750 milliGRAM(s) Oral every 12 hours    MEDICATIONS  (PRN):  acetaminophen   Tablet .. 650 milliGRAM(s) Oral every 6 hours PRN Temp greater or equal to 38C (100.4F), Mild Pain (1 - 3)  ALBUTerol    90 MICROgram(s) HFA Inhaler 2 Puff(s) Inhalation every 6 hours PRN Shortness of Breath and/or Wheezing  bisacodyl Suppository 10 milliGRAM(s) Rectal daily PRN Constipation    Vital Signs Last 24 Hrs  T(F): 97.5 (2019 05:12), Max: 98.7 (2019 20:57)  HR: 90 (2019 05:12) (68 - 90)  BP: 124/70 (2019 05:12) (114/69 - 124/70)  RR: 97 (2019 05:12) (15 - 97)  SpO2: 96% (2019 20:57) (96% - 98%)  I&O's Summary    2019 07:  -  2019 07:00  --------------------------------------------------------  IN: 1320 mL / OUT: 201 mL / NET: 1119 mL    2019 07:01  -  2019 11:26  --------------------------------------------------------  IN: 500 mL / OUT: 400 mL / NET: 100 mL        PHYSICAL EXAM:  General: NAD, A/O x 3, flat mood  ENT: MMM  Neck: Supple, No JVD, posterior surgical scar healed  Lungs: Clear to auscultation bilaterally  Cardio: RRR, S1/S2,   Abdomen: Soft, Nontender, Nondistended; Bowel sounds present  Extremities: No calf tenderness, No pitting edema    LABS:          141  |  104  |  22  ----------------------------<  115  3.9   |  30  |  0.75    Ca    9.5      2019 09:15       eGFR if African American: 121 mL/min/1.73M2 (19 @ 09:15)  eGFR if Non African American: 105 mL/min/1.73M2 (19 @ 09:15)      Urinalysis Basic - ( 2019 09:04 )    Color: Yellow / Appearance: Slightly Turbid / S.005 / pH: x  Gluc: x / Ketone: Negative  / Bili: Negative / Urobili: Negative   Blood: x / Protein: 30 mg/dL / Nitrite: Negative   Leuk Esterase: Moderate / RBC: 11-25 /HPF / WBC >50 /HPF   Sq Epi: x / Non Sq Epi: Neg.-Few / Bacteria: Many /HPF        Culture - Urine (collected 2019 10:50)  Source: .Urine Clean Catch (Midstream)  Final Report (2019 19:08):    >=3 organisms. Probable collection contamination.        RADIOLOGY & ADDITIONAL TESTS:    Care Discussed with Consultants/Other Providers:

## 2019-07-19 PROCEDURE — 99232 SBSQ HOSP IP/OBS MODERATE 35: CPT

## 2019-07-19 RX ADMIN — Medication 175 MICROGRAM(S): at 05:48

## 2019-07-19 RX ADMIN — Medication 1 TABLET(S): at 12:07

## 2019-07-19 RX ADMIN — LIDOCAINE 1 PATCH: 4 CREAM TOPICAL at 12:06

## 2019-07-19 RX ADMIN — Medication 1 PATCH: at 19:00

## 2019-07-19 RX ADMIN — OLANZAPINE 5 MILLIGRAM(S): 15 TABLET, FILM COATED ORAL at 07:55

## 2019-07-19 RX ADMIN — OXYCODONE HYDROCHLORIDE 10 MILLIGRAM(S): 5 TABLET ORAL at 18:20

## 2019-07-19 RX ADMIN — SENNA PLUS 2 TABLET(S): 8.6 TABLET ORAL at 22:05

## 2019-07-19 RX ADMIN — Medication 750 MILLIGRAM(S): at 07:54

## 2019-07-19 RX ADMIN — Medication 30 MILLILITER(S): at 18:28

## 2019-07-19 RX ADMIN — DESMOPRESSIN ACETATE 0.2 MILLIGRAM(S): 0.1 TABLET ORAL at 22:06

## 2019-07-19 RX ADMIN — LIDOCAINE 1 PATCH: 4 CREAM TOPICAL at 19:00

## 2019-07-19 RX ADMIN — Medication 750 MILLIGRAM(S): at 22:06

## 2019-07-19 RX ADMIN — OLANZAPINE 10 MILLIGRAM(S): 15 TABLET, FILM COATED ORAL at 22:06

## 2019-07-19 RX ADMIN — OXYCODONE HYDROCHLORIDE 10 MILLIGRAM(S): 5 TABLET ORAL at 05:48

## 2019-07-19 RX ADMIN — NYSTATIN CREAM 1 APPLICATION(S): 100000 CREAM TOPICAL at 18:22

## 2019-07-19 RX ADMIN — Medication 50 MILLIGRAM(S): at 05:49

## 2019-07-19 RX ADMIN — Medication 100 MILLIGRAM(S): at 05:48

## 2019-07-19 RX ADMIN — Medication 1 PATCH: at 12:06

## 2019-07-19 RX ADMIN — Medication 30 MILLILITER(S): at 05:49

## 2019-07-19 RX ADMIN — FAMOTIDINE 20 MILLIGRAM(S): 10 INJECTION INTRAVENOUS at 12:08

## 2019-07-19 RX ADMIN — LITHIUM CARBONATE 300 MILLIGRAM(S): 300 TABLET, EXTENDED RELEASE ORAL at 18:20

## 2019-07-19 RX ADMIN — Medication 0.5 MILLIGRAM(S): at 05:48

## 2019-07-19 RX ADMIN — LITHIUM CARBONATE 300 MILLIGRAM(S): 300 TABLET, EXTENDED RELEASE ORAL at 05:48

## 2019-07-19 RX ADMIN — Medication 50 MILLIGRAM(S): at 12:07

## 2019-07-19 RX ADMIN — OXYCODONE HYDROCHLORIDE 10 MILLIGRAM(S): 5 TABLET ORAL at 07:53

## 2019-07-19 RX ADMIN — ENOXAPARIN SODIUM 40 MILLIGRAM(S): 100 INJECTION SUBCUTANEOUS at 22:06

## 2019-07-19 RX ADMIN — NYSTATIN CREAM 1 APPLICATION(S): 100000 CREAM TOPICAL at 05:49

## 2019-07-19 RX ADMIN — Medication 0.5 MILLIGRAM(S): at 18:21

## 2019-07-19 RX ADMIN — Medication 500 MILLIGRAM(S): at 12:07

## 2019-07-19 RX ADMIN — Medication 100 MILLIGRAM(S): at 18:20

## 2019-07-19 RX ADMIN — OXYCODONE HYDROCHLORIDE 10 MILLIGRAM(S): 5 TABLET ORAL at 19:30

## 2019-07-19 RX ADMIN — Medication 3 MILLIGRAM(S): at 22:05

## 2019-07-19 NOTE — PROGRESS NOTE ADULT - SUBJECTIVE AND OBJECTIVE BOX
Patient is a 54y old  Male who presents with a chief complaint of suicide ideation (18 Jul 2019 11:25)      Patient seen and examined at bedside. No overnight events reported. no complaints. pt reports no auditory hallucination, no SI and pt is not as depressed today.     ALLERGIES:  Haldol (Unknown)  No Known Allergies  Thorazine (Unknown)    MEDICATIONS  (STANDING):  aluminum hydroxide/magnesium hydroxide/simethicone Suspension 30 milliLiter(s) Oral every 12 hours  ascorbic acid 500 milliGRAM(s) Oral daily  benztropine 0.5 milliGRAM(s) Oral two times a day  desmopressin 0.2 milliGRAM(s) Oral at bedtime  docusate sodium 100 milliGRAM(s) Oral two times a day  enoxaparin Injectable 40 milliGRAM(s) SubCutaneous at bedtime  famotidine    Tablet 20 milliGRAM(s) Oral daily  levothyroxine 175 MICROGram(s) Oral daily  lidocaine   Patch 1 Patch Transdermal daily  lithium 300 milliGRAM(s) Oral every 12 hours  melatonin 3 milliGRAM(s) Oral at bedtime  metoprolol succinate ER 50 milliGRAM(s) Oral daily  multivitamin 1 Tablet(s) Oral daily  nicotine - 21 mG/24Hr(s) Patch 1 patch Transdermal daily  nystatin Powder 1 Application(s) Topical two times a day  OLANZapine 10 milliGRAM(s) Oral at bedtime  OLANZapine 5 milliGRAM(s) Oral daily  oxyCODONE  ER Tablet 10 milliGRAM(s) Oral every 12 hours  polyethylene glycol 3350 17 Gram(s) Oral daily  pyridoxine 50 milliGRAM(s) Oral daily  senna 2 Tablet(s) Oral at bedtime  valproic  acid Syrup 750 milliGRAM(s) Oral every 12 hours    MEDICATIONS  (PRN):  acetaminophen   Tablet .. 650 milliGRAM(s) Oral every 6 hours PRN Temp greater or equal to 38C (100.4F), Mild Pain (1 - 3)  ALBUTerol    90 MICROgram(s) HFA Inhaler 2 Puff(s) Inhalation every 6 hours PRN Shortness of Breath and/or Wheezing  bisacodyl Suppository 10 milliGRAM(s) Rectal daily PRN Constipation    Vital Signs Last 24 Hrs  T(F): 97.8 (19 Jul 2019 05:30), Max: 98.2 (18 Jul 2019 15:45)  HR: 83 (19 Jul 2019 05:30) (83 - 83)  BP: 120/65 (19 Jul 2019 05:30) (120/65 - 121/82)  RR: 15 (19 Jul 2019 05:30) (15 - 16)  SpO2: 98% (19 Jul 2019 05:30) (98% - 98%)  I&O's Summary    18 Jul 2019 07:01  -  19 Jul 2019 07:00  --------------------------------------------------------  IN: 1820 mL / OUT: 1104 mL / NET: 716 mL    19 Jul 2019 07:01  -  19 Jul 2019 10:53  --------------------------------------------------------  IN: 420 mL / OUT: 0 mL / NET: 420 mL          GENERAL: NAD, well-developed  HEAD:  Atraumatic, Normocephalic  EYES: EOMI, PERRLA,sclera clear  NECK: Supple  CHEST/LUNG: Clear to auscultation bilaterally; No wheeze  HEART: Regular rate and rhythm; No murmurs, rubs, or gallops  ABDOMEN: Soft, Nontender, Nondistended; Bowel sounds present  EXTREMITIES: No clubbing, cyanosis, or edema  NEUROLOGY: non-focal  SKIN: No rashes or lesions    LABS:    Culture - Urine (collected 16 Jul 2019 10:50)  Source: .Urine Clean Catch (Midstream)  Final Report (17 Jul 2019 19:08):    >=3 organisms. Probable collection contamination.=

## 2019-07-19 NOTE — PROGRESS NOTE ADULT - PROBLEM SELECTOR PROBLEM 10
Healthcare maintenance

## 2019-07-19 NOTE — PROGRESS NOTE ADULT - PROBLEM SELECTOR PLAN 2
- s/p C3-C6 laminectomy and fusion on 5/1/19. MRI shows interval resolution of the dorsal paraspinal/epidural fluid collection at C3-C6, compatible with interval resolution of postoperative seroma/hematoma. Residual subcutaneous dorsal paraspinal edema extending from the C2-C7 level, with resolution of previously seen subcutaneous fluid collection, favored to represent resolved postoperative seroma/hematoma. No new fluid collections. Per Dr. Villasenor, may be normal to still have neck pain, pain will decrease gradually.   - continue PT  - continue Oxycontin to 10mg BID  - Lidocaine patch  - heat pack

## 2019-07-20 DIAGNOSIS — G62.9 POLYNEUROPATHY, UNSPECIFIED: ICD-10-CM

## 2019-07-20 PROCEDURE — 99232 SBSQ HOSP IP/OBS MODERATE 35: CPT | Mod: GC

## 2019-07-20 RX ORDER — GABAPENTIN 400 MG/1
100 CAPSULE ORAL THREE TIMES A DAY
Refills: 0 | Status: DISCONTINUED | OUTPATIENT
Start: 2019-07-20 | End: 2019-07-22

## 2019-07-20 RX ADMIN — Medication 3 MILLIGRAM(S): at 21:39

## 2019-07-20 RX ADMIN — Medication 50 MILLIGRAM(S): at 12:27

## 2019-07-20 RX ADMIN — Medication 1 PATCH: at 07:30

## 2019-07-20 RX ADMIN — Medication 1 PATCH: at 12:25

## 2019-07-20 RX ADMIN — Medication 100 MILLIGRAM(S): at 17:23

## 2019-07-20 RX ADMIN — GABAPENTIN 100 MILLIGRAM(S): 400 CAPSULE ORAL at 21:40

## 2019-07-20 RX ADMIN — LITHIUM CARBONATE 300 MILLIGRAM(S): 300 TABLET, EXTENDED RELEASE ORAL at 06:17

## 2019-07-20 RX ADMIN — Medication 50 MILLIGRAM(S): at 06:16

## 2019-07-20 RX ADMIN — LIDOCAINE 1 PATCH: 4 CREAM TOPICAL at 18:27

## 2019-07-20 RX ADMIN — Medication 30 MILLILITER(S): at 17:22

## 2019-07-20 RX ADMIN — FAMOTIDINE 20 MILLIGRAM(S): 10 INJECTION INTRAVENOUS at 12:27

## 2019-07-20 RX ADMIN — NYSTATIN CREAM 1 APPLICATION(S): 100000 CREAM TOPICAL at 17:23

## 2019-07-20 RX ADMIN — OLANZAPINE 10 MILLIGRAM(S): 15 TABLET, FILM COATED ORAL at 21:40

## 2019-07-20 RX ADMIN — LIDOCAINE 1 PATCH: 4 CREAM TOPICAL at 00:06

## 2019-07-20 RX ADMIN — OXYCODONE HYDROCHLORIDE 10 MILLIGRAM(S): 5 TABLET ORAL at 06:17

## 2019-07-20 RX ADMIN — DESMOPRESSIN ACETATE 0.2 MILLIGRAM(S): 0.1 TABLET ORAL at 21:40

## 2019-07-20 RX ADMIN — NYSTATIN CREAM 1 APPLICATION(S): 100000 CREAM TOPICAL at 06:18

## 2019-07-20 RX ADMIN — LIDOCAINE 1 PATCH: 4 CREAM TOPICAL at 12:24

## 2019-07-20 RX ADMIN — Medication 500 MILLIGRAM(S): at 12:27

## 2019-07-20 RX ADMIN — LITHIUM CARBONATE 300 MILLIGRAM(S): 300 TABLET, EXTENDED RELEASE ORAL at 17:23

## 2019-07-20 RX ADMIN — OLANZAPINE 5 MILLIGRAM(S): 15 TABLET, FILM COATED ORAL at 08:32

## 2019-07-20 RX ADMIN — Medication 100 MILLIGRAM(S): at 06:16

## 2019-07-20 RX ADMIN — Medication 750 MILLIGRAM(S): at 08:32

## 2019-07-20 RX ADMIN — Medication 175 MICROGRAM(S): at 06:16

## 2019-07-20 RX ADMIN — Medication 750 MILLIGRAM(S): at 20:27

## 2019-07-20 RX ADMIN — OXYCODONE HYDROCHLORIDE 10 MILLIGRAM(S): 5 TABLET ORAL at 07:17

## 2019-07-20 RX ADMIN — Medication 1 TABLET(S): at 12:27

## 2019-07-20 RX ADMIN — Medication 0.5 MILLIGRAM(S): at 17:22

## 2019-07-20 RX ADMIN — Medication 0.5 MILLIGRAM(S): at 06:16

## 2019-07-20 RX ADMIN — OXYCODONE HYDROCHLORIDE 10 MILLIGRAM(S): 5 TABLET ORAL at 18:22

## 2019-07-20 RX ADMIN — SENNA PLUS 2 TABLET(S): 8.6 TABLET ORAL at 21:40

## 2019-07-20 RX ADMIN — Medication 30 MILLILITER(S): at 06:21

## 2019-07-20 RX ADMIN — OXYCODONE HYDROCHLORIDE 10 MILLIGRAM(S): 5 TABLET ORAL at 17:22

## 2019-07-20 RX ADMIN — ENOXAPARIN SODIUM 40 MILLIGRAM(S): 100 INJECTION SUBCUTANEOUS at 21:39

## 2019-07-20 NOTE — PROGRESS NOTE ADULT - PROBLEM SELECTOR PLAN 1
No suicidal thoughts today  - off 1:1 observation  - continue Valproic acid 750mg BID, level therapeutic 52  - Prolixin discontinued 07/10  - continue Olanzapine 5mg qAM, 10mg qHS  - continue Cogentin 0.5mg BID per Psych  - continue Lithium 300mg q12hrs, check level in 5 days (07/22)  - Psych following

## 2019-07-20 NOTE — PROGRESS NOTE ADULT - SUBJECTIVE AND OBJECTIVE BOX
Patient is a 54y old  Male who presents with a chief complaint of suicide ideation (19 Jul 2019 10:52)  Patient seen and examined at bedside.  Pt. still with back pain.    ALLERGIES:  Haldol (Unknown)  No Known Allergies  Thorazine (Unknown)    MEDICATIONS  (STANDING):  aluminum hydroxide/magnesium hydroxide/simethicone Suspension 30 milliLiter(s) Oral every 12 hours  ascorbic acid 500 milliGRAM(s) Oral daily  benztropine 0.5 milliGRAM(s) Oral two times a day  desmopressin 0.2 milliGRAM(s) Oral at bedtime  docusate sodium 100 milliGRAM(s) Oral two times a day  enoxaparin Injectable 40 milliGRAM(s) SubCutaneous at bedtime  famotidine    Tablet 20 milliGRAM(s) Oral daily  levothyroxine 175 MICROGram(s) Oral daily  lidocaine   Patch 1 Patch Transdermal daily  lithium 300 milliGRAM(s) Oral every 12 hours  melatonin 3 milliGRAM(s) Oral at bedtime  metoprolol succinate ER 50 milliGRAM(s) Oral daily  multivitamin 1 Tablet(s) Oral daily  nicotine - 21 mG/24Hr(s) Patch 1 patch Transdermal daily  nystatin Powder 1 Application(s) Topical two times a day  OLANZapine 10 milliGRAM(s) Oral at bedtime  OLANZapine 5 milliGRAM(s) Oral daily  oxyCODONE  ER Tablet 10 milliGRAM(s) Oral every 12 hours  polyethylene glycol 3350 17 Gram(s) Oral daily  pyridoxine 50 milliGRAM(s) Oral daily  senna 2 Tablet(s) Oral at bedtime  valproic  acid Syrup 750 milliGRAM(s) Oral every 12 hours    MEDICATIONS  (PRN):  acetaminophen   Tablet .. 650 milliGRAM(s) Oral every 6 hours PRN Temp greater or equal to 38C (100.4F), Mild Pain (1 - 3)  ALBUTerol    90 MICROgram(s) HFA Inhaler 2 Puff(s) Inhalation every 6 hours PRN Shortness of Breath and/or Wheezing  bisacodyl Suppository 10 milliGRAM(s) Rectal daily PRN Constipation    Vital Signs Last 24 Hrs  T(F): 98.5 (20 Jul 2019 06:14), Max: 98.7 (19 Jul 2019 15:37)  HR: 86 (20 Jul 2019 06:14) (82 - 90)  BP: 127/74 (20 Jul 2019 06:14) (127/66 - 127/74)  RR: 15 (20 Jul 2019 06:14) (15 - 15)  SpO2: 95% (20 Jul 2019 06:14) (93% - 95%)  I&O's Summary    19 Jul 2019 07:01  -  20 Jul 2019 07:00  --------------------------------------------------------  IN: 1140 mL / OUT: 6 mL / NET: 1134 mL      PHYSICAL EXAM:  General: NAD, alert.  ENT: MMM  Neck: Supple, No JVD  Lungs: Clear to auscultation bilaterally  Cardio: RRR, S1/S2, No murmurs  Abdomen: Soft, Nontender, Nondistended; Bowel sounds present  Extremities: No calf tenderness, No pitting edema  Neuro:  nonfocal    LABS:        Culture - Urine (collected 16 Jul 2019 10:50)  Source: .Urine Clean Catch (Midstream)  Final Report (17 Jul 2019 19:08):    >=3 organisms. Probable collection contamination.        RADIOLOGY & ADDITIONAL TESTS:    Care Discussed with Consultants/Other Providers: Patient is a 54y old  Male who presents with a chief complaint of suicide ideation (19 Jul 2019 10:52)  Patient seen and examined at bedside.  Pt. still with back pain. + pins and needles sensation in hands and feets    ALLERGIES:  Haldol (Unknown)  No Known Allergies  Thorazine (Unknown)    MEDICATIONS  (STANDING):  aluminum hydroxide/magnesium hydroxide/simethicone Suspension 30 milliLiter(s) Oral every 12 hours  ascorbic acid 500 milliGRAM(s) Oral daily  benztropine 0.5 milliGRAM(s) Oral two times a day  desmopressin 0.2 milliGRAM(s) Oral at bedtime  docusate sodium 100 milliGRAM(s) Oral two times a day  enoxaparin Injectable 40 milliGRAM(s) SubCutaneous at bedtime  famotidine    Tablet 20 milliGRAM(s) Oral daily  levothyroxine 175 MICROGram(s) Oral daily  lidocaine   Patch 1 Patch Transdermal daily  lithium 300 milliGRAM(s) Oral every 12 hours  melatonin 3 milliGRAM(s) Oral at bedtime  metoprolol succinate ER 50 milliGRAM(s) Oral daily  multivitamin 1 Tablet(s) Oral daily  nicotine - 21 mG/24Hr(s) Patch 1 patch Transdermal daily  nystatin Powder 1 Application(s) Topical two times a day  OLANZapine 10 milliGRAM(s) Oral at bedtime  OLANZapine 5 milliGRAM(s) Oral daily  oxyCODONE  ER Tablet 10 milliGRAM(s) Oral every 12 hours  polyethylene glycol 3350 17 Gram(s) Oral daily  pyridoxine 50 milliGRAM(s) Oral daily  senna 2 Tablet(s) Oral at bedtime  valproic  acid Syrup 750 milliGRAM(s) Oral every 12 hours    MEDICATIONS  (PRN):  acetaminophen   Tablet .. 650 milliGRAM(s) Oral every 6 hours PRN Temp greater or equal to 38C (100.4F), Mild Pain (1 - 3)  ALBUTerol    90 MICROgram(s) HFA Inhaler 2 Puff(s) Inhalation every 6 hours PRN Shortness of Breath and/or Wheezing  bisacodyl Suppository 10 milliGRAM(s) Rectal daily PRN Constipation    Vital Signs Last 24 Hrs  T(F): 98.5 (20 Jul 2019 06:14), Max: 98.7 (19 Jul 2019 15:37)  HR: 86 (20 Jul 2019 06:14) (82 - 90)  BP: 127/74 (20 Jul 2019 06:14) (127/66 - 127/74)  RR: 15 (20 Jul 2019 06:14) (15 - 15)  SpO2: 95% (20 Jul 2019 06:14) (93% - 95%)  I&O's Summary    19 Jul 2019 07:01  -  20 Jul 2019 07:00  --------------------------------------------------------  IN: 1140 mL / OUT: 6 mL / NET: 1134 mL      PHYSICAL EXAM:  General: NAD, alert.  ENT: MMM  Neck: Supple, No JVD  Lungs: Clear to auscultation bilaterally  Cardio: RRR, S1/S2, No murmurs  Abdomen: Soft, Nontender, Nondistended; Bowel sounds present  Extremities: No calf tenderness, No pitting edema  Neuro:  nonfocal    LABS:        Culture - Urine (collected 16 Jul 2019 10:50)  Source: .Urine Clean Catch (Midstream)  Final Report (17 Jul 2019 19:08):    >=3 organisms. Probable collection contamination.        RADIOLOGY & ADDITIONAL TESTS:    Care Discussed with Consultants/Other Providers:

## 2019-07-20 NOTE — PROGRESS NOTE ADULT - PROBLEM SELECTOR PLAN 3
improved, 2/2 constipation  - continue senna, colace and miralax  - on pepcid pins and needles sensation in fingers and feets  - per pt and pt's father, it started before the laminectomy and never went away  - trial of gabapentin 100mg q8

## 2019-07-20 NOTE — PROGRESS NOTE ADULT - PROBLEM SELECTOR PLAN 5
see above - continue Valproic acid 750mg BID  - Prolixin discontinued 07/10  - continue Olanzapine 5mg qAM, 10mg qHS  - continue Cogentin 0.5mg BID per Psych  - continue Lithium 300mg q12hrs, check level in 07/22  - Psych following

## 2019-07-20 NOTE — PROGRESS NOTE ADULT - PROBLEM SELECTOR PLAN 4
- continue Valproic acid 750mg BID  - Prolixin discontinued 07/10  - continue Olanzapine 5mg qAM, 10mg qHS  - continue Cogentin 0.5mg BID per Psych  - continue Lithium 300mg q12hrs, check level in 07/22  - Psych following improved, 2/2 constipation  - continue senna, colace and miralax  - on pepcid

## 2019-07-20 NOTE — PROGRESS NOTE ADULT - SUBJECTIVE AND OBJECTIVE BOX
Psychiatry Consultation-Liaison Follow-up Note:  53yo Single Never  Male.   Encounter: [facility]  Chart reviewed.   Met with:  Ubaldo Headley Case coordinator  Contacted: Outpatient care coordinator  Case Discussed with: Attending of Record.     Chief Complaint:  " I am OK , I go to  Rehab""   Interval History:  Pt presented s/p Laminectomy and was initially seen by C/L Service due to history of Schizophrenia.  Pt diagnosis clarified and medication management as well.  He remained at the time on oral Prolixin with plan of use of Prolixin Decanoate. Pt made only fair progress in acute rehab and sent to [facility].  While there patient apparently acutely decompensated, pt Depakote level on admission was 11. Pt reported suicidal ideation to ED attending Dr. Ortega who   consulted Telepsychiatry. Hand off given to the undersigned by Dr. Natalya Lemus regarding his need for treatment, but his inability to ambulate and his need for Lovenox   limited the scope of transfer to acute inpatient psychiatry.      Symptom progression/resolution:  Pt has intermittently reported suicidal ideation at times due to depressed mood, at others due to command AH.   He is inconsistent in his response to medications- at some point tends to get better allow ADL care and come off 1:1.  He confirmed ,that , he was voicing suicidal thoughts 'for attention", although that is questionably reliable due to hx of suicide   attempts in the remote past. Pt has been  getting out of bed to chair, still requires maximal assist.  Paranoid ideation and referential thoughts as per initial consult are chronic and   tend to be reality tested or not interfere with daily functioning. Mood symptoms impact his progress in PT.   Started Lithium , encouraged to stay hydrated, and he drank water when suggested. Will Check Litium level.    MEDICATIONS  (STANDING):  aluminum hydroxide/magnesium hydroxide/simethicone Suspension 30 milliLiter(s) Oral every 12 hours  ascorbic acid 500 milliGRAM(s) Oral daily  benztropine 0.5 milliGRAM(s) Oral two times a day  cefTRIAXone   IVPB 1000 milliGRAM(s) IV Intermittent every 24 hours  desmopressin 0.2 milliGRAM(s) Oral at bedtime  docusate sodium 100 milliGRAM(s) Oral two times a day  enoxaparin Injectable 40 milliGRAM(s) SubCutaneous at bedtime  famotidine    Tablet 20 milliGRAM(s) Oral daily  levothyroxine 175 MICROGram(s) Oral daily  lidocaine   Patch 1 Patch Transdermal daily  lithium 300 milliGRAM(s) Oral every 12 hours  melatonin 3 milliGRAM(s) Oral at bedtime  metoprolol succinate ER 50 milliGRAM(s) Oral daily  multivitamin 1 Tablet(s) Oral daily  nicotine - 21 mG/24Hr(s) Patch 1 patch Transdermal daily  nystatin Powder 1 Application(s) Topical two times a day  OLANZapine 10 milliGRAM(s) Oral at bedtime  OLANZapine 5 milliGRAM(s) Oral daily  oxyCODONE  ER Tablet 10 milliGRAM(s) Oral every 12 hours  polyethylene glycol 3350 17 Gram(s) Oral daily  pyridoxine 50 milliGRAM(s) Oral daily  senna 2 Tablet(s) Oral at bedtime  valproic  acid Syrup 750 milliGRAM(s) Oral every 12 hours    MEDICATIONS  (PRN):  acetaminophen   Tablet .. 650 milliGRAM(s) Oral every 6 hours PRN Temp greater or equal to 38C (100.4F), Mild Pain (1 - 3)  ALBUTerol    90 MICROgram(s) HFA Inhaler 2 Puff(s) Inhalation every 6 hours PRN Shortness of Breath and/or Wheezing  bisacodyl Suppository 10 milliGRAM(s) Rectal daily PRN Constipation      Mental Status Examination:  General Appearance: Heavy set White Male who looks his stated age,  Dressed in hospital gown.   Remarkable Features: as noted in previous documentation.   Psychomotor State: + psychomotor retardation.   Abnormal movements and posture: pt grimacing to self, oral movements.  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  Social interaction and affect: Speech normal and eye contact improved.   Cognition, perceptual abnormalities: Paranoid ideation denies, as well as A/V hallucinations, denied suicidal ideation intent or plan, has not been aggressive or inappropriate during the hospital stays.   Consciousness: alert  Orientation: X3  Memory: Recent/Past/Remote: poor historian, recent memory appears intact.   Attention: impaired   Naming/Repetition /Comprehension: intact     Laboratory t   Vital Signs Last 24 Hrs:  T(C): 37.3 (16 Jul 2019 16:09), Max: 37.3 (15 Jul 2019 20:57)  T(F): 99.2 (16 Jul 2019 16:09), Max: 99.2 (16 Jul 2019 16:09)  HR: 90 (16 Jul 2019 16:09) (71 - 103)  BP: 114/68 (16 Jul 2019 16:09) (108/66 - 118/69)  BP(mean): --  RR: 15 (16 Jul 2019 16:09) (15 - 16)  SpO2: 96% (16 Jul 2019 16:09) (96% - 100%)    Psychiatric Diagnosis:  Schizoaffective d/o Bipolar type.     Recommendations:  Continue Depakote and Zyprexa, monitor response to Lithium, get level      Medication:  VPA level therapeutic at 52 would not increase from 750 mg bid.     Other Treatment considerations-  Level of supervision: regular    Guidance for patient management-encourage other coping skills for attention rather than voicing he is suicidal.  Pt aware that optimally placement in an BRIGID is most appropriate level of care to return to walking.        Hospital course Follow-up:  (will follow)-  Message left for Rola - Care coordinator at [place]. [phone].

## 2019-07-21 LAB
LITHIUM SERPL-MCNC: 0.39 MMOL/L — LOW (ref 0.6–1.2)
VALPROATE SERPL-MCNC: 51 UG/ML — SIGNIFICANT CHANGE UP (ref 50–100)

## 2019-07-21 PROCEDURE — 99232 SBSQ HOSP IP/OBS MODERATE 35: CPT

## 2019-07-21 RX ADMIN — LIDOCAINE 1 PATCH: 4 CREAM TOPICAL at 12:16

## 2019-07-21 RX ADMIN — LIDOCAINE 1 PATCH: 4 CREAM TOPICAL at 00:24

## 2019-07-21 RX ADMIN — LITHIUM CARBONATE 300 MILLIGRAM(S): 300 TABLET, EXTENDED RELEASE ORAL at 05:19

## 2019-07-21 RX ADMIN — Medication 500 MILLIGRAM(S): at 12:15

## 2019-07-21 RX ADMIN — Medication 50 MILLIGRAM(S): at 12:16

## 2019-07-21 RX ADMIN — OXYCODONE HYDROCHLORIDE 10 MILLIGRAM(S): 5 TABLET ORAL at 17:05

## 2019-07-21 RX ADMIN — OLANZAPINE 5 MILLIGRAM(S): 15 TABLET, FILM COATED ORAL at 08:24

## 2019-07-21 RX ADMIN — Medication 175 MICROGRAM(S): at 05:18

## 2019-07-21 RX ADMIN — Medication 0.5 MILLIGRAM(S): at 17:05

## 2019-07-21 RX ADMIN — FAMOTIDINE 20 MILLIGRAM(S): 10 INJECTION INTRAVENOUS at 12:15

## 2019-07-21 RX ADMIN — GABAPENTIN 100 MILLIGRAM(S): 400 CAPSULE ORAL at 21:08

## 2019-07-21 RX ADMIN — GABAPENTIN 100 MILLIGRAM(S): 400 CAPSULE ORAL at 05:18

## 2019-07-21 RX ADMIN — POLYETHYLENE GLYCOL 3350 17 GRAM(S): 17 POWDER, FOR SOLUTION ORAL at 12:15

## 2019-07-21 RX ADMIN — Medication 100 MILLIGRAM(S): at 17:05

## 2019-07-21 RX ADMIN — Medication 1 TABLET(S): at 12:15

## 2019-07-21 RX ADMIN — Medication 3 MILLIGRAM(S): at 21:08

## 2019-07-21 RX ADMIN — Medication 50 MILLIGRAM(S): at 05:20

## 2019-07-21 RX ADMIN — LITHIUM CARBONATE 300 MILLIGRAM(S): 300 TABLET, EXTENDED RELEASE ORAL at 17:35

## 2019-07-21 RX ADMIN — OXYCODONE HYDROCHLORIDE 10 MILLIGRAM(S): 5 TABLET ORAL at 17:36

## 2019-07-21 RX ADMIN — Medication 30 MILLILITER(S): at 17:04

## 2019-07-21 RX ADMIN — SENNA PLUS 2 TABLET(S): 8.6 TABLET ORAL at 21:08

## 2019-07-21 RX ADMIN — GABAPENTIN 100 MILLIGRAM(S): 400 CAPSULE ORAL at 13:23

## 2019-07-21 RX ADMIN — NYSTATIN CREAM 1 APPLICATION(S): 100000 CREAM TOPICAL at 05:20

## 2019-07-21 RX ADMIN — Medication 1 PATCH: at 07:22

## 2019-07-21 RX ADMIN — Medication 0.5 MILLIGRAM(S): at 05:18

## 2019-07-21 RX ADMIN — DESMOPRESSIN ACETATE 0.2 MILLIGRAM(S): 0.1 TABLET ORAL at 21:08

## 2019-07-21 RX ADMIN — OLANZAPINE 10 MILLIGRAM(S): 15 TABLET, FILM COATED ORAL at 21:08

## 2019-07-21 RX ADMIN — ENOXAPARIN SODIUM 40 MILLIGRAM(S): 100 INJECTION SUBCUTANEOUS at 21:08

## 2019-07-21 RX ADMIN — OXYCODONE HYDROCHLORIDE 10 MILLIGRAM(S): 5 TABLET ORAL at 05:24

## 2019-07-21 RX ADMIN — Medication 100 MILLIGRAM(S): at 05:18

## 2019-07-21 RX ADMIN — Medication 750 MILLIGRAM(S): at 21:07

## 2019-07-21 RX ADMIN — LIDOCAINE 1 PATCH: 4 CREAM TOPICAL at 18:18

## 2019-07-21 RX ADMIN — Medication 30 MILLILITER(S): at 05:24

## 2019-07-21 RX ADMIN — OXYCODONE HYDROCHLORIDE 10 MILLIGRAM(S): 5 TABLET ORAL at 06:25

## 2019-07-21 RX ADMIN — NYSTATIN CREAM 1 APPLICATION(S): 100000 CREAM TOPICAL at 17:05

## 2019-07-21 RX ADMIN — Medication 750 MILLIGRAM(S): at 08:24

## 2019-07-21 NOTE — PROGRESS NOTE ADULT - PROBLEM SELECTOR PLAN 3
pins and needles sensation in hands and feet  - per pt and pt's father, started before the laminectomy and never went away  - trial of gabapentin 100mg q8

## 2019-07-21 NOTE — PROGRESS NOTE ADULT - SUBJECTIVE AND OBJECTIVE BOX
Patient is a 54y old  Male who presents with a chief complaint of suicide ideation (20 Jul 2019 15:13)      Patient seen and examined at bedside. No overnight events reported. No SI and no auditory hallucination. + pins and needle sensation on hands and feet. no abd pain.    ALLERGIES:  Haldol (Unknown)  No Known Allergies  Thorazine (Unknown)    MEDICATIONS  (STANDING):  aluminum hydroxide/magnesium hydroxide/simethicone Suspension 30 milliLiter(s) Oral every 12 hours  ascorbic acid 500 milliGRAM(s) Oral daily  benztropine 0.5 milliGRAM(s) Oral two times a day  desmopressin 0.2 milliGRAM(s) Oral at bedtime  docusate sodium 100 milliGRAM(s) Oral two times a day  enoxaparin Injectable 40 milliGRAM(s) SubCutaneous at bedtime  famotidine    Tablet 20 milliGRAM(s) Oral daily  gabapentin 100 milliGRAM(s) Oral three times a day  levothyroxine 175 MICROGram(s) Oral daily  lidocaine   Patch 1 Patch Transdermal daily  lithium 300 milliGRAM(s) Oral every 12 hours  melatonin 3 milliGRAM(s) Oral at bedtime  metoprolol succinate ER 50 milliGRAM(s) Oral daily  multivitamin 1 Tablet(s) Oral daily  nicotine - 21 mG/24Hr(s) Patch 1 patch Transdermal daily  nystatin Powder 1 Application(s) Topical two times a day  OLANZapine 10 milliGRAM(s) Oral at bedtime  OLANZapine 5 milliGRAM(s) Oral daily  oxyCODONE  ER Tablet 10 milliGRAM(s) Oral every 12 hours  polyethylene glycol 3350 17 Gram(s) Oral daily  pyridoxine 50 milliGRAM(s) Oral daily  senna 2 Tablet(s) Oral at bedtime  valproic  acid Syrup 750 milliGRAM(s) Oral every 12 hours    MEDICATIONS  (PRN):  acetaminophen   Tablet .. 650 milliGRAM(s) Oral every 6 hours PRN Temp greater or equal to 38C (100.4F), Mild Pain (1 - 3)  ALBUTerol    90 MICROgram(s) HFA Inhaler 2 Puff(s) Inhalation every 6 hours PRN Shortness of Breath and/or Wheezing  bisacodyl Suppository 10 milliGRAM(s) Rectal daily PRN Constipation    Vital Signs Last 24 Hrs  T(F): 98 (21 Jul 2019 06:03), Max: 98.9 (20 Jul 2019 16:15)  HR: 68 (21 Jul 2019 06:03) (68 - 83)  BP: 112/65 (21 Jul 2019 06:03) (112/65 - 130/76)  RR: 16 (21 Jul 2019 06:03) (15 - 16)  SpO2: 97% (21 Jul 2019 06:03) (96% - 98%)  I&O's Summary    20 Jul 2019 07:01  -  21 Jul 2019 07:00  --------------------------------------------------------  IN: 2400 mL / OUT: 2 mL / NET: 2398 mL    GENERAL: NAD.   HEAD:  Atraumatic, Normocephalic  EYES: EOMI, PERRLA, sclera clear  NECK: Supple  CHEST/LUNG: Clear to auscultation bilaterally; No wheeze  HEART: Regular rate and rhythm; No murmurs, rubs, or gallops  ABDOMEN: Soft, Nontender, Nondistended; Bowel sounds present  EXTREMITIES: No clubbing, cyanosis, or edema  NEUROLOGY: non-focal  SKIN: No rashes or lesions

## 2019-07-21 NOTE — PROGRESS NOTE ADULT - SUBJECTIVE AND OBJECTIVE BOX
Psychiatry Consultation-Liaison Follow-up Note:7/21/19  53yo Single Never  Male.  Chief Complaint:  " I am OK ,   Interval History:  Pt presented s/p Laminectomy and was initially seen by C/L Service due to history of Schizophrenia.  Pt diagnosis clarified and medication management as well.  He remained at the time on oral Prolixin with plan of use of Prolixin Decanoate. Pt made only fair progress in acute rehab and sent to [facility].  While there patient apparently acutely decompensated, pt Depakote level on admission was 11. Pt reported suicidal ideation to ED attending Dr. Ortega who   consulted Telepsychiatry. Hand off given to the undersigned by Dr. Natalya Lemus regarding his need for treatment, but his inability to ambulate and his need for Lovenox   limited the scope of transfer to acute inpatient psychiatry.      Symptom progression/resolution: Patient has been stable with out psychotic symptoms reported, compliant, denies S/H ideations.   MEDICATIONS  (STANDING):  aluminum hydroxide/magnesium hydroxide/simethicone Suspension 30 milliLiter(s) Oral every 12 hours  ascorbic acid 500 milliGRAM(s) Oral daily  benztropine 0.5 milliGRAM(s) Oral two times a day  cefTRIAXone   IVPB 1000 milliGRAM(s) IV Intermittent every 24 hours  desmopressin 0.2 milliGRAM(s) Oral at bedtime  docusate sodium 100 milliGRAM(s) Oral two times a day  enoxaparin Injectable 40 milliGRAM(s) SubCutaneous at bedtime  famotidine    Tablet 20 milliGRAM(s) Oral daily  levothyroxine 175 MICROGram(s) Oral daily  lidocaine   Patch 1 Patch Transdermal daily  lithium 300 milliGRAM(s) Oral every 12 hours  melatonin 3 milliGRAM(s) Oral at bedtime  metoprolol succinate ER 50 milliGRAM(s) Oral daily  multivitamin 1 Tablet(s) Oral daily  nicotine - 21 mG/24Hr(s) Patch 1 patch Transdermal daily  nystatin Powder 1 Application(s) Topical two times a day  OLANZapine 10 milliGRAM(s) Oral at bedtime  OLANZapine 5 milliGRAM(s) Oral daily  oxyCODONE  ER Tablet 10 milliGRAM(s) Oral every 12 hours  polyethylene glycol 3350 17 Gram(s) Oral daily  pyridoxine 50 milliGRAM(s) Oral daily  senna 2 Tablet(s) Oral at bedtime  valproic  acid Syrup 750 milliGRAM(s) Oral every 12 hours    MEDICATIONS  (PRN):  acetaminophen   Tablet .. 650 milliGRAM(s) Oral every 6 hours PRN Temp greater or equal to 38C (100.4F), Mild Pain (1 - 3)  ALBUTerol    90 MICROgram(s) HFA Inhaler 2 Puff(s) Inhalation every 6 hours PRN Shortness of Breath and/or Wheezing  bisacodyl Suppository 10 milliGRAM(s) Rectal daily PRN Constipation      Mental Status Examination:  General Appearance: Heavy set White Male who looks his stated age,  Dressed in hospital gown.   Remarkable Features: as noted in previous documentation.   Psychomotor State: + psychomotor retardation.   Abnormal movements and posture: pt grimacing to self, oral movements.  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  Social interaction and affect: Speech normal and eye contact improved.   Cognition, perceptual abnormalities: Paranoid ideation denies, as well as A/V hallucinations, denied suicidal ideation intent or plan, has not been aggressive or inappropriate during the hospital stays.   Consciousness: alert  Orientation: X3  Memory: Recent/Past/Remote: poor historian, recent memory appears intact.   Attention: impaired   Naming/Repetition /Comprehension: intact     Laboratory t   Vital Signs Last 24 Hrs:  T(C): 37.3 (16 Jul 2019 16:09), Max: 37.3 (15 Jul 2019 20:57)  T(F): 99.2 (16 Jul 2019 16:09), Max: 99.2 (16 Jul 2019 16:09)  HR: 90 (16 Jul 2019 16:09) (71 - 103)  BP: 114/68 (16 Jul 2019 16:09) (108/66 - 118/69)  BP(mean): --  RR: 15 (16 Jul 2019 16:09) (15 - 16)  SpO2: 96% (16 Jul 2019 16:09) (96% - 100%)    Psychiatric Diagnosis:  Schizoaffective d/o Bipolar type.     Recommendations:  Continue Depakote and Zyprexa, Lithium,and Depakote level  pending/       Other Treatment considerations-  Level of supervision: regular    Guidance for patient management-encourage other coping skills for attention rather than voicing he is suicidal.  Pt aware that optimally placement in an [facility]IG[facility] is most appropriate level of care to return to walking.        Hospital course Follow-up: will , if nessasary. can be D/C to [facility]  (will follow)-

## 2019-07-21 NOTE — PROGRESS NOTE ADULT - PROBLEM SELECTOR PLAN 1
No suicidal thoughts. no auditory hallucinations.  - continue Valproic acid 750mg BID, level therapeutic 52  - Prolixin discontinued 07/10  - continue Olanzapine 5mg qAM, 10mg qHS  - continue Cogentin 0.5mg BID per Psych  - continue Lithium 300mg q12hrs, check level on 07/22  - Psych following

## 2019-07-21 NOTE — PROGRESS NOTE ADULT - PROBLEM SELECTOR PLAN 5
- continue Valproic acid 750mg BID  - Prolixin discontinued 07/10  - continue Olanzapine 5mg qAM, 10mg qHS  - continue Cogentin 0.5mg BID per Psych  - continue Lithium 300mg q12hrs, check level in 07/22  - Psych following

## 2019-07-22 ENCOUNTER — TRANSCRIPTION ENCOUNTER (OUTPATIENT)
Age: 54
End: 2019-07-22

## 2019-07-22 VITALS
HEART RATE: 88 BPM | RESPIRATION RATE: 16 BRPM | SYSTOLIC BLOOD PRESSURE: 143 MMHG | DIASTOLIC BLOOD PRESSURE: 83 MMHG | OXYGEN SATURATION: 98 % | TEMPERATURE: 98 F

## 2019-07-22 LAB
ANION GAP SERPL CALC-SCNC: 6 MMOL/L — SIGNIFICANT CHANGE UP (ref 5–17)
BUN SERPL-MCNC: 22 MG/DL — SIGNIFICANT CHANGE UP (ref 7–23)
CALCIUM SERPL-MCNC: 10.1 MG/DL — SIGNIFICANT CHANGE UP (ref 8.4–10.5)
CHLORIDE SERPL-SCNC: 107 MMOL/L — SIGNIFICANT CHANGE UP (ref 96–108)
CO2 SERPL-SCNC: 29 MMOL/L — SIGNIFICANT CHANGE UP (ref 22–31)
CREAT SERPL-MCNC: 0.65 MG/DL — SIGNIFICANT CHANGE UP (ref 0.5–1.3)
GLUCOSE SERPL-MCNC: 99 MG/DL — SIGNIFICANT CHANGE UP (ref 70–99)
LITHIUM SERPL-MCNC: 0.6 MMOL/L — SIGNIFICANT CHANGE UP (ref 0.6–1.2)
POTASSIUM SERPL-MCNC: 4.3 MMOL/L — SIGNIFICANT CHANGE UP (ref 3.5–5.3)
POTASSIUM SERPL-SCNC: 4.3 MMOL/L — SIGNIFICANT CHANGE UP (ref 3.5–5.3)
SODIUM SERPL-SCNC: 142 MMOL/L — SIGNIFICANT CHANGE UP (ref 135–145)
VALPROATE SERPL-MCNC: 43 UG/ML — LOW (ref 50–100)

## 2019-07-22 PROCEDURE — 80053 COMPREHEN METABOLIC PANEL: CPT

## 2019-07-22 PROCEDURE — 84443 ASSAY THYROID STIM HORMONE: CPT

## 2019-07-22 PROCEDURE — 80178 ASSAY OF LITHIUM: CPT

## 2019-07-22 PROCEDURE — 84484 ASSAY OF TROPONIN QUANT: CPT

## 2019-07-22 PROCEDURE — 80307 DRUG TEST PRSMV CHEM ANLYZR: CPT

## 2019-07-22 PROCEDURE — 85027 COMPLETE CBC AUTOMATED: CPT

## 2019-07-22 PROCEDURE — A9579: CPT

## 2019-07-22 PROCEDURE — 99239 HOSP IP/OBS DSCHRG MGMT >30: CPT

## 2019-07-22 PROCEDURE — 72156 MRI NECK SPINE W/O & W/DYE: CPT

## 2019-07-22 PROCEDURE — 93005 ELECTROCARDIOGRAM TRACING: CPT

## 2019-07-22 PROCEDURE — 83735 ASSAY OF MAGNESIUM: CPT

## 2019-07-22 PROCEDURE — 85730 THROMBOPLASTIN TIME PARTIAL: CPT

## 2019-07-22 PROCEDURE — 87086 URINE CULTURE/COLONY COUNT: CPT

## 2019-07-22 PROCEDURE — 80164 ASSAY DIPROPYLACETIC ACD TOT: CPT

## 2019-07-22 PROCEDURE — 36000 PLACE NEEDLE IN VEIN: CPT

## 2019-07-22 PROCEDURE — 81001 URINALYSIS AUTO W/SCOPE: CPT

## 2019-07-22 PROCEDURE — 36415 COLL VENOUS BLD VENIPUNCTURE: CPT

## 2019-07-22 PROCEDURE — 85610 PROTHROMBIN TIME: CPT

## 2019-07-22 PROCEDURE — 84439 ASSAY OF FREE THYROXINE: CPT

## 2019-07-22 PROCEDURE — 71045 X-RAY EXAM CHEST 1 VIEW: CPT

## 2019-07-22 PROCEDURE — 83880 ASSAY OF NATRIURETIC PEPTIDE: CPT

## 2019-07-22 PROCEDURE — 97161 PT EVAL LOW COMPLEX 20 MIN: CPT

## 2019-07-22 PROCEDURE — 83690 ASSAY OF LIPASE: CPT

## 2019-07-22 PROCEDURE — 99285 EMERGENCY DEPT VISIT HI MDM: CPT | Mod: 25

## 2019-07-22 PROCEDURE — 97116 GAIT TRAINING THERAPY: CPT

## 2019-07-22 PROCEDURE — 80048 BASIC METABOLIC PNL TOTAL CA: CPT

## 2019-07-22 RX ORDER — PYRIDOXINE HCL (VITAMIN B6) 100 MG
1 TABLET ORAL
Qty: 0 | Refills: 0 | DISCHARGE
Start: 2019-07-22

## 2019-07-22 RX ORDER — DESMOPRESSIN ACETATE 0.1 MG/1
1 TABLET ORAL
Qty: 0 | Refills: 0 | DISCHARGE
Start: 2019-07-22

## 2019-07-22 RX ORDER — LITHIUM CARBONATE 300 MG/1
1 TABLET, EXTENDED RELEASE ORAL
Qty: 0 | Refills: 0 | DISCHARGE
Start: 2019-07-22

## 2019-07-22 RX ORDER — VALPROIC ACID (AS SODIUM SALT) 250 MG/5ML
3 SOLUTION, ORAL ORAL
Qty: 0 | Refills: 0 | DISCHARGE

## 2019-07-22 RX ORDER — NYSTATIN CREAM 100000 [USP'U]/G
1 CREAM TOPICAL
Qty: 0 | Refills: 0 | DISCHARGE

## 2019-07-22 RX ORDER — FLUPHENAZINE HYDROCHLORIDE 1 MG/1
1 TABLET, FILM COATED ORAL
Qty: 0 | Refills: 0 | DISCHARGE

## 2019-07-22 RX ORDER — ACETAMINOPHEN 500 MG
2 TABLET ORAL
Qty: 0 | Refills: 0 | DISCHARGE
Start: 2019-07-22

## 2019-07-22 RX ORDER — NYSTATIN CREAM 100000 [USP'U]/G
1 CREAM TOPICAL
Qty: 0 | Refills: 0 | DISCHARGE
Start: 2019-07-22

## 2019-07-22 RX ORDER — VALPROIC ACID (AS SODIUM SALT) 250 MG/5ML
15 SOLUTION, ORAL ORAL
Qty: 0 | Refills: 0 | DISCHARGE
Start: 2019-07-22

## 2019-07-22 RX ORDER — OLANZAPINE 15 MG/1
1 TABLET, FILM COATED ORAL
Qty: 0 | Refills: 0 | DISCHARGE
Start: 2019-07-22

## 2019-07-22 RX ORDER — LANOLIN ALCOHOL/MO/W.PET/CERES
1 CREAM (GRAM) TOPICAL
Qty: 0 | Refills: 0 | DISCHARGE
Start: 2019-07-22

## 2019-07-22 RX ORDER — DOCUSATE SODIUM 100 MG
1 CAPSULE ORAL
Qty: 0 | Refills: 0 | DISCHARGE
Start: 2019-07-22

## 2019-07-22 RX ORDER — OXYCODONE HYDROCHLORIDE 5 MG/1
1 TABLET ORAL
Qty: 0 | Refills: 0 | DISCHARGE
Start: 2019-07-22

## 2019-07-22 RX ORDER — ASCORBIC ACID 60 MG
1 TABLET,CHEWABLE ORAL
Qty: 0 | Refills: 0 | DISCHARGE
Start: 2019-07-22

## 2019-07-22 RX ORDER — ALBUTEROL 90 UG/1
2 AEROSOL, METERED ORAL
Qty: 0 | Refills: 0 | DISCHARGE
Start: 2019-07-22

## 2019-07-22 RX ORDER — SENNA PLUS 8.6 MG/1
2 TABLET ORAL
Qty: 0 | Refills: 0 | DISCHARGE
Start: 2019-07-22

## 2019-07-22 RX ORDER — LIDOCAINE 4 G/100G
1 CREAM TOPICAL
Qty: 0 | Refills: 0 | DISCHARGE
Start: 2019-07-22

## 2019-07-22 RX ORDER — BENZTROPINE MESYLATE 1 MG
1 TABLET ORAL
Qty: 0 | Refills: 0 | DISCHARGE
Start: 2019-07-22

## 2019-07-22 RX ORDER — GABAPENTIN 400 MG/1
1 CAPSULE ORAL
Qty: 0 | Refills: 0 | DISCHARGE
Start: 2019-07-22

## 2019-07-22 RX ORDER — POLYETHYLENE GLYCOL 3350 17 G/17G
17 POWDER, FOR SOLUTION ORAL
Qty: 0 | Refills: 0 | DISCHARGE
Start: 2019-07-22

## 2019-07-22 RX ORDER — LANOLIN ALCOHOL/MO/W.PET/CERES
1 CREAM (GRAM) TOPICAL
Qty: 0 | Refills: 0 | DISCHARGE

## 2019-07-22 RX ADMIN — GABAPENTIN 100 MILLIGRAM(S): 400 CAPSULE ORAL at 13:25

## 2019-07-22 RX ADMIN — GABAPENTIN 100 MILLIGRAM(S): 400 CAPSULE ORAL at 06:27

## 2019-07-22 RX ADMIN — Medication 0.5 MILLIGRAM(S): at 16:34

## 2019-07-22 RX ADMIN — Medication 500 MILLIGRAM(S): at 11:30

## 2019-07-22 RX ADMIN — OXYCODONE HYDROCHLORIDE 10 MILLIGRAM(S): 5 TABLET ORAL at 16:33

## 2019-07-22 RX ADMIN — FAMOTIDINE 20 MILLIGRAM(S): 10 INJECTION INTRAVENOUS at 11:30

## 2019-07-22 RX ADMIN — OXYCODONE HYDROCHLORIDE 10 MILLIGRAM(S): 5 TABLET ORAL at 06:31

## 2019-07-22 RX ADMIN — Medication 30 MILLILITER(S): at 06:40

## 2019-07-22 RX ADMIN — NYSTATIN CREAM 1 APPLICATION(S): 100000 CREAM TOPICAL at 06:39

## 2019-07-22 RX ADMIN — Medication 175 MICROGRAM(S): at 06:27

## 2019-07-22 RX ADMIN — Medication 30 MILLILITER(S): at 16:33

## 2019-07-22 RX ADMIN — LITHIUM CARBONATE 300 MILLIGRAM(S): 300 TABLET, EXTENDED RELEASE ORAL at 16:34

## 2019-07-22 RX ADMIN — LIDOCAINE 1 PATCH: 4 CREAM TOPICAL at 11:29

## 2019-07-22 RX ADMIN — OXYCODONE HYDROCHLORIDE 10 MILLIGRAM(S): 5 TABLET ORAL at 07:30

## 2019-07-22 RX ADMIN — Medication 50 MILLIGRAM(S): at 11:30

## 2019-07-22 RX ADMIN — Medication 0.5 MILLIGRAM(S): at 06:25

## 2019-07-22 RX ADMIN — Medication 1 TABLET(S): at 11:30

## 2019-07-22 RX ADMIN — Medication 50 MILLIGRAM(S): at 06:27

## 2019-07-22 RX ADMIN — NYSTATIN CREAM 1 APPLICATION(S): 100000 CREAM TOPICAL at 16:33

## 2019-07-22 RX ADMIN — Medication 100 MILLIGRAM(S): at 06:27

## 2019-07-22 RX ADMIN — Medication 750 MILLIGRAM(S): at 08:08

## 2019-07-22 RX ADMIN — Medication 10 MILLIGRAM(S): at 08:09

## 2019-07-22 RX ADMIN — OLANZAPINE 5 MILLIGRAM(S): 15 TABLET, FILM COATED ORAL at 08:08

## 2019-07-22 RX ADMIN — LITHIUM CARBONATE 300 MILLIGRAM(S): 300 TABLET, EXTENDED RELEASE ORAL at 06:28

## 2019-07-22 NOTE — DISCHARGE NOTE NURSING/CASE MANAGEMENT/SOCIAL WORK - [email or web address]_GEN_ALL_CORE
You can access the [facility] Patient Portal, offered by [facility], by registering with the following website: [email or web address]

## 2019-07-22 NOTE — PROGRESS NOTE ADULT - SUBJECTIVE AND OBJECTIVE BOX
Patient is a 54y old  Male who presents with a chief complaint of suicide ideation (21 Jul 2019 14:19)  Patient seen and examined at bedside.  Pt. c/o constipation.    ALLERGIES:  Haldol (Unknown)  No Known Allergies  Thorazine (Unknown)    MEDICATIONS  (STANDING):  aluminum hydroxide/magnesium hydroxide/simethicone Suspension 30 milliLiter(s) Oral every 12 hours  ascorbic acid 500 milliGRAM(s) Oral daily  benztropine 0.5 milliGRAM(s) Oral two times a day  desmopressin 0.2 milliGRAM(s) Oral at bedtime  docusate sodium 100 milliGRAM(s) Oral two times a day  enoxaparin Injectable 40 milliGRAM(s) SubCutaneous at bedtime  famotidine    Tablet 20 milliGRAM(s) Oral daily  gabapentin 100 milliGRAM(s) Oral three times a day  levothyroxine 175 MICROGram(s) Oral daily  lidocaine   Patch 1 Patch Transdermal daily  lithium 300 milliGRAM(s) Oral every 12 hours  melatonin 3 milliGRAM(s) Oral at bedtime  metoprolol succinate ER 50 milliGRAM(s) Oral daily  multivitamin 1 Tablet(s) Oral daily  nicotine - 21 mG/24Hr(s) Patch 1 patch Transdermal daily  nystatin Powder 1 Application(s) Topical two times a day  OLANZapine 10 milliGRAM(s) Oral at bedtime  OLANZapine 5 milliGRAM(s) Oral daily  oxyCODONE  ER Tablet 10 milliGRAM(s) Oral every 12 hours  polyethylene glycol 3350 17 Gram(s) Oral daily  pyridoxine 50 milliGRAM(s) Oral daily  senna 2 Tablet(s) Oral at bedtime  valproic  acid Syrup 750 milliGRAM(s) Oral every 12 hours    MEDICATIONS  (PRN):  acetaminophen   Tablet .. 650 milliGRAM(s) Oral every 6 hours PRN Temp greater or equal to 38C (100.4F), Mild Pain (1 - 3)  ALBUTerol    90 MICROgram(s) HFA Inhaler 2 Puff(s) Inhalation every 6 hours PRN Shortness of Breath and/or Wheezing  bisacodyl Suppository 10 milliGRAM(s) Rectal daily PRN Constipation    Vital Signs Last 24 Hrs  T(F): 97.9 (22 Jul 2019 06:00), Max: 98.3 (21 Jul 2019 16:09)  HR: 71 (22 Jul 2019 08:14) (67 - 84)  BP: 139/81 (22 Jul 2019 06:00) (127/78 - 139/81)  RR: 15 (22 Jul 2019 06:00) (15 - 16)  SpO2: 95% (22 Jul 2019 08:14) (93% - 98%)  I&O's Summary    21 Jul 2019 07:01  -  22 Jul 2019 07:00  --------------------------------------------------------  IN: 2300 mL / OUT: 7 mL / NET: 2293 mL      PHYSICAL EXAM:  General: NAD, A/O x 3  ENT: MMM  Neck: Supple, No JVD  Lungs: Clear to auscultation bilaterally  Cardio: RRR, S1/S2, No murmurs  Abdomen: Soft, Nontender, Nondistended; Bowel sounds present  Extremities: No calf tenderness, No pitting edema  Neuro:  nonfocal  LABS:    07-22    142  |  107  |  22  ----------------------------<  99  4.3   |  29  |  0.65    Ca    10.1      22 Jul 2019 07:26      eGFR if African American: 129 mL/min/1.73M2 (07-22-19 @ 07:26)  eGFR if Non African American: 111 mL/min/1.73M2 (07-22-19 @ 07:26)    RADIOLOGY & ADDITIONAL TESTS:    Care Discussed with Consultants/Other Providers:

## 2019-07-22 NOTE — PROGRESS NOTE ADULT - PROBLEM SELECTOR PLAN 1
No suicidal thoughts. no auditory hallucinations.  - continue Valproic acid 750mg BID, level therapeutic 52  - Prolixin discontinued 07/10  - continue Olanzapine 5mg qAM, 10mg qHS  - continue Cogentin 0.5mg BID per Psych  - continue Lithium 300mg q12hrs, check level on 07/22  - Psych following No suicidal thoughts. no auditory hallucinations.  - continue Valproic acid 750mg BID, level therapeutic 52  - Prolixin discontinued 07/10  - continue Olanzapine 5mg qAM, 10mg qHS  - continue Cogentin 0.5mg BID per Psych  - continue Lithium 300mg q12hrs. Lithium level 0.6  - Psych following

## 2019-07-22 NOTE — DISCHARGE NOTE PROVIDER - HOSPITAL COURSE
52 yo M  with PMHx bipolar d/o, HTN, nocturnal enuresis, deformity of lower leg, hypothyroid, schizophrenia, BPH, IVDA currently residing at [facility] care s/p recent discharge from  acute rehab after  to C3-C6 laminectomy and fusion on 5/1/19 admitted to hospitalist with suicidal ideation.        Pt. seen by Psychiatry, placed on a 1:1 Observation initially.  He had his psych medications adjusted.    Pt. had an MRI of cervical spine.  He had pain medications adjusted.        < from: MR Cervical Spine w/wo IV Cont (07.10.19 @ 14:26) >            IMPRESSION:         Status post posterior decompression andcervical fusion extending from C3     to C6 level.    Focal T2/STIR hyperintense signal abnormality within the cervical cord at     the C4-C5 level , which may represent persistent cord edema and/or     developing myelomalacia.        Interval resolution of the dorsal paraspinal/epidural fluid collection at     C3-C6, compatible with interval resolution of postoperative     seroma/hematoma. Residual subcutaneous dorsal paraspinal edema extending     from the C2-C7 level, with resolution of previously seen subcutaneous     fluid collection, favored to represent resolved postoperative     seroma/hematoma. No new fluid collections.        Persistent bone marrow edema involving the C5 and to a lesser degree C4     vertebral body, nonspecific finding. Continued follow-up is advised.            < end of copied text >        Dispo: BRIGID --- Accepted to [place]. 52 yo M  with PMHx bipolar d/o, HTN, nocturnal enuresis, deformity of lower leg, hypothyroid, schizophrenia, BPH, IVDA currently residing at [facility] care s/p recent discharge from  acute rehab after  to C3-C6 laminectomy and fusion on 5/1/19 admitted to hospitalist with suicidal ideation.        Pt. seen by Psychiatry, placed on a 1:1 Observation initially.  He had his psych medications adjusted.    Pt. had an MRI of cervical spine.  He had pain medications adjusted. Pt no longer has suicidal thoughts.        < from: MR Cervical Spine w/wo IV Cont (07.10.19 @ 14:26) >            IMPRESSION:         Status post posterior decompression andcervical fusion extending from C3     to C6 level.    Focal T2/STIR hyperintense signal abnormality within the cervical cord at     the C4-C5 level , which may represent persistent cord edema and/or     developing myelomalacia.        Interval resolution of the dorsal paraspinal/epidural fluid collection at     C3-C6, compatible with interval resolution of postoperative     seroma/hematoma. Residual subcutaneous dorsal paraspinal edema extending     from the C2-C7 level, with resolution of previously seen subcutaneous     fluid collection, favored to represent resolved postoperative     seroma/hematoma. No new fluid collections.        Persistent bone marrow edema involving the C5 and to a lesser degree C4     vertebral body, nonspecific finding. Continued follow-up is advised.            < end of copied text >        Dispo: BRIGID --- Accepted to [place].

## 2019-07-22 NOTE — PROGRESS NOTE ADULT - PROVIDER SPECIALTY LIST ADULT
Hospitalist
Internal Medicine
Psychiatry
Psychiatry
Hospitalist

## 2019-07-22 NOTE — CHART NOTE - NSCHARTNOTEFT_GEN_A_CORE
NUTRITION FOLLOW UP    SOURCE: Patient [X)   Family [ ]     other [ ]    DIET: DASH    PATIENT REPORT[ ] nausea  [ ] vomiting [ ] diarrhea [ ] constipation  [ ]chewing problems [ ] swallowing issues  [ ] other: no GI distress    PO INTAKE:  < 50% [ ]   50-75%  [ X]   %  []  other :    SOURCE: for PO intake [X] Patient [ ] family [ ] chart [ ] staff [ ] other    ENTERAL/PARENTERAL NUTRITION: n/a    CURRENT WEIGHT:  7/20/19   107.9 kg.    PERTINENT LABS:    Date: 07-22  Sodium 142  Potassium 4.3  Glucose Serum 99  BUN 22      Creatinine    ACCUCHECK      SKIN: intact, no edema noted    ESTIMATED NEEDS:   [X] no change since previous assessment  [ ] recalculated:     PREVIOUS NUTRITION DIAGNOSIS: addressed    NUTRITION DIAGNOSIS is   [X] resolved, RD will follow as per nutrition department protocol.    NEW NUTRITION DIAGNOSIS: [X] not applicable    MONITORING AND EVALUATION:   Current diet order is appropriate and is well tolerated, but will monitor for any changes that may be needed    [X] PO intake [X] Tolerance to diet prescription [X] weights [X] follow up per protocol    NUTRITION RECOMMENDATIONS:    RD remains available MATI Hills RD CDE

## 2019-07-22 NOTE — PROGRESS NOTE ADULT - PROBLEM SELECTOR PROBLEM 1
Suicidal ideation

## 2019-07-22 NOTE — PROGRESS NOTE ADULT - ASSESSMENT
52 yo M  with PMHx bipolar d/o, HTN, nocturnal enuresis, deformity of lower leg, hypothyroid, schizophrenia, BPH, IVDA currently residing at [facility] s/p recent discharge from  acute rehab after  to C3-C6 laminectomy and fusion on 5/1/19 admitted to hospitalist with suicidal ideation.    Dispo: BRIGID with psych capacity when hospital psych clears for discharge
52 yo M  with PMHx bipolar d/o, HTN, nocturnal enuresis, deformity of lower leg, hypothyroid, schizophrenia, BPH, IVDA currently residing at [facility] care s/p recent discharge from  acute rehab after  to C3-C6 laminectomy and fusion on 5/1/19 admitted to hospitalist with suicidal ideation.    Dispo: BRIGID with psych capacity when hospital psych clears for discharge. Accepted to [place]. pending level 2 screening.
52 yo M  with PMHx bipolar d/o, HTN, nocturnal enuresis, deformity of lower leg, hypothyroid, schizophrenia, BPH, IVDA currently residing at [facility] s/p recent discharge from  acute rehab after  to C3-C6 laminectomy and fusion on 5/1/19 admitted to hospitalist with suicidal ideation.
52 yo M  with PMHx bipolar d/o, HTN, nocturnal enuresis, deformity of lower leg, hypothyroid, schizophrenia, BPH, IVDA currently residing at [facility] s/p recent discharge from  acute rehab after  to C3-C6 laminectomy and fusion on 5/1/19 admitted to hospitalist with suicidal ideation.    Dispo: BRIGID with psych capacity when hospital psych clears for discharge
52 yo M  with PMHx bipolar d/o, HTN, nocturnal enuresis, deformity of lower leg, hypothyroid, schizophrenia, BPH, IVDA currently residing at [facility] care s/p recent discharge from  acute rehab after  to C3-C6 laminectomy and fusion on 5/1/19 admitted to hospitalist with suicidal ideation.    Dispo: BRIGID with psych capacity when hospital psych clears for discharge. Accepted to [place]. pending level 2 screening.
52 yo M  with PMHx bipolar d/o, HTN, nocturnal enuresis, deformity of lower leg, hypothyroid, schizophrenia, BPH, IVDA currently residing at [facility] s/p recent discharge from  acute rehab after  to C3-C6 laminectomy and fusion on 5/1/19 admitted to hospitalist with suicidal ideation.    Dispo: BRIGID with psych capacity when hospital psych clears for discharge
52 yo M  with PMHx bipolar d/o, HTN, nocturnal enuresis, deformity of lower leg, hypothyroid, schizophrenia, BPH, IVDA currently residing at [facility] s/p recent discharge from  acute rehab after  to C3-C6 laminectomy and fusion on 5/1/19 admitted to hospitalist with suicidal ideation.    Dispo: BRIGID with psych capacity when hospital psych clears for discharge
52 yo M  with PMHx bipolar d/o, HTN, nocturnal enuresis, deformity of lower leg, hypothyroid, schizophrenia, BPH, IVDA currently residing at [facility] s/p recent discharge from  acute rehab after  to C3-C6 laminectomy and fusion on 5/1/19 admitted to hospitalist with suicidal ideation.    Dispo: BRIGID with psych capacity when hospital psych clears for discharge
54 yo M  with PMHx bipolar d/o, HTN, nocturnal enuresis, deformity of lower leg, hypothyroid, schizophrenia, BPH, IVDA currently residing at [facility] s/p recent discharge from  acute rehab after  to C3-C6 laminectomy and fusion on 5/1/19 admitted to hospitalist with suicidal ideation.    Dispo: BRIGID with psych capacity when hospital psych clears for discharge
54 yo M  with PMHx bipolar d/o, HTN, nocturnal enuresis, deformity of lower leg, hypothyroid, schizophrenia, BPH, IVDA currently residing at [facility] s/p recent discharge from  acute rehab after  to C3-C6 laminectomy and fusion on 5/1/19 admitted to hospitalist with suicidal ideation.    Dispo: BRIGID with psych capacity when hospital psych clears for discharge
54 yo M  with PMHx bipolar d/o, HTN, nocturnal enuresis, deformity of lower leg, hypothyroid, schizophrenia, BPH, IVDA currently residing at [facility] care s/p recent discharge from  acute rehab after  to C3-C6 laminectomy and fusion on 5/1/19 admitted to hospitalist with suicidal ideation.    Dispo: BRIGID with psych capacity when hospital psych clears for discharge. Accepted to [place]. pending level 2 screening.
54 yo M  with PMHx bipolar d/o, HTN, nocturnal enuresis, deformity of lower leg, hypothyroid, schizophrenia, BPH, IVDA currently residing at [facility] care s/p recent discharge from  acute rehab after  to C3-C6 laminectomy and fusion on 5/1/19 admitted to hospitalist with suicidal ideation.    Dispo: BRIGID with psych capacity when hospital psych clears for discharge. Accepted to [place]. pending level 2 screening.
54 yo M  with PMHx bipolar d/o, HTN, nocturnal enuresis, deformity of lower leg, hypothyroid, schizophrenia, BPH, IVDA currently residing at [facility] s/p recent discharge from  acute rehab after  to C3-C6 laminectomy and fusion on 5/1/19 admitted to hospitalist with suicidal ideation.    Dispo: BRIGID with psych capacity when hospital psych clears for discharge
54 yo M currently residing at [facility] s/p recent discharge from  acute rehab secondary to C3-C6 laminectomy and fusion on 5/1/19 with PMHx bipolar d/o, HTN, nocturnal enuresis, deformity of lower leg, hypothyroid, schizophrenia, BPH, IVDA admitted to hospitalist with suicidal ideation. He currently has no plan.  Psychiatry consulted. continues on 1:1 observation.
54 yo M  with PMHx bipolar d/o, HTN, nocturnal enuresis, deformity of lower leg, hypothyroid, schizophrenia, BPH, IVDA currently residing at [facility] s/p recent discharge from  acute rehab after  to C3-C6 laminectomy and fusion on 5/1/19 admitted to hospitalist with suicidal ideation.    Dispo: BRIGID with psych capacity when hospital psych clears for discharge
52 yo M  with PMHx bipolar d/o, HTN, nocturnal enuresis, deformity of lower leg, hypothyroid, schizophrenia, BPH, IVDA currently residing at [facility] care s/p recent discharge from  acute rehab after  to C3-C6 laminectomy and fusion on 5/1/19 admitted to hospitalist with suicidal ideation.    Dispo: BRIGID with psych capacity when hospital psych clears for discharge. Accepted to [place]
52 yo M  with PMHx bipolar d/o, HTN, nocturnal enuresis, deformity of lower leg, hypothyroid, schizophrenia, BPH, IVDA currently residing at [facility] s/p recent discharge from  acute rehab after  to C3-C6 laminectomy and fusion on 5/1/19 admitted to hospitalist with suicidal ideation.    Dispo: BRIGID with psych capacity when hospital psych clears for discharge
52 yo M  with PMHx bipolar d/o, HTN, nocturnal enuresis, deformity of lower leg, hypothyroid, schizophrenia, BPH, IVDA currently residing at [facility] s/p recent discharge from  acute rehab after  to C3-C6 laminectomy and fusion on 5/1/19 admitted to hospitalist with suicidal ideation.    Dispo: BRIGID with psych capacity when hospital psych clears for discharge

## 2019-07-22 NOTE — DISCHARGE NOTE PROVIDER - NSDCCPCAREPLAN_GEN_ALL_CORE_FT
PRINCIPAL DISCHARGE DIAGNOSIS  Diagnosis: Depression with suicidal ideation  Assessment and Plan of Treatment:

## 2019-07-22 NOTE — PROGRESS NOTE ADULT - PROBLEM SELECTOR PROBLEM 2
Cervical disc disorder

## 2019-07-22 NOTE — PROGRESS NOTE ADULT - REASON FOR ADMISSION
suicide ideation

## 2019-07-22 NOTE — DISCHARGE NOTE PROVIDER - CARE PROVIDER_API CALL
Alex Villasenor)  Neurosurgery  General  [address] [facility] [address], [address]  [place] [address]  Phone: [phone]  Fax: [phone]  Follow Up Time:

## 2019-07-22 NOTE — PROGRESS NOTE ADULT - PROBLEM SELECTOR PLAN 4
-pt still w/ complaints of Constipation; enema added to bowel regimen  - continue senna, colace and miralax  - on pepcid

## 2019-08-02 ENCOUNTER — INPATIENT (INPATIENT)
Facility: HOSPITAL | Age: 54
LOS: 2 days | Discharge: NURSING, REHAB OR LONG-TERM CARE | DRG: 175 | End: 2019-08-05
Attending: INTERNAL MEDICINE | Admitting: INTERNAL MEDICINE
Payer: MEDICARE

## 2019-08-02 VITALS — WEIGHT: 169.98 LBS | HEART RATE: 85 BPM | RESPIRATION RATE: 18 BRPM | TEMPERATURE: 98 F | OXYGEN SATURATION: 97 %

## 2019-08-02 DIAGNOSIS — R06.02 SHORTNESS OF BREATH: ICD-10-CM

## 2019-08-02 LAB
ALBUMIN SERPL ELPH-MCNC: 2.2 G/DL — LOW (ref 3.5–5)
ALP SERPL-CCNC: 110 U/L — SIGNIFICANT CHANGE UP (ref 40–120)
ALT FLD-CCNC: 20 U/L DA — SIGNIFICANT CHANGE UP (ref 10–60)
ANION GAP SERPL CALC-SCNC: 4 MMOL/L — LOW (ref 5–17)
APTT BLD: 36.7 SEC — HIGH (ref 27.5–36.3)
AST SERPL-CCNC: 17 U/L — SIGNIFICANT CHANGE UP (ref 10–40)
BASOPHILS # BLD AUTO: 0.1 K/UL — SIGNIFICANT CHANGE UP (ref 0–0.2)
BASOPHILS NFR BLD AUTO: 0.7 % — SIGNIFICANT CHANGE UP (ref 0–2)
BILIRUB SERPL-MCNC: 0.3 MG/DL — SIGNIFICANT CHANGE UP (ref 0.2–1.2)
BUN SERPL-MCNC: 14 MG/DL — SIGNIFICANT CHANGE UP (ref 7–18)
CALCIUM SERPL-MCNC: 10.5 MG/DL — SIGNIFICANT CHANGE UP (ref 8.4–10.5)
CHLORIDE SERPL-SCNC: 104 MMOL/L — SIGNIFICANT CHANGE UP (ref 96–108)
CO2 SERPL-SCNC: 33 MMOL/L — HIGH (ref 22–31)
CREAT SERPL-MCNC: 0.47 MG/DL — LOW (ref 0.5–1.3)
EOSINOPHIL # BLD AUTO: 0.49 K/UL — SIGNIFICANT CHANGE UP (ref 0–0.5)
EOSINOPHIL NFR BLD AUTO: 3.3 % — SIGNIFICANT CHANGE UP (ref 0–6)
GLUCOSE SERPL-MCNC: 96 MG/DL — SIGNIFICANT CHANGE UP (ref 70–99)
HCT VFR BLD CALC: 38.5 % — LOW (ref 39–50)
HGB BLD-MCNC: 12.3 G/DL — LOW (ref 13–17)
IMM GRANULOCYTES NFR BLD AUTO: 0.5 % — SIGNIFICANT CHANGE UP (ref 0–1.5)
INR BLD: 1.42 RATIO — HIGH (ref 0.88–1.16)
LIDOCAIN IGE QN: 254 U/L — SIGNIFICANT CHANGE UP (ref 73–393)
LYMPHOCYTES # BLD AUTO: 1.28 K/UL — SIGNIFICANT CHANGE UP (ref 1–3.3)
LYMPHOCYTES # BLD AUTO: 8.6 % — LOW (ref 13–44)
MCHC RBC-ENTMCNC: 28.6 PG — SIGNIFICANT CHANGE UP (ref 27–34)
MCHC RBC-ENTMCNC: 31.9 GM/DL — LOW (ref 32–36)
MCV RBC AUTO: 89.5 FL — SIGNIFICANT CHANGE UP (ref 80–100)
MONOCYTES # BLD AUTO: 1.06 K/UL — HIGH (ref 0–0.9)
MONOCYTES NFR BLD AUTO: 7.2 % — SIGNIFICANT CHANGE UP (ref 2–14)
NEUTROPHILS # BLD AUTO: 11.79 K/UL — HIGH (ref 1.8–7.4)
NEUTROPHILS NFR BLD AUTO: 79.7 % — HIGH (ref 43–77)
NRBC # BLD: 0 /100 WBCS — SIGNIFICANT CHANGE UP (ref 0–0)
NT-PROBNP SERPL-SCNC: 95 PG/ML — SIGNIFICANT CHANGE UP (ref 0–125)
PLATELET # BLD AUTO: 524 K/UL — HIGH (ref 150–400)
POTASSIUM SERPL-MCNC: 4.3 MMOL/L — SIGNIFICANT CHANGE UP (ref 3.5–5.3)
POTASSIUM SERPL-SCNC: 4.3 MMOL/L — SIGNIFICANT CHANGE UP (ref 3.5–5.3)
PROT SERPL-MCNC: 6.8 G/DL — SIGNIFICANT CHANGE UP (ref 6–8.3)
PROTHROM AB SERPL-ACNC: 15.9 SEC — HIGH (ref 10–12.9)
RBC # BLD: 4.3 M/UL — SIGNIFICANT CHANGE UP (ref 4.2–5.8)
RBC # FLD: 14.4 % — SIGNIFICANT CHANGE UP (ref 10.3–14.5)
SODIUM SERPL-SCNC: 141 MMOL/L — SIGNIFICANT CHANGE UP (ref 135–145)
TROPONIN I SERPL-MCNC: <0.015 NG/ML — SIGNIFICANT CHANGE UP (ref 0–0.04)
WBC # BLD: 14.8 K/UL — HIGH (ref 3.8–10.5)
WBC # FLD AUTO: 14.8 K/UL — HIGH (ref 3.8–10.5)

## 2019-08-02 PROCEDURE — 99285 EMERGENCY DEPT VISIT HI MDM: CPT

## 2019-08-02 PROCEDURE — 99222 1ST HOSP IP/OBS MODERATE 55: CPT

## 2019-08-02 RX ORDER — OXYCODONE HYDROCHLORIDE 5 MG/1
10 TABLET ORAL
Refills: 0 | Status: DISCONTINUED | OUTPATIENT
Start: 2019-08-02 | End: 2019-08-05

## 2019-08-02 RX ORDER — BENZTROPINE MESYLATE 1 MG
0.5 TABLET ORAL
Refills: 0 | Status: DISCONTINUED | OUTPATIENT
Start: 2019-08-02 | End: 2019-08-05

## 2019-08-02 RX ORDER — LANOLIN ALCOHOL/MO/W.PET/CERES
3 CREAM (GRAM) TOPICAL AT BEDTIME
Refills: 0 | Status: DISCONTINUED | OUTPATIENT
Start: 2019-08-02 | End: 2019-08-05

## 2019-08-02 RX ORDER — POLYETHYLENE GLYCOL 3350 17 G/17G
17 POWDER, FOR SOLUTION ORAL DAILY
Refills: 0 | Status: DISCONTINUED | OUTPATIENT
Start: 2019-08-02 | End: 2019-08-05

## 2019-08-02 RX ORDER — LACTULOSE 10 G/15ML
10 SOLUTION ORAL
Refills: 0 | Status: DISCONTINUED | OUTPATIENT
Start: 2019-08-02 | End: 2019-08-05

## 2019-08-02 RX ORDER — GABAPENTIN 400 MG/1
300 CAPSULE ORAL THREE TIMES A DAY
Refills: 0 | Status: DISCONTINUED | OUTPATIENT
Start: 2019-08-02 | End: 2019-08-05

## 2019-08-02 RX ORDER — DOCUSATE SODIUM 100 MG
100 CAPSULE ORAL DAILY
Refills: 0 | Status: DISCONTINUED | OUTPATIENT
Start: 2019-08-02 | End: 2019-08-05

## 2019-08-02 RX ORDER — PANTOPRAZOLE SODIUM 20 MG/1
40 TABLET, DELAYED RELEASE ORAL
Refills: 0 | Status: DISCONTINUED | OUTPATIENT
Start: 2019-08-02 | End: 2019-08-05

## 2019-08-02 RX ORDER — OLANZAPINE 15 MG/1
5 TABLET, FILM COATED ORAL DAILY
Refills: 0 | Status: DISCONTINUED | OUTPATIENT
Start: 2019-08-03 | End: 2019-08-05

## 2019-08-02 RX ORDER — DESMOPRESSIN ACETATE 0.1 MG/1
0.2 TABLET ORAL AT BEDTIME
Refills: 0 | Status: DISCONTINUED | OUTPATIENT
Start: 2019-08-02 | End: 2019-08-05

## 2019-08-02 RX ORDER — LITHIUM CARBONATE 300 MG/1
300 TABLET, EXTENDED RELEASE ORAL
Refills: 0 | Status: DISCONTINUED | OUTPATIENT
Start: 2019-08-02 | End: 2019-08-05

## 2019-08-02 RX ORDER — SODIUM CHLORIDE 9 MG/ML
1000 INJECTION INTRAMUSCULAR; INTRAVENOUS; SUBCUTANEOUS ONCE
Refills: 0 | Status: COMPLETED | OUTPATIENT
Start: 2019-08-02 | End: 2019-08-02

## 2019-08-02 RX ORDER — VALPROIC ACID (AS SODIUM SALT) 250 MG/5ML
750 SOLUTION, ORAL ORAL
Refills: 0 | Status: DISCONTINUED | OUTPATIENT
Start: 2019-08-02 | End: 2019-08-05

## 2019-08-02 RX ORDER — LEVOTHYROXINE SODIUM 125 MCG
175 TABLET ORAL DAILY
Refills: 0 | Status: DISCONTINUED | OUTPATIENT
Start: 2019-08-02 | End: 2019-08-05

## 2019-08-02 RX ORDER — METOPROLOL TARTRATE 50 MG
50 TABLET ORAL DAILY
Refills: 0 | Status: DISCONTINUED | OUTPATIENT
Start: 2019-08-02 | End: 2019-08-05

## 2019-08-02 RX ORDER — ENOXAPARIN SODIUM 100 MG/ML
40 INJECTION SUBCUTANEOUS AT BEDTIME
Refills: 0 | Status: DISCONTINUED | OUTPATIENT
Start: 2019-08-02 | End: 2019-08-04

## 2019-08-02 RX ORDER — IPRATROPIUM/ALBUTEROL SULFATE 18-103MCG
3 AEROSOL WITH ADAPTER (GRAM) INHALATION ONCE
Refills: 0 | Status: COMPLETED | OUTPATIENT
Start: 2019-08-02 | End: 2019-08-02

## 2019-08-02 RX ORDER — OLANZAPINE 15 MG/1
10 TABLET, FILM COATED ORAL AT BEDTIME
Refills: 0 | Status: DISCONTINUED | OUTPATIENT
Start: 2019-08-02 | End: 2019-08-05

## 2019-08-02 RX ADMIN — GABAPENTIN 300 MILLIGRAM(S): 400 CAPSULE ORAL at 23:58

## 2019-08-02 RX ADMIN — Medication 100 MILLIGRAM(S): at 23:58

## 2019-08-02 RX ADMIN — OLANZAPINE 10 MILLIGRAM(S): 15 TABLET, FILM COATED ORAL at 23:57

## 2019-08-02 RX ADMIN — Medication 3 MILLILITER(S): at 18:58

## 2019-08-02 RX ADMIN — Medication 3 MILLIGRAM(S): at 23:58

## 2019-08-02 RX ADMIN — Medication 1 MILLIGRAM(S): at 22:13

## 2019-08-02 RX ADMIN — SODIUM CHLORIDE 1000 MILLILITER(S): 9 INJECTION INTRAMUSCULAR; INTRAVENOUS; SUBCUTANEOUS at 20:22

## 2019-08-02 RX ADMIN — OXYCODONE HYDROCHLORIDE 10 MILLIGRAM(S): 5 TABLET ORAL at 22:13

## 2019-08-02 RX ADMIN — SODIUM CHLORIDE 1000 MILLILITER(S): 9 INJECTION INTRAMUSCULAR; INTRAVENOUS; SUBCUTANEOUS at 19:00

## 2019-08-02 RX ADMIN — ENOXAPARIN SODIUM 40 MILLIGRAM(S): 100 INJECTION SUBCUTANEOUS at 23:59

## 2019-08-02 NOTE — H&P ADULT - NSICDXPASTMEDICALHX_GEN_ALL_CORE_FT
PAST MEDICAL HISTORY:  Asthma     Bipolar disorder     HTN (hypertension)     Schizophreniform disorder

## 2019-08-02 NOTE — ED PROVIDER NOTE - OBJECTIVE STATEMENT
Patient is a 54 y.o. male with a significant PMHx of HTN and asthma with multiple comorbidities presents to the ED with shortness of breath for 1-2 days coming from [facility]. Denies hx of COPD or CHF. States that the SOB has been gradual. States he's had occasional cough. Denies fevers, chill, nausea, vomiting, or sweats. Denies chest pain. No other acute complaints. Per EMS, patient had episode of coughing PTA with a large piece of sputum coughed up. Patient was reportedly hypoxic in the field with O2 sat 91-92% on room air which is unusual for the patient.

## 2019-08-02 NOTE — H&P ADULT - PROBLEM SELECTOR PLAN 1
p/w Shortness of breath and SO2 91% on RA in NH  afebrile, with mild leukocytosis, vitals stable, 96% on RA  pt. refused ABG  CXR negative for infiltrate  EKG shows NSR with no ST-T wave changes  on lovenox for DVT PPx at NH  likely PNA vs bronchitis with some component of anxiety  c/w levaquin

## 2019-08-02 NOTE — H&P ADULT - ATTENDING COMMENTS
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 The NH also notes episode of coughing and SaO2 of 91%. There was no fevers, chills or additional symptoms.  At bedside, he complains of SOB but no coughing but otherwise no objective finding or respiratory distress. SaO2 in room air is about 96 - 98%.    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 The NH also notes episode of coughing and SaO2 of 91%. There was no fevers, chills or additional symptoms.  At bedside, he complains of SOB but no coughing but otherwise no objective finding or respiratory distress. SaO2 in room air is about 96 - 98%.    Vital Signs Last 24 Hrs  T(C): 36.6 (02 Aug 2019 20:30), Max: 36.7 (02 Aug 2019 17:55)  T(F): 97.8 (02 Aug 2019 20:30), Max: 98 (02 Aug 2019 17:55)  HR: 93 (02 Aug 2019 21:35) (82 - 93)  BP: 128/74 (02 Aug 2019 21:35) (119/74 - 128/74)  RR: 17 (02 Aug 2019 21:35) (17 - 18)  SpO2: 96% (02 Aug 2019 21:35) (96% - 98%)    Middle aged man, NAD of note, AAO X 3; " kept saying Dr, you agree something is wrong with me right" He is eating dinner with no issues  Good air entry B/L with added sounds.   RRR S1S2 only  Soft, full, NT ND BS +  No pedal edema    Labs                        12.3   14.80 )-----------( 524      ( 02 Aug 2019 19:01 )             38.5     08-02    141  |  104  |  14  ----------------------------<  96  4.3   |  33<H>  |  0.47<L>    Ca    10.5      02 Aug 2019 19:01    TPro  6.8  /  Alb  2.2<L>  /  TBili  0.3  /  DBili  x   /  AST  17  /  ALT  20  /  AlkPhos  110  08-02    CXR - appears unremarkable    Impression  54 year old man with hx as above presenting with SOB /cough and low SaO2 with suspected pneumonia. In the ED during evaluation, still c/o of SOB but with no additional finding on evalution.    A/P  - SOB  Suspected acute bronchitis vs. CAP   R/O anxiety disorder    Admit to Medicine  Symptomatic management of SOB  PRN - anxiolytics  Continue antibiotics for now and monitor  Resume home meds

## 2019-08-02 NOTE — H&P ADULT - NSHPPHYSICALEXAM_GEN_ALL_CORE
Vital Signs Last 24 Hrs  T(C): 36.7 (03 Aug 2019 04:58), Max: 36.7 (02 Aug 2019 17:55)  T(F): 98.1 (03 Aug 2019 04:58), Max: 98.1 (03 Aug 2019 04:58)  HR: 84 (03 Aug 2019 05:10) (76 - 93)  BP: 103/60 (03 Aug 2019 05:10) (101/65 - 128/74)  BP(mean): --  RR: 17 (03 Aug 2019 05:10) (17 - 18)  SpO2: 97% (03 Aug 2019 05:10) (95% - 98%)    PHYSICAL EXAM:  GENERAL: NAD, anxious  HEENT: Normocephalic;  conjunctivae and sclerae clear; moist mucous membranes;   NECK : supple  CHEST/LUNG: Clear to auscultation bilaterally with good air entry   HEART: S1 S2  regular; no murmurs, gallops or rubs  ABDOMEN: Soft, Nontender, Nondistended; Bowel sounds present  EXTREMITIES: no cyanosis; no edema; no calf tenderness  SKIN: warm and dry; no rash  NERVOUS SYSTEM:  Awake and alert; Oriented  to place, person and time ; no new deficits

## 2019-08-02 NOTE — H&P ADULT - ASSESSMENT
54M from [facility], with PMHx of HTN, asthma, schizophreniform disorder, bipolar disorder, GERD, DI, Hypothyroidism, chronic pain syndrome an PSH of cervical spine surgery presented to ED c/o shortness of breath. On my evaluation, pt. was AAO X3 but very anxious. He states he started having shortness of breath at rest since morning, ass. with cough and left sided chest pain 10/10, pressure like, reproducible, increased with inspiration and relieved on relaxing. He denies any fever. chills. nausea, vomiting, palpitations, leg swellings/pain.      ED course: Vitals stable with SO2 96% on RA  Labs significant for mild leukocytosis 14k, Hb 12.3  Radiological findings- CXR negative  s/p levaquin 750, duoneb

## 2019-08-02 NOTE — H&P ADULT - HISTORY OF PRESENT ILLNESS
54M from [facility], with PMHx of HTN, asthma, schizophreniform disorder, bipolar disorder, GERD, DI, Hypothyroidism, chronic pain syndrome an PSH of cervical spine surgery presented to ED c/o shortness of breath. On my evaluation, pt. was AAO X3 but very anxious. He states he started having shortness of breath at rest since morning, ass. with cough and left sided chest pain 10/10, pressure like, reproducible, increased with inspiration and relieved on relaxing. He denies any fever. chills. nausea, vomiting, palpitations, leg swellings/pain.

## 2019-08-02 NOTE — ED ADULT NURSE NOTE - NSIMPLEMENTINTERV_GEN_ALL_ED
Implemented All Universal Safety Interventions:  [place] to call system. Call bell, personal items and telephone within reach. Instruct patient to call for assistance. Room bathroom lighting operational. Non-slip footwear when patient is off stretcher. Physically safe environment: no spills, clutter or unnecessary equipment. Stretcher in lowest position, wheels locked, appropriate side rails in place.

## 2019-08-02 NOTE — H&P ADULT - NSHPREVIEWOFSYSTEMS_GEN_ALL_CORE
CONSTITUTIONAL: No fever,   EYES: no acute visual disturbances  NECK: No pain or stiffness  RESPIRATORY: No cough; + shortness of breath  CARDIOVASCULAR: No chest pain, no palpitations  GASTROINTESTINAL: No pain. No nausea or vomiting; No diarrhea   NEUROLOGICAL: No headache or numbness, no tremors  MUSCULOSKELETAL: No joint pain, no muscle pain  GENITOURINARY: no dysuria, no frequency, no hesitancy  PSYCHIATRY: no depression , no anxiety  ALL OTHER  ROS negative

## 2019-08-02 NOTE — H&P ADULT - PROBLEM SELECTOR PLAN 4
IMPROVE VTE Individual Risk Assessment  RISK                                                                Points  [  ] Previous VTE                                                  3  [  ] Thrombophilia                                               2  [  ] Lower limb paralysis                                      2        (unable to hold up >15 seconds)    [  ] Current Cancer                                              2         (within 6 months)  [x  ] Immobilization > 24 hrs                                1  [  ] ICU/CCU stay > 24 hours                              1  [ ] Age > 60                                                      1  IMPROVE VTE Score 1,   lovenox for DVT PPX

## 2019-08-03 DIAGNOSIS — Z29.9 ENCOUNTER FOR PROPHYLACTIC MEASURES, UNSPECIFIED: ICD-10-CM

## 2019-08-03 DIAGNOSIS — R06.02 SHORTNESS OF BREATH: ICD-10-CM

## 2019-08-03 DIAGNOSIS — F20.81 SCHIZOPHRENIFORM DISORDER: ICD-10-CM

## 2019-08-03 DIAGNOSIS — I10 ESSENTIAL (PRIMARY) HYPERTENSION: ICD-10-CM

## 2019-08-03 LAB
ALBUMIN SERPL ELPH-MCNC: 2 G/DL — LOW (ref 3.5–5)
ALP SERPL-CCNC: 100 U/L — SIGNIFICANT CHANGE UP (ref 40–120)
ALT FLD-CCNC: 18 U/L DA — SIGNIFICANT CHANGE UP (ref 10–60)
ANION GAP SERPL CALC-SCNC: 5 MMOL/L — SIGNIFICANT CHANGE UP (ref 5–17)
AST SERPL-CCNC: 15 U/L — SIGNIFICANT CHANGE UP (ref 10–40)
BASOPHILS # BLD AUTO: 0.08 K/UL — SIGNIFICANT CHANGE UP (ref 0–0.2)
BASOPHILS NFR BLD AUTO: 0.7 % — SIGNIFICANT CHANGE UP (ref 0–2)
BILIRUB SERPL-MCNC: 0.3 MG/DL — SIGNIFICANT CHANGE UP (ref 0.2–1.2)
BUN SERPL-MCNC: 15 MG/DL — SIGNIFICANT CHANGE UP (ref 7–18)
CALCIUM SERPL-MCNC: 9.9 MG/DL — SIGNIFICANT CHANGE UP (ref 8.4–10.5)
CHLORIDE SERPL-SCNC: 104 MMOL/L — SIGNIFICANT CHANGE UP (ref 96–108)
CHOLEST SERPL-MCNC: 79 MG/DL — SIGNIFICANT CHANGE UP (ref 10–199)
CK MB BLD-MCNC: <3.2 % — SIGNIFICANT CHANGE UP (ref 0–3.5)
CK MB CFR SERPL CALC: <1 NG/ML — SIGNIFICANT CHANGE UP (ref 0–3.6)
CK SERPL-CCNC: 31 U/L — LOW (ref 35–232)
CO2 SERPL-SCNC: 32 MMOL/L — HIGH (ref 22–31)
CREAT SERPL-MCNC: 0.52 MG/DL — SIGNIFICANT CHANGE UP (ref 0.5–1.3)
EOSINOPHIL # BLD AUTO: 0.7 K/UL — HIGH (ref 0–0.5)
EOSINOPHIL NFR BLD AUTO: 6.2 % — HIGH (ref 0–6)
FERRITIN SERPL-MCNC: 415 NG/ML — HIGH (ref 30–400)
FOLATE SERPL-MCNC: 8.6 NG/ML — SIGNIFICANT CHANGE UP
GLUCOSE SERPL-MCNC: 93 MG/DL — SIGNIFICANT CHANGE UP (ref 70–99)
HBA1C BLD-MCNC: 4.6 % — SIGNIFICANT CHANGE UP (ref 4–5.6)
HCT VFR BLD CALC: 36.1 % — LOW (ref 39–50)
HDLC SERPL-MCNC: 11 MG/DL — LOW
HGB BLD-MCNC: 11.3 G/DL — LOW (ref 13–17)
IMM GRANULOCYTES NFR BLD AUTO: 0.6 % — SIGNIFICANT CHANGE UP (ref 0–1.5)
IRON SATN MFR SERPL: 25 % — SIGNIFICANT CHANGE UP (ref 20–55)
IRON SATN MFR SERPL: 47 UG/DL — LOW (ref 65–170)
LIPID PNL WITH DIRECT LDL SERPL: 36 MG/DL — SIGNIFICANT CHANGE UP
LITHIUM SERPL-MCNC: 0.7 MMOL/L — SIGNIFICANT CHANGE UP (ref 0.6–1.2)
LYMPHOCYTES # BLD AUTO: 1.34 K/UL — SIGNIFICANT CHANGE UP (ref 1–3.3)
LYMPHOCYTES # BLD AUTO: 11.8 % — LOW (ref 13–44)
MAGNESIUM SERPL-MCNC: 2.2 MG/DL — SIGNIFICANT CHANGE UP (ref 1.6–2.6)
MCHC RBC-ENTMCNC: 28.5 PG — SIGNIFICANT CHANGE UP (ref 27–34)
MCHC RBC-ENTMCNC: 31.3 GM/DL — LOW (ref 32–36)
MCV RBC AUTO: 90.9 FL — SIGNIFICANT CHANGE UP (ref 80–100)
MONOCYTES # BLD AUTO: 0.83 K/UL — SIGNIFICANT CHANGE UP (ref 0–0.9)
MONOCYTES NFR BLD AUTO: 7.3 % — SIGNIFICANT CHANGE UP (ref 2–14)
NEUTROPHILS # BLD AUTO: 8.36 K/UL — HIGH (ref 1.8–7.4)
NEUTROPHILS NFR BLD AUTO: 73.4 % — SIGNIFICANT CHANGE UP (ref 43–77)
NRBC # BLD: 0 /100 WBCS — SIGNIFICANT CHANGE UP (ref 0–0)
PHOSPHATE SERPL-MCNC: 3.9 MG/DL — SIGNIFICANT CHANGE UP (ref 2.5–4.5)
PLATELET # BLD AUTO: 507 K/UL — HIGH (ref 150–400)
POTASSIUM SERPL-MCNC: 4.3 MMOL/L — SIGNIFICANT CHANGE UP (ref 3.5–5.3)
POTASSIUM SERPL-SCNC: 4.3 MMOL/L — SIGNIFICANT CHANGE UP (ref 3.5–5.3)
PROT SERPL-MCNC: 6.2 G/DL — SIGNIFICANT CHANGE UP (ref 6–8.3)
RBC # BLD: 3.97 M/UL — LOW (ref 4.2–5.8)
RBC # FLD: 14.5 % — SIGNIFICANT CHANGE UP (ref 10.3–14.5)
SODIUM SERPL-SCNC: 141 MMOL/L — SIGNIFICANT CHANGE UP (ref 135–145)
TIBC SERPL-MCNC: 190 UG/DL — LOW (ref 250–450)
TOTAL CHOLESTEROL/HDL RATIO MEASUREMENT: 7.2 RATIO — SIGNIFICANT CHANGE UP (ref 3.4–9.6)
TRIGL SERPL-MCNC: 159 MG/DL — HIGH (ref 10–149)
TROPONIN I SERPL-MCNC: <0.015 NG/ML — SIGNIFICANT CHANGE UP (ref 0–0.04)
TSH SERPL-MCNC: 5.23 UU/ML — HIGH (ref 0.34–4.82)
UIBC SERPL-MCNC: 143 UG/DL — SIGNIFICANT CHANGE UP (ref 110–370)
VALPROATE SERPL-MCNC: 45 UG/ML — LOW (ref 50–100)
VIT B12 SERPL-MCNC: 964 PG/ML — SIGNIFICANT CHANGE UP (ref 232–1245)
WBC # BLD: 11.38 K/UL — HIGH (ref 3.8–10.5)
WBC # FLD AUTO: 11.38 K/UL — HIGH (ref 3.8–10.5)

## 2019-08-03 PROCEDURE — 99233 SBSQ HOSP IP/OBS HIGH 50: CPT | Mod: GC

## 2019-08-03 PROCEDURE — 71275 CT ANGIOGRAPHY CHEST: CPT | Mod: 26

## 2019-08-03 RX ORDER — TIOTROPIUM BROMIDE 18 UG/1
1 CAPSULE ORAL; RESPIRATORY (INHALATION) DAILY
Refills: 0 | Status: DISCONTINUED | OUTPATIENT
Start: 2019-08-03 | End: 2019-08-05

## 2019-08-03 RX ORDER — IPRATROPIUM/ALBUTEROL SULFATE 18-103MCG
3 AEROSOL WITH ADAPTER (GRAM) INHALATION EVERY 6 HOURS
Refills: 0 | Status: DISCONTINUED | OUTPATIENT
Start: 2019-08-03 | End: 2019-08-05

## 2019-08-03 RX ORDER — ALBUTEROL 90 UG/1
1 AEROSOL, METERED ORAL EVERY 4 HOURS
Refills: 0 | Status: DISCONTINUED | OUTPATIENT
Start: 2019-08-03 | End: 2019-08-05

## 2019-08-03 RX ADMIN — Medication 750 MILLIGRAM(S): at 18:56

## 2019-08-03 RX ADMIN — ENOXAPARIN SODIUM 40 MILLIGRAM(S): 100 INJECTION SUBCUTANEOUS at 21:22

## 2019-08-03 RX ADMIN — OXYCODONE HYDROCHLORIDE 10 MILLIGRAM(S): 5 TABLET ORAL at 19:00

## 2019-08-03 RX ADMIN — OXYCODONE HYDROCHLORIDE 10 MILLIGRAM(S): 5 TABLET ORAL at 07:42

## 2019-08-03 RX ADMIN — OLANZAPINE 5 MILLIGRAM(S): 15 TABLET, FILM COATED ORAL at 11:48

## 2019-08-03 RX ADMIN — Medication 3 MILLILITER(S): at 15:04

## 2019-08-03 RX ADMIN — OXYCODONE HYDROCHLORIDE 10 MILLIGRAM(S): 5 TABLET ORAL at 18:55

## 2019-08-03 RX ADMIN — Medication 0.5 MILLIGRAM(S): at 06:47

## 2019-08-03 RX ADMIN — LACTULOSE 10 GRAM(S): 10 SOLUTION ORAL at 06:44

## 2019-08-03 RX ADMIN — LACTULOSE 10 GRAM(S): 10 SOLUTION ORAL at 18:56

## 2019-08-03 RX ADMIN — Medication 3 MILLILITER(S): at 08:23

## 2019-08-03 RX ADMIN — GABAPENTIN 300 MILLIGRAM(S): 400 CAPSULE ORAL at 21:22

## 2019-08-03 RX ADMIN — Medication 750 MILLIGRAM(S): at 06:46

## 2019-08-03 RX ADMIN — LITHIUM CARBONATE 300 MILLIGRAM(S): 300 TABLET, EXTENDED RELEASE ORAL at 18:56

## 2019-08-03 RX ADMIN — OXYCODONE HYDROCHLORIDE 10 MILLIGRAM(S): 5 TABLET ORAL at 07:14

## 2019-08-03 RX ADMIN — PANTOPRAZOLE SODIUM 40 MILLIGRAM(S): 20 TABLET, DELAYED RELEASE ORAL at 06:46

## 2019-08-03 RX ADMIN — POLYETHYLENE GLYCOL 3350 17 GRAM(S): 17 POWDER, FOR SOLUTION ORAL at 11:47

## 2019-08-03 RX ADMIN — DESMOPRESSIN ACETATE 0.2 MILLIGRAM(S): 0.1 TABLET ORAL at 00:01

## 2019-08-03 RX ADMIN — DESMOPRESSIN ACETATE 0.2 MILLIGRAM(S): 0.1 TABLET ORAL at 21:22

## 2019-08-03 RX ADMIN — LITHIUM CARBONATE 300 MILLIGRAM(S): 300 TABLET, EXTENDED RELEASE ORAL at 06:51

## 2019-08-03 RX ADMIN — GABAPENTIN 300 MILLIGRAM(S): 400 CAPSULE ORAL at 06:47

## 2019-08-03 RX ADMIN — Medication 0.5 MILLIGRAM(S): at 18:56

## 2019-08-03 RX ADMIN — GABAPENTIN 300 MILLIGRAM(S): 400 CAPSULE ORAL at 12:04

## 2019-08-03 RX ADMIN — Medication 3 MILLIGRAM(S): at 21:22

## 2019-08-03 RX ADMIN — Medication 175 MICROGRAM(S): at 06:51

## 2019-08-03 RX ADMIN — Medication 100 MILLIGRAM(S): at 11:47

## 2019-08-03 RX ADMIN — Medication 3 MILLILITER(S): at 20:49

## 2019-08-03 RX ADMIN — OLANZAPINE 10 MILLIGRAM(S): 15 TABLET, FILM COATED ORAL at 21:21

## 2019-08-03 NOTE — PATIENT PROFILE ADULT - MONEY FOR FOOD
spoke with NP who agrees that pt has BPD traits rather than classic bipolar illness. pt has not been persistently depressed and this appears to be impulsive. no h/o SA. she has been working with the family to encourage their own therapy because is hard on the pt. she has been educating them about mental health and reinforcing the need for constant f/u bc they have been missing appointments.
no

## 2019-08-03 NOTE — PROGRESS NOTE ADULT - ASSESSMENT
54M from [facility], with PMHx of HTN, asthma, schizophreniform disorder, bipolar disorder, GERD, DI, Hypothyroidism, chronic pain syndrome an PSH of cervical spine surgery presented to ED c/o shortness of breath. On my evaluation, pt. was AAO X3 but very anxious. He states he started having shortness of breath at rest since morning, ass. with cough and left sided chest pain 10/10, pressure like, reproducible, increased with inspiration and relieved on relaxing. He denies any fever. chills. nausea, vomiting, palpitations, leg swellings/pain.      ED course: Vitals stable with SO2 96% on RA  Labs significant for mild leukocytosis 14k, Hb 12.3  Radiological findings- CXR negative  s/p levaquin 750, duoneb

## 2019-08-03 NOTE — PROGRESS NOTE ADULT - SUBJECTIVE AND OBJECTIVE BOX
Patient is a 54y old  Male who presents with a chief complaint of Shortness of breath (02 Aug 2019 22:05)      INTERVAL HPI/OVERNIGHT EVENTS: no new complaints    MEDICATIONS  (STANDING):  ALBUTerol    90 MICROgram(s) HFA Inhaler 1 Puff(s) Inhalation every 4 hours  ALBUTerol/ipratropium for Nebulization 3 milliLiter(s) Nebulizer every 6 hours  benztropine 0.5 milliGRAM(s) Oral two times a day  desmopressin 0.2 milliGRAM(s) Oral at bedtime  docusate sodium 100 milliGRAM(s) Oral daily  enoxaparin Injectable 40 milliGRAM(s) SubCutaneous at bedtime  gabapentin 300 milliGRAM(s) Oral three times a day  lactulose Syrup 10 Gram(s) Oral two times a day  levoFLOXacin IVPB 750 milliGRAM(s) IV Intermittent every 24 hours  levothyroxine 175 MICROGram(s) Oral daily  lithium SR (LITHOBID) 300 milliGRAM(s) Oral two times a day  melatonin 3 milliGRAM(s) Oral at bedtime  metoprolol succinate ER 50 milliGRAM(s) Oral daily  OLANZapine 5 milliGRAM(s) Oral daily  OLANZapine 10 milliGRAM(s) Oral at bedtime  oxyCODONE    IR 10 milliGRAM(s) Oral two times a day  pantoprazole    Tablet 40 milliGRAM(s) Oral before breakfast  polyethylene glycol 3350 17 Gram(s) Oral daily  tiotropium 18 MICROgram(s) Capsule 1 Capsule(s) Inhalation daily  valproic  acid Syrup 750 milliGRAM(s) Oral two times a day    MEDICATIONS  (PRN):      __________________________________________________  REVIEW OF SYSTEMS:    CONSTITUTIONAL: No fever,   EYES: no acute visual disturbances  NECK: No pain or stiffness  RESPIRATORY: No cough; No shortness of breath  CARDIOVASCULAR: No chest pain, no palpitations  GASTROINTESTINAL: No pain. No nausea or vomiting; No diarrhea   NEUROLOGICAL: No headache or numbness, no tremors  MUSCULOSKELETAL: No joint pain, no muscle pain  GENITOURINARY: no dysuria, no frequency, no hesitancy  PSYCHIATRY: no depression , no anxiety  ALL OTHER  ROS negative        Vital Signs Last 24 Hrs  T(C): 36.5 (03 Aug 2019 13:58), Max: 36.7 (02 Aug 2019 17:55)  T(F): 97.7 (03 Aug 2019 13:58), Max: 98.1 (03 Aug 2019 04:58)  HR: 87 (03 Aug 2019 13:58) (76 - 93)  BP: 119/90 (03 Aug 2019 13:58) (101/65 - 128/74)  BP(mean): --  RR: 18 (03 Aug 2019 13:58) (17 - 18)  SpO2: 96% (03 Aug 2019 13:58) (95% - 98%)    ________________________________________________  PHYSICAL EXAM:  GENERAL: NAD  HEENT: Normocephalic;  conjunctivae and sclerae clear; moist mucous membranes;   NECK : supple  CHEST/LUNG: Clear to auscultation bilaterally with good air entry   HEART: S1 S2  regular; no murmurs, gallops or rubs  ABDOMEN: Soft, Nontender, Nondistended; Bowel sounds present  EXTREMITIES: no cyanosis; no edema; no calf tenderness  SKIN: warm and dry; no rash  NERVOUS SYSTEM:  Awake and alert; Oriented  to place, person and time ; no new deficits    _________________________________________________  LABS:                        11.3   11.38 )-----------( 507      ( 03 Aug 2019 06:26 )             36.1     08-03    141  |  104  |  15  ----------------------------<  93  4.3   |  32<H>  |  0.52    Ca    9.9      03 Aug 2019 06:26  Phos  3.9     08-03  Mg     2.2     08-03    TPro  6.2  /  Alb  2.0<L>  /  TBili  0.3  /  DBili  x   /  AST  15  /  ALT  18  /  AlkPhos  100  08-03    PT/INR - ( 02 Aug 2019 19:01 )   PT: 15.9 sec;   INR: 1.42 ratio         PTT - ( 02 Aug 2019 19:01 )  PTT:36.7 sec    CAPILLARY BLOOD GLUCOSE            RADIOLOGY & ADDITIONAL TESTS:    Imaging Personally Reviewed:  YES/NO    Consultant(s) Notes Reviewed:   YES/ No    Care Discussed with Consultants :     Plan of care was discussed with patient and /or primary care giver; all questions and concerns were addressed and care was aligned with patient's wishes. Patient is a 54y old  Male who presents with a chief complaint of Shortness of breath (02 Aug 2019 22:05)      INTERVAL HPI/OVERNIGHT EVENTS:   Patient reports feeling anxious. No chest pain, nausea/vomiting/LH. Reports intermittent sob  +rare cough, mostly nonproductive. No fevers/chills/sick contacts    MEDICATIONS  (STANDING):  ALBUTerol    90 MICROgram(s) HFA Inhaler 1 Puff(s) Inhalation every 4 hours  ALBUTerol/ipratropium for Nebulization 3 milliLiter(s) Nebulizer every 6 hours  benztropine 0.5 milliGRAM(s) Oral two times a day  desmopressin 0.2 milliGRAM(s) Oral at bedtime  docusate sodium 100 milliGRAM(s) Oral daily  enoxaparin Injectable 40 milliGRAM(s) SubCutaneous at bedtime  gabapentin 300 milliGRAM(s) Oral three times a day  lactulose Syrup 10 Gram(s) Oral two times a day  levoFLOXacin IVPB 750 milliGRAM(s) IV Intermittent every 24 hours  levothyroxine 175 MICROGram(s) Oral daily  lithium SR (LITHOBID) 300 milliGRAM(s) Oral two times a day  melatonin 3 milliGRAM(s) Oral at bedtime  metoprolol succinate ER 50 milliGRAM(s) Oral daily  OLANZapine 5 milliGRAM(s) Oral daily  OLANZapine 10 milliGRAM(s) Oral at bedtime  oxyCODONE    IR 10 milliGRAM(s) Oral two times a day  pantoprazole    Tablet 40 milliGRAM(s) Oral before breakfast  polyethylene glycol 3350 17 Gram(s) Oral daily  tiotropium 18 MICROgram(s) Capsule 1 Capsule(s) Inhalation daily  valproic  acid Syrup 750 milliGRAM(s) Oral two times a day    MEDICATIONS  (PRN):      __________________________________________________  REVIEW OF SYSTEMS:    CONSTITUTIONAL: No fever,   EYES: no acute visual disturbances  NECK: No pain or stiffness  RESPIRATORY: No cough; No shortness of breath  CARDIOVASCULAR: No chest pain, no palpitations  GASTROINTESTINAL: No pain. No nausea or vomiting; No diarrhea   NEUROLOGICAL: No headache or numbness, no tremors  MUSCULOSKELETAL: No joint pain, no muscle pain  GENITOURINARY: no dysuria, no frequency, no hesitancy  PSYCHIATRY: no depression , no anxiety  ALL OTHER  ROS negative        Vital Signs Last 24 Hrs  T(C): 36.5 (03 Aug 2019 13:58), Max: 36.7 (02 Aug 2019 17:55)  T(F): 97.7 (03 Aug 2019 13:58), Max: 98.1 (03 Aug 2019 04:58)  HR: 87 (03 Aug 2019 13:58) (76 - 93)  BP: 119/90 (03 Aug 2019 13:58) (101/65 - 128/74)  BP(mean): --  RR: 18 (03 Aug 2019 13:58) (17 - 18)  SpO2: 96% (03 Aug 2019 13:58) (95% - 98%)    ________________________________________________  PHYSICAL EXAM:  GENERAL: NAD  HEENT: Normocephalic;  conjunctivae and sclerae clear; moist mucous membranes;   NECK : supple  CHEST/LUNG: Clear to auscultation bilaterally with good air entry   HEART: S1 S2  regular; no murmurs, gallops or rubs  ABDOMEN: Soft, Nontender, Nondistended; Bowel sounds present  EXTREMITIES: no cyanosis; no edema; no calf tenderness  SKIN: warm and dry; no rash  NERVOUS SYSTEM:  Awake and alert; Oriented  to place, person and time ; no new deficits    _________________________________________________  LABS:                        11.3   11.38 )-----------( 507      ( 03 Aug 2019 06:26 )             36.1     08-03    141  |  104  |  15  ----------------------------<  93  4.3   |  32<H>  |  0.52    Ca    9.9      03 Aug 2019 06:26  Phos  3.9     08-03  Mg     2.2     08-03    TPro  6.2  /  Alb  2.0<L>  /  TBili  0.3  /  DBili  x   /  AST  15  /  ALT  18  /  AlkPhos  100  08-03    PT/INR - ( 02 Aug 2019 19:01 )   PT: 15.9 sec;   INR: 1.42 ratio         PTT - ( 02 Aug 2019 19:01 )  PTT:36.7 sec    CAPILLARY BLOOD GLUCOSE            RADIOLOGY & ADDITIONAL TESTS:    Imaging Personally Reviewed:  YES/NO    Consultant(s) Notes Reviewed:   YES/ No    Care Discussed with Consultants :     Plan of care was discussed with patient and /or primary care giver; all questions and concerns were addressed and care was aligned with patient's wishes.

## 2019-08-03 NOTE — PROGRESS NOTE ADULT - PROBLEM SELECTOR PLAN 1
p/w Shortness of breath and SO2 91% on RA in NH  afebrile, with mild leukocytosis, vitals stable, 96% on RA  pt. refused ABG  CXR negative for infiltrate  EKG shows NSR with no ST-T wave changes  on lovenox for DVT PPx at NH  likely PNA vs bronchitis with some component of anxiety  c/w levaquin p/w Shortness of breath and SO2 91% on RA in NH  afebrile, with mild leukocytosis, vitals stable, 96% on RA  pt. refused ABG however, there is clinical concern for pulmonary embolism based on paucity of pulmonary complaints consistent with pneumonia and a negative CXR negative   -proceed to CT chest angio  -treatment dose lovenox until CT angio results are known  on lovenox for DVT PPx at NH  discontinue levofloxacin if no evidence of pneumonia

## 2019-08-04 DIAGNOSIS — I26.90 SEPTIC PULMONARY EMBOLISM WITHOUT ACUTE COR PULMONALE: ICD-10-CM

## 2019-08-04 LAB
ALBUMIN SERPL ELPH-MCNC: 2.1 G/DL — LOW (ref 3.5–5)
ALP SERPL-CCNC: 106 U/L — SIGNIFICANT CHANGE UP (ref 40–120)
ALT FLD-CCNC: 18 U/L DA — SIGNIFICANT CHANGE UP (ref 10–60)
ANION GAP SERPL CALC-SCNC: 5 MMOL/L — SIGNIFICANT CHANGE UP (ref 5–17)
AST SERPL-CCNC: 14 U/L — SIGNIFICANT CHANGE UP (ref 10–40)
BASOPHILS # BLD AUTO: 0.09 K/UL — SIGNIFICANT CHANGE UP (ref 0–0.2)
BASOPHILS NFR BLD AUTO: 1 % — SIGNIFICANT CHANGE UP (ref 0–2)
BILIRUB SERPL-MCNC: 0.4 MG/DL — SIGNIFICANT CHANGE UP (ref 0.2–1.2)
BUN SERPL-MCNC: 15 MG/DL — SIGNIFICANT CHANGE UP (ref 7–18)
CALCIUM SERPL-MCNC: 10.1 MG/DL — SIGNIFICANT CHANGE UP (ref 8.4–10.5)
CHLORIDE SERPL-SCNC: 108 MMOL/L — SIGNIFICANT CHANGE UP (ref 96–108)
CO2 SERPL-SCNC: 31 MMOL/L — SIGNIFICANT CHANGE UP (ref 22–31)
CREAT SERPL-MCNC: 0.62 MG/DL — SIGNIFICANT CHANGE UP (ref 0.5–1.3)
EOSINOPHIL # BLD AUTO: 0.8 K/UL — HIGH (ref 0–0.5)
EOSINOPHIL NFR BLD AUTO: 8.8 % — HIGH (ref 0–6)
GLUCOSE SERPL-MCNC: 99 MG/DL — SIGNIFICANT CHANGE UP (ref 70–99)
HCT VFR BLD CALC: 37.2 % — LOW (ref 39–50)
HGB BLD-MCNC: 11.6 G/DL — LOW (ref 13–17)
IMM GRANULOCYTES NFR BLD AUTO: 1.2 % — SIGNIFICANT CHANGE UP (ref 0–1.5)
LYMPHOCYTES # BLD AUTO: 1.29 K/UL — SIGNIFICANT CHANGE UP (ref 1–3.3)
LYMPHOCYTES # BLD AUTO: 14.2 % — SIGNIFICANT CHANGE UP (ref 13–44)
MCHC RBC-ENTMCNC: 28 PG — SIGNIFICANT CHANGE UP (ref 27–34)
MCHC RBC-ENTMCNC: 31.2 GM/DL — LOW (ref 32–36)
MCV RBC AUTO: 89.6 FL — SIGNIFICANT CHANGE UP (ref 80–100)
MONOCYTES # BLD AUTO: 0.68 K/UL — SIGNIFICANT CHANGE UP (ref 0–0.9)
MONOCYTES NFR BLD AUTO: 7.5 % — SIGNIFICANT CHANGE UP (ref 2–14)
NEUTROPHILS # BLD AUTO: 6.14 K/UL — SIGNIFICANT CHANGE UP (ref 1.8–7.4)
NEUTROPHILS NFR BLD AUTO: 67.3 % — SIGNIFICANT CHANGE UP (ref 43–77)
NRBC # BLD: 0 /100 WBCS — SIGNIFICANT CHANGE UP (ref 0–0)
PLATELET # BLD AUTO: 543 K/UL — HIGH (ref 150–400)
POTASSIUM SERPL-MCNC: 4.3 MMOL/L — SIGNIFICANT CHANGE UP (ref 3.5–5.3)
POTASSIUM SERPL-SCNC: 4.3 MMOL/L — SIGNIFICANT CHANGE UP (ref 3.5–5.3)
PROT SERPL-MCNC: 6.3 G/DL — SIGNIFICANT CHANGE UP (ref 6–8.3)
RBC # BLD: 4.15 M/UL — LOW (ref 4.2–5.8)
RBC # FLD: 14.7 % — HIGH (ref 10.3–14.5)
SODIUM SERPL-SCNC: 144 MMOL/L — SIGNIFICANT CHANGE UP (ref 135–145)
WBC # BLD: 9.11 K/UL — SIGNIFICANT CHANGE UP (ref 3.8–10.5)
WBC # FLD AUTO: 9.11 K/UL — SIGNIFICANT CHANGE UP (ref 3.8–10.5)

## 2019-08-04 PROCEDURE — 99233 SBSQ HOSP IP/OBS HIGH 50: CPT | Mod: GC

## 2019-08-04 RX ORDER — ENOXAPARIN SODIUM 100 MG/ML
80 INJECTION SUBCUTANEOUS EVERY 12 HOURS
Refills: 0 | Status: DISCONTINUED | OUTPATIENT
Start: 2019-08-04 | End: 2019-08-05

## 2019-08-04 RX ADMIN — Medication 750 MILLIGRAM(S): at 17:46

## 2019-08-04 RX ADMIN — Medication 175 MICROGRAM(S): at 05:55

## 2019-08-04 RX ADMIN — Medication 3 MILLILITER(S): at 21:09

## 2019-08-04 RX ADMIN — GABAPENTIN 300 MILLIGRAM(S): 400 CAPSULE ORAL at 05:55

## 2019-08-04 RX ADMIN — Medication 0.5 MILLIGRAM(S): at 05:55

## 2019-08-04 RX ADMIN — OLANZAPINE 10 MILLIGRAM(S): 15 TABLET, FILM COATED ORAL at 21:50

## 2019-08-04 RX ADMIN — OXYCODONE HYDROCHLORIDE 10 MILLIGRAM(S): 5 TABLET ORAL at 05:53

## 2019-08-04 RX ADMIN — OLANZAPINE 5 MILLIGRAM(S): 15 TABLET, FILM COATED ORAL at 12:07

## 2019-08-04 RX ADMIN — ENOXAPARIN SODIUM 80 MILLIGRAM(S): 100 INJECTION SUBCUTANEOUS at 00:33

## 2019-08-04 RX ADMIN — Medication 3 MILLILITER(S): at 14:59

## 2019-08-04 RX ADMIN — LACTULOSE 10 GRAM(S): 10 SOLUTION ORAL at 05:56

## 2019-08-04 RX ADMIN — OXYCODONE HYDROCHLORIDE 10 MILLIGRAM(S): 5 TABLET ORAL at 18:30

## 2019-08-04 RX ADMIN — OXYCODONE HYDROCHLORIDE 10 MILLIGRAM(S): 5 TABLET ORAL at 06:30

## 2019-08-04 RX ADMIN — Medication 100 MILLIGRAM(S): at 12:07

## 2019-08-04 RX ADMIN — Medication 3 MILLIGRAM(S): at 21:50

## 2019-08-04 RX ADMIN — LITHIUM CARBONATE 300 MILLIGRAM(S): 300 TABLET, EXTENDED RELEASE ORAL at 05:55

## 2019-08-04 RX ADMIN — GABAPENTIN 300 MILLIGRAM(S): 400 CAPSULE ORAL at 13:44

## 2019-08-04 RX ADMIN — PANTOPRAZOLE SODIUM 40 MILLIGRAM(S): 20 TABLET, DELAYED RELEASE ORAL at 05:57

## 2019-08-04 RX ADMIN — DESMOPRESSIN ACETATE 0.2 MILLIGRAM(S): 0.1 TABLET ORAL at 21:50

## 2019-08-04 RX ADMIN — Medication 3 MILLILITER(S): at 08:37

## 2019-08-04 RX ADMIN — OXYCODONE HYDROCHLORIDE 10 MILLIGRAM(S): 5 TABLET ORAL at 17:46

## 2019-08-04 RX ADMIN — ENOXAPARIN SODIUM 80 MILLIGRAM(S): 100 INJECTION SUBCUTANEOUS at 17:46

## 2019-08-04 RX ADMIN — Medication 750 MILLIGRAM(S): at 05:56

## 2019-08-04 RX ADMIN — POLYETHYLENE GLYCOL 3350 17 GRAM(S): 17 POWDER, FOR SOLUTION ORAL at 12:07

## 2019-08-04 RX ADMIN — LITHIUM CARBONATE 300 MILLIGRAM(S): 300 TABLET, EXTENDED RELEASE ORAL at 17:46

## 2019-08-04 RX ADMIN — LACTULOSE 10 GRAM(S): 10 SOLUTION ORAL at 17:47

## 2019-08-04 RX ADMIN — Medication 0.5 MILLIGRAM(S): at 17:46

## 2019-08-04 RX ADMIN — GABAPENTIN 300 MILLIGRAM(S): 400 CAPSULE ORAL at 21:50

## 2019-08-04 NOTE — PROGRESS NOTE ADULT - ATTENDING COMMENTS
Patient seen/evaluated at bedside 8/4/19. I agree with the resident progress note/outlined plan of care. My independent findings and conclusions are documented.    Pt reports intermittent sob. Had episode of blood tinged sputum.   Afebrile, no sputum production    1. acute hypoxic respiratory failure requiring 02 in setting of   2. bilateral pulmonary emboli  3. bilateral pleural effusion  4. chronic pain syndrome  5. debility/ deconditioning  6. schizophreniform disorder  7. anxiety  8. diabetes insipidus  9. hypoalbuminemia    now off abx as no adri pna on CT chest angio  continue with weight based lovenox--> convert to DOAC if now significant hemoptysis  will have pulmonary assess pleural effusions with bedside ultrasound tomorrow. Consultant: Dr. Larsen  check urinalysis for proteinuria in light of profound hypoalbuminemia. Suspect edema and pleural effusions more likely related to anasarca for decreased oncotic pressure  nutrition consult  physical therapy consult  check TTE, pro bnp is only 95

## 2019-08-04 NOTE — PROGRESS NOTE ADULT - SUBJECTIVE AND OBJECTIVE BOX
PGY 1 Note:  Patient is a 54y old  Male who presents with a chief complaint of Shortness of breath (02 Aug 2019 22:05)      INTERVAL HPI/OVERNIGHT EVENTS:   Patient tired this am. Reports pain is subsiding. endorsed 1 episode of hemoptysis at NH prior to admission.     MEDICATIONS  (STANDING):  ALBUTerol    90 MICROgram(s) HFA Inhaler 1 Puff(s) Inhalation every 4 hours  ALBUTerol/ipratropium for Nebulization 3 milliLiter(s) Nebulizer every 6 hours  benztropine 0.5 milliGRAM(s) Oral two times a day  desmopressin 0.2 milliGRAM(s) Oral at bedtime  docusate sodium 100 milliGRAM(s) Oral daily  enoxaparin Injectable 80 milliGRAM(s) SubCutaneous every 12 hours  gabapentin 300 milliGRAM(s) Oral three times a day  lactulose Syrup 10 Gram(s) Oral two times a day  levothyroxine 175 MICROGram(s) Oral daily  lithium SR (LITHOBID) 300 milliGRAM(s) Oral two times a day  melatonin 3 milliGRAM(s) Oral at bedtime  metoprolol succinate ER 50 milliGRAM(s) Oral daily  OLANZapine 5 milliGRAM(s) Oral daily  OLANZapine 10 milliGRAM(s) Oral at bedtime  oxyCODONE    IR 10 milliGRAM(s) Oral two times a day  pantoprazole    Tablet 40 milliGRAM(s) Oral before breakfast  polyethylene glycol 3350 17 Gram(s) Oral daily  tiotropium 18 MICROgram(s) Capsule 1 Capsule(s) Inhalation daily  valproic  acid Syrup 750 milliGRAM(s) Oral two times a day    MEDICATIONS  (PRN):          REVIEW OF SYSTEMS:    CONSTITUTIONAL: No fever, +fatigue  EYES: no acute visual disturbances  NECK: No pain or stiffness  RESPIRATORY: No cough; No shortness of breath  CARDIOVASCULAR: No chest pain, no palpitations  GASTROINTESTINAL: No pain. No nausea or vomiting; No diarrhea   NEUROLOGICAL: No headache or numbness, no tremors  MUSCULOSKELETAL: No joint pain, no muscle pain  GENITOURINARY: no dysuria, no frequency, no hesitancy  PSYCHIATRY: no depression , no anxiety  ALL OTHER  ROS negative        Vital Signs Last 24 Hrs  T(C): 36.6 (04 Aug 2019 05:19), Max: 36.6 (04 Aug 2019 05:19)  T(F): 97.8 (04 Aug 2019 05:19), Max: 97.8 (04 Aug 2019 05:19)  HR: 82 (04 Aug 2019 05:19) (82 - 87)  BP: 101/61 (04 Aug 2019 05:19) (101/61 - 119/90)  BP(mean): --  RR: 18 (04 Aug 2019 05:19) (18 - 18)  SpO2: 97% (04 Aug 2019 05:19) (96% - 99%)    PHYSICAL EXAM:  GENERAL: NAD  HEENT: Normocephalic;  conjunctivae and sclerae clear; moist mucous membranes;   NECK : supple  CHEST/LUNG: Clear to auscultation bilaterally with good air entry   HEART: S1 S2  regular; no murmurs, gallops or rubs  ABDOMEN: Soft, Nontender, Nondistended; Bowel sounds present  EXTREMITIES: no cyanosis; no edema; no calf tenderness  SKIN: warm and dry; no rash  NERVOUS SYSTEM:  Awake and alert; Oriented  to place, person and time ; no new deficits      LABS:                                   11.6   9.11  )-----------( 543      ( 04 Aug 2019 06:47 )             37.2     08-04    144  |  108  |  15  ----------------------------<  99  4.3   |  31  |  0.62    Ca    10.1      04 Aug 2019 06:47  Phos  3.9     08-03  Mg     2.2     08-03    TPro  6.3  /  Alb  2.1<L>  /  TBili  0.4  /  DBili  x   /  AST  14  /  ALT  18  /  AlkPhos  106  08-04        CAPILLARY BLOOD GLUCOSE            RADIOLOGY & ADDITIONAL TESTS:  < from: CT Angio Chest w/ IV Cont (08.03.19 @ 18:40) >  EXAM:  CT ANGIO CHEST (W)AW                             PROCEDURE DATE:  08/03/2019          INTERPRETATION:  CLINICAL INFORMATION: Hypoxia, shortness of breath..    CT of the chest was performed following a pulmonary embolism protocol. 90   cc omnipaque 350 intravenous contrast. 10 cc were discarded. Sagittal and   coronal reconstructions were obtained.         COMPARISON: Chest x-ray 8/13/2019.    Streak artifact from the patient's arms at their side limits evaluation.    CHEST:  Acute pulmonary embolism involving segmental branches of the right upper   lobe, right middle lobe, right lower lobe. There is right lower lobe   atelectasis and a moderate right effusion.  Thyroid: Unremarkable  Heart:  Unremarkable.    Lymph nodes: No significant adenopathy.    Airways: Patent    Visualized abdominal structures:   Question small right renal cyst. Limited evaluation secondary to streak   artifact..      Osseous structures:   Degenerative changes.    IMPRESSION:      Acute pulmonary embolism involving segmental branches of the right upper   lobe, right middle lobe, right lower lobe. There is right lower lobe   atelectasis and a moderate right effusion.        [facility] RADIOLOGIST PRELIMINARY REPORT    EXAM:   CT Angiography Chest With Contrast     EXAM DATE/TIME:   8/3/2019 6:22 PM     CLINICAL HISTORY:   54 years old, male; Other: SOB, hypoxia; Patient HX: R/O pe     TECHNIQUE:   Imaging protocol: Axial computed tomographic angiography images of the   chest   with intravenous contrast using CT angiography protocol. Coronal and   sagittal   reformatted images were created and reviewed.   3D rendering: MIP reconstructed images were created and reviewed.   Radiation optimization: All CT scans at this facility use at least one of   these   dose optimization techniques: automated exposure control; mA and/or kV   adjustment per patient size (includes targeted exams where dose is   matched to   clinical indication); or iterative reconstruction.   Contrast material: OMNIPAQUE 350;Contrast volume: 70 ml;Contrast route:   LEFT   UPPER EXT;     COMPARISON:   CR XR CHEST IMMEDIATE 8/2/2019 7:43 PM     FINDINGS:   Pulmonary arteries: The acute pulmonary embolism involving the right   lower lobe   segmental and subsegmental branches. Filling defect in the right middle   lobe   pulmonary arteries.   Aorta: Unremarkable. No aortic aneurysm. No aortic dissection.   Lungs: Unremarkable. No consolidation. No masses.   Pleural space: Moderate size normal right pleural effusion with   compressive   atelectasis at the right lower lobe.  Heart: Unremarkable. No cardiomegaly. No pericardial effusion.   Lymph nodes: Unremarkable. No enlarged lymph nodes.   Bones/joints: Unremarkable. No acute fracture.   Soft tissues: Unremarkable.     IMPRESSION:   Acute pulmonary embolism involving the right middle and lower lobe   segmental   and subsegmental arteries.  Moderate size right pleural effusion with compressive atelectasis at the   right   lower lobe.                  KIMBERLEE JAMISON M.D.,ATTENDING RADIOLOGIST  This document has been electronically signed. Aug  4 2019  8:52AM                < end of copied text >      Imaging Personally Reviewed:  YES    Consultant(s) Notes Reviewed:       Care Discussed with Consultants :     Plan of care was discussed with attending,  patient and /or primary care giver; all questions and concerns were addressed and care was aligned with patient's wishes.

## 2019-08-04 NOTE — PROGRESS NOTE ADULT - PROBLEM SELECTOR PROBLEM 1
Acute septic pulmonary embolism without acute cor pulmonale Acute pulmonary embolism without acute cor pulmonale, unspecified pulmonary embolism type

## 2019-08-04 NOTE — CHART NOTE - NSCHARTNOTEFT_GEN_A_CORE
Paged by RN for episode of coughing up bloody sputum.    Sputum seen by writer to be rust colored. Small amount.     Will continue to monitor CBC and for repeat episodes. Paged by RN for episode of coughing up bloody sputum.    Sputum seen by writer to be rust colored. Small amount.     Will continue to for repeat episodes.    CBC stable 11.3 -> 11.6 today. Will monitor AM tomorrow.

## 2019-08-04 NOTE — PROGRESS NOTE ADULT - PROBLEM SELECTOR PLAN 1
p/w Shortness of breath and SO2 91% on RA in NH  afebrile, with mild leukocytosis, vitals stable, 96% on RA  -CTA positive for acute PE, right upper middle and lower lobes.  c./w treatment dose lovenox   Abx d/c as no evidence of pneumonia  -F/u echo for evidence of R heart strain. Will place on tele if new onset cor pulmonale.

## 2019-08-04 NOTE — PROGRESS NOTE ADULT - ASSESSMENT
54M from [facility], with PMHx of HTN, asthma, schizophreniform disorder, bipolar disorder, GERD, DI, Hypothyroidism, chronic pain syndrome an PSH of cervical spine surgery presented to ED c/o shortness of breath. On my evaluation, pt. was AAO X3 but very anxious. He states he started having shortness of breath at rest since morning, ass. with cough and left sided chest pain 10/10, pressure like, reproducible, increased with inspiration and relieved on relaxing. He denies any fever. chills. nausea, vomiting, palpitations, leg swellings/pain.      ED course: Vitals stable with SO2 96% on RA  Labs significant for mild leukocytosis 14k, Hb 12.3  Radiological findings- CXR negative  s/p levaquin 750, duoneb 54M from [facility], with PMHx of HTN, asthma, schizophreniform disorder, bipolar disorder, GERD, DI, Hypothyroidism, chronic pain syndrome an PSH of cervical spine surgery presented to ED c/o shortness of breath. On my evaluation, pt. was AAO X3 but very anxious. He states he started having shortness of breath at rest since morning, ass. with cough and left sided chest pain 10/10, pressure like, reproducible, increased with inspiration and relieved on relaxing. He denies any fever. chills. nausea, vomiting, palpitations, leg swellings/pain.      ED course: Vitals stable with SO2 96% on RA  Labs significant for mild leukocytosis 14k, Hb 12.3    53 y/o m admitted w:  1. acute hypoxic respiratory failure requiring 02 in setting of   2. bilateral pulmonary emboli  3. bilateral pleural effusion  4. chronic pain syndrome  5. debility/ deconditioning  6. schizophreniform disorder  7. anxiety  8. diabetes insipidus  9. hypoalbuminemia    Radiological findings- CXR negative  s/p levaquin 750, duoneb    53 y/o male w/ above history admitted with

## 2019-08-05 ENCOUNTER — TRANSCRIPTION ENCOUNTER (OUTPATIENT)
Age: 54
End: 2019-08-05

## 2019-08-05 VITALS — OXYGEN SATURATION: 96 %

## 2019-08-05 DIAGNOSIS — I26.99 OTHER PULMONARY EMBOLISM WITHOUT ACUTE COR PULMONALE: ICD-10-CM

## 2019-08-05 PROCEDURE — 93970 EXTREMITY STUDY: CPT | Mod: 26

## 2019-08-05 PROCEDURE — 99239 HOSP IP/OBS DSCHRG MGMT >30: CPT | Mod: GC

## 2019-08-05 PROCEDURE — 99222 1ST HOSP IP/OBS MODERATE 55: CPT

## 2019-08-05 RX ORDER — ENOXAPARIN SODIUM 100 MG/ML
0 INJECTION SUBCUTANEOUS
Qty: 0 | Refills: 0 | DISCHARGE

## 2019-08-05 RX ORDER — VALPROIC ACID (AS SODIUM SALT) 250 MG/5ML
5 SOLUTION, ORAL ORAL
Qty: 0 | Refills: 0 | DISCHARGE

## 2019-08-05 RX ORDER — OLANZAPINE 15 MG/1
1 TABLET, FILM COATED ORAL
Qty: 0 | Refills: 0 | DISCHARGE

## 2019-08-05 RX ORDER — APIXABAN 2.5 MG/1
2 TABLET, FILM COATED ORAL
Qty: 28 | Refills: 0
Start: 2019-08-05 | End: 2019-08-11

## 2019-08-05 RX ORDER — CHOLECALCIFEROL (VITAMIN D3) 125 MCG
1 CAPSULE ORAL
Qty: 0 | Refills: 0 | DISCHARGE

## 2019-08-05 RX ORDER — POLYETHYLENE GLYCOL 3350 17 G/17G
17 POWDER, FOR SOLUTION ORAL
Qty: 0 | Refills: 0 | DISCHARGE

## 2019-08-05 RX ORDER — BENZTROPINE MESYLATE 1 MG
1 TABLET ORAL
Qty: 0 | Refills: 0 | DISCHARGE

## 2019-08-05 RX ORDER — OXYCODONE HYDROCHLORIDE 5 MG/1
1 TABLET ORAL
Qty: 0 | Refills: 0 | DISCHARGE

## 2019-08-05 RX ORDER — RANITIDINE HYDROCHLORIDE 150 MG/1
1 TABLET, FILM COATED ORAL
Qty: 0 | Refills: 0 | DISCHARGE

## 2019-08-05 RX ORDER — LEVOTHYROXINE SODIUM 125 MCG
1 TABLET ORAL
Qty: 0 | Refills: 0 | DISCHARGE

## 2019-08-05 RX ORDER — APIXABAN 2.5 MG/1
10 TABLET, FILM COATED ORAL EVERY 12 HOURS
Refills: 0 | Status: DISCONTINUED | OUTPATIENT
Start: 2019-08-05 | End: 2019-08-05

## 2019-08-05 RX ORDER — PYRIDOXINE HCL (VITAMIN B6) 100 MG
1 TABLET ORAL
Qty: 0 | Refills: 0 | DISCHARGE

## 2019-08-05 RX ORDER — LITHIUM CARBONATE 300 MG/1
300 TABLET, EXTENDED RELEASE ORAL
Qty: 0 | Refills: 0 | DISCHARGE

## 2019-08-05 RX ORDER — METOPROLOL TARTRATE 50 MG
1 TABLET ORAL
Qty: 0 | Refills: 0 | DISCHARGE

## 2019-08-05 RX ORDER — DOCUSATE SODIUM 100 MG
1 CAPSULE ORAL
Qty: 0 | Refills: 0 | DISCHARGE

## 2019-08-05 RX ORDER — LANOLIN ALCOHOL/MO/W.PET/CERES
1 CREAM (GRAM) TOPICAL
Qty: 0 | Refills: 0 | DISCHARGE

## 2019-08-05 RX ORDER — DESMOPRESSIN ACETATE 0.1 MG/1
1 TABLET ORAL
Qty: 0 | Refills: 0 | DISCHARGE

## 2019-08-05 RX ADMIN — Medication 3 MILLILITER(S): at 09:01

## 2019-08-05 RX ADMIN — Medication 750 MILLIGRAM(S): at 18:04

## 2019-08-05 RX ADMIN — OXYCODONE HYDROCHLORIDE 10 MILLIGRAM(S): 5 TABLET ORAL at 18:07

## 2019-08-05 RX ADMIN — Medication 175 MICROGRAM(S): at 06:34

## 2019-08-05 RX ADMIN — GABAPENTIN 300 MILLIGRAM(S): 400 CAPSULE ORAL at 06:34

## 2019-08-05 RX ADMIN — LACTULOSE 10 GRAM(S): 10 SOLUTION ORAL at 18:05

## 2019-08-05 RX ADMIN — Medication 0.5 MILLIGRAM(S): at 06:34

## 2019-08-05 RX ADMIN — OXYCODONE HYDROCHLORIDE 10 MILLIGRAM(S): 5 TABLET ORAL at 06:32

## 2019-08-05 RX ADMIN — PANTOPRAZOLE SODIUM 40 MILLIGRAM(S): 20 TABLET, DELAYED RELEASE ORAL at 06:34

## 2019-08-05 RX ADMIN — OXYCODONE HYDROCHLORIDE 10 MILLIGRAM(S): 5 TABLET ORAL at 07:30

## 2019-08-05 RX ADMIN — APIXABAN 10 MILLIGRAM(S): 2.5 TABLET, FILM COATED ORAL at 18:04

## 2019-08-05 RX ADMIN — LACTULOSE 10 GRAM(S): 10 SOLUTION ORAL at 06:34

## 2019-08-05 RX ADMIN — GABAPENTIN 300 MILLIGRAM(S): 400 CAPSULE ORAL at 13:26

## 2019-08-05 RX ADMIN — Medication 50 MILLIGRAM(S): at 06:34

## 2019-08-05 RX ADMIN — Medication 750 MILLIGRAM(S): at 06:35

## 2019-08-05 RX ADMIN — Medication 0.5 MILLIGRAM(S): at 18:05

## 2019-08-05 RX ADMIN — POLYETHYLENE GLYCOL 3350 17 GRAM(S): 17 POWDER, FOR SOLUTION ORAL at 13:27

## 2019-08-05 RX ADMIN — Medication 100 MILLIGRAM(S): at 13:27

## 2019-08-05 RX ADMIN — Medication 3 MILLILITER(S): at 14:34

## 2019-08-05 RX ADMIN — OLANZAPINE 5 MILLIGRAM(S): 15 TABLET, FILM COATED ORAL at 13:26

## 2019-08-05 RX ADMIN — ENOXAPARIN SODIUM 80 MILLIGRAM(S): 100 INJECTION SUBCUTANEOUS at 06:35

## 2019-08-05 RX ADMIN — LITHIUM CARBONATE 300 MILLIGRAM(S): 300 TABLET, EXTENDED RELEASE ORAL at 06:33

## 2019-08-05 RX ADMIN — LITHIUM CARBONATE 300 MILLIGRAM(S): 300 TABLET, EXTENDED RELEASE ORAL at 18:05

## 2019-08-05 NOTE — PROGRESS NOTE ADULT - PROBLEM SELECTOR PLAN 2
c/w valproic acid, lithium, olanzapine  FU valproic acid and lithium level
c/w valproic acid, lithium, olanzapine  Valproic acid level low, Lithium level therapeutic.
c/w valproic acid, lithium, olanzapine  FU valproic acid and lithium level

## 2019-08-05 NOTE — PHYSICAL THERAPY INITIAL EVALUATION ADULT - RANGE OF MOTION EXAMINATION, REHAB EVAL
Right UE ROM was WFL (within functional limits)/limited to both LE at 40%/Left UE ROM was WFL (within functional limits)

## 2019-08-05 NOTE — PROGRESS NOTE ADULT - PROBLEM SELECTOR PLAN 4
IMPROVE VTE Individual Risk Assessment  RISK                                                                Points  [  ] Previous VTE                                                  3  [  ] Thrombophilia                                               2  [  ] Lower limb paralysis                                      2        (unable to hold up >15 seconds)    [  ] Current Cancer                                              2         (within 6 months)  [x  ] Immobilization > 24 hrs                                1  [  ] ICU/CCU stay > 24 hours                              1  [ ] Age > 60                                                      1  IMPROVE VTE Score 1,   lovenox for DVT PPX
IMPROVE VTE Individual Risk Assessment  RISK                                                                Points  [  ] Previous VTE                                                  3  [  ] Thrombophilia                                               2  [  ] Lower limb paralysis                                      2        (unable to hold up >15 seconds)    [  ] Current Cancer                                              2         (within 6 months)  [x  ] Immobilization > 24 hrs                                1  [  ] ICU/CCU stay > 24 hours                              1  [ ] Age > 60                                                      1  IMPROVE VTE Score 1,   On Rx dose loveox for PE
IMPROVE VTE Individual Risk Assessment  RISK                                                                Points  [  ] Previous VTE                                                  3  [  ] Thrombophilia                                               2  [  ] Lower limb paralysis                                      2        (unable to hold up >15 seconds)    [  ] Current Cancer                                              2         (within 6 months)  [x  ] Immobilization > 24 hrs                                1  [  ] ICU/CCU stay > 24 hours                              1  [ ] Age > 60                                                      1  IMPROVE VTE Score 1,   lovenox for DVT PPX

## 2019-08-05 NOTE — PHYSICAL THERAPY INITIAL EVALUATION ADULT - CRITERIA FOR SKILLED THERAPEUTIC INTERVENTIONS
rehab potential/impairments found/therapy frequency/risk reduction/prevention/functional limitations in following categories/predicted duration of therapy intervention/anticipated equipment needs at discharge

## 2019-08-05 NOTE — DISCHARGE NOTE PROVIDER - NSDCCPCAREPLAN_GEN_ALL_CORE_FT
PRINCIPAL DISCHARGE DIAGNOSIS  Diagnosis: Acute septic pulmonary embolism without acute cor pulmonale  Assessment and Plan of Treatment: You came to the ED complaining of shortness of breath and hemoptysis. Workup showed no cardiac issue      SECONDARY DISCHARGE DIAGNOSES  Diagnosis: HTN (hypertension)  Assessment and Plan of Treatment: HTN (hypertension)    Diagnosis: Schizophreniform disorder  Assessment and Plan of Treatment: Schizophreniform disorder PRINCIPAL DISCHARGE DIAGNOSIS  Diagnosis: Acute septic pulmonary embolism without acute cor pulmonale  Assessment and Plan of Treatment: You came to the ED complaining of shortness of breath and hemoptysis. Workup showed no cardiac issues. A CT scan of your chest showed that you have a pulmonary embolus in your right lung. This may have developed due to less movement of your legs. We encourage leg exercises and ambulation as possible. You have been started on anticoagulation which will be continued for 3 months. Please follow up with your primary care provider and take your medications as prescribed.      SECONDARY DISCHARGE DIAGNOSES  Diagnosis: Schizophreniform disorder  Assessment and Plan of Treatment: Schizophreniform disorder    Diagnosis: HTN (hypertension)  Assessment and Plan of Treatment: HTN (hypertension) PRINCIPAL DISCHARGE DIAGNOSIS  Diagnosis: Right pulmonary embolus  Assessment and Plan of Treatment: You came to the ED complaining of shortness of breath and hemoptysis. Workup showed no cardiac issues. A CT scan of your chest showed that you have a pulmonary embolus in your right lung. This may have developed due to less movement of your legs. We encourage leg exercises and ambulation as possible. You have been started on anticoagulation which will be continued for 3 months. Please follow up with your primary care provider and take your medications as prescribed.      SECONDARY DISCHARGE DIAGNOSES  Diagnosis: Schizophreniform disorder  Assessment and Plan of Treatment: Your mood disorder medications were continued during your stay. Please continue to take your medications as prescribed. Please follow up with your primary doctor.    Diagnosis: HTN (hypertension)  Assessment and Plan of Treatment: Your blood pressure medications were continued during your stay. Please continue to take your medications as prescribed. Please follow up with your primary doctor. PRINCIPAL DISCHARGE DIAGNOSIS  Diagnosis: Right pulmonary embolus  Assessment and Plan of Treatment: You came to the ED complaining of shortness of breath and hemoptysis. Workup showed no cardiac issues. A CT scan of your chest showed that you have a pulmonary embolus in your right lung. This may have developed due to less movement of your legs. We encourage leg exercises and ambulation as possible. You have been started on anticoagulation which will be continued for 3-6 months. Take eliquis 10mg every 12 hours for 7 days, until AM dose on 8/12/2019, and then switch to 5mg every 12 hours. Please follow up with your primary care provider and take your medications as prescribed.      SECONDARY DISCHARGE DIAGNOSES  Diagnosis: Schizophreniform disorder  Assessment and Plan of Treatment: Your mood disorder medications were continued during your stay. Please continue to take your medications as prescribed. Please follow up with your primary doctor.    Diagnosis: HTN (hypertension)  Assessment and Plan of Treatment: Your blood pressure medications were continued during your stay. Please continue to take your medications as prescribed. Please follow up with your primary doctor.    Diagnosis: Chronic pain syndrome  Assessment and Plan of Treatment: Continue wth your home medications as well as bowel regime to prevent constipation.    Diagnosis: Hypothyroidism  Assessment and Plan of Treatment: Continue your home dose of levothyroxine. Follow up with your primary doctor for routine monitoring of your thyroid function.    Diagnosis: Asthma  Assessment and Plan of Treatment: Continue your inhalers and follow up with your primary doctor as well as pulmonologsit for continued care. PRINCIPAL DISCHARGE DIAGNOSIS  Diagnosis: Right pulmonary embolus  Assessment and Plan of Treatment: You came to the ED complaining of shortness of breath and hemoptysis. Workup showed no cardiac issues. A CT scan of your chest showed that you have a pulmonary embolus in your right lung. This may have developed due to less movement of your legs. We encourage leg exercises and ambulation as possible. You have been started on anticoagulation which will be continued for 3-6 months. Take eliquis 10mg every 12 hours for 7 days, until AM dose on 8/12/2019, and then switch to 5mg every 12 hours. Please follow up with your primary care provider and take your medications as prescribed.      SECONDARY DISCHARGE DIAGNOSES  Diagnosis: Pressure ulcer  Assessment and Plan of Treatment: Pressure ulcers were noted by nursing. Please follow up with your primary physician for continued wound care.    Diagnosis: Schizophreniform disorder  Assessment and Plan of Treatment: Your mood disorder medications were continued during your stay. Please continue to take your medications as prescribed. Please follow up with your primary doctor.    Diagnosis: HTN (hypertension)  Assessment and Plan of Treatment: Your blood pressure medications were continued during your stay. Please continue to take your medications as prescribed. Please follow up with your primary doctor.    Diagnosis: Chronic pain syndrome  Assessment and Plan of Treatment: Continue wth your home medications as well as bowel regime to prevent constipation.    Diagnosis: Hypothyroidism  Assessment and Plan of Treatment: Continue your home dose of levothyroxine. Follow up with your primary doctor for routine monitoring of your thyroid function.    Diagnosis: Asthma  Assessment and Plan of Treatment: Continue your inhalers and follow up with your primary doctor as well as pulmonologsit for continued care.

## 2019-08-05 NOTE — DISCHARGE NOTE PROVIDER - CARE PROVIDER_API CALL
Josué Moon (MD)  [place] [id number] [facility], [place] [address]  Phone: [phone]  Fax: [phone]  Follow Up Time: 1 week

## 2019-08-05 NOTE — DIETITIAN INITIAL EVALUATION ADULT. - PERTINENT MEDS FT
MEDICATIONS  (STANDING):  ALBUTerol    90 MICROgram(s) HFA Inhaler 1 Puff(s) Inhalation every 4 hours  ALBUTerol/ipratropium for Nebulization 3 milliLiter(s) Nebulizer every 6 hours  benztropine 0.5 milliGRAM(s) Oral two times a day  desmopressin 0.2 milliGRAM(s) Oral at bedtime  docusate sodium 100 milliGRAM(s) Oral daily  enoxaparin Injectable 80 milliGRAM(s) SubCutaneous every 12 hours  gabapentin 300 milliGRAM(s) Oral three times a day  lactulose Syrup 10 Gram(s) Oral two times a day  levothyroxine 175 MICROGram(s) Oral daily  lithium SR (LITHOBID) 300 milliGRAM(s) Oral two times a day  melatonin 3 milliGRAM(s) Oral at bedtime  metoprolol succinate ER 50 milliGRAM(s) Oral daily  OLANZapine 5 milliGRAM(s) Oral daily  OLANZapine 10 milliGRAM(s) Oral at bedtime  oxyCODONE    IR 10 milliGRAM(s) Oral two times a day  pantoprazole    Tablet 40 milliGRAM(s) Oral before breakfast  polyethylene glycol 3350 17 Gram(s) Oral daily  tiotropium 18 MICROgram(s) Capsule 1 Capsule(s) Inhalation daily  valproic  acid Syrup 750 milliGRAM(s) Oral two times a day    MEDICATIONS  (PRN):

## 2019-08-05 NOTE — PHYSICAL THERAPY INITIAL EVALUATION ADULT - PERTINENT HX OF CURRENT PROBLEM, REHAB EVAL
Pt. admitted from [facility] Acute Rehab due to SOB and left sided chest pain. Pt. w/ h/o HTN, Schizophrenis, HTN.

## 2019-08-05 NOTE — DIETITIAN INITIAL EVALUATION ADULT. - PERTINENT LABORATORY DATA
08-04 Na144 mmol/L Glu 99 mg/dL K+ 4.3 mmol/L Cr  0.62 mg/dL BUN 15 mg/dL   08-03 Phos 3.9 mg/dL   08-04 Alb 2.1 g/dL<L>     08-03 [id number] 4.6 %   08-03 Chol 79 mg/dL LDL 36 mg/dL HDL 11 mg/dL<L> Trig 159 mg/dL<H>

## 2019-08-05 NOTE — DISCHARGE NOTE PROVIDER - HOSPITAL COURSE
54M from [facility], with PMHx of HTN, asthma, schizophreniform disorder, bipolar disorder, GERD, DI, Hypothyroidism, chronic pain syndrome an PSH of cervical spine surgery presented to ED c/o shortness of breath. He was found to be AAO X3 but very anxious. He began having shortness of breath at rest since morning, associated with cough and left sided chest pain 10/10, pressure like, reproducible, increased with inspiration and relieved on relaxing. Additionally he reported one episode of small quantity hemoptysis at nursing home. He denied any fever. chills. nausea, vomiting, palpitations, leg swellings/pain.        In the ED, vitals were stable with saturation of 91% on RA. His labs were significant for mild leukocytosis 14k, Hb 12.3.        After admission, he received an EKG which showed an old septal infarct and L atrial enlargement. Troponins were negative x2. He had one additional episode of small volume hemoptysis after admission which was seen by resident. His hemoglobin has been stable throughout his stay.     He was started on treatment dose lovenox for suspision of PE. CTA chest was performed which revealed R sided upper, middle, and lower lobe pulmonary emboli.     Lovenox has been continued and an ECHO was performed which showed EF of 55-60% with no evidence or R heart strain.         Throughout his stay, his psychiatric medications were continued. A valproic acid level was found to be low at 45. His lithium level was therapeutic at 11.2.         His home blood pressure medications were continued and his blood pressure was stable throughout his stay.         He will be discharged back to [facility] and will complete a 3 month course of lovenox following acute pulmonary embolus. 54M from [facility], with PMHx of HTN, asthma, schizophreniform disorder, bipolar disorder, GERD, DI, Hypothyroidism, chronic pain syndrome an PSH of cervical spine surgery presented to ED c/o shortness of breath. He was found to be AAO X3 but very anxious. He began having shortness of breath at rest since morning, associated with cough and left sided chest pain 10/10, pressure like, reproducible, increased with inspiration and relieved on relaxing. Additionally he reported one episode of small quantity hemoptysis at nursing home. He denied any fever. chills. nausea, vomiting, palpitations, leg swellings/pain.        In the ED, vitals were stable with saturation of 91% on RA. His labs were significant for mild leukocytosis 14k, Hb 12.3.        After admission, he received an EKG which showed an old septal infarct and L atrial enlargement. Troponins were negative x2. He had one additional episode of small volume hemoptysis after admission which was seen by resident. His hemoglobin has been stable throughout his stay.     He was started on treatment dose lovenox for suspision of PE. CTA chest was performed which revealed R sided upper, middle, and lower lobe pulmonary emboli.     Lovenox has been continued and an ECHO was performed which showed EF of 55-60% with no evidence or R heart strain.         Throughout his stay, his psychiatric medications were continued. A valproic acid level was found to be low at 45. His lithium level was therapeutic at 11.2.         His home blood pressure medications were continued and his blood pressure was stable throughout his stay.         He will be discharged back to [facility] and will complete a 3 month course of apixaban following acute pulmonary embolus. 54M from [facility], with PMHx of HTN, asthma, schizophreniform disorder, bipolar disorder, GERD, DI, Hypothyroidism, chronic pain syndrome an PSH of cervical spine surgery presented to ED c/o shortness of breath. He was found to be AAO X3 but very anxious. He began having shortness of breath at rest since morning, associated with cough and left sided chest pain 10/10, pressure like, reproducible, increased with inspiration and relieved on relaxing. Additionally he reported one episode of small quantity hemoptysis at nursing home. He denied any fever. chills. nausea, vomiting, palpitations, leg swellings/pain.        In the ED, vitals were stable with saturation of 91% on RA. His labs were significant for mild leukocytosis 14k, Hb 12.3.        After admission, he received an EKG which showed an old septal infarct and L atrial enlargement. Troponins were negative x2. He had one additional episode of small volume hemoptysis after admission which was seen by resident. His hemoglobin has been stable throughout his stay.     He was started on treatment dose lovenox for suspision of PE. CTA chest was performed which revealed R sided upper, middle, and lower lobe pulmonary emboli.     Lovenox has been continued and an ECHO was performed which showed EF of 55-60% with no evidence or R heart strain.         Throughout his stay, his psychiatric medications were continued. A valproic acid level was found to be low at 45. His lithium level was therapeutic at 11.2.         His home blood pressure medications were continued and his blood pressure was stable throughout his stay.         He will be discharged back to [facility] and will complete a 3 month course of apixaban following acute pulmonary embolus.         Med Attending Addendum    Patient with multiple decub ulcers on present on admission for which he was already receiving care at [facility]    Patient with Right lateral foot  unstageable/DTI + left lateral unstageable    Sacral decub stage II    He was continued on the Allevyn and liquid barrier foam. Further care to be continued at [facility]

## 2019-08-05 NOTE — PHYSICAL THERAPY INITIAL EVALUATION ADULT - DIAGNOSIS, PT EVAL
Pt. presents w/ weakness, limited endurace, LBP and decreased motivation; non-ambulatory at this time.

## 2019-08-05 NOTE — PROGRESS NOTE ADULT - SUBJECTIVE AND OBJECTIVE BOX
PGY 1 Note:  Patient is a 54y old  Male who presents with a chief complaint of Shortness of breath (02 Aug 2019 22:05)      INTERVAL HPI/OVERNIGHT EVENTS:   Patient seen and examined at bedside.   Improved this am. Continued chest tightness and mild SOB. 1 episode of hemoptysis yesterday morning, none since.     MEDICATIONS  (STANDING):  ALBUTerol    90 MICROgram(s) HFA Inhaler 1 Puff(s) Inhalation every 4 hours  ALBUTerol/ipratropium for Nebulization 3 milliLiter(s) Nebulizer every 6 hours  benztropine 0.5 milliGRAM(s) Oral two times a day  desmopressin 0.2 milliGRAM(s) Oral at bedtime  docusate sodium 100 milliGRAM(s) Oral daily  enoxaparin Injectable 80 milliGRAM(s) SubCutaneous every 12 hours  gabapentin 300 milliGRAM(s) Oral three times a day  lactulose Syrup 10 Gram(s) Oral two times a day  levothyroxine 175 MICROGram(s) Oral daily  lithium SR (LITHOBID) 300 milliGRAM(s) Oral two times a day  melatonin 3 milliGRAM(s) Oral at bedtime  metoprolol succinate ER 50 milliGRAM(s) Oral daily  OLANZapine 5 milliGRAM(s) Oral daily  OLANZapine 10 milliGRAM(s) Oral at bedtime  oxyCODONE    IR 10 milliGRAM(s) Oral two times a day  pantoprazole    Tablet 40 milliGRAM(s) Oral before breakfast  polyethylene glycol 3350 17 Gram(s) Oral daily  tiotropium 18 MICROgram(s) Capsule 1 Capsule(s) Inhalation daily  valproic  acid Syrup 750 milliGRAM(s) Oral two times a day    MEDICATIONS  (PRN):          REVIEW OF SYSTEMS:    CONSTITUTIONAL: No fever, +fatigue  EYES: no acute visual disturbances  NECK: No pain or stiffness  RESPIRATORY: No cough; +shortness of breath  CARDIOVASCULAR: +chest tightness, no palpitations  GASTROINTESTINAL: No pain. No nausea or vomiting; No diarrhea   NEUROLOGICAL: No headache or numbness, no tremors  MUSCULOSKELETAL: No joint pain, no muscle pain  GENITOURINARY: no dysuria, no frequency, no hesitancy  PSYCHIATRY: no depression , no anxiety  ALL OTHER  ROS negative        Vital Signs Last 24 Hrs  T(C): 36.6 (05 Aug 2019 05:48), Max: 36.7 (04 Aug 2019 20:31)  T(F): 97.9 (05 Aug 2019 05:48), Max: 98 (04 Aug 2019 20:31)  HR: 81 (05 Aug 2019 05:48) (75 - 87)  BP: 110/66 (05 Aug 2019 05:48) (110/66 - 116/59)  BP(mean): --  RR: 17 (05 Aug 2019 05:48) (17 - 18)  SpO2: 96% (05 Aug 2019 05:48) (96% - 98%)    PHYSICAL EXAM:  GENERAL: NAD  HEENT: Normocephalic;  conjunctivae and sclerae clear; moist mucous membranes;   NECK : supple  CHEST/LUNG: Clear to auscultation bilaterally with good air entry   HEART: S1 S2  regular; no murmurs, gallops or rubs  ABDOMEN: Soft, Nontender, Nondistended; Bowel sounds present  EXTREMITIES: no cyanosis; no edema; no calf tenderness  SKIN: warm and dry; no rash  NERVOUS SYSTEM:  Awake and alert; Oriented  to place, person and time ; no new deficits      LABS:                                              11.6   9.11  )-----------( 543      ( 04 Aug 2019 06:47 )             37.2     08-04    144  |  108  |  15  ----------------------------<  99  4.3   |  31  |  0.62    Ca    10.1      04 Aug 2019 06:47    TPro  6.3  /  Alb  2.1<L>  /  TBili  0.4  /  DBili  x   /  AST  14  /  ALT  18  /  AlkPhos  106  08-04          CAPILLARY BLOOD GLUCOSE            RADIOLOGY & ADDITIONAL TESTS:        Imaging Personally Reviewed:  YES    Consultant(s) Notes Reviewed:       Care Discussed with Consultants :     Plan of care was discussed with attending,  patient and /or primary care giver; all questions and concerns were addressed and care was aligned with patient's wishes.

## 2019-08-05 NOTE — PROGRESS NOTE ADULT - ASSESSMENT
54M from [facility], with PMHx of HTN, asthma, schizophreniform disorder, bipolar disorder, GERD, DI, Hypothyroidism, chronic pain syndrome an PSH of cervical spine surgery presented to ED c/o shortness of breath. On my evaluation, pt. was AAO X3 but very anxious. He states he started having shortness of breath at rest since morning, ass. with cough and left sided chest pain 10/10, pressure like, reproducible, increased with inspiration and relieved on relaxing. He denies any fever. chills. nausea, vomiting, palpitations, leg swellings/pain.      ED course: Vitals stable with SO2 96% on RA  Labs significant for mild leukocytosis 14k, Hb 12.3    55 yo M with hx of HTN asthma, schizophreniform, bipolar p/w SOB due to acute R sided PE

## 2019-08-05 NOTE — DISCHARGE NOTE NURSING/CASE MANAGEMENT/SOCIAL WORK - NSDCDPATPORTLINK_GEN_ALL_CORE
You can access the [facility] Patient Portal, offered by [facility], by registering with the following website: [email or web address]

## 2019-08-05 NOTE — PROGRESS NOTE ADULT - PROBLEM SELECTOR PLAN 3
Home meds: on toprol XL 50 OD  c/w home medications  monitor BP and adjust meds

## 2019-08-05 NOTE — CONSULT NOTE ADULT - ASSESSMENT
1) Low risk PE- RLL/RML segmental PE possibly from recent surgery and current immobility  2) Schizophrenia    Hemodynamically stable  Low risk PE, normal proBNP and troponin with normal Echocardiogram  Likely will require lifelong anticoagulation as it is difficult to attribute all of the risk to his recent spinal surgery which was 3 months ago  Stable for dc on oral anticoagulation  Can assess Room Air saturation, likely will not require home O2  Right pleural effusion is very small and likely related to his pulmonary embolism

## 2019-08-05 NOTE — PHYSICAL THERAPY INITIAL EVALUATION ADULT - ADDITIONAL COMMENTS
Pt. states has given up with walking and getting around, but will try to cooperate to improve his function.

## 2019-08-05 NOTE — DIETITIAN INITIAL EVALUATION ADULT. - NS FNS WEIGHT USED FOR CALC
Ht=appears 5' 10"     GPU=700 lb + 10%=182.6 lb   Current qg=479.9 lb 8/4/19-->230 lb 8/5/19    BMI=33/ideal

## 2019-08-05 NOTE — DIETITIAN INITIAL EVALUATION ADULT. - OTHER INFO
Pt from skilled nursing facility, alert, awake, but anxious, forgetful, poor historian reported; appetite good, tolerating 90% intake reported at breakfast today Pt from skilled nursing facility, alert, awake, but anxious, forgetful, poor historian reported; appetite good, tolerating 90% intake reported at breakfast today; Discussed with nursing

## 2019-08-05 NOTE — PHYSICAL THERAPY INITIAL EVALUATION ADULT - PATIENT/FAMILY AGREES WITH PLAN
Recommending SUB-ACUTE REHAB for intensive strengthening, gait, balance and transfer training for safer transition to home./yes

## 2019-08-05 NOTE — PROGRESS NOTE ADULT - PROBLEM SELECTOR PLAN 1
p/w Shortness of breath and SO2 91% on RA in NH  afebrile, with mild leukocytosis, vitals stable, 96% on RA  -CTA positive for acute PE, right upper middle and lower lobes.  c./w treatment dose lovenox   -Echo - EF 55-60%. No evidence of R heart strain.

## 2019-08-05 NOTE — DIETITIAN INITIAL EVALUATION ADULT. - DIET TYPE
diet Rx at skilled nursing facility: No added salt, regular food, thin liquid If poor intake, may consider Ensure Enlive  1can ( 240ml ) x bid ( 700 kcal, 40 g protein) Add Prosource 30ml x bid daily as medically feasible (extra 120 kcal, 30 g protein daily); If poor intake, may consider Ensure Enlive  1can ( 240ml ) x bid ( 700 kcal, 40 g protein)

## 2019-08-05 NOTE — PHYSICAL THERAPY INITIAL EVALUATION ADULT - DISCHARGE DISPOSITION, PT EVAL
Recommending SUB-ACUTE REHAB for intensive strengthening, gait, balance and transfer training for safer transition to home./rehabilitation facility

## 2019-08-05 NOTE — PHYSICAL THERAPY INITIAL EVALUATION ADULT - GENERAL OBSERVATIONS, REHAB EVAL
Pt. received in supine; awake and alert; Pt. w/ O2 @  5 li NC. Pt. appears to be nervous to participate w/ PT.

## 2019-08-05 NOTE — CONSULT NOTE ADULT - SUBJECTIVE AND OBJECTIVE BOX
Source: Patient, Chart    Reason for Consultation: PE    Chief Complaint:    HPI:  54M from [facility], with PMHx of HTN, asthma, schizophreniform disorder, bipolar disorder, GERD, DI, Hypothyroidism, chronic pain syndrome an PSH of cervical spine surgery presented to ED c/o shortness of breath. On my evaluation, pt. was AAO X3 but very anxious. He states he started having shortness of breath at rest since morning, ass. with cough and left sided chest pain 10/10, pressure like, reproducible, increased with inspiration and relieved on relaxing. He denies any fever. chills. nausea, vomiting, palpitations, leg swellings/pain. (02 Aug 2019 22:05)            MEDICATIONS  (STANDING):  ALBUTerol    90 MICROgram(s) HFA Inhaler 1 Puff(s) Inhalation every 4 hours  ALBUTerol/ipratropium for Nebulization 3 milliLiter(s) Nebulizer every 6 hours  apixaban 10 milliGRAM(s) Oral every 12 hours  benztropine 0.5 milliGRAM(s) Oral two times a day  desmopressin 0.2 milliGRAM(s) Oral at bedtime  docusate sodium 100 milliGRAM(s) Oral daily  gabapentin 300 milliGRAM(s) Oral three times a day  lactulose Syrup 10 Gram(s) Oral two times a day  levothyroxine 175 MICROGram(s) Oral daily  lithium SR (LITHOBID) 300 milliGRAM(s) Oral two times a day  melatonin 3 milliGRAM(s) Oral at bedtime  metoprolol succinate ER 50 milliGRAM(s) Oral daily  OLANZapine 5 milliGRAM(s) Oral daily  OLANZapine 10 milliGRAM(s) Oral at bedtime  oxyCODONE    IR 10 milliGRAM(s) Oral two times a day  pantoprazole    Tablet 40 milliGRAM(s) Oral before breakfast  polyethylene glycol 3350 17 Gram(s) Oral daily  tiotropium 18 MICROgram(s) Capsule 1 Capsule(s) Inhalation daily  valproic  acid Syrup 750 milliGRAM(s) Oral two times a day    MEDICATIONS  (PRN):      Allergies    Thorazine (Unknown)    Intolerances    Haldol (Unknown)  Thorazine (Unknown)      PAST MEDICAL & SURGICAL HISTORY:  Bipolar disorder  Schizophreniform disorder  Asthma  HTN (hypertension)  Bipolar disorder  Drug abuse  Incontinence  Schizophrenia  Hypothyroid  Hypertension  No significant past surgical history  No significant past surgical history      FAMILY HISTORY:  Family history of Crohn's disease: in Mother      SOCIAL HISTORY  Smoking History:   Alcohol:  Drugs:  Occupation:    T(C): 36.6 (08-05-19 @ 14:05), Max: 36.7 (08-04-19 @ 20:31)  HR: 78 (08-05-19 @ 14:05) (75 - 87)  BP: 109/71 (08-05-19 @ 14:05) (109/71 - 116/59)  RR: 18 (08-05-19 @ 14:05) (17 - 18)  SpO2: 95% (08-05-19 @ 14:05) (95% - 98%)    LABS:                        11.6   9.11  )-----------( 543      ( 04 Aug 2019 06:47 )             37.2     08-04    144  |  108  |  15  ----------------------------<  99  4.3   |  31  |  0.62    Ca    10.1      04 Aug 2019 06:47    TPro  6.3  /  Alb  2.1<L>  /  TBili  0.4  /  DBili  x   /  AST  14  /  ALT  18  /  AlkPhos  106  08-04    Serum Pro-Brain Natriuretic Peptide: 95 pg/mL (08-02-19 @ 19:01)    microbiology    RADIOLOGY & ADDITIONAL STUDIES: (My Reading)  cxr- poor inspiration, right pleural effusion appears small  CTA chest- RML, RLL segmental PE, R small effusion Source: Patient, Chart    Reason for Consultation: PE    Chief Complaint:    HPI:  54M from [facility], with PMHx of HTN, asthma, schizophreniform disorder, bipolar disorder, GERD, DI, Hypothyroidism, chronic pain syndrome an PSH of cervical spine surgery presented to ED c/o shortness of breath. On my evaluation, pt. was AAO X3 but very anxious. He states he started having shortness of breath at rest since morning, ass. with cough and left sided chest pain 10/10, pressure like, reproducible, increased with inspiration and relieved on relaxing. He denies any fever. chills. nausea, vomiting, palpitations, leg swellings/pain. (02 Aug 2019 22:05)    The patient had recent spinal surgery 3 months prior. He has been mainly bedbound over the last few months. +Smoking. He presented with dyspnea and right sided pleuristy over the last few days. He also noted that he had some left leg swelling. Currently he feels better.  He denies any fevers, chills, or sick contacts.        MEDICATIONS  (STANDING):  ALBUTerol    90 MICROgram(s) HFA Inhaler 1 Puff(s) Inhalation every 4 hours  ALBUTerol/ipratropium for Nebulization 3 milliLiter(s) Nebulizer every 6 hours  apixaban 10 milliGRAM(s) Oral every 12 hours  benztropine 0.5 milliGRAM(s) Oral two times a day  desmopressin 0.2 milliGRAM(s) Oral at bedtime  docusate sodium 100 milliGRAM(s) Oral daily  gabapentin 300 milliGRAM(s) Oral three times a day  lactulose Syrup 10 Gram(s) Oral two times a day  levothyroxine 175 MICROGram(s) Oral daily  lithium SR (LITHOBID) 300 milliGRAM(s) Oral two times a day  melatonin 3 milliGRAM(s) Oral at bedtime  metoprolol succinate ER 50 milliGRAM(s) Oral daily  OLANZapine 5 milliGRAM(s) Oral daily  OLANZapine 10 milliGRAM(s) Oral at bedtime  oxyCODONE    IR 10 milliGRAM(s) Oral two times a day  pantoprazole    Tablet 40 milliGRAM(s) Oral before breakfast  polyethylene glycol 3350 17 Gram(s) Oral daily  tiotropium 18 MICROgram(s) Capsule 1 Capsule(s) Inhalation daily  valproic  acid Syrup 750 milliGRAM(s) Oral two times a day    MEDICATIONS  (PRN):      Allergies    Thorazine (Unknown)    Intolerances    Haldol (Unknown)  Thorazine (Unknown)      PAST MEDICAL & SURGICAL HISTORY:  Bipolar disorder  Schizophreniform disorder  Asthma  HTN (hypertension)  Bipolar disorder  Drug abuse  Incontinence  Schizophrenia  Hypothyroid  Hypertension  No significant past surgical history  No significant past surgical history      FAMILY HISTORY:  Family history of Crohn's disease: in Mother      SOCIAL HISTORY  Smoking History: +Smoker  Alcohol: Denies  Drugs: Denies  Occupation: Unemployed    T(C): 36.6 (08-05-19 @ 14:05), Max: 36.7 (08-04-19 @ 20:31)  HR: 78 (08-05-19 @ 14:05) (75 - 87)  BP: 109/71 (08-05-19 @ 14:05) (109/71 - 116/59)  RR: 18 (08-05-19 @ 14:05) (17 - 18)  SpO2: 95% (08-05-19 @ 14:05) (95% - 98%)    LABS:                        11.6   9.11  )-----------( 543      ( 04 Aug 2019 06:47 )             37.2     08-04    144  |  108  |  15  ----------------------------<  99  4.3   |  31  |  0.62    Ca    10.1      04 Aug 2019 06:47    TPro  6.3  /  Alb  2.1<L>  /  TBili  0.4  /  DBili  x   /  AST  14  /  ALT  18  /  AlkPhos  106  08-04    Serum Pro-Brain Natriuretic Peptide: 95 pg/mL (08-02-19 @ 19:01)    microbiology    RADIOLOGY & ADDITIONAL STUDIES: (My Reading)  cxr- poor inspiration, right pleural effusion appears small  CTA chest- RML, RLL segmental PE, R small effusion

## 2019-08-12 RX ORDER — APIXABAN 2.5 MG/1
1 TABLET, FILM COATED ORAL
Qty: 180 | Refills: 0
Start: 2019-08-12 | End: 2019-11-09

## 2019-08-23 PROCEDURE — 93010 ELECTROCARDIOGRAM REPORT: CPT

## 2019-09-05 ENCOUNTER — INPATIENT (INPATIENT)
Facility: HOSPITAL | Age: 54
LOS: 13 days | Discharge: NURSING, REHAB OR LONG-TERM CARE | DRG: 885 | End: 2019-09-19
Attending: HOSPITALIST | Admitting: INTERNAL MEDICINE
Payer: MEDICARE

## 2019-09-05 VITALS
TEMPERATURE: 98 F | HEIGHT: 75 IN | HEART RATE: 75 BPM | RESPIRATION RATE: 17 BRPM | SYSTOLIC BLOOD PRESSURE: 121 MMHG | DIASTOLIC BLOOD PRESSURE: 73 MMHG | OXYGEN SATURATION: 96 % | WEIGHT: 235.01 LBS

## 2019-09-05 DIAGNOSIS — R26.2 DIFFICULTY IN WALKING, NOT ELSEWHERE CLASSIFIED: ICD-10-CM

## 2019-09-05 DIAGNOSIS — Z98.890 OTHER SPECIFIED POSTPROCEDURAL STATES: Chronic | ICD-10-CM

## 2019-09-05 PROBLEM — F31.9 BIPOLAR DISORDER, UNSPECIFIED: Chronic | Status: ACTIVE | Noted: 2019-08-03

## 2019-09-05 PROBLEM — F20.81 SCHIZOPHRENIFORM DISORDER: Chronic | Status: ACTIVE | Noted: 2019-08-03

## 2019-09-05 PROBLEM — I10 ESSENTIAL (PRIMARY) HYPERTENSION: Chronic | Status: ACTIVE | Noted: 2019-08-02

## 2019-09-05 PROBLEM — J45.909 UNSPECIFIED ASTHMA, UNCOMPLICATED: Chronic | Status: ACTIVE | Noted: 2019-08-02

## 2019-09-05 LAB
AMMONIA BLD-MCNC: 57 UMOL/L — HIGH (ref 11–55)
AMPHET UR-MCNC: NEGATIVE — SIGNIFICANT CHANGE UP
ANION GAP SERPL CALC-SCNC: 3 MMOL/L — LOW (ref 5–17)
BARBITURATES UR SCN-MCNC: NEGATIVE — SIGNIFICANT CHANGE UP
BASOPHILS # BLD AUTO: 0.08 K/UL — SIGNIFICANT CHANGE UP (ref 0–0.2)
BASOPHILS NFR BLD AUTO: 1 % — SIGNIFICANT CHANGE UP (ref 0–2)
BENZODIAZ UR-MCNC: NEGATIVE — SIGNIFICANT CHANGE UP
BUN SERPL-MCNC: 14 MG/DL — SIGNIFICANT CHANGE UP (ref 7–23)
CALCIUM SERPL-MCNC: 9.8 MG/DL — SIGNIFICANT CHANGE UP (ref 8.4–10.5)
CHLORIDE SERPL-SCNC: 106 MMOL/L — SIGNIFICANT CHANGE UP (ref 96–108)
CO2 SERPL-SCNC: 32 MMOL/L — HIGH (ref 22–31)
COCAINE METAB.OTHER UR-MCNC: NEGATIVE — SIGNIFICANT CHANGE UP
CREAT SERPL-MCNC: 0.38 MG/DL — LOW (ref 0.5–1.3)
EOSINOPHIL # BLD AUTO: 0.33 K/UL — SIGNIFICANT CHANGE UP (ref 0–0.5)
EOSINOPHIL NFR BLD AUTO: 4 % — SIGNIFICANT CHANGE UP (ref 0–6)
ETHANOL SERPL-MCNC: <3 MG/DL — SIGNIFICANT CHANGE UP (ref 0–3)
GLUCOSE SERPL-MCNC: 77 MG/DL — SIGNIFICANT CHANGE UP (ref 70–99)
HCT VFR BLD CALC: 39.5 % — SIGNIFICANT CHANGE UP (ref 39–50)
HGB BLD-MCNC: 12.5 G/DL — LOW (ref 13–17)
IMM GRANULOCYTES NFR BLD AUTO: 0.4 % — SIGNIFICANT CHANGE UP (ref 0–1.5)
LITHIUM SERPL-MCNC: 0.46 MMOL/L — LOW (ref 0.6–1.2)
LYMPHOCYTES # BLD AUTO: 1.69 K/UL — SIGNIFICANT CHANGE UP (ref 1–3.3)
LYMPHOCYTES # BLD AUTO: 20.5 % — SIGNIFICANT CHANGE UP (ref 13–44)
MCHC RBC-ENTMCNC: 28.8 PG — SIGNIFICANT CHANGE UP (ref 27–34)
MCHC RBC-ENTMCNC: 31.6 GM/DL — LOW (ref 32–36)
MCV RBC AUTO: 91 FL — SIGNIFICANT CHANGE UP (ref 80–100)
METHADONE UR-MCNC: NEGATIVE — SIGNIFICANT CHANGE UP
MONOCYTES # BLD AUTO: 0.66 K/UL — SIGNIFICANT CHANGE UP (ref 0–0.9)
MONOCYTES NFR BLD AUTO: 8 % — SIGNIFICANT CHANGE UP (ref 2–14)
NEUTROPHILS # BLD AUTO: 5.45 K/UL — SIGNIFICANT CHANGE UP (ref 1.8–7.4)
NEUTROPHILS NFR BLD AUTO: 66.1 % — SIGNIFICANT CHANGE UP (ref 43–77)
NRBC # BLD: 0 /100 WBCS — SIGNIFICANT CHANGE UP (ref 0–0)
OPIATES UR-MCNC: NEGATIVE — SIGNIFICANT CHANGE UP
PCP SPEC-MCNC: SIGNIFICANT CHANGE UP
PCP UR-MCNC: NEGATIVE — SIGNIFICANT CHANGE UP
PLATELET # BLD AUTO: 337 K/UL — SIGNIFICANT CHANGE UP (ref 150–400)
POTASSIUM SERPL-MCNC: 4.2 MMOL/L — SIGNIFICANT CHANGE UP (ref 3.5–5.3)
POTASSIUM SERPL-SCNC: 4.2 MMOL/L — SIGNIFICANT CHANGE UP (ref 3.5–5.3)
RBC # BLD: 4.34 M/UL — SIGNIFICANT CHANGE UP (ref 4.2–5.8)
RBC # FLD: 14.7 % — HIGH (ref 10.3–14.5)
SODIUM SERPL-SCNC: 141 MMOL/L — SIGNIFICANT CHANGE UP (ref 135–145)
THC UR QL: NEGATIVE — SIGNIFICANT CHANGE UP
VALPROATE SERPL-MCNC: 45 UG/ML — LOW (ref 50–100)
WBC # BLD: 8.24 K/UL — SIGNIFICANT CHANGE UP (ref 3.8–10.5)
WBC # FLD AUTO: 8.24 K/UL — SIGNIFICANT CHANGE UP (ref 3.8–10.5)

## 2019-09-05 PROCEDURE — 99222 1ST HOSP IP/OBS MODERATE 55: CPT

## 2019-09-05 PROCEDURE — 99285 EMERGENCY DEPT VISIT HI MDM: CPT

## 2019-09-05 RX ORDER — PANTOPRAZOLE SODIUM 20 MG/1
40 TABLET, DELAYED RELEASE ORAL
Refills: 0 | Status: DISCONTINUED | OUTPATIENT
Start: 2019-09-05 | End: 2019-09-19

## 2019-09-05 RX ORDER — OLANZAPINE 15 MG/1
5 TABLET, FILM COATED ORAL DAILY
Refills: 0 | Status: DISCONTINUED | OUTPATIENT
Start: 2019-09-05 | End: 2019-09-10

## 2019-09-05 RX ORDER — SENNA PLUS 8.6 MG/1
2 TABLET ORAL AT BEDTIME
Refills: 0 | Status: DISCONTINUED | OUTPATIENT
Start: 2019-09-05 | End: 2019-09-19

## 2019-09-05 RX ORDER — ASCORBIC ACID 60 MG
500 TABLET,CHEWABLE ORAL DAILY
Refills: 0 | Status: DISCONTINUED | OUTPATIENT
Start: 2019-09-05 | End: 2019-09-19

## 2019-09-05 RX ORDER — PYRIDOXINE HCL (VITAMIN B6) 100 MG
50 TABLET ORAL DAILY
Refills: 0 | Status: DISCONTINUED | OUTPATIENT
Start: 2019-09-05 | End: 2019-09-19

## 2019-09-05 RX ORDER — DOCUSATE SODIUM 100 MG
100 CAPSULE ORAL DAILY
Refills: 0 | Status: DISCONTINUED | OUTPATIENT
Start: 2019-09-05 | End: 2019-09-19

## 2019-09-05 RX ORDER — POLYETHYLENE GLYCOL 3350 17 G/17G
17 POWDER, FOR SOLUTION ORAL DAILY
Refills: 0 | Status: DISCONTINUED | OUTPATIENT
Start: 2019-09-05 | End: 2019-09-19

## 2019-09-05 RX ORDER — LACTULOSE 10 G/15ML
15 SOLUTION ORAL
Qty: 0 | Refills: 0 | DISCHARGE

## 2019-09-05 RX ORDER — NICOTINE POLACRILEX 2 MG
1 GUM BUCCAL DAILY
Refills: 0 | Status: DISCONTINUED | OUTPATIENT
Start: 2019-09-05 | End: 2019-09-19

## 2019-09-05 RX ORDER — CHOLECALCIFEROL (VITAMIN D3) 125 MCG
1000 CAPSULE ORAL DAILY
Refills: 0 | Status: DISCONTINUED | OUTPATIENT
Start: 2019-09-05 | End: 2019-09-19

## 2019-09-05 RX ORDER — LEVOTHYROXINE SODIUM 125 MCG
1 TABLET ORAL
Qty: 0 | Refills: 0 | DISCHARGE

## 2019-09-05 RX ORDER — LEVOTHYROXINE SODIUM 125 MCG
100 TABLET ORAL DAILY
Refills: 0 | Status: DISCONTINUED | OUTPATIENT
Start: 2019-09-05 | End: 2019-09-06

## 2019-09-05 RX ORDER — VALPROIC ACID (AS SODIUM SALT) 250 MG/5ML
750 SOLUTION, ORAL ORAL
Refills: 0 | Status: DISCONTINUED | OUTPATIENT
Start: 2019-09-05 | End: 2019-09-15

## 2019-09-05 RX ORDER — METOPROLOL TARTRATE 50 MG
50 TABLET ORAL DAILY
Refills: 0 | Status: DISCONTINUED | OUTPATIENT
Start: 2019-09-05 | End: 2019-09-19

## 2019-09-05 RX ORDER — LEVOTHYROXINE SODIUM 125 MCG
88 TABLET ORAL DAILY
Refills: 0 | Status: DISCONTINUED | OUTPATIENT
Start: 2019-09-05 | End: 2019-09-06

## 2019-09-05 RX ORDER — ACETAMINOPHEN 500 MG
650 TABLET ORAL EVERY 6 HOURS
Refills: 0 | Status: DISCONTINUED | OUTPATIENT
Start: 2019-09-05 | End: 2019-09-19

## 2019-09-05 RX ORDER — OXYCODONE HYDROCHLORIDE 5 MG/1
10 TABLET ORAL EVERY 12 HOURS
Refills: 0 | Status: DISCONTINUED | OUTPATIENT
Start: 2019-09-05 | End: 2019-09-12

## 2019-09-05 RX ORDER — KETOROLAC TROMETHAMINE 30 MG/ML
30 SYRINGE (ML) INJECTION ONCE
Refills: 0 | Status: DISCONTINUED | OUTPATIENT
Start: 2019-09-05 | End: 2019-09-05

## 2019-09-05 RX ORDER — OLANZAPINE 15 MG/1
10 TABLET, FILM COATED ORAL AT BEDTIME
Refills: 0 | Status: DISCONTINUED | OUTPATIENT
Start: 2019-09-05 | End: 2019-09-19

## 2019-09-05 RX ORDER — BENZTROPINE MESYLATE 1 MG
0.5 TABLET ORAL
Refills: 0 | Status: DISCONTINUED | OUTPATIENT
Start: 2019-09-05 | End: 2019-09-19

## 2019-09-05 RX ORDER — IPRATROPIUM/ALBUTEROL SULFATE 18-103MCG
3 AEROSOL WITH ADAPTER (GRAM) INHALATION EVERY 6 HOURS
Refills: 0 | Status: DISCONTINUED | OUTPATIENT
Start: 2019-09-05 | End: 2019-09-19

## 2019-09-05 RX ORDER — LANOLIN ALCOHOL/MO/W.PET/CERES
3 CREAM (GRAM) TOPICAL AT BEDTIME
Refills: 0 | Status: DISCONTINUED | OUTPATIENT
Start: 2019-09-05 | End: 2019-09-19

## 2019-09-05 RX ORDER — GABAPENTIN 400 MG/1
300 CAPSULE ORAL THREE TIMES A DAY
Refills: 0 | Status: DISCONTINUED | OUTPATIENT
Start: 2019-09-05 | End: 2019-09-07

## 2019-09-05 RX ORDER — APIXABAN 2.5 MG/1
5 TABLET, FILM COATED ORAL EVERY 12 HOURS
Refills: 0 | Status: DISCONTINUED | OUTPATIENT
Start: 2019-09-05 | End: 2019-09-19

## 2019-09-05 RX ORDER — NICOTINE POLACRILEX 2 MG
1 GUM BUCCAL
Qty: 0 | Refills: 0 | DISCHARGE

## 2019-09-05 RX ORDER — LACTULOSE 10 G/15ML
10 SOLUTION ORAL THREE TIMES A DAY
Refills: 0 | Status: DISCONTINUED | OUTPATIENT
Start: 2019-09-05 | End: 2019-09-19

## 2019-09-05 RX ORDER — SACCHAROMYCES BOULARDII 250 MG
250 POWDER IN PACKET (EA) ORAL
Refills: 0 | Status: DISCONTINUED | OUTPATIENT
Start: 2019-09-05 | End: 2019-09-19

## 2019-09-05 RX ORDER — LITHIUM CARBONATE 300 MG/1
300 TABLET, EXTENDED RELEASE ORAL EVERY 12 HOURS
Refills: 0 | Status: DISCONTINUED | OUTPATIENT
Start: 2019-09-05 | End: 2019-09-19

## 2019-09-05 RX ORDER — DESMOPRESSIN ACETATE 0.1 MG/1
0.2 TABLET ORAL AT BEDTIME
Refills: 0 | Status: DISCONTINUED | OUTPATIENT
Start: 2019-09-05 | End: 2019-09-19

## 2019-09-05 RX ADMIN — OLANZAPINE 5 MILLIGRAM(S): 15 TABLET, FILM COATED ORAL at 23:53

## 2019-09-05 RX ADMIN — GABAPENTIN 300 MILLIGRAM(S): 400 CAPSULE ORAL at 23:54

## 2019-09-05 RX ADMIN — LITHIUM CARBONATE 300 MILLIGRAM(S): 300 TABLET, EXTENDED RELEASE ORAL at 23:54

## 2019-09-05 RX ADMIN — DESMOPRESSIN ACETATE 0.2 MILLIGRAM(S): 0.1 TABLET ORAL at 23:53

## 2019-09-05 RX ADMIN — OXYCODONE HYDROCHLORIDE 10 MILLIGRAM(S): 5 TABLET ORAL at 23:18

## 2019-09-05 RX ADMIN — Medication 3 MILLIGRAM(S): at 23:18

## 2019-09-05 RX ADMIN — Medication 30 MILLILITER(S): at 22:13

## 2019-09-05 RX ADMIN — Medication 30 MILLIGRAM(S): at 17:08

## 2019-09-05 RX ADMIN — Medication 750 MILLIGRAM(S): at 23:53

## 2019-09-05 RX ADMIN — Medication 0.5 MILLIGRAM(S): at 23:53

## 2019-09-05 NOTE — ED ADULT NURSE NOTE - OBJECTIVE STATEMENT
54 yr old male BIB EMS from [facility]) for evaluation of lower back pain and abdominal pain x 6 weeks per Paramedic. Per staff pt was agitated but calm/cooperative at this time. Pt requesting food, denies nausea/vomiting/fevers/chills. PMHX: Bipolar Disorder, HTN, Asthma, Hypothyroidism, Back pain, Neck surgery 2019.

## 2019-09-05 NOTE — ED PROVIDER NOTE - CLINICAL SUMMARY MEDICAL DECISION MAKING FREE TEXT BOX
55 y/o M with PMH of bipolar d/o, schizophrenia, cervical spine surgery earlier this year was BIB EMS from [facility] for agitations. Per EMS patient verbally abusive and aggressive to the nursing home staff. Patient reports he is in [place] for physical therapy, to regain strength in his legs. Reports he does not like the PT in [place]. He reports he is constipated for 2 week, only passing small amounts of stool. He denies SI/HI/VH/AH, CP, SOB, abd pain, f/c, urinary symptoms, or all other complaints. PE as noted above. Patient is calm and cooperative on exam. labs pending, including lithium and valporic acid level. Patient asking for food, diet ordered, soap enema, reassess

## 2019-09-05 NOTE — H&P ADULT - NSICDXPASTMEDICALHX_GEN_ALL_CORE_FT
PAST MEDICAL HISTORY:  Asthma     Bipolar disorder     Bipolar disorder     Drug abuse     HTN (hypertension)     Hypertension     Hypothyroid     Incontinence     Schizophrenia     Schizophreniform disorder

## 2019-09-05 NOTE — ED ADULT NURSE NOTE - PMH
Asthma    Bipolar disorder    Bipolar disorder    Drug abuse    HTN (hypertension)    Hypertension    Hypothyroid    Incontinence    Schizophrenia    Schizophreniform disorder

## 2019-09-05 NOTE — ED ADULT NURSE REASSESSMENT NOTE - NS ED NURSE REASSESS COMMENT FT1
pt had large bowel movement, Yesy PENNINGTON made aware. Pt verbalizes "feeling better". Awaiting EMS transport.

## 2019-09-05 NOTE — ED PROVIDER NOTE - OBJECTIVE STATEMENT
55 y/o M with PMH of bipolar d/o, schizophrenia, 55 y/o M with PMH of bipolar d/o, schizophrenia, cervical spine surgery earlier this year was BIB EMS from [facility] for agitations. Per EMS patient verbally abusive and aggressive to the nursing home staff. Patient reports he is in [place] for physical therapy, to regain strength in his legs. Reports he does not like the PT in [place]. He reports he is constipated for 2 week, only passing small amounts of stool. He denies SI/HI/VH/AH, CP, SOB, abd pain, f/c, urinary symptoms, or all other complaints

## 2019-09-05 NOTE — ED PROVIDER NOTE - NSFOLLOWUPINSTRUCTIONS_ED_ALL_ED_FT
Follow up your test results with your primary care physician within 1 week.     Stay hydrated. Increase your fiber intake.     Return to the ER if your symptoms worsen, high fevers, severe pain or for any other medical emergencies

## 2019-09-05 NOTE — ED PROVIDER NOTE - PROGRESS NOTE DETAILS
GORGE Downey: Patient had a large BM after enema. Reports he feels better. labs reviewed. lithium and valporic acid level are a send out test. Patient stable for d/c. if levels are elevated will inform [facility] GORGE Downey: nurse manager Vinicius ([phone]) from [place] called, states patients bed was "given away", they will have a bed available tomorrow for patient. She states they want a psych c/s for patient as patient was agitated at the NH . MAGALEI Ac was informed, she states patient will need admission for social hold. Additional labs ordered for telepsych consult GORGE Downey: Spoke with telepsych attending, she will contact [facility] for collateral info and evaluate patient GORGE Downey: Spoke with telepsych attending, she states if patients are admitted, there is a separate consult number for admitted patients. I informed hospitalist, Dr. Larsen about this, she will get the consult once patient is admitted

## 2019-09-05 NOTE — H&P ADULT - NSHPPHYSICALEXAM_GEN_ALL_CORE
Vital Signs Last 24 Hrs  T(C): 36.7 (05 Sep 2019 22:10), Max: 36.7 (05 Sep 2019 22:10)  T(F): 98.1 (05 Sep 2019 22:10), Max: 98.1 (05 Sep 2019 22:10)  HR: 82 (05 Sep 2019 22:10) (75 - 82)  BP: 117/79 (05 Sep 2019 22:10) (112/67 - 121/73)  BP(mean): --  RR: 16 (05 Sep 2019 22:10) (16 - 18)  SpO2: 99% (05 Sep 2019 22:10) (96% - 99%)  Daily Height in cm: 190.5 (05 Sep 2019 13:09)    Daily   CAPILLARY BLOOD GLUCOSE        I&O's Summary      GENERAL: NAD  HEAD:  Normocephalic  EYES: EOMI, PERRLA, conjunctiva and sclera clear  ENMT: No tonsillar erythema, exudates, or enlargement; Moist mucous membranes, No lesions  NECK: Supple, No JVD, no bruit, normal thyroid  NERVOUS SYSTEM:  Alert & Oriented X3, Good concentration; moves upper extremities easily. only able to dorsi/plantar flex ankles and wiggles toes. unable to flex/extend hip or knees without assistance.   CHEST/LUNG: Clear to auscultation bilaterally; No rales, rhonchi, wheezing, or rubs  HEART: Regular rate and rhythm; No murmurs, rubs, or gallops  ABDOMEN: Soft, Nontender, Nondistended; Bowel sounds present  EXTREMITIES:  2+ Peripheral Pulses, No clubbing, cyanosis, or edema. R knee contracture worse than L.   LYMPH: No lymphadenopathy noted  SKIN: No rashes or lesions

## 2019-09-05 NOTE — ED PROVIDER NOTE - CARE PLAN
Principal Discharge DX:	Constipation, unspecified constipation type Principal Discharge DX:	Ambulatory dysfunction  Secondary Diagnosis:	Constipation, unspecified constipation type

## 2019-09-05 NOTE — H&P ADULT - HISTORY OF PRESENT ILLNESS
54M from [facility], with PMHx of HTN, asthma, schizophreniform disorder, bipolar disorder, GERD, DI, Hypothyroidism, chronic pain syndrome an PSxH of cervical spine surgery with LE weakness, hx of recent PE on Eliquis presented to  with complaints of constipation of several days duration assoc with mild abd discomfort, no NVD. Denied fevers, chills, SOB, CP, dysuria. NH reported pt with verbally aggressive and abusive behavior and would not reaccept pt until he has a formal psych evaluation. In ED, s/p Maalox/enema with relieved constipation. Pt has been calm, cooperative in ED. On interview, pt is alert, oriented x 3,  calm, responds appropriately to questions and follows directions.

## 2019-09-05 NOTE — H&P ADULT - NSHPLABSRESULTS_GEN_ALL_CORE
12.5   8.24  )-----------( 337      ( 05 Sep 2019 14:12 )             39.5       09-05    141  |  106  |  14  ----------------------------<  77  4.2   |  32<H>  |  0.38<L>    Ca    9.8      05 Sep 2019 14:12            EKG:      CXR: wet read 12.5   8.24  )-----------( 337      ( 05 Sep 2019 14:12 )             39.5       09-05    141  |  106  |  14  ----------------------------<  77  4.2   |  32<H>  |  0.38<L>    Ca    9.8      05 Sep 2019 14:12            EKG: PENDING

## 2019-09-05 NOTE — ED ADULT TRIAGE NOTE - CHIEF COMPLAINT QUOTE
Pt BIBA from [facility], here to be evaluated, EMT stated pt was agitated, pt denies suicidal/homicidal  ideations, c/o back and abdominal pain for the last 6 weeks, pt cooperative at this time

## 2019-09-05 NOTE — ED BEHAVIORAL HEALTH NOTE - BEHAVIORAL HEALTH NOTE
Consult canceled by GORGE Hayward. The patient was not evaluated by Telepsychiatry - no contact was made by any member of Telepsychiatry team with patient or collateral sources of information. GORGE Hayward informed to consult Telepsychiatry as needed.

## 2019-09-05 NOTE — CHART NOTE - NSCHARTNOTEFT_GEN_A_CORE
Called to ED for patient not being accepted back to [facility]. Called  Ms Ferraar to determine the reason pt can no longer be accepted, she stated that pt is agitated and requires a formal psych eval. Explained to Ms Ferrara that patient is calm and cooperative in the ED and has been medically cleared to return. Ms Ferrara is requiring a complete evaluation and if he is cleared [facility] will not accept patient back because they gave his bed away. Dr Husain aware that pt would have to be admitted and could not return to [facility] this evening.

## 2019-09-05 NOTE — H&P ADULT - ASSESSMENT
54M from [facility], with PMHx of HTN, asthma, schizophreniform disorder, bipolar disorder, GERD, DI, Hypothyroidism, chronic pain syndrome an PSxH of cervical spine surgery with LE weakness, hx of recent PE on Eliquis pw verbally aggressive/abusive behavior and constipation.     # hx of schizo/bipolar d/o with hx of verbal abusive behavior  Psych evaluation   cont psych meds. check lithium, depakote levels. 54M from [facility], with PMHx of HTN, asthma, schizophreniform disorder, bipolar disorder, GERD, DI, Hypothyroidism, chronic pain syndrome an PSxH of cervical spine surgery with LE weakness, hx of recent PE on Eliquis pw verbally aggressive/abusive behavior and constipation.     # hx of schizo/bipolar d/o with hx of verbal abusive behavior  Psych evaluation   cont psych meds. check lithium, depakote levels.     # hx of PE  cont ELiquis    # hypothyroid   cont synthroid    # SW for dispo 54M from [facility], with PMHx of HTN, asthma, schizophreniform disorder, bipolar disorder, GERD, DI, Hypothyroidism, chronic pain syndrome an PSxH of cervical spine surgery with LE weakness, hx of recent PE on Eliquis pw verbally aggressive/abusive behavior and constipation.     # hx of schizo/bipolar d/o with hx of verbal abusive behavior  Psych evaluation   cont psych meds. check lithium, depakote levels.     # hx of PE  cont ELiquis    # HTN   cont toprol    # hypothyroid   cont synthroid    # SW for dispo

## 2019-09-05 NOTE — H&P ADULT - NSHPREVIEWOFSYSTEMS_GEN_ALL_CORE
CONSTITUTIONAL: No fever, weight loss, or fatigue  EYES: No eye pain, visual disturbances, or discharge  ENMT:  No difficulty hearing, tinnitus, vertigo; No sinus or throat pain  NECK: No pain or stiffness  RESPIRATORY: No cough, wheezing, chills or hemoptysis; No shortness of breath  CARDIOVASCULAR: No chest pain, palpitations, dizziness, or leg swelling  GASTROINTESTINAL: No abdominal or epigastric pain. No nausea, vomiting, or hematemesis; No diarrhea.  ++constipation. No melena or hematochezia.  GENITOURINARY: No dysuria, frequency, hematuria, or incontinence  NEUROLOGICAL: No headaches, memory loss, loss of strength, numbness, or tremors  SKIN: No itching, burning, rashes, or lesions   LYMPH NODES: No enlarged glands  ENDOCRINE: No heat or cold intolerance; No hair loss  MUSCULOSKELETAL: No joint pain or swelling; No muscle, back, or extremity pain  PSYCHIATRIC: No depression, anxiety, mood swings, or difficulty sleeping  HEME/LYMPH: No easy bruising, or bleeding gums  ALLERY AND IMMUNOLOGIC: No hives or eczema    IMPROVE VTE Individual Risk Assessment          RISK                                                          Points  [3  ] Previous VTE                                                3  [  ] Thrombophilia                                             2  [ 2 ] Lower limb paralysis                                   2        (unable to hold up >15 seconds)    [  ] Current Cancer                                             2         (within 6 months)  [  ] Immobilization > 24 hrs                              1  [  ] ICU/CCU stay > 24 hours                             1  [  ] Age > 60                                                         1    IMPROVE VTE Score:         [    5     ]    Total Risk Factor Score:    0 - 1:   Consider IPC  >2 - 3:  Thromboprophylaxis required (enoxaparin or SQ heparin)        >4:   High Risk: Thromboprophylaxis required (enoxaparin or SQ heparin), optional add IPC  **If CONTRAINDICATION to enoxaparin or SQ heparin, USE IPCs**

## 2019-09-05 NOTE — ED PROVIDER NOTE - PATIENT PORTAL LINK FT
You can access the FollowMyHealth Patient Portal offered by [facility] by registering at the following website: [email or web address]. By joining [employer]s FollowMyHealth portal, you will also be able to view your health information using other applications (apps) compatible with our system.

## 2019-09-05 NOTE — ED ADULT NURSE NOTE - CHIEF COMPLAINT QUOTE
Pt BIBA from [facility], here to be evaluated, EMT stated pt was agitated, pt denies suicidal/homicidal  ideations, c/o back and abdominal pain for the last 6 weeks, pt cooperative at this time

## 2019-09-05 NOTE — ED PROVIDER NOTE - PHYSICAL EXAMINATION
patient calm and cooperative on exam  on rectal exam by Dr. Husain, patient had large amount of stool in rectum

## 2019-09-05 NOTE — ED PROVIDER NOTE - ATTENDING CONTRIBUTION TO CARE
55 y/o M with PMH of bipolar d/o, schizophrenia, cervical spine surgery earlier this year was BIB EMS from [facility] for agitations. Per EMS patient verbally abusive and aggressive to the nursing home staff. Patient reports he is in [place] for physical therapy, to regain strength in his legs. Reports he does not like the PT in [place]. He reports he is constipated for 2 week, only passing small amounts of stool. He denies SI/HI/VH/AH, CP, SOB, abd pain, f/c, urinary symptoms, or all other complaints  [facility] did not accept pt till psych eval not compleyted d/w manager [phone]. also notified pt brother [phone]  psych eval pending s/w recommended admit pt for social reasons  Dr. Husain:  I have reviewed and discussed with the PA/ resident the case specifics, including the history, physical assessment, evaluation, conclusion, laboratory results, and medical plan. I agree with the contents, and conclusions. I have personally examined, and interviewed the patient.

## 2019-09-06 LAB — TSH SERPL-MCNC: 11.2 UIU/ML — HIGH (ref 0.27–4.2)

## 2019-09-06 PROCEDURE — 99233 SBSQ HOSP IP/OBS HIGH 50: CPT

## 2019-09-06 PROCEDURE — 99232 SBSQ HOSP IP/OBS MODERATE 35: CPT

## 2019-09-06 PROCEDURE — 93010 ELECTROCARDIOGRAM REPORT: CPT

## 2019-09-06 RX ORDER — LEVOTHYROXINE SODIUM 125 MCG
200 TABLET ORAL DAILY
Refills: 0 | Status: DISCONTINUED | OUTPATIENT
Start: 2019-09-06 | End: 2019-09-19

## 2019-09-06 RX ADMIN — OXYCODONE HYDROCHLORIDE 10 MILLIGRAM(S): 5 TABLET ORAL at 17:54

## 2019-09-06 RX ADMIN — Medication 30 MILLILITER(S): at 06:21

## 2019-09-06 RX ADMIN — Medication 1 PATCH: at 12:00

## 2019-09-06 RX ADMIN — DESMOPRESSIN ACETATE 0.2 MILLIGRAM(S): 0.1 TABLET ORAL at 21:02

## 2019-09-06 RX ADMIN — Medication 750 MILLIGRAM(S): at 06:19

## 2019-09-06 RX ADMIN — Medication 100 MICROGRAM(S): at 06:20

## 2019-09-06 RX ADMIN — Medication 750 MILLIGRAM(S): at 17:48

## 2019-09-06 RX ADMIN — OXYCODONE HYDROCHLORIDE 10 MILLIGRAM(S): 5 TABLET ORAL at 06:21

## 2019-09-06 RX ADMIN — Medication 50 MILLIGRAM(S): at 12:00

## 2019-09-06 RX ADMIN — LACTULOSE 10 GRAM(S): 10 SOLUTION ORAL at 13:22

## 2019-09-06 RX ADMIN — OLANZAPINE 10 MILLIGRAM(S): 15 TABLET, FILM COATED ORAL at 21:03

## 2019-09-06 RX ADMIN — Medication 0.5 MILLIGRAM(S): at 06:21

## 2019-09-06 RX ADMIN — SENNA PLUS 2 TABLET(S): 8.6 TABLET ORAL at 21:02

## 2019-09-06 RX ADMIN — OXYCODONE HYDROCHLORIDE 10 MILLIGRAM(S): 5 TABLET ORAL at 19:11

## 2019-09-06 RX ADMIN — LACTULOSE 10 GRAM(S): 10 SOLUTION ORAL at 21:02

## 2019-09-06 RX ADMIN — APIXABAN 5 MILLIGRAM(S): 2.5 TABLET, FILM COATED ORAL at 17:48

## 2019-09-06 RX ADMIN — OXYCODONE HYDROCHLORIDE 10 MILLIGRAM(S): 5 TABLET ORAL at 00:24

## 2019-09-06 RX ADMIN — Medication 3 MILLIGRAM(S): at 21:05

## 2019-09-06 RX ADMIN — Medication 650 MILLIGRAM(S): at 16:16

## 2019-09-06 RX ADMIN — Medication 1000 UNIT(S): at 12:00

## 2019-09-06 RX ADMIN — Medication 100 MILLIGRAM(S): at 12:00

## 2019-09-06 RX ADMIN — LITHIUM CARBONATE 300 MILLIGRAM(S): 300 TABLET, EXTENDED RELEASE ORAL at 17:48

## 2019-09-06 RX ADMIN — Medication 1 TABLET(S): at 12:00

## 2019-09-06 RX ADMIN — Medication 500 MILLIGRAM(S): at 12:00

## 2019-09-06 RX ADMIN — Medication 88 MICROGRAM(S): at 06:20

## 2019-09-06 RX ADMIN — Medication 250 MILLIGRAM(S): at 06:20

## 2019-09-06 RX ADMIN — PANTOPRAZOLE SODIUM 40 MILLIGRAM(S): 20 TABLET, DELAYED RELEASE ORAL at 06:20

## 2019-09-06 RX ADMIN — Medication 650 MILLIGRAM(S): at 17:16

## 2019-09-06 RX ADMIN — OLANZAPINE 5 MILLIGRAM(S): 15 TABLET, FILM COATED ORAL at 13:22

## 2019-09-06 RX ADMIN — Medication 1 PATCH: at 19:30

## 2019-09-06 RX ADMIN — GABAPENTIN 300 MILLIGRAM(S): 400 CAPSULE ORAL at 06:19

## 2019-09-06 RX ADMIN — Medication 30 MILLILITER(S): at 17:49

## 2019-09-06 RX ADMIN — GABAPENTIN 300 MILLIGRAM(S): 400 CAPSULE ORAL at 21:02

## 2019-09-06 RX ADMIN — LITHIUM CARBONATE 300 MILLIGRAM(S): 300 TABLET, EXTENDED RELEASE ORAL at 06:20

## 2019-09-06 RX ADMIN — Medication 0.5 MILLIGRAM(S): at 17:48

## 2019-09-06 RX ADMIN — LACTULOSE 10 GRAM(S): 10 SOLUTION ORAL at 06:21

## 2019-09-06 RX ADMIN — Medication 250 MILLIGRAM(S): at 17:48

## 2019-09-06 RX ADMIN — APIXABAN 5 MILLIGRAM(S): 2.5 TABLET, FILM COATED ORAL at 06:21

## 2019-09-06 RX ADMIN — GABAPENTIN 300 MILLIGRAM(S): 400 CAPSULE ORAL at 16:16

## 2019-09-06 RX ADMIN — Medication 50 MILLIGRAM(S): at 06:20

## 2019-09-06 RX ADMIN — OXYCODONE HYDROCHLORIDE 10 MILLIGRAM(S): 5 TABLET ORAL at 08:11

## 2019-09-06 RX ADMIN — POLYETHYLENE GLYCOL 3350 17 GRAM(S): 17 POWDER, FOR SOLUTION ORAL at 12:00

## 2019-09-06 NOTE — PROGRESS NOTE ADULT - ASSESSMENT
54M from [facility] ([facility]), with PMHx of HTN, asthma, schizophreniform disorder, bipolar disorder, GERD, DI, Hypothyroidism, chronic pain syndrome with hx of cervical spine surgery with residual lower extremity weakness, hx of recent PE on Eliquis p/w verbally aggressive/abusive behavior and constipation.     #Schizophreniform disorder with behavioral disturbance  #Bipolar disorder with behavioral disturbance  -Psych to see patient  -Patient is much better at this time  -Patient's mother  last week - which is likely why patient had an exacerbation of his underlying mood disorder  -lithium and depakote levels are pending    #History of PE  cont Eliquis    #Essential HTN  -c/w Toprol    #Diabetes insipidus  -Desmopressin    # hypothyroid   Elevated TSH, will increase synthroid to 200 mcg (up from 188 mcg)  Needs repeat TSH in 3-6 weeks    Hope for d/c back to long term facility tomorrow if okay with psych 54M from [facility] ([facility]), with PMHx of HTN, asthma, schizophreniform disorder, bipolar disorder, GERD, DI, Hypothyroidism, chronic pain syndrome with hx of cervical spine surgery with residual lower extremity weakness, hx of recent PE on Eliquis p/w verbally aggressive/abusive behavior and constipation.     #Schizophreniform disorder with behavioral disturbance  #Bipolar disorder with behavioral disturbance  -Psych to see patient  -Patient is much better at this time  -Patient's mother  last week - which is likely why patient had an exacerbation of his underlying mood disorder  -lithium and depakote levels are pending    #History of PE  cont Eliquis    #Essential HTN  -c/w Toprol    #Diabetes insipidus  -Desmopressin    # hypothyroid   Elevated TSH, will increase synthroid to 200 mcg (up from 188 mcg)  Needs repeat TSH in 3-6 weeks    #Abnormal EKG  -Repeat EKG to confirm - ordered by me  -QTc 420ms    Hope for d/c back to long term facility tomorrow if okay with psych

## 2019-09-06 NOTE — PROGRESS NOTE ADULT - SUBJECTIVE AND OBJECTIVE BOX
Patient is a 54y old  Male who presents with a chief complaint of ambulatory dysfxn, verbally abusive behavior. constipation (05 Sep 2019 22:14)    Patient seen and examined at bedside. No overnight events reported.     ALLERGIES:  Haldol (Unknown)  Thorazine (Unknown)  Thorazine (Unknown)    MEDICATIONS  (STANDING):  aluminum hydroxide/magnesium hydroxide/simethicone Suspension 30 milliLiter(s) Oral every 12 hours  apixaban 5 milliGRAM(s) Oral every 12 hours  ascorbic acid 500 milliGRAM(s) Oral daily  benztropine 0.5 milliGRAM(s) Oral two times a day  cholecalciferol 1000 Unit(s) Oral daily  desmopressin 0.2 milliGRAM(s) Oral at bedtime  docusate sodium 100 milliGRAM(s) Oral daily  gabapentin 300 milliGRAM(s) Oral three times a day  lactulose Syrup 10 Gram(s) Oral three times a day  levothyroxine 200 MICROGram(s) Oral daily  lithium 300 milliGRAM(s) Oral every 12 hours  melatonin 3 milliGRAM(s) Oral at bedtime  metoprolol succinate ER 50 milliGRAM(s) Oral daily  multivitamin 1 Tablet(s) Oral daily  nicotine - 21 mG/24Hr(s) Patch 1 patch Transdermal daily  OLANZapine 10 milliGRAM(s) Oral at bedtime  OLANZapine 5 milliGRAM(s) Oral daily  oxyCODONE  ER Tablet 10 milliGRAM(s) Oral every 12 hours  pantoprazole    Tablet 40 milliGRAM(s) Oral before breakfast  polyethylene glycol 3350 17 Gram(s) Oral daily  pyridoxine 50 milliGRAM(s) Oral daily  saccharomyces boulardii 250 milliGRAM(s) Oral two times a day  senna 2 Tablet(s) Oral at bedtime  valproic acid 750 milliGRAM(s) Oral two times a day    MEDICATIONS  (PRN):  acetaminophen   Tablet .. 650 milliGRAM(s) Oral every 6 hours PRN Temp greater or equal to 38C (100.4F), Mild Pain (1 - 3)  ALBUTerol/ipratropium for Nebulization 3 milliLiter(s) Nebulizer every 6 hours PRN Bronchospasm  bisacodyl Suppository 10 milliGRAM(s) Rectal daily PRN Constipation    Vital Signs Last 24 Hrs  T(F): 97.6 (06 Sep 2019 15:30), Max: 98.6 (05 Sep 2019 23:32)  HR: 73 (06 Sep 2019 15:30) (62 - 82)  BP: 105/56 (06 Sep 2019 15:30) (105/56 - 118/73)  RR: 16 (06 Sep 2019 15:30) (15 - 17)  SpO2: 94% (06 Sep 2019 15:30) (94% - 99%)  I&O's Summary    06 Sep 2019 07:01  -  06 Sep 2019 15:57  --------------------------------------------------------  IN: 300 mL / OUT: 1 mL / NET: 299 mL      PHYSICAL EXAM:  General: NAD, A/O x 3  ENT: MMM, no thrush, poor dentition  Neck: Supple, No JVD  Lungs: Clear to auscultation bilaterally, good air entry, non-labored breathing  Cardio: RRR, S1/S2, No murmur  Abdomen: Soft, Nontender, Nondistended; Bowel sounds present  Extremities: No calf tenderness, No pitting edema    LABS:                        12.5   8.24  )-----------( 337      ( 05 Sep 2019 14:12 )             39.5     09-05    141  |  106  |  14  ----------------------------<  77  4.2   |  32  |  0.38    Ca    9.8      05 Sep 2019 14:12          eGFR if Non African American: 139 mL/min/1.73M2 (09-05-19 @ 14:12)  eGFR if : 161 mL/min/1.73M2 (09-05-19 @ 14:12)      08-03 Chol 79 mg/dL LDL 36 mg/dL HDL 11 mg/dL Trig 159 mg/dL  TSH 11.20   TSH with FT4 reflex --  Total T3 --    08-03 [id number] 4.6

## 2019-09-07 PROCEDURE — 99232 SBSQ HOSP IP/OBS MODERATE 35: CPT

## 2019-09-07 PROCEDURE — 99222 1ST HOSP IP/OBS MODERATE 55: CPT

## 2019-09-07 PROCEDURE — 99233 SBSQ HOSP IP/OBS HIGH 50: CPT | Mod: GC

## 2019-09-07 RX ORDER — GABAPENTIN 400 MG/1
400 CAPSULE ORAL THREE TIMES A DAY
Refills: 0 | Status: DISCONTINUED | OUTPATIENT
Start: 2019-09-07 | End: 2019-09-19

## 2019-09-07 RX ADMIN — OXYCODONE HYDROCHLORIDE 10 MILLIGRAM(S): 5 TABLET ORAL at 05:54

## 2019-09-07 RX ADMIN — PANTOPRAZOLE SODIUM 40 MILLIGRAM(S): 20 TABLET, DELAYED RELEASE ORAL at 05:55

## 2019-09-07 RX ADMIN — Medication 0.5 MILLIGRAM(S): at 17:15

## 2019-09-07 RX ADMIN — OXYCODONE HYDROCHLORIDE 10 MILLIGRAM(S): 5 TABLET ORAL at 17:26

## 2019-09-07 RX ADMIN — Medication 0.5 MILLIGRAM(S): at 05:55

## 2019-09-07 RX ADMIN — OXYCODONE HYDROCHLORIDE 10 MILLIGRAM(S): 5 TABLET ORAL at 18:30

## 2019-09-07 RX ADMIN — Medication 250 MILLIGRAM(S): at 05:55

## 2019-09-07 RX ADMIN — LITHIUM CARBONATE 300 MILLIGRAM(S): 300 TABLET, EXTENDED RELEASE ORAL at 17:40

## 2019-09-07 RX ADMIN — APIXABAN 5 MILLIGRAM(S): 2.5 TABLET, FILM COATED ORAL at 05:55

## 2019-09-07 RX ADMIN — Medication 50 MILLIGRAM(S): at 12:16

## 2019-09-07 RX ADMIN — Medication 750 MILLIGRAM(S): at 17:26

## 2019-09-07 RX ADMIN — Medication 250 MILLIGRAM(S): at 17:26

## 2019-09-07 RX ADMIN — LACTULOSE 10 GRAM(S): 10 SOLUTION ORAL at 22:15

## 2019-09-07 RX ADMIN — Medication 30 MILLILITER(S): at 17:16

## 2019-09-07 RX ADMIN — GABAPENTIN 300 MILLIGRAM(S): 400 CAPSULE ORAL at 05:55

## 2019-09-07 RX ADMIN — OLANZAPINE 10 MILLIGRAM(S): 15 TABLET, FILM COATED ORAL at 22:16

## 2019-09-07 RX ADMIN — GABAPENTIN 400 MILLIGRAM(S): 400 CAPSULE ORAL at 22:16

## 2019-09-07 RX ADMIN — Medication 650 MILLIGRAM(S): at 13:22

## 2019-09-07 RX ADMIN — GABAPENTIN 300 MILLIGRAM(S): 400 CAPSULE ORAL at 14:40

## 2019-09-07 RX ADMIN — Medication 30 MILLILITER(S): at 05:56

## 2019-09-07 RX ADMIN — SENNA PLUS 2 TABLET(S): 8.6 TABLET ORAL at 22:16

## 2019-09-07 RX ADMIN — Medication 500 MILLIGRAM(S): at 12:16

## 2019-09-07 RX ADMIN — Medication 750 MILLIGRAM(S): at 05:55

## 2019-09-07 RX ADMIN — POLYETHYLENE GLYCOL 3350 17 GRAM(S): 17 POWDER, FOR SOLUTION ORAL at 12:17

## 2019-09-07 RX ADMIN — Medication 1 TABLET(S): at 12:16

## 2019-09-07 RX ADMIN — OXYCODONE HYDROCHLORIDE 10 MILLIGRAM(S): 5 TABLET ORAL at 06:54

## 2019-09-07 RX ADMIN — LACTULOSE 10 GRAM(S): 10 SOLUTION ORAL at 14:40

## 2019-09-07 RX ADMIN — Medication 3 MILLIGRAM(S): at 22:16

## 2019-09-07 RX ADMIN — APIXABAN 5 MILLIGRAM(S): 2.5 TABLET, FILM COATED ORAL at 17:15

## 2019-09-07 RX ADMIN — LITHIUM CARBONATE 300 MILLIGRAM(S): 300 TABLET, EXTENDED RELEASE ORAL at 05:55

## 2019-09-07 RX ADMIN — LACTULOSE 10 GRAM(S): 10 SOLUTION ORAL at 05:56

## 2019-09-07 RX ADMIN — OLANZAPINE 5 MILLIGRAM(S): 15 TABLET, FILM COATED ORAL at 12:16

## 2019-09-07 RX ADMIN — Medication 100 MILLIGRAM(S): at 12:16

## 2019-09-07 RX ADMIN — Medication 650 MILLIGRAM(S): at 12:22

## 2019-09-07 RX ADMIN — DESMOPRESSIN ACETATE 0.2 MILLIGRAM(S): 0.1 TABLET ORAL at 22:16

## 2019-09-07 RX ADMIN — Medication 1000 UNIT(S): at 12:16

## 2019-09-07 RX ADMIN — Medication 200 MICROGRAM(S): at 05:55

## 2019-09-07 NOTE — PROGRESS NOTE ADULT - SUBJECTIVE AND OBJECTIVE BOX
Patient is a 54y old  Male who presents with a chief complaint of ambulatory dysfxn, verbally abusive behavior. constipation (06 Sep 2019 18:52)    Interval event: uneventful last night.  Patient seen and examined at bedside.    ROS: Denies any chest pain, Shortness of breath, nausea, vomiting.     ALLERGIES:  Haldol (Unknown)  Thorazine (Unknown)  Thorazine (Unknown)    MEDICATIONS  (STANDING):  aluminum hydroxide/magnesium hydroxide/simethicone Suspension 30 milliLiter(s) Oral every 12 hours  apixaban 5 milliGRAM(s) Oral every 12 hours  ascorbic acid 500 milliGRAM(s) Oral daily  benztropine 0.5 milliGRAM(s) Oral two times a day  cholecalciferol 1000 Unit(s) Oral daily  desmopressin 0.2 milliGRAM(s) Oral at bedtime  docusate sodium 100 milliGRAM(s) Oral daily  gabapentin 300 milliGRAM(s) Oral three times a day  lactulose Syrup 10 Gram(s) Oral three times a day  levothyroxine 200 MICROGram(s) Oral daily  lithium 300 milliGRAM(s) Oral every 12 hours  melatonin 3 milliGRAM(s) Oral at bedtime  metoprolol succinate ER 50 milliGRAM(s) Oral daily  multivitamin 1 Tablet(s) Oral daily  nicotine - 21 mG/24Hr(s) Patch 1 patch Transdermal daily  OLANZapine 10 milliGRAM(s) Oral at bedtime  OLANZapine 5 milliGRAM(s) Oral daily  oxyCODONE  ER Tablet 10 milliGRAM(s) Oral every 12 hours  pantoprazole    Tablet 40 milliGRAM(s) Oral before breakfast  polyethylene glycol 3350 17 Gram(s) Oral daily  pyridoxine 50 milliGRAM(s) Oral daily  saccharomyces boulardii 250 milliGRAM(s) Oral two times a day  senna 2 Tablet(s) Oral at bedtime  valproic acid 750 milliGRAM(s) Oral two times a day    MEDICATIONS  (PRN):  acetaminophen   Tablet .. 650 milliGRAM(s) Oral every 6 hours PRN Temp greater or equal to 38C (100.4F), Mild Pain (1 - 3)  ALBUTerol/ipratropium for Nebulization 3 milliLiter(s) Nebulizer every 6 hours PRN Bronchospasm  bisacodyl Suppository 10 milliGRAM(s) Rectal daily PRN Constipation    Vital Signs Last 24 Hrs  T(F): 98.5 (07 Sep 2019 11:59), Max: 98.5 (07 Sep 2019 11:59)  HR: 78 (07 Sep 2019 11:59) (58 - 78)  BP: 100/61 (07 Sep 2019 11:59) (97/60 - 109/59)  RR: 15 (07 Sep 2019 11:59) (15 - 16)  SpO2: 94% (07 Sep 2019 11:59) (94% - 94%)  I&O's Summary    06 Sep 2019 07:01  -  07 Sep 2019 07:00  --------------------------------------------------------  IN: 600 mL / OUT: 1 mL / NET: 599 mL    PHYSICAL EXAM:  General: NAD, A/O x 3  ENT: MMM  Neck: Supple, No JVD  Lungs: Clear to auscultation bilaterally, no wheezing, rales, rhonchi.  Cardio: RRR, S1/S2, No murmurs  Abdomen: Soft, Nontender, Nondistended; Bowel sounds present  Extremities: No calf tenderness, No pitting edema    LABS:                        12.5   8.24  )-----------( 337      ( 05 Sep 2019 14:12 )             39.5     09-05    141  |  106  |  14  ----------------------------<  77  4.2   |  32  |  0.38    Ca    9.8      05 Sep 2019 14:12      eGFR if Non African American: 139 mL/min/1.73M2 (09-05-19 @ 14:12)  eGFR if : 161 mL/min/1.73M2 (09-05-19 @ 14:12)    08-03 Chol 79 mg/dL LDL 36 mg/dL HDL 11 mg/dL Trig 159 mg/dL  TSH 11.20   TSH with FT4 reflex --  Total T3 --    08-03 [id number] 4.6    RADIOLOGY & ADDITIONAL TESTS:    < from: US Duplex Venous Lower Ext Complete, Bilateral (08.05.19 @ 13:22) >  EXAM:  US DPLX LWR EXT VEINS COMPL BI                        PROCEDURE DATE:  08/05/2019    INTERPRETATION:  CLINICAL STATEMENT: Bilateral pulmonary emboli..   Shortness of breath  Technique:: Ultrasound of bilateral lower extremity deep venous system.  COMPARISON: None.  IMPRESSION:  Thrombus in the right posterior tibial vein and bilateral peroneal veins.   < end of copied text >  Care Discussed with Consultants/Other Providers: Patient is a 54y old  Male who presents with a chief complaint of ambulatory dysfxn, verbally abusive behavior. constipation (06 Sep 2019 18:52)    Interval event: uneventful last night.  Patient seen and examined at bedside.    ROS: Denies any chest pain, Shortness of breath, nausea, vomiting.     ALLERGIES:  Haldol (Unknown)  Thorazine (Unknown)  Thorazine (Unknown)    MEDICATIONS  (STANDING):  aluminum hydroxide/magnesium hydroxide/simethicone Suspension 30 milliLiter(s) Oral every 12 hours  apixaban 5 milliGRAM(s) Oral every 12 hours  ascorbic acid 500 milliGRAM(s) Oral daily  benztropine 0.5 milliGRAM(s) Oral two times a day  cholecalciferol 1000 Unit(s) Oral daily  desmopressin 0.2 milliGRAM(s) Oral at bedtime  docusate sodium 100 milliGRAM(s) Oral daily  gabapentin 300 milliGRAM(s) Oral three times a day  lactulose Syrup 10 Gram(s) Oral three times a day  levothyroxine 200 MICROGram(s) Oral daily  lithium 300 milliGRAM(s) Oral every 12 hours  melatonin 3 milliGRAM(s) Oral at bedtime  metoprolol succinate ER 50 milliGRAM(s) Oral daily  multivitamin 1 Tablet(s) Oral daily  nicotine - 21 mG/24Hr(s) Patch 1 patch Transdermal daily  OLANZapine 10 milliGRAM(s) Oral at bedtime  OLANZapine 5 milliGRAM(s) Oral daily  oxyCODONE  ER Tablet 10 milliGRAM(s) Oral every 12 hours  pantoprazole    Tablet 40 milliGRAM(s) Oral before breakfast  polyethylene glycol 3350 17 Gram(s) Oral daily  pyridoxine 50 milliGRAM(s) Oral daily  saccharomyces boulardii 250 milliGRAM(s) Oral two times a day  senna 2 Tablet(s) Oral at bedtime  valproic acid 750 milliGRAM(s) Oral two times a day    MEDICATIONS  (PRN):  acetaminophen   Tablet .. 650 milliGRAM(s) Oral every 6 hours PRN Temp greater or equal to 38C (100.4F), Mild Pain (1 - 3)  ALBUTerol/ipratropium for Nebulization 3 milliLiter(s) Nebulizer every 6 hours PRN Bronchospasm  bisacodyl Suppository 10 milliGRAM(s) Rectal daily PRN Constipation    Vital Signs Last 24 Hrs  T(F): 98.5 (07 Sep 2019 11:59), Max: 98.5 (07 Sep 2019 11:59)  HR: 78 (07 Sep 2019 11:59) (58 - 78)  BP: 100/61 (07 Sep 2019 11:59) (97/60 - 109/59)  RR: 15 (07 Sep 2019 11:59) (15 - 16)  SpO2: 94% (07 Sep 2019 11:59) (94% - 94%)  I&O's Summary    06 Sep 2019 07:01  -  07 Sep 2019 07:00  --------------------------------------------------------  IN: 600 mL / OUT: 1 mL / NET: 599 mL    PHYSICAL EXAM:  General: NAD, A/O x 3, resting comfortably  ENT: MMM  Neck: Supple, No JVD  Lungs: Clear to auscultation bilaterally, no wheezing, rales, rhonchi.  Cardio: RRR, S1/S2, No murmurs  Abdomen: Soft, Nontender, Nondistended; Bowel sounds present  Extremities: No calf tenderness, No pitting edema  Psych: no schizophreniform symptoms noted as pt is apologetic on admission aggression noted.    LABS:                        12.5   8.24  )-----------( 337      ( 05 Sep 2019 14:12 )             39.5     09-05    141  |  106  |  14  ----------------------------<  77  4.2   |  32  |  0.38    Ca    9.8      05 Sep 2019 14:12      eGFR if Non African American: 139 mL/min/1.73M2 (09-05-19 @ 14:12)  eGFR if : 161 mL/min/1.73M2 (09-05-19 @ 14:12)    08-03 Chol 79 mg/dL LDL 36 mg/dL HDL 11 mg/dL Trig 159 mg/dL  TSH 11.20   TSH with FT4 reflex --  Total T3 --    08-03 [id number] 4.6    RADIOLOGY & ADDITIONAL TESTS:    < from: US Duplex Venous Lower Ext Complete, Bilateral (08.05.19 @ 13:22) >  EXAM:  US DPLX LWR EXT VEINS COMPL BI                        PROCEDURE DATE:  08/05/2019    INTERPRETATION:  CLINICAL STATEMENT: Bilateral pulmonary emboli..   Shortness of breath  Technique:: Ultrasound of bilateral lower extremity deep venous system.  COMPARISON: None.  IMPRESSION:  Thrombus in the right posterior tibial vein and bilateral peroneal veins.   < end of copied text >  Care Discussed with Consultants/Other Providers: Patient is a 54y old  Male who presents with a chief complaint of ambulatory dysfxn, verbally abusive behavior. constipation (06 Sep 2019 18:52)    Interval event: uneventful last night.  Patient seen and examined at bedside.    ROS: Denies any chest pain, Shortness of breath, nausea, vomiting.     ALLERGIES:  Haldol (Unknown)  Thorazine (Unknown)  Thorazine (Unknown)    MEDICATIONS  (STANDING):  aluminum hydroxide/magnesium hydroxide/simethicone Suspension 30 milliLiter(s) Oral every 12 hours  apixaban 5 milliGRAM(s) Oral every 12 hours  ascorbic acid 500 milliGRAM(s) Oral daily  benztropine 0.5 milliGRAM(s) Oral two times a day  cholecalciferol 1000 Unit(s) Oral daily  desmopressin 0.2 milliGRAM(s) Oral at bedtime  docusate sodium 100 milliGRAM(s) Oral daily  gabapentin 300 milliGRAM(s) Oral three times a day  lactulose Syrup 10 Gram(s) Oral three times a day  levothyroxine 200 MICROGram(s) Oral daily  lithium 300 milliGRAM(s) Oral every 12 hours  melatonin 3 milliGRAM(s) Oral at bedtime  metoprolol succinate ER 50 milliGRAM(s) Oral daily  multivitamin 1 Tablet(s) Oral daily  nicotine - 21 mG/24Hr(s) Patch 1 patch Transdermal daily  OLANZapine 10 milliGRAM(s) Oral at bedtime  OLANZapine 5 milliGRAM(s) Oral daily  oxyCODONE  ER Tablet 10 milliGRAM(s) Oral every 12 hours  pantoprazole    Tablet 40 milliGRAM(s) Oral before breakfast  polyethylene glycol 3350 17 Gram(s) Oral daily  pyridoxine 50 milliGRAM(s) Oral daily  saccharomyces boulardii 250 milliGRAM(s) Oral two times a day  senna 2 Tablet(s) Oral at bedtime  valproic acid 750 milliGRAM(s) Oral two times a day    MEDICATIONS  (PRN):  acetaminophen   Tablet .. 650 milliGRAM(s) Oral every 6 hours PRN Temp greater or equal to 38C (100.4F), Mild Pain (1 - 3)  ALBUTerol/ipratropium for Nebulization 3 milliLiter(s) Nebulizer every 6 hours PRN Bronchospasm  bisacodyl Suppository 10 milliGRAM(s) Rectal daily PRN Constipation    Vital Signs Last 24 Hrs  T(F): 98.5 (07 Sep 2019 11:59), Max: 98.5 (07 Sep 2019 11:59)  HR: 78 (07 Sep 2019 11:59) (58 - 78)  BP: 100/61 (07 Sep 2019 11:59) (97/60 - 109/59)  RR: 15 (07 Sep 2019 11:59) (15 - 16)  SpO2: 94% (07 Sep 2019 11:59) (94% - 94%)  I&O's Summary    06 Sep 2019 07:01  -  07 Sep 2019 07:00  --------------------------------------------------------  IN: 600 mL / OUT: 1 mL / NET: 599 mL    PHYSICAL EXAM:  General: NAD, A/O x 3, resting comfortably  ENT: MMM  Neck: Supple, No JVD  Lungs: Clear to auscultation bilaterally, no wheezing, rales, rhonchi.  Cardio: RRR, S1/S2, No murmurs  Abdomen: Soft, Nontender, Nondistended; Bowel sounds present  Extremities: No calf tenderness, No pitting edema  Psych: no schizophreniform symptoms noted as pt is apologetic on admission aggression noted.    LABS:                        12.5   8.24  )-----------( 337      ( 05 Sep 2019 14:12 )             39.5     09-05    141  |  106  |  14  ----------------------------<  77  4.2   |  32  |  0.38    Ca    9.8      05 Sep 2019 14:12      eGFR if Non African American: 139 mL/min/1.73M2 (09-05-19 @ 14:12)  eGFR if : 161 mL/min/1.73M2 (09-05-19 @ 14:12)    08-03 Chol 79 mg/dL LDL 36 mg/dL HDL 11 mg/dL Trig 159 mg/dL  TSH 11.20   TSH with FT4 reflex --  Total T3 --    08-03 [id number] 4.6

## 2019-09-07 NOTE — PROGRESS NOTE ADULT - ASSESSMENT
54M from [facility] ([facility]), with PMHx of HTN, asthma, schizophreniform disorder, bipolar disorder, GERD, DI, Hypothyroidism, chronic pain syndrome with hx of cervical spine surgery with residual lower extremity weakness, hx of recent PE on Eliquis p/w verbally aggressive/abusive behavior and constipation.       #Schizophreniform disorder with behavioral disturbance  #Bipolar disorder with behavioral disturbance  -[facility]rec Psych- c/w valproic acid (lvl sub-therapetuic @ 45), c/w lithium (sub-therapeutic at 0.46). possibly pt not adherent to medication as out pt. Will f/u with Dr. david in terms of adjustment of medications.   -Patient is much better at this time  -Patient's mother  last week - which is likely why patient had an exacerbation of his underlying mood disorder.    #Constipation  resolved in ED  -continue to monitor    #History of PE  c/w Eliquis    #Essential HTN  -continue with Toprol    #Diabetes insipidus  improve as pt not displaying frequent urination  c/w Desmopressin    # hypothyroid   Elevated TSH, will increase synthroid to 200 mcg (up from 188 mcg)  Needs repeat TSH in 3-6 weeks    #Abnormal EKG  -Repeat EKG reveals QTc 429ms and septal infarct  -cardiac consult requested.     dispo: plan d/c 24-48 hrs pending psych clearance. 54M from [facility] ([facility]), with PMHx of HTN, asthma, schizophreniform disorder, bipolar disorder, GERD, DI, Hypothyroidism, chronic pain syndrome with hx of cervical spine surgery with residual lower extremity weakness, hx of recent PE on Eliquis p/w verbally aggressive/abusive behavior and constipation.       #Schizophreniform disorder with behavioral disturbance  #Bipolar disorder with behavioral disturbance  -[facility]rec Psych- c/w valproic acid (lvl sub-therapetuic @ 45), c/w lithium (sub-therapeutic at 0.46). possibly pt not adherent to medication as out pt. Will f/u with Dr. david in terms of adjustment of medications.   -Patient is much better at this time  -Patient's mother  last week - which is likely why patient had an exacerbation of his underlying mood disorder.    #Constipation  resolved in ED  -continue to monitor    #History of PE  c/w Eliquis    #Essential HTN  -continue with Toprol    #Diabetes insipidus  improve as pt not displaying frequent urination  c/w Desmopressin    # hypothyroid   Elevated TSH, will increase synthroid to 200 mcg (up from 188 mcg)  Needs repeat TSH in 3-6 weeks    #Abnormal EKG  -Repeat EKG reveals QTc 429ms and septal infarct  -cardiac consult aprec: Dr Albright: Echo and repeat EKG tomorrow am.    dispo: plan d/c 24-48 hrs pending psych clearance. 54M from [facility] ([facility]), with PMHx of HTN, asthma, schizophreniform disorder, bipolar disorder, GERD, DI, Hypothyroidism, chronic pain syndrome with hx of cervical spine surgery with residual lower extremity weakness, hx of recent PE on Eliquis p/w verbally aggressive/abusive behavior and constipation.     #Schizophreniform disorder with behavioral disturbance  #Bipolar disorder with behavioral disturbance  -[facility]rec Psych- c/w valproic acid (lvl sub-therapetuic @ 45), c/w lithium (sub-therapeutic at 0.46). possibly pt not adherent to medication as out pt. Will f/u with Dr. david in terms of adjustment of medications.   -Patient is much better at this time  -Patient's mother  last week - which is likely why patient had an exacerbation of his underlying mood disorder.    #Constipation  resolved in ED  -continue to monitor    #History of PE  c/w Eliquis    #Essential HTN  -continue with Toprol    #Diabetes insipidus  improve as pt not displaying frequent urination  c/w Desmopressin    # hypothyroid   Elevated TSH, will increase synthroid to 200 mcg (up from 188 mcg)  Needs repeat TSH in 3-6 weeks    #Abnormal EKG  -Repeat EKG reveals QTc 429ms and septal infarct  -cardiac consult aprec: Dr Albright: Echo and repeat EKG tomorrow am.    dispo: plan d/c 24-48 hrs pending psych clearance.

## 2019-09-07 NOTE — CONSULT NOTE ADULT - ATTENDING COMMENTS
abnormal ekg  I suspect this is a normal variant absetn ACS symptoms.. an echo is planned to assess for interval infarction although doubt.

## 2019-09-08 LAB — D DIMER BLD IA.RAPID-MCNC: 286 NG/ML DDU — HIGH

## 2019-09-08 PROCEDURE — 93306 TTE W/DOPPLER COMPLETE: CPT | Mod: 26

## 2019-09-08 PROCEDURE — 99232 SBSQ HOSP IP/OBS MODERATE 35: CPT | Mod: GC

## 2019-09-08 PROCEDURE — 99231 SBSQ HOSP IP/OBS SF/LOW 25: CPT

## 2019-09-08 PROCEDURE — 93010 ELECTROCARDIOGRAM REPORT: CPT

## 2019-09-08 RX ORDER — IPRATROPIUM/ALBUTEROL SULFATE 18-103MCG
3 AEROSOL WITH ADAPTER (GRAM) INHALATION ONCE
Refills: 0 | Status: COMPLETED | OUTPATIENT
Start: 2019-09-08 | End: 2019-09-08

## 2019-09-08 RX ADMIN — GABAPENTIN 400 MILLIGRAM(S): 400 CAPSULE ORAL at 20:55

## 2019-09-08 RX ADMIN — GABAPENTIN 400 MILLIGRAM(S): 400 CAPSULE ORAL at 06:31

## 2019-09-08 RX ADMIN — Medication 500 MILLIGRAM(S): at 11:57

## 2019-09-08 RX ADMIN — Medication 250 MILLIGRAM(S): at 17:10

## 2019-09-08 RX ADMIN — OXYCODONE HYDROCHLORIDE 10 MILLIGRAM(S): 5 TABLET ORAL at 06:30

## 2019-09-08 RX ADMIN — DESMOPRESSIN ACETATE 0.2 MILLIGRAM(S): 0.1 TABLET ORAL at 20:55

## 2019-09-08 RX ADMIN — OLANZAPINE 5 MILLIGRAM(S): 15 TABLET, FILM COATED ORAL at 11:58

## 2019-09-08 RX ADMIN — Medication 250 MILLIGRAM(S): at 06:31

## 2019-09-08 RX ADMIN — Medication 30 MILLILITER(S): at 06:31

## 2019-09-08 RX ADMIN — Medication 100 MILLIGRAM(S): at 11:58

## 2019-09-08 RX ADMIN — Medication 1 PATCH: at 17:49

## 2019-09-08 RX ADMIN — Medication 0.5 MILLIGRAM(S): at 06:30

## 2019-09-08 RX ADMIN — Medication 1000 UNIT(S): at 11:57

## 2019-09-08 RX ADMIN — LITHIUM CARBONATE 300 MILLIGRAM(S): 300 TABLET, EXTENDED RELEASE ORAL at 17:10

## 2019-09-08 RX ADMIN — OLANZAPINE 10 MILLIGRAM(S): 15 TABLET, FILM COATED ORAL at 22:56

## 2019-09-08 RX ADMIN — Medication 3 MILLIGRAM(S): at 20:55

## 2019-09-08 RX ADMIN — APIXABAN 5 MILLIGRAM(S): 2.5 TABLET, FILM COATED ORAL at 17:10

## 2019-09-08 RX ADMIN — SENNA PLUS 2 TABLET(S): 8.6 TABLET ORAL at 22:56

## 2019-09-08 RX ADMIN — Medication 750 MILLIGRAM(S): at 17:11

## 2019-09-08 RX ADMIN — APIXABAN 5 MILLIGRAM(S): 2.5 TABLET, FILM COATED ORAL at 06:31

## 2019-09-08 RX ADMIN — Medication 3 MILLILITER(S): at 19:20

## 2019-09-08 RX ADMIN — LACTULOSE 10 GRAM(S): 10 SOLUTION ORAL at 21:00

## 2019-09-08 RX ADMIN — Medication 1 TABLET(S): at 11:57

## 2019-09-08 RX ADMIN — GABAPENTIN 400 MILLIGRAM(S): 400 CAPSULE ORAL at 15:10

## 2019-09-08 RX ADMIN — Medication 200 MICROGRAM(S): at 06:31

## 2019-09-08 RX ADMIN — Medication 50 MILLIGRAM(S): at 06:31

## 2019-09-08 RX ADMIN — Medication 50 MILLIGRAM(S): at 11:58

## 2019-09-08 RX ADMIN — POLYETHYLENE GLYCOL 3350 17 GRAM(S): 17 POWDER, FOR SOLUTION ORAL at 11:58

## 2019-09-08 RX ADMIN — Medication 0.5 MILLIGRAM(S): at 17:10

## 2019-09-08 RX ADMIN — Medication 750 MILLIGRAM(S): at 06:30

## 2019-09-08 RX ADMIN — Medication 30 MILLILITER(S): at 17:10

## 2019-09-08 RX ADMIN — PANTOPRAZOLE SODIUM 40 MILLIGRAM(S): 20 TABLET, DELAYED RELEASE ORAL at 06:31

## 2019-09-08 RX ADMIN — Medication 1 PATCH: at 11:58

## 2019-09-08 RX ADMIN — LACTULOSE 10 GRAM(S): 10 SOLUTION ORAL at 06:32

## 2019-09-08 RX ADMIN — LITHIUM CARBONATE 300 MILLIGRAM(S): 300 TABLET, EXTENDED RELEASE ORAL at 06:31

## 2019-09-08 RX ADMIN — OXYCODONE HYDROCHLORIDE 10 MILLIGRAM(S): 5 TABLET ORAL at 17:11

## 2019-09-08 NOTE — PROGRESS NOTE ADULT - ASSESSMENT
54M from [facility] ([facility]), with PMHx of HTN, asthma, schizophreniform disorder, bipolar disorder, GERD, DI, Hypothyroidism, chronic pain syndrome with hx of cervical spine surgery with residual lower extremity weakness, hx of recent PE on Eliquis p/w verbally aggressive/abusive behavior and constipation. current issues with neuropathy to hands and feet bilat.     #Neuropathy to hands and feet bilat  -not controlled with numbness/tingling ongoing with burning sensation upon grasping objects.  -change neurontin to 400mg TID (titrate from 300mg)  -pt sts improvement with tolerance to sensation/pain.  -continue to monitor. possibly increase dose if symptoms becomes worse.      #Schizophreniform disorder with behavioral disturbance  #Bipolar disorder with behavioral disturbance  -[facility] Psych and following- c/w valproic acid (lvl sub-therapetuic @ 45), c/w lithium (sub-therapeutic at 0.46). Will f/u with Dr. david in terms of if any adjustment of medications.   -Patient continues to improve as no symptoms    -Patient's mother  last week - which is likely why patient had an exacerbation of his underlying mood disorder.    #Constipation  resolved in ED  -c/t monitor    #History of PE  c/w Eliquis    #Essential HTN  -c/w Toprol    #Diabetes insipidus  improve as pt not displaying frequent urination  continue with Desmopressin    # hypothyroid   Elevated TSH, maintain synthroid to 200 mcg (up from 188 mcg)  Needs repeat TSH in 3-6 weeks    #Abnormal EKG  -Repeat EKG reveals reveals no change: QTc 428ms and septal infarct still seen  -cardiac following.    dispo: plan d/c 24-48 hrs pending placement. 54M from [facility] ([facility]), with PMHx of HTN, asthma, schizophreniform disorder, bipolar disorder, GERD, DI, Hypothyroidism, chronic pain syndrome with hx of cervical spine surgery with residual lower extremity weakness, hx of recent PE on Eliquis p/w verbally aggressive/abusive behavior and constipation. current issues with neuropathy to hands and feet bilat.     #Neuropathy to hands and feet bilat  -not controlled with numbness/tingling ongoing with burning sensation upon grasping objects.  -change neurontin to 400mg TID (titrate from 300mg)  -pt sts improvement with tolerance to sensation/pain.  -continue to monitor. possibly increase dose if symptoms becomes worse.      #Schizophreniform disorder with behavioral disturbance  #Bipolar disorder with behavioral disturbance  -[facility]rec Psych and following- c/w valproic acid (lvl sub-therapetuic @ 45), c/w lithium (sub-therapeutic at 0.46). Will f/u with Dr. david in terms of if any adjustment of medications.   -Patient continues to improve as no symptoms    -Patient's mother  last week - which is likely why patient had an exacerbation of his underlying mood disorder.    #Constipation  resolved in ED  -c/t monitor    #History of PE  c/w Eliquis    #Essential HTN  -c/w Toprol    #Diabetes insipidus  improve as pt not displaying frequent urination  continue with Desmopressin    # hypothyroid   Elevated TSH, maintain synthroid to 200 mcg (up from 188 mcg)  Needs repeat TSH in 3-6 weeks    #Abnormal EKG  -Repeat EKG reveals reveals no change: QTc 428ms and septal infarct still seen  -Aprec cardiac consult: noted RV large: CTA down, ordered venous doppler and d-dimer to r/o PE or DVT.     dispo: plan d/c 24-48 hrs pending placement.

## 2019-09-08 NOTE — PROGRESS NOTE ADULT - SUBJECTIVE AND OBJECTIVE BOX
Patient is a 54y old  Male who presents with a chief complaint of ambulatory dysfxn, verbally abusive behavior. constipation (07 Sep 2019 15:57)      Interval event: uneventful last night.  Patient seen and examined at bedside.    ROS: Denies any chest pain, Shortness of breath, nausea, vomiting. improvement to numbness/tingling to hands/ feet bilat.    ALLERGIES:  Haldol (Unknown)  Thorazine (Unknown)  Thorazine (Unknown)    MEDICATIONS  (STANDING):  aluminum hydroxide/magnesium hydroxide/simethicone Suspension 30 milliLiter(s) Oral every 12 hours  apixaban 5 milliGRAM(s) Oral every 12 hours  ascorbic acid 500 milliGRAM(s) Oral daily  benztropine 0.5 milliGRAM(s) Oral two times a day  cholecalciferol 1000 Unit(s) Oral daily  desmopressin 0.2 milliGRAM(s) Oral at bedtime  docusate sodium 100 milliGRAM(s) Oral daily  gabapentin 400 milliGRAM(s) Oral three times a day  lactulose Syrup 10 Gram(s) Oral three times a day  levothyroxine 200 MICROGram(s) Oral daily  lithium 300 milliGRAM(s) Oral every 12 hours  melatonin 3 milliGRAM(s) Oral at bedtime  metoprolol succinate ER 50 milliGRAM(s) Oral daily  multivitamin 1 Tablet(s) Oral daily  nicotine - 21 mG/24Hr(s) Patch 1 patch Transdermal daily  OLANZapine 10 milliGRAM(s) Oral at bedtime  OLANZapine 5 milliGRAM(s) Oral daily  oxyCODONE  ER Tablet 10 milliGRAM(s) Oral every 12 hours  pantoprazole    Tablet 40 milliGRAM(s) Oral before breakfast  polyethylene glycol 3350 17 Gram(s) Oral daily  pyridoxine 50 milliGRAM(s) Oral daily  saccharomyces boulardii 250 milliGRAM(s) Oral two times a day  senna 2 Tablet(s) Oral at bedtime  valproic acid 750 milliGRAM(s) Oral two times a day    MEDICATIONS  (PRN):  acetaminophen   Tablet .. 650 milliGRAM(s) Oral every 6 hours PRN Temp greater or equal to 38C (100.4F), Mild Pain (1 - 3)  ALBUTerol/ipratropium for Nebulization 3 milliLiter(s) Nebulizer every 6 hours PRN Bronchospasm  bisacodyl Suppository 10 milliGRAM(s) Rectal daily PRN Constipation    Vital Signs Last 24 Hrs  T(F): 97.4 (08 Sep 2019 05:54), Max: 98.5 (07 Sep 2019 11:59)  HR: 64 (08 Sep 2019 05:54) (64 - 78)  BP: 108/68 (08 Sep 2019 05:54) (100/61 - 112/51)  RR: 16 (08 Sep 2019 05:54) (15 - 16)  SpO2: 97% (08 Sep 2019 05:54) (94% - 97%)  I&O's Summary    07 Sep 2019 07:01  -  08 Sep 2019 07:00  --------------------------------------------------------  IN: 300 mL / OUT: 0 mL / NET: 300 mL      PHYSICAL EXAM:  General: NAD, A/O x 3  ENT: MMM  Neck: Supple, No JVD  Lungs: Clear to auscultation bilaterally, no rales, rhonchi, wheezing.  Cardio: RRR, S1/S2, No murmurs  Abdomen: Soft, Nontender, Nondistended; Bowel sounds present  Extremities: No calf tenderness, R foot pitted edema    LABS:                        12.5   8.24  )-----------( 337      ( 05 Sep 2019 14:12 )             39.5     09-05    141  |  106  |  14  ----------------------------<  77  4.2   |  32  |  0.38    Ca    9.8      05 Sep 2019 14:12      eGFR if Non African American: 139 mL/min/1.73M2 (09-05-19 @ 14:12)  eGFR if : 161 mL/min/1.73M2 (09-05-19 @ 14:12)    08-03 Chol 79 mg/dL LDL 36 mg/dL HDL 11 mg/dL Trig 159 mg/dL  TSH 11.20   TSH with FT4 reflex --  Total T3 --    08-03 [id number] 4.6    RADIOLOGY & ADDITIONAL TESTS:  < from: US Duplex Venous Lower Ext Complete, Bilateral (08.05.19 @ 13:22) >  EXAM:  US DPLX LWR EXT VEINS COMPL BI                        PROCEDURE DATE:  08/05/2019    IMPRESSION:  Thrombus in the right posterior tibial vein and bilateral peroneal veins.   < end of copied text >     < from: CT Angio Chest w/ IV Cont (08.03.19 @ 18:40) >  EXAM:  CT ANGIO CHEST (W)AW IC                        PROCEDURE DATE:  08/03/2019    INTERPRETATION:  CLINICAL INFORMATION: Hypoxia, shortness of breath..  IMPRESSION:  Acute pulmonary embolism involving segmental branches of the right upper   lobe, right middle lobe, right lower lobe. There is right lower lobe   atelectasis and a moderate right effusion.  EXAM:   CT Angiography Chest With Contrast   EXAM DATE/TIME:   8/3/2019 6:22 PM   CLINICAL HISTORY:   54 years old, male; Other: SOB, hypoxia; Patient HX: R/O pe   COMPARISON:   CR XR CHEST IMMEDIATE 8/2/2019 7:43 PM   IMPRESSION:   Acute pulmonary embolism involving the right middle and lower lobe   segmental   and subsegmental arteries.  Moderate size right pleural effusion with compressive atelectasis at the   right   lower lobe.  < end of copied text >    < from: Xray Chest 1 View-PORTABLE IMMEDIATE (08.02.19 @ 19:55) >  EXAM:  XR CHEST PORTABLE IMMED 1V                        PROCEDURE DATE:  08/02/2019    IMPRESSION: No gross consolidation is seen. Elevated right hemidiaphragm   is noted.  < end of copied text >

## 2019-09-08 NOTE — PROGRESS NOTE ADULT - SUBJECTIVE AND OBJECTIVE BOX
Follow up for  SUBJ:    no new complaints however reports coughing up blood" a month ago    PMH  Bipolar disorder  Schizophreniform disorder  Asthma  HTN (hypertension)  Bipolar disorder  Drug abuse  Incontinence  Schizophrenia  Hypothyroid  Hypertension      MEDICATIONS  (STANDING):  aluminum hydroxide/magnesium hydroxide/simethicone Suspension 30 milliLiter(s) Oral every 12 hours  apixaban 5 milliGRAM(s) Oral every 12 hours  ascorbic acid 500 milliGRAM(s) Oral daily  benztropine 0.5 milliGRAM(s) Oral two times a day  cholecalciferol 1000 Unit(s) Oral daily  desmopressin 0.2 milliGRAM(s) Oral at bedtime  docusate sodium 100 milliGRAM(s) Oral daily  gabapentin 400 milliGRAM(s) Oral three times a day  lactulose Syrup 10 Gram(s) Oral three times a day  levothyroxine 200 MICROGram(s) Oral daily  lithium 300 milliGRAM(s) Oral every 12 hours  melatonin 3 milliGRAM(s) Oral at bedtime  metoprolol succinate ER 50 milliGRAM(s) Oral daily  multivitamin 1 Tablet(s) Oral daily  nicotine - 21 mG/24Hr(s) Patch 1 patch Transdermal daily  OLANZapine 10 milliGRAM(s) Oral at bedtime  OLANZapine 5 milliGRAM(s) Oral daily  oxyCODONE  ER Tablet 10 milliGRAM(s) Oral every 12 hours  pantoprazole    Tablet 40 milliGRAM(s) Oral before breakfast  polyethylene glycol 3350 17 Gram(s) Oral daily  pyridoxine 50 milliGRAM(s) Oral daily  saccharomyces boulardii 250 milliGRAM(s) Oral two times a day  senna 2 Tablet(s) Oral at bedtime  valproic acid 750 milliGRAM(s) Oral two times a day    MEDICATIONS  (PRN):  acetaminophen   Tablet .. 650 milliGRAM(s) Oral every 6 hours PRN Temp greater or equal to 38C (100.4F), Mild Pain (1 - 3)  ALBUTerol/ipratropium for Nebulization 3 milliLiter(s) Nebulizer every 6 hours PRN Bronchospasm  bisacodyl Suppository 10 milliGRAM(s) Rectal daily PRN Constipation        PHYSICAL EXAM:  Vital Signs Last 24 Hrs  T(C): 36.3 (08 Sep 2019 05:54), Max: 36.6 (07 Sep 2019 15:07)  T(F): 97.4 (08 Sep 2019 05:54), Max: 97.8 (07 Sep 2019 15:07)  HR: 64 (08 Sep 2019 05:54) (64 - 74)  BP: 108/68 (08 Sep 2019 05:54) (107/57 - 112/51)  BP(mean): --  RR: 16 (08 Sep 2019 05:54) (15 - 16)  SpO2: 97% (08 Sep 2019 05:54) (94% - 97%)    GENERAL: NAD, well-groomed, well-developed  HEAD:  Atraumatic, Normocephalic  EYES: EOMI, PERRLA, conjunctiva and sclera clear  ENT: Moist mucous membranes,  NECK: Supple, No JVD, no bruits  CHEST/LUNG: Clear to percussion bilaterally; No rales, rhonchi, wheezing, or rubs  HEART: Regular rate and rhythm; No murmurs, rubs, or gallops PMI non displaced.  ABDOMEN: Soft, Nontender, Nondistended; Bowel sounds present  EXTREMITIES:  2+ Peripheral Pulses, No clubbing, cyanosis, or edema  SKIN: No rashes or lesions  NERVOUS SYSTEM:  Cranial Nerves II-XII intact      TELEMETRY:        ECG:    < from: 12 Lead ECG (09.08.19 @ 05:58) >  Ventricular Rate 60 BPM    Atrial Rate 60 BPM    P-R Interval 192 ms    QRS Duration 98 ms    Q-T Interval 428 ms    QTC Calculation(Bezet) 428 ms    P Axis 39 degrees    R Axis 43 degrees    T Axis 40 degrees    Diagnosis Line Normal sinus rhythm  Septal infarct (cited on or before 06-SEP-2019)  Abnormal ECG  When compared with ECG of 06-SEP-2019 06:10,  No significant change was found    Confirmed by JACKLYN MIRZA MD ([id number]) on 9/8/2019 8:40:48 AM    < end of copied text >    ECHO:    < from: TTE Echo Complete w/Doppler (09.08.19 @ 09:28) >  Summary:   1. Left ventricular ejection fraction, by visual estimation, is 55 to   60%.   2. Normal global left ventricular systolic function.   3. Normal left ventricular size and wall thicknesses, with normal   systolic and diastolic function.   4. The left atrium is normal in size.   5. Normal right atrial size.   6. The right atrium is normal in size.   7. There is no evidence of pericardial effusion.   8. Structurally normal pulmonic valve, with normal leaflet excursion.   9. Estimated pulmonary artery systolic pressure is 43.0 mmHg assuming a   right atrial pressure of 10 mmHg, which is consistent with mild pulmonary   hypertension.  10. Consider acute or chronic pulmonary process.    [id number] Jacklyn Mirza , Electronically signedon 9/8/2019 at   11:25:40 AM       *** Final ***      JACKLYN MIRZA   This document has been electronically signed. Sep  8 2019  9:28AM    < end of copied text >      LABS:        I&O's Summary    07 Sep 2019 07:01  -  08 Sep 2019 07:00  --------------------------------------------------------  IN: 300 mL / OUT: 0 mL / NET: 300 mL    08 Sep 2019 07:01  -  08 Sep 2019 12:20  --------------------------------------------------------  IN: 320 mL / OUT: 0 mL / NET: 320 mL      BNP    RADIOLOGY & ADDITIONAL STUDIES:    ECHO:

## 2019-09-09 PROCEDURE — 99232 SBSQ HOSP IP/OBS MODERATE 35: CPT

## 2019-09-09 PROCEDURE — 93970 EXTREMITY STUDY: CPT | Mod: 26

## 2019-09-09 RX ADMIN — Medication 50 MILLIGRAM(S): at 12:13

## 2019-09-09 RX ADMIN — GABAPENTIN 400 MILLIGRAM(S): 400 CAPSULE ORAL at 21:39

## 2019-09-09 RX ADMIN — LITHIUM CARBONATE 300 MILLIGRAM(S): 300 TABLET, EXTENDED RELEASE ORAL at 17:33

## 2019-09-09 RX ADMIN — POLYETHYLENE GLYCOL 3350 17 GRAM(S): 17 POWDER, FOR SOLUTION ORAL at 12:12

## 2019-09-09 RX ADMIN — Medication 200 MICROGRAM(S): at 09:59

## 2019-09-09 RX ADMIN — PANTOPRAZOLE SODIUM 40 MILLIGRAM(S): 20 TABLET, DELAYED RELEASE ORAL at 10:03

## 2019-09-09 RX ADMIN — Medication 3 MILLIGRAM(S): at 21:39

## 2019-09-09 RX ADMIN — SENNA PLUS 2 TABLET(S): 8.6 TABLET ORAL at 21:40

## 2019-09-09 RX ADMIN — OXYCODONE HYDROCHLORIDE 10 MILLIGRAM(S): 5 TABLET ORAL at 17:33

## 2019-09-09 RX ADMIN — Medication 30 MILLILITER(S): at 17:43

## 2019-09-09 RX ADMIN — Medication 30 MILLILITER(S): at 09:57

## 2019-09-09 RX ADMIN — Medication 1000 UNIT(S): at 12:11

## 2019-09-09 RX ADMIN — Medication 1 PATCH: at 11:59

## 2019-09-09 RX ADMIN — OLANZAPINE 10 MILLIGRAM(S): 15 TABLET, FILM COATED ORAL at 21:39

## 2019-09-09 RX ADMIN — Medication 50 MILLIGRAM(S): at 10:00

## 2019-09-09 RX ADMIN — Medication 250 MILLIGRAM(S): at 17:33

## 2019-09-09 RX ADMIN — GABAPENTIN 400 MILLIGRAM(S): 400 CAPSULE ORAL at 13:58

## 2019-09-09 RX ADMIN — OXYCODONE HYDROCHLORIDE 10 MILLIGRAM(S): 5 TABLET ORAL at 09:45

## 2019-09-09 RX ADMIN — Medication 500 MILLIGRAM(S): at 12:11

## 2019-09-09 RX ADMIN — OLANZAPINE 5 MILLIGRAM(S): 15 TABLET, FILM COATED ORAL at 12:11

## 2019-09-09 RX ADMIN — OXYCODONE HYDROCHLORIDE 10 MILLIGRAM(S): 5 TABLET ORAL at 10:45

## 2019-09-09 RX ADMIN — Medication 1 TABLET(S): at 12:11

## 2019-09-09 RX ADMIN — Medication 1 PATCH: at 10:01

## 2019-09-09 RX ADMIN — Medication 0.5 MILLIGRAM(S): at 17:33

## 2019-09-09 RX ADMIN — APIXABAN 5 MILLIGRAM(S): 2.5 TABLET, FILM COATED ORAL at 09:58

## 2019-09-09 RX ADMIN — Medication 1 PATCH: at 19:01

## 2019-09-09 RX ADMIN — APIXABAN 5 MILLIGRAM(S): 2.5 TABLET, FILM COATED ORAL at 17:33

## 2019-09-09 RX ADMIN — Medication 750 MILLIGRAM(S): at 17:33

## 2019-09-09 RX ADMIN — OXYCODONE HYDROCHLORIDE 10 MILLIGRAM(S): 5 TABLET ORAL at 18:33

## 2019-09-09 RX ADMIN — Medication 750 MILLIGRAM(S): at 10:02

## 2019-09-09 RX ADMIN — Medication 1 PATCH: at 12:12

## 2019-09-09 RX ADMIN — Medication 100 MILLIGRAM(S): at 12:11

## 2019-09-09 RX ADMIN — LITHIUM CARBONATE 300 MILLIGRAM(S): 300 TABLET, EXTENDED RELEASE ORAL at 09:59

## 2019-09-09 RX ADMIN — DESMOPRESSIN ACETATE 0.2 MILLIGRAM(S): 0.1 TABLET ORAL at 21:39

## 2019-09-09 RX ADMIN — Medication 0.5 MILLIGRAM(S): at 09:58

## 2019-09-09 RX ADMIN — LACTULOSE 10 GRAM(S): 10 SOLUTION ORAL at 21:39

## 2019-09-09 RX ADMIN — GABAPENTIN 400 MILLIGRAM(S): 400 CAPSULE ORAL at 09:58

## 2019-09-09 NOTE — PROGRESS NOTE ADULT - ASSESSMENT
54M from [facility] ([facility]), with PMHx of HTN, asthma, schizophreniform disorder, bipolar disorder, GERD, DI, Hypothyroidism, chronic pain syndrome with hx of cervical spine surgery with residual lower extremity weakness, hx of recent PE on Eliquis p/w verbally aggressive/abusive behavior and constipation.     #Schizophreniform disorder with behavioral disturbance  #Bipolar disorder with behavioral disturbance  - Psych following- c/w valproic acid, lithium, stable at this time  -Patient's mother  last week - which is likely why patient had an exacerbation of his underlying mood disorder.    #Neuropathy to hands and feet bilat  -Neurontin dose increased to 400mg TID (titrated from 300mg)    #History of PE  #Bilateral distal vein DVTs (peroneal)  c/w Eliquis    #Essential HTN  -c/w Toprol    #Diabetes insipidus  continue with Desmopressin    # hypothyroid   Elevated TSH, synthroid dose increased to 200 mcg (up from 188 mcg)  Needs repeat TSH in 3-6 weeks    Dispo: Medically stable for d/c. Awaiting placement to [facility] (seen by PT). Patient does not want to go back to [place].  [facility] is working on this case.

## 2019-09-09 NOTE — PROGRESS NOTE ADULT - SUBJECTIVE AND OBJECTIVE BOX
Patient is a 54y old  Male who presents with a chief complaint of ambulatory dysfxn, verbally abusive behavior. constipation (08 Sep 2019 12:20)      Patient seen and examined at bedside. No overnight events reported.     ALLERGIES:  Haldol (Unknown)  Thorazine (Unknown)  Thorazine (Unknown)    MEDICATIONS  (STANDING):  aluminum hydroxide/magnesium hydroxide/simethicone Suspension 30 milliLiter(s) Oral every 12 hours  apixaban 5 milliGRAM(s) Oral every 12 hours  ascorbic acid 500 milliGRAM(s) Oral daily  benztropine 0.5 milliGRAM(s) Oral two times a day  cholecalciferol 1000 Unit(s) Oral daily  desmopressin 0.2 milliGRAM(s) Oral at bedtime  docusate sodium 100 milliGRAM(s) Oral daily  gabapentin 400 milliGRAM(s) Oral three times a day  lactulose Syrup 10 Gram(s) Oral three times a day  levothyroxine 200 MICROGram(s) Oral daily  lithium 300 milliGRAM(s) Oral every 12 hours  melatonin 3 milliGRAM(s) Oral at bedtime  metoprolol succinate ER 50 milliGRAM(s) Oral daily  multivitamin 1 Tablet(s) Oral daily  nicotine - 21 mG/24Hr(s) Patch 1 patch Transdermal daily  OLANZapine 10 milliGRAM(s) Oral at bedtime  OLANZapine 5 milliGRAM(s) Oral daily  oxyCODONE  ER Tablet 10 milliGRAM(s) Oral every 12 hours  pantoprazole    Tablet 40 milliGRAM(s) Oral before breakfast  polyethylene glycol 3350 17 Gram(s) Oral daily  pyridoxine 50 milliGRAM(s) Oral daily  saccharomyces boulardii 250 milliGRAM(s) Oral two times a day  senna 2 Tablet(s) Oral at bedtime  valproic acid 750 milliGRAM(s) Oral two times a day    MEDICATIONS  (PRN):  acetaminophen   Tablet .. 650 milliGRAM(s) Oral every 6 hours PRN Temp greater or equal to 38C (100.4F), Mild Pain (1 - 3)  ALBUTerol/ipratropium for Nebulization 3 milliLiter(s) Nebulizer every 6 hours PRN Bronchospasm  bisacodyl Suppository 10 milliGRAM(s) Rectal daily PRN Constipation    Vital Signs Last 24 Hrs  T(F): 98.2 (09 Sep 2019 11:03), Max: 98.2 (09 Sep 2019 11:03)  HR: 64 (09 Sep 2019 11:03) (18 - 83)  BP: 105/83 (09 Sep 2019 11:03) (105/83 - 122/67)  RR: 16 (09 Sep 2019 11:03) (16 - 16)  SpO2: 96% (09 Sep 2019 11:03) (94% - 98%)  I&O's Summary    08 Sep 2019 07:01  -  09 Sep 2019 07:00  --------------------------------------------------------  IN: 320 mL / OUT: 1 mL / NET: 319 mL      PHYSICAL EXAM:  General: NAD, A/O x 3  ENT: MMM  Neck: Supple, No JVD  Lungs: Clear to auscultation bilaterally, non labored breathing  Cardio: RRR, S1/S2  Abdomen: Soft, Nontender, Nondistended; Bowel sounds present  Extremities: No calf tenderness, 1+ right pedal edema    LABS:    08-03 Chol 79 mg/dL LDL 36 mg/dL HDL 11 mg/dL Trig 159 mg/dL    08-03 [id number] 4.6    RADIOLOGY & ADDITIONAL TESTS:  < from: US Duplex Venous Lower Ext Complete, Bilateral (09.09.19 @ 10:34) >  No evidence of deep venous thrombosis in either lower extremity.    Nonocclusive thrombus within the bilateral peroneal veins.        < end of copied text >    Care Discussed with Consultants/Other Providers:

## 2019-09-10 LAB
ANION GAP SERPL CALC-SCNC: 2 MMOL/L — LOW (ref 5–17)
BUN SERPL-MCNC: 27 MG/DL — HIGH (ref 7–23)
CALCIUM SERPL-MCNC: 9.5 MG/DL — SIGNIFICANT CHANGE UP (ref 8.4–10.5)
CHLORIDE SERPL-SCNC: 104 MMOL/L — SIGNIFICANT CHANGE UP (ref 96–108)
CO2 SERPL-SCNC: 32 MMOL/L — HIGH (ref 22–31)
CREAT SERPL-MCNC: 0.6 MG/DL — SIGNIFICANT CHANGE UP (ref 0.5–1.3)
GLUCOSE SERPL-MCNC: 100 MG/DL — HIGH (ref 70–99)
HCT VFR BLD CALC: 38.7 % — LOW (ref 39–50)
HGB BLD-MCNC: 12.3 G/DL — LOW (ref 13–17)
MCHC RBC-ENTMCNC: 28.3 PG — SIGNIFICANT CHANGE UP (ref 27–34)
MCHC RBC-ENTMCNC: 31.8 GM/DL — LOW (ref 32–36)
MCV RBC AUTO: 89 FL — SIGNIFICANT CHANGE UP (ref 80–100)
NRBC # BLD: 0 /100 WBCS — SIGNIFICANT CHANGE UP (ref 0–0)
PLATELET # BLD AUTO: 371 K/UL — SIGNIFICANT CHANGE UP (ref 150–400)
POTASSIUM SERPL-MCNC: 4.2 MMOL/L — SIGNIFICANT CHANGE UP (ref 3.5–5.3)
POTASSIUM SERPL-SCNC: 4.2 MMOL/L — SIGNIFICANT CHANGE UP (ref 3.5–5.3)
RBC # BLD: 4.35 M/UL — SIGNIFICANT CHANGE UP (ref 4.2–5.8)
RBC # FLD: 14.6 % — HIGH (ref 10.3–14.5)
SODIUM SERPL-SCNC: 138 MMOL/L — SIGNIFICANT CHANGE UP (ref 135–145)
WBC # BLD: 6.61 K/UL — SIGNIFICANT CHANGE UP (ref 3.8–10.5)
WBC # FLD AUTO: 6.61 K/UL — SIGNIFICANT CHANGE UP (ref 3.8–10.5)

## 2019-09-10 PROCEDURE — 99233 SBSQ HOSP IP/OBS HIGH 50: CPT

## 2019-09-10 PROCEDURE — 99232 SBSQ HOSP IP/OBS MODERATE 35: CPT

## 2019-09-10 RX ORDER — OLANZAPINE 15 MG/1
7.5 TABLET, FILM COATED ORAL DAILY
Refills: 0 | Status: DISCONTINUED | OUTPATIENT
Start: 2019-09-11 | End: 2019-09-19

## 2019-09-10 RX ADMIN — GABAPENTIN 400 MILLIGRAM(S): 400 CAPSULE ORAL at 05:54

## 2019-09-10 RX ADMIN — OXYCODONE HYDROCHLORIDE 10 MILLIGRAM(S): 5 TABLET ORAL at 17:56

## 2019-09-10 RX ADMIN — OLANZAPINE 5 MILLIGRAM(S): 15 TABLET, FILM COATED ORAL at 11:27

## 2019-09-10 RX ADMIN — PANTOPRAZOLE SODIUM 40 MILLIGRAM(S): 20 TABLET, DELAYED RELEASE ORAL at 05:54

## 2019-09-10 RX ADMIN — Medication 750 MILLIGRAM(S): at 17:51

## 2019-09-10 RX ADMIN — Medication 200 MICROGRAM(S): at 05:54

## 2019-09-10 RX ADMIN — Medication 100 MILLIGRAM(S): at 11:27

## 2019-09-10 RX ADMIN — Medication 1 PATCH: at 11:31

## 2019-09-10 RX ADMIN — Medication 1 PATCH: at 21:10

## 2019-09-10 RX ADMIN — Medication 250 MILLIGRAM(S): at 17:51

## 2019-09-10 RX ADMIN — LITHIUM CARBONATE 300 MILLIGRAM(S): 300 TABLET, EXTENDED RELEASE ORAL at 17:53

## 2019-09-10 RX ADMIN — LACTULOSE 10 GRAM(S): 10 SOLUTION ORAL at 21:23

## 2019-09-10 RX ADMIN — Medication 30 MILLILITER(S): at 05:54

## 2019-09-10 RX ADMIN — POLYETHYLENE GLYCOL 3350 17 GRAM(S): 17 POWDER, FOR SOLUTION ORAL at 11:27

## 2019-09-10 RX ADMIN — APIXABAN 5 MILLIGRAM(S): 2.5 TABLET, FILM COATED ORAL at 05:54

## 2019-09-10 RX ADMIN — APIXABAN 5 MILLIGRAM(S): 2.5 TABLET, FILM COATED ORAL at 17:50

## 2019-09-10 RX ADMIN — DESMOPRESSIN ACETATE 0.2 MILLIGRAM(S): 0.1 TABLET ORAL at 21:22

## 2019-09-10 RX ADMIN — SENNA PLUS 2 TABLET(S): 8.6 TABLET ORAL at 21:23

## 2019-09-10 RX ADMIN — LITHIUM CARBONATE 300 MILLIGRAM(S): 300 TABLET, EXTENDED RELEASE ORAL at 05:54

## 2019-09-10 RX ADMIN — GABAPENTIN 400 MILLIGRAM(S): 400 CAPSULE ORAL at 14:02

## 2019-09-10 RX ADMIN — Medication 500 MILLIGRAM(S): at 11:27

## 2019-09-10 RX ADMIN — Medication 50 MILLIGRAM(S): at 11:27

## 2019-09-10 RX ADMIN — Medication 750 MILLIGRAM(S): at 05:55

## 2019-09-10 RX ADMIN — OXYCODONE HYDROCHLORIDE 10 MILLIGRAM(S): 5 TABLET ORAL at 18:56

## 2019-09-10 RX ADMIN — LACTULOSE 10 GRAM(S): 10 SOLUTION ORAL at 05:54

## 2019-09-10 RX ADMIN — Medication 250 MILLIGRAM(S): at 05:55

## 2019-09-10 RX ADMIN — OLANZAPINE 10 MILLIGRAM(S): 15 TABLET, FILM COATED ORAL at 21:22

## 2019-09-10 RX ADMIN — Medication 30 MILLILITER(S): at 17:56

## 2019-09-10 RX ADMIN — Medication 0.5 MILLIGRAM(S): at 17:51

## 2019-09-10 RX ADMIN — Medication 1 TABLET(S): at 11:27

## 2019-09-10 RX ADMIN — Medication 3 MILLIGRAM(S): at 21:22

## 2019-09-10 RX ADMIN — Medication 0.5 MILLIGRAM(S): at 05:54

## 2019-09-10 RX ADMIN — LACTULOSE 10 GRAM(S): 10 SOLUTION ORAL at 14:02

## 2019-09-10 RX ADMIN — Medication 1000 UNIT(S): at 11:27

## 2019-09-10 RX ADMIN — OXYCODONE HYDROCHLORIDE 10 MILLIGRAM(S): 5 TABLET ORAL at 06:05

## 2019-09-10 RX ADMIN — Medication 1 PATCH: at 11:27

## 2019-09-10 RX ADMIN — GABAPENTIN 400 MILLIGRAM(S): 400 CAPSULE ORAL at 21:22

## 2019-09-10 NOTE — PROGRESS NOTE ADULT - SUBJECTIVE AND OBJECTIVE BOX
Patient is a 54y old  Male who presents with a chief complaint of ambulatory dysfxn, verbally abusive behavior. constipation (09 Sep 2019 14:52)      Patient seen and examined at bedside. No overnight events reported.     ALLERGIES:  Haldol (Unknown)  Thorazine (Unknown)  Thorazine (Unknown)    MEDICATIONS  (STANDING):  aluminum hydroxide/magnesium hydroxide/simethicone Suspension 30 milliLiter(s) Oral every 12 hours  apixaban 5 milliGRAM(s) Oral every 12 hours  ascorbic acid 500 milliGRAM(s) Oral daily  benztropine 0.5 milliGRAM(s) Oral two times a day  cholecalciferol 1000 Unit(s) Oral daily  desmopressin 0.2 milliGRAM(s) Oral at bedtime  docusate sodium 100 milliGRAM(s) Oral daily  gabapentin 400 milliGRAM(s) Oral three times a day  lactulose Syrup 10 Gram(s) Oral three times a day  levothyroxine 200 MICROGram(s) Oral daily  lithium 300 milliGRAM(s) Oral every 12 hours  melatonin 3 milliGRAM(s) Oral at bedtime  metoprolol succinate ER 50 milliGRAM(s) Oral daily  multivitamin 1 Tablet(s) Oral daily  nicotine - 21 mG/24Hr(s) Patch 1 patch Transdermal daily  OLANZapine 10 milliGRAM(s) Oral at bedtime  OLANZapine 5 milliGRAM(s) Oral daily  oxyCODONE  ER Tablet 10 milliGRAM(s) Oral every 12 hours  pantoprazole    Tablet 40 milliGRAM(s) Oral before breakfast  polyethylene glycol 3350 17 Gram(s) Oral daily  pyridoxine 50 milliGRAM(s) Oral daily  saccharomyces boulardii 250 milliGRAM(s) Oral two times a day  senna 2 Tablet(s) Oral at bedtime  valproic acid 750 milliGRAM(s) Oral two times a day    MEDICATIONS  (PRN):  acetaminophen   Tablet .. 650 milliGRAM(s) Oral every 6 hours PRN Temp greater or equal to 38C (100.4F), Mild Pain (1 - 3)  ALBUTerol/ipratropium for Nebulization 3 milliLiter(s) Nebulizer every 6 hours PRN Bronchospasm  bisacodyl Suppository 10 milliGRAM(s) Rectal daily PRN Constipation    Vital Signs Last 24 Hrs  T(F): 97.5 (10 Sep 2019 10:29), Max: 98.9 (10 Sep 2019 05:50)  HR: 73 (10 Sep 2019 10:29) (58 - 77)  BP: 106/69 (10 Sep 2019 10:29) (106/55 - 126/80)  RR: 15 (10 Sep 2019 10:29) (15 - 16)  SpO2: 96% (10 Sep 2019 10:29) (96% - 98%)  I&O's Summary    09 Sep 2019 07:01  -  10 Sep 2019 07:00  --------------------------------------------------------  IN: 0 mL / OUT: 800 mL / NET: -800 mL      PHYSICAL EXAM:  General: NAD, A/O x 3  ENT: MMM  Neck: Supple, No JVD  Lungs: Clear to auscultation bilaterally, non labored breathing  Cardio: RRR, S1/S2  Abdomen: Soft, Nontender, Nondistended; Bowel sounds present  Extremities: No calf tenderness, 1+ pedal edema (right>left)    LABS:                        12.3   6.61  )-----------( 371      ( 10 Sep 2019 06:00 )             38.7     09-10    138  |  104  |  27  ----------------------------<  100  4.2   |  32  |  0.60    Ca    9.5      10 Sep 2019 06:00      eGFR if Non African American: 115 mL/min/1.73M2 (09-10-19 @ 06:00)  eGFR if : 133 mL/min/1.73M2 (09-10-19 @ 06:00)    08-03 Chol 79 mg/dL LDL 36 mg/dL HDL 11 mg/dL Trig 159 mg/dL    08-03 [id number] 4.6

## 2019-09-10 NOTE — PROGRESS NOTE ADULT - ASSESSMENT
54M from [facility] ([facility]), with PMHx of HTN, asthma, schizophreniform disorder, bipolar disorder, GERD, DI, Hypothyroidism, chronic pain syndrome with hx of cervical spine surgery with residual lower extremity weakness, hx of recent PE on Eliquis p/w verbally aggressive/abusive behavior and constipation.     #Schizophreniform disorder with behavioral disturbance  #Bipolar disorder with behavioral disturbance  - Psych following- c/w valproic acid, lithium, stable at this time  -Patient's mother  last week - which is likely why patient had an exacerbation of his underlying mood disorder.    #Neuropathy to hands and feet bilat  -Neurontin dose increased to 400mg TID (titrated from 300mg)    #History of PE  #Bilateral distal vein DVTs (peroneal)  c/w Eliquis    #Essential HTN  -c/w Toprol    #Diabetes insipidus  continue with Desmopressin    # hypothyroid   Elevated TSH, synthroid dose increased to 200 mcg (up from 188 mcg)  Needs repeat TSH in 3-6 weeks    Dispo: Medically stable for d/c. Awaiting placement to [facility] (seen by PT). Patient does not want to go back to [place].  [facility] is working on this case. Medically has been stable for d/c - but needs a level 2 psychiatric screen for placement.

## 2019-09-11 DIAGNOSIS — F31.9 BIPOLAR DISORDER, UNSPECIFIED: ICD-10-CM

## 2019-09-11 DIAGNOSIS — I10 ESSENTIAL (PRIMARY) HYPERTENSION: ICD-10-CM

## 2019-09-11 DIAGNOSIS — E23.2 DIABETES INSIPIDUS: ICD-10-CM

## 2019-09-11 DIAGNOSIS — E03.9 HYPOTHYROIDISM, UNSPECIFIED: ICD-10-CM

## 2019-09-11 DIAGNOSIS — Z02.9 ENCOUNTER FOR ADMINISTRATIVE EXAMINATIONS, UNSPECIFIED: ICD-10-CM

## 2019-09-11 DIAGNOSIS — F20.81 SCHIZOPHRENIFORM DISORDER: ICD-10-CM

## 2019-09-11 DIAGNOSIS — Z86.711 PERSONAL HISTORY OF PULMONARY EMBOLISM: ICD-10-CM

## 2019-09-11 DIAGNOSIS — G62.9 POLYNEUROPATHY, UNSPECIFIED: ICD-10-CM

## 2019-09-11 PROCEDURE — 99232 SBSQ HOSP IP/OBS MODERATE 35: CPT

## 2019-09-11 RX ADMIN — Medication 1 PATCH: at 11:42

## 2019-09-11 RX ADMIN — Medication 50 MILLIGRAM(S): at 05:54

## 2019-09-11 RX ADMIN — Medication 1 PATCH: at 11:39

## 2019-09-11 RX ADMIN — LACTULOSE 10 GRAM(S): 10 SOLUTION ORAL at 22:43

## 2019-09-11 RX ADMIN — Medication 1 PATCH: at 20:06

## 2019-09-11 RX ADMIN — LITHIUM CARBONATE 300 MILLIGRAM(S): 300 TABLET, EXTENDED RELEASE ORAL at 17:04

## 2019-09-11 RX ADMIN — Medication 500 MILLIGRAM(S): at 11:40

## 2019-09-11 RX ADMIN — DESMOPRESSIN ACETATE 0.2 MILLIGRAM(S): 0.1 TABLET ORAL at 22:43

## 2019-09-11 RX ADMIN — Medication 250 MILLIGRAM(S): at 17:04

## 2019-09-11 RX ADMIN — OLANZAPINE 7.5 MILLIGRAM(S): 15 TABLET, FILM COATED ORAL at 11:40

## 2019-09-11 RX ADMIN — Medication 250 MILLIGRAM(S): at 05:53

## 2019-09-11 RX ADMIN — OXYCODONE HYDROCHLORIDE 10 MILLIGRAM(S): 5 TABLET ORAL at 17:04

## 2019-09-11 RX ADMIN — Medication 3 MILLIGRAM(S): at 22:43

## 2019-09-11 RX ADMIN — Medication 100 MILLIGRAM(S): at 11:40

## 2019-09-11 RX ADMIN — OXYCODONE HYDROCHLORIDE 10 MILLIGRAM(S): 5 TABLET ORAL at 17:28

## 2019-09-11 RX ADMIN — POLYETHYLENE GLYCOL 3350 17 GRAM(S): 17 POWDER, FOR SOLUTION ORAL at 11:42

## 2019-09-11 RX ADMIN — Medication 1 PATCH: at 07:44

## 2019-09-11 RX ADMIN — OXYCODONE HYDROCHLORIDE 10 MILLIGRAM(S): 5 TABLET ORAL at 06:47

## 2019-09-11 RX ADMIN — Medication 1 TABLET(S): at 11:42

## 2019-09-11 RX ADMIN — Medication 750 MILLIGRAM(S): at 05:55

## 2019-09-11 RX ADMIN — Medication 0.5 MILLIGRAM(S): at 05:54

## 2019-09-11 RX ADMIN — Medication 0.5 MILLIGRAM(S): at 17:05

## 2019-09-11 RX ADMIN — Medication 650 MILLIGRAM(S): at 22:43

## 2019-09-11 RX ADMIN — GABAPENTIN 400 MILLIGRAM(S): 400 CAPSULE ORAL at 05:54

## 2019-09-11 RX ADMIN — GABAPENTIN 400 MILLIGRAM(S): 400 CAPSULE ORAL at 22:43

## 2019-09-11 RX ADMIN — Medication 750 MILLIGRAM(S): at 17:04

## 2019-09-11 RX ADMIN — OXYCODONE HYDROCHLORIDE 10 MILLIGRAM(S): 5 TABLET ORAL at 05:53

## 2019-09-11 RX ADMIN — APIXABAN 5 MILLIGRAM(S): 2.5 TABLET, FILM COATED ORAL at 05:55

## 2019-09-11 RX ADMIN — Medication 50 MILLIGRAM(S): at 11:42

## 2019-09-11 RX ADMIN — OLANZAPINE 10 MILLIGRAM(S): 15 TABLET, FILM COATED ORAL at 22:43

## 2019-09-11 RX ADMIN — APIXABAN 5 MILLIGRAM(S): 2.5 TABLET, FILM COATED ORAL at 17:05

## 2019-09-11 RX ADMIN — Medication 30 MILLILITER(S): at 05:53

## 2019-09-11 RX ADMIN — LITHIUM CARBONATE 300 MILLIGRAM(S): 300 TABLET, EXTENDED RELEASE ORAL at 05:55

## 2019-09-11 RX ADMIN — LACTULOSE 10 GRAM(S): 10 SOLUTION ORAL at 05:55

## 2019-09-11 RX ADMIN — PANTOPRAZOLE SODIUM 40 MILLIGRAM(S): 20 TABLET, DELAYED RELEASE ORAL at 06:36

## 2019-09-11 RX ADMIN — Medication 200 MICROGRAM(S): at 05:54

## 2019-09-11 RX ADMIN — Medication 1000 UNIT(S): at 11:39

## 2019-09-11 RX ADMIN — Medication 650 MILLIGRAM(S): at 23:30

## 2019-09-11 RX ADMIN — Medication 30 MILLILITER(S): at 17:04

## 2019-09-11 RX ADMIN — GABAPENTIN 400 MILLIGRAM(S): 400 CAPSULE ORAL at 13:57

## 2019-09-11 NOTE — PROGRESS NOTE ADULT - PROBLEM SELECTOR PLAN 1
schizophreniform disorder with behavioral disturbances, psych consult appreciated, continue medical management

## 2019-09-11 NOTE — DIETITIAN INITIAL EVALUATION ADULT. - OTHER INFO
Pt. reports good appetite. Tolerates low sodium diet. Pt. not interested in any education at this time. Resides in an assisted living facility. Denies any N/V/ diarrhea. Weight stable.  Skin intact.

## 2019-09-11 NOTE — PROGRESS NOTE ADULT - PROBLEM SELECTOR PLAN 8
pt is medically stable for discharge awaiting cris placement, needs a level 2 psychiatric screen for placement, KELSEA Crump is working on the case

## 2019-09-11 NOTE — PROGRESS NOTE ADULT - ASSESSMENT
54M from [facility] ([facility]), with PMHx of HTN, asthma, schizophreniform disorder, bipolar disorder, GERD, DI, Hypothyroidism, chronic pain syndrome with hx of cervical spine surgery with residual lower extremity weakness, hx of recent PE on Eliquis p/w verbally aggressive/abusive behavior and constipation.

## 2019-09-11 NOTE — PROGRESS NOTE ADULT - SUBJECTIVE AND OBJECTIVE BOX
Patient is a 54y old  Male who presents with a chief complaint of ambulatory dysfxn, verbally abusive behavior. constipation (10 Sep 2019 15:58)  No acute events overnight        Patient seen and examined at bedside.    ALLERGIES:  Haldol (Unknown)  Thorazine (Unknown)  Thorazine (Unknown)        Vital Signs Last 24 Hrs  T(F): 97.5 (11 Sep 2019 05:52), Max: 97.6 (10 Sep 2019 17:34)  HR: 68 (11 Sep 2019 05:52) (63 - 73)  BP: 107/72 (11 Sep 2019 05:52) (106/69 - 114/61)  RR: 16 (11 Sep 2019 05:52) (15 - 16)  SpO2: 100% (11 Sep 2019 05:52) (95% - 100%)  I&O's Summary    10 Sep 2019 07:01  -  11 Sep 2019 07:00  --------------------------------------------------------  IN: 720 mL / OUT: 500 mL / NET: 220 mL      MEDICATIONS:  acetaminophen   Tablet .. 650 milliGRAM(s) Oral every 6 hours PRN  ALBUTerol/ipratropium for Nebulization 3 milliLiter(s) Nebulizer every 6 hours PRN  aluminum hydroxide/magnesium hydroxide/simethicone Suspension 30 milliLiter(s) Oral every 12 hours  apixaban 5 milliGRAM(s) Oral every 12 hours  ascorbic acid 500 milliGRAM(s) Oral daily  benztropine 0.5 milliGRAM(s) Oral two times a day  bisacodyl Suppository 10 milliGRAM(s) Rectal daily PRN  cholecalciferol 1000 Unit(s) Oral daily  desmopressin 0.2 milliGRAM(s) Oral at bedtime  docusate sodium 100 milliGRAM(s) Oral daily  gabapentin 400 milliGRAM(s) Oral three times a day  lactulose Syrup 10 Gram(s) Oral three times a day  levothyroxine 200 MICROGram(s) Oral daily  lithium 300 milliGRAM(s) Oral every 12 hours  melatonin 3 milliGRAM(s) Oral at bedtime  metoprolol succinate ER 50 milliGRAM(s) Oral daily  multivitamin 1 Tablet(s) Oral daily  nicotine - 21 mG/24Hr(s) Patch 1 patch Transdermal daily  OLANZapine 7.5 milliGRAM(s) Oral daily  OLANZapine 10 milliGRAM(s) Oral at bedtime  oxyCODONE  ER Tablet 10 milliGRAM(s) Oral every 12 hours  pantoprazole    Tablet 40 milliGRAM(s) Oral before breakfast  polyethylene glycol 3350 17 Gram(s) Oral daily  pyridoxine 50 milliGRAM(s) Oral daily  saccharomyces boulardii 250 milliGRAM(s) Oral two times a day  senna 2 Tablet(s) Oral at bedtime  valproic acid 750 milliGRAM(s) Oral two times a day      PHYSICAL EXAM:  General: NAD, A/O x 3  ENT: MMM  Neck: Supple, No JVD  Lungs: Clear to auscultation bilaterally, good air entry  Cardio: S1S2 regular  Abdomen: Soft, Nontender, Nondistended; Bowel sounds present  Extremities: No cyanosis, No edema    LABS:                        12.3   6.61  )-----------( 371      ( 10 Sep 2019 06:00 )             38.7     09-10    138  |  104  |  27  ----------------------------<  100  4.2   |  32  |  0.60    Ca    9.5      10 Sep 2019 06:00      eGFR if Non African American: 115 mL/min/1.73M2 (09-10-19 @ 06:00)  eGFR if : 133 mL/min/1.73M2 (09-10-19 @ 06:00)            08-03 Chol 79 mg/dL LDL 36 mg/dL HDL 11 mg/dL Trig 159 mg/dL                08-03 [id number] 4.6          RADIOLOGY & ADDITIONAL TESTS:    Care Discussed with Consultants/Other Providers: Patient is a 54y old  Male who presents with a chief complaint of ambulatory dysfxn, verbally abusive behavior. constipation (10 Sep 2019 15:58)    Interval Hx  No acute events overnight  Patient seen and examined at bedside.    ALLERGIES:  Haldol (Unknown)  Thorazine (Unknown)  Thorazine (Unknown)        Vital Signs Last 24 Hrs  T(F): 97.5 (11 Sep 2019 05:52), Max: 97.6 (10 Sep 2019 17:34)  HR: 68 (11 Sep 2019 05:52) (63 - 73)  BP: 107/72 (11 Sep 2019 05:52) (106/69 - 114/61)  RR: 16 (11 Sep 2019 05:52) (15 - 16)  SpO2: 100% (11 Sep 2019 05:52) (95% - 100%)  I&O's Summary    10 Sep 2019 07:01  -  11 Sep 2019 07:00  --------------------------------------------------------  IN: 720 mL / OUT: 500 mL / NET: 220 mL      MEDICATIONS:  acetaminophen   Tablet .. 650 milliGRAM(s) Oral every 6 hours PRN  ALBUTerol/ipratropium for Nebulization 3 milliLiter(s) Nebulizer every 6 hours PRN  aluminum hydroxide/magnesium hydroxide/simethicone Suspension 30 milliLiter(s) Oral every 12 hours  apixaban 5 milliGRAM(s) Oral every 12 hours  ascorbic acid 500 milliGRAM(s) Oral daily  benztropine 0.5 milliGRAM(s) Oral two times a day  bisacodyl Suppository 10 milliGRAM(s) Rectal daily PRN  cholecalciferol 1000 Unit(s) Oral daily  desmopressin 0.2 milliGRAM(s) Oral at bedtime  docusate sodium 100 milliGRAM(s) Oral daily  gabapentin 400 milliGRAM(s) Oral three times a day  lactulose Syrup 10 Gram(s) Oral three times a day  levothyroxine 200 MICROGram(s) Oral daily  lithium 300 milliGRAM(s) Oral every 12 hours  melatonin 3 milliGRAM(s) Oral at bedtime  metoprolol succinate ER 50 milliGRAM(s) Oral daily  multivitamin 1 Tablet(s) Oral daily  nicotine - 21 mG/24Hr(s) Patch 1 patch Transdermal daily  OLANZapine 7.5 milliGRAM(s) Oral daily  OLANZapine 10 milliGRAM(s) Oral at bedtime  oxyCODONE  ER Tablet 10 milliGRAM(s) Oral every 12 hours  pantoprazole    Tablet 40 milliGRAM(s) Oral before breakfast  polyethylene glycol 3350 17 Gram(s) Oral daily  pyridoxine 50 milliGRAM(s) Oral daily  saccharomyces boulardii 250 milliGRAM(s) Oral two times a day  senna 2 Tablet(s) Oral at bedtime  valproic acid 750 milliGRAM(s) Oral two times a day      PHYSICAL EXAM:  General: NAD, A/O x 3  ENT: MMM  Neck: Supple, No JVD  Lungs: Clear to auscultation bilaterally, good air entry  Cardio: S1S2 regular  Abdomen: Soft, Nontender, Nondistended; Bowel sounds present  Extremities: No cyanosis, No edema    LABS:                        12.3   6.61  )-----------( 371      ( 10 Sep 2019 06:00 )             38.7     09-10    138  |  104  |  27  ----------------------------<  100  4.2   |  32  |  0.60    Ca    9.5      10 Sep 2019 06:00      eGFR if Non African American: 115 mL/min/1.73M2 (09-10-19 @ 06:00)  eGFR if : 133 mL/min/1.73M2 (09-10-19 @ 06:00)            08-03 Chol 79 mg/dL LDL 36 mg/dL HDL 11 mg/dL Trig 159 mg/dL    08-03 [id number] 4.6          RADIOLOGY & ADDITIONAL TESTS:    Care Discussed with Consultants/Other Providers:

## 2019-09-11 NOTE — PROGRESS NOTE ADULT - PROBLEM SELECTOR PLAN 7
continue increased dose of synthroid 200 mcg daily (up from 188 mcg)  will require repeat tsh in 3-6 weeks

## 2019-09-11 NOTE — PROGRESS NOTE ADULT - PROBLEM SELECTOR PLAN 2
bipolar disorder with behavioral disturbance, psych consult appreciated, continue valproic acid, lithium

## 2019-09-12 LAB
ANION GAP SERPL CALC-SCNC: 6 MMOL/L — SIGNIFICANT CHANGE UP (ref 5–17)
BUN SERPL-MCNC: 25 MG/DL — HIGH (ref 7–23)
CALCIUM SERPL-MCNC: 9.6 MG/DL — SIGNIFICANT CHANGE UP (ref 8.4–10.5)
CHLORIDE SERPL-SCNC: 105 MMOL/L — SIGNIFICANT CHANGE UP (ref 96–108)
CO2 SERPL-SCNC: 30 MMOL/L — SIGNIFICANT CHANGE UP (ref 22–31)
CREAT SERPL-MCNC: 0.6 MG/DL — SIGNIFICANT CHANGE UP (ref 0.5–1.3)
GLUCOSE SERPL-MCNC: 82 MG/DL — SIGNIFICANT CHANGE UP (ref 70–99)
HCT VFR BLD CALC: 42.6 % — SIGNIFICANT CHANGE UP (ref 39–50)
HGB BLD-MCNC: 13.4 G/DL — SIGNIFICANT CHANGE UP (ref 13–17)
MCHC RBC-ENTMCNC: 28.4 PG — SIGNIFICANT CHANGE UP (ref 27–34)
MCHC RBC-ENTMCNC: 31.5 GM/DL — LOW (ref 32–36)
MCV RBC AUTO: 90.3 FL — SIGNIFICANT CHANGE UP (ref 80–100)
NRBC # BLD: 0 /100 WBCS — SIGNIFICANT CHANGE UP (ref 0–0)
PLATELET # BLD AUTO: 427 K/UL — HIGH (ref 150–400)
POTASSIUM SERPL-MCNC: 4.3 MMOL/L — SIGNIFICANT CHANGE UP (ref 3.5–5.3)
POTASSIUM SERPL-SCNC: 4.3 MMOL/L — SIGNIFICANT CHANGE UP (ref 3.5–5.3)
RBC # BLD: 4.72 M/UL — SIGNIFICANT CHANGE UP (ref 4.2–5.8)
RBC # FLD: 14.7 % — HIGH (ref 10.3–14.5)
SODIUM SERPL-SCNC: 141 MMOL/L — SIGNIFICANT CHANGE UP (ref 135–145)
WBC # BLD: 6.9 K/UL — SIGNIFICANT CHANGE UP (ref 3.8–10.5)
WBC # FLD AUTO: 6.9 K/UL — SIGNIFICANT CHANGE UP (ref 3.8–10.5)

## 2019-09-12 PROCEDURE — 99232 SBSQ HOSP IP/OBS MODERATE 35: CPT | Mod: GC

## 2019-09-12 RX ORDER — TRAMADOL HYDROCHLORIDE 50 MG/1
25 TABLET ORAL ONCE
Refills: 0 | Status: DISCONTINUED | OUTPATIENT
Start: 2019-09-12 | End: 2019-09-12

## 2019-09-12 RX ORDER — GABAPENTIN 400 MG/1
300 CAPSULE ORAL ONCE
Refills: 0 | Status: COMPLETED | OUTPATIENT
Start: 2019-09-12 | End: 2019-09-12

## 2019-09-12 RX ADMIN — Medication 1 PATCH: at 09:42

## 2019-09-12 RX ADMIN — LACTULOSE 10 GRAM(S): 10 SOLUTION ORAL at 06:19

## 2019-09-12 RX ADMIN — Medication 750 MILLIGRAM(S): at 18:15

## 2019-09-12 RX ADMIN — APIXABAN 5 MILLIGRAM(S): 2.5 TABLET, FILM COATED ORAL at 06:20

## 2019-09-12 RX ADMIN — OLANZAPINE 7.5 MILLIGRAM(S): 15 TABLET, FILM COATED ORAL at 11:32

## 2019-09-12 RX ADMIN — LACTULOSE 10 GRAM(S): 10 SOLUTION ORAL at 14:17

## 2019-09-12 RX ADMIN — Medication 100 MILLIGRAM(S): at 11:32

## 2019-09-12 RX ADMIN — GABAPENTIN 300 MILLIGRAM(S): 400 CAPSULE ORAL at 02:52

## 2019-09-12 RX ADMIN — Medication 30 MILLILITER(S): at 18:15

## 2019-09-12 RX ADMIN — Medication 1 PATCH: at 11:33

## 2019-09-12 RX ADMIN — LITHIUM CARBONATE 300 MILLIGRAM(S): 300 TABLET, EXTENDED RELEASE ORAL at 18:15

## 2019-09-12 RX ADMIN — Medication 1 PATCH: at 20:16

## 2019-09-12 RX ADMIN — Medication 1 TABLET(S): at 11:32

## 2019-09-12 RX ADMIN — Medication 0.5 MILLIGRAM(S): at 06:21

## 2019-09-12 RX ADMIN — OXYCODONE HYDROCHLORIDE 10 MILLIGRAM(S): 5 TABLET ORAL at 06:19

## 2019-09-12 RX ADMIN — Medication 0.5 MILLIGRAM(S): at 18:15

## 2019-09-12 RX ADMIN — APIXABAN 5 MILLIGRAM(S): 2.5 TABLET, FILM COATED ORAL at 18:15

## 2019-09-12 RX ADMIN — OLANZAPINE 10 MILLIGRAM(S): 15 TABLET, FILM COATED ORAL at 21:36

## 2019-09-12 RX ADMIN — Medication 3 MILLIGRAM(S): at 21:36

## 2019-09-12 RX ADMIN — Medication 200 MICROGRAM(S): at 06:20

## 2019-09-12 RX ADMIN — PANTOPRAZOLE SODIUM 40 MILLIGRAM(S): 20 TABLET, DELAYED RELEASE ORAL at 06:21

## 2019-09-12 RX ADMIN — TRAMADOL HYDROCHLORIDE 25 MILLIGRAM(S): 50 TABLET ORAL at 03:45

## 2019-09-12 RX ADMIN — Medication 50 MILLIGRAM(S): at 11:32

## 2019-09-12 RX ADMIN — Medication 750 MILLIGRAM(S): at 06:21

## 2019-09-12 RX ADMIN — Medication 500 MILLIGRAM(S): at 11:33

## 2019-09-12 RX ADMIN — GABAPENTIN 400 MILLIGRAM(S): 400 CAPSULE ORAL at 21:36

## 2019-09-12 RX ADMIN — Medication 1000 UNIT(S): at 11:32

## 2019-09-12 RX ADMIN — POLYETHYLENE GLYCOL 3350 17 GRAM(S): 17 POWDER, FOR SOLUTION ORAL at 11:32

## 2019-09-12 RX ADMIN — Medication 250 MILLIGRAM(S): at 06:21

## 2019-09-12 RX ADMIN — Medication 1 PATCH: at 11:12

## 2019-09-12 RX ADMIN — GABAPENTIN 400 MILLIGRAM(S): 400 CAPSULE ORAL at 14:17

## 2019-09-12 RX ADMIN — Medication 50 MILLIGRAM(S): at 06:20

## 2019-09-12 RX ADMIN — OXYCODONE HYDROCHLORIDE 10 MILLIGRAM(S): 5 TABLET ORAL at 18:15

## 2019-09-12 RX ADMIN — TRAMADOL HYDROCHLORIDE 25 MILLIGRAM(S): 50 TABLET ORAL at 02:52

## 2019-09-12 RX ADMIN — Medication 250 MILLIGRAM(S): at 18:15

## 2019-09-12 RX ADMIN — GABAPENTIN 400 MILLIGRAM(S): 400 CAPSULE ORAL at 06:20

## 2019-09-12 RX ADMIN — DESMOPRESSIN ACETATE 0.2 MILLIGRAM(S): 0.1 TABLET ORAL at 21:36

## 2019-09-12 RX ADMIN — LITHIUM CARBONATE 300 MILLIGRAM(S): 300 TABLET, EXTENDED RELEASE ORAL at 06:21

## 2019-09-12 NOTE — PROGRESS NOTE ADULT - ASSESSMENT
Pt is a 53yo M admitted to [facility] for verbally aggressive/abusive behavior.     *Schizoprenifor disorder and Bipolar disorder with behavioral disturbance  psy consult appreciated   Cont Valproic and Lithium     *hx of PE  Cotn Eliquis     *DI   BMP WNL   Cont desmopressin     *HTN  controlled  cont toprol     *Hypothyroidism   TSH 11.2  Syntrhoid increased to 200mcg  f/u lab in 6 weeks out pt     *Disposition   pending level 2 psychiatric screen   Clear to [place] medically

## 2019-09-12 NOTE — PROGRESS NOTE ADULT - SUBJECTIVE AND OBJECTIVE BOX
HPI  Pt is a 55yo M admitted to [facility] for verbally aggressive/abusive behavior.   No acute event over night. Pt was seen and examined at bedside. Hemodynamically stable.     Vital Signs Last 24 Hrs  T(C): 36.3 (12 Sep 2019 05:00), Max: 36.3 (11 Sep 2019 22:15)  T(F): 97.3 (12 Sep 2019 05:00), Max: 97.4 (11 Sep 2019 22:15)  HR: 58 (12 Sep 2019 05:00) (58 - 71)  BP: 106/62 (12 Sep 2019 05:00) (106/62 - 125/76)  BP(mean): --  RR: 16 (12 Sep 2019 05:00) (16 - 17)  SpO2: 96% (12 Sep 2019 05:00) (96% - 100%)    I&O's Summary    11 Sep 2019 07:01  -  12 Sep 2019 07:00  --------------------------------------------------------  IN: 2020 mL / OUT: 3200 mL / NET: -1180 mL    12 Sep 2019 07:01  -  12 Sep 2019 10:32  --------------------------------------------------------  IN: 800 mL / OUT: 0 mL / NET: 800 mL        CAPILLARY BLOOD GLUCOSE          PHYSICAL EXAM:    Constitutional: NAD, awake  HEENT: PERR, EOMI, Normal Hearing, MMM  Neck: Soft and supple, No LAD, No JVD  Respiratory: Breath sounds are clear bilaterally, No wheezing, rales or rhonchi  Cardiovascular: S1 and S2,   Extremities: No peripheral edema  Vascular: 2+ peripheral pulses  Musculoskeletal: 5/5 strength b/l upper and lower extremities  Skin: No rashes    MEDICATIONS:  MEDICATIONS  (STANDING):  aluminum hydroxide/magnesium hydroxide/simethicone Suspension 30 milliLiter(s) Oral every 12 hours  apixaban 5 milliGRAM(s) Oral every 12 hours  ascorbic acid 500 milliGRAM(s) Oral daily  benztropine 0.5 milliGRAM(s) Oral two times a day  cholecalciferol 1000 Unit(s) Oral daily  desmopressin 0.2 milliGRAM(s) Oral at bedtime  docusate sodium 100 milliGRAM(s) Oral daily  gabapentin 400 milliGRAM(s) Oral three times a day  lactulose Syrup 10 Gram(s) Oral three times a day  levothyroxine 200 MICROGram(s) Oral daily  lithium 300 milliGRAM(s) Oral every 12 hours  melatonin 3 milliGRAM(s) Oral at bedtime  metoprolol succinate ER 50 milliGRAM(s) Oral daily  multivitamin 1 Tablet(s) Oral daily  nicotine - 21 mG/24Hr(s) Patch 1 patch Transdermal daily  OLANZapine 7.5 milliGRAM(s) Oral daily  OLANZapine 10 milliGRAM(s) Oral at bedtime  oxyCODONE  ER Tablet 10 milliGRAM(s) Oral every 12 hours  pantoprazole    Tablet 40 milliGRAM(s) Oral before breakfast  polyethylene glycol 3350 17 Gram(s) Oral daily  pyridoxine 50 milliGRAM(s) Oral daily  saccharomyces boulardii 250 milliGRAM(s) Oral two times a day  senna 2 Tablet(s) Oral at bedtime  valproic acid 750 milliGRAM(s) Oral two times a day      LABS: All Labs Reviewed:                        13.4   6.90  )-----------( 427      ( 12 Sep 2019 07:05 )             42.6     09-12    141  |  105  |  25<H>  ----------------------------<  82  4.3   |  30  |  0.60    Ca    9.6      12 Sep 2019 07:05            Blood Culture:     RADIOLOGY/EKG:    DVT PPX:    ADVANCED DIRECTIVE:    DISPOSITION:

## 2019-09-13 PROCEDURE — 99232 SBSQ HOSP IP/OBS MODERATE 35: CPT

## 2019-09-13 PROCEDURE — 99232 SBSQ HOSP IP/OBS MODERATE 35: CPT | Mod: GC

## 2019-09-13 RX ORDER — OXYCODONE HYDROCHLORIDE 5 MG/1
10 TABLET ORAL EVERY 12 HOURS
Refills: 0 | Status: DISCONTINUED | OUTPATIENT
Start: 2019-09-13 | End: 2019-09-19

## 2019-09-13 RX ADMIN — OLANZAPINE 7.5 MILLIGRAM(S): 15 TABLET, FILM COATED ORAL at 13:58

## 2019-09-13 RX ADMIN — Medication 250 MILLIGRAM(S): at 17:50

## 2019-09-13 RX ADMIN — Medication 1000 UNIT(S): at 13:56

## 2019-09-13 RX ADMIN — Medication 1 PATCH: at 11:00

## 2019-09-13 RX ADMIN — Medication 750 MILLIGRAM(S): at 17:50

## 2019-09-13 RX ADMIN — APIXABAN 5 MILLIGRAM(S): 2.5 TABLET, FILM COATED ORAL at 06:20

## 2019-09-13 RX ADMIN — Medication 100 MILLIGRAM(S): at 13:57

## 2019-09-13 RX ADMIN — Medication 200 MICROGRAM(S): at 06:20

## 2019-09-13 RX ADMIN — Medication 250 MILLIGRAM(S): at 06:24

## 2019-09-13 RX ADMIN — Medication 50 MILLIGRAM(S): at 06:20

## 2019-09-13 RX ADMIN — Medication 30 MILLILITER(S): at 17:49

## 2019-09-13 RX ADMIN — PANTOPRAZOLE SODIUM 40 MILLIGRAM(S): 20 TABLET, DELAYED RELEASE ORAL at 06:21

## 2019-09-13 RX ADMIN — POLYETHYLENE GLYCOL 3350 17 GRAM(S): 17 POWDER, FOR SOLUTION ORAL at 13:58

## 2019-09-13 RX ADMIN — GABAPENTIN 400 MILLIGRAM(S): 400 CAPSULE ORAL at 06:20

## 2019-09-13 RX ADMIN — Medication 1 PATCH: at 07:00

## 2019-09-13 RX ADMIN — Medication 0.5 MILLIGRAM(S): at 17:49

## 2019-09-13 RX ADMIN — Medication 50 MILLIGRAM(S): at 13:58

## 2019-09-13 RX ADMIN — GABAPENTIN 400 MILLIGRAM(S): 400 CAPSULE ORAL at 13:58

## 2019-09-13 RX ADMIN — Medication 1 TABLET(S): at 13:57

## 2019-09-13 RX ADMIN — OXYCODONE HYDROCHLORIDE 10 MILLIGRAM(S): 5 TABLET ORAL at 17:50

## 2019-09-13 RX ADMIN — OXYCODONE HYDROCHLORIDE 10 MILLIGRAM(S): 5 TABLET ORAL at 18:50

## 2019-09-13 RX ADMIN — Medication 30 MILLILITER(S): at 06:20

## 2019-09-13 RX ADMIN — Medication 1 PATCH: at 13:57

## 2019-09-13 RX ADMIN — Medication 0.5 MILLIGRAM(S): at 06:21

## 2019-09-13 RX ADMIN — Medication 750 MILLIGRAM(S): at 06:21

## 2019-09-13 RX ADMIN — APIXABAN 5 MILLIGRAM(S): 2.5 TABLET, FILM COATED ORAL at 17:50

## 2019-09-13 RX ADMIN — LITHIUM CARBONATE 300 MILLIGRAM(S): 300 TABLET, EXTENDED RELEASE ORAL at 06:20

## 2019-09-13 RX ADMIN — LACTULOSE 10 GRAM(S): 10 SOLUTION ORAL at 13:58

## 2019-09-13 RX ADMIN — LITHIUM CARBONATE 300 MILLIGRAM(S): 300 TABLET, EXTENDED RELEASE ORAL at 17:50

## 2019-09-13 RX ADMIN — LACTULOSE 10 GRAM(S): 10 SOLUTION ORAL at 06:20

## 2019-09-13 RX ADMIN — Medication 500 MILLIGRAM(S): at 13:56

## 2019-09-13 NOTE — PROGRESS NOTE ADULT - SUBJECTIVE AND OBJECTIVE BOX
HPI  Pt is a 53yo M admitted to [facility] for verbally aggressive/abusive behavior.    Pt was seen and examined at bedside. Hemodynamically stable.     Vital Signs Last 24 Hrs  T(C): 36.2 (12 Sep 2019 21:34), Max: 36.4 (12 Sep 2019 15:30)  T(F): 97.2 (12 Sep 2019 21:34), Max: 97.6 (12 Sep 2019 15:30)  HR: 61 (13 Sep 2019 06:19) (61 - 70)  BP: 127/59 (13 Sep 2019 06:19) (94/53 - 127/59)  BP(mean): --  RR: 16 (13 Sep 2019 06:19) (14 - 16)  SpO2: 100% (13 Sep 2019 06:19) (91% - 100%)    I&O's Summary    12 Sep 2019 07:01  -  13 Sep 2019 07:00  --------------------------------------------------------  IN: 1400 mL / OUT: 300 mL / NET: 1100 mL        CAPILLARY BLOOD GLUCOSE          PHYSICAL EXAM:    Constitutional: NAD, awake  HEENT: PERR, EOMI, Normal Hearing, MMM  Neck: Soft and supple, No LAD, No JVD  Respiratory: Breath sounds are clear bilaterally, No wheezing, rales or rhonchi  Cardiovascular: S1 and S2,   Extremities: No peripheral edema  Vascular: 2+ peripheral pulses  Musculoskeletal: 5/5 strength b/l upper and lower extremities  Skin: No rashes    MEDICATIONS:  MEDICATIONS  (STANDING):  aluminum hydroxide/magnesium hydroxide/simethicone Suspension 30 milliLiter(s) Oral every 12 hours  apixaban 5 milliGRAM(s) Oral every 12 hours  ascorbic acid 500 milliGRAM(s) Oral daily  benztropine 0.5 milliGRAM(s) Oral two times a day  cholecalciferol 1000 Unit(s) Oral daily  desmopressin 0.2 milliGRAM(s) Oral at bedtime  docusate sodium 100 milliGRAM(s) Oral daily  gabapentin 400 milliGRAM(s) Oral three times a day  lactulose Syrup 10 Gram(s) Oral three times a day  levothyroxine 200 MICROGram(s) Oral daily  lithium 300 milliGRAM(s) Oral every 12 hours  melatonin 3 milliGRAM(s) Oral at bedtime  metoprolol succinate ER 50 milliGRAM(s) Oral daily  multivitamin 1 Tablet(s) Oral daily  nicotine - 21 mG/24Hr(s) Patch 1 patch Transdermal daily  OLANZapine 7.5 milliGRAM(s) Oral daily  OLANZapine 10 milliGRAM(s) Oral at bedtime  pantoprazole    Tablet 40 milliGRAM(s) Oral before breakfast  polyethylene glycol 3350 17 Gram(s) Oral daily  pyridoxine 50 milliGRAM(s) Oral daily  saccharomyces boulardii 250 milliGRAM(s) Oral two times a day  senna 2 Tablet(s) Oral at bedtime  valproic acid 750 milliGRAM(s) Oral two times a day      LABS: All Labs Reviewed:                        13.4   6.90  )-----------( 427      ( 12 Sep 2019 07:05 )             42.6     09-12    141  |  105  |  25<H>  ----------------------------<  82  4.3   |  30  |  0.60    Ca    9.6      12 Sep 2019 07:05            Blood Culture:     RADIOLOGY/EKG:    DVT PPX:    ADVANCED DIRECTIVE:    DISPOSITION:

## 2019-09-14 PROCEDURE — 99232 SBSQ HOSP IP/OBS MODERATE 35: CPT | Mod: GC

## 2019-09-14 PROCEDURE — 99232 SBSQ HOSP IP/OBS MODERATE 35: CPT

## 2019-09-14 RX ORDER — OLANZAPINE 15 MG/1
10 TABLET, FILM COATED ORAL ONCE
Refills: 0 | Status: COMPLETED | OUTPATIENT
Start: 2019-09-14 | End: 2019-09-14

## 2019-09-14 RX ADMIN — DESMOPRESSIN ACETATE 0.2 MILLIGRAM(S): 0.1 TABLET ORAL at 21:52

## 2019-09-14 RX ADMIN — LACTULOSE 10 GRAM(S): 10 SOLUTION ORAL at 07:54

## 2019-09-14 RX ADMIN — Medication 3 MILLIGRAM(S): at 21:53

## 2019-09-14 RX ADMIN — Medication 50 MILLIGRAM(S): at 07:16

## 2019-09-14 RX ADMIN — Medication 200 MICROGRAM(S): at 07:15

## 2019-09-14 RX ADMIN — OLANZAPINE 10 MILLIGRAM(S): 15 TABLET, FILM COATED ORAL at 18:47

## 2019-09-14 RX ADMIN — GABAPENTIN 400 MILLIGRAM(S): 400 CAPSULE ORAL at 07:13

## 2019-09-14 RX ADMIN — Medication 250 MILLIGRAM(S): at 07:16

## 2019-09-14 RX ADMIN — LITHIUM CARBONATE 300 MILLIGRAM(S): 300 TABLET, EXTENDED RELEASE ORAL at 07:16

## 2019-09-14 RX ADMIN — Medication 0.5 MILLIGRAM(S): at 07:15

## 2019-09-14 RX ADMIN — APIXABAN 5 MILLIGRAM(S): 2.5 TABLET, FILM COATED ORAL at 07:14

## 2019-09-14 RX ADMIN — OLANZAPINE 10 MILLIGRAM(S): 15 TABLET, FILM COATED ORAL at 07:57

## 2019-09-14 RX ADMIN — GABAPENTIN 400 MILLIGRAM(S): 400 CAPSULE ORAL at 07:53

## 2019-09-14 RX ADMIN — OLANZAPINE 10 MILLIGRAM(S): 15 TABLET, FILM COATED ORAL at 21:53

## 2019-09-14 RX ADMIN — GABAPENTIN 400 MILLIGRAM(S): 400 CAPSULE ORAL at 21:53

## 2019-09-14 RX ADMIN — Medication 750 MILLIGRAM(S): at 07:16

## 2019-09-14 RX ADMIN — Medication 3 MILLIGRAM(S): at 07:55

## 2019-09-14 RX ADMIN — OXYCODONE HYDROCHLORIDE 10 MILLIGRAM(S): 5 TABLET ORAL at 07:16

## 2019-09-14 RX ADMIN — SENNA PLUS 2 TABLET(S): 8.6 TABLET ORAL at 07:58

## 2019-09-14 RX ADMIN — PANTOPRAZOLE SODIUM 40 MILLIGRAM(S): 20 TABLET, DELAYED RELEASE ORAL at 07:17

## 2019-09-14 RX ADMIN — Medication 30 MILLILITER(S): at 07:15

## 2019-09-14 RX ADMIN — Medication 1 PATCH: at 13:50

## 2019-09-14 RX ADMIN — DESMOPRESSIN ACETATE 0.2 MILLIGRAM(S): 0.1 TABLET ORAL at 07:51

## 2019-09-14 RX ADMIN — LACTULOSE 10 GRAM(S): 10 SOLUTION ORAL at 07:13

## 2019-09-14 RX ADMIN — OXYCODONE HYDROCHLORIDE 10 MILLIGRAM(S): 5 TABLET ORAL at 07:10

## 2019-09-14 NOTE — INPATIENT CERTIFICATION FOR MEDICARE PATIENTS - IN ORDER TO MEET MEDICARE REQUIREMENTS.
In order to meet Medicare requirements, the clinical documentation must support the information cited in the admission order.  Please be sure to provide detailed and clear documentation about the following in the admitting note/history and physical: ambulatory

## 2019-09-14 NOTE — PROGRESS NOTE ADULT - ASSESSMENT
Pt is a 53yo M admitted to [facility] for verbally aggressive/abusive behavior.     *Schizoprenifor disorder and Bipolar disorder with behavioral disturbance  psy consult appreciated   Cont Valproic and Lithium   will need to encourage pt take medication     *hx of PE  Cotn Eliquis     *DI   BMP WNL   Cont desmopressin     *HTN  controlled  cont toprol     *Hypothyroidism   TSH 11.2  Syntrhoid increased to 200mcg  f/u lab in 6 weeks out pt     *Disposition   pending level 2 psychiatric screen   Clear to DC medically

## 2019-09-14 NOTE — PROGRESS NOTE ADULT - SUBJECTIVE AND OBJECTIVE BOX
HPI  Pt is a 53yo M admitted to [facility] for verbally aggressive/abusive behavior.    Pt was seen and examined at bedside. Hemodynamically stable. Pt was angry this morning and refused medication given multiple RN try to convince him and almost hit staff.     Vital Signs Last 24 Hrs  T(C): 36.6 (13 Sep 2019 20:57), Max: 37.1 (13 Sep 2019 17:07)  T(F): 97.9 (13 Sep 2019 20:57), Max: 98.7 (13 Sep 2019 17:07)  HR: 81 (13 Sep 2019 20:57) (77 - 81)  BP: 106/58 (13 Sep 2019 20:57) (106/58 - 122/60)  BP(mean): --  RR: 15 (13 Sep 2019 20:57) (15 - 15)  SpO2: 97% (13 Sep 2019 20:57) (97% - 98%)    I&O's Summary    13 Sep 2019 07:01  -  14 Sep 2019 07:00  --------------------------------------------------------  IN: 2000 mL / OUT: 400 mL / NET: 1600 mL    14 Sep 2019 07:01  -  14 Sep 2019 11:24  --------------------------------------------------------  IN: 360 mL / OUT: 0 mL / NET: 360 mL        CAPILLARY BLOOD GLUCOSE          PHYSICAL EXAM:    Constitutional: NAD, awake  HEENT: PERR, EOMI, Normal Hearing, MMM  Neck: Soft and supple, No LAD, No JVD  Respiratory: Breath sounds are clear bilaterally, No wheezing, rales or rhonchi  Cardiovascular: S1 and S2,   Extremities: No peripheral edema  Vascular: 2+ peripheral pulses  Musculoskeletal: 5/5 strength b/l upper and lower extremities  Psy: angry mood   Skin: No rashes    MEDICATIONS:  MEDICATIONS  (STANDING):  aluminum hydroxide/magnesium hydroxide/simethicone Suspension 30 milliLiter(s) Oral every 12 hours  apixaban 5 milliGRAM(s) Oral every 12 hours  ascorbic acid 500 milliGRAM(s) Oral daily  benztropine 0.5 milliGRAM(s) Oral two times a day  cholecalciferol 1000 Unit(s) Oral daily  desmopressin 0.2 milliGRAM(s) Oral at bedtime  docusate sodium 100 milliGRAM(s) Oral daily  gabapentin 400 milliGRAM(s) Oral three times a day  lactulose Syrup 10 Gram(s) Oral three times a day  levothyroxine 200 MICROGram(s) Oral daily  lithium 300 milliGRAM(s) Oral every 12 hours  melatonin 3 milliGRAM(s) Oral at bedtime  metoprolol succinate ER 50 milliGRAM(s) Oral daily  multivitamin 1 Tablet(s) Oral daily  nicotine - 21 mG/24Hr(s) Patch 1 patch Transdermal daily  OLANZapine 7.5 milliGRAM(s) Oral daily  OLANZapine 10 milliGRAM(s) Oral at bedtime  oxyCODONE  ER Tablet 10 milliGRAM(s) Oral every 12 hours  pantoprazole    Tablet 40 milliGRAM(s) Oral before breakfast  polyethylene glycol 3350 17 Gram(s) Oral daily  pyridoxine 50 milliGRAM(s) Oral daily  saccharomyces boulardii 250 milliGRAM(s) Oral two times a day  senna 2 Tablet(s) Oral at bedtime  valproic acid 750 milliGRAM(s) Oral two times a day      LABS: All Labs Reviewed:                Blood Culture:     RADIOLOGY/EKG:    DVT PPX:    ADVANCED DIRECTIVE:    DISPOSITION:

## 2019-09-15 PROCEDURE — 99232 SBSQ HOSP IP/OBS MODERATE 35: CPT | Mod: GC

## 2019-09-15 RX ORDER — VALPROIC ACID (AS SODIUM SALT) 250 MG/5ML
1000 SOLUTION, ORAL ORAL
Refills: 0 | Status: DISCONTINUED | OUTPATIENT
Start: 2019-09-15 | End: 2019-09-19

## 2019-09-15 RX ADMIN — LITHIUM CARBONATE 300 MILLIGRAM(S): 300 TABLET, EXTENDED RELEASE ORAL at 16:24

## 2019-09-15 RX ADMIN — OXYCODONE HYDROCHLORIDE 10 MILLIGRAM(S): 5 TABLET ORAL at 16:25

## 2019-09-15 RX ADMIN — Medication 1000 MILLIGRAM(S): at 16:25

## 2019-09-15 RX ADMIN — DESMOPRESSIN ACETATE 0.2 MILLIGRAM(S): 0.1 TABLET ORAL at 21:53

## 2019-09-15 RX ADMIN — LACTULOSE 10 GRAM(S): 10 SOLUTION ORAL at 21:52

## 2019-09-15 RX ADMIN — GABAPENTIN 400 MILLIGRAM(S): 400 CAPSULE ORAL at 21:53

## 2019-09-15 RX ADMIN — OLANZAPINE 10 MILLIGRAM(S): 15 TABLET, FILM COATED ORAL at 21:54

## 2019-09-15 RX ADMIN — Medication 650 MILLIGRAM(S): at 22:43

## 2019-09-15 RX ADMIN — Medication 0.5 MILLIGRAM(S): at 16:24

## 2019-09-15 RX ADMIN — APIXABAN 5 MILLIGRAM(S): 2.5 TABLET, FILM COATED ORAL at 16:22

## 2019-09-15 RX ADMIN — SENNA PLUS 2 TABLET(S): 8.6 TABLET ORAL at 21:54

## 2019-09-15 RX ADMIN — Medication 3 MILLIGRAM(S): at 21:53

## 2019-09-15 RX ADMIN — OXYCODONE HYDROCHLORIDE 10 MILLIGRAM(S): 5 TABLET ORAL at 15:24

## 2019-09-15 RX ADMIN — Medication 650 MILLIGRAM(S): at 23:45

## 2019-09-15 NOTE — PROGRESS NOTE ADULT - ASSESSMENT
Pt is a 55yo M admitted to [facility] for verbally aggressive/abusive behavior.     *Schizoprenifor disorder and Bipolar disorder with behavioral disturbance  psy consult appreciated   Cont Lithium   Increased Valproic acid to 1000mg BID   will need to encourage pt take medication     *Angrey out burst   PO Zyprexa hold  IM Zyprexa prn with max dose of 20mg in 24hr     *hx of PE  Cotn Eliquis     *DI   BMP WNL   Cont desmopressin     *HTN  controlled  cont toprol     *Hypothyroidism   TSH 11.2  Syntrhoid increased to 200mcg  f/u lab in 6 weeks out pt     *Disposition   pending level 2 psychiatric screen   Clear to DC medically

## 2019-09-15 NOTE — PROGRESS NOTE ADULT - SUBJECTIVE AND OBJECTIVE BOX
HPI  Pt is a 55yo M admitted to [facility] for verbally aggressive/abusive behavior.    Pt was seen and examined at bedside. Hemodynamically stable. Anger out burst, refused medication.     Vital Signs Last 24 Hrs  T(C): 36.4 (15 Sep 2019 06:13), Max: 36.9 (14 Sep 2019 16:45)  T(F): 97.6 (15 Sep 2019 06:13), Max: 98.5 (14 Sep 2019 16:45)  HR: 62 (15 Sep 2019 06:13) (62 - 72)  BP: 120/63 (15 Sep 2019 06:13) (104/72 - 120/63)  BP(mean): --  RR: 15 (15 Sep 2019 06:13) (14 - 15)  SpO2: 100% (15 Sep 2019 06:13) (93% - 100%)    I&O's Summary    14 Sep 2019 07:01  -  15 Sep 2019 07:00  --------------------------------------------------------  IN: 1600 mL / OUT: 0 mL / NET: 1600 mL        CAPILLARY BLOOD GLUCOSE          PHYSICAL EXAM:    Constitutional: NAD, awake  HEENT: PERR, EOMI, Normal Hearing, MMM  Neck: Soft and supple, No LAD, No JVD  Respiratory: Breath sounds are clear bilaterally, No wheezing, rales or rhonchi  Cardiovascular: S1 and S2,   Extremities: No peripheral edema  Vascular: 2+ peripheral pulses  Musculoskeletal: 5/5 strength b/l upper and lower extremities  Psy: angry mood   Skin: No rashes    MEDICATIONS:  MEDICATIONS  (STANDING):  aluminum hydroxide/magnesium hydroxide/simethicone Suspension 30 milliLiter(s) Oral every 12 hours  apixaban 5 milliGRAM(s) Oral every 12 hours  ascorbic acid 500 milliGRAM(s) Oral daily  benztropine 0.5 milliGRAM(s) Oral two times a day  cholecalciferol 1000 Unit(s) Oral daily  desmopressin 0.2 milliGRAM(s) Oral at bedtime  docusate sodium 100 milliGRAM(s) Oral daily  gabapentin 400 milliGRAM(s) Oral three times a day  lactulose Syrup 10 Gram(s) Oral three times a day  levothyroxine 200 MICROGram(s) Oral daily  lithium 300 milliGRAM(s) Oral every 12 hours  melatonin 3 milliGRAM(s) Oral at bedtime  metoprolol succinate ER 50 milliGRAM(s) Oral daily  multivitamin 1 Tablet(s) Oral daily  nicotine - 21 mG/24Hr(s) Patch 1 patch Transdermal daily  OLANZapine 7.5 milliGRAM(s) Oral daily  OLANZapine 10 milliGRAM(s) Oral at bedtime  oxyCODONE  ER Tablet 10 milliGRAM(s) Oral every 12 hours  pantoprazole    Tablet 40 milliGRAM(s) Oral before breakfast  polyethylene glycol 3350 17 Gram(s) Oral daily  pyridoxine 50 milliGRAM(s) Oral daily  saccharomyces boulardii 250 milliGRAM(s) Oral two times a day  senna 2 Tablet(s) Oral at bedtime  valproic acid 1000 milliGRAM(s) Oral two times a day      LABS: All Labs Reviewed:                Blood Culture:     RADIOLOGY/EKG:    DVT PPX:    ADVANCED DIRECTIVE:    DISPOSITION:

## 2019-09-16 PROCEDURE — 99232 SBSQ HOSP IP/OBS MODERATE 35: CPT | Mod: GC

## 2019-09-16 RX ADMIN — Medication 3 MILLIGRAM(S): at 22:08

## 2019-09-16 RX ADMIN — Medication 0.5 MILLIGRAM(S): at 17:27

## 2019-09-16 RX ADMIN — APIXABAN 5 MILLIGRAM(S): 2.5 TABLET, FILM COATED ORAL at 17:27

## 2019-09-16 RX ADMIN — LACTULOSE 10 GRAM(S): 10 SOLUTION ORAL at 13:09

## 2019-09-16 RX ADMIN — Medication 100 MILLIGRAM(S): at 11:12

## 2019-09-16 RX ADMIN — GABAPENTIN 400 MILLIGRAM(S): 400 CAPSULE ORAL at 07:13

## 2019-09-16 RX ADMIN — LITHIUM CARBONATE 300 MILLIGRAM(S): 300 TABLET, EXTENDED RELEASE ORAL at 17:27

## 2019-09-16 RX ADMIN — Medication 1000 MILLIGRAM(S): at 17:27

## 2019-09-16 RX ADMIN — OLANZAPINE 10 MILLIGRAM(S): 15 TABLET, FILM COATED ORAL at 22:08

## 2019-09-16 RX ADMIN — LITHIUM CARBONATE 300 MILLIGRAM(S): 300 TABLET, EXTENDED RELEASE ORAL at 07:12

## 2019-09-16 RX ADMIN — OXYCODONE HYDROCHLORIDE 10 MILLIGRAM(S): 5 TABLET ORAL at 17:27

## 2019-09-16 RX ADMIN — OXYCODONE HYDROCHLORIDE 10 MILLIGRAM(S): 5 TABLET ORAL at 07:49

## 2019-09-16 RX ADMIN — DESMOPRESSIN ACETATE 0.2 MILLIGRAM(S): 0.1 TABLET ORAL at 22:09

## 2019-09-16 RX ADMIN — APIXABAN 5 MILLIGRAM(S): 2.5 TABLET, FILM COATED ORAL at 07:12

## 2019-09-16 RX ADMIN — Medication 30 MILLILITER(S): at 07:12

## 2019-09-16 RX ADMIN — Medication 650 MILLIGRAM(S): at 22:36

## 2019-09-16 RX ADMIN — Medication 650 MILLIGRAM(S): at 23:30

## 2019-09-16 RX ADMIN — Medication 30 MILLILITER(S): at 17:27

## 2019-09-16 RX ADMIN — GABAPENTIN 400 MILLIGRAM(S): 400 CAPSULE ORAL at 22:08

## 2019-09-16 RX ADMIN — Medication 250 MILLIGRAM(S): at 17:27

## 2019-09-16 RX ADMIN — POLYETHYLENE GLYCOL 3350 17 GRAM(S): 17 POWDER, FOR SOLUTION ORAL at 11:12

## 2019-09-16 RX ADMIN — Medication 1000 UNIT(S): at 11:12

## 2019-09-16 RX ADMIN — LACTULOSE 10 GRAM(S): 10 SOLUTION ORAL at 07:13

## 2019-09-16 RX ADMIN — Medication 1000 MILLIGRAM(S): at 07:12

## 2019-09-16 RX ADMIN — Medication 1 TABLET(S): at 11:12

## 2019-09-16 RX ADMIN — Medication 500 MILLIGRAM(S): at 11:12

## 2019-09-16 RX ADMIN — OXYCODONE HYDROCHLORIDE 10 MILLIGRAM(S): 5 TABLET ORAL at 07:13

## 2019-09-16 RX ADMIN — OXYCODONE HYDROCHLORIDE 10 MILLIGRAM(S): 5 TABLET ORAL at 18:00

## 2019-09-16 RX ADMIN — SENNA PLUS 2 TABLET(S): 8.6 TABLET ORAL at 22:08

## 2019-09-16 RX ADMIN — GABAPENTIN 400 MILLIGRAM(S): 400 CAPSULE ORAL at 13:09

## 2019-09-16 RX ADMIN — Medication 50 MILLIGRAM(S): at 11:13

## 2019-09-16 RX ADMIN — Medication 200 MICROGRAM(S): at 07:13

## 2019-09-16 RX ADMIN — Medication 250 MILLIGRAM(S): at 07:12

## 2019-09-16 RX ADMIN — PANTOPRAZOLE SODIUM 40 MILLIGRAM(S): 20 TABLET, DELAYED RELEASE ORAL at 07:12

## 2019-09-16 RX ADMIN — OLANZAPINE 7.5 MILLIGRAM(S): 15 TABLET, FILM COATED ORAL at 11:12

## 2019-09-16 RX ADMIN — LACTULOSE 10 GRAM(S): 10 SOLUTION ORAL at 22:09

## 2019-09-16 RX ADMIN — Medication 0.5 MILLIGRAM(S): at 07:13

## 2019-09-16 NOTE — PROGRESS NOTE ADULT - ASSESSMENT
Pt is a 55yo M admitted to [facility] for verbally aggressive/abusive behavior.     *Schizoprenifor disorder and Bipolar disorder with behavioral disturbance  - psych consult appreciated   - Continue Lithium   - Continue Valproic acid PO 1000mg BID as per psych  - Continue olanzapine 7.5 PO morning and 10mg PO at bedtime  - For increased aggression psych recommends to hold PO olanzapine and administer IM with maximum dose of 20mg in 24hr     *hx of PE  - Continue Eliquis     *DI   - Continue desmopressin     *HTN  - Continue toprol     *Chronic back pain  - c/w oxycodone ER 10 mg q12 hrs  - c/w tylenol PRN  - c/w gabapentin 400 mg TID    *Constipation  - c/w colace, lactulose, miralax, senna  - c/w bisacodyl suppository PRN    *GERD:  - c/w protonix   - c/w maalox    *Hypothyroidism   - TSH 11.2  - Continue with Synthroid - increased to 200mcg  - f/u lab in 6 weeks out pt     *Disposition   pending level 2 psychiatric screen   Clear to [place] medically

## 2019-09-16 NOTE — PROGRESS NOTE ADULT - SUBJECTIVE AND OBJECTIVE BOX
Patient is a 54y old  Male who presents with a chief complaint of ambulatory dysfxn, verbally abusive behavior. constipation (15 Sep 2019 15:00)    Patient seen and examined at bedside. No overnight events reported. Patient drowsy, c/o back pain and left leg pain.     ALLERGIES:  Haldol (Unknown)  Thorazine (Unknown)  Thorazine (Unknown)    MEDICATIONS  (STANDING):  aluminum hydroxide/magnesium hydroxide/simethicone Suspension 30 milliLiter(s) Oral every 12 hours  apixaban 5 milliGRAM(s) Oral every 12 hours  ascorbic acid 500 milliGRAM(s) Oral daily  benztropine 0.5 milliGRAM(s) Oral two times a day  cholecalciferol 1000 Unit(s) Oral daily  desmopressin 0.2 milliGRAM(s) Oral at bedtime  docusate sodium 100 milliGRAM(s) Oral daily  gabapentin 400 milliGRAM(s) Oral three times a day  lactulose Syrup 10 Gram(s) Oral three times a day  levothyroxine 200 MICROGram(s) Oral daily  lithium 300 milliGRAM(s) Oral every 12 hours  melatonin 3 milliGRAM(s) Oral at bedtime  metoprolol succinate ER 50 milliGRAM(s) Oral daily  multivitamin 1 Tablet(s) Oral daily  nicotine - 21 mG/24Hr(s) Patch 1 patch Transdermal daily  OLANZapine 7.5 milliGRAM(s) Oral daily  OLANZapine 10 milliGRAM(s) Oral at bedtime  oxyCODONE  ER Tablet 10 milliGRAM(s) Oral every 12 hours  pantoprazole    Tablet 40 milliGRAM(s) Oral before breakfast  polyethylene glycol 3350 17 Gram(s) Oral daily  pyridoxine 50 milliGRAM(s) Oral daily  saccharomyces boulardii 250 milliGRAM(s) Oral two times a day  senna 2 Tablet(s) Oral at bedtime  valproic acid 1000 milliGRAM(s) Oral two times a day    MEDICATIONS  (PRN):  acetaminophen   Tablet .. 650 milliGRAM(s) Oral every 6 hours PRN Temp greater or equal to 38C (100.4F), Mild Pain (1 - 3)  ALBUTerol/ipratropium for Nebulization 3 milliLiter(s) Nebulizer every 6 hours PRN Bronchospasm  bisacodyl Suppository 10 milliGRAM(s) Rectal daily PRN Constipation    Vital Signs Last 24 Hrs  T(F): 97.5 (16 Sep 2019 04:59), Max: 98.3 (15 Sep 2019 16:38)  HR: 60 (16 Sep 2019 04:59) (60 - 78)  BP: 116/66 (16 Sep 2019 04:59) (104/56 - 116/66)  RR: 16 (16 Sep 2019 04:59) (14 - 16)  SpO2: 100% (16 Sep 2019 04:59) (95% - 100%)  I&O's Summary    15 Sep 2019 07:01  -  16 Sep 2019 07:00  --------------------------------------------------------  IN: 1440 mL / OUT: 500 mL / NET: 940 mL      PHYSICAL EXAM:  General: NAD, calm, drowsy  ENT: MMM, no thrush  Neck: Supple, No JVD  Lungs: Clear to auscultation bilaterally, good air entry, non-labored breathing  Cardio: RRR, S1/S2, No murmur  Abdomen: Soft, Nontender, Nondistended; Bowel sounds present  Extremities: No peripheral edema    LABS:                          08-03 Chol 79 mg/dL LDL 36 mg/dL HDL 11 mg/dL Trig 159 mg/dL                08-03 [id number] 4.6        RADIOLOGY & ADDITIONAL TESTS:    Care Discussed with Consultants/Other Providers: Dr Rolon, interdisciplinary rounds

## 2019-09-17 PROCEDURE — 99232 SBSQ HOSP IP/OBS MODERATE 35: CPT | Mod: GC

## 2019-09-17 RX ADMIN — Medication 1000 UNIT(S): at 12:25

## 2019-09-17 RX ADMIN — LACTULOSE 10 GRAM(S): 10 SOLUTION ORAL at 22:04

## 2019-09-17 RX ADMIN — APIXABAN 5 MILLIGRAM(S): 2.5 TABLET, FILM COATED ORAL at 05:47

## 2019-09-17 RX ADMIN — LITHIUM CARBONATE 300 MILLIGRAM(S): 300 TABLET, EXTENDED RELEASE ORAL at 05:47

## 2019-09-17 RX ADMIN — SENNA PLUS 2 TABLET(S): 8.6 TABLET ORAL at 22:03

## 2019-09-17 RX ADMIN — OXYCODONE HYDROCHLORIDE 10 MILLIGRAM(S): 5 TABLET ORAL at 06:50

## 2019-09-17 RX ADMIN — PANTOPRAZOLE SODIUM 40 MILLIGRAM(S): 20 TABLET, DELAYED RELEASE ORAL at 06:55

## 2019-09-17 RX ADMIN — Medication 1000 MILLIGRAM(S): at 17:52

## 2019-09-17 RX ADMIN — LACTULOSE 10 GRAM(S): 10 SOLUTION ORAL at 14:19

## 2019-09-17 RX ADMIN — OLANZAPINE 10 MILLIGRAM(S): 15 TABLET, FILM COATED ORAL at 22:03

## 2019-09-17 RX ADMIN — Medication 50 MILLIGRAM(S): at 05:47

## 2019-09-17 RX ADMIN — GABAPENTIN 400 MILLIGRAM(S): 400 CAPSULE ORAL at 22:04

## 2019-09-17 RX ADMIN — Medication 0.5 MILLIGRAM(S): at 17:52

## 2019-09-17 RX ADMIN — LITHIUM CARBONATE 300 MILLIGRAM(S): 300 TABLET, EXTENDED RELEASE ORAL at 17:52

## 2019-09-17 RX ADMIN — Medication 30 MILLILITER(S): at 06:50

## 2019-09-17 RX ADMIN — Medication 250 MILLIGRAM(S): at 17:52

## 2019-09-17 RX ADMIN — POLYETHYLENE GLYCOL 3350 17 GRAM(S): 17 POWDER, FOR SOLUTION ORAL at 12:25

## 2019-09-17 RX ADMIN — Medication 1000 MILLIGRAM(S): at 05:48

## 2019-09-17 RX ADMIN — Medication 0.5 MILLIGRAM(S): at 05:47

## 2019-09-17 RX ADMIN — GABAPENTIN 400 MILLIGRAM(S): 400 CAPSULE ORAL at 14:19

## 2019-09-17 RX ADMIN — Medication 250 MILLIGRAM(S): at 05:48

## 2019-09-17 RX ADMIN — OXYCODONE HYDROCHLORIDE 10 MILLIGRAM(S): 5 TABLET ORAL at 07:35

## 2019-09-17 RX ADMIN — Medication 500 MILLIGRAM(S): at 12:25

## 2019-09-17 RX ADMIN — DESMOPRESSIN ACETATE 0.2 MILLIGRAM(S): 0.1 TABLET ORAL at 22:03

## 2019-09-17 RX ADMIN — Medication 100 MILLIGRAM(S): at 12:25

## 2019-09-17 RX ADMIN — Medication 50 MILLIGRAM(S): at 12:25

## 2019-09-17 RX ADMIN — Medication 3 MILLIGRAM(S): at 22:03

## 2019-09-17 RX ADMIN — OLANZAPINE 7.5 MILLIGRAM(S): 15 TABLET, FILM COATED ORAL at 12:25

## 2019-09-17 RX ADMIN — Medication 1 TABLET(S): at 12:25

## 2019-09-17 RX ADMIN — LACTULOSE 10 GRAM(S): 10 SOLUTION ORAL at 05:48

## 2019-09-17 RX ADMIN — GABAPENTIN 400 MILLIGRAM(S): 400 CAPSULE ORAL at 05:47

## 2019-09-17 RX ADMIN — APIXABAN 5 MILLIGRAM(S): 2.5 TABLET, FILM COATED ORAL at 17:52

## 2019-09-17 RX ADMIN — OXYCODONE HYDROCHLORIDE 10 MILLIGRAM(S): 5 TABLET ORAL at 18:18

## 2019-09-17 RX ADMIN — Medication 200 MICROGRAM(S): at 05:47

## 2019-09-17 RX ADMIN — OXYCODONE HYDROCHLORIDE 10 MILLIGRAM(S): 5 TABLET ORAL at 17:52

## 2019-09-17 NOTE — PROGRESS NOTE ADULT - ASSESSMENT
Pt is a 53yo M admitted to [facility] for verbally aggressive/abusive behavior.     *Schizoprenifor disorder and Bipolar disorder with behavioral disturbance  - Patient has been calm and cooperative with staff  - C/w Lithium   - C/w Valproic acid PO 1000mg BID as per psych  - C/w olanzapine 7.5 PO morning and 10mg PO at bedtime    *hx of PE  - Continue Eliquis     *DI   - Continue desmopressin     *HTN - controlled  - BP: 109/58 (17 Sep 2019 05:21) (107/50 - 117/63)  - Continue toprol     *Chronic back pain  - c/w oxycodone ER 10 mg q12 hrs  - c/w tylenol PRN  - c/w gabapentin 400 mg TID    *Constipation  - c/w colace, lactulose, miralax, senna  - c/w bisacodyl suppository PRN    *GERD:  - c/w protonix   - c/w maalox    *Hypothyroidism   - TSH 11.2  - Continue with Synthroid - increased to 200mcg  - f/u lab in 6 weeks out pt     *Disposition   pending level 2 psychiatric screen   Clear to DC medically Pt is a 53yo M admitted to [facility] for verbally aggressive/abusive behavior.     *Schizophreniform disorder and Bipolar disorder with behavioral disturbance  - Patient has been calm and cooperative with staff  - C/w Lithium   - C/w Valproic acid PO 1000mg BID as per psych  - C/w olanzapine 7.5 PO morning and 10mg PO at bedtime    *hx of PE  - Continue Eliquis     *DI   - Continue desmopressin     *HTN - controlled  - BP: 109/58 (17 Sep 2019 05:21) (107/50 - 117/63)  - Continue toprol     *Chronic back pain  - c/w oxycodone ER 10 mg q12 hrs  - c/w tylenol PRN  - c/w gabapentin 400 mg TID    *Constipation  - c/w colace, lactulose, miralax, senna  - c/w bisacodyl suppository PRN    *GERD:  - c/w protonix   - c/w maalox    *Hypothyroidism   - TSH 11.2  - Continue with Synthroid - increased to 200mcg  - f/u lab in 6 weeks out pt     *Disposition   pending level 2 psychiatric screen   Clear to DC medically Pt is a 55yo M admitted to [facility] for verbally aggressive/abusive behavior.     *Schizophreniform disorder and Bipolar disorder with behavioral disturbance  - Patient has been calm and cooperative with staff  - C/w Lithium   - C/w Valproic acid PO 1000mg BID as per psych  - C/w olanzapine 7.5 PO morning and 10mg PO at bedtime    *hx of PE  - Continue Eliquis     *DI   - Continue desmopressin     *HTN - controlled  - BP: 109/58 (17 Sep 2019 05:21) (107/50 - 117/63)  - Continue toprol     *Chronic back pain  - c/w oxycodone ER 10 mg q12 hrs  - c/w tylenol PRN  - c/w gabapentin 400 mg TID    *Constipation  - c/w colace, lactulose, miralax, senna  - c/w bisacodyl suppository PRN    *GERD:  - c/w protonix       *Hypothyroidism   - TSH 11.2  - Continue with Synthroid - increased to 200mcg  - f/u lab in 6 weeks out pt     *Disposition   pending level 2 psychiatric screen   Clear to [place] medically

## 2019-09-17 NOTE — CHART NOTE - NSCHARTNOTEFT_GEN_A_CORE
NUTRITION FOLLOW UP    SOURCE: Patient [X)   Family [ ]     other [ ]    DIET: Regular    PATIENT REPORT[ ] nausea  [ ] vomiting [ ] diarrhea [ ] constipation  [ ]chewing problems [ ] swallowing issues  [ ] other: no GI distress    PO INTAKE:  < 50% [ ]   50-75%  [ X]   %  []  other : ENSURE ENLIVE tid    SOURCE: for PO intake [X] Patient [ ] family [ ] chart [ ] staff [ ] other    ENTERAL/PARENTERAL NUTRITION: n/a    CURRENT WEIGHT:  120.7 kg. 9/16/19    PERTINENT LABS:      Creatinine    ACCUCHECK      SKIN: intact, edema B/L legs, last BM was 9/16    ESTIMATED NEEDS:   [X] no change since previous assessment  [ ] recalculated:     PREVIOUS NUTRITION DIAGNOSIS: addressed    NUTRITION DIAGNOSIS is   [X] resolved, RD will follow as per nutrition department protocol.    NEW NUTRITION DIAGNOSIS: [X] not applicable    MONITORING AND EVALUATION:   Current diet order is appropriate and is well tolerated, but will monitor for any changes that may be needed    [X] PO intake [X] Tolerance to diet prescription [X] weights [X] follow up per protocol    NUTRITION RECOMMENDATIONS:    RD remains available MATI Hills RD CDE

## 2019-09-17 NOTE — PROGRESS NOTE ADULT - SUBJECTIVE AND OBJECTIVE BOX
Patient is a 54y old  Male who presents with a chief complaint of ambulatory dysfxn, verbally abusive behavior. constipation (16 Sep 2019 11:41)      Patient seen and examined at bedside. No overnight events reported. This morning patient calm, cooperative, he states "I am just tired."     ALLERGIES:  Haldol (Unknown)  Thorazine (Unknown)  Thorazine (Unknown)    MEDICATIONS  (STANDING):  aluminum hydroxide/magnesium hydroxide/simethicone Suspension 30 milliLiter(s) Oral every 12 hours  apixaban 5 milliGRAM(s) Oral every 12 hours  ascorbic acid 500 milliGRAM(s) Oral daily  benztropine 0.5 milliGRAM(s) Oral two times a day  cholecalciferol 1000 Unit(s) Oral daily  desmopressin 0.2 milliGRAM(s) Oral at bedtime  docusate sodium 100 milliGRAM(s) Oral daily  gabapentin 400 milliGRAM(s) Oral three times a day  lactulose Syrup 10 Gram(s) Oral three times a day  levothyroxine 200 MICROGram(s) Oral daily  lithium 300 milliGRAM(s) Oral every 12 hours  melatonin 3 milliGRAM(s) Oral at bedtime  metoprolol succinate ER 50 milliGRAM(s) Oral daily  multivitamin 1 Tablet(s) Oral daily  nicotine - 21 mG/24Hr(s) Patch 1 patch Transdermal daily  OLANZapine 7.5 milliGRAM(s) Oral daily  OLANZapine 10 milliGRAM(s) Oral at bedtime  oxyCODONE  ER Tablet 10 milliGRAM(s) Oral every 12 hours  pantoprazole    Tablet 40 milliGRAM(s) Oral before breakfast  polyethylene glycol 3350 17 Gram(s) Oral daily  pyridoxine 50 milliGRAM(s) Oral daily  saccharomyces boulardii 250 milliGRAM(s) Oral two times a day  senna 2 Tablet(s) Oral at bedtime  valproic acid 1000 milliGRAM(s) Oral two times a day    MEDICATIONS  (PRN):  acetaminophen   Tablet .. 650 milliGRAM(s) Oral every 6 hours PRN Temp greater or equal to 38C (100.4F), Mild Pain (1 - 3)  ALBUTerol/ipratropium for Nebulization 3 milliLiter(s) Nebulizer every 6 hours PRN Bronchospasm  bisacodyl Suppository 10 milliGRAM(s) Rectal daily PRN Constipation    Vital Signs Last 24 Hrs  T(F): 97.7 (17 Sep 2019 05:21), Max: 98.2 (16 Sep 2019 16:33)  HR: 64 (17 Sep 2019 05:21) (64 - 78)  BP: 109/58 (17 Sep 2019 05:21) (107/50 - 117/63)  RR: 16 (17 Sep 2019 05:21) (14 - 16)  SpO2: 99% (17 Sep 2019 05:21) (93% - 100%)  I&O's Summary    16 Sep 2019 07:01  -  17 Sep 2019 07:00  --------------------------------------------------------  IN: 1680 mL / OUT: 700 mL / NET: 980 mL      PHYSICAL EXAM:  General: NAD, A/O x 3  ENT: MMM, no thrush  Neck: Supple, No JVD  Lungs: Anterior expiratory wheeze, otherwise lungs clear to auscultation bilaterally, good air entry, non-labored breathing  Cardio: RRR, S1/S2, No murmur  Abdomen: Soft, Nontender, Nondistended; Bowel sounds present  Extremities: No calf tenderness, no pitting edema    LABS:                          08-03 Chol 79 mg/dL LDL 36 mg/dL HDL 11 mg/dL Trig 159 mg/dL                08-03 [id number] 4.6        RADIOLOGY & ADDITIONAL TESTS:    Care Discussed with Consultants/Other Providers: Dr Rolon, multidisciplinary rounds

## 2019-09-18 ENCOUNTER — TRANSCRIPTION ENCOUNTER (OUTPATIENT)
Age: 54
End: 2019-09-18

## 2019-09-18 PROCEDURE — 99239 HOSP IP/OBS DSCHRG MGMT >30: CPT

## 2019-09-18 PROCEDURE — 99232 SBSQ HOSP IP/OBS MODERATE 35: CPT

## 2019-09-18 RX ORDER — APIXABAN 2.5 MG/1
1 TABLET, FILM COATED ORAL
Qty: 0 | Refills: 0 | DISCHARGE

## 2019-09-18 RX ORDER — LITHIUM CARBONATE 300 MG/1
1 TABLET, EXTENDED RELEASE ORAL
Qty: 0 | Refills: 0 | DISCHARGE
Start: 2019-09-18

## 2019-09-18 RX ORDER — GABAPENTIN 400 MG/1
1 CAPSULE ORAL
Qty: 0 | Refills: 0 | DISCHARGE

## 2019-09-18 RX ORDER — VALPROIC ACID (AS SODIUM SALT) 250 MG/5ML
4 SOLUTION, ORAL ORAL
Qty: 0 | Refills: 0 | DISCHARGE
Start: 2019-09-18

## 2019-09-18 RX ORDER — VALPROIC ACID (AS SODIUM SALT) 250 MG/5ML
3 SOLUTION, ORAL ORAL
Qty: 0 | Refills: 0 | DISCHARGE

## 2019-09-18 RX ORDER — ACETAMINOPHEN 500 MG
2 TABLET ORAL
Qty: 0 | Refills: 0 | DISCHARGE
Start: 2019-09-18

## 2019-09-18 RX ORDER — LEVOTHYROXINE SODIUM 125 MCG
1 TABLET ORAL
Qty: 0 | Refills: 0 | DISCHARGE
Start: 2019-09-18

## 2019-09-18 RX ORDER — GABAPENTIN 400 MG/1
1 CAPSULE ORAL
Qty: 0 | Refills: 0 | DISCHARGE
Start: 2019-09-18

## 2019-09-18 RX ORDER — APIXABAN 2.5 MG/1
1 TABLET, FILM COATED ORAL
Qty: 0 | Refills: 0 | DISCHARGE
Start: 2019-09-18

## 2019-09-18 RX ORDER — OLANZAPINE 15 MG/1
1 TABLET, FILM COATED ORAL
Qty: 0 | Refills: 0 | DISCHARGE
Start: 2019-09-18

## 2019-09-18 RX ORDER — LEVOTHYROXINE SODIUM 125 MCG
1 TABLET ORAL
Qty: 0 | Refills: 0 | DISCHARGE

## 2019-09-18 RX ORDER — OXYCODONE HYDROCHLORIDE 5 MG/1
1 TABLET ORAL
Qty: 0 | Refills: 0 | DISCHARGE
Start: 2019-09-18

## 2019-09-18 RX ADMIN — OLANZAPINE 7.5 MILLIGRAM(S): 15 TABLET, FILM COATED ORAL at 12:48

## 2019-09-18 RX ADMIN — GABAPENTIN 400 MILLIGRAM(S): 400 CAPSULE ORAL at 06:45

## 2019-09-18 RX ADMIN — Medication 250 MILLIGRAM(S): at 06:45

## 2019-09-18 RX ADMIN — OXYCODONE HYDROCHLORIDE 10 MILLIGRAM(S): 5 TABLET ORAL at 06:49

## 2019-09-18 RX ADMIN — Medication 1000 UNIT(S): at 12:48

## 2019-09-18 RX ADMIN — Medication 500 MILLIGRAM(S): at 12:48

## 2019-09-18 RX ADMIN — PANTOPRAZOLE SODIUM 40 MILLIGRAM(S): 20 TABLET, DELAYED RELEASE ORAL at 06:46

## 2019-09-18 RX ADMIN — Medication 650 MILLIGRAM(S): at 15:41

## 2019-09-18 RX ADMIN — OXYCODONE HYDROCHLORIDE 10 MILLIGRAM(S): 5 TABLET ORAL at 17:22

## 2019-09-18 RX ADMIN — Medication 50 MILLIGRAM(S): at 12:48

## 2019-09-18 RX ADMIN — Medication 100 MILLIGRAM(S): at 12:49

## 2019-09-18 RX ADMIN — Medication 1000 MILLIGRAM(S): at 17:22

## 2019-09-18 RX ADMIN — Medication 0.5 MILLIGRAM(S): at 06:45

## 2019-09-18 RX ADMIN — OLANZAPINE 10 MILLIGRAM(S): 15 TABLET, FILM COATED ORAL at 21:59

## 2019-09-18 RX ADMIN — Medication 50 MILLIGRAM(S): at 06:44

## 2019-09-18 RX ADMIN — GABAPENTIN 400 MILLIGRAM(S): 400 CAPSULE ORAL at 21:59

## 2019-09-18 RX ADMIN — DESMOPRESSIN ACETATE 0.2 MILLIGRAM(S): 0.1 TABLET ORAL at 21:59

## 2019-09-18 RX ADMIN — SENNA PLUS 2 TABLET(S): 8.6 TABLET ORAL at 21:59

## 2019-09-18 RX ADMIN — Medication 1 TABLET(S): at 12:49

## 2019-09-18 RX ADMIN — LITHIUM CARBONATE 300 MILLIGRAM(S): 300 TABLET, EXTENDED RELEASE ORAL at 06:46

## 2019-09-18 RX ADMIN — Medication 0.5 MILLIGRAM(S): at 17:23

## 2019-09-18 RX ADMIN — Medication 250 MILLIGRAM(S): at 17:23

## 2019-09-18 RX ADMIN — LACTULOSE 10 GRAM(S): 10 SOLUTION ORAL at 15:14

## 2019-09-18 RX ADMIN — GABAPENTIN 400 MILLIGRAM(S): 400 CAPSULE ORAL at 15:14

## 2019-09-18 RX ADMIN — Medication 1000 MILLIGRAM(S): at 06:45

## 2019-09-18 RX ADMIN — APIXABAN 5 MILLIGRAM(S): 2.5 TABLET, FILM COATED ORAL at 17:23

## 2019-09-18 RX ADMIN — LITHIUM CARBONATE 300 MILLIGRAM(S): 300 TABLET, EXTENDED RELEASE ORAL at 17:22

## 2019-09-18 RX ADMIN — LACTULOSE 10 GRAM(S): 10 SOLUTION ORAL at 21:58

## 2019-09-18 RX ADMIN — Medication 200 MICROGRAM(S): at 06:45

## 2019-09-18 RX ADMIN — APIXABAN 5 MILLIGRAM(S): 2.5 TABLET, FILM COATED ORAL at 06:44

## 2019-09-18 RX ADMIN — OXYCODONE HYDROCHLORIDE 10 MILLIGRAM(S): 5 TABLET ORAL at 18:32

## 2019-09-18 RX ADMIN — Medication 650 MILLIGRAM(S): at 18:03

## 2019-09-18 RX ADMIN — OXYCODONE HYDROCHLORIDE 10 MILLIGRAM(S): 5 TABLET ORAL at 07:35

## 2019-09-18 RX ADMIN — LACTULOSE 10 GRAM(S): 10 SOLUTION ORAL at 06:45

## 2019-09-18 RX ADMIN — POLYETHYLENE GLYCOL 3350 17 GRAM(S): 17 POWDER, FOR SOLUTION ORAL at 12:49

## 2019-09-18 RX ADMIN — Medication 3 MILLIGRAM(S): at 21:59

## 2019-09-18 NOTE — PROGRESS NOTE ADULT - ATTENDING COMMENTS
I have personally seen and examined patient on the above date.  I discussed the case with RICHMOND Worley and I agree with findings and plan as detailed per note above, which I have amended where appropriate.      Patient is pending dispo at this time. Stable for d/c, but in order for placement to [facility], needs a level 2 screen. This screen was done three days ago - awaiting for the report at this time.    Case d/w case management
right heart enlargement  suggest chest cta rule out pulmonary embolism o r d dimer and venous ultrasound.
I have personally seen and examined patient on the above date.  I discussed the case with RICHMOND Riley and I agree with findings and plan as detailed per note above, which I have amended where appropriate.      Patient admitted with behavioral disturbances, and is now back to baseline.     Echo noted mildly reduced RV function. Low risk for PE based on Wells criteria. D-dimer 286, but if using age-adjusted formula (age x10 in low risk patients), it is actually considered to be "low" risk as well. Will hold off on further imaging at this time.    Discharge planning in effect - needs placement.
I have personally seen and examined patient on the above date.  I discussed the case with RICHMOND Worley and I agree with findings and plan as detailed per note above, which I have amended where appropriate.      Patient is pending dispo at this time. Stable for d/c, but in order for placement to [facility], needs a level 2 screen. This screen was done last week - awaiting for the report at this time.    Case d/w case management
I have personally seen and examined patient on the above date.  I discussed the case with RICHMOND Worley and I agree with findings and plan as detailed per note above, which I have amended where appropriate.      Patient is stable for d/c to [facility]. Level 2 screen completed and paperwork received. Working with [facility] to discharge patient. Expect patient to be discharged today. Time spent on d/c 33 min
I have personally seen and examined patient on the above date.  I discussed the case with RICHMOND Riley and I agree with findings and plan as detailed per note above, which I have amended where appropriate.      Patient admitted with behavioral disturbances, and is now back to baseline.     EKG yesterday showed flattened T waves as well as possible Q wave in V1-V2. Highly suspect lead misplacement in V2. Echo ordered. Case d/w Dr. Albright. No signs of ACS. No chest pain.     Discharge planning hopefully tomorrow.
I have personally seen and examined patient on the above date.  I discussed the case with RICHMOND deleon and I agree with findings and plan as detailed per note above, which I have amended where appropriate.      In summary, 54M from [facility] ([facility] with PMHx of HTN, asthma, schizophreniform disorder, bipolar disorder, GERD, DI, Hypothyroidism, chronic pain syndrome with hx of cervical spine surgery with residual lower extremity weakness, hx of recent PE on Eliquis p/w verbally aggressive/abusive behavior and constipation.   #Schizophreniform disorder with behavioral disturbance  #Bipolar disorder with behavioral disturbance  continue valproic acid, lithium  Dispo: Medically stable for d/c. . Patient does not want to go back to [facility].  [facility] is working on this case---needs a level 2 psychiatric screen for placement.

## 2019-09-18 NOTE — CONSULT NOTE ADULT - CONSULT REASON
Agitation
Management of Schizoaffective disorder
Verbally abusive behavior
Verbally abusive behavior at SNF
abnormal ekg
Verbally Abusive Behavior

## 2019-09-18 NOTE — CONSULT NOTE ADULT - CONSULT REQUESTED DATE/TIME
06-Sep-2019 18:52
13-Sep-2019 16:33
14-Sep-2019 18:03
18-Sep-2019 12:14
07-Sep-2019 15:57
10-Sep-2019

## 2019-09-18 NOTE — DISCHARGE NOTE PROVIDER - NSFOLLOWUPCLINICS_GEN_ALL_ED_FT
[facility]  Family Medicine  [address] [place] [id number]  Phone: [phone]  Fax:   Follow Up Time:

## 2019-09-18 NOTE — PROGRESS NOTE ADULT - SUBJECTIVE AND OBJECTIVE BOX
Patient is a 54y old  Male who presents with a chief complaint of ambulatory dysfxn, verbally abusive behavior. constipation (17 Sep 2019 13:06)      Patient seen and examined at bedside. No overnight events reported. Patient looking forward to discharge today.     ALLERGIES:  Haldol (Unknown)  Thorazine (Unknown)  Thorazine (Unknown)    MEDICATIONS  (STANDING):  apixaban 5 milliGRAM(s) Oral every 12 hours  ascorbic acid 500 milliGRAM(s) Oral daily  benztropine 0.5 milliGRAM(s) Oral two times a day  cholecalciferol 1000 Unit(s) Oral daily  desmopressin 0.2 milliGRAM(s) Oral at bedtime  docusate sodium 100 milliGRAM(s) Oral daily  gabapentin 400 milliGRAM(s) Oral three times a day  lactulose Syrup 10 Gram(s) Oral three times a day  levothyroxine 200 MICROGram(s) Oral daily  lithium 300 milliGRAM(s) Oral every 12 hours  melatonin 3 milliGRAM(s) Oral at bedtime  metoprolol succinate ER 50 milliGRAM(s) Oral daily  multivitamin 1 Tablet(s) Oral daily  nicotine - 21 mG/24Hr(s) Patch 1 patch Transdermal daily  OLANZapine 7.5 milliGRAM(s) Oral daily  OLANZapine 10 milliGRAM(s) Oral at bedtime  oxyCODONE  ER Tablet 10 milliGRAM(s) Oral every 12 hours  pantoprazole    Tablet 40 milliGRAM(s) Oral before breakfast  polyethylene glycol 3350 17 Gram(s) Oral daily  pyridoxine 50 milliGRAM(s) Oral daily  saccharomyces boulardii 250 milliGRAM(s) Oral two times a day  senna 2 Tablet(s) Oral at bedtime  valproic acid 1000 milliGRAM(s) Oral two times a day    MEDICATIONS  (PRN):  acetaminophen   Tablet .. 650 milliGRAM(s) Oral every 6 hours PRN Temp greater or equal to 38C (100.4F), Mild Pain (1 - 3)  ALBUTerol/ipratropium for Nebulization 3 milliLiter(s) Nebulizer every 6 hours PRN Bronchospasm  bisacodyl Suppository 10 milliGRAM(s) Rectal daily PRN Constipation    Vital Signs Last 24 Hrs  T(F): 97.5 (17 Sep 2019 22:02), Max: 97.6 (17 Sep 2019 16:04)  HR: 67 (17 Sep 2019 22:02) (67 - 74)  BP: 107/57 (17 Sep 2019 22:02) (107/57 - 124/-)  RR: 16 (17 Sep 2019 22:02) (16 - 16)  SpO2: 96% (17 Sep 2019 22:02) (95% - 98%)  I&O's Summary    17 Sep 2019 07:01  -  18 Sep 2019 07:00  --------------------------------------------------------  IN: 1100 mL / OUT: 0 mL / NET: 1100 mL    18 Sep 2019 07:01  -  18 Sep 2019 10:55  --------------------------------------------------------  IN: 800 mL / OUT: 0 mL / NET: 800 mL      PHYSICAL EXAM:  General: NAD, A/O x 3.  ENT: MMM, no thrush  Neck: Supple, No JVD  Lungs: Clear to auscultation bilaterally, good air entry, non-labored breathing.  Cardio: RRR, S1/S2, No murmur.  Abdomen: Soft, Nontender, Nondistended; Bowel sounds present.  Extremities: No calf tenderness, No pitting edema.    LABS:              08-03 Chol 79 mg/dL LDL 36 mg/dL HDL 11 mg/dL Trig 159 mg/dL          08-03 [id number] 4.6        RADIOLOGY & ADDITIONAL TESTS:    Care Discussed with Consultants/Other Providers: Dr Rolon, multidisciplinary rounds Patient is a 54y old  Male who presents with a chief complaint of ambulatory dysfxn, verbally abusive behavior. constipation (17 Sep 2019 13:06)      Patient seen and examined at bedside. No overnight events reported. Patient looking forward to discharge today.     ALLERGIES:  Haldol (Unknown)  Thorazine (Unknown)  Thorazine (Unknown)    MEDICATIONS  (STANDING):  apixaban 5 milliGRAM(s) Oral every 12 hours  ascorbic acid 500 milliGRAM(s) Oral daily  benztropine 0.5 milliGRAM(s) Oral two times a day  cholecalciferol 1000 Unit(s) Oral daily  desmopressin 0.2 milliGRAM(s) Oral at bedtime  docusate sodium 100 milliGRAM(s) Oral daily  gabapentin 400 milliGRAM(s) Oral three times a day  lactulose Syrup 10 Gram(s) Oral three times a day  levothyroxine 200 MICROGram(s) Oral daily  lithium 300 milliGRAM(s) Oral every 12 hours  melatonin 3 milliGRAM(s) Oral at bedtime  metoprolol succinate ER 50 milliGRAM(s) Oral daily  multivitamin 1 Tablet(s) Oral daily  nicotine - 21 mG/24Hr(s) Patch 1 patch Transdermal daily  OLANZapine 7.5 milliGRAM(s) Oral daily  OLANZapine 10 milliGRAM(s) Oral at bedtime  oxyCODONE  ER Tablet 10 milliGRAM(s) Oral every 12 hours  pantoprazole    Tablet 40 milliGRAM(s) Oral before breakfast  polyethylene glycol 3350 17 Gram(s) Oral daily  pyridoxine 50 milliGRAM(s) Oral daily  saccharomyces boulardii 250 milliGRAM(s) Oral two times a day  senna 2 Tablet(s) Oral at bedtime  valproic acid 1000 milliGRAM(s) Oral two times a day    MEDICATIONS  (PRN):  acetaminophen   Tablet .. 650 milliGRAM(s) Oral every 6 hours PRN Temp greater or equal to 38C (100.4F), Mild Pain (1 - 3)  ALBUTerol/ipratropium for Nebulization 3 milliLiter(s) Nebulizer every 6 hours PRN Bronchospasm  bisacodyl Suppository 10 milliGRAM(s) Rectal daily PRN Constipation    Vital Signs Last 24 Hrs  T(F): 97.5 (17 Sep 2019 22:02), Max: 97.6 (17 Sep 2019 16:04)  HR: 67 (17 Sep 2019 22:02) (67 - 74)  BP: 107/57 (17 Sep 2019 22:02) (107/57 - 124/-)  RR: 16 (17 Sep 2019 22:02) (16 - 16)  SpO2: 96% (17 Sep 2019 22:02) (95% - 98%)  I&O's Summary    17 Sep 2019 07:01  -  18 Sep 2019 07:00  --------------------------------------------------------  IN: 1100 mL / OUT: 0 mL / NET: 1100 mL    18 Sep 2019 07:01  -  18 Sep 2019 10:55  --------------------------------------------------------  IN: 800 mL / OUT: 0 mL / NET: 800 mL      PHYSICAL EXAM:  General: NAD, A/O x 3, pleasant  ENT: MMM, no thrush  Neck: Supple, No JVD  Lungs: Clear to auscultation bilaterally, good air entry, non-labored breathing.  Cardio: RRR, S1/S2, no murmur.  Abdomen: Soft, nontender, nondistended; Bowel sounds present.  Extremities: No calf tenderness, No pitting edema.    LABS:              08-03 Chol 79 mg/dL LDL 36 mg/dL HDL 11 mg/dL Trig 159 mg/dL          08-03 [id number] 4.6        RADIOLOGY & ADDITIONAL TESTS:    Care Discussed with Consultants/Other Providers: Dr Rolon, multidisciplinary rounds

## 2019-09-18 NOTE — DISCHARGE NOTE PROVIDER - NSDCFUADDINST_GEN_ALL_CORE_FT
Check lithium levels and Depakote levels (via blood work) next week. Can check quarterly thereafter if stable.

## 2019-09-18 NOTE — DISCHARGE NOTE PROVIDER - NSDCFUADDAPPT_GEN_ALL_CORE_FT
Follow up with [facility] for primary care after completing rehab. While in rehab follow with rehab doctors

## 2019-09-18 NOTE — CONSULT NOTE ADULT - SUBJECTIVE AND OBJECTIVE BOX
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  bisacodyl Suppository 10 milliGRAM(s) Rectal daily PRN Constipation      Mental Status Examination:  General Appearance: Tall pale  Male , resting in bed.   Remarkable Features: Large blue eyes.   Psychomotor State: Calm   Abnormal movements and posture: deficits as per PT notes.   Social interaction and affect: appropriate, affect constricted.   Cognition, perceptual abnormalities: Goal directed, would be focused on smoking, and getting Chinese food or [employer].   Consciousness: alert  Orientation: x3  Memory: Recent/Past/Remote: intact  Attention: intact  Naming/Repitition/Comprehension: intact, can follow simple commands.   Insight/Judgment: Fair, but will get into power struggles with staff regarding dietary compliance. Generally is apologetic after verbal out bursts.     Studies: see echocardiogram for details.     Laboratory testing- Monitor BUN on Lithium, creatinine wnl.  EKG with normal QT and QTc, pt had echo as well.       Vital Signs Last 24 Hrs  T(C): 36.4 (17 Sep 2019 22:02), Max: 36.4 (17 Sep 2019 16:04)  T(F): 97.5 (17 Sep 2019 22:02), Max: 97.6 (17 Sep 2019 16:04)  HR: 67 (17 Sep 2019 22:02) (67 - 74)  BP: 107/57 (17 Sep 2019 22:02) (107/57 - 124/-)  BP(mean): 68 (17 Sep 2019 16:04) (68 - 68)  RR: 16 (17 Sep 2019 22:02) (16 - 16)  SpO2: 96% (17 Sep 2019 22:02) (95% - 98%)    Psychiatric Diagnosis:  Schizoaffective disorder - Bipolar type  Recommendations:   No psychiatric contraindications to d/c to [facility], pt does not require acute inpatient psychiatric care, is not a danger to self or others.     Medication:  Changes/blood levels if applicable: Get Lithium level next week and Depakote level next week can be done quarterly thereafter.   Other Treatment considerations-  Level of supervision: Routine  Guidance for team/ family management- as per treatment team, update family with disposition.     Guidance for patient management- Pt may be still upset about the loss of his mother,  be patient with episodes of irritability but lay out in a firm and neutral way , what is expected for him in terms of   behavior and participation, may do best in recreation therapy as a way to engage patient and ensure cooperation.     Referrals- Psychiatry, Psychology consultation service at [facility].     Hospital course Follow-up: Will follow with you.
Chief Complaint:     54M from [facility], with PMHx of HTN, asthma, schizophreniform disorder, bipolar disorder, GERD, DI, Hypothyroidism, chronic pain syndrome an PSxH of cervical spine surgery with LE weakness, hx of recent PE on Eliquis presented to  with complaints of constipation of several days duration assoc with mild abd discomfort, no NVD. Denied fevers, chills, SOB, CP, dysuria. NH reported pt with verbally aggressive and abusive behavior and would not reaccept pt until he has a formal psych evaluation. In ED, s/p Maalox/enema with relieved constipation. Pt has been calm, cooperative in ED. On interview, pt is alert, oriented x 3,  calm, responds appropriately to questions and follows directions. ekg shows a qs in v2 and a cardiolgy consult is requested    HPI:    PMH:   Bipolar disorder  Schizophreniform disorder  Asthma  HTN (hypertension)  Bipolar disorder  Drug abuse  Incontinence  Schizophrenia  Hypothyroid  Hypertension    PSH:   H/O cervical spine surgery  No significant past surgical history  No significant past surgical history  No significant past surgical history  No significant past surgical history    Family History:  FAMILY HISTORY:  Family history of Crohn's disease: in Mother  No significant family history      Social History:  Smoking:  Alcohol:  Drugs:    Allergies:  Haldol (Unknown)  Thorazine (Unknown)  Thorazine (Unknown)      Medications:  acetaminophen   Tablet .. 650 milliGRAM(s) Oral every 6 hours PRN  ALBUTerol/ipratropium for Nebulization 3 milliLiter(s) Nebulizer every 6 hours PRN  aluminum hydroxide/magnesium hydroxide/simethicone Suspension 30 milliLiter(s) Oral every 12 hours  apixaban 5 milliGRAM(s) Oral every 12 hours  ascorbic acid 500 milliGRAM(s) Oral daily  benztropine 0.5 milliGRAM(s) Oral two times a day  bisacodyl Suppository 10 milliGRAM(s) Rectal daily PRN  cholecalciferol 1000 Unit(s) Oral daily  desmopressin 0.2 milliGRAM(s) Oral at bedtime  docusate sodium 100 milliGRAM(s) Oral daily  gabapentin 300 milliGRAM(s) Oral three times a day  lactulose Syrup 10 Gram(s) Oral three times a day  levothyroxine 200 MICROGram(s) Oral daily  lithium 300 milliGRAM(s) Oral every 12 hours  melatonin 3 milliGRAM(s) Oral at bedtime  metoprolol succinate ER 50 milliGRAM(s) Oral daily  multivitamin 1 Tablet(s) Oral daily  nicotine - 21 mG/24Hr(s) Patch 1 patch Transdermal daily  OLANZapine 10 milliGRAM(s) Oral at bedtime  OLANZapine 5 milliGRAM(s) Oral daily  oxyCODONE  ER Tablet 10 milliGRAM(s) Oral every 12 hours  pantoprazole    Tablet 40 milliGRAM(s) Oral before breakfast  polyethylene glycol 3350 17 Gram(s) Oral daily  pyridoxine 50 milliGRAM(s) Oral daily  saccharomyces boulardii 250 milliGRAM(s) Oral two times a day  senna 2 Tablet(s) Oral at bedtime  valproic acid 750 milliGRAM(s) Oral two times a day      REVIEW OF SYSTEMS:  CONSTITUTIONAL: No fever, weight loss, or fatigue  EYES: No eye pain, visual disturbances, or discharge  ENMT:  No difficulty hearing, tinnitus, vertigo; No sinus or throat pain  NECK: No pain or stiffness  BREASTS: No pain, masses, or nipple discharge  RESPIRATORY: No cough, wheezing, chills or hemoptysis; No shortness of breath  CARDIOVASCULAR: No chest pain, palpitations, dizziness, or leg swelling  GASTROINTESTINAL: No abdominal or epigastric pain. No nausea, vomiting, or hematemesis; No diarrhea or constipation. No melena or hematochezia.  GENITOURINARY: No dysuria, frequency, hematuria, or incontinence  NEUROLOGICAL: No headaches, memory loss, loss of strength, numbness, or tremors  SKIN: No itching, burning, rashes, or lesions   LYMPH NODES: No enlarged glands  ENDOCRINE: No heat or cold intolerance; No hair loss  MUSCULOSKELETAL: No joint pain or swelling; No muscle, back, or extremity pain  PSYCHIATRIC: No depression, anxiety, mood swings, or difficulty sleeping  HEME/LYMPH: No easy bruising, or bleeding gums  ALLERY AND IMMUNOLOGIC: No hives or eczema    Physical Exam:  T(C): 36.9 (09-07-19 @ 11:59), Max: 36.9 (09-07-19 @ 11:59)  HR: 78 (09-07-19 @ 11:59) (58 - 78)  BP: 100/61 (09-07-19 @ 11:59) (97/60 - 109/59)  RR: 15 (09-07-19 @ 11:59) (15 - 16)  SpO2: 94% (09-07-19 @ 11:59) (94% - 94%)  Wt(kg): --    GENERAL: NAD, well-groomed, well-developed  HEAD:  Atraumatic, Normocephalic  EYES: EOMI, conjunctiva and sclera clear  ENT: Moist mucous membranes,  NECK: Supple, No JVD, no bruits  CHEST/LUNG: Clear to percussion bilaterally; No rales, rhonchi, wheezing, or rubs  HEART: Regular rate and rhythm; No murmurs, rubs, or gallops PMI non displaced.  ABDOMEN: Soft, Nontender, Nondistended; Bowel sounds present  EXTREMITIES:  2+ Peripheral Pulses, No clubbing, cyanosis, or edema  SKIN: No rashes or lesions  NERVOUS SYSTEM:  Cranial Nerves II-XII intact     Cardiovascular Diagnostic Testing:  ECG:< from: 12 Lead ECG (09.06.19 @ 06:10) >  Ventricular Rate 68 BPM    Atrial Rate 68 BPM    P-R Interval 184 ms    QRS Duration 90 ms    Q-T Interval 404 ms    QTC Calculation(Bezet) 429 ms    P Axis 69 degrees    R Axis 46 degrees    T Axis 27 degrees    Diagnosis Line Normal sinus rhythm  RSR' pattern in V1  consider  Septal infarct , age undetermined  Abnormal ECG  When compared with ECG of 03-JUL-2019 09:38,  possible  Septal infarct is now present    Confirmed by CARLOS ELLINGTON, LISA KATZ ([id number]) on 9/6/2019 1:15:41 PM    < end of copied text >      ECHO:  < from: Transthoracic Echocardiogram (08.04.19 @ 06:46) >  CONCLUSIONS:  1. Normal mitral valve. Trace mitral regurgitation.  2. Normal trileaflet aortic valve.  3. Aortic Root: 3.5 cm.    4. Mildly dilated left atrium.  LA volume index = 35 cc/m2.  5. Normal left ventricular internal dimensions and wall  thicknesses.  6. Normal Left Ventricular Systolic Function,  (EF = 55 to  60%)  7. Normal diastolic function.  8. Normal right atrium.  9. Normal right ventricular size and systolic function  (TAPSE  2.3cm).  10. Unable to estimate RVSP.  11. There is trace tricuspid regurgitation.  12. There is mild pulmonic regurgitation.  13. Normal pericardium with no pericardial effusion.    ------------------------------------------------------------------------  Confirmed on  8/5/2019 - 07:19:59 by Page Nelson MD    < end of copied text >      Labs:          Thyroid Stimulating Hormone, Serum: 11.20 uIU/mL (09-06 @ 05:48)      Imaging:

## 2019-09-18 NOTE — DISCHARGE NOTE PROVIDER - NSDCCPCAREPLAN_GEN_ALL_CORE_FT
PRINCIPAL DISCHARGE DIAGNOSIS  Diagnosis: Ambulatory dysfunction  Assessment and Plan of Treatment: - Continue rehab program      SECONDARY DISCHARGE DIAGNOSES  Diagnosis: Constipation, unspecified constipation type  Assessment and Plan of Treatment:

## 2019-09-18 NOTE — CONSULT NOTE ADULT - REASON FOR ADMISSION
ambulatory dysfxn, verbally abusive behavior. constipation

## 2019-09-18 NOTE — PROGRESS NOTE ADULT - ASSESSMENT
Pt is a 55yo M admitted to [facility] for verbally aggressive/abusive behavior.     *Schizophreniform disorder and Bipolar disorder with behavioral disturbance  - Patient has been calm and cooperative with staff  - C/w Lithium   - C/w Valproic acid PO 1000mg BID as per psych  - C/w olanzapine 7.5 PO morning and 10mg PO at bedtime    *hx of PE  - Continue Eliquis     *DI   - Continue desmopressin     *HTN - controlled  - Continue toprol     *Chronic back pain  - c/w oxycodone ER 10 mg q12 hrs  - c/w tylenol PRN  - c/w gabapentin 400 mg TID    *Constipation  - c/w colace, lactulose, miralax, senna  - c/w bisacodyl suppository PRN    *GERD:  - c/w protonix     *Hypothyroidism   - TSH 11.2  - Continue with Synthroid - increased to 200mcg  - f/u lab in 6 weeks out pt     *Disposition   level 2 psychiatric screen -- done. Pt to be DC to [facility] today  Clear to DC medically Pt is a 53yo M admitted to [facility] for verbally aggressive/abusive behavior.     DC to [facility] tomorrow (9/19/19) as per  [facility]     *Schizophreniform disorder and Bipolar disorder with behavioral disturbance  - Patient has been calm and cooperative with staff  - C/w Lithium   - C/w Valproic acid PO 1000mg BID as per psych  - C/w olanzapine 7.5 PO morning and 10mg PO at bedtime    *hx of PE  - Continue Eliquis     *DI   - Continue desmopressin     *HTN - controlled  - Continue toprol     *Chronic back pain  - c/w oxycodone ER 10 mg q12 hrs  - c/w tylenol PRN  - c/w gabapentin 400 mg TID    *Constipation  - c/w colace, lactulose, miralax, senna  - c/w bisacodyl suppository PRN    *GERD:  - c/w protonix     *Hypothyroidism   - TSH 11.2  - Continue with Synthroid - increased to 200mcg  - f/u lab in 6 weeks out pt     *Disposition   level 2 psychiatric screen -- done. Pt to be DC to [facility] today  Clear to DC medically

## 2019-09-18 NOTE — DISCHARGE NOTE PROVIDER - HOSPITAL COURSE
54M from [facility], with PMHx of HTN, asthma, schizophreniform disorder, bipolar disorder, GERD, DI, Hypothyroidism, HTN, chronic pain syndrome an PSxH of cervical spine surgery with LE weakness, hx of recent PE on Eliquis sent to ED for aggressive behavior and constipation. During hospital stay psych was consulted, valproic acid dosing increased to 1000 mg twice per day with improved behavior. Constipation resolved. Patient required level 2 psychiatric screen before DC 54M from [facility], with PMHx of HTN, asthma, schizophreniform disorder, bipolar disorder, GERD, DI, Hypothyroidism, HTN, chronic pain syndrome an PSxH of cervical spine surgery with LE weakness, hx of recent PE on Eliquis sent to ED for aggressive behavior and constipation. During hospital stay psych was consulted, valproic acid dosing increased to 1000 mg twice per day with improved behavior. Constipation resolved. Patient required level 2 psychiatric screen before DC. Discharged to [facility] today 54M from [facility], with PMHx of HTN, asthma, schizophreniform disorder, bipolar disorder, GERD, DI, Hypothyroidism, HTN, chronic pain syndrome an PSxH of cervical spine surgery with LE weakness, hx of recent PE on Eliquis sent to ED for aggressive behavior and constipation. During hospital stay psych was consulted, valproic acid dosing increased to 1000 mg twice per day with improved behavior. Constipation resolved. Patient required level 2 psychiatric screen before DC. Discharged to [facility] today        *Please refer to progress note from same date for an extended detailed summary of all of the patient's medical diagnoses during the course of this hospital stay. 54M from [facility], with PMHx of HTN, asthma, schizophreniform disorder, bipolar disorder, GERD, DI, Hypothyroidism, HTN, chronic pain syndrome an PSxH of cervical spine surgery with LE weakness, hx of recent PE on Eliquis sent to ED for aggressive behavior and constipation. During hospital stay psych was consulted, valproic acid dosing increased to 1000 mg twice per day with improved behavior. Constipation resolved. Patient required level 2 psychiatric screen before DC. Discharged to [facility] today        *Please refer to progress note from same date for an extended detailed summary of all of the patient's medical diagnoses during the course of this hospital stay.

## 2019-09-19 ENCOUNTER — TRANSCRIPTION ENCOUNTER (OUTPATIENT)
Age: 54
End: 2019-09-19

## 2019-09-19 VITALS
OXYGEN SATURATION: 96 % | HEART RATE: 60 BPM | WEIGHT: 270.29 LBS | SYSTOLIC BLOOD PRESSURE: 103 MMHG | DIASTOLIC BLOOD PRESSURE: 65 MMHG | RESPIRATION RATE: 16 BRPM | TEMPERATURE: 99 F

## 2019-09-19 PROCEDURE — 71045 X-RAY EXAM CHEST 1 VIEW: CPT

## 2019-09-19 PROCEDURE — 99232 SBSQ HOSP IP/OBS MODERATE 35: CPT

## 2019-09-19 PROCEDURE — 99285 EMERGENCY DEPT VISIT HI MDM: CPT | Mod: 25

## 2019-09-19 PROCEDURE — 71275 CT ANGIOGRAPHY CHEST: CPT

## 2019-09-19 PROCEDURE — 84100 ASSAY OF PHOSPHORUS: CPT

## 2019-09-19 PROCEDURE — 83690 ASSAY OF LIPASE: CPT

## 2019-09-19 PROCEDURE — 93306 TTE W/DOPPLER COMPLETE: CPT

## 2019-09-19 PROCEDURE — 84484 ASSAY OF TROPONIN QUANT: CPT

## 2019-09-19 PROCEDURE — 96361 HYDRATE IV INFUSION ADD-ON: CPT

## 2019-09-19 PROCEDURE — 82550 ASSAY OF CK (CPK): CPT

## 2019-09-19 PROCEDURE — 96374 THER/PROPH/DIAG INJ IV PUSH: CPT

## 2019-09-19 PROCEDURE — 93005 ELECTROCARDIOGRAM TRACING: CPT

## 2019-09-19 PROCEDURE — 83880 ASSAY OF NATRIURETIC PEPTIDE: CPT

## 2019-09-19 PROCEDURE — 36415 COLL VENOUS BLD VENIPUNCTURE: CPT

## 2019-09-19 PROCEDURE — 82553 CREATINE MB FRACTION: CPT

## 2019-09-19 PROCEDURE — 80178 ASSAY OF LITHIUM: CPT

## 2019-09-19 PROCEDURE — 80164 ASSAY DIPROPYLACETIC ACD TOT: CPT

## 2019-09-19 PROCEDURE — 83550 IRON BINDING TEST: CPT

## 2019-09-19 PROCEDURE — 85730 THROMBOPLASTIN TIME PARTIAL: CPT

## 2019-09-19 PROCEDURE — 93970 EXTREMITY STUDY: CPT

## 2019-09-19 PROCEDURE — 83735 ASSAY OF MAGNESIUM: CPT

## 2019-09-19 PROCEDURE — 83540 ASSAY OF IRON: CPT

## 2019-09-19 PROCEDURE — 83036 HEMOGLOBIN GLYCOSYLATED A1C: CPT

## 2019-09-19 PROCEDURE — 82728 ASSAY OF FERRITIN: CPT

## 2019-09-19 PROCEDURE — 85027 COMPLETE CBC AUTOMATED: CPT

## 2019-09-19 PROCEDURE — 80053 COMPREHEN METABOLIC PANEL: CPT

## 2019-09-19 PROCEDURE — 84443 ASSAY THYROID STIM HORMONE: CPT

## 2019-09-19 PROCEDURE — 97163 PT EVAL HIGH COMPLEX 45 MIN: CPT

## 2019-09-19 PROCEDURE — 82607 VITAMIN B-12: CPT

## 2019-09-19 PROCEDURE — 85610 PROTHROMBIN TIME: CPT

## 2019-09-19 PROCEDURE — 82746 ASSAY OF FOLIC ACID SERUM: CPT

## 2019-09-19 PROCEDURE — 80061 LIPID PANEL: CPT

## 2019-09-19 PROCEDURE — 94640 AIRWAY INHALATION TREATMENT: CPT

## 2019-09-19 RX ADMIN — Medication 1000 MILLIGRAM(S): at 05:52

## 2019-09-19 RX ADMIN — OLANZAPINE 7.5 MILLIGRAM(S): 15 TABLET, FILM COATED ORAL at 11:28

## 2019-09-19 RX ADMIN — POLYETHYLENE GLYCOL 3350 17 GRAM(S): 17 POWDER, FOR SOLUTION ORAL at 11:28

## 2019-09-19 RX ADMIN — Medication 1 TABLET(S): at 11:28

## 2019-09-19 RX ADMIN — Medication 200 MICROGRAM(S): at 05:52

## 2019-09-19 RX ADMIN — Medication 50 MILLIGRAM(S): at 05:52

## 2019-09-19 RX ADMIN — Medication 500 MILLIGRAM(S): at 11:28

## 2019-09-19 RX ADMIN — PANTOPRAZOLE SODIUM 40 MILLIGRAM(S): 20 TABLET, DELAYED RELEASE ORAL at 05:53

## 2019-09-19 RX ADMIN — Medication 250 MILLIGRAM(S): at 05:52

## 2019-09-19 RX ADMIN — Medication 50 MILLIGRAM(S): at 11:28

## 2019-09-19 RX ADMIN — LITHIUM CARBONATE 300 MILLIGRAM(S): 300 TABLET, EXTENDED RELEASE ORAL at 05:51

## 2019-09-19 RX ADMIN — Medication 100 MILLIGRAM(S): at 11:28

## 2019-09-19 RX ADMIN — LACTULOSE 10 GRAM(S): 10 SOLUTION ORAL at 05:53

## 2019-09-19 RX ADMIN — Medication 0.5 MILLIGRAM(S): at 05:52

## 2019-09-19 RX ADMIN — OXYCODONE HYDROCHLORIDE 10 MILLIGRAM(S): 5 TABLET ORAL at 05:52

## 2019-09-19 RX ADMIN — GABAPENTIN 400 MILLIGRAM(S): 400 CAPSULE ORAL at 05:52

## 2019-09-19 RX ADMIN — Medication 1000 UNIT(S): at 11:28

## 2019-09-19 RX ADMIN — APIXABAN 5 MILLIGRAM(S): 2.5 TABLET, FILM COATED ORAL at 05:52

## 2019-09-19 NOTE — ED BEHAVIORAL HEALTH ASSESSMENT NOTE - NS ED BHA ALCOHOL
Yes Repair Performed By Another Provider Text (Leave Blank If You Do Not Want): After obtaining clear surgical margins the defect was repaired by another provider.

## 2019-09-19 NOTE — PROGRESS NOTE ADULT - SUBJECTIVE AND OBJECTIVE BOX
Patient is a 54y old  Male who presents with a chief complaint of ambulatory dysfxn, verbally abusive behavior. constipation (18 Sep 2019 12:13)      Patient seen and examined at bedside. No overnight events reported.     ALLERGIES:  Haldol (Unknown)  Thorazine (Unknown)  Thorazine (Unknown)    MEDICATIONS  (STANDING):  apixaban 5 milliGRAM(s) Oral every 12 hours  ascorbic acid 500 milliGRAM(s) Oral daily  benztropine 0.5 milliGRAM(s) Oral two times a day  cholecalciferol 1000 Unit(s) Oral daily  desmopressin 0.2 milliGRAM(s) Oral at bedtime  docusate sodium 100 milliGRAM(s) Oral daily  gabapentin 400 milliGRAM(s) Oral three times a day  lactulose Syrup 10 Gram(s) Oral three times a day  levothyroxine 200 MICROGram(s) Oral daily  lithium 300 milliGRAM(s) Oral every 12 hours  melatonin 3 milliGRAM(s) Oral at bedtime  metoprolol succinate ER 50 milliGRAM(s) Oral daily  multivitamin 1 Tablet(s) Oral daily  nicotine - 21 mG/24Hr(s) Patch 1 patch Transdermal daily  OLANZapine 7.5 milliGRAM(s) Oral daily  OLANZapine 10 milliGRAM(s) Oral at bedtime  oxyCODONE  ER Tablet 10 milliGRAM(s) Oral every 12 hours  pantoprazole    Tablet 40 milliGRAM(s) Oral before breakfast  polyethylene glycol 3350 17 Gram(s) Oral daily  pyridoxine 50 milliGRAM(s) Oral daily  saccharomyces boulardii 250 milliGRAM(s) Oral two times a day  senna 2 Tablet(s) Oral at bedtime  valproic acid 1000 milliGRAM(s) Oral two times a day    MEDICATIONS  (PRN):  acetaminophen   Tablet .. 650 milliGRAM(s) Oral every 6 hours PRN Temp greater or equal to 38C (100.4F), Mild Pain (1 - 3)  ALBUTerol/ipratropium for Nebulization 3 milliLiter(s) Nebulizer every 6 hours PRN Bronchospasm  bisacodyl Suppository 10 milliGRAM(s) Rectal daily PRN Constipation    Vital Signs Last 24 Hrs  T(F): 98.7 (19 Sep 2019 05:04), Max: 98.7 (18 Sep 2019 16:52)  HR: 60 (19 Sep 2019 05:04) (60 - 70)  BP: 103/65 (19 Sep 2019 05:04) (103/65 - 119/56)  RR: 16 (19 Sep 2019 05:04) (16 - 16)  SpO2: 96% (19 Sep 2019 05:04) (94% - 96%)  I&O's Summary    18 Sep 2019 07:01  -  19 Sep 2019 07:00  --------------------------------------------------------  IN: 2500 mL / OUT: 1000 mL / NET: 1500 mL      PHYSICAL EXAM:  General: NAD, A/O x 3  ENT: MMM, no thrush  Neck: Supple, No JVD  Lungs: Clear to auscultation bilaterally, good air entry, non-labored breathing  Cardio: RRR, S1/S2, No murmur  Abdomen: Soft, Nontender, Nondistended; Bowel sounds present  Extremities: No calf tenderness, trace pedal edema  Psych: Normal mood and affect    LABS:    08-03 Chol 79 mg/dL LDL 36 mg/dL HDL 11 mg/dL Trig 159 mg/dL    08-03 [id number] 4.6

## 2019-09-19 NOTE — PROGRESS NOTE ADULT - ASSESSMENT
Pt is a 53yo M admitted to [facility] for verbally aggressive/abusive behavior in the setting of his mothers' recent passing away. His behavior is much improved. He has been medically stable for d/c for days, but required a Level 2 Behavioral Health Screen prior to return to [facility], which has since been completed. He has been accepted to [facility] and is being discharged today. (He was accepted yesterday, but the facility wanted him to come today by the time everything was in place)    #Schizophreniform disorder and   #Bipolar disorder with behavioral disturbance  - Patient has been calm and cooperative with staff  - C/w Lithium   - C/w Valproic acid PO 1000mg BID as per psych  - C/w olanzapine 7.5 PO morning and 10mg PO at bedtime  - Lithium and Depakote levels next week (to be drawn)    #hx of PE  - Continue Eliquis     #DI   - Continue desmopressin     #HTN - controlled  - Continue toprol     #Chronic back pain  - c/w oxycodone ER 10 mg q12 hrs  - c/w tylenol PRN  - c/w gabapentin 400 mg TID    #Constipation  - c/w colace, lactulose, miralax, senna  - c/w bisacodyl suppository PRN    #GERD:  - c/w protonix     #Hypothyroidism   - TSH 11.2  - Continue with Synthroid - increased to 200mcg  - f/u lab in 6 weeks out pt     D/C TO [facility] TODAY. (All discharge paperwork completed yesterday, today's visit serves as a "follow-up" visit)

## 2019-09-19 NOTE — PROGRESS NOTE ADULT - REASON FOR ADMISSION
ambulatory dysfxn, verbally abusive behavior. constipation

## 2019-09-19 NOTE — PROGRESS NOTE ADULT - PROVIDER SPECIALTY LIST ADULT
Hospitalist
Cardiology
Hospitalist

## 2019-09-19 NOTE — DISCHARGE NOTE NURSING/CASE MANAGEMENT/SOCIAL WORK - PATIENT PORTAL LINK FT
You can access the FollowMyHealth Patient Portal offered by [facility] by registering at the following website: [email or web address]. By joining [facility]’s FollowMyHealth portal, you will also be able to view your health information using other applications (apps) compatible with our system.

## 2019-10-01 PROCEDURE — 80178 ASSAY OF LITHIUM: CPT

## 2019-10-01 PROCEDURE — 94640 AIRWAY INHALATION TREATMENT: CPT

## 2019-10-01 PROCEDURE — 97530 THERAPEUTIC ACTIVITIES: CPT

## 2019-10-01 PROCEDURE — 85027 COMPLETE CBC AUTOMATED: CPT

## 2019-10-01 PROCEDURE — 85379 FIBRIN DEGRADATION QUANT: CPT

## 2019-10-01 PROCEDURE — 80307 DRUG TEST PRSMV CHEM ANLYZR: CPT

## 2019-10-01 PROCEDURE — 93306 TTE W/DOPPLER COMPLETE: CPT

## 2019-10-01 PROCEDURE — 97162 PT EVAL MOD COMPLEX 30 MIN: CPT

## 2019-10-01 PROCEDURE — 93005 ELECTROCARDIOGRAM TRACING: CPT

## 2019-10-01 PROCEDURE — 93970 EXTREMITY STUDY: CPT

## 2019-10-01 PROCEDURE — 97116 GAIT TRAINING THERAPY: CPT

## 2019-10-01 PROCEDURE — 80048 BASIC METABOLIC PNL TOTAL CA: CPT

## 2019-10-01 PROCEDURE — 99285 EMERGENCY DEPT VISIT HI MDM: CPT | Mod: 25

## 2019-10-01 PROCEDURE — 80164 ASSAY DIPROPYLACETIC ACD TOT: CPT

## 2019-10-01 PROCEDURE — 84443 ASSAY THYROID STIM HORMONE: CPT

## 2019-10-01 PROCEDURE — 36415 COLL VENOUS BLD VENIPUNCTURE: CPT

## 2019-10-01 PROCEDURE — 82140 ASSAY OF AMMONIA: CPT

## 2019-10-01 PROCEDURE — 96374 THER/PROPH/DIAG INJ IV PUSH: CPT

## 2019-10-02 NOTE — CONSULT NOTE ADULT - ASSESSMENT
Mabel Cloud and Rashad Srivastava (daughter and son) Mabel Cloud and Rashad Srivastava (daughter and son) 52 y/o Male presents with Left submet 5 ulcer  ·	Pt seen and evaluated in ED   ·	stable ulcer submet 5 with probe to fascia   ·	Cellulitis of left foot extending to ankle   ·	wound flushed with saline and cultured  ·	Dressed with betadine and dry sterile dressing   ·	Stable for d/c pending negative Xrays, on PO antibiotics - Keflex for 7 days   ·	Follow up with Dr. Piper within 1 week. Call  [phone] to schedule appointment   ·	Will discuss w/ attending Mabel Cloud and Rashad Srivastava (daughter and son) 52 y/o Male presents with Left submet 5 ulcer  ·	Pt seen and evaluated in ED   ·	stable ulcer submet 5 with probe to fascia   ·	Cellulitis of left foot extending to ankle   ·	wound flushed with saline and cultured  ·	Dressed with betadine and dry sterile dressing   ·	Stable for d/c pending negative Xrays, on PO antibiotics - Keflex for 7 days   ·	Follow up with Dr. Piper within 1 week. Call  [phone] to schedule appointment   ·	Will discuss w/ attending

## 2019-10-19 NOTE — ED ADULT TRIAGE NOTE - TEMPERATURE IN CELSIUS (DEGREES C)
Encounter Date: 10/19/2019       History     Chief Complaint   Patient presents with    Toxic Inhalation     Pt reports foul smell from air conditioner vent.      Ms Sanchez is a 43yoF who presents for feared chemical inhalation; pertinent PMHx cigarette use.  Patient 1st noticed foul odor coming from window air unit about 2 weeks ago.  She reports attempted to contact her landlord who switch the filter without alleviation of smell.  She told her mother who became concerned and recommended she come to the ER.  Patient continues to smoke about a 1/3rd pack a day. Patient denies associated chest pain, shortness of breath, cough, dizziness, fever, nausea vomiting, vision changes, weakness.  The patients available PMH, PSH, Social History, medications, allergies, and triage vital signs were reviewed just prior to their medical evaluation.  A ten point review of systems was completed and is negative except as documented above.  Patient denies any other acute medical complaint.    Please be advised this text was dictated with RESPACE software and may contain errors due to translation.           Review of patient's allergies indicates:  No Known Allergies  History reviewed. No pertinent past medical history.  History reviewed. No pertinent surgical history.  History reviewed. No pertinent family history.  Social History     Tobacco Use    Smoking status: Current Every Day Smoker     Packs/day: 0.50     Types: Cigarettes    Smokeless tobacco: Never Used    Tobacco comment: 5-6 cigarettes per day   Substance Use Topics    Alcohol use: Not Currently    Drug use: No     Review of Systems   Constitutional: Negative for chills and fever.   HENT: Negative for sore throat and trouble swallowing.    Eyes: Negative for visual disturbance.   Respiratory: Negative for cough, choking, chest tightness, shortness of breath, wheezing and stridor.    Cardiovascular: Negative for chest pain.   Gastrointestinal: Negative for abdominal  pain, nausea and vomiting.   Skin: Negative for pallor and rash.   Neurological: Negative for dizziness, weakness, light-headedness and headaches.   Psychiatric/Behavioral: Negative for confusion.       Physical Exam     Initial Vitals [10/19/19 1334]   BP Pulse Resp Temp SpO2   (!) 149/93 89 16 99.4 °F (37.4 °C) 99 %      MAP       --         Physical Exam    Vitals reviewed.  Constitutional: She appears well-developed and well-nourished. She is not diaphoretic. No distress.   Well-appearing female in NAD, VSS, afebrile, 99% on RA.     HENT:   Head: Normocephalic and atraumatic.   Right Ear: External ear normal.   Left Ear: External ear normal.   Nose: Nose normal.   Mouth/Throat: Oropharynx is clear and moist. No oropharyngeal exudate.   No burn injury appreciated to external nares  Posterior oropharynx unremarkable   Eyes: Conjunctivae and EOM are normal. Pupils are equal, round, and reactive to light. No scleral icterus.   Neck: Normal range of motion. Neck supple.   Cardiovascular: Normal rate, regular rhythm, normal heart sounds and intact distal pulses.   Pulmonary/Chest: Breath sounds normal. No stridor. No respiratory distress. She has no wheezes. She has no rhonchi. She has no rales.   Abdominal: There is no tenderness.   Musculoskeletal: She exhibits no edema.   Lymphadenopathy:     She has no cervical adenopathy.   Neurological: She is alert and oriented to person, place, and time. She has normal strength. No cranial nerve deficit or sensory deficit.   Skin: Skin is warm and dry. Capillary refill takes less than 2 seconds. No rash noted. No pallor.   No facial flushing  No palpebral pallor         ED Course   Procedures  Labs Reviewed - No data to display       Imaging Results    None          Medical Decision Making:   History:   Old Medical Records: I decided to obtain old medical records.  Old Records Summarized: records from clinic visits and records from previous admission(s).  Initial Assessment:    Patient presents for feared gaseous exposure after 2 weeks of foul smell from AC unit, no physical symptoms or complaints at this time, VSS, afebrile, LTAB  Differential Diagnosis:   DDx includes feared medical complaints without diagnosis. Physical exam and history taking lower clinical suspicion for carbon monoxide exposure, smoke inhalation injury, PNA.   ED Management:  Lack of physical complaints and exam are reassuring for no subsequent emergent sequela from possible chemical exposure.  Patient reassured.  Recommended she establish care with PCP regarding possible long-term exposure.  Smoking cessation education given.  Given resources for [facility] care establishment.  Patient agreed to plan of care and voiced understanding. Discharged in stable condition with strict ED return precautions.    Claudette Aguilar PA-C  10/19/2019    I discussed the following case, diagnosis and plan of care with attending physician.                        Clinical Impression:       ICD-10-CM ICD-9-CM   1. Inhalation of gaseous substance, accidental or unintentional, initial encounter T59.91XA 987.9     E869.9         Disposition:   Disposition: Discharged  Condition: Stable                        Claudette Aguilar PA-C  10/19/19 1428     36.3

## 2019-12-20 NOTE — ED PROVIDER NOTE - HISTORY ATTESTATION, MLM
Lumbar Medial Branch Block Under Fluoroscopic Guidance:  I have reviewed the patient's medications, allergies and relevant histories prior to the procedure and no contraindications have been identified. The risks, benefits and alternatives to the procedure were discussed with the patient, including bleeding, infection, discomfort, nerve injury, weakness, numbness or bowel and bladder dysfunction. All questions regarding the procedure were answered to the patient's satisfaction. I personally obtained Micaela's consent prior to the start of the procedure and the signed consent can be found in the patient's chart.                                                         Time-out was taken to identify patient, procedure, laterality, and allergies prior to starting the procedure.       Date of Service: 12/20/2019  Procedure: Bilateral L4-5, L5-S1 zygapophyseal facet joint block  Pre-Operative Diagnosis: Lumbar Spondylosis  Post-Operative Diagnosis: Lumbar Spondylosis    Physician: Deena Daily M.D.  Assistants: None    Medications Injected: 8 mL of Preservative-free Bupivicaine 0.25% + Depo-Medrol 80 mg/mL (1.5 mL at each level)  Local Anesthetic: Xylocaine 1% 10 mL    Sedation Medications: Versed 1 mg, Fentanyl 25 mcg    Procedural Technique:   Laying in a prone position, the patient was prepped and draped in the usual sterile fashion using ChloraPrep and fenestrated drape.  Continuous hemodynamic monitoring was initiated including blood pressure, EKG, and pulse oximetry. The vertebral level was determined under fluoroscopic guidance.  Local anesthetic was given by going down to the hub of the 25-gauge 1.5 inch needle and raising a wheel.  A 22-gauge 3.5 inch needle was introduced to the anatomic location of the medial branch of L3, L4, and L5 at the junction of the superior articular process and transverse process of L4, L5 & sacral ala respectively utilizing live fluoroscopy. After negative aspiration for blood, 1 ml  of Omnipaque 300 contrast agent was slowly injected to confirm no vascular runoff.  Medication was then injected slowly. The patient tolerated the procedure well. The needle was removed and bandage applied to the area.    Estimated Blood Loss:  None.  Complications:  None.     Disposition: The patient tolerated the procedure well, and there were no apparent complications. Vital signs remained stable throughout the procedure. The patient was taken to the recovery area and monitored. The patient was supplied with written discharge instructions for the procedure. If helpful, we can repeat as needed. If only short term relief, may proceed with radiofrequency ablation. The patient was discharged in a stable condition.    Follow-Up: We will see the patient back in 1-2 weeks or the patient may call to inform of status.      I have reviewed and confirmed nurses' notes...

## 2020-01-02 NOTE — PROGRESS NOTE ADULT - PROBLEM SELECTOR PLAN 2
[FreeTextEntry1] : s/p recent hospitalization for left hip septic arthritis complicated by distal left femur fracture\par - completed IV ABX (Vanco/Ethel), no further indication for PICC line, needs to be removed.  I explained this at length to the patient including possibility of infection/thrombosis.  We are currently trying to arrange for VNS services to facilitate removal, if this is unsuccessful it will need to be removed at the hospital (likely ED).  I am unable to perform this procedure at the home 2/2 lack of ancillary support in the event of a complication\par - maintain splint to LLE, outpatient Ortho follow up next week per patient\par - not receiving PT/OT, will try to arrange for services.  Informed by office, patient refused VNS.  Will try another company\par - PM as per attending\par \par CIDP\par - outpatient Neuro follow up, currently receiving IVIG, DOES NOT NEED PICC FOR IVIG per Neuro.  Needs to be removed as above.  \par - Neuro reccs appreciated\par - c/w current pain regimen per attending\par \par CAD s/p CABG\par - currently on DAPT and high intensity statin, will continue\par - c/w BB\par - needs telehealth Cards follow, last appointment previously cancelled.  Will try to arrange another.  \par \par MDD/MARGUERITE\par - c/w current medications, patient reporting was on Seroquel 100mg QHS and does not know why it was stopped.  Patient on multiple psych meds currently but states unable to follow up with Psych currently.  Will restart Seroquel at 100mg QHS (previous dose), \par - Patient needs medications evaluated by psych, especially Duloxetine/Trintillix\par - Needs Psychiatry telehealth, has cancelled previous appointment.  Will try to arrange again\par \par DM2\par - supposed to be on basal/bolus insulin, patient reports not taking as does not have glucometer.  Wants to change back to PO medications.  See below\par - will attempt to obtain glucometer for patient, last A1C 10.8% 8/19, needs insulin regimen at this time\par - advised medication and dietary compliance\par - Will have office call patient and send an Rx for a regular glucometer while he awaits the continuos glucose monitor.  \par \par Chronic Constipation\par - no relieved on current regimen, reports was on movantik in the past\par - Will order 25mg PO QD and monitor for response\par \par BPH\par - c/w Flomax\par - no s/s UR\par \par Chronic Pruritus\par - generalized and without clear cause, also chronic\par - c/w Hydroxyzine PRN - s/p C3-C6 laminectomy and fusion on 5/1/19. MRI shows interval resolution of the dorsal paraspinal/epidural fluid collection at C3-C6, compatible with interval resolution of postoperative seroma/hematoma. Residual subcutaneous dorsal paraspinal edema extending from the C2-C7 level, with resolution of previously seen subcutaneous fluid collection, favored to represent resolved postoperative seroma/hematoma. No new fluid collections. Per Dr. Villasenor,  pain may be normal to still have neck pain, pain will decrease gradually.   - continue PT  - decrease Oxycontin to 10mg BID  - Lidocaine patch  - heat pack - s/p C3-C6 laminectomy and fusion on 5/1/19. MRI shows interval resolution of the dorsal paraspinal/epidural fluid collection at C3-C6, compatible with interval resolution of postoperative seroma/hematoma. Residual subcutaneous dorsal paraspinal edema extending from the C2-C7 level, with resolution of previously seen subcutaneous fluid collection, favored to represent resolved postoperative seroma/hematoma. No new fluid collections. Per Dr. Villasenor,  pain may be normal to still have neck pain, pain will decrease gradually.   - continue PT  - continue Oxycontin to 10mg BID  - Lidocaine patch  - heat pack

## 2020-01-28 NOTE — ED ADULT NURSE NOTE - PRO INTERPRETER NEED 2
PT called in regarding her test results. Informed PT of Dr. Yakelin Dean note. PT did say that the medication that Dr. Bereket Rizo prescribed has not really helped much.        Best call back number: [phone] English

## 2020-02-11 NOTE — DIETITIAN INITIAL EVALUATION ADULT. - 15 CAL
Additional Anesthesia Volume In Cc (Will Not Render If 0): 0 5801 Render Path Notes In Note?: No Type Of Destruction Used: Electrodesiccation Notification Instructions: Patient will be notified of biopsy results. However, patient instructed to call the office if not contacted within 2 weeks. Anesthesia Volume In Cc (Will Not Render If 0): 0.3 Cryotherapy Text: The wound bed was treated with cryotherapy after the biopsy was performed. Electrodesiccation And Curettage Text: The wound bed was treated with electrodesiccation and curettage after the biopsy was performed. Curettage Text: The wound bed was treated with curettage after the biopsy was performed. Biopsy Method: double edge Personna blade Electrodesiccation Text: The wound bed was treated with electrodesiccation after the biopsy was performed. Wound Care: Vaseline Anesthesia Type: 1% lidocaine with epinephrine and a 1:10 solution of 8.4% sodium bicarbonate Depth Of Biopsy: dermis Biopsy Type: H and E Billing Type: Third-Party Bill Dressing: Band-Aid Hemostasis: Drysol Silver Nitrate Text: The wound bed was treated with silver nitrate after the biopsy was performed. Detail Level: Detailed Consent: Written consent was obtained and risks were reviewed including but not limited to scarring, infection, bleeding, scabbing, incomplete removal, nerve damage and allergy to anesthesia. Was A Bandage Applied: Yes Post-Care Instructions: I reviewed with the patient in detail post-care instructions. Patient is to keep the biopsy site dry overnight, and then apply bacitracin twice daily until healed. Patient may apply hydrogen peroxide soaks to remove any crusting.

## 2020-03-03 ENCOUNTER — APPOINTMENT (OUTPATIENT)
Dept: NEUROSURGERY | Facility: CLINIC | Age: 55
End: 2020-03-03

## 2020-03-19 DIAGNOSIS — G95.9 DISEASE OF SPINAL CORD, UNSPECIFIED: ICD-10-CM

## 2020-04-21 ENCOUNTER — APPOINTMENT (OUTPATIENT)
Dept: NEUROSURGERY | Facility: CLINIC | Age: 55
End: 2020-04-21
Payer: MEDICAID

## 2020-04-21 DIAGNOSIS — M50.20 OTHER CERVICAL DISC DISPLACEMENT, UNSPECIFIED CERVICAL REGION: ICD-10-CM

## 2020-04-21 DIAGNOSIS — G95.20 UNSPECIFIED CORD COMPRESSION: ICD-10-CM

## 2020-04-21 DIAGNOSIS — G82.20 PARAPLEGIA, UNSPECIFIED: ICD-10-CM

## 2020-04-21 DIAGNOSIS — R26.89 OTHER ABNORMALITIES OF GAIT AND MOBILITY: ICD-10-CM

## 2020-04-21 DIAGNOSIS — Z87.891 PERSONAL HISTORY OF NICOTINE DEPENDENCE: ICD-10-CM

## 2020-04-21 DIAGNOSIS — Z98.1 ARTHRODESIS STATUS: ICD-10-CM

## 2020-04-21 PROCEDURE — 99213 OFFICE O/P EST LOW 20 MIN: CPT | Mod: 95

## 2020-04-22 PROBLEM — M50.20 CERVICAL HERNIATED DISC: Status: ACTIVE | Noted: 2020-03-19

## 2020-04-22 PROBLEM — R26.89 IMPAIRED GAIT AND MOBILITY: Status: ACTIVE | Noted: 2020-04-21

## 2020-04-22 PROBLEM — Z98.1 S/P CERVICAL SPINAL FUSION: Status: ACTIVE | Noted: 2020-03-19

## 2020-04-22 PROBLEM — G95.20 CORD COMPRESSION MYELOPATHY: Status: ACTIVE | Noted: 2020-04-21

## 2020-04-22 PROBLEM — G82.20 PARAPARESIS OF BOTH LOWER LIMBS: Status: ACTIVE | Noted: 2020-04-21

## 2020-04-23 NOTE — ASSESSMENT
[FreeTextEntry1] : Persistent lower extremity weakness, and myelopathic symptoms.  We will obtain a MRI cervical to evaluate post operative soft tissue, and any compressive pathology, and xrays to evaluate the stability of the cervical spine.  Obtain an MRI of the Lumbar spine to evaluate the soft tissue and any new developing pathology.  We will also reach out to Dr. Keshawn Kaur, who was recently consulted for collaborative care.  \par \par 1)  MRI Cervical wo - asap\par 2)  MRI Lumbar wo - asap\par 3)  Xrays Cervical flex/ext - asap\par 4)  obtain ortho notes\par 5)  RTO 6 weeks - TEB with father and son from nursing home if possible

## 2020-04-23 NOTE — HISTORY OF PRESENT ILLNESS
[FreeTextEntry1] : Mr. Townsend is a 53 yo male with PMH of resident of [facility] (now resides in [facility], bipolar, HTN, nocturnal enuresis, deformity of lower leg, hypothyroidism, schizophrenia, BPD, IVDA who initially presented 4/25/19 to [facility] after a fall 2 weeks prior.  He presented with inability to walk due to LE weakness and low back pain which had been worsening over 2 weeks since fall.  Also, paresthesias in Bilateral hands with frequent falls.  Today, 4/21/20, father reports, he has not ambulated for about 2 years, and his son would lift his legs manually to move them for over a year prior to cervical surgery.  Pt is working in PT in facility in [place] taking steps with assistance.  \par Consulted with Dr. Parminder Kaur - and has new imaging ([facility] below)  - requesting records \par 2/4/2020 IVETH CT of C,T,L report scanned to allscripts - portal access \par 5/1/2019, he underwent a C3-C6 laminectomy and fusion.\par 5/15/2019 post op MRI Cervical wo demonstrated a dorsal epidural paraspinal fluid collection.\par 7/10/2019 repeat MRI Cervical w/wo showed C4-5 interval resolution of post op seroma/fluid collection.  Residual subcutaneous dorsal paraspinal edema extending form the C2-C7 level with no new fluid collections identified.

## 2020-04-23 NOTE — RESULTS/DATA
[FreeTextEntry1] : Patient Name: IFEANYI STAFFORD  Report Date: 15-May-2019 14:14.00 \par Patient ID: [id number] ([id number]), [id number] (EPI)  Accession No.: [id number] \par Patient Birth Date: 1965  Report Status: F \par Referring Physician: [id number] IAN BROOKS   Reason For Study: DORYS weakness  \par \par \par \par \par \par \par \par EXAM: MR SPINE LUMBAR \par \par \par PROCEDURE DATE: May 15 2019 \par \par \par \par INTERPRETATION: History: Weakness. Postoperative. Lower extremity weakness. \par R53.1. \par \par Description: A noncontrast lumbar spine MRI was performed. \par \par No prior lumbar spine MRI was available for comparison. \par \par Sagittal T1/T2/STIR, coronal T1, and axial T2/T1 weighted series were \par obtained. \par \par A transitional vertebral body is present at the lumbar sacral junction. The \par last large disc space is designated as S1-S2, which is confirmed when \par counting inferiorly from the cervical level. This places the conus at the \par expected L1-L2 level. \par \par Bone marrow edema involves the L2 and L3 vertebral bodies adjacent to the \par disc space. No intervening fluid is noted in the disc space so the bone \par marrow edema most likely reflects active degenerative changes. Small \par Schmorl's nodes are present at this level. \par \par There is no acute compression fracture within the lumbar spine. There is no \par lower thoracic spinal cord compression or cauda equina compression. \par \par L1-L2: A minimal disc bulge and facet hypertrophy result in minimal central \par canal stenosis. The neural foramina appear patent. \par \par L2-L3: A broad small disc bulge, facet hypertrophy, and ligamentum flavum \par hypertrophy result in mild central canal stenosis and minimal bilateral \par neural foraminal narrowing. \par \par L3-L4: A broad small disc bulge, facet hypertrophy, and ligamentum flavum \par hypertrophy result in mild central canal stenosis, mild to moderate right \par and mild left neural foramen narrowing. \par \par L4-L5: A broad disc bulge, facet hypertrophy, and ligamentum flavum \par hypertrophy result in mild central canal stenosis and mild to moderate right \par neural foramen narrowing, and mild left neural foramen narrowing. \par \par L5-S1: Minimal posterior subluxation of the L5 vertebral body is present. No \par significant central canal stenosis is present at this level. Facet and \par ligamentum flavum hypertrophy result in severe left neural foramen narrowing \par and mild right neural foramen narrowing. \par \par IMPRESSION: \par \par Degenerative changes involve the lumbar spine with distribution as \par described. There is no lower thoracic spinal cord compression or cauda \par equina compression. \par \par \par \par \par \par \par \par \par JUAN DAY M.D., ATTENDING RADIOLOGIST \par This document has been electronically signed. May 15 2019 2:14PM \par \par \par \par  \par

## 2020-04-23 NOTE — REASON FOR VISIT
[Parent] : parent [Follow-Up: _____] : a [unfilled] follow-up visit [FreeTextEntry1] : Verbal consent obtained for telehealth appointment today with patient's health care proxy (father) at residence  [id number] [place] [address]\par Dr. Villasenor and NP Rosa Gracia-Bernard in office [address] [place] [address]

## 2020-04-23 NOTE — DATA REVIEWED
[de-identified] : 7/10/19 MRI Cervical w/wo - PACS, 5/15/2019 MRI Cerv, and MRI Lumbar - both PACS

## 2020-06-10 NOTE — ED ADULT NURSE NOTE - BOWEL SOUNDS RUQ
present No Residual Tumor Seen Histology Text: There were no malignant cells seen in the sections examined.

## 2020-07-06 NOTE — BEHAVIORAL HEALTH ASSESSMENT NOTE - BODY HABITUS
"1815- Patient brought to triage. EDC 8/3/20 making her 35.6 weeks.    Presents with spotting x3 times while voiding, and intermittent cramping in lower abdomen. Not cramping or actively bleeding at this time. Patient also reports decreased fetal movement today, stating \"I felt her move probably 2 hours ago.\" Monitors applied. VSS. Denies contractions and leaking of fluid.    1900- Report given to Maulik AMES.  " 1900- Pt up to bathroom. No bleeding noted on pad. Report given to Dr. Medel. Orders to oral hydrate and recheck urine..   2100- wet prep ordered per MD.   2130- pt ok to discharge home per MD. Per term labor precautions discussed, pt am,bulatory off unit in stable condition.    hard copy, drawn during this pregnancy Well nourished

## 2020-07-20 NOTE — H&P ADULT - PROBLEM/PLAN-9
DISPLAY PLAN FREE TEXT Colchicine Pregnancy And Lactation Text: This medication is Pregnancy Category C and isn't considered safe during pregnancy. It is excreted in breast milk.

## 2020-08-17 NOTE — H&P PST ADULT - RS GEN PE MLT RESP DETAILS PC
Bed: HA  Expected date:   Expected time:   Means of arrival:   Comments:  Triage left side head and body numbness   respirations non-labored/airway patent

## 2020-09-21 NOTE — PROGRESS NOTE ADULT - SUBJECTIVE AND OBJECTIVE BOX
CHIEF COMPLAINT: Patient is a 53y old  male who presents with a chief complaint of Cannot move legs (08 May 2019 14:59)      SUBJECTIVE / OVERNIGHT EVENTS:    Denies any physical discomfort. Denies constipation. Inquiring when he would be going to rehab. Discussed with SW - awaiting placement.    MEDICATIONS  (STANDING):  benztropine 0.5 milliGRAM(s) Oral two times a day  bisacodyl 5 milliGRAM(s) Oral every 12 hours  desmopressin 0.2 milliGRAM(s) Oral at bedtime  docusate sodium 100 milliGRAM(s) Oral three times a day  enoxaparin Injectable 40 milliGRAM(s) SubCutaneous at bedtime  fluPHENAZine 5 milliGRAM(s) Oral two times a day  gabapentin 100 milliGRAM(s) Oral two times a day  levothyroxine 175 MICROGram(s) Oral daily  metoprolol succinate ER 50 milliGRAM(s) Oral daily  nicotine - 21 mG/24Hr(s) Patch 1 patch Transdermal daily  pantoprazole    Tablet 40 milliGRAM(s) Oral before breakfast  senna 2 Tablet(s) Oral at bedtime  valproic  acid Syrup 500 milliGRAM(s) Oral <User Schedule>  valproic  acid Syrup 750 milliGRAM(s) Oral at bedtime    MEDICATIONS  (PRN):  acetaminophen   Tablet .. 650 milliGRAM(s) Oral every 8 hours PRN Temp greater or equal to 38C (100.4F), Mild Pain (1 - 3)  baclofen 10 milliGRAM(s) Oral three times a day PRN Muscle Spasm  diazepam    Tablet 5 milliGRAM(s) Oral every 6 hours PRN Spasm  nicotine  Polacrilex Gum 4 milliGRAM(s) Oral every 6 hours PRN nicotine craving  ondansetron Injectable 4 milliGRAM(s) IV Push every 6 hours PRN Nausea  oxyCODONE    IR 5 milliGRAM(s) Oral every 4 hours PRN Moderate Pain (4 - 6)  oxyCODONE    IR 10 milliGRAM(s) Oral every 4 hours PRN Severe Pain (7 - 10)  polyethylene glycol 3350 17 Gram(s) Oral daily PRN Constipation      VITALS:  T(F): 98.3 (05-09-19 @ 10:00), Max: 98.4 (05-09-19 @ 06:00)  HR: 65 (05-09-19 @ 10:00) (63 - 78)  BP: 112/57 (05-09-19 @ 10:00) (112/57 - 126/73)  RR: 20 (05-09-19 @ 10:00) (18 - 20)  SpO2: 99% (05-09-19 @ 10:00)      CAPILLARY BLOOD GLUCOSE    Output     I&O's Summary  T(F): 98.3 (05-09-19 @ 10:00), Max: 98.4 (05-09-19 @ 06:00)  HR: 65 (05-09-19 @ 10:00) (63 - 78)  BP: 112/57 (05-09-19 @ 10:00) (112/57 - 126/73)  RR: 20 (05-09-19 @ 10:00) (18 - 20)  SpO2: 99% (05-09-19 @ 10:00)    PHYSICAL EXAM:  GENERAL: NAD, well-developed  HEAD:  Atraumatic, Normocephalic  EYES: EOMI  NECK: Supple, No JVD  CHEST/LUNG: nonlabored breathing  HEART: nl S1/S2  ABDOMEN: nondistended, soft  EXTREMITIES:  no LE edema  PSYCH: alert, answering questions appropriately  SKIN: No rashes noted    LABS:              15.3                 139  | 27   | 17           11.44 >-----------< 316     ------------------------< 97                    47.1                 5.0  | 102  | 0.49                                         Ca 9.3   Mg x     Ph x                          MICROBIOLOGY:        RADIOLOGY & ADDITIONAL TESTS:    Imaging Personally Reviewed:    < from: MR Cervical Spine w/wo IV Cont (04.26.19 @ 19:54) >      < end of copied text >        [x] Care Discussed with Consultants/Other Providers:      Daria Reilly M.D.  Hospitalist  Pager [phone] solids clears

## 2020-11-13 NOTE — CHART NOTE - NSCHARTNOTEFT_GEN_A_CORE
Problem: Mobility Impaired (Adult and Pediatric) Goal: *Acute Goals and Plan of Care (Insert Text) Description: FUNCTIONAL STATUS PRIOR TO ADMISSION: Patient was modified independent using a rollator for functional mobility. HOME SUPPORT PRIOR TO ADMISSION: The patient lived alone with daughter available to provide assistance. Physical Therapy Goals Initiated 11/13/2020 1. Patient will move from supine to sit and sit to supine , scoot up and down, and roll side to side in bed with modified independence within 7 day(s). 2.  Patient will transfer from bed to chair and chair to bed with modified independence using the least restrictive device within 7 day(s). 3.  Patient will perform sit to stand with modified independence within 7 day(s). 4.  Patient will ambulate with modified independence for 150 feet with the least restrictive device within 7 day(s). Outcome: Not Met PHYSICAL THERAPY EVALUATION Patient: Chandra Lesch (89 y.o. female) Date: 11/13/2020 Primary Diagnosis: Anemia [D64.9] ROCIO (acute kidney injury) ([facility] [address] [N17.9] Precautions:  Fall ASSESSMENT Based on the objective data described below, the patient presents functioning below her baseline of independent with the rollator walker. Patient was recently hospitalized at [facility] in late October and discharged home with her daughter staying with her. This admission, patient returned s/p GLF at home in which she reports she tripped (unaware of details of fall) and must have fallen on her rollator, resulting in a left leg wound. Report received from OT informing of jerking movements making position changes difficulty and of patient's difficulty with LVAD switchover this morning. Patient is known to this PT from the previous admission and was independent with LVAD switchover. Received patient supine in bed. Observed jerking movements which patient endorses are voluntary.   She was Surgery: c3-c6 laminectomy and fusion  Consent: telephone consent done     T(C): 36.3 (04-30-19 @ 21:23), Max: 36.8 (04-29-19 @ 22:39)  HR: 60 (04-30-19 @ 21:23) (53 - 67)  BP: 123/63 (04-30-19 @ 21:23) (114/68 - 136/83)  RR: 19 (04-30-19 @ 21:23) (16 - 19)  SpO2: 99% (04-30-19 @ 21:23) (97% - 100%)    141  |  102  |  18  ----------------------------<  114<H>  4.0   |  27  |  0.60    Ca    9.9      30 Apr 2019 06:15  Phos  3.2     04-30  Mg     1.9     04-30    CBC Full  -  ( 30 Apr 2019 06:15 )  WBC Count : 11.59 K/uL  RBC Count : 4.96 M/uL  Hemoglobin : 14.2 g/dL  Hematocrit : 43.6 %  Platelet Count - Automated : 267 K/uL  Mean Cell Volume : 87.9 fL  Mean Cell Hemoglobin : 28.6 pg  Mean Cell Hemoglobin Concentration : 32.6 %  Auto Neutrophil # : 10.40 K/uL  Auto Lymphocyte # : 0.55 K/uL  Auto Monocyte # : 0.58 K/uL  Auto Eosinophil # : 0.00 K/uL  Auto Basophil # : 0.01 K/uL  Auto Neutrophil % : 89.8 %  Auto Lymphocyte % : 4.7 %  Auto Monocyte % : 5.0 %  Auto Eosinophil % : 0.0 %  Auto Basophil % : 0.1 %  PT/INR - ( 30 Apr 2019 11:30 )   PT: 13.8 SEC;   INR: 1.20     PTT - ( 30 Apr 2019 11:30 )  PTT:32.7 SEC  Type & Screen (in past 72hrs): done     NPO after midnight   OR @ 7:30am able to control movements in bed during this PT visit as compared with OT session earlier today. Patient remained on wall power during this visit due to her difficulty with LVAD switch over earlier today. She was able to transition position in bed with stand by assist and complete sit<->stand and bed<->chair transfer with min assist.  She completed position changes and transfers with minimal jerking movements which did not limit her mobility during this visit. She was however, inattentive to lines/ trip hazards and LVAD  hanging from her abdomen requiring assistance to manage. Functional mobility is limited by weakness, impaired balance, decreased tolerance to activity, SOB w/ activity, and her decreased insight into deficits, trip hazards, and need for assistance. At this time there is concern for additional falls therefore recommending IPR at discharge. Hopeful that patient will progress to her PLOF with therapy and be able to return home with HHPT and 24/7 caregiver assist.   
 
Next session: Plan to gait train if appropriate. Recommend having RN present when patient performs LVAD switchover if next treating therapist is not LVAD certified. Current Level of Function Impacting Discharge (mobility/balance): Min assist 
 
Functional Outcome Measure: The patient scored 30/100 on the Barthel outcome measure which is indicative of 70% impairment in functional tasks and mobility. Other factors to consider for discharge: Lives alone. Daughter was staying with her when d/c'd home last admission. Patient will benefit from skilled therapy intervention to address the above noted impairments. PLAN : 
Recommendations and Planned Interventions: bed mobility training, transfer training, gait training, therapeutic exercises, patient and family training/education and therapeutic activities Frequency/Duration: Patient will be followed by physical therapy:  5 times a week to address goals. Recommendation for discharge: (in order for the patient to meet his/her long term goals) Therapy 3 hours per day 5-7 days per week. This discharge recommendation: 
Has not yet been discussed the attending provider and/or case management IF patient discharges home will need the following DME: patient owns DME required for discharge SUBJECTIVE:  
Patient stated I can stop them. I just move when I'm nervous.  OBJECTIVE DATA SUMMARY:  
HISTORY:   
Past Medical History:  
Diagnosis Date  Asthma  Cancer ([facility] [address] breast  
 [facility] ([facility]   
 endometrial  
 Congestive heart failure, unspecified  CRI (chronic renal insufficiency)  Depression  Diabetes ([facility]  Hypertension Past Surgical History:  
Procedure Laterality Date  HX HERNIA REPAIR    
 HX HYSTERECTOMY  HX MASTECTOMY Personal factors and/or comorbidities impacting plan of care: [facility] Home Situation Home Environment: Private residence # Steps to Enter: 1 One/Two Story Residence: One story Living Alone: Yes Support Systems: Child(ivania)(daughter nearby) Patient Expects to be Discharged to[de-identified] Private residence Current DME Used/Available at Home: Walker, rollator, Raised toilet seat, Shower chair, Grab bars(LVAD; trilogy) Tub or Shower Type: Shower EXAMINATION/PRESENTATION/DECISION MAKING:  
Critical Behavior: 
Neurologic State: Alert Orientation Level: Appropriate for age Cognition: Follows commands, Poor safety awareness, Decreased attention/concentration, Impaired decision making Safety/Judgement: Decreased insight into deficits, Decreased awareness of need for safety, Decreased awareness of need for assistance Hearing: Auditory Auditory Impairment: None Skin:  Wound dressing intact LLE Edema:  
Range Of Motion: 
AROM: Generally decreased, functional 
  
  
  
  
  
  
  
Strength:   
Strength: Generally decreased, functional 
  
  
  
  
  
  
 Tone & Sensation:  
Tone: Normal 
  
  
  
  
  
  
  
  
   
Coordination: 
Coordination: Generally decreased, functional(involuntary jerky movements of all extremities) Functional Mobility: 
Bed Mobility: 
Supine to Sit: Stand-by assistance Sit to Supine: n/t - remained sitting up in the chair Scooting: Stand-by assistance Transfers: 
Sit to Stand: Minimum assistance Stand to Sit: Minimum assistance Balance:  
Sitting: Impaired; Without support Sitting - Static: Good (unsupported) Sitting - Dynamic:Good (unsupported) Standing: Impaired; Without support Standing - Static: Fair;Constant support Standing - Dynamic : Fair;Constant support Ambulation/Gait Training: 
Able to take steps with rollator walker for bed to chair transfers. Steady balance with walker support, adequate foot clearance. Min assist primarily for line management and for cues to improve safety Functional Measure: 
Barthel Index: 
 
Bathin Bladder: 0(using Purewick 2* safety concerns) Bowels: 5(smear on bed pad and requiring bed pan) Groomin Dressin Feedin Mobility: 0 Stairs: 0 Toilet Use: 5 Transfer (Bed to Chair and Back): 5 Total: 30/100 The Barthel ADL Index: Guidelines 1. The index should be used as a record of what a patient does, not as a record of what a patient could do. 2. The main aim is to establish degree of independence from any help, physical or verbal, however minor and for whatever reason. 3. The need for supervision renders the patient not independent. 4. A patient's performance should be established using the best available evidence. Asking the patient, friends/relatives and nurses are the usual sources, but direct observation and common sense are also important. However direct testing is not needed. 5. Usually the patient's performance over the preceding 24-48 hours is important, but occasionally longer periods will be relevant. 6. Middle categories imply that the patient supplies over 50 per cent of the effort. 7. Use of aids to be independent is allowed. Leetta Screws., Barthel, D.WLacie (8625). Functional evaluation: the Barthel Index. [address] (14)2. FORREST Keller, Dara Celestin., Mag Gan., [place], [address] (1999). Measuring the change indisability after inpatient rehabilitation; comparison of the responsiveness of the Barthel Index and Functional [place] Measure. Journal of Neurology, Neurosurgery, and Psychiatry, 66(4), 142-883. WILDA Estrella, ROSLYN Still, & Trena Ornelas M.A. (2004.) Assessment of post-stroke quality of life in cost-effectiveness studies: The usefulness of the Barthel Index and the EuroQoL-5D. [facility], 24, 067-76 Physical Therapy Evaluation Charge Determination History Examination Presentation Decision-Making MEDIUM  Complexity : 1-2 comorbidities / personal factors will impact the outcome/ POC  LOW Complexity : 1-2 Standardized tests and measures addressing body structure, function, activity limitation and / or participation in recreation  MEDIUM Complexity : Evolving with changing characteristics  LOW Complexity : FOTO score of  Based on the above components, the patient evaluation is determined to be of the following complexity level: LOW Pain Rating: 
Voiced no pain Activity Tolerance:  
Fair After treatment patient left in no apparent distress:  
Sitting in chair and Call bell within reach COMMUNICATION/EDUCATION:  
The patients plan of care was discussed with: Occupational therapist and Registered nurse. Fall prevention education was provided and the patient/caregiver indicated understanding., Patient/family have participated as able in goal setting and plan of care. and Patient/family agree to work toward stated goals and plan of care.  
 
Thank you for this referral. 
Elliot Huertas, PT 
 Time Calculation: 21 mins

## 2020-11-16 NOTE — ED ADULT NURSE NOTE - NSIMPLEMENTINTERV_GEN_ALL_ED
16-Nov-2020 Implemented All Universal Safety Interventions:  [place] to call system. Call bell, personal items and telephone within reach. Instruct patient to call for assistance. Room bathroom lighting operational. Non-slip footwear when patient is off stretcher. Physically safe environment: no spills, clutter or unnecessary equipment. Stretcher in lowest position, wheels locked, appropriate side rails in place.

## 2020-11-28 ENCOUNTER — EMERGENCY (EMERGENCY)
Facility: HOSPITAL | Age: 55
LOS: 1 days | Discharge: HOME | End: 2020-11-28
Admitting: EMERGENCY MEDICINE
Payer: MEDICARE

## 2020-11-28 VITALS
OXYGEN SATURATION: 100 % | DIASTOLIC BLOOD PRESSURE: 91 MMHG | RESPIRATION RATE: 17 BRPM | SYSTOLIC BLOOD PRESSURE: 145 MMHG | TEMPERATURE: 98 F | HEIGHT: 75 IN | HEART RATE: 90 BPM

## 2020-11-28 DIAGNOSIS — Z98.890 OTHER SPECIFIED POSTPROCEDURAL STATES: Chronic | ICD-10-CM

## 2020-11-28 LAB — SARS-COV-2 RNA SPEC QL NAA+PROBE: SIGNIFICANT CHANGE UP

## 2020-11-28 PROCEDURE — 99284 EMERGENCY DEPT VISIT MOD MDM: CPT | Mod: CS

## 2020-11-28 RX ORDER — DIPHENHYDRAMINE HCL 50 MG
25 CAPSULE ORAL ONCE
Refills: 0 | Status: COMPLETED | OUTPATIENT
Start: 2020-11-28 | End: 2020-11-28

## 2020-11-28 RX ORDER — HALOPERIDOL DECANOATE 100 MG/ML
5 INJECTION INTRAMUSCULAR ONCE
Refills: 0 | Status: COMPLETED | OUTPATIENT
Start: 2020-11-28 | End: 2020-11-28

## 2020-11-28 RX ADMIN — HALOPERIDOL DECANOATE 5 MILLIGRAM(S): 100 INJECTION INTRAMUSCULAR at 19:12

## 2020-11-28 RX ADMIN — Medication 2 MILLIGRAM(S): at 19:12

## 2020-11-28 RX ADMIN — Medication 25 MILLIGRAM(S): at 19:11

## 2020-11-28 NOTE — ED PROVIDER NOTE - PMH
Asthma    Bipolar disorder    Bipolar disorder    Drug abuse    DVT (deep venous thrombosis)    HTN (hypertension)    Hypertension    Hypothyroid    Incontinence    Schizophrenia    Schizophreniform disorder
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
## 2020-11-28 NOTE — ED ADULT NURSE REASSESSMENT NOTE - NS ED NURSE REASSESS COMMENT FT1
Pts diaper charged; skin care performed. Pt discharged and is awaiting EMS xfer back to Nursing Home residence.
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
## 2020-11-28 NOTE — ED PROVIDER NOTE - CLINICAL SUMMARY MEDICAL DECISION MAKING FREE TEXT BOX
54 y/o M  hx Schizophrenia, HTN, Hypothyroid, Asthma, DI, DVT, Incontinence    Covid PCR - Negative    Medical evaluation performed. There is no clinical evidence of intoxication or any acute medical problem requiring immediate intervention.  Medication offered. Tolerated same well.  D/C to nursing home  via EMS .

## 2020-11-28 NOTE — ED BEHAVIORAL HEALTH NOTE - BEHAVIORAL HEALTH NOTE
Per provider, Gadiel FRAGOSO, pt is cleared and able to return to previous residence [facility] [address] [place] [address]. SW has spoken to GARY Serrano ([phone]) who confirmed pt's mode of transportation is AMBULANCE. Clinical provider is in agreement with AMBULANCE back to nursing [place]. Verbal huddle completed. Transportation to be arranged by [facility].

## 2020-11-28 NOTE — ED PROVIDER NOTE - PATIENT PORTAL LINK FT
You can access the FollowMyHealth Patient Portal offered by [facility] by registering at the following website: [email or web address]. By joining [employer]’s FollowMyHealth portal, you will also be able to view your health information using other applications (apps) compatible with our system.
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
## 2021-01-22 NOTE — ED ADULT TRIAGE NOTE - HEART RATE (BEATS/MIN)
Daily Note     Today's date: 2021  Patient name: Jared Antony  : 1957  MRN: [id number]  Referring provider: Payal Harvey MD  Dx:   Encounter Diagnosis     ICD-10-CM    1  Aftercare following surgery  Z48 [id number]    2  Closed displaced subtrochanteric fracture of left femur with routine healing, subsequent encounter  S72  22XD                   Subjective: Patient complains of left knee soreness with kicking movements      Objective: See treatment diary below      Assessment: Tolerated treatment well  Patient demonstrated fatigue post treatment, exhibited good technique with therapeutic exercises and would benefit from continued PT, still has pain with SLS on left and uses RW      Plan: Progress treatment as tolerated         Precautions: HTN, colon sx      Manuals 1/05 1/08 1/11 1/13 1/15 1/18 1/22                   MT left hip 10 10 10 10' 10' 10 10                                Neuro Re-Ed                                                                                                        Ther Ex             NU STEP 5 min 5 min 7' 8' 9' 6  6 min      Slant board     L2 30"x4 30"x4 4x 4x      Ball exs 30x 30x 30x 30x 30x 30x 30x      SAQ 2/30 2/30 2/30 2# 30x 3#/30 4/30 4/30      LAQ 2/30 2/30 2/30 2# 30x 3/30 4/30 4/30      Hip add 30x 30x 30x 30x 30x 30x 30x      Hip abd 30x 30x 30x Blue 30x 30x 30/30 30/30      bridge 30x 30x 15x 30x 30x nt nt      Stand L hip abd  30x 30x 30x 30x 1/30 2/30      Heel raises   30x 30x 30x 30x 30x      Standing march     20x 30x 3/30      Standing ext     30x 30x 3/30      Gait Training             Leg press       65/30                   Modalities
85

## 2021-01-27 NOTE — H&P ADULT - NEGATIVE MUSCULOSKELETAL SYMPTOMS
Amlodipine 5 mg p.o. daily was added to the patient's medical regimen.  This was fully discussed with the patient.  She will also follow a low-sodium diet and begin an exercise program.  
Patient called regarding blood pressure.   Blood pressure is jumping around, highest 160/92, low 145/90. 155/93 hr  67  156/98 hr 63 today.  No chest pain, some dizzines    Please call patient.  
no joint swelling/no myalgia

## 2021-01-29 NOTE — ED ADULT TRIAGE NOTE - AS TEMP SITE
In the next few weeks you may receive a [employer] survey regarding your most recent clinic visit with us.  Please take a few moments out of your day to accurately evaluate your visit.  We strive to provide you with the best medical care. Thank you for your time and we look forward to your next visit.      Results: Dr. Walker will go over all results with you at your next appointment.  If there is anything that needs to be address prior to that, our office will call you.     Refills: If you need a refill of your medication, please contact your pharmacy FIRST. You may have refills on file with them; if not, they will contact our office to refill the prescription on your behalf. You must have a follow appointment scheduled to have a refill sent to your pharmacy. It may take 48 to 72 hours to refill a medication so please allow enough time before you run out of medication.      [place] [facility] Hours:  Monday - Thursday: 7:00 am - 6:00 pm  Friday: 7:00 am - 5:00 pm  Saturday: 7:00 am - noon  Lab in [place] is walk in only no appointments.  To go to [place] for the labs Call [phone] to set up a lab appointment.    If you need to call to schedule any imaging, the phone number for central scheduling is [phone].     Please do not hesitate to call our office with any questions or concerns, Dept: [phone].    
oral

## 2021-03-25 NOTE — ED ADULT TRIAGE NOTE - PAIN: PRESENCE, MLM
denies pain/discomfort Siliq Pregnancy And Lactation Text: The risk during pregnancy and breastfeeding is uncertain with this medication.

## 2021-08-06 NOTE — ED ADULT NURSE NOTE - NS ED NOTE  TALK SOMEONE YN
Regi Jenkins from [facility] called in regard to patient and she was inquiring on whether or not patient is going to continue with his nutritional supplement osmelite 1.5? She states the company has not sent any supplies since march because every time they have tried the patient and his spouse decline and tell them not to send. Regi Jenkins would like to know can she discontinue the services since patient has not been utilizing them. No

## 2021-11-01 NOTE — ED ADULT TRIAGE NOTE - NS_BH TRG QUESTION5_ED_ALL_ED

## 2021-12-27 NOTE — ED PROVIDER NOTE - CONSTITUTIONAL, MLM
Bed: ER08  Expected date:   Expected time:   Means of arrival:   Comments:  EMS     Zay Machado RN  12/27/21 [id number] - - -

## 2021-12-30 NOTE — ED ADULT NURSE NOTE - NS ED NOTE  TALK SOMEONE YN
"Chief Complaint  Contraception and Annual Exam    Subjective          Elisa Jarvis presents to [facility]  History of Present Illness  No current problems.  Objective   Vital Signs:   BP (!) 123/81 (BP Location: Left arm, Patient Position: Sitting, Cuff Size: Adult)   Pulse 80   Temp 98 °F (36.7 °C)   Ht 162.6 cm (64\") Comment: pt reported  Wt 62.4 kg (137 lb 9.6 oz)   SpO2 98%   BMI 23.62 kg/m²     Physical Exam  Vitals and nursing note reviewed. Exam conducted with a chaperone present (Mom.).   Constitutional:       General: She is not in acute distress.     Appearance: Normal appearance. She is normal weight. She is not ill-appearing.   HENT:      Head: Normocephalic and atraumatic.      Right Ear: Tympanic membrane and ear canal normal.      Left Ear: Tympanic membrane and ear canal normal.      Nose: Nose normal.      Mouth/Throat:      Mouth: Mucous membranes are moist.      Pharynx: Oropharynx is clear.   Eyes:      Conjunctiva/sclera: Conjunctivae normal.      Pupils: Pupils are equal, round, and reactive to light.   Cardiovascular:      Rate and Rhythm: Normal rate and regular rhythm.      Pulses: Normal pulses.      Heart sounds: Normal heart sounds. No murmur heard.      Pulmonary:      Effort: Pulmonary effort is normal.      Breath sounds: Normal breath sounds.   Abdominal:      General: Abdomen is flat. Bowel sounds are normal.      Palpations: Abdomen is soft.      Tenderness: There is no abdominal tenderness.   Musculoskeletal:         General: Normal range of motion.      Cervical back: Normal range of motion and neck supple.   Skin:     General: Skin is warm and dry.      Capillary Refill: Capillary refill takes less than 2 seconds.   Neurological:      Mental Status: She is alert and oriented to person, place, and time.   Psychiatric:         Thought Content: Thought content normal.        Result Review :                 Assessment and Plan    Diagnoses " and all orders for this visit:    1. Encounter for well child visit at 17 years of age (Primary)    2. Menorrhagia with regular cycle  -     drospirenone-ethinyl estradiol (FAB,GIANVI) 3-0.02 MG per tablet; Take 1 tablet by mouth Daily.  Dispense: 84 tablet; Refill: 3    Teen encouraged to abstain from alcohol and illicit drugs.  Teen encouraged to wear seat belt and never ride with a  that is under the influence.  Encouraged to partake of healthy diet rich in fresh fruits and vegetables as well as lean proteins.  Encouraged to participate in daily exercise of at least 30 min duration.    Follow Up   Return in about 1 year (around 12/30/2022) for Annual physical.  Patient was given instructions and counseling regarding her condition or for health maintenance advice. Please see specific information pulled into the AVS if appropriate.        No

## 2022-01-21 NOTE — PROGRESS NOTE ADULT - PROBLEM SELECTOR PLAN 1
No suicidal thoughts today  - off 1:1 observation  - continue Valproic acid 750mg BID, level therapeutic 52  - Prolixin discontinued 07/10  - continue Olanzapine 5mg qAM, 10mg qHS  - continue Cogentin 0.5mg BID per Psych  - continue Lithium 300mg q12hrs, check level in 5 days (07/22)  - Psych following PICC care

## 2022-03-03 NOTE — ED ADULT TRIAGE NOTE - ACCOMPANIED BY
Self Tazorac Pregnancy And Lactation Text: This medication is not safe during pregnancy. It is unknown if this medication is excreted in breast milk.

## 2022-03-29 NOTE — ED ADULT NURSE NOTE - CAS DISCH TRANSFER METHOD
[FreeTextEntry1] : \par 5 year old ex-33 week female with RAD presenting for evaluation of cough\par Suspect most likely viral URI. May also be allergic rhinitis given mild symptoms that are worse in the morning and hx of atopy. Mild wheeze on exam that improved after coughing. No s/s of respiratory distress. No evidence of bacterial illness on exam today. Well hydrated, well appearing, in no acute distress on exam today. \par \par - Supportive care reviewed with nasal saline drops/spray, clearing nose frequently, humidifier in bedroom, elevating head of bed when sleeping, steam from bath/shower, plenty of clear fluids\par - Monitor PO intake/UOP closely and call for concerns of dehydration\par - Trial Zyrtec/Claritin 5mg daily ; can add Flonase for additional relief of symptoms\par - Continue budesonide nebulizer BID x 1 week. Use albuterol nebulizer PRN for cough, wheezing, chest tightnes, or shortness of breath\par - Defer COVID-19 PCR since pt was positive ~90 days ago. Mom to do another rapid test at home this morning which will be 2nd one 24 hours apart\par \par Call/return to clinic if develops a fever, new/worsening symptoms, wheezing, or decreased PO/UOP  Transportation service

## 2022-04-28 NOTE — ED ADULT TRIAGE NOTE - NS ED NURSE BANDS TYPE
Patient drops off POA at [facility] today. POA is placed in red folder at . Name band;

## 2022-05-11 NOTE — PATIENT PROFILE ADULT - NSTRANSFERBELONGINGSDISPO_GEN_A_NUR
Spoke to patient and made aware of need for 1 unit of blood on Friday, type and screen tomorrow and iron infusions ordered. Verbalized understanding and states she will come here tomorrow morning to type and screen.    with patient

## 2022-05-12 NOTE — ED BEHAVIORAL HEALTH ASSESSMENT NOTE - RISK ASSESSMENT
----- Message from Jack Haney MD sent at 5/9/2022  5:00 PM CDT -----  Please inform the patient's parents that the strep test was negative, but we will do the PCR and if positive will inform them.  Also the hemogram this showed some reduction in the total WBC as well as reduction in the neutrophils absolute count.  This does have a picture of possible viral type infection.  I will let them know if the PCR of the strep is positive.  In the meantime we will continue with the Tylenol/ibuprofen and increasing fluid intake.   Acute risks include recent discharge from hospital. Chronic risks include past hospitalizations, past SA, chronic psychotic sx, unemployment. Protective factors includes denies SI/intent/plan, future orientated, mental state is improved, complaint w tx, lives in supportive resident, no access to means/weapons. Pt appears at baseline, and risk is not excessively elevated. Furthermore, inpatient hospitalization would further institutionalization and would inhibit successive transition to community live. Pt does not pose an imminent danger to self or others and does not meet criteria for involuntary psychiatric admission. Acute risks include recent discharge from hospital. Chronic risks include past hospitalizations, past SA, chronic psychotic sx, unemployment. Protective factors includes denies SI/intent/plan, future orientated, mental state is improved, complaint w tx, lives in supportive resident, no access to means/weapons. Pt appears at baseline, and risk is not excessively elevated. Furthermore, inpatient hospitalization would further institutionalization and would inhibit successive transition to community life. Pt does not pose an imminent danger to self or others and does not meet criteria for involuntary psychiatric admission.
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
## 2022-08-29 NOTE — OCCUPATIONAL THERAPY INITIAL EVALUATION ADULT - HEALTH SCREEN CRITERIA
Message left for return call and to schedule TCM F/U appointment.  Gout left knee/inability to ambulate/HTN  Need TCM- Letter sent.  New Prednisone Naproxen  DCed 8/27/22    yes

## 2022-09-26 NOTE — ED ADULT TRIAGE NOTE - NS_BH TRG QUESTION2_ED_ALL_ED
Recent Visits  Date Type Provider Dept   05/12/22 Office Visit Hildegard Babinski, APRN - CNP [facility]   04/14/22 Office Visit Hildegard Babinski, APRN - CNP [facility]   10/19/21 Office Visit Hildegard Babinski, APRN - CNP [facility]   07/14/21 Office Visit Hildegard Babinski, APRN - CNP [facility]   04/28/21 Office Visit Hildegard Babinski, APRN - CNP [facility]   04/19/21 Office Visit Hildegard Babinski, APRN - CNP [facility]   Showing recent visits within past 540 days with a meds authorizing provider and meeting all other requirements  Future Appointments  Date Type Provider Dept   10/17/22 Appointment Hildegard Babinski, APRN - CNP [facility]   Showing future appointments within next 150 days with a meds authorizing provider and meeting all other requirements      Future Appointments   Date Time Provider Ravi Mccann   9/29/2022  3:00 PM STR ULTRASOUND RM 2 STRZ US STR Radiolog   10/6/2022  1:45 PM Oneida Guidry  [facility]   10/17/2022  2:00 PM Hildegard Babinski, APRN - [address]
No

## 2022-10-05 NOTE — ED ADULT NURSE NOTE - CAS DISCH TRANSFER METHOD
"Chief Complaint  Diabetes (Patient needs a refill on her med), Peripheral Neuropathy (Patient needs a refill on her med), and Hypertension (Patient needs a refill on her med)    Subjective          Betzaida Santiago presents to [facility]  History of Present Illness patient is here to follow-up on her diabetes specifically needing her glimepiride refilled.  She states that since her last visit she has made aggressive changes in her diet and exercise program she has \"got her head on straight.\"  He has dealt with her mother's death and other issues and feels like she is doing markedly better she has lost about 8 pounds she would like to postpone getting blood work for another 3 months to see if these changes will make significant improvements before adding more medications.  She states that her current medicines all seem to be working well and she is feeling better than she has felt in a long time.    Objective   Vital Signs:   /70   Pulse 76   Ht 175.3 cm (69\")   Wt 134 kg (296 lb 6.4 oz)   SpO2 98%   BMI 43.77 kg/m²     Body mass index is 43.77 kg/m².    Review of Systems   Constitutional: Negative for chills, fatigue and fever.   Respiratory: Negative for cough and shortness of breath.    Cardiovascular: Negative for chest pain and palpitations.   Musculoskeletal: Negative for back pain and myalgias.   Neurological: Negative for dizziness and headache.   Psychiatric/Behavioral: Negative for depressed mood. The patient is not nervous/anxious.        Past History:  Medical History: has a past medical history of Anxiety, Hypertension, Hypertensive disorder, Hypothyroidism, Left ventricular hypertrophy, Neuropathy in diabetes (HCC), Neuropathy of left lower extremity, Obesity, Polyneuropathy due to type 2 diabetes mellitus (HCC), and Type 2 diabetes mellitus (HCC).   Surgical History: has a past surgical history that includes Other surgical history (2016); LASIK (2002); and Total " hip arthroplasty (2015).   Family History: family history includes Alcohol abuse in her father; Coronary artery disease in her father; Diabetes in her father; Hearing loss in her father; Hypertension in her father and mother; Mental illness in her father; Obesity in her father and mother.   Social History: reports that she has never smoked. She has never used smokeless tobacco. She reports that she does not drink alcohol and does not use drugs.      Current Outpatient Medications:   •  FLUoxetine (PROzac) 20 MG capsule, TAKE ONE CAPSULE BY MOUTH DAILY, Disp: 90 capsule, Rfl: 1  •  gabapentin (NEURONTIN) 300 MG capsule, Take 1 capsule by mouth 4 (Four) Times a Day., Disp: 120 capsule, Rfl: 5  •  glimepiride (AMARYL) 4 MG tablet, Take 2 tablets by mouth Daily., Disp: 180 tablet, Rfl: 1  •  levothyroxine (SYNTHROID, LEVOTHROID) 125 MCG tablet, Take 1 tablet by mouth Daily., Disp: 90 tablet, Rfl: 1  •  lisinopril-hydrochlorothiazide (PRINZIDE,ZESTORETIC) 20-25 MG per tablet, Take 1 tablet by mouth Daily., Disp: 90 tablet, Rfl: 1  •  metFORMIN ER (GLUCOPHAGE-XR) 500 MG 24 hr tablet, Take 2 tablets by mouth 2 (Two) Times a Day., Disp: 360 tablet, Rfl: 1    Allergies: Penicillins    Physical Exam  Vitals reviewed.   Constitutional:       Appearance: Normal appearance.   Cardiovascular:      Rate and Rhythm: Normal rate and regular rhythm.      Heart sounds: Normal heart sounds.   Pulmonary:      Effort: Pulmonary effort is normal.      Breath sounds: Normal breath sounds.   Abdominal:      General: Bowel sounds are normal.      Palpations: Abdomen is soft.   Musculoskeletal:         General: Normal range of motion.   Neurological:      General: No focal deficit present.      Mental Status: She is alert and oriented to person, place, and time.   Psychiatric:         Mood and Affect: Mood normal.          Result Review :                   Assessment and Plan    Diagnoses and all orders for this visit:    1. Type 2 diabetes  mellitus with diabetic neuropathy, without long-term current use of insulin (HCC) (Primary)  Assessment & Plan:  Patient does not check her blood sugars at home but she states that she is feeling so much better that she knows that is improving and I had agreed previously that we would she recheck her blood work in December I told her we could continue with that plan but she needed to be aggressive in her diet and exercise program which she tells me she has been.  I did refill her glimepiride today.  We will see her back in December.      2. Primary hypertension  Assessment & Plan:  Hypertension is well controlled.Continue present care no changes.       Other orders  -     glimepiride (AMARYL) 4 MG tablet; Take 2 tablets by mouth Daily.  Dispense: 180 tablet; Refill: 1      Follow Up   Return in about 2 months (around 12/5/2022) for Annual physical.  Patient was given instructions and counseling regarding her condition or for health maintenance advice. Please see specific information pulled into the AVS if appropriate.     Rosaura Sheth PA-C   Transportation service

## 2022-11-07 NOTE — PROGRESS NOTE ADULT - PROBLEM SELECTOR PLAN 7
Form received. Last office visit 10/11/2022, next visit 01/13/2023. Last 2 office notes faxed.    cont desmopresin

## 2022-12-01 NOTE — ED ADULT TRIAGE NOTE - NS_BHTRGCALCULATEDSCORE_ED_A_ED_FT
5 Detail Level: Detailed Size Of Lesion In Cm (Optional): 0 Morphology Per Location (Optional): [facility]

## 2023-01-01 NOTE — DISCHARGE NOTE ADULT - SMOKING EVEN A SINGLE PUFF INCREASES THE LIKELIHOOD OF A FULL RELAPSE, WITHDRAWAL SYMPTOMS PEAK WITHIN 1-2 WEEKS, BUT CAN PERSIST FOR MONTHS
Problem: Nutrition  Goal: Tolerates feedings  2023 0747 by Jesica Almonte, RN  Outcome: Outcome Not Met, Continue to Monitor  2023 0739 by Jesica Almonte, RN  Outcome: Outcome Not Met, Continue to Monitor   NO events overnight. Parents updated. Bili 6.1 WNL. Tolerating NG feeds.   Statement Selected

## 2023-01-04 NOTE — ED ADULT NURSE NOTE - CAS EDN DISCHARGE ASSESSMENT
Problem: Diabetes Comorbidity  Goal: Blood Glucose Level Within Targeted Range  Outcome: Ongoing, Progressing     Problem: Adult Inpatient Plan of Care  Goal: Plan of Care Review  Outcome: Ongoing, Progressing  Goal: Patient-Specific Goal (Individualized)  Outcome: Ongoing, Progressing  Goal: Absence of Hospital-Acquired Illness or Injury  Outcome: Ongoing, Progressing  Goal: Optimal Comfort and Wellbeing  Outcome: Ongoing, Progressing  Goal: Readiness for Transition of Care  Outcome: Ongoing, Progressing     Problem: Infection  Goal: Absence of Infection Signs and Symptoms  Outcome: Ongoing, Progressing     Problem: Adjustment to Illness (Stroke, Ischemic/Transient Ischemic Attack)  Goal: Optimal Coping  Outcome: Ongoing, Progressing     Problem: Bowel Elimination Impaired (Stroke, Ischemic/Transient Ischemic Attack)  Goal: Effective Bowel Elimination  Outcome: Ongoing, Progressing     Problem: Cerebral Tissue Perfusion (Stroke, Ischemic/Transient Ischemic Attack)  Goal: Optimal Cerebral Tissue Perfusion  Outcome: Ongoing, Progressing     Problem: Cognitive Impairment (Stroke, Ischemic/Transient Ischemic Attack)  Goal: Optimal Cognitive Function  Outcome: Ongoing, Progressing     Problem: Communication Impairment (Stroke, Ischemic/Transient Ischemic Attack)  Goal: Improved Communication Skills  Outcome: Ongoing, Progressing     Problem: Functional Ability Impaired (Stroke, Ischemic/Transient Ischemic Attack)  Goal: Optimal Functional Ability  Outcome: Ongoing, Progressing     Problem: Respiratory Compromise (Stroke, Ischemic/Transient Ischemic Attack)  Goal: Effective Oxygenation and Ventilation  Outcome: Ongoing, Progressing     Problem: Sensorimotor Impairment (Stroke, Ischemic/Transient Ischemic Attack)  Goal: Improved Sensorimotor Function  Outcome: Ongoing, Progressing     Problem: Swallowing Impairment (Stroke, Ischemic/Transient Ischemic Attack)  Goal: Optimal Eating and Swallowing without  Aspiration  Outcome: Ongoing, Progressing     Problem: Urinary Elimination Impaired (Stroke, Ischemic/Transient Ischemic Attack)  Goal: Effective Urinary Elimination  Outcome: Ongoing, Progressing     Problem: Impaired Wound Healing  Goal: Optimal Wound Healing  Outcome: Ongoing, Progressing     Problem: Fall Injury Risk  Goal: Absence of Fall and Fall-Related Injury  Outcome: Ongoing, Progressing     Problem: Skin Injury Risk Increased  Goal: Skin Health and Integrity  Outcome: Ongoing, Progressing      Patient baseline mental status/Alert and oriented to person, place and time/Awake

## 2023-01-19 NOTE — PATIENT PROFILE ADULT - NSPROMUTINFOINDIVIDFT_GEN_A_NUR
PLEASE INCLUDE MORE SPECIFIC DOCUMENTATION IN YOUR PROGRESS NOTE AND DISCHARGE SUMMARY.  The documentation in this patient's medical record requires additional clarification to ensure that we accurately capture the patients diagnosis(es), treatment and/or severity of illness. Please document to the greatest level of specificity all corresponding diagnoses (either known or suspected) and/or treatment associated with the clinical information described below. none

## 2023-04-04 NOTE — ED BEHAVIORAL HEALTH ASSESSMENT NOTE - NS ED BHA ED COURSE FOUR POINT RESTRAINTS IN ED YN
Go to the ER for any emergent symptoms. If you cannot get there safely call 911.      Get your cardiac testing completed    See cardiology    Follow-up as needed or when your routine care is due No

## 2023-07-25 NOTE — ED PROVIDER NOTE - MUSCULOSKELETAL [+], MLM
CABG EVAL CABG EVAL CABG EVAL CABG EVAL CABG EVAL CABG EVAL CABG EVAL CABG EVAL CABG EVAL CABG EVAL CABG EVAL CABG EVAL CABG EVAL CABG EVAL CABG EVAL CABG EVAL CABG EVAL CABG EVAL CABG CABG EVAL CABG EVAL CABG EVAL CABG EVAL CABG EVAL CABG EVAL CABG EVAL CABG EVAL CABG EVAL CABG EVAL CABG EVAL CABG EVAL CABG EVAL CABG EVAL CABG EVAL CABG EVAL CABG EVAL CABG EVAL CABG EVAL CABG EVAL CABG EVAL CABG EVAL CABG EVAL CABG EVAL CABG EVAL CABG EVAL CABG EVAL CABG EVAL CABG EVAL CABG EVAL CABG EVAL CABG EVAL CABG EVAL CABG EVAL CABG EVAL CABG EVAL CABG EVAL CABG CABG EVAL CABG EVAL CABG EVAL CABG EVAL CABG EVAL CABG EVAL CABG EVAL CABG EVAL CABG CABG EVAL CABG EVAL CABG EVAL CABG EVAL JOINT PAIN/BL knee pain

## 2023-08-18 NOTE — PATIENT PROFILE ADULT - HOME ACCESSIBILITY CONCERNS
Pharmacy Tube Feeding Consult    Medication reviewed for administration by feeding tube and for potential food/drug interactions.    Recommendation: No changes are needed at this time.     Pharmacy will continue to follow as new medications are ordered.    Purvi Saul RPH on 8/18/2023 at 8:45 AM       none

## 2023-09-28 NOTE — BEHAVIORAL HEALTH ASSESSMENT NOTE - RISK ASSESSMENT
Patient/Caregiver provided printed discharge information.
Patient does not present an imminent risk of harm to himself or other at this time
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
## 2024-03-16 NOTE — ED PROVIDER NOTE - TEMPLATE, MLM
General

## 2024-05-17 NOTE — ED ADULT NURSE NOTE - NS ED PATIENT SAFETY CONCERN
[de-identified] : L spine   No rashes, erythema, edema noted TTP right lumbar paraspinals and sciatic notch Positive straight leg raise on the right LLE: 5/5 strength RLE: 5/5 strength except 4+/5 plantarflexion and ehl  Sensation reduced along R l5 and s1 dermatome   
No

## 2024-07-30 NOTE — DIETITIAN INITIAL EVALUATION ADULT. - PROBLEM/PLAN-8
Clinic Care Coordination Contact  Community Health Worker Initial Outreach    CHW Initial Information Gathering:  Referral Source: PCP  Preferred Hospital: Other ([facility])  Preferred Urgent Care: Other ([facility])  No PCP office visit in Past Year: No       Patient accepts CC: No, the CHW was able to give the resources that were listed from the SW. At this time the CHW will send the introduction letter to the patient with the resources attached at the bottom. Patient will be sent Care Coordination introduction letter for future reference.     GERTRUDIS Tellez  [phone]  [facility]  [facility]    
DISPLAY PLAN FREE TEXT

## 2024-10-26 NOTE — ED ADULT NURSE NOTE - EXTENSIONS OF SELF_ADULT
Problem: ABCDS Injury Assessment  Goal: Absence of physical injury  10/26/2024 0308 by Tess Soriano RN  Outcome: Progressing     Problem: Chronic Conditions and Co-morbidities  Goal: Patient's chronic conditions and co-morbidity symptoms are monitored and maintained or improved  Outcome: Progressing     Problem: Skin/Tissue Integrity  Goal: Absence of new skin breakdown  Description: 1.  Monitor for areas of redness and/or skin breakdown  2.  Assess vascular access sites hourly  3.  Every 4-6 hours minimum:  Change oxygen saturation probe site  4.  Every 4-6 hours:  If on nasal continuous positive airway pressure, respiratory therapy assess nares and determine need for appliance change or resting period.  Outcome: Progressing      None

## 2024-12-03 NOTE — ED PROVIDER NOTE - GASTROINTESTINAL, MLM
Detail Level: Zone Photo Preface (Leave Blank If You Do Not Want): Photographs were obtained today Abdomen soft, non-tender, no guarding.

## 2024-12-13 NOTE — PROGRESS NOTE ADULT - PROBLEM SELECTOR PLAN 7
RECALL: Last Colonoscopy on 1//17/2020 with Dr. Raphael Torres ([facility] in [place]). Recommend repeat  5 years. (Op Report noted under Media Section dated 6/29/21, page 342 of 359    PROCEDURE: Colonoscopy (13062)    KIDNEY CONCERNS: NONE - Follow provider preferred prep    DIAGNOSIS: History of Colon Polyps Z86.010    DIABETIC: no    PROCEDURE LOCATION: ASC    SEDATION: MAC Only    ANTIPLATELET / ANTICOAGULATION: None     MEDICATION HOLDS: N/A    Current use or HX of cocaine, meth or heroin use within the past 12 months (MAC): No     SPECIAL INSTRUCTIONS: None    Nurse chart review complete. Please contact patient to schedule their procedure(s).     cont desmopresin

## 2025-01-18 NOTE — ED PROVIDER NOTE - CLINICAL SUMMARY MEDICAL DECISION MAKING FREE TEXT BOX
Patient states that since last night, he has had chest pain. It is now improved. He had no nausea, vomitng, or diaphoresis. Non radiating pain. No new back pain. Has h/o chronic back and neck pain with h/o spinal stenosis. No fever. No cough. He states it was 8/10. Now it is 4/10. He was given ASA and Nitro by EMS. Patient is sitting upright in the stretcher. Appears comfortable. No acute distress. S1 S2, Clear lungs. Abd soft and nontender. Distal pulses equal b/l. Xray , EKG, Labs. Discharge in sight. Pt well and states his pain is improved. Patient states that since last night, he has had chest pain. It is now improved. He had no nausea, vomitng, or diaphoresis. Non radiating pain. No new back pain. Has h/o chronic back and neck pain with h/o spinal stenosis. No fever. No cough. He states it was 8/10. Now it is 4/10. He was given ASA and Nitro by EMS. Patient is sitting upright in the stretcher. Appears comfortable. No acute distress. S1 S2, Clear lungs. Abd soft and nontender. Distal pulses equal b/l. Xray , EKG, Labs. Discharge in sight. Pt well and states his pain is improved.  Father spoke with patient on phone and then called me. He has concerns that his son is depressed. I went to re-evaluate the patient who states he feels depressed and when asked about suicidality, he affirms that he feels suicidal without plan. We have added BAL and UTox and will activate telepsych for evaluation.  Eval by [facility] who recc psych admission but pt has many medical needs due to his recent surgery and disability. They recc medical admission with psych CL service. Will d/w Dr Arthur. declines

## 2025-01-28 NOTE — ED PROVIDER NOTE - NS ED MD DISPO DISCHARGE CCDA
PROCEDURES:  Complex drainage of wound 28-Jan-2025 17:00:06  Gilbert Darden  Delayed closure of wound 28-Jan-2025 17:02:17  Gilbert Darden   Patient/Caregiver provided printed discharge information.

## 2025-02-03 NOTE — ED PROVIDER NOTE - CARDIAC, MLM
Established pt appt for new injury left knee over weekend no recent imaging please advise  Future Appointments  2/3/2025   1:10 PM    Sagrario Finch, PA        [id number]     Normal rate, regular rhythm.  Heart sounds S1, S2.  No murmurs, rubs or gallops.

## 2025-05-13 NOTE — ED BEHAVIORAL HEALTH ASSESSMENT NOTE - NS ED [facility] PLAN ADMIT TO [facility] CONTACTED YN
PAST MEDICAL HISTORY:  Branchial cleft anomaly     H/O removal of neck cyst     No pertinent past medical history      Yes

## 2025-07-07 NOTE — OCCUPATIONAL THERAPY INITIAL EVALUATION ADULT - VISUAL ASSESSMENT: EYE MUSCLE BALANCE
[Initial Evaluation] : an initial evaluation of
[Proteinuria] : proteinuria
[Patient] : patient
[Mother] : mother
normal
normal